# Patient Record
Sex: MALE | Race: BLACK OR AFRICAN AMERICAN | Employment: UNEMPLOYED | URBAN - METROPOLITAN AREA
[De-identification: names, ages, dates, MRNs, and addresses within clinical notes are randomized per-mention and may not be internally consistent; named-entity substitution may affect disease eponyms.]

---

## 2024-03-05 ENCOUNTER — APPOINTMENT (OUTPATIENT)
Dept: CT IMAGING | Age: 44
DRG: 253 | End: 2024-03-05
Payer: MEDICAID

## 2024-03-05 ENCOUNTER — HOSPITAL ENCOUNTER (INPATIENT)
Age: 44
LOS: 2 days | Discharge: ANOTHER HOSPITAL | DRG: 253 | End: 2024-03-07
Attending: STUDENT IN AN ORGANIZED HEALTH CARE EDUCATION/TRAINING PROGRAM | Admitting: FAMILY MEDICINE
Payer: MEDICAID

## 2024-03-05 DIAGNOSIS — K92.2 GASTROINTESTINAL HEMORRHAGE, UNSPECIFIED GASTROINTESTINAL HEMORRHAGE TYPE: Primary | ICD-10-CM

## 2024-03-05 DIAGNOSIS — Z71.89 GOALS OF CARE, COUNSELING/DISCUSSION: ICD-10-CM

## 2024-03-05 DIAGNOSIS — D64.9 ANEMIA, UNSPECIFIED TYPE: ICD-10-CM

## 2024-03-05 LAB
ABO + RH BLD: NORMAL
ACANTHOCYTES BLD QL SMEAR: ABNORMAL
ALP SERPL-CCNC: 44 U/L (ref 40–129)
ANION GAP SERPL CALCULATED.3IONS-SCNC: 12 MMOL/L (ref 3–16)
AST SERPL-CCNC: 18 U/L (ref 15–37)
BACTERIA URNS QL MICRO: ABNORMAL /HPF
BASO STIPL BLD QL SMEAR: ABNORMAL
BASOPHILS # BLD: 0 K/UL (ref 0–0.2)
BASOPHILS NFR BLD: 0 %
BILIRUB DIRECT SERPL-MCNC: <0.2 MG/DL (ref 0–0.3)
BILIRUB SERPL-MCNC: 1.1 MG/DL (ref 0–1)
BILIRUB UR QL STRIP.AUTO: ABNORMAL
BLD GP AB SCN SERPL QL: NORMAL
BLOOD BANK DISPENSE STATUS: NORMAL
BLOOD BANK DISPENSE STATUS: NORMAL
BLOOD BANK PRODUCT CODE: NORMAL
BLOOD BANK PRODUCT CODE: NORMAL
BUN SERPL-MCNC: 7 MG/DL (ref 7–20)
CALCIUM SERPL-MCNC: 9.4 MG/DL (ref 8.3–10.6)
CHLORIDE SERPL-SCNC: 103 MMOL/L (ref 99–110)
CLARITY UR: ABNORMAL
COLOR UR: ABNORMAL
CRYSTALS URNS MICRO: ABNORMAL /HPF
DESCRIPTION BLOOD BANK: NORMAL
EOSINOPHIL # BLD: 0 K/UL (ref 0–0.6)
EOSINOPHIL NFR BLD: 0 %
EPI CELLS #/AREA URNS HPF: ABNORMAL /HPF (ref 0–5)
GFR SERPLBLD CREATININE-BSD FMLA CKD-EPI: >60 ML/MIN/{1.73_M2}
GLUCOSE SERPL-MCNC: 96 MG/DL (ref 70–99)
GLUCOSE UR STRIP.AUTO-MCNC: NEGATIVE MG/DL
HEMOCCULT STL QL: ABNORMAL
HGB BLD-MCNC: 4.8 G/DL (ref 13.5–17.5)
HGB UR QL STRIP.AUTO: ABNORMAL
HYALINE CASTS #/AREA URNS LPF: ABNORMAL /LPF (ref 0–2)
HYPOCHROMIA BLD QL SMEAR: ABNORMAL
KETONES UR STRIP.AUTO-MCNC: ABNORMAL MG/DL
LEUKOCYTE ESTERASE UR QL STRIP.AUTO: ABNORMAL
LIPASE SERPL-CCNC: 28 U/L (ref 13–60)
LYMPHOCYTES # BLD: 6 K/UL (ref 1–5.1)
LYMPHOCYTES NFR BLD: 47 %
MCH RBC QN AUTO: 19.3 PG (ref 26–34)
MCHC RBC AUTO-ENTMCNC: 28.7 G/DL (ref 31–36)
MCV RBC AUTO: 67.3 FL (ref 80–100)
MONOCYTES # BLD: 0.5 K/UL (ref 0–1.3)
MONONUC CELLS NFR BLD MANUAL: 9 %
MUCOUS THREADS #/AREA URNS LPF: ABNORMAL /LPF
MYELOCYTES NFR BLD MANUAL: 4 %
NEUTROPHILS # BLD: 4.8 K/UL (ref 1.7–7.7)
NEUTROPHILS NFR BLD: 16 %
NEUTS BAND NFR BLD MANUAL: 16 % (ref 0–7)
NITRITE UR QL STRIP.AUTO: NEGATIVE
PH UR STRIP.AUTO: 5.5 [PH] (ref 5–8)
PLATELET # BLD AUTO: 95 K/UL (ref 135–450)
PMV BLD AUTO: 10.7 FL (ref 5–10.5)
POTASSIUM SERPL-SCNC: 4.1 MMOL/L (ref 3.5–5.1)
PROT SERPL-MCNC: 7.1 G/DL (ref 6.4–8.2)
PROT UR STRIP.AUTO-MCNC: 30 MG/DL
RBC # BLD AUTO: 2.49 M/UL (ref 4.2–5.9)
RBC #/AREA URNS HPF: ABNORMAL /HPF (ref 0–4)
REASON FOR REJECTION: NORMAL
REJECTED TEST: NORMAL
SCHISTOCYTES BLD QL SMEAR: ABNORMAL
SP GR UR STRIP.AUTO: 1.02 (ref 1–1.03)
TARGETS BLD QL SMEAR: ABNORMAL
UA COMPLETE W REFLEX CULTURE PNL UR: ABNORMAL
UA DIPSTICK W REFLEX MICRO PNL UR: YES
URN SPEC COLLECT METH UR: ABNORMAL
UROBILINOGEN UR STRIP-ACNC: 1 E.U./DL
VARIANT LYMPHS NFR BLD MANUAL: 1 % (ref 0–6)
WBC # BLD AUTO: 12.4 K/UL (ref 4–11)
WBC #/AREA URNS HPF: ABNORMAL /HPF (ref 0–5)

## 2024-03-05 PROCEDURE — 30233N1 TRANSFUSION OF NONAUTOLOGOUS RED BLOOD CELLS INTO PERIPHERAL VEIN, PERCUTANEOUS APPROACH: ICD-10-PCS | Performed by: FAMILY MEDICINE

## 2024-03-05 PROCEDURE — 96374 THER/PROPH/DIAG INJ IV PUSH: CPT

## 2024-03-05 PROCEDURE — 81001 URINALYSIS AUTO W/SCOPE: CPT

## 2024-03-05 PROCEDURE — 6360000002 HC RX W HCPCS: Performed by: STUDENT IN AN ORGANIZED HEALTH CARE EDUCATION/TRAINING PROGRAM

## 2024-03-05 PROCEDURE — 86900 BLOOD TYPING SEROLOGIC ABO: CPT

## 2024-03-05 PROCEDURE — 86880 COOMBS TEST DIRECT: CPT

## 2024-03-05 PROCEDURE — 86901 BLOOD TYPING SEROLOGIC RH(D): CPT

## 2024-03-05 PROCEDURE — 96375 TX/PRO/DX INJ NEW DRUG ADDON: CPT

## 2024-03-05 PROCEDURE — 82270 OCCULT BLOOD FECES: CPT

## 2024-03-05 PROCEDURE — 99285 EMERGENCY DEPT VISIT HI MDM: CPT

## 2024-03-05 PROCEDURE — C9113 INJ PANTOPRAZOLE SODIUM, VIA: HCPCS | Performed by: STUDENT IN AN ORGANIZED HEALTH CARE EDUCATION/TRAINING PROGRAM

## 2024-03-05 PROCEDURE — 74177 CT ABD & PELVIS W/CONTRAST: CPT

## 2024-03-05 PROCEDURE — A4216 STERILE WATER/SALINE, 10 ML: HCPCS | Performed by: STUDENT IN AN ORGANIZED HEALTH CARE EDUCATION/TRAINING PROGRAM

## 2024-03-05 PROCEDURE — 6360000004 HC RX CONTRAST MEDICATION: Performed by: STUDENT IN AN ORGANIZED HEALTH CARE EDUCATION/TRAINING PROGRAM

## 2024-03-05 PROCEDURE — 80048 BASIC METABOLIC PNL TOTAL CA: CPT

## 2024-03-05 PROCEDURE — 2580000003 HC RX 258: Performed by: STUDENT IN AN ORGANIZED HEALTH CARE EDUCATION/TRAINING PROGRAM

## 2024-03-05 PROCEDURE — 85025 COMPLETE CBC W/AUTO DIFF WBC: CPT

## 2024-03-05 PROCEDURE — 36415 COLL VENOUS BLD VENIPUNCTURE: CPT

## 2024-03-05 PROCEDURE — 83690 ASSAY OF LIPASE: CPT

## 2024-03-05 PROCEDURE — 86850 RBC ANTIBODY SCREEN: CPT

## 2024-03-05 PROCEDURE — 80076 HEPATIC FUNCTION PANEL: CPT

## 2024-03-05 PROCEDURE — 1200000000 HC SEMI PRIVATE

## 2024-03-05 PROCEDURE — P9016 RBC LEUKOCYTES REDUCED: HCPCS

## 2024-03-05 PROCEDURE — 6370000000 HC RX 637 (ALT 250 FOR IP): Performed by: STUDENT IN AN ORGANIZED HEALTH CARE EDUCATION/TRAINING PROGRAM

## 2024-03-05 PROCEDURE — 86923 COMPATIBILITY TEST ELECTRIC: CPT

## 2024-03-05 RX ORDER — ONDANSETRON 4 MG/1
4 TABLET, ORALLY DISINTEGRATING ORAL EVERY 8 HOURS PRN
Status: DISCONTINUED | OUTPATIENT
Start: 2024-03-05 | End: 2024-03-07 | Stop reason: HOSPADM

## 2024-03-05 RX ORDER — ACETAMINOPHEN 650 MG/1
650 SUPPOSITORY RECTAL EVERY 6 HOURS PRN
Status: DISCONTINUED | OUTPATIENT
Start: 2024-03-05 | End: 2024-03-07 | Stop reason: HOSPADM

## 2024-03-05 RX ORDER — SODIUM CHLORIDE 0.9 % (FLUSH) 0.9 %
5-40 SYRINGE (ML) INJECTION PRN
Status: DISCONTINUED | OUTPATIENT
Start: 2024-03-05 | End: 2024-03-07 | Stop reason: HOSPADM

## 2024-03-05 RX ORDER — LEVETIRACETAM 500 MG/5ML
1500 INJECTION, SOLUTION, CONCENTRATE INTRAVENOUS ONCE
Status: COMPLETED | OUTPATIENT
Start: 2024-03-05 | End: 2024-03-05

## 2024-03-05 RX ORDER — ONDANSETRON 2 MG/ML
4 INJECTION INTRAMUSCULAR; INTRAVENOUS ONCE
Status: COMPLETED | OUTPATIENT
Start: 2024-03-05 | End: 2024-03-05

## 2024-03-05 RX ORDER — SODIUM CHLORIDE 0.9 % (FLUSH) 0.9 %
5-40 SYRINGE (ML) INJECTION EVERY 12 HOURS SCHEDULED
Status: DISCONTINUED | OUTPATIENT
Start: 2024-03-05 | End: 2024-03-07 | Stop reason: HOSPADM

## 2024-03-05 RX ORDER — SODIUM CHLORIDE 9 MG/ML
INJECTION, SOLUTION INTRAVENOUS CONTINUOUS
Status: ACTIVE | OUTPATIENT
Start: 2024-03-06 | End: 2024-03-06

## 2024-03-05 RX ORDER — DICYCLOMINE HYDROCHLORIDE 10 MG/1
10 CAPSULE ORAL
Status: DISCONTINUED | OUTPATIENT
Start: 2024-03-05 | End: 2024-03-07 | Stop reason: HOSPADM

## 2024-03-05 RX ORDER — SODIUM CHLORIDE 9 MG/ML
INJECTION, SOLUTION INTRAVENOUS PRN
Status: DISCONTINUED | OUTPATIENT
Start: 2024-03-05 | End: 2024-03-07 | Stop reason: HOSPADM

## 2024-03-05 RX ORDER — ONDANSETRON 2 MG/ML
4 INJECTION INTRAMUSCULAR; INTRAVENOUS EVERY 6 HOURS PRN
Status: DISCONTINUED | OUTPATIENT
Start: 2024-03-05 | End: 2024-03-07 | Stop reason: HOSPADM

## 2024-03-05 RX ORDER — ACETAMINOPHEN 325 MG/1
650 TABLET ORAL EVERY 6 HOURS PRN
Status: DISCONTINUED | OUTPATIENT
Start: 2024-03-05 | End: 2024-03-07 | Stop reason: HOSPADM

## 2024-03-05 RX ORDER — LEVETIRACETAM 1000 MG/1
1500 TABLET ORAL 2 TIMES DAILY
Status: ON HOLD | COMMUNITY
Start: 2018-03-31 | End: 2024-08-02

## 2024-03-05 RX ADMIN — IOPAMIDOL 75 ML: 755 INJECTION, SOLUTION INTRAVENOUS at 16:44

## 2024-03-05 RX ADMIN — PANTOPRAZOLE SODIUM 40 MG: 40 INJECTION, POWDER, FOR SOLUTION INTRAVENOUS at 18:11

## 2024-03-05 RX ADMIN — ONDANSETRON 4 MG: 2 INJECTION INTRAMUSCULAR; INTRAVENOUS at 16:02

## 2024-03-05 RX ADMIN — DICYCLOMINE HYDROCHLORIDE 10 MG: 10 CAPSULE ORAL at 16:01

## 2024-03-05 RX ADMIN — LEVETIRACETAM 1500 MG: 100 INJECTION, SOLUTION INTRAVENOUS at 20:25

## 2024-03-05 ASSESSMENT — PAIN DESCRIPTION - LOCATION
LOCATION: ABDOMEN
LOCATION: ABDOMEN

## 2024-03-05 ASSESSMENT — LIFESTYLE VARIABLES
HOW OFTEN DO YOU HAVE A DRINK CONTAINING ALCOHOL: NEVER
HOW MANY STANDARD DRINKS CONTAINING ALCOHOL DO YOU HAVE ON A TYPICAL DAY: PATIENT DOES NOT DRINK

## 2024-03-05 ASSESSMENT — PAIN SCALES - GENERAL
PAINLEVEL_OUTOF10: 10
PAINLEVEL_OUTOF10: 6

## 2024-03-05 ASSESSMENT — PAIN DESCRIPTION - DESCRIPTORS
DESCRIPTORS: ACHING
DESCRIPTORS: CRAMPING

## 2024-03-05 ASSESSMENT — PAIN - FUNCTIONAL ASSESSMENT
PAIN_FUNCTIONAL_ASSESSMENT: ACTIVITIES ARE NOT PREVENTED
PAIN_FUNCTIONAL_ASSESSMENT: 0-10

## 2024-03-05 ASSESSMENT — PAIN DESCRIPTION - ORIENTATION: ORIENTATION: LOWER

## 2024-03-05 NOTE — ED PROVIDER NOTES
[facility] EMERGENCY DEPARTMENT      EMERGENCY MEDICINE     Pt Name: Dennys Peguero  MRN: [id number]  Birthdate 1980  Date of evaluation: 3/5/2024  Provider: Mikel Smith MD    CHIEF COMPLAINT       Chief Complaint   Patient presents with    Abdominal Pain     Pt states \"I am having constant abd pain and burning pain when I pee that has lasted about a month, it has progressively gotten worse.\" Pt rates pain 10/10. Pt states \"I am feeling more tired than normal recently.\"      HISTORY OF PRESENT ILLNESS   Dennys Peguero is a 43 y.o. male who presents to the emergency department for abdominal pain the last month or worsening last few days and mentioned that mainly in his right lower quadrant region as well as having some pain with urination and more frequency than usual.  No hematuria no testicular pain no penile discharge.  No nausea no vomiting but does mention some loose/occasional watery nonbloody diarrhea no new foods or recent travel.     PASTMEDICAL HISTORY   No past medical history on file.    There is no problem list on file for this patient.    SURGICAL HISTORY     No past surgical history on file.    CURRENT MEDICATIONS       Previous Medications    LEVETIRACETAM (KEPPRA) 1000 MG TABLET    Take 1.5 tablets by mouth 2 times daily       ALLERGIES     has No Known Allergies.    FAMILY HISTORY     has no family status information on file.        SOCIAL HISTORY          PHYSICAL EXAM       ED Triage Vitals [03/05/24 1336]   BP Temp Temp Source Pulse Respirations SpO2 Height Weight - Scale   112/69 99 °F (37.2 °C) Oral 88 16 100 % 1.803 m (5' 11\") 82.5 kg (181 lb 14.1 oz)     Physical Exam  Vitals and nursing note reviewed.   Constitutional:       General: He is not in acute distress.     Appearance: He is not toxic-appearing or diaphoretic.   HENT:      Head: Normocephalic and atraumatic.      Right Ear: External ear normal.      Left Ear: External ear normal.      Nose: Nose

## 2024-03-06 ENCOUNTER — ANESTHESIA EVENT (OUTPATIENT)
Dept: ENDOSCOPY | Age: 44
DRG: 253 | End: 2024-03-06
Payer: MEDICAID

## 2024-03-06 ENCOUNTER — ANESTHESIA (OUTPATIENT)
Dept: ENDOSCOPY | Age: 44
DRG: 253 | End: 2024-03-06
Payer: MEDICAID

## 2024-03-06 LAB
ACANTHOCYTES BLD QL SMEAR: ABNORMAL
ALBUMIN SERPL-MCNC: 3.5 G/DL (ref 3.4–5)
ALBUMIN/GLOB SERPL: 1.5 {RATIO} (ref 1.1–2.2)
ALP SERPL-CCNC: 37 U/L (ref 40–129)
ALP SERPL-CCNC: 39 U/L (ref 40–129)
ALT SERPL-CCNC: 6 U/L (ref 10–40)
ALT SERPL-CCNC: 7 U/L (ref 10–40)
ANION GAP SERPL CALCULATED.3IONS-SCNC: 11 MMOL/L (ref 3–16)
ANION GAP SERPL CALCULATED.3IONS-SCNC: 9 MMOL/L (ref 3–16)
ANISOCYTOSIS BLD QL SMEAR: ABNORMAL
APTT BLD: 30.5 SEC (ref 22.7–35.9)
AST SERPL-CCNC: 11 U/L (ref 15–37)
AST SERPL-CCNC: 13 U/L (ref 15–37)
BASO STIPL BLD QL SMEAR: ABNORMAL
BASOPHILS # BLD: 0 K/UL (ref 0–0.2)
BASOPHILS NFR BLD: 0 %
BILIRUB INDIRECT SERPL-MCNC: ABNORMAL MG/DL (ref 0–1)
BILIRUB SERPL-MCNC: 0.9 MG/DL (ref 0–1)
BLOOD BANK DISPENSE STATUS: NORMAL
BLOOD BANK PRODUCT CODE: NORMAL
BPU ID: NORMAL
BUN SERPL-MCNC: 7 MG/DL (ref 7–20)
BUN SERPL-MCNC: 8 MG/DL (ref 7–20)
C3 SERPL-MCNC: 101.9 MG/DL (ref 90–180)
C4 SERPL-MCNC: 31 MG/DL (ref 10–40)
CALCIUM SERPL-MCNC: 8.9 MG/DL (ref 8.3–10.6)
CHLORIDE SERPL-SCNC: 106 MMOL/L (ref 99–110)
CHLORIDE SERPL-SCNC: 106 MMOL/L (ref 99–110)
CO2 SERPL-SCNC: 24 MMOL/L (ref 21–32)
CREAT SERPL-MCNC: 0.8 MG/DL (ref 0.9–1.3)
CREAT SERPL-MCNC: 0.9 MG/DL (ref 0.9–1.3)
DACRYOCYTES BLD QL SMEAR: ABNORMAL
DEPRECATED RDW RBC AUTO: 35.5 % (ref 12.4–15.4)
DEPRECATED RDW RBC AUTO: 37 % (ref 12.4–15.4)
DESCRIPTION BLOOD BANK: NORMAL
EOSINOPHIL # BLD: 0.3 K/UL (ref 0–0.6)
EOSINOPHIL NFR BLD: 3 %
FERRITIN SERPL IA-MCNC: 557.7 NG/ML (ref 30–400)
FIBRINOGEN PPP-MCNC: 328 MG/DL (ref 243–550)
GFR SERPLBLD CREATININE-BSD FMLA CKD-EPI: >60 ML/MIN/{1.73_M2}
GFR SERPLBLD CREATININE-BSD FMLA CKD-EPI: >60 ML/MIN/{1.73_M2}
GLUCOSE SERPL-MCNC: 80 MG/DL (ref 70–99)
GLUCOSE SERPL-MCNC: 94 MG/DL (ref 70–99)
HAPTOGLOB SERPL-MCNC: <10 MG/DL (ref 30–200)
HCT VFR BLD AUTO: 22.6 % (ref 40.5–52.5)
HCT VFR BLD AUTO: 23.2 % (ref 40.5–52.5)
HCT VFR BLD AUTO: 24.1 % (ref 40.5–52.5)
HCT VFR BLD AUTO: 24.4 % (ref 40.5–52.5)
HCT VFR BLD AUTO: 25.8 % (ref 40.5–52.5)
HGB BLD-MCNC: 6.8 G/DL (ref 13.5–17.5)
HGB BLD-MCNC: 7.4 G/DL (ref 13.5–17.5)
HGB BLD-MCNC: 7.6 G/DL (ref 13.5–17.5)
HGB BLD-MCNC: 7.6 G/DL (ref 13.5–17.5)
HGB BLD-MCNC: 8 G/DL (ref 13.5–17.5)
HYPOCHROMIA BLD QL SMEAR: ABNORMAL
INR PPP: 1.2 (ref 0.84–1.16)
IRON SATN MFR SERPL: 52 % (ref 20–50)
IRON SERPL-MCNC: 86 UG/DL (ref 59–158)
LDH SERPL L TO P-CCNC: 1017 U/L (ref 100–190)
LYMPHOCYTES # BLD: 6 K/UL (ref 1–5.1)
LYMPHOCYTES NFR BLD: 54 %
MCH RBC QN AUTO: 23.4 PG (ref 26–34)
MCH RBC QN AUTO: 23.5 PG (ref 26–34)
MCHC RBC AUTO-ENTMCNC: 31.5 G/DL (ref 31–36)
MCV RBC AUTO: 74.5 FL (ref 80–100)
MCV RBC AUTO: 75.9 FL (ref 80–100)
METAMYELOCYTES NFR BLD MANUAL: 1 %
MONOCYTES # BLD: 0.8 K/UL (ref 0–1.3)
MONOCYTES NFR BLD: 7 %
MONONUC CELLS NFR BLD MANUAL: 11 %
MYELOCYTES NFR BLD MANUAL: 1 %
NEUTROPHILS # BLD: 2.8 K/UL (ref 1.7–7.7)
NEUTROPHILS NFR BLD: 21 %
NEUTS BAND NFR BLD MANUAL: 2 % (ref 0–7)
NRBC BLD-RTO: 16 /100 WBC
OVALOCYTES BLD QL SMEAR: ABNORMAL
PATH INTERP BLD-IMP: YES
PLATELET # BLD AUTO: 52 K/UL (ref 135–450)
PLATELET # BLD AUTO: 54 K/UL (ref 135–450)
PLATELET BLD QL SMEAR: ABNORMAL
PMV BLD AUTO: 8.8 FL (ref 5–10.5)
POIKILOCYTOSIS BLD QL SMEAR: ABNORMAL
POTASSIUM SERPL-SCNC: 3.6 MMOL/L (ref 3.5–5.1)
POTASSIUM SERPL-SCNC: 3.9 MMOL/L (ref 3.5–5.1)
PROT SERPL-MCNC: 5.8 G/DL (ref 6.4–8.2)
PROT SERPL-MCNC: 6 G/DL (ref 6.4–8.2)
PROTHROMBIN TIME: 15.2 SEC (ref 11.5–14.8)
RBC # BLD AUTO: 3.24 M/UL (ref 4.2–5.9)
RBC # BLD AUTO: 3.4 M/UL (ref 4.2–5.9)
SCHISTOCYTES BLD QL SMEAR: ABNORMAL
SLIDE REVIEW: ABNORMAL
SODIUM SERPL-SCNC: 138 MMOL/L (ref 136–145)
SODIUM SERPL-SCNC: 141 MMOL/L (ref 136–145)
TIBC SERPL-MCNC: 164 UG/DL (ref 260–445)
WBC # BLD AUTO: 11.1 K/UL (ref 4–11)
WBC # BLD AUTO: 6.3 K/UL (ref 4–11)

## 2024-03-06 PROCEDURE — 6370000000 HC RX 637 (ALT 250 FOR IP): Performed by: STUDENT IN AN ORGANIZED HEALTH CARE EDUCATION/TRAINING PROGRAM

## 2024-03-06 PROCEDURE — 85018 HEMOGLOBIN: CPT

## 2024-03-06 PROCEDURE — 6370000000 HC RX 637 (ALT 250 FOR IP): Performed by: INTERNAL MEDICINE

## 2024-03-06 PROCEDURE — 6360000002 HC RX W HCPCS: Performed by: STUDENT IN AN ORGANIZED HEALTH CARE EDUCATION/TRAINING PROGRAM

## 2024-03-06 PROCEDURE — 83010 ASSAY OF HAPTOGLOBIN QUANT: CPT

## 2024-03-06 PROCEDURE — 85025 COMPLETE CBC W/AUTO DIFF WBC: CPT

## 2024-03-06 PROCEDURE — 1200000000 HC SEMI PRIVATE

## 2024-03-06 PROCEDURE — 36430 TRANSFUSION BLD/BLD COMPNT: CPT

## 2024-03-06 PROCEDURE — 99152 MOD SED SAME PHYS/QHP 5/>YRS: CPT | Performed by: INTERNAL MEDICINE

## 2024-03-06 PROCEDURE — 85397 CLOTTING FUNCT ACTIVITY: CPT

## 2024-03-06 PROCEDURE — 88184 FLOWCYTOMETRY/ TC 1 MARKER: CPT

## 2024-03-06 PROCEDURE — 85610 PROTHROMBIN TIME: CPT

## 2024-03-06 PROCEDURE — 2580000003 HC RX 258: Performed by: FAMILY MEDICINE

## 2024-03-06 PROCEDURE — 94760 N-INVAS EAR/PLS OXIMETRY 1: CPT

## 2024-03-06 PROCEDURE — 88185 FLOWCYTOMETRY/TC ADD-ON: CPT

## 2024-03-06 PROCEDURE — 85730 THROMBOPLASTIN TIME PARTIAL: CPT

## 2024-03-06 PROCEDURE — C9113 INJ PANTOPRAZOLE SODIUM, VIA: HCPCS | Performed by: STUDENT IN AN ORGANIZED HEALTH CARE EDUCATION/TRAINING PROGRAM

## 2024-03-06 PROCEDURE — 0DJ08ZZ INSPECTION OF UPPER INTESTINAL TRACT, VIA NATURAL OR ARTIFICIAL OPENING ENDOSCOPIC: ICD-10-PCS | Performed by: INTERNAL MEDICINE

## 2024-03-06 PROCEDURE — 7100000000 HC PACU RECOVERY - FIRST 15 MIN: Performed by: INTERNAL MEDICINE

## 2024-03-06 PROCEDURE — 85014 HEMATOCRIT: CPT

## 2024-03-06 PROCEDURE — 83615 LACTATE (LD) (LDH) ENZYME: CPT

## 2024-03-06 PROCEDURE — 2580000003 HC RX 258: Performed by: STUDENT IN AN ORGANIZED HEALTH CARE EDUCATION/TRAINING PROGRAM

## 2024-03-06 PROCEDURE — 6360000002 HC RX W HCPCS: Performed by: INTERNAL MEDICINE

## 2024-03-06 PROCEDURE — 86160 COMPLEMENT ANTIGEN: CPT

## 2024-03-06 PROCEDURE — 6370000000 HC RX 637 (ALT 250 FOR IP): Performed by: PHYSICIAN ASSISTANT

## 2024-03-06 PROCEDURE — 36415 COLL VENOUS BLD VENIPUNCTURE: CPT

## 2024-03-06 PROCEDURE — 3609017100 HC EGD: Performed by: INTERNAL MEDICINE

## 2024-03-06 PROCEDURE — 83550 IRON BINDING TEST: CPT

## 2024-03-06 PROCEDURE — 80053 COMPREHEN METABOLIC PANEL: CPT

## 2024-03-06 PROCEDURE — 85384 FIBRINOGEN ACTIVITY: CPT

## 2024-03-06 PROCEDURE — 2709999900 HC NON-CHARGEABLE SUPPLY: Performed by: INTERNAL MEDICINE

## 2024-03-06 PROCEDURE — 82728 ASSAY OF FERRITIN: CPT

## 2024-03-06 PROCEDURE — 85027 COMPLETE CBC AUTOMATED: CPT

## 2024-03-06 PROCEDURE — 83540 ASSAY OF IRON: CPT

## 2024-03-06 RX ORDER — FENTANYL CITRATE 50 UG/ML
INJECTION, SOLUTION INTRAMUSCULAR; INTRAVENOUS PRN
Status: DISCONTINUED | OUTPATIENT
Start: 2024-03-06 | End: 2024-03-06 | Stop reason: HOSPADM

## 2024-03-06 RX ORDER — LEVETIRACETAM 500 MG/1
1500 TABLET ORAL 2 TIMES DAILY
Status: DISCONTINUED | OUTPATIENT
Start: 2024-03-06 | End: 2024-03-07 | Stop reason: HOSPADM

## 2024-03-06 RX ORDER — MIDAZOLAM HYDROCHLORIDE 1 MG/ML
INJECTION INTRAMUSCULAR; INTRAVENOUS PRN
Status: DISCONTINUED | OUTPATIENT
Start: 2024-03-06 | End: 2024-03-06 | Stop reason: HOSPADM

## 2024-03-06 RX ORDER — SODIUM CHLORIDE 9 MG/ML
INJECTION, SOLUTION INTRAVENOUS PRN
Status: DISCONTINUED | OUTPATIENT
Start: 2024-03-06 | End: 2024-03-07 | Stop reason: HOSPADM

## 2024-03-06 RX ADMIN — Medication 10 ML: at 00:20

## 2024-03-06 RX ADMIN — POLYETHYLENE GLYCOL-3350 AND ELECTROLYTES 4000 ML: 236; 6.74; 5.86; 2.97; 22.74 POWDER, FOR SOLUTION ORAL at 09:10

## 2024-03-06 RX ADMIN — Medication 10 ML: at 09:12

## 2024-03-06 RX ADMIN — DICYCLOMINE HYDROCHLORIDE 10 MG: 10 CAPSULE ORAL at 18:12

## 2024-03-06 RX ADMIN — SODIUM CHLORIDE: 9 INJECTION, SOLUTION INTRAVENOUS at 00:19

## 2024-03-06 RX ADMIN — DICYCLOMINE HYDROCHLORIDE 10 MG: 10 CAPSULE ORAL at 06:16

## 2024-03-06 RX ADMIN — LEVETIRACETAM 1500 MG: 500 TABLET, FILM COATED ORAL at 21:01

## 2024-03-06 RX ADMIN — PANTOPRAZOLE SODIUM 40 MG: 40 INJECTION, POWDER, FOR SOLUTION INTRAVENOUS at 09:11

## 2024-03-06 ASSESSMENT — PAIN SCALES - GENERAL
PAINLEVEL_OUTOF10: 0
PAINLEVEL_OUTOF10: 0

## 2024-03-06 ASSESSMENT — PAIN - FUNCTIONAL ASSESSMENT: PAIN_FUNCTIONAL_ASSESSMENT: NONE - DENIES PAIN

## 2024-03-06 NOTE — PLAN OF CARE
Problem: Discharge Planning  Goal: Discharge to home or other facility with appropriate resources  3/6/2024 1026 by Luis Barnhart RN  Outcome: Progressing  Flowsheets (Taken 3/6/2024 1024)  Discharge to home or other facility with appropriate resources:   Identify barriers to discharge with patient and caregiver   Arrange for needed discharge resources and transportation as appropriate  3/6/2024 0248 by Saba Berumen RN  Outcome: Progressing     Problem: Pain  Goal: Verbalizes/displays adequate comfort level or baseline comfort level  3/6/2024 1026 by Luis Barnhart RN  Outcome: Progressing  3/6/2024 0248 by Saba Berumen RN  Outcome: Progressing     Problem: Safety - Adult  Goal: Free from fall injury  3/6/2024 1026 by Luis Barnhart RN  Outcome: Progressing  3/6/2024 0248 by Saba Berumen RN  Outcome: Progressing     Problem: Nutrition Deficit:  Goal: Optimize nutritional status  Outcome: Progressing   Electronically signed by LUIS BARNHART RN on 3/6/2024 at 10:26 AM

## 2024-03-06 NOTE — H&P
Hospital Medicine History & Physical      Date of Admission: 3/5/2024    Date of Service:  Pt seen/examined on 3/5/24     [x]Admitted to Inpatient with expected LOS greater than two midnights due to medical therapy.  []Placed in Observation status.    Chief Admission Complaint: GI bleed    Presenting Admission History:      43 y.o. male with past medical history of brain tumor status post partial resection, on prophylactic antiepileptic, who presents to the ER complaining of worsening fatigue over the last several days.  Patient initially attributed the fatigue to recent stomach infection however his energy continued to get worse prompting him to come to the ER for evaluation.  Patient states he does remember that he had blood-tinged stools intermittently over the last several weeks. In the ER, laboratory testing showed a hemoglobin of 4.8.      Assessment/Plan:    GI bleed  Brain tumor  Thrombocytopenia    Plan  GI bleed: Patient is being transfused 2 units of packed red blood cells in the ER, will continue to monitor H&H.  Place on IV Protonix.  Consult gastroenterology for further recommendations.    History of brain tumor: Patient has a history of partial resection of tumor and had to take antiepileptics prophylactically.  Continue Keppra 1500 mg twice daily.    Thrombocytopenia: Will continue to monitor closely. Anticoagulation already on hold.    Discussed management and the need for Hospitalization of the patient w/ the Emergency Department Provider.      Physical Exam Performed:      BP (!) 109/58   Pulse 78   Temp 98.3 °F (36.8 °C) (Oral)   Resp 19   Ht 1.803 m (5' 11\")   Wt 82.5 kg (181 lb 14.1 oz)   SpO2 99%   BMI 25.37 kg/m²     General appearance:  No apparent distress, appears stated age and cooperative.  HEENT:  Pupils equal, round, and reactive to light. Conjunctivae/corneas clear.  Respiratory:  Normal respiratory effort. Clear to auscultation, bilaterally without

## 2024-03-06 NOTE — CONSULTS
[facility]    Consult Note      Requesting Physician:  Jeanette Smith MD    CHIEF COMPLAINT:  anemia/TCP      HISTORY OF PRESENT ILLNESS:    Mr. Peguero  is a 43 y.o. male we are seeing in consultation for anemia/TCP.  This is a patient that presented yesterday with a 3 to 4-week history of progressive bloody stools.  His hemoglobin on admission was 4.8 with a platelet count in the 90s. He then received 2 to 3 units of blood and his hemoglobin is up to 7.6 but his platelets did drop down to 52.  Hematology was consulted for further workup and management.  He is not on any blood thinners.  He does not drink excessive alcohol.  Pathology reviewed his smear this afternoon and I got a call letting me know that he had several schistocytes-at least 5 per high-power field.  The patient is otherwise doing well.  He feels much better now that he has gotten some blood.  He denies any shortness of breath or chest pain or further rectal bleeding.    Past Medical History:  Past Medical History:   Diagnosis Date    Brain cancer (HCC)     diagnosed in 2017    Seizure (HCC)        Past Surgical History:  Past Surgical History:   Procedure Laterality Date    BRAIN SURGERY      Surgery in 2017 at [facility]       Current Medications:  Current Facility-Administered Medications   Medication Dose Route Frequency Provider Last Rate Last Admin    0.9 % sodium chloride infusion   IntraVENous PRN Ban Barriga APRN - CNP        levETIRAcetam (KEPPRA) tablet 1,500 mg  1,500 mg Oral BID Tania Juárez MD        dicyclomine (BENTYL) capsule 10 mg  10 mg Oral TID AC Mikel Smith MD   10 mg at 03/06/24 0616    0.9 % sodium chloride infusion   IntraVENous PRN Mikel Smith MD        pantoprazole (PROTONIX) 40 mg in sodium chloride (PF) 0.9 % 10 mL injection  40 mg IntraVENous Daily 
(3/6/2024)    PRAPARE - Transportation     Lack of Transportation (Medical): No     Lack of Transportation (Non-Medical): No   Housing Stability: Low Risk  (3/6/2024)    Housing Stability Vital Sign     Unable to Pay for Housing in the Last Year: No     Number of Places Lived in the Last Year: 1     Unstable Housing in the Last Year: No       MEDICATIONS   SCHEDULED:  levETIRAcetam, 1,500 mg, BID  dicyclomine, 10 mg, TID AC  pantoprazole (PROTONIX) 40 mg in sodium chloride (PF) 0.9 % 10 mL injection, 40 mg, Daily  sodium chloride flush, 5-40 mL, 2 times per day      FLUIDS/DRIPS:     sodium chloride      sodium chloride      sodium chloride      sodium chloride 75 mL/hr at 03/06/24 0019     PRNs: sodium chloride, , PRN  sodium chloride, , PRN  sodium chloride flush, 5-40 mL, PRN  sodium chloride, , PRN  ondansetron, 4 mg, Q8H PRN   Or  ondansetron, 4 mg, Q6H PRN  acetaminophen, 650 mg, Q6H PRN   Or  acetaminophen, 650 mg, Q6H PRN      ALLERGIES:  He   Allergies   Allergen Reactions    [place] Meal Anaphylaxis       REVIEW OF SYSTEMS   Pertinent ROS noted in HPI    PHYSICAL EXAM     Vitals:    03/06/24 0222 03/06/24 0507 03/06/24 0638 03/06/24 0711   BP: (!) 108/56 (!) 109/58  115/72   Pulse: 82 78  62   Resp: 18 19  17   Temp: 98.5 °F (36.9 °C) 98.3 °F (36.8 °C)  98.6 °F (37 °C)   TempSrc: Oral Oral  Oral   SpO2: 98% 99%  98%   Weight:   80.5 kg (177 lb 7.5 oz)    Height:           I/O last 3 completed shifts:  In: 680 [Blood:680]  Out: -       Physical Exam:  General appearance: alert, cooperative, no distress, appears stated age  Eyes: Anicteric  Head: Normocephalic, without obvious abnormality  Lungs: clear to auscultation bilaterally, Normal Effort  Heart: regular rate and rhythm, normal S1 and S2, no murmurs or rubs  Abdomen: soft, non-distended, non-tender. Bowel sounds normal. No masses,  no organomegaly.   Extremities: atraumatic, no cyanosis or edema  Skin: warm and dry, no jaundice  Neuro: Grossly intact,

## 2024-03-06 NOTE — CARE COORDINATION
Chart reviewed; noted no PCP listed and no insurance. Patient states he has a [facility] PCP and has seen the financial counselor re; medicaid application. He denies any needs at this time.   Electronically signed by CIRA Tomas on 3/6/2024 at 5:33 PM

## 2024-03-06 NOTE — OP NOTE
Endoscopy Note    Patient: Dennys Peguero  : 1980  Acct#:     Procedure: Esophagogastroduodenoscopy    Date:  3/6/2024     Surgeon:  Wade Cantrell MD,     Referring Physician:  Dr. Smith    Indications: This is a 43 y.o. year old male who presents today with melena vs. Hematochezia and acute blood loss anemia for EGD.    Anesthesia:  TIVA    Description of Procedure:  Informed consent was obtained from the patient after explanation of indications, benefits and possible risks and complications of the procedure.  The patient was then taken to the endoscopy suite, placed in the left lateral decubitus position and the above IV sedation was administrered.    The Olympus videoendoscope was placed in the patient's mouth and under direct visualization passed into the esophagus and advanced without difficulty to the 2nd portion of the duodenum.  Views were good, patient toleration was good.  Retroflexion was performed in the stomach.      Findings:  1.  The esophagus appeared normal without evidence of Matamoros's esophagus or reflux esophagitis. No varices.  2.  Normal stomach.   3.  Normal duodenum.     The scope was then withdrawn back into the stomach, it was decompressed, and the scope was completely withdrawn.    The patient tolerated the procedure well and was taken to the post anesthesia care unit in good condition.    Estimated blood loss: none  Specimens taken: none    Impression:    Normal EGD. No cause of bleeding      Recommendations:   1.  Clear liquid diet,   2.  Complete bowel prep today and plan colonoscopy at 9am tomorrow when bowels cleared out.        Wade Cantrell MD,   [facility]

## 2024-03-07 ENCOUNTER — HOSPITAL ENCOUNTER (INPATIENT)
Age: 44
LOS: 26 days | Discharge: HOME | DRG: 690 | End: 2024-04-02
Attending: STUDENT IN AN ORGANIZED HEALTH CARE EDUCATION/TRAINING PROGRAM | Admitting: STUDENT IN AN ORGANIZED HEALTH CARE EDUCATION/TRAINING PROGRAM
Payer: MEDICAID

## 2024-03-07 VITALS
HEIGHT: 71 IN | OXYGEN SATURATION: 99 % | WEIGHT: 177.47 LBS | TEMPERATURE: 97.7 F | DIASTOLIC BLOOD PRESSURE: 81 MMHG | BODY MASS INDEX: 24.85 KG/M2 | RESPIRATION RATE: 18 BRPM | HEART RATE: 51 BPM | SYSTOLIC BLOOD PRESSURE: 136 MMHG

## 2024-03-07 DIAGNOSIS — K92.1 HEMATOCHEZIA: ICD-10-CM

## 2024-03-07 PROBLEM — D69.6 THROMBOCYTOPENIA (HCC): Status: ACTIVE | Noted: 2024-03-07

## 2024-03-07 PROBLEM — M31.19 TTP (THROMBOTIC THROMBOCYTOPENIC PURPURA) (HCC): Status: ACTIVE | Noted: 2024-03-07

## 2024-03-07 LAB
ABO + RH BLD: NORMAL
ACANTHOCYTES BLD QL SMEAR: ABNORMAL
ALBUMIN SERPL-MCNC: 3.5 G/DL (ref 3.4–5)
ALBUMIN/GLOB SERPL: 1.6 {RATIO} (ref 1.1–2.2)
ALP SERPL-CCNC: 36 U/L (ref 40–129)
ALT SERPL-CCNC: 6 U/L (ref 10–40)
ANION GAP SERPL CALCULATED.3IONS-SCNC: 10 MMOL/L (ref 3–16)
ANISOCYTOSIS BLD QL SMEAR: ABNORMAL
APTT BLD: 29.7 SEC (ref 22.7–35.9)
AST SERPL-CCNC: 13 U/L (ref 15–37)
BASOPHILS # BLD: 0 K/UL (ref 0–0.2)
BASOPHILS NFR BLD: 0 %
BILIRUB SERPL-MCNC: 1 MG/DL (ref 0–1)
BLD GP AB SCN SERPL QL: NORMAL
C DIFF TOX A+B STL QL IA: NORMAL
CALCIUM SERPL-MCNC: 8.8 MG/DL (ref 8.3–10.6)
CHLORIDE SERPL-SCNC: 108 MMOL/L (ref 99–110)
CO2 SERPL-SCNC: 22 MMOL/L (ref 21–32)
CREAT SERPL-MCNC: 0.8 MG/DL (ref 0.9–1.3)
D DIMER: 1.91 UG/ML FEU (ref 0–0.6)
DACRYOCYTES BLD QL SMEAR: ABNORMAL
DAT POLY-SP REAG RBC QL: NORMAL
DEPRECATED RDW RBC AUTO: 37.2 % (ref 12.4–15.4)
EOSINOPHIL # BLD: 0.1 K/UL (ref 0–0.6)
EOSINOPHIL NFR BLD: 1 %
FIBRINOGEN PPP-MCNC: 376 MG/DL (ref 243–550)
FOLATE SERPL-MCNC: >20 NG/ML (ref 4.78–24.2)
GFR SERPLBLD CREATININE-BSD FMLA CKD-EPI: >60 ML/MIN/{1.73_M2}
GLUCOSE SERPL-MCNC: 85 MG/DL (ref 70–99)
HAPTOGLOB SERPL-MCNC: <10 MG/DL (ref 30–200)
HCT VFR BLD AUTO: 23.6 % (ref 40.5–52.5)
HCT VFR BLD AUTO: 25.7 % (ref 40.5–52.5)
HCT VFR BLD AUTO: 26.1 % (ref 40.5–52.5)
HGB BLD-MCNC: 7.6 G/DL (ref 13.5–17.5)
HGB BLD-MCNC: 8 G/DL (ref 13.5–17.5)
HYPERCHROMIA BLD QL SMEAR: ABNORMAL
IMMATURE RETIC FRACT: 0.43 (ref 0.21–0.37)
INR PPP: 1.08 (ref 0.84–1.16)
LYMPHOCYTES # BLD: 3.7 K/UL (ref 1–5.1)
LYMPHOCYTES NFR BLD: 56 %
MCH RBC QN AUTO: 23.9 PG (ref 26–34)
MCV RBC AUTO: 74.4 FL (ref 80–100)
MONOCYTES NFR BLD: 10 %
NEUTROPHILS # BLD: 1.6 K/UL (ref 1.7–7.7)
NEUTROPHILS NFR BLD: 9 %
NEUTS BAND NFR BLD MANUAL: 17 % (ref 0–7)
NRBC BLD-RTO: 21 /100 WBC
OVALOCYTES BLD QL SMEAR: ABNORMAL
PATH INTERP BLD-IMP: NO
PATH INTERP BLD-IMP: NORMAL
PLATELET # BLD AUTO: 68 K/UL (ref 135–450)
PLATELET BLD QL SMEAR: ABNORMAL
PMV BLD AUTO: 10.9 FL (ref 5–10.5)
POIKILOCYTOSIS BLD QL SMEAR: ABNORMAL
POLYCHROMASIA BLD QL SMEAR: ABNORMAL
POTASSIUM SERPL-SCNC: 3.8 MMOL/L (ref 3.5–5.1)
PROT SERPL-MCNC: 5.7 G/DL (ref 6.4–8.2)
PROTHROMBIN TIME: 14 SEC (ref 11.5–14.8)
RBC # BLD AUTO: 3.17 M/UL (ref 4.2–5.9)
RETICS # AUTO: 0.11 M/UL
RETICS/RBC NFR AUTO: 3.22 % (ref 0.5–2.18)
SCHISTOCYTES BLD QL SMEAR: ABNORMAL
SLIDE REVIEW: ABNORMAL
SODIUM SERPL-SCNC: 140 MMOL/L (ref 136–145)
VARIANT LYMPHS NFR BLD MANUAL: 6 % (ref 0–6)
VIT B12 SERPL-MCNC: 411 PG/ML (ref 211–911)
WBC # BLD AUTO: 5.9 K/UL (ref 4–11)

## 2024-03-07 PROCEDURE — 85384 FIBRINOGEN ACTIVITY: CPT

## 2024-03-07 PROCEDURE — 86923 COMPATIBILITY TEST ELECTRIC: CPT

## 2024-03-07 PROCEDURE — 83615 LACTATE (LD) (LDH) ENZYME: CPT

## 2024-03-07 PROCEDURE — 36415 COLL VENOUS BLD VENIPUNCTURE: CPT

## 2024-03-07 PROCEDURE — 6370000000 HC RX 637 (ALT 250 FOR IP): Performed by: STUDENT IN AN ORGANIZED HEALTH CARE EDUCATION/TRAINING PROGRAM

## 2024-03-07 PROCEDURE — 94150 VITAL CAPACITY TEST: CPT

## 2024-03-07 PROCEDURE — 82607 VITAMIN B-12: CPT

## 2024-03-07 PROCEDURE — 85014 HEMATOCRIT: CPT

## 2024-03-07 PROCEDURE — 30233K1 TRANSFUSION OF NONAUTOLOGOUS FROZEN PLASMA INTO PERIPHERAL VEIN, PERCUTANEOUS APPROACH: ICD-10-PCS | Performed by: STUDENT IN AN ORGANIZED HEALTH CARE EDUCATION/TRAINING PROGRAM

## 2024-03-07 PROCEDURE — 2580000003 HC RX 258: Performed by: INTERNAL MEDICINE

## 2024-03-07 PROCEDURE — P9040 RBC LEUKOREDUCED IRRADIATED: HCPCS

## 2024-03-07 PROCEDURE — 87449 NOS EACH ORGANISM AG IA: CPT

## 2024-03-07 PROCEDURE — 85610 PROTHROMBIN TIME: CPT

## 2024-03-07 PROCEDURE — 83010 ASSAY OF HAPTOGLOBIN QUANT: CPT

## 2024-03-07 PROCEDURE — 2580000003 HC RX 258: Performed by: NURSE PRACTITIONER

## 2024-03-07 PROCEDURE — 85025 COMPLETE CBC W/AUTO DIFF WBC: CPT

## 2024-03-07 PROCEDURE — 87506 IADNA-DNA/RNA PROBE TQ 6-11: CPT

## 2024-03-07 PROCEDURE — 6370000000 HC RX 637 (ALT 250 FOR IP): Performed by: NURSE PRACTITIONER

## 2024-03-07 PROCEDURE — 86850 RBC ANTIBODY SCREEN: CPT

## 2024-03-07 PROCEDURE — 86900 BLOOD TYPING SEROLOGIC ABO: CPT

## 2024-03-07 PROCEDURE — 87324 CLOSTRIDIUM AG IA: CPT

## 2024-03-07 PROCEDURE — 6370000000 HC RX 637 (ALT 250 FOR IP): Performed by: INTERNAL MEDICINE

## 2024-03-07 PROCEDURE — 87493 C DIFF AMPLIFIED PROBE: CPT

## 2024-03-07 PROCEDURE — 94760 N-INVAS EAR/PLS OXIMETRY 1: CPT

## 2024-03-07 PROCEDURE — 82746 ASSAY OF FOLIC ACID SERUM: CPT

## 2024-03-07 PROCEDURE — 82955 ASSAY OF G6PD ENZYME: CPT

## 2024-03-07 PROCEDURE — 85018 HEMOGLOBIN: CPT

## 2024-03-07 PROCEDURE — 85730 THROMBOPLASTIN TIME PARTIAL: CPT

## 2024-03-07 PROCEDURE — 2580000003 HC RX 258: Performed by: STUDENT IN AN ORGANIZED HEALTH CARE EDUCATION/TRAINING PROGRAM

## 2024-03-07 PROCEDURE — 85379 FIBRIN DEGRADATION QUANT: CPT

## 2024-03-07 PROCEDURE — 2060000000 HC ICU INTERMEDIATE R&B

## 2024-03-07 PROCEDURE — C9113 INJ PANTOPRAZOLE SODIUM, VIA: HCPCS | Performed by: INTERNAL MEDICINE

## 2024-03-07 PROCEDURE — 6360000002 HC RX W HCPCS: Performed by: INTERNAL MEDICINE

## 2024-03-07 PROCEDURE — 85045 AUTOMATED RETICULOCYTE COUNT: CPT

## 2024-03-07 PROCEDURE — 80053 COMPREHEN METABOLIC PANEL: CPT

## 2024-03-07 PROCEDURE — 86901 BLOOD TYPING SEROLOGIC RH(D): CPT

## 2024-03-07 RX ORDER — POTASSIUM CHLORIDE 7.45 MG/ML
10 INJECTION INTRAVENOUS PRN
Status: DISCONTINUED | OUTPATIENT
Start: 2024-03-07 | End: 2024-04-02 | Stop reason: HOSPADM

## 2024-03-07 RX ORDER — SODIUM CHLORIDE 0.9 % (FLUSH) 0.9 %
5-40 SYRINGE (ML) INJECTION PRN
Status: DISCONTINUED | OUTPATIENT
Start: 2024-03-07 | End: 2024-04-02 | Stop reason: HOSPADM

## 2024-03-07 RX ORDER — SODIUM CHLORIDE 9 MG/ML
500 INJECTION, SOLUTION INTRAVENOUS CONTINUOUS PRN
Status: DISCONTINUED | OUTPATIENT
Start: 2024-03-07 | End: 2024-03-07

## 2024-03-07 RX ORDER — ALLOPURINOL 300 MG/1
300 TABLET ORAL DAILY
Status: DISCONTINUED | OUTPATIENT
Start: 2024-03-08 | End: 2024-03-07

## 2024-03-07 RX ORDER — CALCIUM CARBONATE 500 MG/1
500 TABLET, CHEWABLE ORAL 3 TIMES DAILY PRN
Status: DISCONTINUED | OUTPATIENT
Start: 2024-03-07 | End: 2024-04-02 | Stop reason: HOSPADM

## 2024-03-07 RX ORDER — MECOBALAMIN 5000 MCG
5 TABLET,DISINTEGRATING ORAL NIGHTLY PRN
Status: DISCONTINUED | OUTPATIENT
Start: 2024-03-07 | End: 2024-04-02 | Stop reason: HOSPADM

## 2024-03-07 RX ORDER — POTASSIUM CHLORIDE 20 MEQ/1
40 TABLET, EXTENDED RELEASE ORAL PRN
Status: DISCONTINUED | OUTPATIENT
Start: 2024-03-07 | End: 2024-04-02 | Stop reason: HOSPADM

## 2024-03-07 RX ORDER — SODIUM CHLORIDE 9 MG/ML
INJECTION, SOLUTION INTRAVENOUS PRN
Status: DISCONTINUED | OUTPATIENT
Start: 2024-03-07 | End: 2024-04-02 | Stop reason: HOSPADM

## 2024-03-07 RX ORDER — MAGNESIUM SULFATE IN WATER 40 MG/ML
4000 INJECTION, SOLUTION INTRAVENOUS PRN
Status: DISCONTINUED | OUTPATIENT
Start: 2024-03-07 | End: 2024-04-02 | Stop reason: HOSPADM

## 2024-03-07 RX ORDER — LEVETIRACETAM 500 MG/1
1500 TABLET ORAL 2 TIMES DAILY
Status: DISCONTINUED | OUTPATIENT
Start: 2024-03-07 | End: 2024-04-02 | Stop reason: HOSPADM

## 2024-03-07 RX ORDER — ALLOPURINOL 300 MG/1
300 TABLET ORAL DAILY
Status: DISCONTINUED | OUTPATIENT
Start: 2024-03-07 | End: 2024-03-20

## 2024-03-07 RX ORDER — SENNA AND DOCUSATE SODIUM 50; 8.6 MG/1; MG/1
2 TABLET, FILM COATED ORAL 2 TIMES DAILY PRN
Status: DISCONTINUED | OUTPATIENT
Start: 2024-03-07 | End: 2024-03-12

## 2024-03-07 RX ORDER — SODIUM CHLORIDE 9 MG/ML
500 INJECTION, SOLUTION INTRAVENOUS CONTINUOUS
Status: DISCONTINUED | OUTPATIENT
Start: 2024-03-07 | End: 2024-03-09

## 2024-03-07 RX ORDER — SODIUM CHLORIDE 0.9 % (FLUSH) 0.9 %
5-40 SYRINGE (ML) INJECTION EVERY 12 HOURS SCHEDULED
Status: DISCONTINUED | OUTPATIENT
Start: 2024-03-07 | End: 2024-04-02 | Stop reason: HOSPADM

## 2024-03-07 RX ADMIN — ALLOPURINOL 300 MG: 300 TABLET ORAL at 23:22

## 2024-03-07 RX ADMIN — SODIUM CHLORIDE, PRESERVATIVE FREE 10 ML: 5 INJECTION INTRAVENOUS at 21:16

## 2024-03-07 RX ADMIN — SODIUM CHLORIDE 1000 ML: 9 INJECTION, SOLUTION INTRAVENOUS at 23:31

## 2024-03-07 RX ADMIN — DICYCLOMINE HYDROCHLORIDE 10 MG: 10 CAPSULE ORAL at 06:20

## 2024-03-07 RX ADMIN — PANTOPRAZOLE SODIUM 40 MG: 40 INJECTION, POWDER, FOR SOLUTION INTRAVENOUS at 08:39

## 2024-03-07 RX ADMIN — Medication 10 ML: at 08:43

## 2024-03-07 RX ADMIN — LEVETIRACETAM 1500 MG: 500 TABLET, FILM COATED ORAL at 21:16

## 2024-03-07 ASSESSMENT — PAIN SCALES - GENERAL: PAINLEVEL_OUTOF10: 0

## 2024-03-07 NOTE — PLAN OF CARE
Problem: Safety - Adult  Goal: Free from fall injury  Outcome: Progressing  Note: Orthostatic vital signs obtained at start of shift - see flowsheet for details.  Pt does not meet criteria for orthostasis.  Pt is a Med fall risk. See Apple Fall Score and ABCDS Injury Risk assessments.     - Screening for Orthostasis AND not a [place] Risk per APPLE/ABCDS: Pt bed is in low position, side rails up, call light and belongings are in reach.  Fall risk light is on outside pts room.  Pt encouraged to call for assistance as needed. Will continue with hourly rounds for PO intake, pain needs, toileting and repositioning as needed.      Problem: Hematologic - Adult  Goal: Maintains hematologic stability  Outcome: Progressing  Note: Patient's hemoglobin this AM:   Recent Labs     03/07/24  1053   HGB 8.0*     Patient's platelet count this AM:   Recent Labs     03/07/24  0424   PLT 68*    Thrombocytopenia not present at this time.  Patient showing no signs or symptoms of active bleeding.  Transfusion not indicated at this time.  Patient verbalizes understanding of all instructions. Will continue to assess and implement POC. Call light within reach and hourly rounding in place.

## 2024-03-07 NOTE — PLAN OF CARE
Problem: Discharge Planning  Goal: Discharge to home or other facility with appropriate resources  3/7/2024 0913 by Diana Christina  Outcome: Progressing  3/6/2024 2157 by Lula Melgar, RN  Outcome: Progressing  Flowsheets (Taken 3/6/2024 2157)  Discharge to home or other facility with appropriate resources:   Identify barriers to discharge with patient and caregiver   Identify discharge learning needs (meds, wound care, etc)   Arrange for needed discharge resources and transportation as appropriate     Problem: Pain  Goal: Verbalizes/displays adequate comfort level or baseline comfort level  3/7/2024 0913 by Diana Christina  Outcome: Progressing  3/6/2024 2157 by Lula Melgar, RN  Outcome: Progressing  Flowsheets (Taken 3/6/2024 2157)  Verbalizes/displays adequate comfort level or baseline comfort level:   Encourage patient to monitor pain and request assistance   Administer analgesics based on type and severity of pain and evaluate response   Assess pain using appropriate pain scale     Problem: Safety - Adult  Goal: Free from fall injury  3/7/2024 0913 by Diana hCristina  Outcome: Progressing  3/6/2024 2157 by Llua Melgar, RN  Outcome: Progressing  Flowsheets (Taken 3/6/2024 2157)  Free From Fall Injury:   Instruct family/caregiver on patient safety   Based on caregiver fall risk screen, instruct family/caregiver to ask for assistance with transferring infant if caregiver noted to have fall risk factors     Problem: Nutrition Deficit:  Goal: Optimize nutritional status  3/7/2024 0913 by Diana Christina  Outcome: Progressing  3/6/2024 2157 by Lula Melgar, RN  Outcome: Progressing  Flowsheets (Taken 3/6/2024 2157)  Nutrient intake appropriate for improving, restoring, or maintaining nutritional needs: Assess nutritional status and recommend course of action     Problem: ABCDS Injury Assessment  Goal: Absence of physical injury  3/7/2024 0913 by Diana Christina  Outcome: Progressing  3/6/2024 2157 by

## 2024-03-07 NOTE — PLAN OF CARE
Problem: Discharge Planning  Goal: Discharge to home or other facility with appropriate resources  3/6/2024 2157 by Lula Melgar, RN  Outcome: Progressing  Flowsheets (Taken 3/6/2024 2157)  Discharge to home or other facility with appropriate resources:   Identify barriers to discharge with patient and caregiver   Identify discharge learning needs (meds, wound care, etc)   Arrange for needed discharge resources and transportation as appropriate  3/6/2024 1026 by Luis Chavez RN  Outcome: Progressing  Flowsheets (Taken 3/6/2024 1024)  Discharge to home or other facility with appropriate resources:   Identify barriers to discharge with patient and caregiver   Arrange for needed discharge resources and transportation as appropriate     Problem: Pain  Goal: Verbalizes/displays adequate comfort level or baseline comfort level  3/6/2024 2157 by Lula Melgar, RN  Outcome: Progressing  Flowsheets (Taken 3/6/2024 2157)  Verbalizes/displays adequate comfort level or baseline comfort level:   Encourage patient to monitor pain and request assistance   Administer analgesics based on type and severity of pain and evaluate response   Assess pain using appropriate pain scale  3/6/2024 1026 by Luis Chavez RN  Outcome: Progressing     Problem: Safety - Adult  Goal: Free from fall injury  3/6/2024 2157 by Lula Melgar, RN  Outcome: Progressing  Flowsheets (Taken 3/6/2024 2157)  Free From Fall Injury:   Instruct family/caregiver on patient safety   Based on caregiver fall risk screen, instruct family/caregiver to ask for assistance with transferring infant if caregiver noted to have fall risk factors  3/6/2024 1026 by Luis Chavez RN  Outcome: Progressing  Flowsheets (Taken 3/6/2024 1026)  Free From Fall Injury: Instruct family/caregiver on patient safety     Problem: Nutrition Deficit:  Goal: Optimize nutritional status  3/6/2024 2157 by Lula Melgar, RN  Outcome: Progressing  Flowsheets (Taken 3/6/2024

## 2024-03-07 NOTE — PROGRESS NOTES
Gastroenterology Progress Note    Dennys Peguero is a 43 y.o. male patient.  Principal Problem:    GI bleed  Resolved Problems:    * No resolved hospital problems. *      SUBJECTIVE:  Tolerated endoscopy.     Current Facility-Administered Medications: 0.9 % sodium chloride infusion, , IntraVENous, PRN  levETIRAcetam (KEPPRA) tablet 1,500 mg, 1,500 mg, Oral, BID  fentaNYL (SUBLIMAZE) injection, , , PRN  midazolam (VERSED) injection, , , PRN  dicyclomine (BENTYL) capsule 10 mg, 10 mg, Oral, TID AC  0.9 % sodium chloride infusion, , IntraVENous, PRN  pantoprazole (PROTONIX) 40 mg in sodium chloride (PF) 0.9 % 10 mL injection, 40 mg, IntraVENous, Daily  sodium chloride flush 0.9 % injection 5-40 mL, 5-40 mL, IntraVENous, 2 times per day  sodium chloride flush 0.9 % injection 5-40 mL, 5-40 mL, IntraVENous, PRN  0.9 % sodium chloride infusion, , IntraVENous, PRN  ondansetron (ZOFRAN-ODT) disintegrating tablet 4 mg, 4 mg, Oral, Q8H PRN **OR** ondansetron (ZOFRAN) injection 4 mg, 4 mg, IntraVENous, Q6H PRN  acetaminophen (TYLENOL) tablet 650 mg, 650 mg, Oral, Q6H PRN **OR** acetaminophen (TYLENOL) suppository 650 mg, 650 mg, Rectal, Q6H PRN  0.9 % sodium chloride infusion, , IntraVENous, Continuous    Physical    VITALS:  /83   Pulse 60   Temp 98 °F (36.7 °C) (Oral)   Resp 18   Ht 1.803 m (5' 10.98\")   Wt 80.5 kg (177 lb 7.5 oz)   SpO2 98%   BMI 24.76 kg/m²   TEMPERATURE:  Current - Temp: 98 °F (36.7 °C); Max - Temp  Av.4 °F (36.9 °C)  Min: 97.6 °F (36.4 °C)  Max: 98.8 °F (37.1 °C)    NAD  Eyes: No icterus  RRR  Lungs CTA Bilaterally, normal effort  Abdomen soft, ND, NT, Bowel sounds normal.  Ext: no edema  Neuro: No tremor  Psych: A&Ox3    Data    Data Review:    Recent Labs     24  1635 24  0022 24  0610   WBC 12.4*  --  11.1*   HGB 4.8* 6.8* 7.6*   HCT 16.8* 22.6* 24.1*   MCV 67.3*  --  74.5*   PLT 95*  --  52*     Recent Labs     24  1544 24  0610    141   K 4.1 3.6 
4 Eyes Skin Assessment     NAME:  Dennys Peguero  YOB: 1980  MEDICAL RECORD NUMBER:  [id number]    The patient is being assessed for  Admission    I agree that at least one RN has performed a thorough Head to Toe Skin Assessment on the patient. ALL assessment sites listed below have been assessed.      Areas assessed by both nurses:    Head, Face, Ears, Shoulders, Back, Chest, Arms, Elbows, Hands, Sacrum. Buttock, Coccyx, Ischium, Legs. Feet and Heels, and Under Medical Devices         Does the Patient have a Wound? No noted wound(s)       Alexandre Prevention initiated by RN: No  Wound Care Orders initiated by RN: No    Pressure Injury (Stage 3,4, Unstageable, DTI, NWPT, and Complex wounds) if present, place Wound referral order by RN under : No    New Ostomies, if present place, Ostomy referral order under : No     Nurse 1 eSignature: Electronically signed by Saba Berumen RN on 3/6/24 at 1:03 AM EST    **SHARE this note so that the co-signing nurse can place an eSignature**    Nurse 2 eSignature: {Esignature:[id number]}    
Bedside introduction complete. Patient denies any needs at this time. Updated whiteboard with RN and PCA name and number. Patient able to make needs known, using call light appropriately.     
Comprehensive Nutrition Assessment    Type and Reason for Visit:  Positive Nutrition Screen    Nutrition Recommendations/Plan:   Monitor for start of oral diet.  Start Ensure clear BID with diet.      Malnutrition Assessment:  Malnutrition Status:  At risk for malnutrition (Comment) (Recent poor diet acceptance with significant weight loss.) (03/06/24 1010)    Context:  Chronic Illness     Findings of the 6 clinical characteristics of malnutrition:  Energy Intake:  75% or less estimated energy requirements for 1 month or longer  Weight Loss:  Greater than 7.5% over 3 months (11%)     Body Fat Loss:  Mild body fat loss Triceps   Muscle Mass Loss:  Mild muscle mass loss Clavicles (pectoralis & deltoids)    Nutrition Assessment:    Pt. admitted for GI bleed. Currently NPO for EGD and colonoscopy today. Pt. reporting very poor intake for the past month, averaging one meal/day. Common meals this past month have been a sandwich which he typically will not finish. He did indicate finishing a personal pizza on one of his \"good days\" but he was very sick the next day with no oral intake. Pt. endorses being 200# at the beginning of this year, 11% body weight lost in the past 3 months. Upon visual impression Pt. did not reveal signs of malnutrition. Physical assessment revealed mild subcutaneous fat and muscle wasting. No malnutrition present at this time. Pt. agreeable to start Ensure Clear BID with diet. Will continue to monitor nutritional adequacy and diet acceptance.    Nutrition Related Findings:    Nutrition related labs reviewed. LBM PTA. Wound Type: None       Current Nutrition Intake & Therapies:    Average Meal Intake: NPO  Average Supplements Intake: NPO  Diet NPO    Anthropometric Measures:  Height: 180.3 cm (5' 10.98\")  Ideal Body Weight (IBW): 172 lbs (78 kg)       Current Body Weight: 80.5 kg (177 lb 7.5 oz), 103.2 % IBW.    Current BMI (kg/m2): 24.8                          BMI Categories: Normal Weight (BMI 
Dr. Russo notified me patient being transferred to [facility] for possible TTP. He asked that I cancel colonoscopy.  I notified endoscopy. Will sign off since being transferred.    Colby Cantrell MD  
Medication Reconciliation     List of medications patient is currently taking is complete.     Source of information: 1. Conversation with patient at bedside                                      2. EPIC records      Allergies  Patient has no known allergies.     Notes regarding home medications:  1. Patient states he received his morning dose of his home medication today prior to arrival in the ED.    Colette Conway, Pharmacy Intern  3/5/2024 8:17 PM     
Oncology Progress Note    Patient feeling well today.  He denies any shortness of breath or chest pain or abdominal pain.  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  This could be due to a number of causes including TTP.  [id number] was sent and is pending.  Although I am leaning against TTP, I will transfer him to [facility] and we will likely start pheresis today until this can be sorted out.  Dr. Lei Tiwari has accepted the patient in transfer.  The patient is agreeable.  Will pursue a colonoscopy once he is more stable.    Dmitriy Russo MD  
Patient admitted to room 3104 from ED.  Oriented to room, call light, and floor policies.  Plan of care reviewed with patient. Pt was given 2U of PRBC in the ED, received call from lab for critical hgb-6.8 post-transfusion. Sent message to on-call hospitalist via [employer], awaiting response. Pt is resting in bed and no c/o pain at this time; no s/s of distress noted. Assessment completed; VSS. Safety precautions in place; call light and bedside table within reach.  Pt encouraged to call for needs or ambulation.  
Patient called for this RN to assess last BM. Stool brown watery with no obvious formed pieces. Instructed patient to continue with bowel prep as stool needs to be more clear before procedure, patient verbalized understanding.   
Patient to bathroom and flushed stool before this RN could assess. Patient states it was all water, but did remain brown in color. Instructed patient to drink more bowel prep at this time, patient verbalized understanding.   
Patient up in bed and aaox4. Patient denies any pain at the moment. Head to toe assessment completed with changes documented.   Patient started prep for colonoscopy at 9:10. Patient remains npo otherwise. Morning PO medications held.   IVF running. Patient denies any other needs at the moment.   Electronically signed by HUSSEIN BARNHART RN on 3/6/2024 at 10:11 AM    
Perfect serve sent to [facility] due to patient having 8/10 pain in lower abd. Patient is about 1/4th through prep for colonoscopy but does not think he will be able to finish because of pain. Awaiting new orders.  Electronically signed by HUSSEIN BARNHART RN on 3/6/2024 at 11:51 AM    
Pt awake sitting in bed.  Alert and oriented x 4.   Head to toe assessment completed Morning med given as ordered. Pt expresses no needs at this time.  Standard safety precautions in place.   
Pt from endo to pacu. Pt asleep, wakes easily. Denies pain. Abdomen soft. IV infusing. Monitor in sinus rhythm.   
This RN gave report to nurse at [facility]. Patient aware transfer is at 1200.  Electronically signed by HUSSEIN BARNHART RN on 3/7/2024 at 12:40 PM    
This RN just got a call from Endo letting me know they are running ahead of schedule and he will now come down at 1600. This RN let endo RN  know that patient way still working on Prep drink and he is a little more than half way done.    Patient now instructed to stop drinking due to patients procedure being pushed up.     Electronically signed by HUSSEIN BARNHART RN on 3/6/2024 at 3:08 PM'  
Transfused 1U PRBC, no transfusion reaction noted, VSS, post transfusion HH lab drawn- awaiting result. Pt awake, in bed, no c/o pain or discomfort, no s/s of distress noted. Standard safety precautions in place. Pt encouraged to call for needs.  
VSS throughout procedure, unable to link monitor  
for input(s): \"LACTA\" in the last 72 hours.  BNP: No results for input(s): \"PROBNP\" in the last 72 hours.  UA:  Lab Results   Component Value Date/Time    NITRU Negative 03/05/2024 03:53 PM    COLORU DARK YELLOW 03/05/2024 03:53 PM    PHUR 5.5 03/05/2024 03:53 PM    WBCUA 0-2 03/05/2024 03:53 PM    RBCUA 5-10 03/05/2024 03:53 PM    MUCUS Rare 03/05/2024 03:53 PM    BACTERIA Rare 03/05/2024 03:53 PM    CLARITYU CLOUDY 03/05/2024 03:53 PM    SPECGRAV 1.024 03/05/2024 03:53 PM    LEUKOCYTESUR TRACE 03/05/2024 03:53 PM    UROBILINOGEN 1.0 03/05/2024 03:53 PM    BILIRUBINUR SMALL 03/05/2024 03:53 PM    BLOODU MODERATE 03/05/2024 03:53 PM    GLUCOSEU Negative 03/05/2024 03:53 PM    KETUA TRACE 03/05/2024 03:53 PM     Urine Cultures: No results found for: \"LABURIN\"  Blood Cultures: No results found for: \"BC\"  No results found for: \"BLOODCULT2\"  Organism: No results found for: \"ORG\"      Electronically signed by Jeanette Smith MD on 3/6/2024 at 9:49 AM

## 2024-03-07 NOTE — H&P
[facility] History and Physical       Attending Physician: Lei Tiwari MD    Primary Care: No primary care provider on file.       Referring MD: Dmitriy Russo Jr., MD  [address] [place] [address]    Name: Dennys Peguero :  1980  MRN:  [id number]    Admission: 3/7/2024      Date: 3/7/2024    Reason for Admission: Evaluation for abnormal CBC (anemia and thrombocytopenia)    History of Present Illness:   Mr. Peguero is a 43 y.o. male with past medical history of seizure since .  In late , he presented to the ER with increased seizure activity.  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 He is status post extensive resection but not gross total resection on 2017. He then received 5400 cGy partial brain irradiation completed 2019. Treatment was given with concurrent Temodar under the direction of Dr. Swenson and was continued for 6 months after completion of radiation therapy treatment.   He received Vimpat and Lamictal but is no longer taking her medication.  He continues his Keppra 1500 mg PO BID.      Patient presented to the ER on 3/05/24 with increased fatigue.  Patient initially attributed the fatigue to recent stomach infection however his energy continued to get worse prompting him to come to the ER for evaluation.  Patient stated he did remember that he had blood-tinged stools intermittently over the last several weeks. In the ER, laboratory testing showed a hemoglobin of 4.8 and platelet count of 95.  He also had a leukocytosis (WBC 12.4).  His stool occult with positive.  GI was consulted and EGD was performed which showed a normal EGD.  Hem/Onc was consulted and pathology stated he had several shistocytes (at least 5 per high power field) on his peripheral blood.  LDH was elevated and haptoglobin was <10.  LIISZF26 is pending.  CT A/P

## 2024-03-08 PROBLEM — E43 SEVERE MALNUTRITION (HCC): Chronic | Status: ACTIVE | Noted: 2024-03-08

## 2024-03-08 LAB
ALBUMIN SERPL-MCNC: 3.5 G/DL (ref 3.4–5)
ALP SERPL-CCNC: 40 U/L (ref 40–129)
ALT SERPL-CCNC: 7 U/L (ref 10–40)
ANION GAP SERPL CALCULATED.3IONS-SCNC: 12 MMOL/L (ref 3–16)
ANISOCYTOSIS BLD QL SMEAR: ABNORMAL
AST SERPL-CCNC: 11 U/L (ref 15–37)
BASOPHILS # BLD: 0 K/UL (ref 0–0.2)
BASOPHILS NFR BLD: 0 %
BILIRUB DIRECT SERPL-MCNC: <0.2 MG/DL (ref 0–0.3)
BILIRUB INDIRECT SERPL-MCNC: ABNORMAL MG/DL (ref 0–1)
BILIRUB SERPL-MCNC: 1.1 MG/DL (ref 0–1)
BILIRUB UR QL STRIP.AUTO: NEGATIVE
BUN SERPL-MCNC: 8 MG/DL (ref 7–20)
C DIFF TOX GENS STL QL NAA+PROBE: ABNORMAL
CALCIUM SERPL-MCNC: 8.8 MG/DL (ref 8.3–10.6)
CHLORIDE SERPL-SCNC: 104 MMOL/L (ref 99–110)
CLARITY UR: CLEAR
CO2 SERPL-SCNC: 22 MMOL/L (ref 21–32)
COLOR UR: YELLOW
CREAT SERPL-MCNC: 0.8 MG/DL (ref 0.9–1.3)
DEPRECATED RDW RBC AUTO: 35.6 % (ref 12.4–15.4)
EOSINOPHIL # BLD: 0 K/UL (ref 0–0.6)
EOSINOPHIL NFR BLD: 0 %
G6PD RBC-CCNC: 11 U/G HB (ref 9.9–16.6)
GFR SERPLBLD CREATININE-BSD FMLA CKD-EPI: >60 ML/MIN/{1.73_M2}
GI PATHOGENS PNL STL NAA+PROBE: NORMAL
GLUCOSE SERPL-MCNC: 87 MG/DL (ref 70–99)
GLUCOSE UR STRIP.AUTO-MCNC: NEGATIVE MG/DL
HCT VFR BLD AUTO: 24.1 % (ref 40.5–52.5)
HGB BLD-MCNC: 7.6 G/DL (ref 13.5–17.5)
HGB UR QL STRIP.AUTO: ABNORMAL
HYPOCHROMIA BLD QL SMEAR: ABNORMAL
KETONES UR STRIP.AUTO-MCNC: NEGATIVE MG/DL
LDH SERPL L TO P-CCNC: 898 U/L (ref 100–190)
LEUKOCYTE ESTERASE UR QL STRIP.AUTO: NEGATIVE
LYMPHOCYTES # BLD: 3.1 K/UL (ref 1–5.1)
LYMPHOCYTES NFR BLD: 58 %
MAGNESIUM SERPL-MCNC: 1.6 MG/DL (ref 1.8–2.4)
MCH RBC QN AUTO: 23.5 PG (ref 26–34)
MCHC RBC AUTO-ENTMCNC: 31.7 G/DL (ref 31–36)
MCV RBC AUTO: 74.3 FL (ref 80–100)
METAMYELOCYTES NFR BLD MANUAL: 4 %
MONOCYTES # BLD: 0.1 K/UL (ref 0–1.3)
MONOCYTES NFR BLD: 1 %
MYELOCYTES NFR BLD MANUAL: 1 %
NEUTROPHILS # BLD: 2.2 K/UL (ref 1.7–7.7)
NEUTROPHILS NFR BLD: 30 %
NEUTS BAND NFR BLD MANUAL: 6 % (ref 0–7)
NITRITE UR QL STRIP.AUTO: NEGATIVE
NRBC BLD-RTO: 19 /100 WBC
ORGANISM: ABNORMAL
PH UR STRIP.AUTO: 6.5 [PH] (ref 5–8)
PHOSPHATE SERPL-MCNC: 4.9 MG/DL (ref 2.5–4.9)
PLATELET # BLD AUTO: 76 K/UL (ref 135–450)
PMV BLD AUTO: 10.7 FL (ref 5–10.5)
POLYCHROMASIA BLD QL SMEAR: ABNORMAL
POTASSIUM SERPL-SCNC: 3.5 MMOL/L (ref 3.5–5.1)
PROT SERPL-MCNC: 5.8 G/DL (ref 6.4–8.2)
PROT UR STRIP.AUTO-MCNC: NEGATIVE MG/DL
RBC # BLD AUTO: 3.25 M/UL (ref 4.2–5.9)
RBC #/AREA URNS HPF: NORMAL /HPF (ref 0–4)
SCHISTOCYTES BLD QL SMEAR: ABNORMAL
SODIUM SERPL-SCNC: 138 MMOL/L (ref 136–145)
SP GR UR STRIP.AUTO: 1.01 (ref 1–1.03)
UA DIPSTICK W REFLEX MICRO PNL UR: YES
URATE SERPL-MCNC: 8.1 MG/DL (ref 3.5–7.2)
URN SPEC COLLECT METH UR: ABNORMAL
UROBILINOGEN UR STRIP-ACNC: 0.2 E.U./DL
WBC # BLD AUTO: 5.4 K/UL (ref 4–11)
WBC #/AREA URNS HPF: NORMAL /HPF (ref 0–5)

## 2024-03-08 PROCEDURE — 80076 HEPATIC FUNCTION PANEL: CPT

## 2024-03-08 PROCEDURE — 88184 FLOWCYTOMETRY/ TC 1 MARKER: CPT

## 2024-03-08 PROCEDURE — 88305 TISSUE EXAM BY PATHOLOGIST: CPT

## 2024-03-08 PROCEDURE — 85025 COMPLETE CBC W/AUTO DIFF WBC: CPT

## 2024-03-08 PROCEDURE — 36415 COLL VENOUS BLD VENIPUNCTURE: CPT

## 2024-03-08 PROCEDURE — 6370000000 HC RX 637 (ALT 250 FOR IP): Performed by: STUDENT IN AN ORGANIZED HEALTH CARE EDUCATION/TRAINING PROGRAM

## 2024-03-08 PROCEDURE — 2060000000 HC ICU INTERMEDIATE R&B

## 2024-03-08 PROCEDURE — 02HV33Z INSERTION OF INFUSION DEVICE INTO SUPERIOR VENA CAVA, PERCUTANEOUS APPROACH: ICD-10-PCS | Performed by: INTERNAL MEDICINE

## 2024-03-08 PROCEDURE — 80048 BASIC METABOLIC PNL TOTAL CA: CPT

## 2024-03-08 PROCEDURE — 3E04305 INTRODUCTION OF OTHER ANTINEOPLASTIC INTO CENTRAL VEIN, PERCUTANEOUS APPROACH: ICD-10-PCS | Performed by: INTERNAL MEDICINE

## 2024-03-08 PROCEDURE — 83020 HEMOGLOBIN ELECTROPHORESIS: CPT

## 2024-03-08 PROCEDURE — 88334 PATH CONSLTJ SURG CYTO XM EA: CPT

## 2024-03-08 PROCEDURE — 87086 URINE CULTURE/COLONY COUNT: CPT

## 2024-03-08 PROCEDURE — 6370000000 HC RX 637 (ALT 250 FOR IP): Performed by: INTERNAL MEDICINE

## 2024-03-08 PROCEDURE — 83615 LACTATE (LD) (LDH) ENZYME: CPT

## 2024-03-08 PROCEDURE — 88333 PATH CONSLTJ SURG CYTO XM 1: CPT

## 2024-03-08 PROCEDURE — 6360000004 HC RX CONTRAST MEDICATION: Performed by: INTERNAL MEDICINE

## 2024-03-08 PROCEDURE — 2580000003 HC RX 258: Performed by: INTERNAL MEDICINE

## 2024-03-08 PROCEDURE — 88185 FLOWCYTOMETRY/TC ADD-ON: CPT

## 2024-03-08 PROCEDURE — 36569 INSJ PICC 5 YR+ W/O IMAGING: CPT

## 2024-03-08 PROCEDURE — 88313 SPECIAL STAINS GROUP 2: CPT

## 2024-03-08 PROCEDURE — 86701 HIV-1ANTIBODY: CPT

## 2024-03-08 PROCEDURE — 6370000000 HC RX 637 (ALT 250 FOR IP): Performed by: NURSE PRACTITIONER

## 2024-03-08 PROCEDURE — 84100 ASSAY OF PHOSPHORUS: CPT

## 2024-03-08 PROCEDURE — 86702 HIV-2 ANTIBODY: CPT

## 2024-03-08 PROCEDURE — 80074 ACUTE HEPATITIS PANEL: CPT

## 2024-03-08 PROCEDURE — C1751 CATH, INF, PER/CENT/MIDLINE: HCPCS

## 2024-03-08 PROCEDURE — 87390 HIV-1 AG IA: CPT

## 2024-03-08 PROCEDURE — 83735 ASSAY OF MAGNESIUM: CPT

## 2024-03-08 PROCEDURE — 81001 URINALYSIS AUTO W/SCOPE: CPT

## 2024-03-08 PROCEDURE — C8929 TTE W OR WO FOL WCON,DOPPLER: HCPCS

## 2024-03-08 PROCEDURE — 88311 DECALCIFY TISSUE: CPT

## 2024-03-08 PROCEDURE — 88342 IMHCHEM/IMCYTCHM 1ST ANTB: CPT

## 2024-03-08 PROCEDURE — 07DR3ZX EXTRACTION OF ILIAC BONE MARROW, PERCUTANEOUS APPROACH, DIAGNOSTIC: ICD-10-PCS | Performed by: INTERNAL MEDICINE

## 2024-03-08 PROCEDURE — 2500000003 HC RX 250 WO HCPCS: Performed by: INTERNAL MEDICINE

## 2024-03-08 PROCEDURE — 84550 ASSAY OF BLOOD/URIC ACID: CPT

## 2024-03-08 PROCEDURE — 88341 IMHCHEM/IMCYTCHM EA ADD ANTB: CPT

## 2024-03-08 RX ORDER — LIDOCAINE HYDROCHLORIDE 10 MG/ML
5 INJECTION, SOLUTION EPIDURAL; INFILTRATION; INTRACAUDAL; PERINEURAL ONCE
Status: COMPLETED | OUTPATIENT
Start: 2024-03-08 | End: 2024-03-08

## 2024-03-08 RX ORDER — LANOLIN ALCOHOL/MO/W.PET/CERES
1000 CREAM (GRAM) TOPICAL DAILY
Status: DISCONTINUED | OUTPATIENT
Start: 2024-03-08 | End: 2024-04-02 | Stop reason: HOSPADM

## 2024-03-08 RX ORDER — SODIUM CHLORIDE 0.9 % (FLUSH) 0.9 %
5-40 SYRINGE (ML) INJECTION PRN
Status: DISCONTINUED | OUTPATIENT
Start: 2024-03-08 | End: 2024-04-02 | Stop reason: HOSPADM

## 2024-03-08 RX ORDER — SODIUM CHLORIDE 0.9 % (FLUSH) 0.9 %
5-40 SYRINGE (ML) INJECTION EVERY 12 HOURS SCHEDULED
Status: DISCONTINUED | OUTPATIENT
Start: 2024-03-08 | End: 2024-04-02 | Stop reason: HOSPADM

## 2024-03-08 RX ORDER — SODIUM CHLORIDE 9 MG/ML
25 INJECTION, SOLUTION INTRAVENOUS PRN
Status: DISCONTINUED | OUTPATIENT
Start: 2024-03-08 | End: 2024-04-02 | Stop reason: HOSPADM

## 2024-03-08 RX ADMIN — PERFLUTREN 1.5 ML: 6.52 INJECTION, SUSPENSION INTRAVENOUS at 14:30

## 2024-03-08 RX ADMIN — SODIUM CHLORIDE, PRESERVATIVE FREE 10 ML: 5 INJECTION INTRAVENOUS at 10:02

## 2024-03-08 RX ADMIN — LEVETIRACETAM 1500 MG: 500 TABLET, FILM COATED ORAL at 10:01

## 2024-03-08 RX ADMIN — CYANOCOBALAMIN TAB 1000 MCG 1000 MCG: 1000 TAB at 10:01

## 2024-03-08 RX ADMIN — LEVETIRACETAM 1500 MG: 500 TABLET, FILM COATED ORAL at 21:00

## 2024-03-08 RX ADMIN — ALLOPURINOL 300 MG: 300 TABLET ORAL at 10:01

## 2024-03-08 RX ADMIN — LIDOCAINE HYDROCHLORIDE ANHYDROUS 5 ML: 10 INJECTION, SOLUTION INFILTRATION at 11:00

## 2024-03-08 RX ADMIN — POTASSIUM CHLORIDE 40 MEQ: 1500 TABLET, EXTENDED RELEASE ORAL at 06:35

## 2024-03-08 RX ADMIN — SODIUM CHLORIDE, PRESERVATIVE FREE 10 ML: 5 INJECTION INTRAVENOUS at 21:00

## 2024-03-08 RX ADMIN — FIDAXOMICIN 200 MG: 200 TABLET, FILM COATED ORAL at 16:05

## 2024-03-08 ASSESSMENT — PAIN SCALES - GENERAL: PAINLEVEL_OUTOF10: 0

## 2024-03-08 NOTE — PROCEDURES
Procedure: Bone Marrow Biopsy and Needle Aspirate    Indication: Anemia, leukocytosis and thrombocytopenia. Concern for acute leukemia.     Anesthesia:  Lidocaine 1% 10 mL    Patient given risks and benefits of procedure. Consent signed and time out performed.  Patient placed in the left lateral decubitus position. Area cleaned and prepped in a sterile fashion with chloraprep. Sterile drape applied. Lidocaine 1% -10ml administered to the subcutaneous tissue and periosteimum of the right iliac crest. Using sterile technique and using an aspirate needle a bone marrow aspirate was performed. A puncture was then made with the provided scalpel and using a Jamshidi needle a biopsy was taken from the right posterior iliac crest. A sterile bandage was applied. No significant bleeding. Sample sent for histology, flow cytometry, FISH, cytogenetics and molecular studies including NGS & PCR testing. Patient tolerated procedure well.     Estimated Blood Loss: 0    Shanthi Flowers, APRN - CNP    Татьяна Pettit, APRN - NP

## 2024-03-08 NOTE — ACP (ADVANCE CARE PLANNING)
Advance Care Planning     General Advance Care Planning (ACP) Conversation    Date of Conversation: 3/8/2024  Conducted with: Patient with Decision Making Capacity    Healthcare Decision Maker:    Primary Decision Maker: Pierre Lowry - [phone]  Click here to complete Healthcare Decision Makers including selection of the Healthcare Decision Maker Relationship (ie \"Primary\").   Today we documented Decision Maker(s) consistent with Legal Next of Kin hierarchy. He stated that he is not  and has no adult children. He stated his dad is his next of kin     Length of Voluntary ACP Conversation in minutes:  <16 minutes (Non-Billable)    JENNIFER Ortiz   for [facility] ([facility])  Office Phone: [phone]  [employer] Mobile: [phone]

## 2024-03-08 NOTE — PLAN OF CARE
Problem: Safety - Adult  Goal: Free from fall injury  Outcome: Progressing     Problem: ABCDS Injury Assessment  Goal: Absence of physical injury  Outcome: Progressing     Problem: Hematologic - Adult  Goal: Maintains hematologic stability  Outcome: Progressing

## 2024-03-08 NOTE — PLAN OF CARE
Problem: Safety - Adult  Goal: Free from fall injury  3/8/2024 0153 by Courtney Graves, RN  Outcome: Progressing  Orthostatic vital signs obtained at start of shift - see flowsheet for details.  Pt does not meet criteria for orthostasis.  Pt is a Med fall risk. See Apple Fall Score and ABCDS Injury Risk assessments.   - Screening for Orthostasis AND not a [place] Risk per APPLE/ABCDS: Pt bed is in low position, side rails up, call light and belongings are in reach.  Fall risk light is on outside pts room.  Pt encouraged to call for assistance as needed. Will continue with hourly rounds for PO intake, pain needs, toileting and repositioning as needed.      Problem: Hematologic - Adult  Goal: Maintains hematologic stability  3/8/2024 0153 by Courtney Graves, RN  Outcome: Progressing  Patient's hemoglobin this AM:   Recent Labs     03/08/24  0319   HGB 7.6*     Patient's platelet count this AM:   Recent Labs     03/08/24  0319   PLT 76*    Thrombocytopenia Precautions in place.  Patient showing no signs or symptoms of active bleeding.  Transfusion not indicated at this time.  Patient verbalizes understanding of all instructions. Will continue to assess and implement POC. Call light within reach and hourly rounding in place.      Problem: Respiratory - Adult  Goal: Achieves optimal ventilation and oxygenation  Outcome: Progressing  Pt tolerated room air throughout the shift.

## 2024-03-09 LAB
ALBUMIN SERPL-MCNC: 3.8 G/DL (ref 3.4–5)
ALP SERPL-CCNC: 40 U/L (ref 40–129)
ALT SERPL-CCNC: 7 U/L (ref 10–40)
ANION GAP SERPL CALCULATED.3IONS-SCNC: 10 MMOL/L (ref 3–16)
ANISOCYTOSIS BLD QL SMEAR: ABNORMAL
ANISOCYTOSIS BLD QL SMEAR: ABNORMAL
AST SERPL-CCNC: 11 U/L (ref 15–37)
BACTERIA UR CULT: NORMAL
BASOPHILS # BLD: 0 K/UL (ref 0–0.2)
BASOPHILS # BLD: 0 K/UL (ref 0–0.2)
BASOPHILS NFR BLD: 0 %
BASOPHILS NFR BLD: 0 %
BILIRUB DIRECT SERPL-MCNC: <0.2 MG/DL (ref 0–0.3)
BILIRUB INDIRECT SERPL-MCNC: ABNORMAL MG/DL (ref 0–1)
BILIRUB SERPL-MCNC: 1.1 MG/DL (ref 0–1)
BLASTS NFR BLD MANUAL: 5 %
BLOOD BANK DISPENSE STATUS: NORMAL
BLOOD BANK PRODUCT CODE: NORMAL
BPU ID: NORMAL
BUN SERPL-MCNC: 7 MG/DL (ref 7–20)
CALCIUM SERPL-MCNC: 9.2 MG/DL (ref 8.3–10.6)
CHLORIDE SERPL-SCNC: 104 MMOL/L (ref 99–110)
CO2 SERPL-SCNC: 24 MMOL/L (ref 21–32)
CREAT SERPL-MCNC: 0.8 MG/DL (ref 0.9–1.3)
DACRYOCYTES BLD QL SMEAR: ABNORMAL
DACRYOCYTES BLD QL SMEAR: ABNORMAL
DEPRECATED RDW RBC AUTO: 37.2 % (ref 12.4–15.4)
DEPRECATED RDW RBC AUTO: 37.3 % (ref 12.4–15.4)
DESCRIPTION BLOOD BANK: NORMAL
EOSINOPHIL # BLD: 0 K/UL (ref 0–0.6)
EOSINOPHIL # BLD: 0 K/UL (ref 0–0.6)
EOSINOPHIL NFR BLD: 1 %
EOSINOPHIL NFR BLD: 1 %
GFR SERPLBLD CREATININE-BSD FMLA CKD-EPI: >60 ML/MIN/{1.73_M2}
GLUCOSE SERPL-MCNC: 88 MG/DL (ref 70–99)
HAV IGM SERPL QL IA: NORMAL
HBV CORE IGM SERPL QL IA: NORMAL
HBV SURFACE AG SERPL QL IA: NORMAL
HCT VFR BLD AUTO: 22.7 % (ref 40.5–52.5)
HCT VFR BLD AUTO: 24.4 % (ref 40.5–52.5)
HCV AB SERPL QL IA: NORMAL
HGB BLD-MCNC: 7 G/DL (ref 13.5–17.5)
HGB BLD-MCNC: 7.6 G/DL (ref 13.5–17.5)
HIV 1+2 AB+HIV1 P24 AG SERPL QL IA: NORMAL
HIV 2 AB SERPL QL IA: NORMAL
HIV1 AB SERPL QL IA: NORMAL
HIV1 P24 AG SERPL QL IA: NORMAL
HYPOCHROMIA BLD QL SMEAR: ABNORMAL
HYPOCHROMIA BLD QL SMEAR: ABNORMAL
LDH SERPL L TO P-CCNC: 894 U/L (ref 100–190)
LYMPHOCYTES # BLD: 2.4 K/UL (ref 1–5.1)
LYMPHOCYTES # BLD: 2.7 K/UL (ref 1–5.1)
LYMPHOCYTES NFR BLD: 47 %
LYMPHOCYTES NFR BLD: 55 %
MACROCYTES BLD QL SMEAR: ABNORMAL
MCH RBC QN AUTO: 23 PG (ref 26–34)
MCH RBC QN AUTO: 23.2 PG (ref 26–34)
MCHC RBC AUTO-ENTMCNC: 30.8 G/DL (ref 31–36)
MCHC RBC AUTO-ENTMCNC: 31 G/DL (ref 31–36)
MCV RBC AUTO: 74.6 FL (ref 80–100)
MCV RBC AUTO: 74.8 FL (ref 80–100)
METAMYELOCYTES NFR BLD MANUAL: 1 %
METAMYELOCYTES NFR BLD MANUAL: 5 %
MICROCYTES BLD QL SMEAR: ABNORMAL
MICROCYTES BLD QL SMEAR: ABNORMAL
MONOCYTES # BLD: 0.4 K/UL (ref 0–1.3)
MONOCYTES # BLD: 0.7 K/UL (ref 0–1.3)
MONOCYTES NFR BLD: 14 %
MONOCYTES NFR BLD: 8 %
MYELOCYTES NFR BLD MANUAL: 2 %
MYELOCYTES NFR BLD MANUAL: 2 %
NEUTROPHILS # BLD: 1.5 K/UL (ref 1.7–7.7)
NEUTROPHILS # BLD: 1.5 K/UL (ref 1.7–7.7)
NEUTROPHILS NFR BLD: 21 %
NEUTROPHILS NFR BLD: 23 %
NEUTS BAND NFR BLD MANUAL: 3 % (ref 0–7)
NEUTS BAND NFR BLD MANUAL: 6 % (ref 0–7)
NRBC BLD-RTO: 19 /100 WBC
NRBC BLD-RTO: 40 /100 WBC
OVALOCYTES BLD QL SMEAR: ABNORMAL
OVALOCYTES BLD QL SMEAR: ABNORMAL
PATH INTERP BLD-IMP: YES
PHOSPHATE SERPL-MCNC: 4.9 MG/DL (ref 2.5–4.9)
PLATELET # BLD AUTO: 53 K/UL (ref 135–450)
PLATELET # BLD AUTO: 72 K/UL (ref 135–450)
PLATELET BLD QL SMEAR: ABNORMAL
PLATELET BLD QL SMEAR: ABNORMAL
PMV BLD AUTO: 10.7 FL (ref 5–10.5)
PMV BLD AUTO: 8.3 FL (ref 5–10.5)
POLYCHROMASIA BLD QL SMEAR: ABNORMAL
POLYCHROMASIA BLD QL SMEAR: ABNORMAL
POTASSIUM SERPL-SCNC: 3.8 MMOL/L (ref 3.5–5.1)
PROT SERPL-MCNC: 6 G/DL (ref 6.4–8.2)
RBC # BLD AUTO: 3.03 M/UL (ref 4.2–5.9)
RBC # BLD AUTO: 3.27 M/UL (ref 4.2–5.9)
SCHISTOCYTES BLD QL SMEAR: ABNORMAL
SCHISTOCYTES BLD QL SMEAR: ABNORMAL
SLIDE REVIEW: ABNORMAL
SLIDE REVIEW: ABNORMAL
SODIUM SERPL-SCNC: 138 MMOL/L (ref 136–145)
TARGETS BLD QL SMEAR: ABNORMAL
URATE SERPL-MCNC: 6.7 MG/DL (ref 3.5–7.2)
VARIANT LYMPHS NFR BLD MANUAL: 6 % (ref 0–6)
VWF CP ACT/NOR PPP CHRO: 59 %
WBC # BLD AUTO: 4.5 K/UL (ref 4–11)
WBC # BLD AUTO: 4.9 K/UL (ref 4–11)

## 2024-03-09 PROCEDURE — 2580000003 HC RX 258: Performed by: NURSE PRACTITIONER

## 2024-03-09 PROCEDURE — 6370000000 HC RX 637 (ALT 250 FOR IP): Performed by: STUDENT IN AN ORGANIZED HEALTH CARE EDUCATION/TRAINING PROGRAM

## 2024-03-09 PROCEDURE — 36592 COLLECT BLOOD FROM PICC: CPT

## 2024-03-09 PROCEDURE — 6370000000 HC RX 637 (ALT 250 FOR IP): Performed by: NURSE PRACTITIONER

## 2024-03-09 PROCEDURE — 84550 ASSAY OF BLOOD/URIC ACID: CPT

## 2024-03-09 PROCEDURE — 6370000000 HC RX 637 (ALT 250 FOR IP): Performed by: INTERNAL MEDICINE

## 2024-03-09 PROCEDURE — 80048 BASIC METABOLIC PNL TOTAL CA: CPT

## 2024-03-09 PROCEDURE — 2580000003 HC RX 258: Performed by: STUDENT IN AN ORGANIZED HEALTH CARE EDUCATION/TRAINING PROGRAM

## 2024-03-09 PROCEDURE — 2060000000 HC ICU INTERMEDIATE R&B

## 2024-03-09 PROCEDURE — 2580000003 HC RX 258: Performed by: INTERNAL MEDICINE

## 2024-03-09 PROCEDURE — 36430 TRANSFUSION BLD/BLD COMPNT: CPT

## 2024-03-09 PROCEDURE — 85025 COMPLETE CBC W/AUTO DIFF WBC: CPT

## 2024-03-09 PROCEDURE — 83615 LACTATE (LD) (LDH) ENZYME: CPT

## 2024-03-09 PROCEDURE — 84100 ASSAY OF PHOSPHORUS: CPT

## 2024-03-09 PROCEDURE — 80076 HEPATIC FUNCTION PANEL: CPT

## 2024-03-09 RX ORDER — SODIUM CHLORIDE 9 MG/ML
INJECTION, SOLUTION INTRAVENOUS PRN
Status: DISCONTINUED | OUTPATIENT
Start: 2024-03-09 | End: 2024-04-02 | Stop reason: HOSPADM

## 2024-03-09 RX ORDER — SODIUM CHLORIDE 9 MG/ML
500 INJECTION, SOLUTION INTRAVENOUS CONTINUOUS
Status: ACTIVE | OUTPATIENT
Start: 2024-03-09 | End: 2024-03-12

## 2024-03-09 RX ADMIN — SODIUM CHLORIDE 500 ML: 9 INJECTION, SOLUTION INTRAVENOUS at 16:09

## 2024-03-09 RX ADMIN — SODIUM CHLORIDE, PRESERVATIVE FREE 10 ML: 5 INJECTION INTRAVENOUS at 09:09

## 2024-03-09 RX ADMIN — ALLOPURINOL 300 MG: 300 TABLET ORAL at 09:03

## 2024-03-09 RX ADMIN — LEVETIRACETAM 1500 MG: 500 TABLET, FILM COATED ORAL at 09:03

## 2024-03-09 RX ADMIN — SODIUM CHLORIDE 1000 ML: 9 INJECTION, SOLUTION INTRAVENOUS at 18:27

## 2024-03-09 RX ADMIN — FIDAXOMICIN 200 MG: 200 TABLET, FILM COATED ORAL at 20:58

## 2024-03-09 RX ADMIN — CYANOCOBALAMIN TAB 1000 MCG 1000 MCG: 1000 TAB at 09:03

## 2024-03-09 RX ADMIN — SODIUM CHLORIDE 1000 ML: 9 INJECTION, SOLUTION INTRAVENOUS at 03:27

## 2024-03-09 RX ADMIN — SODIUM CHLORIDE, PRESERVATIVE FREE 10 ML: 5 INJECTION INTRAVENOUS at 21:00

## 2024-03-09 RX ADMIN — LEVETIRACETAM 1500 MG: 500 TABLET, FILM COATED ORAL at 20:58

## 2024-03-09 RX ADMIN — FIDAXOMICIN 200 MG: 200 TABLET, FILM COATED ORAL at 09:03

## 2024-03-09 NOTE — DISCHARGE SUMMARY
V2.0  Discharge Summary    Name:  Dennys Peguero /Age/Sex: 1980 (43 y.o. male)   Admit Date: 3/5/2024  Discharge Date: 3/7/24    MRN & CSN:  [id number] & [id number] Encounter Date and Time 3/7/24 9:44 AM EST    Attending:  No att. providers found Discharging Provider: Jeanette Smith MD       Hospital Course:     Brief Problem Based Course:     GI bleed:  - s/p 2U pRBC's  - cont IV protonix  - GI consulted:  EGD performed without source of bleeding foundand colonoscopy to be done at [facility] due to need for transfer     Thrombocytopenia  Schistocytes  Hepatosplenomegaly  - hem/onc consulted:  [id number] activity, fly cotometry, LDH, haptoglobin ordered  - transfer to [facility] due to potential for TTP     H/o brain tumor from  s/p resection:  - cont keppra    The patient expressed appropriate understanding of, and agreement with the discharge recommendations, medications, and plan.     Consults this admission:  IP CONSULT TO GI  IP CONSULT TO HEM/ONC    Discharge Diagnosis:   GI bleed    Severe anemia  Thrombocytopenia    Discharge Instruction:   Follow up appointments: transfer to [facility]  Primary care physician: No primary care provider on file. within 2 weeks  Diet: regular diet   Activity: activity as tolerated  Disposition: Discharged to:  transfer to [facility]  Condition on discharge: Stable  Labs and Tests to be Followed up as an outpatient by PCP or Specialist: transfer to [facility]    Discharge Medications:        Medication List        CONTINUE taking these medications      levETIRAcetam 1000 MG tablet  Commonly known as: KEPPRA             Objective Findings at Discharge:   /81   Pulse 51   Temp 97.7 °F (36.5 °C) (Oral)   Resp 18   Ht 1.803 m (5' 10.98\")   Wt 80.5 kg (177 lb 7.5 oz)   SpO2 99%   BMI 24.76 kg/m²       Physical Exam:     General: NAD  Eyes: EOMI  ENT: neck supple  Cardiovascular: Regular rate.  Respiratory: Clear to auscultation  Gastrointestinal: Soft, mildly diffusely

## 2024-03-09 NOTE — PLAN OF CARE
Problem: Safety - Adult  Goal: Free from fall injury  Outcome: Progressing  Free From Fall Injury:   Instruct family/caregiver on patient safety   Based on caregiver fall risk screen, instruct family/caregiver to ask for assistance with transferring infant if caregiver noted to have fall risk factors  Note: Patient verbalizes understanding using call light for assistance. Call light within reach with bed in lowest position.       Problem: ABCDS Injury Assessment  Goal: Absence of physical injury  Outcome: Progressing  Absence of Physical Injury: Implement safety measures based on patient assessment  Note: - Screening for Orthostasis AND not a [place] Risk per APPLE/ABCDS: Pt bed is in low position, side rails up, call light and belongings are in reach.  Fall risk light is on outside pts room.  Pt encouraged to call for assistance as needed. Will continue with hourly rounds for PO intake, pain needs, toileting and repositioning as needed.        Problem: Hematologic - Adult  Goal: Maintains hematologic stability  Outcome: Progressing  Maintains hematologic stability:   Assess for signs and symptoms of bleeding or hemorrhage   Administer blood products/factors as ordered   Monitor labs for bleeding or clotting disorders  Note: Patient's hemoglobin this AM:   Recent Labs     03/09/24  0351   HGB 7.6*     Patient's platelet count this AM:   Recent Labs     03/09/24  0351   PLT 72*    Thrombocytopenia not present at this time.  Patient showing no signs or symptoms of active bleeding.  Transfusion not indicated at this time.  Patient verbalizes understanding of all instructions. Will continue to assess and implement POC. Call light within reach and hourly rounding in place.        Problem: Gastrointestinal - Adult  Goal: Minimal or absence of nausea and vomiting  Outcome: Progressing  Minimal or absence of nausea and vomiting:   Administer IV fluids as ordered to ensure adequate hydration   Nasogastric tube to low

## 2024-03-09 NOTE — PLAN OF CARE
Problem: Safety - Adult  Goal: Free from fall injury  3/9/2024 1457 by Pilar Fox, RN  Outcome: Progressing  Orthostatic vital signs obtained at start of shift - see flowsheet for details.  Pt does not meet criteria for orthostasis.  Pt is a Med fall risk. See [place] Fall Score and ABCDS Injury Risk assessments.   - Screening for Orthostasis AND not a [place] Risk per APPLE/ABCDS: Pt bed is in low position, side rails up, call light and belongings are in reach.  Fall risk light is on outside pts room.  Pt encouraged to call for assistance as needed. Will continue with hourly rounds for PO intake, pain needs, toileting and repositioning as needed.      Problem: Hematologic - Adult  Goal: Maintains hematologic stability  3/9/2024 1457 by Pilar Fox, RN  Outcome: Progressing  Patient's hemoglobin this AM:   Recent Labs     03/09/24  0351   HGB 7.6*     Patient's platelet count this AM:   Recent Labs     03/09/24  0351   PLT 72*    Thrombocytopenia Precautions in place.  Patient showing no signs or symptoms of active bleeding.  Blood product transfused per order during shift. Patient verbalizes understanding of all instructions. Will continue to assess and implement POC. Call light within reach and hourly rounding in place.      Problem: Gastrointestinal - Adult  Goal: Maintains or returns to baseline bowel function  Outcome: Progressing- patient with one BM during shift. Formed stool, but bloody. MD notified, see notes.

## 2024-03-10 LAB
ALBUMIN SERPL-MCNC: 3.6 G/DL (ref 3.4–5)
ALP SERPL-CCNC: 37 U/L (ref 40–129)
ALT SERPL-CCNC: 8 U/L (ref 10–40)
ANION GAP SERPL CALCULATED.3IONS-SCNC: 10 MMOL/L (ref 3–16)
ANISOCYTOSIS BLD QL SMEAR: ABNORMAL
AST SERPL-CCNC: 12 U/L (ref 15–37)
BASOPHILS # BLD: 0 K/UL (ref 0–0.2)
BASOPHILS NFR BLD: 0 %
BILIRUB DIRECT SERPL-MCNC: <0.2 MG/DL (ref 0–0.3)
BILIRUB INDIRECT SERPL-MCNC: ABNORMAL MG/DL (ref 0–1)
BILIRUB SERPL-MCNC: 1 MG/DL (ref 0–1)
BLASTS NFR BLD MANUAL: 3 %
BUN SERPL-MCNC: 9 MG/DL (ref 7–20)
CALCIUM SERPL-MCNC: 9.1 MG/DL (ref 8.3–10.6)
CHLORIDE SERPL-SCNC: 102 MMOL/L (ref 99–110)
CO2 SERPL-SCNC: 26 MMOL/L (ref 21–32)
CREAT SERPL-MCNC: 0.8 MG/DL (ref 0.9–1.3)
DACRYOCYTES BLD QL SMEAR: ABNORMAL
DEPRECATED RDW RBC AUTO: 33.2 % (ref 12.4–15.4)
EOSINOPHIL # BLD: 0.1 K/UL (ref 0–0.6)
EOSINOPHIL NFR BLD: 2 %
GFR SERPLBLD CREATININE-BSD FMLA CKD-EPI: >60 ML/MIN/{1.73_M2}
GLUCOSE SERPL-MCNC: 85 MG/DL (ref 70–99)
HCT VFR BLD AUTO: 23.9 % (ref 40.5–52.5)
HGB BLD-MCNC: 7.9 G/DL (ref 13.5–17.5)
HYPOCHROMIA BLD QL SMEAR: ABNORMAL
LDH SERPL L TO P-CCNC: 839 U/L (ref 100–190)
LYMPHOCYTES # BLD: 3 K/UL (ref 1–5.1)
LYMPHOCYTES NFR BLD: 54 %
MACROCYTES BLD QL SMEAR: ABNORMAL
MCH RBC QN AUTO: 24.8 PG (ref 26–34)
MCHC RBC AUTO-ENTMCNC: 33.1 G/DL (ref 31–36)
MCV RBC AUTO: 74.8 FL (ref 80–100)
METAMYELOCYTES NFR BLD MANUAL: 3 %
MICROCYTES BLD QL SMEAR: ABNORMAL
MONOCYTES # BLD: 0.2 K/UL (ref 0–1.3)
MONOCYTES NFR BLD: 4 %
NEUTROPHILS # BLD: 1.3 K/UL (ref 1.7–7.7)
NEUTROPHILS NFR BLD: 22 %
NEUTS BAND NFR BLD MANUAL: 3 % (ref 0–7)
NRBC BLD-RTO: 27 /100 WBC
OVALOCYTES BLD QL SMEAR: ABNORMAL
PATH INTERP BLD-IMP: ABNORMAL
PHOSPHATE SERPL-MCNC: 5 MG/DL (ref 2.5–4.9)
PLATELET # BLD AUTO: 69 K/UL (ref 135–450)
PMV BLD AUTO: 10.3 FL (ref 5–10.5)
POLYCHROMASIA BLD QL SMEAR: ABNORMAL
POTASSIUM SERPL-SCNC: 3.8 MMOL/L (ref 3.5–5.1)
PROT SERPL-MCNC: 5.8 G/DL (ref 6.4–8.2)
RBC # BLD AUTO: 3.19 M/UL (ref 4.2–5.9)
SCHISTOCYTES BLD QL SMEAR: ABNORMAL
SMUDGE CELLS BLD QL SMEAR: PRESENT
SODIUM SERPL-SCNC: 138 MMOL/L (ref 136–145)
TARGETS BLD QL SMEAR: ABNORMAL
URATE SERPL-MCNC: 6 MG/DL (ref 3.5–7.2)
VARIANT LYMPHS NFR BLD MANUAL: 9 % (ref 0–6)
WBC # BLD AUTO: 4.8 K/UL (ref 4–11)

## 2024-03-10 PROCEDURE — 36592 COLLECT BLOOD FROM PICC: CPT

## 2024-03-10 PROCEDURE — 2060000000 HC ICU INTERMEDIATE R&B

## 2024-03-10 PROCEDURE — 80048 BASIC METABOLIC PNL TOTAL CA: CPT

## 2024-03-10 PROCEDURE — 84100 ASSAY OF PHOSPHORUS: CPT

## 2024-03-10 PROCEDURE — 6370000000 HC RX 637 (ALT 250 FOR IP): Performed by: NURSE PRACTITIONER

## 2024-03-10 PROCEDURE — 6370000000 HC RX 637 (ALT 250 FOR IP): Performed by: STUDENT IN AN ORGANIZED HEALTH CARE EDUCATION/TRAINING PROGRAM

## 2024-03-10 PROCEDURE — 84550 ASSAY OF BLOOD/URIC ACID: CPT

## 2024-03-10 PROCEDURE — 6370000000 HC RX 637 (ALT 250 FOR IP): Performed by: INTERNAL MEDICINE

## 2024-03-10 PROCEDURE — 2580000003 HC RX 258: Performed by: INTERNAL MEDICINE

## 2024-03-10 PROCEDURE — 2580000003 HC RX 258: Performed by: NURSE PRACTITIONER

## 2024-03-10 PROCEDURE — 80076 HEPATIC FUNCTION PANEL: CPT

## 2024-03-10 PROCEDURE — 83615 LACTATE (LD) (LDH) ENZYME: CPT

## 2024-03-10 PROCEDURE — 85025 COMPLETE CBC W/AUTO DIFF WBC: CPT

## 2024-03-10 RX ADMIN — LEVETIRACETAM 1500 MG: 500 TABLET, FILM COATED ORAL at 19:34

## 2024-03-10 RX ADMIN — SODIUM CHLORIDE, PRESERVATIVE FREE 10 ML: 5 INJECTION INTRAVENOUS at 19:36

## 2024-03-10 RX ADMIN — SODIUM CHLORIDE 1000 ML: 9 INJECTION, SOLUTION INTRAVENOUS at 14:39

## 2024-03-10 RX ADMIN — SODIUM CHLORIDE, PRESERVATIVE FREE 10 ML: 5 INJECTION INTRAVENOUS at 08:42

## 2024-03-10 RX ADMIN — SODIUM CHLORIDE, PRESERVATIVE FREE 10 ML: 5 INJECTION INTRAVENOUS at 08:43

## 2024-03-10 RX ADMIN — FIDAXOMICIN 200 MG: 200 TABLET, FILM COATED ORAL at 19:34

## 2024-03-10 RX ADMIN — CYANOCOBALAMIN TAB 1000 MCG 1000 MCG: 1000 TAB at 08:42

## 2024-03-10 RX ADMIN — ALLOPURINOL 300 MG: 300 TABLET ORAL at 08:42

## 2024-03-10 RX ADMIN — LEVETIRACETAM 1500 MG: 500 TABLET, FILM COATED ORAL at 08:42

## 2024-03-10 RX ADMIN — FIDAXOMICIN 200 MG: 200 TABLET, FILM COATED ORAL at 08:42

## 2024-03-10 NOTE — PLAN OF CARE
Problem: Safety - Adult  Goal: Free from fall injury  3/9/2024 2312 by Courtney Graves, RN  Outcome: Progressing  Orthostatic vital signs obtained at start of shift - see flowsheet for details.  Pt does not meet criteria for orthostasis.  Pt is a Med fall risk. See Apple Fall Score and ABCDS Injury Risk assessments.   - Screening for Orthostasis AND not a [place] Risk per APPLE/ABCDS: Pt bed is in low position, side rails up, call light and belongings are in reach.  Fall risk light is on outside pts room.  Pt encouraged to call for assistance as needed. Will continue with hourly rounds for PO intake, pain needs, toileting and repositioning as needed.      Problem: Hematologic - Adult  Goal: Maintains hematologic stability  3/9/2024 2312 by Courtney Graves, RN  Outcome: Progressing  Patient's hemoglobin this AM:   Recent Labs     03/10/24  0331   HGB 7.9*     Patient's platelet count this AM:   Recent Labs     03/10/24  0331   PLT 69*    Thrombocytopenia Precautions in place.  Patient showing no signs or symptoms of active bleeding.  Transfusion not indicated at this time.  Patient verbalizes understanding of all instructions. Will continue to assess and implement POC. Call light within reach and hourly rounding in place.      Problem: Respiratory - Adult  Goal: Achieves optimal ventilation and oxygenation  Outcome: Progressing  Pt tolerated RA throughout the shift.

## 2024-03-10 NOTE — PLAN OF CARE
Problem: Safety - Adult  Goal: Free from fall injury  Outcome: Progressing  Orthostatic vital signs obtained at start of shift - see flowsheet for details.  Pt does not meet criteria for orthostasis.  Pt is a Med fall risk. See Apple Fall Score and ABCDS Injury Risk assessments.   - Screening for Orthostasis AND not a [place] Risk per APPLE/ABCDS: Pt bed is in low position, side rails up, call light and belongings are in reach.  Fall risk light is on outside pts room.  Pt encouraged to call for assistance as needed. Will continue with hourly rounds for PO intake, pain needs, toileting and repositioning as needed.      Problem: Gastrointestinal - Adult  Goal: Maintains or returns to baseline bowel function  Outcome: Progressing- patient stool recorded in I and O for shift. Stools more formed this shift. Care continues.

## 2024-03-11 PROBLEM — M31.19 TTP (THROMBOTIC THROMBOCYTOPENIC PURPURA) (HCC): Status: RESOLVED | Noted: 2024-03-07 | Resolved: 2024-03-11

## 2024-03-11 LAB
ABO + RH BLD: NORMAL
ALBUMIN SERPL-MCNC: 3.6 G/DL (ref 3.4–5)
ALP SERPL-CCNC: 40 U/L (ref 40–129)
ALT SERPL-CCNC: 8 U/L (ref 10–40)
AMORPH SED URNS QL MICRO: NORMAL /HPF
ANION GAP SERPL CALCULATED.3IONS-SCNC: 11 MMOL/L (ref 3–16)
ANISOCYTOSIS BLD QL SMEAR: ABNORMAL
APTT BLD: 31.9 SEC (ref 22.7–35.9)
AST SERPL-CCNC: 12 U/L (ref 15–37)
BASOPHILS # BLD: 0 K/UL (ref 0–0.2)
BASOPHILS NFR BLD: 0 %
BILIRUB DIRECT SERPL-MCNC: <0.2 MG/DL (ref 0–0.3)
BILIRUB INDIRECT SERPL-MCNC: ABNORMAL MG/DL (ref 0–1)
BILIRUB SERPL-MCNC: 1 MG/DL (ref 0–1)
BILIRUB UR QL STRIP.AUTO: NEGATIVE
BLD GP AB SCN SERPL QL: NORMAL
BUN SERPL-MCNC: 8 MG/DL (ref 7–20)
CALCIUM SERPL-MCNC: 9.1 MG/DL (ref 8.3–10.6)
CHLORIDE SERPL-SCNC: 102 MMOL/L (ref 99–110)
CLARITY UR: CLEAR
CO2 SERPL-SCNC: 25 MMOL/L (ref 21–32)
COLOR UR: YELLOW
CREAT SERPL-MCNC: 0.9 MG/DL (ref 0.9–1.3)
DEPRECATED RDW RBC AUTO: 35.3 % (ref 12.4–15.4)
EOSINOPHIL # BLD: 0.1 K/UL (ref 0–0.6)
EOSINOPHIL NFR BLD: 2 %
EPI CELLS #/AREA URNS HPF: NORMAL /HPF (ref 0–5)
GFR SERPLBLD CREATININE-BSD FMLA CKD-EPI: >60 ML/MIN/{1.73_M2}
GLUCOSE SERPL-MCNC: 92 MG/DL (ref 70–99)
GLUCOSE UR STRIP.AUTO-MCNC: NEGATIVE MG/DL
HCT VFR BLD AUTO: 24.9 % (ref 40.5–52.5)
HGB BLD-MCNC: 7.8 G/DL (ref 13.5–17.5)
HGB UR QL STRIP.AUTO: ABNORMAL
HYPOCHROMIA BLD QL SMEAR: ABNORMAL
INR PPP: 1.2 (ref 0.84–1.16)
KETONES UR STRIP.AUTO-MCNC: NEGATIVE MG/DL
LDH SERPL L TO P-CCNC: 785 U/L (ref 100–190)
LEUKOCYTE ESTERASE UR QL STRIP.AUTO: NEGATIVE
LYMPHOCYTES # BLD: 2.2 K/UL (ref 1–5.1)
LYMPHOCYTES NFR BLD: 52 %
MACROCYTES BLD QL SMEAR: ABNORMAL
MAGNESIUM SERPL-MCNC: 1.5 MG/DL (ref 1.8–2.4)
MCH RBC QN AUTO: 23.2 PG (ref 26–34)
MCHC RBC AUTO-ENTMCNC: 31.2 G/DL (ref 31–36)
MCV RBC AUTO: 74.2 FL (ref 80–100)
MICROCYTES BLD QL SMEAR: ABNORMAL
MONOCYTES # BLD: 0.3 K/UL (ref 0–1.3)
MONOCYTES NFR BLD: 8 %
NEUTROPHILS # BLD: 1.6 K/UL (ref 1.7–7.7)
NEUTROPHILS NFR BLD: 38 %
NITRITE UR QL STRIP.AUTO: NEGATIVE
NRBC BLD-RTO: 2 /100 WBC
PATH INTERP BLD-IMP: NORMAL
PH UR STRIP.AUTO: 6.5 [PH] (ref 5–8)
PHOSPHATE SERPL-MCNC: 5.3 MG/DL (ref 2.5–4.9)
PLATELET # BLD AUTO: 51 K/UL (ref 135–450)
PMV BLD AUTO: 8.7 FL (ref 5–10.5)
POTASSIUM SERPL-SCNC: 3.8 MMOL/L (ref 3.5–5.1)
PROT SERPL-MCNC: 5.9 G/DL (ref 6.4–8.2)
PROT UR STRIP.AUTO-MCNC: NEGATIVE MG/DL
PROTHROMBIN TIME: 15.2 SEC (ref 11.5–14.8)
RBC # BLD AUTO: 3.36 M/UL (ref 4.2–5.9)
RBC #/AREA URNS HPF: NORMAL /HPF (ref 0–4)
SCHISTOCYTES BLD QL SMEAR: ABNORMAL
SODIUM SERPL-SCNC: 138 MMOL/L (ref 136–145)
SP GR UR STRIP.AUTO: 1.01 (ref 1–1.03)
TARGETS BLD QL SMEAR: ABNORMAL
UA DIPSTICK W REFLEX MICRO PNL UR: YES
URATE SERPL-MCNC: 5.7 MG/DL (ref 3.5–7.2)
URN SPEC COLLECT METH UR: ABNORMAL
UROBILINOGEN UR STRIP-ACNC: 0.2 E.U./DL
WBC # BLD AUTO: 4.2 K/UL (ref 4–11)
WBC #/AREA URNS HPF: NORMAL /HPF (ref 0–5)

## 2024-03-11 PROCEDURE — 2580000003 HC RX 258: Performed by: INTERNAL MEDICINE

## 2024-03-11 PROCEDURE — 86901 BLOOD TYPING SEROLOGIC RH(D): CPT

## 2024-03-11 PROCEDURE — P9036 PLATELET PHERESIS IRRADIATED: HCPCS

## 2024-03-11 PROCEDURE — 36592 COLLECT BLOOD FROM PICC: CPT

## 2024-03-11 PROCEDURE — 2060000000 HC ICU INTERMEDIATE R&B

## 2024-03-11 PROCEDURE — 6370000000 HC RX 637 (ALT 250 FOR IP): Performed by: STUDENT IN AN ORGANIZED HEALTH CARE EDUCATION/TRAINING PROGRAM

## 2024-03-11 PROCEDURE — 81001 URINALYSIS AUTO W/SCOPE: CPT

## 2024-03-11 PROCEDURE — 6370000000 HC RX 637 (ALT 250 FOR IP): Performed by: NURSE PRACTITIONER

## 2024-03-11 PROCEDURE — 86850 RBC ANTIBODY SCREEN: CPT

## 2024-03-11 PROCEDURE — 86900 BLOOD TYPING SEROLOGIC ABO: CPT

## 2024-03-11 PROCEDURE — 85025 COMPLETE CBC W/AUTO DIFF WBC: CPT

## 2024-03-11 PROCEDURE — 2580000003 HC RX 258: Performed by: NURSE PRACTITIONER

## 2024-03-11 PROCEDURE — 83735 ASSAY OF MAGNESIUM: CPT

## 2024-03-11 PROCEDURE — 83615 LACTATE (LD) (LDH) ENZYME: CPT

## 2024-03-11 PROCEDURE — 80076 HEPATIC FUNCTION PANEL: CPT

## 2024-03-11 PROCEDURE — 84550 ASSAY OF BLOOD/URIC ACID: CPT

## 2024-03-11 PROCEDURE — 6370000000 HC RX 637 (ALT 250 FOR IP): Performed by: INTERNAL MEDICINE

## 2024-03-11 PROCEDURE — 85730 THROMBOPLASTIN TIME PARTIAL: CPT

## 2024-03-11 PROCEDURE — 85610 PROTHROMBIN TIME: CPT

## 2024-03-11 PROCEDURE — 84100 ASSAY OF PHOSPHORUS: CPT

## 2024-03-11 PROCEDURE — 80048 BASIC METABOLIC PNL TOTAL CA: CPT

## 2024-03-11 RX ORDER — PROCHLORPERAZINE EDISYLATE 5 MG/ML
10 INJECTION INTRAMUSCULAR; INTRAVENOUS EVERY 4 HOURS PRN
Status: DISCONTINUED | OUTPATIENT
Start: 2024-03-11 | End: 2024-04-02 | Stop reason: HOSPADM

## 2024-03-11 RX ORDER — LORAZEPAM 0.5 MG/1
0.5 TABLET ORAL EVERY 4 HOURS PRN
Status: DISCONTINUED | OUTPATIENT
Start: 2024-03-11 | End: 2024-04-02 | Stop reason: HOSPADM

## 2024-03-11 RX ORDER — LORAZEPAM 2 MG/ML
0.5 INJECTION INTRAMUSCULAR EVERY 4 HOURS PRN
Status: DISCONTINUED | OUTPATIENT
Start: 2024-03-11 | End: 2024-04-02 | Stop reason: HOSPADM

## 2024-03-11 RX ORDER — ONDANSETRON HYDROCHLORIDE 8 MG/1
8 TABLET, FILM COATED ORAL EVERY 24 HOURS
Status: COMPLETED | OUTPATIENT
Start: 2024-03-12 | End: 2024-03-16

## 2024-03-11 RX ORDER — PROCHLORPERAZINE MALEATE 10 MG
10 TABLET ORAL EVERY 4 HOURS PRN
Status: DISCONTINUED | OUTPATIENT
Start: 2024-03-11 | End: 2024-04-02 | Stop reason: HOSPADM

## 2024-03-11 RX ORDER — FUROSEMIDE 40 MG/1
40 TABLET ORAL EVERY 12 HOURS
Status: DISCONTINUED | OUTPATIENT
Start: 2024-03-13 | End: 2024-03-15

## 2024-03-11 RX ORDER — SODIUM CHLORIDE 9 MG/ML
INJECTION, SOLUTION INTRAVENOUS CONTINUOUS
Status: DISCONTINUED | OUTPATIENT
Start: 2024-03-12 | End: 2024-03-20

## 2024-03-11 RX ADMIN — SODIUM CHLORIDE, PRESERVATIVE FREE 10 ML: 5 INJECTION INTRAVENOUS at 09:58

## 2024-03-11 RX ADMIN — CYANOCOBALAMIN TAB 1000 MCG 1000 MCG: 1000 TAB at 09:58

## 2024-03-11 RX ADMIN — FIDAXOMICIN 200 MG: 200 TABLET, FILM COATED ORAL at 21:01

## 2024-03-11 RX ADMIN — SODIUM CHLORIDE, PRESERVATIVE FREE 10 ML: 5 INJECTION INTRAVENOUS at 21:54

## 2024-03-11 RX ADMIN — LEVETIRACETAM 1500 MG: 500 TABLET, FILM COATED ORAL at 09:58

## 2024-03-11 RX ADMIN — FIDAXOMICIN 200 MG: 200 TABLET, FILM COATED ORAL at 09:58

## 2024-03-11 RX ADMIN — SODIUM CHLORIDE 1000 ML: 9 INJECTION, SOLUTION INTRAVENOUS at 14:45

## 2024-03-11 RX ADMIN — SODIUM CHLORIDE, PRESERVATIVE FREE 10 ML: 5 INJECTION INTRAVENOUS at 21:53

## 2024-03-11 RX ADMIN — LEVETIRACETAM 1500 MG: 500 TABLET, FILM COATED ORAL at 21:00

## 2024-03-11 RX ADMIN — SODIUM CHLORIDE, PRESERVATIVE FREE 10 ML: 5 INJECTION INTRAVENOUS at 09:59

## 2024-03-11 RX ADMIN — ALLOPURINOL 300 MG: 300 TABLET ORAL at 09:58

## 2024-03-11 NOTE — PLAN OF CARE
Problem: Hematologic - Adult  Goal: Maintains hematologic stability  Outcome: Progressing     Problem: ABCDS Injury Assessment  Goal: Absence of physical injury  Outcome: Progressing     Problem: Safety - Adult  Goal: Free from fall injury  Outcome: Progressing   Orthostatic vital signs obtained at start of shift - see flowsheet for details.  Pt does not meet criteria for orthostasis.  Pt is a Med fall risk. See Apple Fall Score and ABCDS Injury Risk assessments.   - Screening for Orthostasis AND not a [place] Risk per APPLE/ABCDS: Pt bed is in low position, side rails up, call light and belongings are in reach.  Fall risk light is on outside pts room.  Pt encouraged to call for assistance as needed. Will continue with hourly rounds for PO intake, pain needs, toileting and repositioning as needed.

## 2024-03-11 NOTE — PLAN OF CARE
Problem: Safety - Adult  Goal: Free from fall injury  3/10/2024 2228 by Courtney Graves, RN  Outcome: Progressing  Orthostatic vital signs obtained at start of shift - see flowsheet for details.  Pt does not meet criteria for orthostasis.  Pt is a Med fall risk. See Apple Fall Score and ABCDS Injury Risk assessments.   - Screening for Orthostasis AND not a [place] Risk per APPLE/ABCDS: Pt bed is in low position, side rails up, call light and belongings are in reach.  Fall risk light is on outside pts room.  Pt encouraged to call for assistance as needed. Will continue with hourly rounds for PO intake, pain needs, toileting and repositioning as needed.      Problem: Hematologic - Adult  Goal: Maintains hematologic stability  Outcome: Progressing  Patient's hemoglobin this AM:   Recent Labs     03/11/24  0344   HGB 7.8*     Patient's platelet count this AM:   Recent Labs     03/11/24  0344   PLT 51*    Thrombocytopenia Precautions in place.  Patient showing no signs or symptoms of active bleeding.  Transfusion not indicated at this time.  Patient verbalizes understanding of all instructions. Will continue to assess and implement POC. Call light within reach and hourly rounding in place.      Problem: Respiratory - Adult  Goal: Achieves optimal ventilation and oxygenation  Outcome: Progressing  Pt tolerated RA throughout the whole shift.     Problem: Gastrointestinal - Adult  Goal: Maintains or returns to baseline bowel function  3/10/2024 2228 by Courtney Graves, RN  Outcome: Progressing  Pt had no bowel movements throughout this shift.

## 2024-03-11 NOTE — BH NOTE
Behavioral Health Intervention Note    Met with patient briefly to introduce services. Patient denied behavioral health needs today.     Lisette Craig Psy.D., MSCP  Clinical Psychologist

## 2024-03-12 LAB
ALBUMIN SERPL-MCNC: 3.5 G/DL (ref 3.4–5)
ALP SERPL-CCNC: 39 U/L (ref 40–129)
ALT SERPL-CCNC: 8 U/L (ref 10–40)
ANION GAP SERPL CALCULATED.3IONS-SCNC: 9 MMOL/L (ref 3–16)
ANISOCYTOSIS BLD QL SMEAR: ABNORMAL
AST SERPL-CCNC: 11 U/L (ref 15–37)
BASOPHILS # BLD: 0 K/UL (ref 0–0.2)
BASOPHILS NFR BLD: 0 %
BILIRUB DIRECT SERPL-MCNC: <0.2 MG/DL (ref 0–0.3)
BILIRUB INDIRECT SERPL-MCNC: ABNORMAL MG/DL (ref 0–1)
BILIRUB SERPL-MCNC: 0.9 MG/DL (ref 0–1)
BLOOD BANK DISPENSE STATUS: NORMAL
BLOOD BANK PRODUCT CODE: NORMAL
BPU ID: NORMAL
BUN SERPL-MCNC: 10 MG/DL (ref 7–20)
CALCIUM SERPL-MCNC: 9.6 MG/DL (ref 8.3–10.6)
CHLORIDE SERPL-SCNC: 100 MMOL/L (ref 99–110)
CO2 SERPL-SCNC: 25 MMOL/L (ref 21–32)
CREAT SERPL-MCNC: 0.8 MG/DL (ref 0.9–1.3)
DACRYOCYTES BLD QL SMEAR: ABNORMAL
DEPRECATED RDW RBC AUTO: 34 % (ref 12.4–15.4)
DESCRIPTION BLOOD BANK: NORMAL
EOSINOPHIL # BLD: 0 K/UL (ref 0–0.6)
EOSINOPHIL NFR BLD: 0 %
GFR SERPLBLD CREATININE-BSD FMLA CKD-EPI: >60 ML/MIN/{1.73_M2}
GLUCOSE SERPL-MCNC: 93 MG/DL (ref 70–99)
HCT VFR BLD AUTO: 22.7 % (ref 40.5–52.5)
HGB BLD-MCNC: 7.6 G/DL (ref 13.5–17.5)
HGB FRACT BLD ELPH-IMP: NORMAL
HGB FRACT BLD ELPH-IMP: NORMAL
HYPOCHROMIA BLD QL SMEAR: ABNORMAL
LDH SERPL L TO P-CCNC: 732 U/L (ref 100–190)
LYMPHOCYTES # BLD: 2.4 K/UL (ref 1–5.1)
LYMPHOCYTES NFR BLD: 66 %
MACROCYTES BLD QL SMEAR: ABNORMAL
MAGNESIUM SERPL-MCNC: 1.7 MG/DL (ref 1.8–2.4)
MCH RBC QN AUTO: 25.2 PG (ref 26–34)
MCHC RBC AUTO-ENTMCNC: 33.4 G/DL (ref 31–36)
MCV RBC AUTO: 75.5 FL (ref 80–100)
MICROCYTES BLD QL SMEAR: ABNORMAL
MONOCYTES # BLD: 0.3 K/UL (ref 0–1.3)
MONOCYTES NFR BLD: 8 %
NEUTROPHILS # BLD: 1 K/UL (ref 1.7–7.7)
NEUTROPHILS NFR BLD: 26 %
NRBC BLD-RTO: 14 /100 WBC
OVALOCYTES BLD QL SMEAR: ABNORMAL
PHOSPHATE SERPL-MCNC: 5.2 MG/DL (ref 2.5–4.9)
PLATELET # BLD AUTO: 45 K/UL (ref 135–450)
PMV BLD AUTO: 8.8 FL (ref 5–10.5)
POLYCHROMASIA BLD QL SMEAR: ABNORMAL
POTASSIUM SERPL-SCNC: 4 MMOL/L (ref 3.5–5.1)
PROT SERPL-MCNC: 5.7 G/DL (ref 6.4–8.2)
RBC # BLD AUTO: 3.01 M/UL (ref 4.2–5.9)
SCHISTOCYTES BLD QL SMEAR: ABNORMAL
SMUDGE CELLS BLD QL SMEAR: PRESENT
SODIUM SERPL-SCNC: 134 MMOL/L (ref 136–145)
TARGETS BLD QL SMEAR: ABNORMAL
URATE SERPL-MCNC: 5.4 MG/DL (ref 3.5–7.2)
WBC # BLD AUTO: 3.7 K/UL (ref 4–11)

## 2024-03-12 PROCEDURE — 80076 HEPATIC FUNCTION PANEL: CPT

## 2024-03-12 PROCEDURE — 80048 BASIC METABOLIC PNL TOTAL CA: CPT

## 2024-03-12 PROCEDURE — 83615 LACTATE (LD) (LDH) ENZYME: CPT

## 2024-03-12 PROCEDURE — 84550 ASSAY OF BLOOD/URIC ACID: CPT

## 2024-03-12 PROCEDURE — 85025 COMPLETE CBC W/AUTO DIFF WBC: CPT

## 2024-03-12 PROCEDURE — 83735 ASSAY OF MAGNESIUM: CPT

## 2024-03-12 PROCEDURE — 6360000002 HC RX W HCPCS: Performed by: INTERNAL MEDICINE

## 2024-03-12 PROCEDURE — 2580000003 HC RX 258: Performed by: NURSE PRACTITIONER

## 2024-03-12 PROCEDURE — 6370000000 HC RX 637 (ALT 250 FOR IP): Performed by: NURSE PRACTITIONER

## 2024-03-12 PROCEDURE — 2580000003 HC RX 258: Performed by: INTERNAL MEDICINE

## 2024-03-12 PROCEDURE — 6370000000 HC RX 637 (ALT 250 FOR IP): Performed by: STUDENT IN AN ORGANIZED HEALTH CARE EDUCATION/TRAINING PROGRAM

## 2024-03-12 PROCEDURE — A4217 STERILE WATER/SALINE, 500 ML: HCPCS | Performed by: NURSE PRACTITIONER

## 2024-03-12 PROCEDURE — 84100 ASSAY OF PHOSPHORUS: CPT

## 2024-03-12 PROCEDURE — 2060000000 HC ICU INTERMEDIATE R&B

## 2024-03-12 PROCEDURE — 6370000000 HC RX 637 (ALT 250 FOR IP): Performed by: INTERNAL MEDICINE

## 2024-03-12 PROCEDURE — 36430 TRANSFUSION BLD/BLD COMPNT: CPT

## 2024-03-12 RX ORDER — LEVOFLOXACIN 500 MG/1
500 TABLET, FILM COATED ORAL NIGHTLY
Status: DISCONTINUED | OUTPATIENT
Start: 2024-03-12 | End: 2024-04-02 | Stop reason: HOSPADM

## 2024-03-12 RX ORDER — SENNA AND DOCUSATE SODIUM 50; 8.6 MG/1; MG/1
1 TABLET, FILM COATED ORAL 2 TIMES DAILY PRN
Status: DISCONTINUED | OUTPATIENT
Start: 2024-03-12 | End: 2024-04-02 | Stop reason: HOSPADM

## 2024-03-12 RX ORDER — VALACYCLOVIR HYDROCHLORIDE 500 MG/1
500 TABLET, FILM COATED ORAL 2 TIMES DAILY
Status: DISCONTINUED | OUTPATIENT
Start: 2024-03-12 | End: 2024-04-02 | Stop reason: HOSPADM

## 2024-03-12 RX ORDER — SODIUM CHLORIDE 9 MG/ML
INJECTION, SOLUTION INTRAVENOUS PRN
Status: DISCONTINUED | OUTPATIENT
Start: 2024-03-12 | End: 2024-04-02 | Stop reason: HOSPADM

## 2024-03-12 RX ORDER — FLUCONAZOLE 200 MG/1
200 TABLET ORAL DAILY
Status: DISCONTINUED | OUTPATIENT
Start: 2024-03-12 | End: 2024-04-02 | Stop reason: HOSPADM

## 2024-03-12 RX ORDER — PANTOPRAZOLE SODIUM 40 MG/1
40 TABLET, DELAYED RELEASE ORAL
Status: DISCONTINUED | OUTPATIENT
Start: 2024-03-12 | End: 2024-03-18

## 2024-03-12 RX ADMIN — SODIUM CHLORIDE 15 ML: 900 IRRIGANT IRRIGATION at 16:23

## 2024-03-12 RX ADMIN — FIDAXOMICIN 200 MG: 200 TABLET, FILM COATED ORAL at 20:07

## 2024-03-12 RX ADMIN — SODIUM CHLORIDE: 9 INJECTION, SOLUTION INTRAVENOUS at 15:03

## 2024-03-12 RX ADMIN — CYANOCOBALAMIN TAB 1000 MCG 1000 MCG: 1000 TAB at 08:21

## 2024-03-12 RX ADMIN — SODIUM CHLORIDE 1000 ML: 9 INJECTION, SOLUTION INTRAVENOUS at 09:16

## 2024-03-12 RX ADMIN — ONDANSETRON HYDROCHLORIDE 8 MG: 8 TABLET, FILM COATED ORAL at 14:40

## 2024-03-12 RX ADMIN — VALACYCLOVIR HYDROCHLORIDE 500 MG: 500 TABLET, FILM COATED ORAL at 08:21

## 2024-03-12 RX ADMIN — VALACYCLOVIR HYDROCHLORIDE 500 MG: 500 TABLET, FILM COATED ORAL at 20:07

## 2024-03-12 RX ADMIN — SODIUM CHLORIDE 15 ML: 900 IRRIGANT IRRIGATION at 20:09

## 2024-03-12 RX ADMIN — SODIUM CHLORIDE, PRESERVATIVE FREE 10 ML: 5 INJECTION INTRAVENOUS at 20:08

## 2024-03-12 RX ADMIN — LEVOFLOXACIN 500 MG: 500 TABLET, FILM COATED ORAL at 20:07

## 2024-03-12 RX ADMIN — LEVETIRACETAM 1500 MG: 500 TABLET, FILM COATED ORAL at 08:20

## 2024-03-12 RX ADMIN — FIDAXOMICIN 200 MG: 200 TABLET, FILM COATED ORAL at 08:21

## 2024-03-12 RX ADMIN — PANTOPRAZOLE SODIUM 40 MG: 40 TABLET, DELAYED RELEASE ORAL at 09:12

## 2024-03-12 RX ADMIN — SODIUM CHLORIDE, PRESERVATIVE FREE 10 ML: 5 INJECTION INTRAVENOUS at 08:22

## 2024-03-12 RX ADMIN — FLUCONAZOLE 200 MG: 200 TABLET ORAL at 08:21

## 2024-03-12 RX ADMIN — DECITABINE 40 MG: 50 INJECTION, POWDER, LYOPHILIZED, FOR SOLUTION INTRAVENOUS at 15:09

## 2024-03-12 RX ADMIN — DOCUSATE SODIUM 50 MG AND SENNOSIDES 8.6 MG 1 TABLET: 8.6; 5 TABLET, FILM COATED ORAL at 16:23

## 2024-03-12 RX ADMIN — LEVETIRACETAM 1500 MG: 500 TABLET, FILM COATED ORAL at 20:07

## 2024-03-12 RX ADMIN — ALLOPURINOL 300 MG: 300 TABLET ORAL at 08:21

## 2024-03-12 NOTE — ONCOLOGY
Administration: Chemotherapy drug dacogen independently verified with Daiana Millan RN prior to administration.  Acknowledgement of informed consent for chemotherapy administration verified.  Original order, appropriateness of regimen, drug supplied, height, weight, BSA, dose calculations, expiration dates/times, drug appearance, and two patient identifiers were verified by both RNs.  Drug checked for vesicant/irritant status and for risk of hypersensitivity.  Most recent laboratory values and allergies, were reviewed.  Positive, brisk blood return via CVC was confirmed prior to administration. Chest x-ray for correct line placement reviewed. Pilar Fox RN, RN and Daiana Millan RN verified correct rate of chemotherapy and maintenance IV fluids.  Patient was educated on chemotherapy regimen prior to administration including indication for treatment related to disease & side effects of chemotherapy drug.  Patient verbalizes understanding of all instructions.    Completion of Chemotherapy: Monitoring during infusion done per policy, see Flowsheets.  Blood return verified before, during, and after infusion per policy; no signs of extravasation.  Pt tolerating chemotherapy well and without incident.  Chemotherapy infusion end time on the MAR.

## 2024-03-12 NOTE — PLAN OF CARE
Problem: Safety - Adult  Goal: Free from fall injury  3/12/2024 0439 by Valentina Abbasi RN  Outcome: Progressing  Note: Orthostatic vital signs obtained at start of shift - see flowsheet for details.  Pt does not meet criteria for orthostasis.  Pt is a Low fall risk. See Apple Fall Score and ABCDS Injury Risk assessments.   - Screening for Orthostasis AND not a [place] Risk per APPLE/ABCDS: Pt bed is in low position, side rails up, call light and belongings are in reach.  Fall risk light is on outside pts room.  Pt encouraged to call for assistance as needed. Will continue with hourly rounds for PO intake, pain needs, toileting and repositioning as needed.       Problem: ABCDS Injury Assessment  Goal: Absence of physical injury  3/12/2024 0439 by Valentina Abbasi RN  Outcome: Progressing     Problem: Hematologic - Adult  Goal: Maintains hematologic stability  3/12/2024 0439 by Valentina Abbasi RN  Outcome: Progressing  Note: Patient's hemoglobin this AM:   Recent Labs     03/12/24  0401   HGB 7.6*     Patient's platelet count this AM:   Recent Labs     03/12/24  0401   PLT 45*    Thrombocytopenia Precautions in place.  Patient showing no signs or symptoms of active bleeding.  Transfusion not indicated at this time.  Patient verbalizes understanding of all instructions. Will continue to assess and implement POC. Call light within reach and hourly rounding in place.

## 2024-03-12 NOTE — PLAN OF CARE
Problem: Safety - Adult  Goal: Free from fall injury  3/12/2024 1336 by Pilar Fox, RN  Outcome: Progressing  Orthostatic vital signs obtained at start of shift - see flowsheet for details.  Pt does not meet criteria for orthostasis.  Pt is a Med fall risk. See Apple Fall Score and ABCDS Injury Risk assessments.   - Screening for Orthostasis AND not a [place] Risk per APPLE/ABCDS: Pt bed is in low position, side rails up, call light and belongings are in reach.  Fall risk light is on outside pts room.  Pt encouraged to call for assistance as needed. Will continue with hourly rounds for PO intake, pain needs, toileting and repositioning as needed.      Problem: Hematologic - Adult  Goal: Maintains hematologic stability  3/12/2024 1336 by Pilar Fox, RN  Outcome: Progressing  Patient's hemoglobin this AM:   Recent Labs     03/12/24  0401   HGB 7.6*     Patient's platelet count this AM:   Recent Labs     03/12/24  0401   PLT 45*    Thrombocytopenia Precautions in place.  Patient showing no signs or symptoms of active bleeding.  Patient transfused blood products per orders - see flowsheet.  Patient verbalizes understanding of all instructions. Will continue to assess and implement POC. Call light within reach and hourly rounding in place.      Problem: Gastrointestinal - Adult  Goal: Maintains or returns to baseline bowel function  Outcome: Progressing- patient with no blood stools during shift. Care continues.

## 2024-03-13 LAB
ALBUMIN SERPL-MCNC: 3.7 G/DL (ref 3.4–5)
ALP SERPL-CCNC: 41 U/L (ref 40–129)
ALT SERPL-CCNC: 8 U/L (ref 10–40)
ANION GAP SERPL CALCULATED.3IONS-SCNC: 10 MMOL/L (ref 3–16)
ANION GAP SERPL CALCULATED.3IONS-SCNC: 10 MMOL/L (ref 3–16)
ANION GAP SERPL CALCULATED.3IONS-SCNC: 11 MMOL/L (ref 3–16)
ANISOCYTOSIS BLD QL SMEAR: ABNORMAL
APTT BLD: 32.9 SEC (ref 22.7–35.9)
AST SERPL-CCNC: 13 U/L (ref 15–37)
BASOPHILS # BLD: 0 K/UL (ref 0–0.2)
BASOPHILS NFR BLD: 0 %
BILIRUB DIRECT SERPL-MCNC: <0.2 MG/DL (ref 0–0.3)
BILIRUB INDIRECT SERPL-MCNC: ABNORMAL MG/DL (ref 0–1)
BILIRUB SERPL-MCNC: 1.1 MG/DL (ref 0–1)
BLASTS NFR BLD MANUAL: 15 %
BUN SERPL-MCNC: 10 MG/DL (ref 7–20)
BUN SERPL-MCNC: 11 MG/DL (ref 7–20)
BUN SERPL-MCNC: 15 MG/DL (ref 7–20)
CALCIUM SERPL-MCNC: 9 MG/DL (ref 8.3–10.6)
CALCIUM SERPL-MCNC: 9.6 MG/DL (ref 8.3–10.6)
CALCIUM SERPL-MCNC: 9.6 MG/DL (ref 8.3–10.6)
CHLORIDE SERPL-SCNC: 102 MMOL/L (ref 99–110)
CHLORIDE SERPL-SCNC: 98 MMOL/L (ref 99–110)
CHLORIDE SERPL-SCNC: 99 MMOL/L (ref 99–110)
CO2 SERPL-SCNC: 25 MMOL/L (ref 21–32)
CO2 SERPL-SCNC: 26 MMOL/L (ref 21–32)
CO2 SERPL-SCNC: 26 MMOL/L (ref 21–32)
CREAT SERPL-MCNC: 0.8 MG/DL (ref 0.9–1.3)
CREAT SERPL-MCNC: 0.9 MG/DL (ref 0.9–1.3)
CREAT SERPL-MCNC: 1.1 MG/DL (ref 0.9–1.3)
DACRYOCYTES BLD QL SMEAR: ABNORMAL
DEPRECATED RDW RBC AUTO: 35.2 % (ref 12.4–15.4)
DEPRECATED RDW RBC AUTO: 36.2 % (ref 12.4–15.4)
EOSINOPHIL # BLD: 0.1 K/UL (ref 0–0.6)
EOSINOPHIL NFR BLD: 2 %
GFR SERPLBLD CREATININE-BSD FMLA CKD-EPI: >60 ML/MIN/{1.73_M2}
GLUCOSE SERPL-MCNC: 104 MG/DL (ref 70–99)
GLUCOSE SERPL-MCNC: 83 MG/DL (ref 70–99)
GLUCOSE SERPL-MCNC: 89 MG/DL (ref 70–99)
HCT VFR BLD AUTO: 23.5 % (ref 40.5–52.5)
HCT VFR BLD AUTO: 24.4 % (ref 40.5–52.5)
HGB BLD-MCNC: 7.5 G/DL (ref 13.5–17.5)
HGB BLD-MCNC: 7.7 G/DL (ref 13.5–17.5)
HYPOCHROMIA BLD QL SMEAR: ABNORMAL
INR PPP: 1.14 (ref 0.84–1.16)
LDH SERPL L TO P-CCNC: 695 U/L (ref 100–190)
LYMPHOCYTES # BLD: 1.7 K/UL (ref 1–5.1)
LYMPHOCYTES NFR BLD: 53 %
Lab: NORMAL
Lab: NORMAL
MACROCYTES BLD QL SMEAR: ABNORMAL
MAGNESIUM SERPL-MCNC: 1.6 MG/DL (ref 1.8–2.4)
MCH RBC QN AUTO: 23.2 PG (ref 26–34)
MCH RBC QN AUTO: 23.3 PG (ref 26–34)
MCHC RBC AUTO-ENTMCNC: 31.5 G/DL (ref 31–36)
MCHC RBC AUTO-ENTMCNC: 31.7 G/DL (ref 31–36)
MCV RBC AUTO: 73.5 FL (ref 80–100)
MCV RBC AUTO: 73.6 FL (ref 80–100)
METAMYELOCYTES NFR BLD MANUAL: 2 %
MICROCYTES BLD QL SMEAR: ABNORMAL
MONOCYTES # BLD: 0.1 K/UL (ref 0–1.3)
MONOCYTES NFR BLD: 3 %
NEUTROPHILS # BLD: 0.8 K/UL (ref 1.7–7.7)
NEUTROPHILS NFR BLD: 23 %
NRBC BLD-RTO: 21 /100 WBC
OVALOCYTES BLD QL SMEAR: ABNORMAL
PATH INTERP BLD-IMP: YES
PHOSPHATE SERPL-MCNC: 3.9 MG/DL (ref 2.5–4.9)
PHOSPHATE SERPL-MCNC: 4.2 MG/DL (ref 2.5–4.9)
PHOSPHATE SERPL-MCNC: 5 MG/DL (ref 2.5–4.9)
PLATELET # BLD AUTO: 97 K/UL (ref 135–450)
PLATELET # BLD AUTO: 97 K/UL (ref 135–450)
PLATELET BLD QL SMEAR: ABNORMAL
PLATELET BLD QL SMEAR: ABNORMAL
PMV BLD AUTO: 10.2 FL (ref 5–10.5)
PMV BLD AUTO: 9.2 FL (ref 5–10.5)
POLYCHROMASIA BLD QL SMEAR: ABNORMAL
POTASSIUM SERPL-SCNC: 3.8 MMOL/L (ref 3.5–5.1)
POTASSIUM SERPL-SCNC: 3.9 MMOL/L (ref 3.5–5.1)
POTASSIUM SERPL-SCNC: 4.4 MMOL/L (ref 3.5–5.1)
PROT SERPL-MCNC: 6.2 G/DL (ref 6.4–8.2)
PROTHROMBIN TIME: 14.6 SEC (ref 11.5–14.8)
RBC # BLD AUTO: 3.2 M/UL (ref 4.2–5.9)
RBC # BLD AUTO: 3.31 M/UL (ref 4.2–5.9)
REPORT: NORMAL
REPORT: NORMAL
SCHISTOCYTES BLD QL SMEAR: ABNORMAL
SLIDE REVIEW: ABNORMAL
SMUDGE CELLS BLD QL SMEAR: PRESENT
SODIUM SERPL-SCNC: 134 MMOL/L (ref 136–145)
SODIUM SERPL-SCNC: 136 MMOL/L (ref 136–145)
SODIUM SERPL-SCNC: 137 MMOL/L (ref 136–145)
SPHEROCYTES BLD QL SMEAR: ABNORMAL
TARGETS BLD QL SMEAR: ABNORMAL
THIS TEST SENT TO: NORMAL
THIS TEST SENT TO: NORMAL
URATE SERPL-MCNC: 4.7 MG/DL (ref 3.5–7.2)
URATE SERPL-MCNC: 4.8 MG/DL (ref 3.5–7.2)
URATE SERPL-MCNC: 5.1 MG/DL (ref 3.5–7.2)
VARIANT LYMPHS NFR BLD MANUAL: 2 % (ref 0–6)
WBC # BLD AUTO: 3.1 K/UL (ref 4–11)
WBC # BLD AUTO: 3.1 K/UL (ref 4–11)

## 2024-03-13 PROCEDURE — 6370000000 HC RX 637 (ALT 250 FOR IP): Performed by: NURSE PRACTITIONER

## 2024-03-13 PROCEDURE — 36592 COLLECT BLOOD FROM PICC: CPT

## 2024-03-13 PROCEDURE — 2580000003 HC RX 258: Performed by: INTERNAL MEDICINE

## 2024-03-13 PROCEDURE — 85730 THROMBOPLASTIN TIME PARTIAL: CPT

## 2024-03-13 PROCEDURE — 85610 PROTHROMBIN TIME: CPT

## 2024-03-13 PROCEDURE — 6370000000 HC RX 637 (ALT 250 FOR IP): Performed by: STUDENT IN AN ORGANIZED HEALTH CARE EDUCATION/TRAINING PROGRAM

## 2024-03-13 PROCEDURE — 6360000002 HC RX W HCPCS: Performed by: INTERNAL MEDICINE

## 2024-03-13 PROCEDURE — 80076 HEPATIC FUNCTION PANEL: CPT

## 2024-03-13 PROCEDURE — 2060000000 HC ICU INTERMEDIATE R&B

## 2024-03-13 PROCEDURE — 80048 BASIC METABOLIC PNL TOTAL CA: CPT

## 2024-03-13 PROCEDURE — 83615 LACTATE (LD) (LDH) ENZYME: CPT

## 2024-03-13 PROCEDURE — 6370000000 HC RX 637 (ALT 250 FOR IP): Performed by: INTERNAL MEDICINE

## 2024-03-13 PROCEDURE — 85025 COMPLETE CBC W/AUTO DIFF WBC: CPT

## 2024-03-13 PROCEDURE — 84550 ASSAY OF BLOOD/URIC ACID: CPT

## 2024-03-13 PROCEDURE — 85027 COMPLETE CBC AUTOMATED: CPT

## 2024-03-13 PROCEDURE — 84100 ASSAY OF PHOSPHORUS: CPT

## 2024-03-13 PROCEDURE — 83735 ASSAY OF MAGNESIUM: CPT

## 2024-03-13 PROCEDURE — 2580000003 HC RX 258: Performed by: NURSE PRACTITIONER

## 2024-03-13 RX ADMIN — SODIUM CHLORIDE, PRESERVATIVE FREE 10 ML: 5 INJECTION INTRAVENOUS at 20:34

## 2024-03-13 RX ADMIN — SODIUM CHLORIDE, PRESERVATIVE FREE 10 ML: 5 INJECTION INTRAVENOUS at 08:14

## 2024-03-13 RX ADMIN — FIDAXOMICIN 200 MG: 200 TABLET, FILM COATED ORAL at 20:34

## 2024-03-13 RX ADMIN — PANTOPRAZOLE SODIUM 40 MG: 40 TABLET, DELAYED RELEASE ORAL at 06:44

## 2024-03-13 RX ADMIN — LEVETIRACETAM 1500 MG: 500 TABLET, FILM COATED ORAL at 20:34

## 2024-03-13 RX ADMIN — VALACYCLOVIR HYDROCHLORIDE 500 MG: 500 TABLET, FILM COATED ORAL at 20:34

## 2024-03-13 RX ADMIN — DECITABINE 40 MG: 50 INJECTION, POWDER, LYOPHILIZED, FOR SOLUTION INTRAVENOUS at 15:06

## 2024-03-13 RX ADMIN — LEVETIRACETAM 1500 MG: 500 TABLET, FILM COATED ORAL at 08:13

## 2024-03-13 RX ADMIN — FLUCONAZOLE 200 MG: 200 TABLET ORAL at 08:13

## 2024-03-13 RX ADMIN — ALLOPURINOL 300 MG: 300 TABLET ORAL at 08:13

## 2024-03-13 RX ADMIN — SODIUM CHLORIDE 15 ML: 900 IRRIGANT IRRIGATION at 10:41

## 2024-03-13 RX ADMIN — ONDANSETRON HYDROCHLORIDE 8 MG: 8 TABLET, FILM COATED ORAL at 14:29

## 2024-03-13 RX ADMIN — LEVOFLOXACIN 500 MG: 500 TABLET, FILM COATED ORAL at 20:34

## 2024-03-13 RX ADMIN — CYANOCOBALAMIN TAB 1000 MCG 1000 MCG: 1000 TAB at 08:13

## 2024-03-13 RX ADMIN — SODIUM CHLORIDE, PRESERVATIVE FREE 10 ML: 5 INJECTION INTRAVENOUS at 08:16

## 2024-03-13 RX ADMIN — SODIUM CHLORIDE 15 ML: 900 IRRIGANT IRRIGATION at 08:14

## 2024-03-13 RX ADMIN — VALACYCLOVIR HYDROCHLORIDE 500 MG: 500 TABLET, FILM COATED ORAL at 08:13

## 2024-03-13 RX ADMIN — SODIUM CHLORIDE 15 ML: 900 IRRIGANT IRRIGATION at 16:23

## 2024-03-13 RX ADMIN — FIDAXOMICIN 200 MG: 200 TABLET, FILM COATED ORAL at 08:13

## 2024-03-13 NOTE — PLAN OF CARE
Problem: Safety - Adult  Goal: Free from fall injury  3/13/2024 1519 by Pilar Fox, RN  Outcome: Progressing  Orthostatic vital signs obtained at start of shift - see flowsheet for details.  Pt does not meet criteria for orthostasis.  Pt is a Med fall risk. See Apple Fall Score and ABCDS Injury Risk assessments.   - Screening for Orthostasis AND not a [place] Risk per APPLE/ABCDS: Pt bed is in low position, side rails up, call light and belongings are in reach.  Fall risk light is on outside pts room.  Pt encouraged to call for assistance as needed. Will continue with hourly rounds for PO intake, pain needs, toileting and repositioning as needed.      Problem: Hematologic - Adult  Goal: Maintains hematologic stability  3/13/2024 1519 by Pilar Fox, RN  Outcome: Progressing  Patient's hemoglobin this AM:   Recent Labs     03/13/24  1120   HGB 7.7*     Patient's platelet count this AM:   Recent Labs     03/13/24  1120   PLT 97*    Thrombocytopenia Precautions in place.  Patient showing no signs or symptoms of active bleeding.  Transfusion not indicated at this time.  Patient verbalizes understanding of all instructions. Will continue to assess and implement POC. Call light within reach and hourly rounding in place.      Problem: Gastrointestinal - Adult  Goal: Maintains or returns to baseline bowel function  Outcome: Progressing- patient with bloody stool during shift. See progress note. Care continues.

## 2024-03-13 NOTE — ONCOLOGY
Administration: Chemotherapy drug dacogen independently verified with Shahla Angela RN prior to administration.  Acknowledgement of informed consent for chemotherapy administration verified.  Original order, appropriateness of regimen, drug supplied, height, weight, BSA, dose calculations, expiration dates/times, drug appearance, and two patient identifiers were verified by both RNs.  Drug checked for vesicant/irritant status and for risk of hypersensitivity.  Most recent laboratory values and allergies, were reviewed.  Positive, brisk blood return via CVC was confirmed prior to administration. Chest x-ray for correct line placement reviewed. Pilar Fox RN, RN and Shahla Angela RN verified correct rate of chemotherapy and maintenance IV fluids.  Patient was educated on chemotherapy regimen prior to administration including indication for treatment related to disease & side effects of chemotherapy drug.  Patient verbalizes understanding of all instructions.    Completion of Chemotherapy: Monitoring during infusion done per policy, see Flowsheets.  Blood return verified before, during, and after infusion per policy; no signs of extravasation.  Pt tolerating chemotherapy well and without incident.  Chemotherapy infusion end time on the MAR.

## 2024-03-13 NOTE — PLAN OF CARE
Problem: Safety - Adult  Goal: Free from fall injury  Outcome: Progressing  Free From Fall Injury:   Instruct family/caregiver on patient safety   Based on caregiver fall risk screen, instruct family/caregiver to ask for assistance with transferring infant if caregiver noted to have fall risk factors  Note: Patient verbalizes using call light for assistance. Call light within reach with bed in lowest position.     Problem: ABCDS Injury Assessment  Goal: Absence of physical injury  Outcome: Progressing  Absence of Physical Injury: Implement safety measures based on patient assessment  Note: - Screening for Orthostasis AND not a [place] Risk per APPLE/ABCDS: Pt bed is in low position, side rails up, call light and belongings are in reach.  Fall risk light is on outside pts room.  Pt encouraged to call for assistance as needed. Will continue with hourly rounds for PO intake, pain needs, toileting and repositioning as needed.      Problem: Respiratory - Adult  Goal: Achieves optimal ventilation and oxygenation  Outcome: Progressing  Achieves optimal ventilation and oxygenation:   Assess for changes in respiratory status   Position to facilitate oxygenation and minimize respiratory effort   Initiate smoking cessation protocol as indicated   Assess the need for suctioning and aspirate as needed   Respiratory therapy support as indicated   Oxygen supplementation based on oxygen saturation or arterial blood gases   Encourage broncho-pulmonary hygiene including cough, deep breathe, incentive spirometry   Assess for changes in mentation and behavior   Assess and instruct to report shortness of breath or any respiratory difficulty    Problem: Gastrointestinal - Adult  Goal: Minimal or absence of nausea and vomiting  Outcome: Progressing  Minimal or absence of nausea and vomiting:   Administer IV fluids as ordered to ensure adequate hydration   Nasogastric tube to low intermittent suction as ordered   Provide

## 2024-03-14 LAB
ALBUMIN SERPL-MCNC: 3.8 G/DL (ref 3.4–5)
ALP SERPL-CCNC: 43 U/L (ref 40–129)
ALT SERPL-CCNC: 8 U/L (ref 10–40)
ANION GAP SERPL CALCULATED.3IONS-SCNC: 10 MMOL/L (ref 3–16)
ANION GAP SERPL CALCULATED.3IONS-SCNC: 10 MMOL/L (ref 3–16)
ANION GAP SERPL CALCULATED.3IONS-SCNC: 11 MMOL/L (ref 3–16)
ANISOCYTOSIS BLD QL SMEAR: ABNORMAL
APTT BLD: 34 SEC (ref 22.7–35.9)
AST SERPL-CCNC: 13 U/L (ref 15–37)
BASOPHILS # BLD: 0 K/UL (ref 0–0.2)
BASOPHILS NFR BLD: 1 %
BILIRUB DIRECT SERPL-MCNC: <0.2 MG/DL (ref 0–0.3)
BILIRUB INDIRECT SERPL-MCNC: ABNORMAL MG/DL (ref 0–1)
BILIRUB SERPL-MCNC: 1 MG/DL (ref 0–1)
BLASTS NFR BLD MANUAL: 6 %
BUN SERPL-MCNC: 12 MG/DL (ref 7–20)
BUN SERPL-MCNC: 14 MG/DL (ref 7–20)
BUN SERPL-MCNC: 15 MG/DL (ref 7–20)
CALCIUM SERPL-MCNC: 9.5 MG/DL (ref 8.3–10.6)
CALCIUM SERPL-MCNC: 9.6 MG/DL (ref 8.3–10.6)
CALCIUM SERPL-MCNC: 9.7 MG/DL (ref 8.3–10.6)
CHLORIDE SERPL-SCNC: 100 MMOL/L (ref 99–110)
CHLORIDE SERPL-SCNC: 100 MMOL/L (ref 99–110)
CHLORIDE SERPL-SCNC: 98 MMOL/L (ref 99–110)
CO2 SERPL-SCNC: 26 MMOL/L (ref 21–32)
CO2 SERPL-SCNC: 27 MMOL/L (ref 21–32)
CO2 SERPL-SCNC: 27 MMOL/L (ref 21–32)
CREAT SERPL-MCNC: 0.9 MG/DL (ref 0.9–1.3)
CREAT SERPL-MCNC: 0.9 MG/DL (ref 0.9–1.3)
CREAT SERPL-MCNC: 1 MG/DL (ref 0.9–1.3)
DACRYOCYTES BLD QL SMEAR: ABNORMAL
DEPRECATED RDW RBC AUTO: 35.1 % (ref 12.4–15.4)
EOSINOPHIL # BLD: 0.1 K/UL (ref 0–0.6)
EOSINOPHIL NFR BLD: 2 %
GFR SERPLBLD CREATININE-BSD FMLA CKD-EPI: >60 ML/MIN/{1.73_M2}
GLUCOSE SERPL-MCNC: 136 MG/DL (ref 70–99)
GLUCOSE SERPL-MCNC: 83 MG/DL (ref 70–99)
GLUCOSE SERPL-MCNC: 89 MG/DL (ref 70–99)
HCT VFR BLD AUTO: 22 % (ref 40.5–52.5)
HGB BLD-MCNC: 7.1 G/DL (ref 13.5–17.5)
INR PPP: 1.19 (ref 0.84–1.16)
LDH SERPL L TO P-CCNC: 664 U/L (ref 100–190)
LYMPHOCYTES # BLD: 1.9 K/UL (ref 1–5.1)
LYMPHOCYTES NFR BLD: 61 %
MACROCYTES BLD QL SMEAR: ABNORMAL
MAGNESIUM SERPL-MCNC: 1.8 MG/DL (ref 1.8–2.4)
MCH RBC QN AUTO: 23.9 PG (ref 26–34)
MCHC RBC AUTO-ENTMCNC: 32.4 G/DL (ref 31–36)
MCV RBC AUTO: 73.9 FL (ref 80–100)
MICROCYTES BLD QL SMEAR: ABNORMAL
MONOCYTES # BLD: 0.1 K/UL (ref 0–1.3)
MONOCYTES NFR BLD: 4 %
NEUTROPHILS # BLD: 0.8 K/UL (ref 1.7–7.7)
NEUTROPHILS NFR BLD: 26 %
NRBC BLD-RTO: 17 /100 WBC
OVALOCYTES BLD QL SMEAR: ABNORMAL
PATH INTERP BLD-IMP: NO
PHOSPHATE SERPL-MCNC: 3.8 MG/DL (ref 2.5–4.9)
PHOSPHATE SERPL-MCNC: 4.2 MG/DL (ref 2.5–4.9)
PHOSPHATE SERPL-MCNC: 5.2 MG/DL (ref 2.5–4.9)
PLATELET # BLD AUTO: 81 K/UL (ref 135–450)
PMV BLD AUTO: 9.3 FL (ref 5–10.5)
POLYCHROMASIA BLD QL SMEAR: ABNORMAL
POTASSIUM SERPL-SCNC: 4 MMOL/L (ref 3.5–5.1)
POTASSIUM SERPL-SCNC: 4.1 MMOL/L (ref 3.5–5.1)
POTASSIUM SERPL-SCNC: 4.2 MMOL/L (ref 3.5–5.1)
PROT SERPL-MCNC: 6.3 G/DL (ref 6.4–8.2)
PROTHROMBIN TIME: 15.1 SEC (ref 11.5–14.8)
RBC # BLD AUTO: 2.98 M/UL (ref 4.2–5.9)
SCHISTOCYTES BLD QL SMEAR: ABNORMAL
SMUDGE CELLS BLD QL SMEAR: PRESENT
SODIUM SERPL-SCNC: 134 MMOL/L (ref 136–145)
SODIUM SERPL-SCNC: 137 MMOL/L (ref 136–145)
SODIUM SERPL-SCNC: 138 MMOL/L (ref 136–145)
URATE SERPL-MCNC: 5.2 MG/DL (ref 3.5–7.2)
URATE SERPL-MCNC: 5.5 MG/DL (ref 3.5–7.2)
URATE SERPL-MCNC: 5.5 MG/DL (ref 3.5–7.2)
WBC # BLD AUTO: 3.1 K/UL (ref 4–11)

## 2024-03-14 PROCEDURE — 83615 LACTATE (LD) (LDH) ENZYME: CPT

## 2024-03-14 PROCEDURE — 84550 ASSAY OF BLOOD/URIC ACID: CPT

## 2024-03-14 PROCEDURE — 36592 COLLECT BLOOD FROM PICC: CPT

## 2024-03-14 PROCEDURE — 83735 ASSAY OF MAGNESIUM: CPT

## 2024-03-14 PROCEDURE — 6370000000 HC RX 637 (ALT 250 FOR IP): Performed by: NURSE PRACTITIONER

## 2024-03-14 PROCEDURE — 6370000000 HC RX 637 (ALT 250 FOR IP): Performed by: INTERNAL MEDICINE

## 2024-03-14 PROCEDURE — 84100 ASSAY OF PHOSPHORUS: CPT

## 2024-03-14 PROCEDURE — 80048 BASIC METABOLIC PNL TOTAL CA: CPT

## 2024-03-14 PROCEDURE — 6360000002 HC RX W HCPCS: Performed by: INTERNAL MEDICINE

## 2024-03-14 PROCEDURE — 85610 PROTHROMBIN TIME: CPT

## 2024-03-14 PROCEDURE — 80076 HEPATIC FUNCTION PANEL: CPT

## 2024-03-14 PROCEDURE — 2060000000 HC ICU INTERMEDIATE R&B

## 2024-03-14 PROCEDURE — 6370000000 HC RX 637 (ALT 250 FOR IP): Performed by: STUDENT IN AN ORGANIZED HEALTH CARE EDUCATION/TRAINING PROGRAM

## 2024-03-14 PROCEDURE — 2580000003 HC RX 258: Performed by: INTERNAL MEDICINE

## 2024-03-14 PROCEDURE — 85025 COMPLETE CBC W/AUTO DIFF WBC: CPT

## 2024-03-14 PROCEDURE — 2580000003 HC RX 258: Performed by: NURSE PRACTITIONER

## 2024-03-14 PROCEDURE — 85730 THROMBOPLASTIN TIME PARTIAL: CPT

## 2024-03-14 RX ADMIN — FIDAXOMICIN 200 MG: 200 TABLET, FILM COATED ORAL at 21:45

## 2024-03-14 RX ADMIN — ALLOPURINOL 300 MG: 300 TABLET ORAL at 08:50

## 2024-03-14 RX ADMIN — CYANOCOBALAMIN TAB 1000 MCG 1000 MCG: 1000 TAB at 08:50

## 2024-03-14 RX ADMIN — PANTOPRAZOLE SODIUM 40 MG: 40 TABLET, DELAYED RELEASE ORAL at 05:58

## 2024-03-14 RX ADMIN — SODIUM CHLORIDE, PRESERVATIVE FREE 10 ML: 5 INJECTION INTRAVENOUS at 19:54

## 2024-03-14 RX ADMIN — VALACYCLOVIR HYDROCHLORIDE 500 MG: 500 TABLET, FILM COATED ORAL at 08:50

## 2024-03-14 RX ADMIN — LEVOFLOXACIN 500 MG: 500 TABLET, FILM COATED ORAL at 19:54

## 2024-03-14 RX ADMIN — SODIUM CHLORIDE, PRESERVATIVE FREE 10 ML: 5 INJECTION INTRAVENOUS at 08:50

## 2024-03-14 RX ADMIN — LEVETIRACETAM 1500 MG: 500 TABLET, FILM COATED ORAL at 19:54

## 2024-03-14 RX ADMIN — ONDANSETRON HYDROCHLORIDE 8 MG: 8 TABLET, FILM COATED ORAL at 14:38

## 2024-03-14 RX ADMIN — FIDAXOMICIN 200 MG: 200 TABLET, FILM COATED ORAL at 08:50

## 2024-03-14 RX ADMIN — SODIUM CHLORIDE, PRESERVATIVE FREE 10 ML: 5 INJECTION INTRAVENOUS at 08:51

## 2024-03-14 RX ADMIN — SODIUM CHLORIDE: 9 INJECTION, SOLUTION INTRAVENOUS at 11:59

## 2024-03-14 RX ADMIN — LEVETIRACETAM 1500 MG: 500 TABLET, FILM COATED ORAL at 08:50

## 2024-03-14 RX ADMIN — DOCUSATE SODIUM 50 MG AND SENNOSIDES 8.6 MG 1 TABLET: 8.6; 5 TABLET, FILM COATED ORAL at 19:54

## 2024-03-14 RX ADMIN — FLUCONAZOLE 200 MG: 200 TABLET ORAL at 08:50

## 2024-03-14 RX ADMIN — VALACYCLOVIR HYDROCHLORIDE 500 MG: 500 TABLET, FILM COATED ORAL at 19:54

## 2024-03-14 RX ADMIN — DECITABINE 40 MG: 50 INJECTION, POWDER, LYOPHILIZED, FOR SOLUTION INTRAVENOUS at 15:06

## 2024-03-14 RX ADMIN — SODIUM CHLORIDE, PRESERVATIVE FREE 10 ML: 5 INJECTION INTRAVENOUS at 19:55

## 2024-03-14 ASSESSMENT — PAIN SCALES - GENERAL: PAINLEVEL_OUTOF10: 0

## 2024-03-14 NOTE — PLAN OF CARE
Problem: Safety - Adult  Goal: Free from fall injury  Outcome: Progressing  Flowsheets (Taken 3/14/2024 0527)  Free From Fall Injury:   Instruct family/caregiver on patient safety   Based on caregiver fall risk screen, instruct family/caregiver to ask for assistance with transferring infant if caregiver noted to have fall risk factors  Note: Orthostatic vital signs obtained at start of shift - see flowsheet for details.  Pt does not meet criteria for orthostasis.  Pt is a Med fall risk. See Apple Fall Score and ABCDS Injury Risk assessments.     - Screening for Orthostasis AND not a [place] Risk per APPLE/ABCDS: Pt bed is in low position, side rails up, call light and belongings are in reach.  Fall risk light is on outside pts room.  Pt encouraged to call for assistance as needed. Will continue with hourly rounds for PO intake, pain needs, toileting and repositioning as needed.      Problem: Hematologic - Adult  Goal: Maintains hematologic stability  Outcome: Progressing  Flowsheets (Taken 3/14/2024 0527)  Maintains hematologic stability:   Assess for signs and symptoms of bleeding or hemorrhage   Monitor labs for bleeding or clotting disorders  Note: Patient's hemoglobin this AM:   Recent Labs     03/14/24  0355   HGB 7.1*     Patient's platelet count this AM:   Recent Labs     03/14/24  0355   PLT 81*    Thrombocytopenia Precautions in place.  Patient showing no signs or symptoms of active bleeding.  Transfusion not indicated at this time.  Patient verbalizes understanding of all instructions. Will continue to assess and implement POC. Call light within reach and hourly rounding in place.      Problem: Respiratory - Adult  Goal: Achieves optimal ventilation and oxygenation  Outcome: Progressing  Flowsheets (Taken 3/14/2024 0527)  Achieves optimal ventilation and oxygenation:   Assess for changes in respiratory status   Assess for changes in mentation and behavior  Note: Patient remained on room air throughout

## 2024-03-14 NOTE — PLAN OF CARE
Problem: Safety - Adult  Goal: Free from fall injury  3/14/2024 1507 by Belgica Castrejon, RN  Outcome: Progressing  Flowsheets (Taken 3/14/2024 0844)  Free From Fall Injury: Instruct family/caregiver on patient safety  Note: Orthostatic vital signs obtained at start of shift - see flowsheet for details.  Pt does not meet criteria for orthostasis.  Pt is a Med fall risk. See Jose Fall Score and ABCDS Injury Risk assessments. Pt bed is in low position, side rails up, call light and belongings are in reach.  Fall risk light is on outside pts room.  Pt encouraged to call for assistance as needed. Will continue with hourly rounds for PO intake, pain needs, toileting and repositioning as needed.        Problem: Hematologic - Adult  Goal: Maintains hematologic stability  3/14/2024 1507 by Belgica Castrejon RN  Outcome: Progressing  Flowsheets (Taken 3/14/2024 0527 by Hayley Knowles RN)  Maintains hematologic stability:   Assess for signs and symptoms of bleeding or hemorrhage   Monitor labs for bleeding or clotting disorders  Note: Patient's hemoglobin this AM:   Recent Labs     03/14/24  0355   HGB 7.1*     Patient's platelet count this AM:   Recent Labs     03/14/24  0355   PLT 81*    Thrombocytopenia Precautions in place.  Patient showing no signs or symptoms of active bleeding.  Transfusion not indicated at this time.  Patient verbalizes understanding of all instructions. Will continue to assess and implement POC. Call light within reach and hourly rounding in place.        Problem: Gastrointestinal - Adult  Goal: Maintains or returns to baseline bowel function  3/14/2024 1507 by Belgica Castrejon, RN  Outcome: Progressing  Flowsheets (Taken 3/14/2024 0844)  Maintains or returns to baseline bowel function:   Assess bowel function   Administer ordered medications as needed  Note: Patient C.Diff +. Has had 1 bowel movement this shift. Denies blood in stool.      Problem: Pain  Goal: Verbalizes/displays adequate comfort level or

## 2024-03-14 NOTE — ONCOLOGY
Administration: Chemotherapy drug Decitabine independently verified with Padmini Shahid RN prior to administration.  Acknowledgement of informed consent for chemotherapy administration verified.  Original order, appropriateness of regimen, drug supplied, height, weight, BSA, dose calculations, expiration dates/times, drug appearance, and two patient identifiers were verified by both RNs.  Drug checked for vesicant/irritant status and for risk of hypersensitivity.  Most recent laboratory values and allergies, were reviewed.  Positive, brisk blood return via CVC was confirmed prior to administration. Chest x-ray for correct line placement reviewed. Belgica Castrejon RN and Padmini Shahid RN verified correct rate of chemotherapy and maintenance IV fluids.  Patient was educated on chemotherapy regimen prior to administration including indication for treatment related to disease & side effects of chemotherapy drug.  Patient verbalizes understanding of all instructions.    Completion of Chemotherapy: Monitoring during infusion done per policy, see Flowsheets.  Blood return verified before, during, and after infusion per policy; no signs of extravasation.  Pt tolerated chemotherapy well and without incident.  Chemotherapy infusion end time on the MAR.  Will continue to monitor.

## 2024-03-15 LAB
ALBUMIN SERPL-MCNC: 4.1 G/DL (ref 3.4–5)
ALP SERPL-CCNC: 47 U/L (ref 40–129)
ALT SERPL-CCNC: 9 U/L (ref 10–40)
ANION GAP SERPL CALCULATED.3IONS-SCNC: 10 MMOL/L (ref 3–16)
ANION GAP SERPL CALCULATED.3IONS-SCNC: 11 MMOL/L (ref 3–16)
ANION GAP SERPL CALCULATED.3IONS-SCNC: 13 MMOL/L (ref 3–16)
ANISOCYTOSIS BLD QL SMEAR: ABNORMAL
APTT BLD: 33.4 SEC (ref 22.7–35.9)
AST SERPL-CCNC: 16 U/L (ref 15–37)
BASOPHILS # BLD: 0.1 K/UL (ref 0–0.2)
BASOPHILS NFR BLD: 3 %
BILIRUB DIRECT SERPL-MCNC: <0.2 MG/DL (ref 0–0.3)
BILIRUB INDIRECT SERPL-MCNC: ABNORMAL MG/DL (ref 0–1)
BILIRUB SERPL-MCNC: 1 MG/DL (ref 0–1)
BUN SERPL-MCNC: 12 MG/DL (ref 7–20)
BUN SERPL-MCNC: 13 MG/DL (ref 7–20)
BUN SERPL-MCNC: 15 MG/DL (ref 7–20)
CALCIUM SERPL-MCNC: 9.3 MG/DL (ref 8.3–10.6)
CALCIUM SERPL-MCNC: 9.9 MG/DL (ref 8.3–10.6)
CALCIUM SERPL-MCNC: 9.9 MG/DL (ref 8.3–10.6)
CHLORIDE SERPL-SCNC: 100 MMOL/L (ref 99–110)
CHLORIDE SERPL-SCNC: 98 MMOL/L (ref 99–110)
CHLORIDE SERPL-SCNC: 98 MMOL/L (ref 99–110)
CO2 SERPL-SCNC: 25 MMOL/L (ref 21–32)
CO2 SERPL-SCNC: 25 MMOL/L (ref 21–32)
CO2 SERPL-SCNC: 27 MMOL/L (ref 21–32)
CREAT SERPL-MCNC: 0.9 MG/DL (ref 0.9–1.3)
CREAT SERPL-MCNC: 1 MG/DL (ref 0.9–1.3)
CREAT SERPL-MCNC: 1 MG/DL (ref 0.9–1.3)
DACRYOCYTES BLD QL SMEAR: ABNORMAL
DEPRECATED RDW RBC AUTO: 36 % (ref 12.4–15.4)
EOSINOPHIL # BLD: 0.1 K/UL (ref 0–0.6)
EOSINOPHIL NFR BLD: 2 %
GFR SERPLBLD CREATININE-BSD FMLA CKD-EPI: >60 ML/MIN/{1.73_M2}
GLUCOSE SERPL-MCNC: 121 MG/DL (ref 70–99)
GLUCOSE SERPL-MCNC: 84 MG/DL (ref 70–99)
GLUCOSE SERPL-MCNC: 89 MG/DL (ref 70–99)
HCT VFR BLD AUTO: 23.9 % (ref 40.5–52.5)
HGB BLD-MCNC: 7.5 G/DL (ref 13.5–17.5)
HYPOCHROMIA BLD QL SMEAR: ABNORMAL
INR PPP: 1.22 (ref 0.84–1.16)
LDH SERPL L TO P-CCNC: 681 U/L (ref 100–190)
LYMPHOCYTES # BLD: 1.8 K/UL (ref 1–5.1)
LYMPHOCYTES NFR BLD: 53 %
MACROCYTES BLD QL SMEAR: ABNORMAL
MAGNESIUM SERPL-MCNC: 1.7 MG/DL (ref 1.8–2.4)
MCH RBC QN AUTO: 23.2 PG (ref 26–34)
MCHC RBC AUTO-ENTMCNC: 31.5 G/DL (ref 31–36)
MCV RBC AUTO: 73.7 FL (ref 80–100)
MICROCYTES BLD QL SMEAR: ABNORMAL
MONOCYTES # BLD: 0.1 K/UL (ref 0–1.3)
MONOCYTES NFR BLD: 3 %
NEUTROPHILS # BLD: 1.3 K/UL (ref 1.7–7.7)
NEUTROPHILS NFR BLD: 38 %
NRBC BLD-RTO: 20 /100 WBC
OVALOCYTES BLD QL SMEAR: ABNORMAL
PHOSPHATE SERPL-MCNC: 4.1 MG/DL (ref 2.5–4.9)
PHOSPHATE SERPL-MCNC: 4.1 MG/DL (ref 2.5–4.9)
PHOSPHATE SERPL-MCNC: 5.1 MG/DL (ref 2.5–4.9)
PLATELET # BLD AUTO: 85 K/UL (ref 135–450)
PLATELET BLD QL SMEAR: ABNORMAL
PMV BLD AUTO: 10.7 FL (ref 5–10.5)
POIKILOCYTOSIS BLD QL SMEAR: ABNORMAL
POLYCHROMASIA BLD QL SMEAR: ABNORMAL
POTASSIUM SERPL-SCNC: 4 MMOL/L (ref 3.5–5.1)
POTASSIUM SERPL-SCNC: 4 MMOL/L (ref 3.5–5.1)
POTASSIUM SERPL-SCNC: 4.4 MMOL/L (ref 3.5–5.1)
PROT SERPL-MCNC: 6.7 G/DL (ref 6.4–8.2)
PROTHROMBIN TIME: 15.4 SEC (ref 11.5–14.8)
RBC # BLD AUTO: 3.25 M/UL (ref 4.2–5.9)
SCHISTOCYTES BLD QL SMEAR: ABNORMAL
SLIDE REVIEW: ABNORMAL
SMUDGE CELLS BLD QL SMEAR: PRESENT
SODIUM SERPL-SCNC: 134 MMOL/L (ref 136–145)
SODIUM SERPL-SCNC: 135 MMOL/L (ref 136–145)
SODIUM SERPL-SCNC: 138 MMOL/L (ref 136–145)
SPHEROCYTES BLD QL SMEAR: ABNORMAL
TARGETS BLD QL SMEAR: ABNORMAL
URATE SERPL-MCNC: 5.4 MG/DL (ref 3.5–7.2)
URATE SERPL-MCNC: 5.6 MG/DL (ref 3.5–7.2)
URATE SERPL-MCNC: 5.8 MG/DL (ref 3.5–7.2)
VARIANT LYMPHS NFR BLD MANUAL: 1 % (ref 0–6)
WBC # BLD AUTO: 3.3 K/UL (ref 4–11)

## 2024-03-15 PROCEDURE — 85610 PROTHROMBIN TIME: CPT

## 2024-03-15 PROCEDURE — 80048 BASIC METABOLIC PNL TOTAL CA: CPT

## 2024-03-15 PROCEDURE — 80076 HEPATIC FUNCTION PANEL: CPT

## 2024-03-15 PROCEDURE — 6370000000 HC RX 637 (ALT 250 FOR IP): Performed by: NURSE PRACTITIONER

## 2024-03-15 PROCEDURE — 36592 COLLECT BLOOD FROM PICC: CPT

## 2024-03-15 PROCEDURE — 6370000000 HC RX 637 (ALT 250 FOR IP): Performed by: INTERNAL MEDICINE

## 2024-03-15 PROCEDURE — 84550 ASSAY OF BLOOD/URIC ACID: CPT

## 2024-03-15 PROCEDURE — 2580000003 HC RX 258: Performed by: NURSE PRACTITIONER

## 2024-03-15 PROCEDURE — 85730 THROMBOPLASTIN TIME PARTIAL: CPT

## 2024-03-15 PROCEDURE — 85025 COMPLETE CBC W/AUTO DIFF WBC: CPT

## 2024-03-15 PROCEDURE — 2060000000 HC ICU INTERMEDIATE R&B

## 2024-03-15 PROCEDURE — 83615 LACTATE (LD) (LDH) ENZYME: CPT

## 2024-03-15 PROCEDURE — 83735 ASSAY OF MAGNESIUM: CPT

## 2024-03-15 PROCEDURE — 6360000002 HC RX W HCPCS: Performed by: INTERNAL MEDICINE

## 2024-03-15 PROCEDURE — 84100 ASSAY OF PHOSPHORUS: CPT

## 2024-03-15 PROCEDURE — 2580000003 HC RX 258: Performed by: INTERNAL MEDICINE

## 2024-03-15 PROCEDURE — 6370000000 HC RX 637 (ALT 250 FOR IP): Performed by: STUDENT IN AN ORGANIZED HEALTH CARE EDUCATION/TRAINING PROGRAM

## 2024-03-15 RX ORDER — FUROSEMIDE 10 MG/ML
40 INJECTION INTRAMUSCULAR; INTRAVENOUS EVERY 12 HOURS
Status: DISCONTINUED | OUTPATIENT
Start: 2024-03-15 | End: 2024-03-19

## 2024-03-15 RX ADMIN — SODIUM CHLORIDE, PRESERVATIVE FREE 10 ML: 5 INJECTION INTRAVENOUS at 20:23

## 2024-03-15 RX ADMIN — ALLOPURINOL 300 MG: 300 TABLET ORAL at 09:08

## 2024-03-15 RX ADMIN — FIDAXOMICIN 200 MG: 200 TABLET, FILM COATED ORAL at 20:22

## 2024-03-15 RX ADMIN — LEVETIRACETAM 1500 MG: 500 TABLET, FILM COATED ORAL at 09:08

## 2024-03-15 RX ADMIN — SODIUM CHLORIDE: 9 INJECTION, SOLUTION INTRAVENOUS at 12:37

## 2024-03-15 RX ADMIN — LEVETIRACETAM 1500 MG: 500 TABLET, FILM COATED ORAL at 20:22

## 2024-03-15 RX ADMIN — ONDANSETRON HYDROCHLORIDE 8 MG: 8 TABLET, FILM COATED ORAL at 14:37

## 2024-03-15 RX ADMIN — VALACYCLOVIR HYDROCHLORIDE 500 MG: 500 TABLET, FILM COATED ORAL at 09:08

## 2024-03-15 RX ADMIN — FIDAXOMICIN 200 MG: 200 TABLET, FILM COATED ORAL at 09:08

## 2024-03-15 RX ADMIN — SODIUM CHLORIDE, PRESERVATIVE FREE 10 ML: 5 INJECTION INTRAVENOUS at 09:09

## 2024-03-15 RX ADMIN — SODIUM CHLORIDE, PRESERVATIVE FREE 10 ML: 5 INJECTION INTRAVENOUS at 09:08

## 2024-03-15 RX ADMIN — CYANOCOBALAMIN TAB 1000 MCG 1000 MCG: 1000 TAB at 09:08

## 2024-03-15 RX ADMIN — DOCUSATE SODIUM 50 MG AND SENNOSIDES 8.6 MG 1 TABLET: 8.6; 5 TABLET, FILM COATED ORAL at 06:09

## 2024-03-15 RX ADMIN — VALACYCLOVIR HYDROCHLORIDE 500 MG: 500 TABLET, FILM COATED ORAL at 20:22

## 2024-03-15 RX ADMIN — LEVOFLOXACIN 500 MG: 500 TABLET, FILM COATED ORAL at 20:22

## 2024-03-15 RX ADMIN — PANTOPRAZOLE SODIUM 40 MG: 40 TABLET, DELAYED RELEASE ORAL at 06:09

## 2024-03-15 RX ADMIN — FLUCONAZOLE 200 MG: 200 TABLET ORAL at 09:08

## 2024-03-15 RX ADMIN — DECITABINE 40 MG: 50 INJECTION, POWDER, LYOPHILIZED, FOR SOLUTION INTRAVENOUS at 15:06

## 2024-03-15 ASSESSMENT — PAIN SCALES - GENERAL
PAINLEVEL_OUTOF10: 0
PAINLEVEL_OUTOF10: 0

## 2024-03-15 NOTE — PLAN OF CARE
Problem: Safety - Adult  Goal: Free from fall injury  Outcome: Progressing  Flowsheets (Taken 3/15/2024 0903)  Free From Fall Injury: Instruct family/caregiver on patient safety  Note: Orthostatic vital signs obtained at start of shift - see flowsheet for details.  Pt does not meet criteria for orthostasis.  Pt is a Med fall risk. See Jose Fall Score and ABCDS Injury Risk assessments. Pt bed is in low position, side rails up, call light and belongings are in reach.  Fall risk light is on outside pts room.  Pt encouraged to call for assistance as needed. Will continue with hourly rounds for PO intake, pain needs, toileting and repositioning as needed.        Problem: Hematologic - Adult  Goal: Maintains hematologic stability  Outcome: Progressing  Flowsheets (Taken 3/15/2024 0903)  Maintains hematologic stability:   Assess for signs and symptoms of bleeding or hemorrhage   Monitor labs for bleeding or clotting disorders   Administer blood products/factors as ordered  Note: Patient's hemoglobin this AM:   Recent Labs     03/15/24  0340   HGB 7.5*     Patient's platelet count this AM:   Recent Labs     03/15/24  0340   PLT 85*    Thrombocytopenia Precautions in place.  Patient showing no signs or symptoms of active bleeding.  Transfusion not indicated at this time.  Patient verbalizes understanding of all instructions. Will continue to assess and implement POC. Call light within reach and hourly rounding in place.        Problem: Gastrointestinal - Adult  Goal: Maintains or returns to baseline bowel function  Outcome: Progressing  Flowsheets (Taken 3/15/2024 0903)  Maintains or returns to baseline bowel function:   Assess bowel function   Administer ordered medications as needed  Note: Patient had one formed bowel movement today. Patient c/o constipation, senna given per MAR. Patient educated on dificid and C. Diff      Problem: Pain  Goal: Verbalizes/displays adequate comfort level or baseline comfort

## 2024-03-15 NOTE — ONCOLOGY
Administration: Chemotherapy drug dacogen independently verified with Belgica Castrejon RN prior to administration.  Acknowledgement of informed consent for chemotherapy administration verified.  Original order, appropriateness of regimen, drug supplied, height, weight, BSA, dose calculations, expiration dates/times, drug appearance, and two patient identifiers were verified by both RNs.  Drug checked for vesicant/irritant status and for risk of hypersensitivity.  Most recent laboratory values and allergies, were reviewed.  Positive, brisk blood return via CVC was confirmed prior to administration. Chest x-ray for correct line placement reviewed. CHERRY ELLER RN and Belgica Castrejon RN verified correct rate of chemotherapy and maintenance IV fluids.  Patient was educated on chemotherapy regimen prior to administration including indication for treatment related to disease & side effects of chemotherapy drug.  Patient verbalizes understanding of all instructions.    Completion of Chemotherapy: Monitoring during infusion done per policy, see Flowsheets.  Blood return verified before, during, and after infusion per policy; no signs of extravasation.  Pt tolerated chemotherapy well and without incident.  Chemotherapy infusion end time on the MAR.

## 2024-03-16 LAB
ABO + RH BLD: NORMAL
ALBUMIN SERPL-MCNC: 3.9 G/DL (ref 3.4–5)
ALP SERPL-CCNC: 43 U/L (ref 40–129)
ALT SERPL-CCNC: 8 U/L (ref 10–40)
ANION GAP SERPL CALCULATED.3IONS-SCNC: 10 MMOL/L (ref 3–16)
APTT BLD: 33.8 SEC (ref 22.7–35.9)
AST SERPL-CCNC: 14 U/L (ref 15–37)
BASOPHILS # BLD: 0 K/UL (ref 0–0.2)
BASOPHILS NFR BLD: 1.1 %
BILIRUB DIRECT SERPL-MCNC: <0.2 MG/DL (ref 0–0.3)
BILIRUB INDIRECT SERPL-MCNC: ABNORMAL MG/DL (ref 0–1)
BILIRUB SERPL-MCNC: 0.9 MG/DL (ref 0–1)
BLD GP AB SCN SERPL QL: NORMAL
BLOOD BANK DISPENSE STATUS: NORMAL
BLOOD BANK PRODUCT CODE: NORMAL
BPU ID: NORMAL
BUN SERPL-MCNC: 14 MG/DL (ref 7–20)
CALCIUM SERPL-MCNC: 9.6 MG/DL (ref 8.3–10.6)
CHLORIDE SERPL-SCNC: 100 MMOL/L (ref 99–110)
CO2 SERPL-SCNC: 27 MMOL/L (ref 21–32)
CREAT SERPL-MCNC: 0.9 MG/DL (ref 0.9–1.3)
DEPRECATED RDW RBC AUTO: 34.9 % (ref 12.4–15.4)
DESCRIPTION BLOOD BANK: NORMAL
EOSINOPHIL # BLD: 0.1 K/UL (ref 0–0.6)
EOSINOPHIL NFR BLD: 3.1 %
GFR SERPLBLD CREATININE-BSD FMLA CKD-EPI: >60 ML/MIN/{1.73_M2}
GLUCOSE SERPL-MCNC: 91 MG/DL (ref 70–99)
HCT VFR BLD AUTO: 22.2 % (ref 40.5–52.5)
HGB BLD-MCNC: 7 G/DL (ref 13.5–17.5)
LDH SERPL L TO P-CCNC: 624 U/L (ref 100–190)
LYMPHOCYTES # BLD: 1.2 K/UL (ref 1–5.1)
LYMPHOCYTES NFR BLD: 34.8 %
MAGNESIUM SERPL-MCNC: 1.7 MG/DL (ref 1.8–2.4)
MCH RBC QN AUTO: 22.8 PG (ref 26–34)
MCHC RBC AUTO-ENTMCNC: 31.5 G/DL (ref 31–36)
MCV RBC AUTO: 72.5 FL (ref 80–100)
MONOCYTES # BLD: 0.3 K/UL (ref 0–1.3)
MONOCYTES NFR BLD: 9.4 %
NEUTROPHILS # BLD: 1.7 K/UL (ref 1.7–7.7)
NEUTROPHILS NFR BLD: 51.6 %
PHOSPHATE SERPL-MCNC: 5 MG/DL (ref 2.5–4.9)
PLATELET # BLD AUTO: 82 K/UL (ref 135–450)
PMV BLD AUTO: 10.7 FL (ref 5–10.5)
POTASSIUM SERPL-SCNC: 4 MMOL/L (ref 3.5–5.1)
PROT SERPL-MCNC: 6.3 G/DL (ref 6.4–8.2)
RBC # BLD AUTO: 3.06 M/UL (ref 4.2–5.9)
SODIUM SERPL-SCNC: 137 MMOL/L (ref 136–145)
URATE SERPL-MCNC: 6 MG/DL (ref 3.5–7.2)
WBC # BLD AUTO: 3.3 K/UL (ref 4–11)

## 2024-03-16 PROCEDURE — 36430 TRANSFUSION BLD/BLD COMPNT: CPT

## 2024-03-16 PROCEDURE — 83735 ASSAY OF MAGNESIUM: CPT

## 2024-03-16 PROCEDURE — 84550 ASSAY OF BLOOD/URIC ACID: CPT

## 2024-03-16 PROCEDURE — P9040 RBC LEUKOREDUCED IRRADIATED: HCPCS

## 2024-03-16 PROCEDURE — 6370000000 HC RX 637 (ALT 250 FOR IP): Performed by: STUDENT IN AN ORGANIZED HEALTH CARE EDUCATION/TRAINING PROGRAM

## 2024-03-16 PROCEDURE — 80048 BASIC METABOLIC PNL TOTAL CA: CPT

## 2024-03-16 PROCEDURE — 6360000002 HC RX W HCPCS: Performed by: INTERNAL MEDICINE

## 2024-03-16 PROCEDURE — 85730 THROMBOPLASTIN TIME PARTIAL: CPT

## 2024-03-16 PROCEDURE — 86850 RBC ANTIBODY SCREEN: CPT

## 2024-03-16 PROCEDURE — 6370000000 HC RX 637 (ALT 250 FOR IP): Performed by: NURSE PRACTITIONER

## 2024-03-16 PROCEDURE — 2580000003 HC RX 258: Performed by: NURSE PRACTITIONER

## 2024-03-16 PROCEDURE — 6370000000 HC RX 637 (ALT 250 FOR IP): Performed by: INTERNAL MEDICINE

## 2024-03-16 PROCEDURE — 84100 ASSAY OF PHOSPHORUS: CPT

## 2024-03-16 PROCEDURE — 85025 COMPLETE CBC W/AUTO DIFF WBC: CPT

## 2024-03-16 PROCEDURE — 83615 LACTATE (LD) (LDH) ENZYME: CPT

## 2024-03-16 PROCEDURE — 86901 BLOOD TYPING SEROLOGIC RH(D): CPT

## 2024-03-16 PROCEDURE — 2060000000 HC ICU INTERMEDIATE R&B

## 2024-03-16 PROCEDURE — 86923 COMPATIBILITY TEST ELECTRIC: CPT

## 2024-03-16 PROCEDURE — 86900 BLOOD TYPING SEROLOGIC ABO: CPT

## 2024-03-16 PROCEDURE — 2580000003 HC RX 258: Performed by: INTERNAL MEDICINE

## 2024-03-16 PROCEDURE — 80076 HEPATIC FUNCTION PANEL: CPT

## 2024-03-16 RX ADMIN — PANTOPRAZOLE SODIUM 40 MG: 40 TABLET, DELAYED RELEASE ORAL at 08:34

## 2024-03-16 RX ADMIN — SODIUM CHLORIDE, PRESERVATIVE FREE 10 ML: 5 INJECTION INTRAVENOUS at 08:35

## 2024-03-16 RX ADMIN — FLUCONAZOLE 200 MG: 200 TABLET ORAL at 08:34

## 2024-03-16 RX ADMIN — VALACYCLOVIR HYDROCHLORIDE 500 MG: 500 TABLET, FILM COATED ORAL at 20:59

## 2024-03-16 RX ADMIN — SODIUM CHLORIDE: 9 INJECTION, SOLUTION INTRAVENOUS at 00:45

## 2024-03-16 RX ADMIN — DECITABINE 40 MG: 50 INJECTION, POWDER, LYOPHILIZED, FOR SOLUTION INTRAVENOUS at 15:31

## 2024-03-16 RX ADMIN — LEVOFLOXACIN 500 MG: 500 TABLET, FILM COATED ORAL at 20:59

## 2024-03-16 RX ADMIN — LEVETIRACETAM 1500 MG: 500 TABLET, FILM COATED ORAL at 08:34

## 2024-03-16 RX ADMIN — FIDAXOMICIN 200 MG: 200 TABLET, FILM COATED ORAL at 08:34

## 2024-03-16 RX ADMIN — ALLOPURINOL 300 MG: 300 TABLET ORAL at 08:34

## 2024-03-16 RX ADMIN — SODIUM CHLORIDE 15 ML: 900 IRRIGANT IRRIGATION at 09:44

## 2024-03-16 RX ADMIN — SODIUM CHLORIDE, PRESERVATIVE FREE 10 ML: 5 INJECTION INTRAVENOUS at 08:36

## 2024-03-16 RX ADMIN — VALACYCLOVIR HYDROCHLORIDE 500 MG: 500 TABLET, FILM COATED ORAL at 08:34

## 2024-03-16 RX ADMIN — SODIUM CHLORIDE 15 ML: 900 IRRIGANT IRRIGATION at 21:00

## 2024-03-16 RX ADMIN — SODIUM CHLORIDE: 9 INJECTION, SOLUTION INTRAVENOUS at 12:53

## 2024-03-16 RX ADMIN — SODIUM CHLORIDE, PRESERVATIVE FREE 10 ML: 5 INJECTION INTRAVENOUS at 20:59

## 2024-03-16 RX ADMIN — SODIUM CHLORIDE 15 ML: 900 IRRIGANT IRRIGATION at 08:36

## 2024-03-16 RX ADMIN — FIDAXOMICIN 200 MG: 200 TABLET, FILM COATED ORAL at 20:59

## 2024-03-16 RX ADMIN — CYANOCOBALAMIN TAB 1000 MCG 1000 MCG: 1000 TAB at 08:34

## 2024-03-16 RX ADMIN — ONDANSETRON HYDROCHLORIDE 8 MG: 8 TABLET, FILM COATED ORAL at 14:48

## 2024-03-16 RX ADMIN — LEVETIRACETAM 1500 MG: 500 TABLET, FILM COATED ORAL at 20:59

## 2024-03-16 RX ADMIN — SODIUM CHLORIDE 15 ML: 900 IRRIGANT IRRIGATION at 15:33

## 2024-03-16 ASSESSMENT — PAIN SCALES - GENERAL
PAINLEVEL_OUTOF10: 0
PAINLEVEL_OUTOF10: 0

## 2024-03-16 NOTE — PLAN OF CARE
Problem: Safety - Adult  Goal: Free from fall injury  Note: Orthostatic vital signs obtained at start of shift - see flowsheet for details.  Pt does not meet criteria for orthostasis.  Pt is a Med fall risk. See Apple Fall Score and ABCDS Injury Risk assessments.   - Screening for Orthostasis AND not a [place] Risk per APPLE/ABCDS: Pt bed is in low position, side rails up, call light and belongings are in reach.  Fall risk light is on outside pts room.  Pt encouraged to call for assistance as needed. Will continue with hourly rounds for PO intake, pain needs, toileting and repositioning as needed.      Problem: Hematologic - Adult  Goal: Maintains hematologic stability  Note: Patient's hemoglobin this AM:   Recent Labs     03/16/24  0344   HGB 7.0*     Patient's platelet count this AM:   Recent Labs     03/16/24  0344   PLT 82*    Thrombocytopenia Precautions in place.  Patient showing no signs or symptoms of active bleeding.  Patient received transfusions per orders prior to this shift.  Patient verbalizes understanding of all instructions. Will continue to assess and implement POC. Call light within reach and hourly rounding in place.      Problem: Infection - Adult  Goal: Absence of infection at discharge  Note: CVC site remains free of signs/symptoms of infection. No drainage, edema, erythema, pain, or warmth noted at site. Dressing changes continue per protocol and on an as needed basis - see flowsheet.     Compliant with [facility] Bath Protocol:  Performed CHG bath today per [facility] protocol utilizing CHG solution in the shower.  CVC site cleansed with CHG wipe over dressing, skin surrounding dressing, and first 6\" of IV tubing.  Pt tolerated well.  Continued to encourage daily CHG bathing per [facility] protocol.

## 2024-03-17 LAB
ALBUMIN SERPL-MCNC: 3.9 G/DL (ref 3.4–5)
ALP SERPL-CCNC: 46 U/L (ref 40–129)
ALT SERPL-CCNC: 10 U/L (ref 10–40)
ANION GAP SERPL CALCULATED.3IONS-SCNC: 10 MMOL/L (ref 3–16)
ANISOCYTOSIS BLD QL SMEAR: ABNORMAL
APTT BLD: 32.6 SEC (ref 22.7–35.9)
AST SERPL-CCNC: 16 U/L (ref 15–37)
BASOPHILS # BLD: 0 K/UL (ref 0–0.2)
BASOPHILS # BLD: 0 K/UL (ref 0–0.2)
BASOPHILS NFR BLD: 0 %
BASOPHILS NFR BLD: 0 %
BILIRUB DIRECT SERPL-MCNC: <0.2 MG/DL (ref 0–0.3)
BILIRUB INDIRECT SERPL-MCNC: ABNORMAL MG/DL (ref 0–1)
BILIRUB SERPL-MCNC: 0.9 MG/DL (ref 0–1)
BLASTS NFR BLD MANUAL: 13 %
BLASTS NFR BLD MANUAL: 8 %
BUN SERPL-MCNC: 14 MG/DL (ref 7–20)
CALCIUM SERPL-MCNC: 9.3 MG/DL (ref 8.3–10.6)
CHLORIDE SERPL-SCNC: 100 MMOL/L (ref 99–110)
CO2 SERPL-SCNC: 26 MMOL/L (ref 21–32)
CREAT SERPL-MCNC: 0.9 MG/DL (ref 0.9–1.3)
DACRYOCYTES BLD QL SMEAR: ABNORMAL
DACRYOCYTES BLD QL SMEAR: ABNORMAL
DEPRECATED RDW RBC AUTO: 31 % (ref 12.4–15.4)
DEPRECATED RDW RBC AUTO: 33.8 % (ref 12.4–15.4)
EOSINOPHIL # BLD: 0 K/UL (ref 0–0.6)
EOSINOPHIL # BLD: 0.1 K/UL (ref 0–0.6)
EOSINOPHIL NFR BLD: 0 %
EOSINOPHIL NFR BLD: 2 %
GFR SERPLBLD CREATININE-BSD FMLA CKD-EPI: >60 ML/MIN/{1.73_M2}
GLUCOSE SERPL-MCNC: 89 MG/DL (ref 70–99)
HCT VFR BLD AUTO: 23.4 % (ref 40.5–52.5)
HCT VFR BLD AUTO: 25.6 % (ref 40.5–52.5)
HGB BLD-MCNC: 7.9 G/DL (ref 13.5–17.5)
HGB BLD-MCNC: 8.2 G/DL (ref 13.5–17.5)
HYPOCHROMIA BLD QL SMEAR: ABNORMAL
LDH SERPL L TO P-CCNC: 625 U/L (ref 100–190)
LYMPHOCYTES # BLD: 1.7 K/UL (ref 1–5.1)
LYMPHOCYTES # BLD: 1.8 K/UL (ref 1–5.1)
LYMPHOCYTES NFR BLD: 41 %
LYMPHOCYTES NFR BLD: 50 %
MACROCYTES BLD QL SMEAR: ABNORMAL
MACROCYTES BLD QL SMEAR: ABNORMAL
MAGNESIUM SERPL-MCNC: 1.8 MG/DL (ref 1.8–2.4)
MCH RBC QN AUTO: 23.9 PG (ref 26–34)
MCH RBC QN AUTO: 25.1 PG (ref 26–34)
MCHC RBC AUTO-ENTMCNC: 32.2 G/DL (ref 31–36)
MCHC RBC AUTO-ENTMCNC: 33.7 G/DL (ref 31–36)
MCV RBC AUTO: 74.3 FL (ref 80–100)
MCV RBC AUTO: 74.5 FL (ref 80–100)
METAMYELOCYTES NFR BLD MANUAL: 1 %
METAMYELOCYTES NFR BLD MANUAL: 3 %
MICROCYTES BLD QL SMEAR: ABNORMAL
MICROCYTES BLD QL SMEAR: ABNORMAL
MONOCYTES # BLD: 0.1 K/UL (ref 0–1.3)
MONOCYTES # BLD: 0.2 K/UL (ref 0–1.3)
MONOCYTES NFR BLD: 2 %
MONOCYTES NFR BLD: 5 %
MYELOCYTES NFR BLD MANUAL: 3 %
NEUTROPHILS # BLD: 1.1 K/UL (ref 1.7–7.7)
NEUTROPHILS # BLD: 1.3 K/UL (ref 1.7–7.7)
NEUTROPHILS NFR BLD: 24 %
NEUTROPHILS NFR BLD: 32 %
NEUTS BAND NFR BLD MANUAL: 6 % (ref 0–7)
NRBC BLD-RTO: 4 /100 WBC
NRBC BLD-RTO: 8 /100 WBC
OVALOCYTES BLD QL SMEAR: ABNORMAL
PATH INTERP BLD-IMP: NO
PATH INTERP BLD-IMP: NO
PHOSPHATE SERPL-MCNC: 5 MG/DL (ref 2.5–4.9)
PLATELET # BLD AUTO: 76 K/UL (ref 135–450)
PLATELET # BLD AUTO: 82 K/UL (ref 135–450)
PLATELET BLD QL SMEAR: ABNORMAL
PLATELET BLD QL SMEAR: ABNORMAL
PMV BLD AUTO: 10.2 FL (ref 5–10.5)
PMV BLD AUTO: 10.7 FL (ref 5–10.5)
POIKILOCYTOSIS BLD QL SMEAR: ABNORMAL
POLYCHROMASIA BLD QL SMEAR: ABNORMAL
POTASSIUM SERPL-SCNC: 4.1 MMOL/L (ref 3.5–5.1)
PROT SERPL-MCNC: 6.3 G/DL (ref 6.4–8.2)
RBC # BLD AUTO: 3.14 M/UL (ref 4.2–5.9)
RBC # BLD AUTO: 3.44 M/UL (ref 4.2–5.9)
SCHISTOCYTES BLD QL SMEAR: ABNORMAL
SCHISTOCYTES BLD QL SMEAR: ABNORMAL
SLIDE REVIEW: ABNORMAL
SODIUM SERPL-SCNC: 136 MMOL/L (ref 136–145)
TARGETS BLD QL SMEAR: ABNORMAL
URATE SERPL-MCNC: 6.3 MG/DL (ref 3.5–7.2)
VARIANT LYMPHS NFR BLD MANUAL: 10 % (ref 0–6)
WBC # BLD AUTO: 3.3 K/UL (ref 4–11)
WBC # BLD AUTO: 3.5 K/UL (ref 4–11)

## 2024-03-17 PROCEDURE — 2580000003 HC RX 258: Performed by: INTERNAL MEDICINE

## 2024-03-17 PROCEDURE — 6370000000 HC RX 637 (ALT 250 FOR IP): Performed by: NURSE PRACTITIONER

## 2024-03-17 PROCEDURE — 84100 ASSAY OF PHOSPHORUS: CPT

## 2024-03-17 PROCEDURE — 83615 LACTATE (LD) (LDH) ENZYME: CPT

## 2024-03-17 PROCEDURE — 80048 BASIC METABOLIC PNL TOTAL CA: CPT

## 2024-03-17 PROCEDURE — 6370000000 HC RX 637 (ALT 250 FOR IP): Performed by: STUDENT IN AN ORGANIZED HEALTH CARE EDUCATION/TRAINING PROGRAM

## 2024-03-17 PROCEDURE — 85730 THROMBOPLASTIN TIME PARTIAL: CPT

## 2024-03-17 PROCEDURE — 84550 ASSAY OF BLOOD/URIC ACID: CPT

## 2024-03-17 PROCEDURE — 80076 HEPATIC FUNCTION PANEL: CPT

## 2024-03-17 PROCEDURE — 6370000000 HC RX 637 (ALT 250 FOR IP): Performed by: INTERNAL MEDICINE

## 2024-03-17 PROCEDURE — 36592 COLLECT BLOOD FROM PICC: CPT

## 2024-03-17 PROCEDURE — 2580000003 HC RX 258: Performed by: NURSE PRACTITIONER

## 2024-03-17 PROCEDURE — 83735 ASSAY OF MAGNESIUM: CPT

## 2024-03-17 PROCEDURE — 85025 COMPLETE CBC W/AUTO DIFF WBC: CPT

## 2024-03-17 PROCEDURE — 30233N1 TRANSFUSION OF NONAUTOLOGOUS RED BLOOD CELLS INTO PERIPHERAL VEIN, PERCUTANEOUS APPROACH: ICD-10-PCS | Performed by: INTERNAL MEDICINE

## 2024-03-17 PROCEDURE — 2060000000 HC ICU INTERMEDIATE R&B

## 2024-03-17 RX ADMIN — PANTOPRAZOLE SODIUM 40 MG: 40 TABLET, DELAYED RELEASE ORAL at 06:55

## 2024-03-17 RX ADMIN — VALACYCLOVIR HYDROCHLORIDE 500 MG: 500 TABLET, FILM COATED ORAL at 08:36

## 2024-03-17 RX ADMIN — SODIUM CHLORIDE 15 ML: 900 IRRIGANT IRRIGATION at 09:27

## 2024-03-17 RX ADMIN — SODIUM CHLORIDE, PRESERVATIVE FREE 10 ML: 5 INJECTION INTRAVENOUS at 21:14

## 2024-03-17 RX ADMIN — LEVETIRACETAM 1500 MG: 500 TABLET, FILM COATED ORAL at 21:14

## 2024-03-17 RX ADMIN — CYANOCOBALAMIN TAB 1000 MCG 1000 MCG: 1000 TAB at 08:36

## 2024-03-17 RX ADMIN — SODIUM CHLORIDE, PRESERVATIVE FREE 10 ML: 5 INJECTION INTRAVENOUS at 08:37

## 2024-03-17 RX ADMIN — ALLOPURINOL 300 MG: 300 TABLET ORAL at 08:36

## 2024-03-17 RX ADMIN — SODIUM CHLORIDE: 9 INJECTION, SOLUTION INTRAVENOUS at 10:30

## 2024-03-17 RX ADMIN — VALACYCLOVIR HYDROCHLORIDE 500 MG: 500 TABLET, FILM COATED ORAL at 21:14

## 2024-03-17 RX ADMIN — LEVOFLOXACIN 500 MG: 500 TABLET, FILM COATED ORAL at 21:14

## 2024-03-17 RX ADMIN — FIDAXOMICIN 200 MG: 200 TABLET, FILM COATED ORAL at 08:37

## 2024-03-17 RX ADMIN — LEVETIRACETAM 1500 MG: 500 TABLET, FILM COATED ORAL at 08:36

## 2024-03-17 RX ADMIN — SODIUM CHLORIDE, PRESERVATIVE FREE 10 ML: 5 INJECTION INTRAVENOUS at 08:36

## 2024-03-17 RX ADMIN — SODIUM CHLORIDE 15 ML: 900 IRRIGANT IRRIGATION at 21:14

## 2024-03-17 RX ADMIN — SODIUM CHLORIDE: 9 INJECTION, SOLUTION INTRAVENOUS at 05:04

## 2024-03-17 RX ADMIN — FLUCONAZOLE 200 MG: 200 TABLET ORAL at 08:36

## 2024-03-17 RX ADMIN — SODIUM CHLORIDE 15 ML: 900 IRRIGANT IRRIGATION at 15:22

## 2024-03-17 RX ADMIN — SODIUM CHLORIDE 15 ML: 900 IRRIGANT IRRIGATION at 07:28

## 2024-03-17 RX ADMIN — FIDAXOMICIN 200 MG: 200 TABLET, FILM COATED ORAL at 21:20

## 2024-03-17 NOTE — PLAN OF CARE
Problem: Safety - Adult  Goal: Free from fall injury  Note: Orthostatic vital signs obtained at start of shift - see flowsheet for details.  Pt does not meet criteria for orthostasis.  Pt is a Med fall risk. See Apple Fall Score and ABCDS Injury Risk assessments.   - Screening for Orthostasis AND not a [place] Risk per APPLE/ABCDS: Pt bed is in low position, side rails up, call light and belongings are in reach.  Fall risk light is on outside pts room.  Pt encouraged to call for assistance as needed. Will continue with hourly rounds for PO intake, pain needs, toileting and repositioning as needed.      Problem: Hematologic - Adult  Goal: Maintains hematologic stability  Note: Patient's hemoglobin this AM:   Recent Labs     03/17/24  0408   HGB 7.9*     Patient's platelet count this AM:   Recent Labs     03/17/24  0408   PLT 76*    Thrombocytopenia Precautions in place.  Patient showing no signs or symptoms of active bleeding.  Transfusion not indicated at this time.  Patient verbalizes understanding of all instructions. Will continue to assess and implement POC. Call light within reach and hourly rounding in place.      Problem: Infection - Adult  Goal: Absence of infection at discharge  Note: CVC site remains free of signs/symptoms of infection. No drainage, edema, erythema, pain, or warmth noted at site. Dressing changes continue per protocol and on an as needed basis - see flowsheet.     Compliant with [facility] Bath Protocol:  Performed CHG bath today per [facility] protocol utilizing CHG solution in the shower.  CVC site cleansed with CHG wipe over dressing, skin surrounding dressing, and first 6\" of IV tubing.  Pt tolerated well.  Continued to encourage daily CHG bathing per [facility] protocol.

## 2024-03-18 LAB
ALBUMIN SERPL-MCNC: 4 G/DL (ref 3.4–5)
ALP SERPL-CCNC: 46 U/L (ref 40–129)
ALT SERPL-CCNC: 12 U/L (ref 10–40)
ANION GAP SERPL CALCULATED.3IONS-SCNC: 10 MMOL/L (ref 3–16)
APTT BLD: 28.4 SEC (ref 22.7–35.9)
AST SERPL-CCNC: 16 U/L (ref 15–37)
BASOPHILS # BLD: 0 K/UL (ref 0–0.2)
BASOPHILS NFR BLD: 0 %
BILIRUB DIRECT SERPL-MCNC: <0.2 MG/DL (ref 0–0.3)
BILIRUB INDIRECT SERPL-MCNC: ABNORMAL MG/DL (ref 0–1)
BILIRUB SERPL-MCNC: 1.1 MG/DL (ref 0–1)
BILIRUB UR QL STRIP.AUTO: NEGATIVE
BLASTS NFR BLD MANUAL: 5 %
BUN SERPL-MCNC: 14 MG/DL (ref 7–20)
CALCIUM SERPL-MCNC: 9.6 MG/DL (ref 8.3–10.6)
CHLORIDE SERPL-SCNC: 100 MMOL/L (ref 99–110)
CLARITY UR: CLEAR
CO2 SERPL-SCNC: 27 MMOL/L (ref 21–32)
COLOR UR: YELLOW
CREAT SERPL-MCNC: 1 MG/DL (ref 0.9–1.3)
DACRYOCYTES BLD QL SMEAR: ABNORMAL
DEPRECATED RDW RBC AUTO: 33.1 % (ref 12.4–15.4)
EOSINOPHIL # BLD: 0 K/UL (ref 0–0.6)
EOSINOPHIL NFR BLD: 0 %
FIBRINOGEN PPP-MCNC: 347 MG/DL (ref 243–550)
GFR SERPLBLD CREATININE-BSD FMLA CKD-EPI: >60 ML/MIN/{1.73_M2}
GLUCOSE SERPL-MCNC: 89 MG/DL (ref 70–99)
GLUCOSE UR STRIP.AUTO-MCNC: NEGATIVE MG/DL
HCT VFR BLD AUTO: 25.3 % (ref 40.5–52.5)
HGB BLD-MCNC: 8.3 G/DL (ref 13.5–17.5)
HGB UR QL STRIP.AUTO: NEGATIVE
HYPOCHROMIA BLD QL SMEAR: ABNORMAL
KETONES UR STRIP.AUTO-MCNC: NEGATIVE MG/DL
LDH SERPL L TO P-CCNC: 660 U/L (ref 100–190)
LEUKOCYTE ESTERASE UR QL STRIP.AUTO: NEGATIVE
LYMPHOCYTES # BLD: 2 K/UL (ref 1–5.1)
LYMPHOCYTES NFR BLD: 58 %
MACROCYTES BLD QL SMEAR: ABNORMAL
MAGNESIUM SERPL-MCNC: 1.8 MG/DL (ref 1.8–2.4)
MCH RBC QN AUTO: 24.3 PG (ref 26–34)
MCHC RBC AUTO-ENTMCNC: 32.7 G/DL (ref 31–36)
MCV RBC AUTO: 74.5 FL (ref 80–100)
METAMYELOCYTES NFR BLD MANUAL: 2 %
MICROCYTES BLD QL SMEAR: ABNORMAL
MONOCYTES # BLD: 0.1 K/UL (ref 0–1.3)
MONOCYTES NFR BLD: 3 %
NEUTROPHILS # BLD: 1.2 K/UL (ref 1.7–7.7)
NEUTROPHILS NFR BLD: 29 %
NEUTS BAND NFR BLD MANUAL: 3 % (ref 0–7)
NITRITE UR QL STRIP.AUTO: NEGATIVE
OVALOCYTES BLD QL SMEAR: ABNORMAL
PATH INTERP BLD-IMP: NO
PH UR STRIP.AUTO: 7 [PH] (ref 5–8)
PHOSPHATE SERPL-MCNC: 5.3 MG/DL (ref 2.5–4.9)
PLATELET # BLD AUTO: 69 K/UL (ref 135–450)
PMV BLD AUTO: 10.6 FL (ref 5–10.5)
POIKILOCYTOSIS BLD QL SMEAR: ABNORMAL
POLYCHROMASIA BLD QL SMEAR: ABNORMAL
POTASSIUM SERPL-SCNC: 3.7 MMOL/L (ref 3.5–5.1)
PROT SERPL-MCNC: 6.7 G/DL (ref 6.4–8.2)
PROT UR STRIP.AUTO-MCNC: NEGATIVE MG/DL
RBC # BLD AUTO: 3.4 M/UL (ref 4.2–5.9)
SCHISTOCYTES BLD QL SMEAR: ABNORMAL
SMUDGE CELLS BLD QL SMEAR: PRESENT
SODIUM SERPL-SCNC: 137 MMOL/L (ref 136–145)
SP GR UR STRIP.AUTO: 1.01 (ref 1–1.03)
TARGETS BLD QL SMEAR: ABNORMAL
UA DIPSTICK W REFLEX MICRO PNL UR: NORMAL
URATE SERPL-MCNC: 6.4 MG/DL (ref 3.5–7.2)
URN SPEC COLLECT METH UR: NORMAL
UROBILINOGEN UR STRIP-ACNC: 0.2 E.U./DL
WBC # BLD AUTO: 3.5 K/UL (ref 4–11)

## 2024-03-18 PROCEDURE — 2580000003 HC RX 258: Performed by: INTERNAL MEDICINE

## 2024-03-18 PROCEDURE — 85384 FIBRINOGEN ACTIVITY: CPT

## 2024-03-18 PROCEDURE — 2580000003 HC RX 258: Performed by: NURSE PRACTITIONER

## 2024-03-18 PROCEDURE — 6370000000 HC RX 637 (ALT 250 FOR IP): Performed by: NURSE PRACTITIONER

## 2024-03-18 PROCEDURE — 84100 ASSAY OF PHOSPHORUS: CPT

## 2024-03-18 PROCEDURE — 6370000000 HC RX 637 (ALT 250 FOR IP): Performed by: INTERNAL MEDICINE

## 2024-03-18 PROCEDURE — 81003 URINALYSIS AUTO W/O SCOPE: CPT

## 2024-03-18 PROCEDURE — 83615 LACTATE (LD) (LDH) ENZYME: CPT

## 2024-03-18 PROCEDURE — 36592 COLLECT BLOOD FROM PICC: CPT

## 2024-03-18 PROCEDURE — 84550 ASSAY OF BLOOD/URIC ACID: CPT

## 2024-03-18 PROCEDURE — 80076 HEPATIC FUNCTION PANEL: CPT

## 2024-03-18 PROCEDURE — 6370000000 HC RX 637 (ALT 250 FOR IP): Performed by: STUDENT IN AN ORGANIZED HEALTH CARE EDUCATION/TRAINING PROGRAM

## 2024-03-18 PROCEDURE — 85025 COMPLETE CBC W/AUTO DIFF WBC: CPT

## 2024-03-18 PROCEDURE — 83735 ASSAY OF MAGNESIUM: CPT

## 2024-03-18 PROCEDURE — 2060000000 HC ICU INTERMEDIATE R&B

## 2024-03-18 PROCEDURE — 85730 THROMBOPLASTIN TIME PARTIAL: CPT

## 2024-03-18 PROCEDURE — 80048 BASIC METABOLIC PNL TOTAL CA: CPT

## 2024-03-18 RX ORDER — PANTOPRAZOLE SODIUM 40 MG/1
40 TABLET, DELAYED RELEASE ORAL
Status: DISCONTINUED | OUTPATIENT
Start: 2024-03-18 | End: 2024-03-23

## 2024-03-18 RX ADMIN — SODIUM CHLORIDE 15 ML: 900 IRRIGANT IRRIGATION at 21:17

## 2024-03-18 RX ADMIN — CYANOCOBALAMIN TAB 1000 MCG 1000 MCG: 1000 TAB at 08:22

## 2024-03-18 RX ADMIN — SODIUM CHLORIDE, PRESERVATIVE FREE 10 ML: 5 INJECTION INTRAVENOUS at 08:24

## 2024-03-18 RX ADMIN — PANTOPRAZOLE SODIUM 40 MG: 40 TABLET, DELAYED RELEASE ORAL at 06:51

## 2024-03-18 RX ADMIN — ALLOPURINOL 300 MG: 300 TABLET ORAL at 08:22

## 2024-03-18 RX ADMIN — VALACYCLOVIR HYDROCHLORIDE 500 MG: 500 TABLET, FILM COATED ORAL at 21:11

## 2024-03-18 RX ADMIN — PANTOPRAZOLE SODIUM 40 MG: 40 TABLET, DELAYED RELEASE ORAL at 16:45

## 2024-03-18 RX ADMIN — SODIUM CHLORIDE, PRESERVATIVE FREE 10 ML: 5 INJECTION INTRAVENOUS at 21:13

## 2024-03-18 RX ADMIN — LEVETIRACETAM 1500 MG: 500 TABLET, FILM COATED ORAL at 08:22

## 2024-03-18 RX ADMIN — LEVETIRACETAM 1500 MG: 500 TABLET, FILM COATED ORAL at 21:11

## 2024-03-18 RX ADMIN — LEVOFLOXACIN 500 MG: 500 TABLET, FILM COATED ORAL at 21:11

## 2024-03-18 RX ADMIN — SODIUM CHLORIDE, PRESERVATIVE FREE 10 ML: 5 INJECTION INTRAVENOUS at 21:14

## 2024-03-18 RX ADMIN — SODIUM CHLORIDE: 9 INJECTION, SOLUTION INTRAVENOUS at 06:47

## 2024-03-18 RX ADMIN — FIDAXOMICIN 200 MG: 200 TABLET, FILM COATED ORAL at 08:23

## 2024-03-18 RX ADMIN — FLUCONAZOLE 200 MG: 200 TABLET ORAL at 08:22

## 2024-03-18 RX ADMIN — VALACYCLOVIR HYDROCHLORIDE 500 MG: 500 TABLET, FILM COATED ORAL at 08:22

## 2024-03-18 NOTE — PLAN OF CARE
Problem: Safety - Adult  Goal: Free from fall injury  Outcome: Progressing  Flowsheets (Taken 3/18/2024 1135)  Free From Fall Injury:   Instruct family/caregiver on patient safety   Based on caregiver fall risk screen, instruct family/caregiver to ask for assistance with transferring infant if caregiver noted to have fall risk factors  Note: Orthostatic vital signs obtained at start of shift - see flowsheet for details.  Pt does not meet criteria for orthostasis.  Pt is a Med fall risk. See Apple Fall Score and ABCDS Injury Risk assessments.   - Screening for Orthostasis AND not a [place] Risk per APPLE/ABCDS: Pt bed is in low position, side rails up, call light and belongings are in reach.  Fall risk light is on outside pts room.  Pt encouraged to call for assistance as needed. Will continue with hourly rounds for PO intake, pain needs, toileting and repositioning as needed.      Problem: Hematologic - Adult  Goal: Maintains hematologic stability  Outcome: Progressing  Flowsheets (Taken 3/18/2024 1135)  Maintains hematologic stability:   Assess for signs and symptoms of bleeding or hemorrhage   Administer blood products/factors as ordered   Monitor labs for bleeding or clotting disorders  Note: Patient's hemoglobin this AM:   Recent Labs     03/18/24  0431   HGB 8.3*     Patient's platelet count this AM:   Recent Labs     03/18/24  0431   PLT 69*    Thrombocytopenia Precautions in place.  Patient showing no signs or symptoms of active bleeding.  Transfusion not indicated at this time.  Patient verbalizes understanding of all instructions. Will continue to assess and implement POC. Call light within reach and hourly rounding in place.      Problem: Gastrointestinal - Adult  Goal: Minimal or absence of nausea and vomiting  Outcome: Progressing  Flowsheets (Taken 3/18/2024 1135)  Minimal or absence of nausea and vomiting:   Administer IV fluids as ordered to ensure adequate hydration   Administer ordered

## 2024-03-19 LAB
ALBUMIN SERPL-MCNC: 3.9 G/DL (ref 3.4–5)
ALP SERPL-CCNC: 49 U/L (ref 40–129)
ALT SERPL-CCNC: 12 U/L (ref 10–40)
ANION GAP SERPL CALCULATED.3IONS-SCNC: 10 MMOL/L (ref 3–16)
ANISOCYTOSIS BLD QL SMEAR: ABNORMAL
APTT BLD: 30.5 SEC (ref 22.7–35.9)
AST SERPL-CCNC: 18 U/L (ref 15–37)
BASOPHILS # BLD: 0.1 K/UL (ref 0–0.2)
BASOPHILS NFR BLD: 3 %
BILIRUB DIRECT SERPL-MCNC: <0.2 MG/DL (ref 0–0.3)
BILIRUB INDIRECT SERPL-MCNC: NORMAL MG/DL (ref 0–1)
BILIRUB SERPL-MCNC: 1 MG/DL (ref 0–1)
BUN SERPL-MCNC: 14 MG/DL (ref 7–20)
CALCIUM SERPL-MCNC: 9.3 MG/DL (ref 8.3–10.6)
CHLORIDE SERPL-SCNC: 100 MMOL/L (ref 99–110)
CO2 SERPL-SCNC: 26 MMOL/L (ref 21–32)
CREAT SERPL-MCNC: 0.9 MG/DL (ref 0.9–1.3)
DACRYOCYTES BLD QL SMEAR: ABNORMAL
DEPRECATED RDW RBC AUTO: 32.1 % (ref 12.4–15.4)
EOSINOPHIL # BLD: 0.1 K/UL (ref 0–0.6)
EOSINOPHIL NFR BLD: 2 %
FIBRINOGEN PPP-MCNC: 308 MG/DL (ref 243–550)
GFR SERPLBLD CREATININE-BSD FMLA CKD-EPI: >60 ML/MIN/{1.73_M2}
GLUCOSE SERPL-MCNC: 91 MG/DL (ref 70–99)
HCT VFR BLD AUTO: 24.1 % (ref 40.5–52.5)
HGB BLD-MCNC: 7.7 G/DL (ref 13.5–17.5)
HYPOCHROMIA BLD QL SMEAR: ABNORMAL
LDH SERPL L TO P-CCNC: 611 U/L (ref 100–190)
LYMPHOCYTES # BLD: 1.4 K/UL (ref 1–5.1)
LYMPHOCYTES NFR BLD: 46 %
MACROCYTES BLD QL SMEAR: ABNORMAL
MAGNESIUM SERPL-MCNC: 1.6 MG/DL (ref 1.8–2.4)
MCH RBC QN AUTO: 24.3 PG (ref 26–34)
MCHC RBC AUTO-ENTMCNC: 32.2 G/DL (ref 31–36)
MCV RBC AUTO: 75.4 FL (ref 80–100)
METAMYELOCYTES NFR BLD MANUAL: 3 %
MICROCYTES BLD QL SMEAR: ABNORMAL
MONOCYTES # BLD: 0.1 K/UL (ref 0–1.3)
MONOCYTES NFR BLD: 5 %
MYELOCYTES NFR BLD MANUAL: 2 %
NEUTROPHILS # BLD: 1.2 K/UL (ref 1.7–7.7)
NEUTROPHILS NFR BLD: 38 %
NEUTS VAC BLD QL SMEAR: PRESENT
NRBC BLD-RTO: 21 /100 WBC
OVALOCYTES BLD QL SMEAR: ABNORMAL
PATH INTERP BLD-IMP: NO
PHOSPHATE SERPL-MCNC: 5 MG/DL (ref 2.5–4.9)
PLATELET # BLD AUTO: 67 K/UL (ref 135–450)
PLATELET BLD QL SMEAR: ABNORMAL
PMV BLD AUTO: 10.1 FL (ref 5–10.5)
POIKILOCYTOSIS BLD QL SMEAR: ABNORMAL
POLYCHROMASIA BLD QL SMEAR: ABNORMAL
POTASSIUM SERPL-SCNC: 3.9 MMOL/L (ref 3.5–5.1)
PROT SERPL-MCNC: 6.4 G/DL (ref 6.4–8.2)
RBC # BLD AUTO: 3.19 M/UL (ref 4.2–5.9)
SCHISTOCYTES BLD QL SMEAR: ABNORMAL
SLIDE REVIEW: ABNORMAL
SODIUM SERPL-SCNC: 136 MMOL/L (ref 136–145)
SPHEROCYTES BLD QL SMEAR: ABNORMAL
TARGETS BLD QL SMEAR: ABNORMAL
URATE SERPL-MCNC: 6.3 MG/DL (ref 3.5–7.2)
VARIANT LYMPHS NFR BLD MANUAL: 1 % (ref 0–6)
WBC # BLD AUTO: 2.9 K/UL (ref 4–11)

## 2024-03-19 PROCEDURE — 84100 ASSAY OF PHOSPHORUS: CPT

## 2024-03-19 PROCEDURE — 83735 ASSAY OF MAGNESIUM: CPT

## 2024-03-19 PROCEDURE — 85384 FIBRINOGEN ACTIVITY: CPT

## 2024-03-19 PROCEDURE — 6370000000 HC RX 637 (ALT 250 FOR IP): Performed by: STUDENT IN AN ORGANIZED HEALTH CARE EDUCATION/TRAINING PROGRAM

## 2024-03-19 PROCEDURE — 80076 HEPATIC FUNCTION PANEL: CPT

## 2024-03-19 PROCEDURE — 83615 LACTATE (LD) (LDH) ENZYME: CPT

## 2024-03-19 PROCEDURE — 6370000000 HC RX 637 (ALT 250 FOR IP): Performed by: NURSE PRACTITIONER

## 2024-03-19 PROCEDURE — 80048 BASIC METABOLIC PNL TOTAL CA: CPT

## 2024-03-19 PROCEDURE — 36592 COLLECT BLOOD FROM PICC: CPT

## 2024-03-19 PROCEDURE — 85025 COMPLETE CBC W/AUTO DIFF WBC: CPT

## 2024-03-19 PROCEDURE — 2580000003 HC RX 258: Performed by: NURSE PRACTITIONER

## 2024-03-19 PROCEDURE — 85730 THROMBOPLASTIN TIME PARTIAL: CPT

## 2024-03-19 PROCEDURE — 84550 ASSAY OF BLOOD/URIC ACID: CPT

## 2024-03-19 PROCEDURE — 2060000000 HC ICU INTERMEDIATE R&B

## 2024-03-19 PROCEDURE — 2580000003 HC RX 258: Performed by: INTERNAL MEDICINE

## 2024-03-19 PROCEDURE — 6370000000 HC RX 637 (ALT 250 FOR IP): Performed by: INTERNAL MEDICINE

## 2024-03-19 RX ORDER — LANOLIN ALCOHOL/MO/W.PET/CERES
CREAM (GRAM) TOPICAL 2 TIMES DAILY
Status: DISCONTINUED | OUTPATIENT
Start: 2024-03-19 | End: 2024-04-02 | Stop reason: HOSPADM

## 2024-03-19 RX ADMIN — SODIUM CHLORIDE, PRESERVATIVE FREE 10 ML: 5 INJECTION INTRAVENOUS at 08:10

## 2024-03-19 RX ADMIN — SODIUM CHLORIDE, PRESERVATIVE FREE 10 ML: 5 INJECTION INTRAVENOUS at 22:31

## 2024-03-19 RX ADMIN — PANTOPRAZOLE SODIUM 40 MG: 40 TABLET, DELAYED RELEASE ORAL at 06:29

## 2024-03-19 RX ADMIN — SODIUM CHLORIDE 15 ML: 900 IRRIGANT IRRIGATION at 21:51

## 2024-03-19 RX ADMIN — SODIUM CHLORIDE: 9 INJECTION, SOLUTION INTRAVENOUS at 18:18

## 2024-03-19 RX ADMIN — FLUCONAZOLE 200 MG: 200 TABLET ORAL at 08:08

## 2024-03-19 RX ADMIN — LEVETIRACETAM 1500 MG: 500 TABLET, FILM COATED ORAL at 21:51

## 2024-03-19 RX ADMIN — SODIUM CHLORIDE: 9 INJECTION, SOLUTION INTRAVENOUS at 00:09

## 2024-03-19 RX ADMIN — VALACYCLOVIR HYDROCHLORIDE 500 MG: 500 TABLET, FILM COATED ORAL at 21:51

## 2024-03-19 RX ADMIN — Medication: at 21:53

## 2024-03-19 RX ADMIN — PANTOPRAZOLE SODIUM 40 MG: 40 TABLET, DELAYED RELEASE ORAL at 16:28

## 2024-03-19 RX ADMIN — LEVETIRACETAM 1500 MG: 500 TABLET, FILM COATED ORAL at 08:08

## 2024-03-19 RX ADMIN — LEVOFLOXACIN 500 MG: 500 TABLET, FILM COATED ORAL at 21:51

## 2024-03-19 RX ADMIN — VALACYCLOVIR HYDROCHLORIDE 500 MG: 500 TABLET, FILM COATED ORAL at 08:08

## 2024-03-19 RX ADMIN — SODIUM CHLORIDE, PRESERVATIVE FREE 10 ML: 5 INJECTION INTRAVENOUS at 21:51

## 2024-03-19 RX ADMIN — CYANOCOBALAMIN TAB 1000 MCG 1000 MCG: 1000 TAB at 08:08

## 2024-03-19 RX ADMIN — ALLOPURINOL 300 MG: 300 TABLET ORAL at 08:08

## 2024-03-19 NOTE — PLAN OF CARE
Problem: Safety - Adult  Goal: Free from fall injury  3/19/2024 1436 by Padmini Shahid, RN  Outcome: Progressing  Flowsheets (Taken 3/19/2024 1436)  Free From Fall Injury:   Instruct family/caregiver on patient safety   Based on caregiver fall risk screen, instruct family/caregiver to ask for assistance with transferring infant if caregiver noted to have fall risk factors  Note: Orthostatic vital signs obtained at start of shift - see flowsheet for details.  Pt does not meet criteria for orthostasis.  Pt is a Med fall risk. See Apple Fall Score and ABCDS Injury Risk assessments.   - Screening for Orthostasis AND not a [place] Risk per APPLE/ABCDS: Pt bed is in low position, side rails up, call light and belongings are in reach.  Fall risk light is on outside pts room.  Pt encouraged to call for assistance as needed. Will continue with hourly rounds for PO intake, pain needs, toileting and repositioning as needed.      Problem: Hematologic - Adult  Goal: Maintains hematologic stability  3/19/2024 1436 by Padmini Shahid, RN  Outcome: Progressing  Flowsheets (Taken 3/19/2024 1436)  Maintains hematologic stability:   Assess for signs and symptoms of bleeding or hemorrhage   Monitor labs for bleeding or clotting disorders   Administer blood products/factors as ordered  Note: Patient's hemoglobin this AM:   Recent Labs     03/19/24  0442   HGB 7.7*     Patient's platelet count this AM:   Recent Labs     03/19/24  0442   PLT 67*    Thrombocytopenia Precautions in place.  Patient showing no signs or symptoms of active bleeding.  Transfusion not indicated at this time.  Patient verbalizes understanding of all instructions. Will continue to assess and implement POC. Call light within reach and hourly rounding in place.      Problem: Gastrointestinal - Adult  Goal: Minimal or absence of nausea and vomiting  Outcome: Progressing  Note: Pt with no complaints of N/V this shift.      Problem: Nutrition Deficit:  Goal: Optimize

## 2024-03-19 NOTE — PLAN OF CARE
Problem: Safety - Adult  Goal: Free from fall injury  3/19/2024 0615 by Ivette Bernard, RN  Outcome: Progressing  Flowsheets (Taken 3/19/2024 0615)  Free From Fall Injury:   Instruct family/caregiver on patient safety   Based on caregiver fall risk screen, instruct family/caregiver to ask for assistance with transferring infant if caregiver noted to have fall risk factors     Problem: ABCDS Injury Assessment  Goal: Absence of physical injury  3/19/2024 0615 by Ivette Bernard, RN  Outcome: Progressing  Flowsheets (Taken 3/19/2024 0615)  Absence of Physical Injury: Implement safety measures based on patient assessment     Problem: Hematologic - Adult  Goal: Maintains hematologic stability  3/19/2024 0615 by Ivette Bernard, RN  Outcome: Progressing  Flowsheets (Taken 3/19/2024 0615)  Maintains hematologic stability:   Assess for signs and symptoms of bleeding or hemorrhage   Monitor labs for bleeding or clotting disorders   Administer blood products/factors as ordered

## 2024-03-20 LAB
ABO + RH BLD: NORMAL
ALBUMIN SERPL-MCNC: 4 G/DL (ref 3.4–5)
ALP SERPL-CCNC: 46 U/L (ref 40–129)
ALT SERPL-CCNC: 11 U/L (ref 10–40)
ANION GAP SERPL CALCULATED.3IONS-SCNC: 12 MMOL/L (ref 3–16)
ANISOCYTOSIS BLD QL SMEAR: ABNORMAL
ANISOCYTOSIS BLD QL SMEAR: ABNORMAL
APTT BLD: 29.5 SEC (ref 22.7–35.9)
AST SERPL-CCNC: 14 U/L (ref 15–37)
BASOPHILS # BLD: 0 K/UL (ref 0–0.2)
BASOPHILS # BLD: 0 K/UL (ref 0–0.2)
BASOPHILS NFR BLD: 0 %
BASOPHILS NFR BLD: 0 %
BILIRUB DIRECT SERPL-MCNC: <0.2 MG/DL (ref 0–0.3)
BILIRUB INDIRECT SERPL-MCNC: ABNORMAL MG/DL (ref 0–1)
BILIRUB SERPL-MCNC: 0.9 MG/DL (ref 0–1)
BLASTS NFR BLD MANUAL: 4 %
BLD GP AB SCN SERPL QL: NORMAL
BLOOD BANK DISPENSE STATUS: NORMAL
BLOOD BANK PRODUCT CODE: NORMAL
BPU ID: NORMAL
BUN SERPL-MCNC: 13 MG/DL (ref 7–20)
CALCIUM SERPL-MCNC: 9.3 MG/DL (ref 8.3–10.6)
CHLORIDE SERPL-SCNC: 101 MMOL/L (ref 99–110)
CO2 SERPL-SCNC: 26 MMOL/L (ref 21–32)
CREAT SERPL-MCNC: 0.9 MG/DL (ref 0.9–1.3)
DEPRECATED RDW RBC AUTO: 31.8 % (ref 12.4–15.4)
DEPRECATED RDW RBC AUTO: 32.4 % (ref 12.4–15.4)
DESCRIPTION BLOOD BANK: NORMAL
EOSINOPHIL # BLD: 0 K/UL (ref 0–0.6)
EOSINOPHIL # BLD: 0 K/UL (ref 0–0.6)
EOSINOPHIL NFR BLD: 1 %
EOSINOPHIL NFR BLD: 1 %
FIBRINOGEN PPP-MCNC: 313 MG/DL (ref 243–550)
GFR SERPLBLD CREATININE-BSD FMLA CKD-EPI: >60 ML/MIN/{1.73_M2}
GLUCOSE SERPL-MCNC: 86 MG/DL (ref 70–99)
HCT VFR BLD AUTO: 23.5 % (ref 40.5–52.5)
HCT VFR BLD AUTO: 23.7 % (ref 40.5–52.5)
HGB BLD-MCNC: 7.5 G/DL (ref 13.5–17.5)
HGB BLD-MCNC: 7.5 G/DL (ref 13.5–17.5)
HYPOCHROMIA BLD QL SMEAR: ABNORMAL
LDH SERPL L TO P-CCNC: 590 U/L (ref 100–190)
LYMPHOCYTES # BLD: 1.3 K/UL (ref 1–5.1)
LYMPHOCYTES # BLD: 1.5 K/UL (ref 1–5.1)
LYMPHOCYTES NFR BLD: 47 %
LYMPHOCYTES NFR BLD: 51 %
MACROCYTES BLD QL SMEAR: ABNORMAL
MAGNESIUM SERPL-MCNC: 1.6 MG/DL (ref 1.8–2.4)
MCH RBC QN AUTO: 23.4 PG (ref 26–34)
MCH RBC QN AUTO: 23.7 PG (ref 26–34)
MCHC RBC AUTO-ENTMCNC: 31.9 G/DL (ref 31–36)
MCHC RBC AUTO-ENTMCNC: 32 G/DL (ref 31–36)
MCV RBC AUTO: 73.4 FL (ref 80–100)
MCV RBC AUTO: 73.9 FL (ref 80–100)
METAMYELOCYTES NFR BLD MANUAL: 1 %
MICROCYTES BLD QL SMEAR: ABNORMAL
MONOCYTES # BLD: 0.1 K/UL (ref 0–1.3)
MONOCYTES # BLD: 0.3 K/UL (ref 0–1.3)
MONOCYTES NFR BLD: 10 %
MONOCYTES NFR BLD: 5 %
MYELOCYTES NFR BLD MANUAL: 2 %
MYELOCYTES NFR BLD MANUAL: 3 %
NEUTROPHILS # BLD: 1 K/UL (ref 1.7–7.7)
NEUTROPHILS # BLD: 1.2 K/UL (ref 1.7–7.7)
NEUTROPHILS NFR BLD: 34 %
NEUTROPHILS NFR BLD: 41 %
NRBC BLD-RTO: 1 /100 WBC
PATH INTERP BLD-IMP: ABNORMAL
PHOSPHATE SERPL-MCNC: 5.1 MG/DL (ref 2.5–4.9)
PLATELET # BLD AUTO: 20 K/UL (ref 135–450)
PLATELET # BLD AUTO: 85 K/UL (ref 135–450)
PMV BLD AUTO: 10.4 FL (ref 5–10.5)
PMV BLD AUTO: 8.5 FL (ref 5–10.5)
POIKILOCYTOSIS BLD QL SMEAR: ABNORMAL
POIKILOCYTOSIS BLD QL SMEAR: ABNORMAL
POLYCHROMASIA BLD QL SMEAR: ABNORMAL
POLYCHROMASIA BLD QL SMEAR: ABNORMAL
POTASSIUM SERPL-SCNC: 3.8 MMOL/L (ref 3.5–5.1)
PROT SERPL-MCNC: 6.5 G/DL (ref 6.4–8.2)
RBC # BLD AUTO: 3.18 M/UL (ref 4.2–5.9)
RBC # BLD AUTO: 3.23 M/UL (ref 4.2–5.9)
SCHISTOCYTES BLD QL SMEAR: ABNORMAL
SCHISTOCYTES BLD QL SMEAR: ABNORMAL
SODIUM SERPL-SCNC: 139 MMOL/L (ref 136–145)
SPHEROCYTES BLD QL SMEAR: ABNORMAL
TARGETS BLD QL SMEAR: ABNORMAL
URATE SERPL-MCNC: 6.1 MG/DL (ref 3.5–7.2)
WBC # BLD AUTO: 2.7 K/UL (ref 4–11)
WBC # BLD AUTO: 2.9 K/UL (ref 4–11)

## 2024-03-20 PROCEDURE — 86850 RBC ANTIBODY SCREEN: CPT

## 2024-03-20 PROCEDURE — 81379 HLA I TYPING COMPLETE HR: CPT | Performed by: STUDENT IN AN ORGANIZED HEALTH CARE EDUCATION/TRAINING PROGRAM

## 2024-03-20 PROCEDURE — 86832 HLA CLASS I HIGH DEFIN QUAL: CPT | Performed by: STUDENT IN AN ORGANIZED HEALTH CARE EDUCATION/TRAINING PROGRAM

## 2024-03-20 PROCEDURE — 83615 LACTATE (LD) (LDH) ENZYME: CPT

## 2024-03-20 PROCEDURE — 86901 BLOOD TYPING SEROLOGIC RH(D): CPT

## 2024-03-20 PROCEDURE — 2580000003 HC RX 258: Performed by: NURSE PRACTITIONER

## 2024-03-20 PROCEDURE — 85730 THROMBOPLASTIN TIME PARTIAL: CPT

## 2024-03-20 PROCEDURE — 85384 FIBRINOGEN ACTIVITY: CPT

## 2024-03-20 PROCEDURE — P9040 RBC LEUKOREDUCED IRRADIATED: HCPCS

## 2024-03-20 PROCEDURE — 2060000000 HC ICU INTERMEDIATE R&B

## 2024-03-20 PROCEDURE — 81382 HLA II TYPING 1 LOC HR: CPT | Performed by: STUDENT IN AN ORGANIZED HEALTH CARE EDUCATION/TRAINING PROGRAM

## 2024-03-20 PROCEDURE — 2580000003 HC RX 258: Performed by: INTERNAL MEDICINE

## 2024-03-20 PROCEDURE — 6370000000 HC RX 637 (ALT 250 FOR IP): Performed by: NURSE PRACTITIONER

## 2024-03-20 PROCEDURE — P9036 PLATELET PHERESIS IRRADIATED: HCPCS

## 2024-03-20 PROCEDURE — 36415 COLL VENOUS BLD VENIPUNCTURE: CPT

## 2024-03-20 PROCEDURE — 6370000000 HC RX 637 (ALT 250 FOR IP): Performed by: INTERNAL MEDICINE

## 2024-03-20 PROCEDURE — 84100 ASSAY OF PHOSPHORUS: CPT

## 2024-03-20 PROCEDURE — 36592 COLLECT BLOOD FROM PICC: CPT

## 2024-03-20 PROCEDURE — 84550 ASSAY OF BLOOD/URIC ACID: CPT

## 2024-03-20 PROCEDURE — 86833 HLA CLASS II HIGH DEFIN QUAL: CPT | Performed by: STUDENT IN AN ORGANIZED HEALTH CARE EDUCATION/TRAINING PROGRAM

## 2024-03-20 PROCEDURE — 83735 ASSAY OF MAGNESIUM: CPT

## 2024-03-20 PROCEDURE — 85025 COMPLETE CBC W/AUTO DIFF WBC: CPT

## 2024-03-20 PROCEDURE — 86923 COMPATIBILITY TEST ELECTRIC: CPT

## 2024-03-20 PROCEDURE — 86900 BLOOD TYPING SEROLOGIC ABO: CPT

## 2024-03-20 PROCEDURE — 6370000000 HC RX 637 (ALT 250 FOR IP): Performed by: STUDENT IN AN ORGANIZED HEALTH CARE EDUCATION/TRAINING PROGRAM

## 2024-03-20 PROCEDURE — 80076 HEPATIC FUNCTION PANEL: CPT

## 2024-03-20 PROCEDURE — 80048 BASIC METABOLIC PNL TOTAL CA: CPT

## 2024-03-20 PROCEDURE — 36430 TRANSFUSION BLD/BLD COMPNT: CPT

## 2024-03-20 RX ORDER — SODIUM CHLORIDE 9 MG/ML
INJECTION, SOLUTION INTRAVENOUS PRN
Status: DISCONTINUED | OUTPATIENT
Start: 2024-03-20 | End: 2024-04-02 | Stop reason: HOSPADM

## 2024-03-20 RX ADMIN — LEVETIRACETAM 1500 MG: 500 TABLET, FILM COATED ORAL at 20:53

## 2024-03-20 RX ADMIN — SODIUM CHLORIDE, PRESERVATIVE FREE 10 ML: 5 INJECTION INTRAVENOUS at 08:21

## 2024-03-20 RX ADMIN — PANTOPRAZOLE SODIUM 40 MG: 40 TABLET, DELAYED RELEASE ORAL at 16:08

## 2024-03-20 RX ADMIN — VALACYCLOVIR HYDROCHLORIDE 500 MG: 500 TABLET, FILM COATED ORAL at 20:54

## 2024-03-20 RX ADMIN — Medication: at 08:20

## 2024-03-20 RX ADMIN — PANTOPRAZOLE SODIUM 40 MG: 40 TABLET, DELAYED RELEASE ORAL at 06:58

## 2024-03-20 RX ADMIN — SODIUM CHLORIDE 15 ML: 900 IRRIGANT IRRIGATION at 20:53

## 2024-03-20 RX ADMIN — LEVOFLOXACIN 500 MG: 500 TABLET, FILM COATED ORAL at 20:54

## 2024-03-20 RX ADMIN — CYANOCOBALAMIN TAB 1000 MCG 1000 MCG: 1000 TAB at 08:18

## 2024-03-20 RX ADMIN — SODIUM CHLORIDE, PRESERVATIVE FREE 10 ML: 5 INJECTION INTRAVENOUS at 20:56

## 2024-03-20 RX ADMIN — SODIUM CHLORIDE, PRESERVATIVE FREE 10 ML: 5 INJECTION INTRAVENOUS at 20:53

## 2024-03-20 RX ADMIN — SODIUM CHLORIDE 15 ML: 900 IRRIGANT IRRIGATION at 06:58

## 2024-03-20 RX ADMIN — ALLOPURINOL 300 MG: 300 TABLET ORAL at 08:18

## 2024-03-20 RX ADMIN — VALACYCLOVIR HYDROCHLORIDE 500 MG: 500 TABLET, FILM COATED ORAL at 08:19

## 2024-03-20 RX ADMIN — LEVETIRACETAM 1500 MG: 500 TABLET, FILM COATED ORAL at 08:19

## 2024-03-20 RX ADMIN — Medication: at 20:54

## 2024-03-20 RX ADMIN — FLUCONAZOLE 200 MG: 200 TABLET ORAL at 08:19
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
## 2024-03-20 NOTE — CONSENT
Informed Consent for Blood Component Transfusion Note    I have discussed with the patient the rationale for blood component transfusion; its benefits in treating or preventing fatigue, organ damage, or death; and its risk which includes mild transfusion reactions, rare risk of blood borne infection, or more serious but rare reactions. I have discussed the alternatives to transfusion, including the risk and consequences of not receiving transfusion. The patient had an opportunity to ask questions and had agreed to proceed with transfusion of blood components.    Electronically signed by KATHLEEN Murray CNP on 3/20/24 at 9:03 AM EDT

## 2024-03-20 NOTE — PLAN OF CARE
Problem: Safety - Adult  Goal: Free from fall injury  3/20/2024 1621 by Padmini Shahid, RN  Outcome: Progressing  Flowsheets (Taken 3/20/2024 1621)  Free From Fall Injury:   Instruct family/caregiver on patient safety   Based on caregiver fall risk screen, instruct family/caregiver to ask for assistance with transferring infant if caregiver noted to have fall risk factors  Note: Orthostatic vital signs obtained at start of shift - see flowsheet for details.  Pt does not meet criteria for orthostasis.  Pt is a Med fall risk. See Apple Fall Score and ABCDS Injury Risk assessments.   - Screening for Orthostasis AND not a [place] Risk per APPLE/ABCDS: Pt bed is in low position, side rails up, call light and belongings are in reach.  Fall risk light is on outside pts room.  Pt encouraged to call for assistance as needed. Will continue with hourly rounds for PO intake, pain needs, toileting and repositioning as needed.      Problem: Hematologic - Adult  Goal: Maintains hematologic stability  3/20/2024 1621 by Padmini Shahid, RN  Outcome: Progressing  Flowsheets (Taken 3/20/2024 1621)  Maintains hematologic stability:   Assess for signs and symptoms of bleeding or hemorrhage   Monitor labs for bleeding or clotting disorders   Administer blood products/factors as ordered  Note: Patient's hemoglobin this AM:   Recent Labs     03/20/24  0501   HGB 7.5*     Patient's platelet count this AM:   Recent Labs     03/20/24  0501   PLT 20*    Thrombocytopenia Precautions in place.  Patient showing no signs or symptoms of active bleeding.  Patient transfused blood products per orders - see flowsheet.  Patient verbalizes understanding of all instructions. Will continue to assess and implement POC. Call light within reach and hourly rounding in place.      Problem: Infection - Adult  Goal: Absence of infection during hospitalization  Outcome: Progressing  Flowsheets (Taken 3/20/2024 1621)  Absence of infection during hospitalization:

## 2024-03-20 NOTE — PLAN OF CARE
Problem: Safety - Adult  Goal: Free from fall injury  3/20/2024 0237 by Ivette Bernard RN  Outcome: Progressing  Flowsheets (Taken 3/20/2024 0237)  Free From Fall Injury:   Instruct family/caregiver on patient safety   Based on caregiver fall risk screen, instruct family/caregiver to ask for assistance with transferring infant if caregiver noted to have fall risk factors  Outcome: Progressing  Problem: ABCDS Injury Assessment  Goal: Absence of physical injury  3/20/2024 0237 by Ivette Bernard RN  Flowsheets (Taken 3/20/2024 0237)  Absence of Physical Injury: Implement safety measures based on patient assessment     Problem: ABCDS Injury Assessment  Goal: Absence of physical injury  3/20/2024 0237 by Ivette Bernard RN  Flowsheets (Taken 3/20/2024 0237)  Absence of Physical Injury: Implement safety measures based on patient assessment  Free From Fall Injury:   Instruct family/caregiver on patient safety   Based on caregiver fall risk screen, instruct family/caregiver to ask for assistance with transferring infant if caregiver noted to have fall risk factors  Note: Orthostatic vital signs obtained at start of shift - see flowsheet for details.  Pt does not meet criteria for orthostasis.  Pt is a Med fall risk. See [place] Fall Score and ABCDS Injury Risk assessments.   - Screening for Orthostasis AND not a [place] Risk per JOSE/ABCDS: Pt bed is in low position, side rails up, call light and belongings are in reach.  Fall risk light is on outside pts room.  Pt encouraged to call for assistance as needed. Will continue with hourly rounds for PO intake, pain needs, toileting and repositioning as needed.

## 2024-03-21 ENCOUNTER — ANESTHESIA EVENT (OUTPATIENT)
Dept: ENDOSCOPY | Age: 44
End: 2024-03-21
Payer: MEDICAID

## 2024-03-21 LAB
ALBUMIN SERPL-MCNC: 4.1 G/DL (ref 3.4–5)
ALP SERPL-CCNC: 50 U/L (ref 40–129)
ALT SERPL-CCNC: 11 U/L (ref 10–40)
ANION GAP SERPL CALCULATED.3IONS-SCNC: 13 MMOL/L (ref 3–16)
ANISOCYTOSIS BLD QL SMEAR: ABNORMAL
ANISOCYTOSIS BLD QL SMEAR: ABNORMAL
APTT BLD: 30.7 SEC (ref 22.7–35.9)
AST SERPL-CCNC: 15 U/L (ref 15–37)
BASOPHILS # BLD: 0 K/UL (ref 0–0.2)
BASOPHILS # BLD: 0 K/UL (ref 0–0.2)
BASOPHILS NFR BLD: 0 %
BASOPHILS NFR BLD: 0 %
BILIRUB DIRECT SERPL-MCNC: <0.2 MG/DL (ref 0–0.3)
BILIRUB INDIRECT SERPL-MCNC: NORMAL MG/DL (ref 0–1)
BILIRUB SERPL-MCNC: 0.8 MG/DL (ref 0–1)
BLASTS NFR BLD MANUAL: 1 %
BLASTS NFR BLD MANUAL: 5 %
BUN SERPL-MCNC: 14 MG/DL (ref 7–20)
CALCIUM SERPL-MCNC: 9.6 MG/DL (ref 8.3–10.6)
CHLORIDE SERPL-SCNC: 101 MMOL/L (ref 99–110)
CO2 SERPL-SCNC: 25 MMOL/L (ref 21–32)
CREAT SERPL-MCNC: 0.9 MG/DL (ref 0.9–1.3)
DACRYOCYTES BLD QL SMEAR: ABNORMAL
DACRYOCYTES BLD QL SMEAR: ABNORMAL
DEPRECATED RDW RBC AUTO: 30.8 % (ref 12.4–15.4)
DEPRECATED RDW RBC AUTO: 31.6 % (ref 12.4–15.4)
EOSINOPHIL # BLD: 0 K/UL (ref 0–0.6)
EOSINOPHIL # BLD: 0.1 K/UL (ref 0–0.6)
EOSINOPHIL NFR BLD: 0 %
EOSINOPHIL NFR BLD: 3 %
FIBRINOGEN PPP-MCNC: 338 MG/DL (ref 243–550)
GFR SERPLBLD CREATININE-BSD FMLA CKD-EPI: >60 ML/MIN/{1.73_M2}
GLUCOSE SERPL-MCNC: 86 MG/DL (ref 70–99)
HCT VFR BLD AUTO: 23.7 % (ref 40.5–52.5)
HCT VFR BLD AUTO: 25 % (ref 40.5–52.5)
HGB BLD-MCNC: 7.5 G/DL (ref 13.5–17.5)
HGB BLD-MCNC: 7.9 G/DL (ref 13.5–17.5)
HYPOCHROMIA BLD QL SMEAR: ABNORMAL
HYPOCHROMIA BLD QL SMEAR: ABNORMAL
LDH SERPL L TO P-CCNC: 550 U/L (ref 100–190)
LYMPHOCYTES # BLD: 1.4 K/UL (ref 1–5.1)
LYMPHOCYTES # BLD: 1.7 K/UL (ref 1–5.1)
LYMPHOCYTES NFR BLD: 48 %
LYMPHOCYTES NFR BLD: 62 %
Lab: NORMAL
MACROCYTES BLD QL SMEAR: ABNORMAL
MACROCYTES BLD QL SMEAR: ABNORMAL
MAGNESIUM SERPL-MCNC: 1.6 MG/DL (ref 1.8–2.4)
MCH RBC QN AUTO: 23.1 PG (ref 26–34)
MCH RBC QN AUTO: 23.8 PG (ref 26–34)
MCHC RBC AUTO-ENTMCNC: 31.5 G/DL (ref 31–36)
MCHC RBC AUTO-ENTMCNC: 31.8 G/DL (ref 31–36)
MCV RBC AUTO: 73.4 FL (ref 80–100)
MCV RBC AUTO: 74.6 FL (ref 80–100)
METAMYELOCYTES NFR BLD MANUAL: 1 %
MICROCYTES BLD QL SMEAR: ABNORMAL
MICROCYTES BLD QL SMEAR: ABNORMAL
MONOCYTES # BLD: 0 K/UL (ref 0–1.3)
MONOCYTES # BLD: 0.1 K/UL (ref 0–1.3)
MONOCYTES NFR BLD: 1 %
MONOCYTES NFR BLD: 4 %
MYELOCYTES NFR BLD MANUAL: 5 %
NEUTROPHILS # BLD: 0.8 K/UL (ref 1.7–7.7)
NEUTROPHILS # BLD: 1.3 K/UL (ref 1.7–7.7)
NEUTROPHILS NFR BLD: 30 %
NEUTROPHILS NFR BLD: 38 %
NEUTS BAND NFR BLD MANUAL: 1 % (ref 0–7)
NRBC BLD-RTO: 1 /100 WBC
NRBC BLD-RTO: 2 /100 WBC
PATH INTERP BLD-IMP: NO
PATH INTERP BLD-IMP: NO
PHOSPHATE SERPL-MCNC: 4.9 MG/DL (ref 2.5–4.9)
PLATELET # BLD AUTO: 78 K/UL (ref 135–450)
PLATELET # BLD AUTO: 91 K/UL (ref 135–450)
PLATELET BLD QL SMEAR: ABNORMAL
PLATELET BLD QL SMEAR: ABNORMAL
PMV BLD AUTO: 10.7 FL (ref 5–10.5)
PMV BLD AUTO: 10.8 FL (ref 5–10.5)
POLYCHROMASIA BLD QL SMEAR: ABNORMAL
POTASSIUM SERPL-SCNC: 3.9 MMOL/L (ref 3.5–5.1)
PROT SERPL-MCNC: 6.6 G/DL (ref 6.4–8.2)
RBC # BLD AUTO: 3.18 M/UL (ref 4.2–5.9)
RBC # BLD AUTO: 3.41 M/UL (ref 4.2–5.9)
REPORT: NORMAL
SCHISTOCYTES BLD QL SMEAR: ABNORMAL
SCHISTOCYTES BLD QL SMEAR: ABNORMAL
SLIDE REVIEW: ABNORMAL
SMUDGE CELLS BLD QL SMEAR: PRESENT
SODIUM SERPL-SCNC: 139 MMOL/L (ref 136–145)
THIS TEST SENT TO: NORMAL
URATE SERPL-MCNC: 6.1 MG/DL (ref 3.5–7.2)
VARIANT LYMPHS NFR BLD MANUAL: 1 % (ref 0–6)
WBC # BLD AUTO: 2.7 K/UL (ref 4–11)
WBC # BLD AUTO: 2.9 K/UL (ref 4–11)

## 2024-03-21 PROCEDURE — 83735 ASSAY OF MAGNESIUM: CPT

## 2024-03-21 PROCEDURE — 85730 THROMBOPLASTIN TIME PARTIAL: CPT

## 2024-03-21 PROCEDURE — 85025 COMPLETE CBC W/AUTO DIFF WBC: CPT

## 2024-03-21 PROCEDURE — 80048 BASIC METABOLIC PNL TOTAL CA: CPT

## 2024-03-21 PROCEDURE — 6370000000 HC RX 637 (ALT 250 FOR IP): Performed by: INTERNAL MEDICINE

## 2024-03-21 PROCEDURE — 83615 LACTATE (LD) (LDH) ENZYME: CPT

## 2024-03-21 PROCEDURE — 2060000000 HC ICU INTERMEDIATE R&B

## 2024-03-21 PROCEDURE — 85384 FIBRINOGEN ACTIVITY: CPT

## 2024-03-21 PROCEDURE — 84550 ASSAY OF BLOOD/URIC ACID: CPT

## 2024-03-21 PROCEDURE — 80076 HEPATIC FUNCTION PANEL: CPT

## 2024-03-21 PROCEDURE — 2580000003 HC RX 258: Performed by: NURSE PRACTITIONER

## 2024-03-21 PROCEDURE — 2580000003 HC RX 258: Performed by: INTERNAL MEDICINE

## 2024-03-21 PROCEDURE — 6370000000 HC RX 637 (ALT 250 FOR IP): Performed by: NURSE PRACTITIONER

## 2024-03-21 PROCEDURE — 84100 ASSAY OF PHOSPHORUS: CPT

## 2024-03-21 PROCEDURE — 36592 COLLECT BLOOD FROM PICC: CPT

## 2024-03-21 RX ADMIN — SODIUM CHLORIDE, PRESERVATIVE FREE 10 ML: 5 INJECTION INTRAVENOUS at 19:56

## 2024-03-21 RX ADMIN — VALACYCLOVIR HYDROCHLORIDE 500 MG: 500 TABLET, FILM COATED ORAL at 08:42

## 2024-03-21 RX ADMIN — LEVETIRACETAM 1500 MG: 500 TABLET, FILM COATED ORAL at 08:41

## 2024-03-21 RX ADMIN — POLYETHYLENE GLYCOL-3350 AND ELECTROLYTES 4000 ML: 236; 6.74; 5.86; 2.97; 22.74 POWDER, FOR SOLUTION ORAL at 14:14

## 2024-03-21 RX ADMIN — Medication: at 19:56

## 2024-03-21 RX ADMIN — LEVOFLOXACIN 500 MG: 500 TABLET, FILM COATED ORAL at 19:54

## 2024-03-21 RX ADMIN — VALACYCLOVIR HYDROCHLORIDE 500 MG: 500 TABLET, FILM COATED ORAL at 19:54

## 2024-03-21 RX ADMIN — PANTOPRAZOLE SODIUM 40 MG: 40 TABLET, DELAYED RELEASE ORAL at 15:14

## 2024-03-21 RX ADMIN — Medication: at 08:43

## 2024-03-21 RX ADMIN — SODIUM CHLORIDE, PRESERVATIVE FREE 10 ML: 5 INJECTION INTRAVENOUS at 08:44

## 2024-03-21 RX ADMIN — PANTOPRAZOLE SODIUM 40 MG: 40 TABLET, DELAYED RELEASE ORAL at 06:58

## 2024-03-21 RX ADMIN — CYANOCOBALAMIN TAB 1000 MCG 1000 MCG: 1000 TAB at 08:42

## 2024-03-21 RX ADMIN — LEVETIRACETAM 1500 MG: 500 TABLET, FILM COATED ORAL at 19:54

## 2024-03-21 RX ADMIN — FLUCONAZOLE 200 MG: 200 TABLET ORAL at 08:42

## 2024-03-21 NOTE — PLAN OF CARE
Problem: Safety - Adult  Goal: Free from fall injury  3/21/2024 0512 by Ivette Bernard RN  Outcome: Progressing  Flowsheets (Taken 3/21/2024 0512)  Free From Fall Injury:   Instruct family/caregiver on patient safety   Based on caregiver fall risk screen, instruct family/caregiver to ask for assistance with   Outcome: Progressing  Flowsheets (Taken 3/20/2024 1621)  Free From Fall Injury:   Instruct family/caregiver on patient safety   Based on caregiver fall risk screen, instruct family/caregiver to ask for assistance with transferring infant if caregiver noted to have fall risk factors  Note: Orthostatic vital signs obtained at start of shift - see flowsheet for details.  Pt does not meet criteria for orthostasis.  Pt is a Med fall risk. See Jose Fall Score and ABCDS Injury Risk assessments.   - Screening for Orthostasis AND not a [place] Risk per JOSE/ABCDS: Pt bed is in low position, side rails up, call light and belongings are in reach.  Fall risk light is on outside pts room.  Pt encouraged to call for assistance as needed. Will continue care as planned  Problem: ABCDS Injury Assessment  Goal: Absence of physical injury  Outcome: Progressing

## 2024-03-21 NOTE — PLAN OF CARE
Problem: Safety - Adult  Goal: Free from fall injury  3/21/2024 1446 by Lillian Short, RN  Outcome: Progressing  Flowsheets (Taken 3/21/2024 1446)  Free From Fall Injury: Instruct family/caregiver on patient safety  Note: Orthostatic vital signs obtained at start of shift - see flowsheet for details.  Pt does not meet criteria for orthostasis.  Pt is a Med fall risk. See Apple Fall Score and ABCDS Injury Risk assessments.     - Screening for Orthostasis AND not a [place] Risk per APPLE/ABCDS: Pt bed is in low position, side rails up, call light and belongings are in reach.  Fall risk light is on outside pts room.  Pt encouraged to call for assistance as needed. Will continue with hourly rounds for PO intake, pain needs, toileting and repositioning as needed.      Problem: Respiratory - Adult  Goal: Achieves optimal ventilation and oxygenation  Outcome: Progressing  Flowsheets (Taken 3/14/2024 1937 by Hayley Knowles, RN)  Achieves optimal ventilation and oxygenation:   Assess for changes in respiratory status   Assess for changes in mentation and behavior  Note: WNL, started prep at 2pm     Problem: Nutrition Deficit:  Goal: Optimize nutritional status  Outcome: Progressing  Note: Continued with Clear liquid diet for coloscopy

## 2024-03-22 ENCOUNTER — ANESTHESIA (OUTPATIENT)
Dept: ENDOSCOPY | Age: 44
End: 2024-03-22
Payer: MEDICAID

## 2024-03-22 LAB
ALBUMIN SERPL-MCNC: 4 G/DL (ref 3.4–5)
ALP SERPL-CCNC: 44 U/L (ref 40–129)
ALT SERPL-CCNC: 11 U/L (ref 10–40)
ANION GAP SERPL CALCULATED.3IONS-SCNC: 10 MMOL/L (ref 3–16)
ANISOCYTOSIS BLD QL SMEAR: ABNORMAL
ANISOCYTOSIS BLD QL SMEAR: ABNORMAL
APTT BLD: 34 SEC (ref 22.7–35.9)
AST SERPL-CCNC: 15 U/L (ref 15–37)
BASOPHILS # BLD: 0 K/UL (ref 0–0.2)
BASOPHILS # BLD: 0 K/UL (ref 0–0.2)
BASOPHILS NFR BLD: 0 %
BASOPHILS NFR BLD: 0 %
BILIRUB DIRECT SERPL-MCNC: <0.2 MG/DL (ref 0–0.3)
BILIRUB INDIRECT SERPL-MCNC: NORMAL MG/DL (ref 0–1)
BILIRUB SERPL-MCNC: 0.9 MG/DL (ref 0–1)
BLASTS NFR BLD MANUAL: 1 %
BUN SERPL-MCNC: 9 MG/DL (ref 7–20)
CALCIUM SERPL-MCNC: 9.8 MG/DL (ref 8.3–10.6)
CHLORIDE SERPL-SCNC: 101 MMOL/L (ref 99–110)
CO2 SERPL-SCNC: 27 MMOL/L (ref 21–32)
CREAT SERPL-MCNC: 0.9 MG/DL (ref 0.9–1.3)
DACRYOCYTES BLD QL SMEAR: ABNORMAL
DEPRECATED RDW RBC AUTO: 33.2 % (ref 12.4–15.4)
DEPRECATED RDW RBC AUTO: 33.3 % (ref 12.4–15.4)
EOSINOPHIL # BLD: 0 K/UL (ref 0–0.6)
EOSINOPHIL # BLD: 0 K/UL (ref 0–0.6)
EOSINOPHIL NFR BLD: 1 %
EOSINOPHIL NFR BLD: 2 %
FIBRINOGEN PPP-MCNC: 320 MG/DL (ref 243–550)
GFR SERPLBLD CREATININE-BSD FMLA CKD-EPI: >60 ML/MIN/{1.73_M2}
GLUCOSE SERPL-MCNC: 87 MG/DL (ref 70–99)
HCT VFR BLD AUTO: 22.8 % (ref 40.5–52.5)
HCT VFR BLD AUTO: 23.9 % (ref 40.5–52.5)
HGB BLD-MCNC: 7.4 G/DL (ref 13.5–17.5)
HGB BLD-MCNC: 7.7 G/DL (ref 13.5–17.5)
HYPOCHROMIA BLD QL SMEAR: ABNORMAL
HYPOCHROMIA BLD QL SMEAR: ABNORMAL
LDH SERPL L TO P-CCNC: 507 U/L (ref 100–190)
LYMPHOCYTES # BLD: 1.1 K/UL (ref 1–5.1)
LYMPHOCYTES # BLD: 1.5 K/UL (ref 1–5.1)
LYMPHOCYTES NFR BLD: 51 %
LYMPHOCYTES NFR BLD: 65 %
MAGNESIUM SERPL-MCNC: 1.7 MG/DL (ref 1.8–2.4)
MCH RBC QN AUTO: 23.4 PG (ref 26–34)
MCH RBC QN AUTO: 23.5 PG (ref 26–34)
MCHC RBC AUTO-ENTMCNC: 32.3 G/DL (ref 31–36)
MCHC RBC AUTO-ENTMCNC: 32.3 G/DL (ref 31–36)
MCV RBC AUTO: 72.3 FL (ref 80–100)
MCV RBC AUTO: 72.7 FL (ref 80–100)
METAMYELOCYTES NFR BLD MANUAL: 1 %
METAMYELOCYTES NFR BLD MANUAL: 4 %
MICROCYTES BLD QL SMEAR: ABNORMAL
MICROCYTES BLD QL SMEAR: ABNORMAL
MONOCYTES # BLD: 0.2 K/UL (ref 0–1.3)
MONOCYTES # BLD: 0.2 K/UL (ref 0–1.3)
MONOCYTES NFR BLD: 11 %
MONOCYTES NFR BLD: 7 %
MYELOCYTES NFR BLD MANUAL: 2 %
MYELOCYTES NFR BLD MANUAL: 4 %
NEUTROPHILS # BLD: 0.6 K/UL (ref 1.7–7.7)
NEUTROPHILS # BLD: 0.8 K/UL (ref 1.7–7.7)
NEUTROPHILS NFR BLD: 18 %
NEUTROPHILS NFR BLD: 30 %
NEUTS BAND NFR BLD MANUAL: 1 % (ref 0–7)
NRBC BLD-RTO: 6 /100 WBC
NRBC BLD-RTO: 7 /100 WBC
OVALOCYTES BLD QL SMEAR: ABNORMAL
PATH INTERP BLD-IMP: NO
PHOSPHATE SERPL-MCNC: 5.2 MG/DL (ref 2.5–4.9)
PLATELET # BLD AUTO: 67 K/UL (ref 135–450)
PLATELET # BLD AUTO: 68 K/UL (ref 135–450)
PLATELET BLD QL SMEAR: ABNORMAL
PMV BLD AUTO: 10.8 FL (ref 5–10.5)
PMV BLD AUTO: 10.8 FL (ref 5–10.5)
POIKILOCYTOSIS BLD QL SMEAR: ABNORMAL
POIKILOCYTOSIS BLD QL SMEAR: ABNORMAL
POTASSIUM SERPL-SCNC: 3.7 MMOL/L (ref 3.5–5.1)
PROMYELOCYTES NFR BLD MANUAL: 2 %
PROT SERPL-MCNC: 6.5 G/DL (ref 6.4–8.2)
RBC # BLD AUTO: 3.15 M/UL (ref 4.2–5.9)
RBC # BLD AUTO: 3.29 M/UL (ref 4.2–5.9)
SCHISTOCYTES BLD QL SMEAR: ABNORMAL
SODIUM SERPL-SCNC: 138 MMOL/L (ref 136–145)
URATE SERPL-MCNC: 6.6 MG/DL (ref 3.5–7.2)
WBC # BLD AUTO: 2.1 K/UL (ref 4–11)
WBC # BLD AUTO: 2.3 K/UL (ref 4–11)

## 2024-03-22 PROCEDURE — 85025 COMPLETE CBC W/AUTO DIFF WBC: CPT

## 2024-03-22 PROCEDURE — 0DBP8ZX EXCISION OF RECTUM, VIA NATURAL OR ARTIFICIAL OPENING ENDOSCOPIC, DIAGNOSTIC: ICD-10-PCS | Performed by: INTERNAL MEDICINE

## 2024-03-22 PROCEDURE — 6370000000 HC RX 637 (ALT 250 FOR IP): Performed by: NURSE PRACTITIONER

## 2024-03-22 PROCEDURE — 2709999900 HC NON-CHARGEABLE SUPPLY: Performed by: INTERNAL MEDICINE

## 2024-03-22 PROCEDURE — 0DBK8ZZ EXCISION OF ASCENDING COLON, VIA NATURAL OR ARTIFICIAL OPENING ENDOSCOPIC: ICD-10-PCS | Performed by: INTERNAL MEDICINE

## 2024-03-22 PROCEDURE — 3700000000 HC ANESTHESIA ATTENDED CARE: Performed by: INTERNAL MEDICINE

## 2024-03-22 PROCEDURE — 80076 HEPATIC FUNCTION PANEL: CPT

## 2024-03-22 PROCEDURE — 88305 TISSUE EXAM BY PATHOLOGIST: CPT

## 2024-03-22 PROCEDURE — 3700000001 HC ADD 15 MINUTES (ANESTHESIA): Performed by: INTERNAL MEDICINE

## 2024-03-22 PROCEDURE — 1200000000 HC SEMI PRIVATE

## 2024-03-22 PROCEDURE — 2580000003 HC RX 258

## 2024-03-22 PROCEDURE — 3609010600 HC COLONOSCOPY POLYPECTOMY SNARE/COLD BIOPSY: Performed by: INTERNAL MEDICINE

## 2024-03-22 PROCEDURE — 7100000011 HC PHASE II RECOVERY - ADDTL 15 MIN: Performed by: INTERNAL MEDICINE

## 2024-03-22 PROCEDURE — 85730 THROMBOPLASTIN TIME PARTIAL: CPT

## 2024-03-22 PROCEDURE — 85384 FIBRINOGEN ACTIVITY: CPT

## 2024-03-22 PROCEDURE — 83615 LACTATE (LD) (LDH) ENZYME: CPT

## 2024-03-22 PROCEDURE — 88341 IMHCHEM/IMCYTCHM EA ADD ANTB: CPT

## 2024-03-22 PROCEDURE — 6360000002 HC RX W HCPCS

## 2024-03-22 PROCEDURE — 83735 ASSAY OF MAGNESIUM: CPT

## 2024-03-22 PROCEDURE — 2580000003 HC RX 258: Performed by: NURSE PRACTITIONER

## 2024-03-22 PROCEDURE — 88342 IMHCHEM/IMCYTCHM 1ST ANTB: CPT

## 2024-03-22 PROCEDURE — 6370000000 HC RX 637 (ALT 250 FOR IP): Performed by: INTERNAL MEDICINE

## 2024-03-22 PROCEDURE — 0DBK8ZX EXCISION OF ASCENDING COLON, VIA NATURAL OR ARTIFICIAL OPENING ENDOSCOPIC, DIAGNOSTIC: ICD-10-PCS | Performed by: INTERNAL MEDICINE

## 2024-03-22 PROCEDURE — 2060000000 HC ICU INTERMEDIATE R&B

## 2024-03-22 PROCEDURE — 80048 BASIC METABOLIC PNL TOTAL CA: CPT

## 2024-03-22 PROCEDURE — 2580000003 HC RX 258: Performed by: INTERNAL MEDICINE

## 2024-03-22 PROCEDURE — 7100000010 HC PHASE II RECOVERY - FIRST 15 MIN: Performed by: INTERNAL MEDICINE

## 2024-03-22 PROCEDURE — 36592 COLLECT BLOOD FROM PICC: CPT

## 2024-03-22 PROCEDURE — 0DBL8ZZ EXCISION OF TRANSVERSE COLON, VIA NATURAL OR ARTIFICIAL OPENING ENDOSCOPIC: ICD-10-PCS | Performed by: INTERNAL MEDICINE

## 2024-03-22 PROCEDURE — 84100 ASSAY OF PHOSPHORUS: CPT

## 2024-03-22 PROCEDURE — 84550 ASSAY OF BLOOD/URIC ACID: CPT

## 2024-03-22 RX ORDER — SODIUM CHLORIDE, SODIUM LACTATE, POTASSIUM CHLORIDE, CALCIUM CHLORIDE 600; 310; 30; 20 MG/100ML; MG/100ML; MG/100ML; MG/100ML
INJECTION, SOLUTION INTRAVENOUS CONTINUOUS PRN
Status: DISCONTINUED | OUTPATIENT
Start: 2024-03-22 | End: 2024-03-22 | Stop reason: SDUPTHER

## 2024-03-22 RX ORDER — LIDOCAINE HYDROCHLORIDE 20 MG/ML
INJECTION, SOLUTION INTRAVENOUS PRN
Status: DISCONTINUED | OUTPATIENT
Start: 2024-03-22 | End: 2024-03-22 | Stop reason: SDUPTHER

## 2024-03-22 RX ORDER — PROPOFOL 10 MG/ML
INJECTION, EMULSION INTRAVENOUS CONTINUOUS PRN
Status: DISCONTINUED | OUTPATIENT
Start: 2024-03-22 | End: 2024-03-22 | Stop reason: SDUPTHER

## 2024-03-22 RX ORDER — PROPOFOL 10 MG/ML
INJECTION, EMULSION INTRAVENOUS PRN
Status: DISCONTINUED | OUTPATIENT
Start: 2024-03-22 | End: 2024-03-22 | Stop reason: SDUPTHER

## 2024-03-22 RX ADMIN — PROPOFOL 150 MCG/KG/MIN: 10 INJECTION, EMULSION INTRAVENOUS at 14:17

## 2024-03-22 RX ADMIN — PANTOPRAZOLE SODIUM 40 MG: 40 TABLET, DELAYED RELEASE ORAL at 08:11

## 2024-03-22 RX ADMIN — PANTOPRAZOLE SODIUM 40 MG: 40 TABLET, DELAYED RELEASE ORAL at 15:27

## 2024-03-22 RX ADMIN — SODIUM CHLORIDE, SODIUM LACTATE, POTASSIUM CHLORIDE, AND CALCIUM CHLORIDE: .6; .31; .03; .02 INJECTION, SOLUTION INTRAVENOUS at 14:06

## 2024-03-22 RX ADMIN — SODIUM CHLORIDE, PRESERVATIVE FREE 10 ML: 5 INJECTION INTRAVENOUS at 08:13

## 2024-03-22 RX ADMIN — LEVETIRACETAM 1500 MG: 500 TABLET, FILM COATED ORAL at 08:11

## 2024-03-22 RX ADMIN — FLUCONAZOLE 200 MG: 200 TABLET ORAL at 08:11

## 2024-03-22 RX ADMIN — CYANOCOBALAMIN TAB 1000 MCG 1000 MCG: 1000 TAB at 08:11

## 2024-03-22 RX ADMIN — VALACYCLOVIR HYDROCHLORIDE 500 MG: 500 TABLET, FILM COATED ORAL at 08:11

## 2024-03-22 RX ADMIN — LIDOCAINE HYDROCHLORIDE 100 MG: 20 INJECTION, SOLUTION INTRAVENOUS at 14:17

## 2024-03-22 RX ADMIN — SODIUM CHLORIDE 15 ML: 900 IRRIGANT IRRIGATION at 21:20

## 2024-03-22 RX ADMIN — SODIUM CHLORIDE, PRESERVATIVE FREE 10 ML: 5 INJECTION INTRAVENOUS at 21:18

## 2024-03-22 RX ADMIN — PROPOFOL 100 MG: 10 INJECTION, EMULSION INTRAVENOUS at 14:17

## 2024-03-22 RX ADMIN — VALACYCLOVIR HYDROCHLORIDE 500 MG: 500 TABLET, FILM COATED ORAL at 21:18

## 2024-03-22 RX ADMIN — SODIUM CHLORIDE, PRESERVATIVE FREE 10 ML: 5 INJECTION INTRAVENOUS at 08:12

## 2024-03-22 RX ADMIN — LEVOFLOXACIN 500 MG: 500 TABLET, FILM COATED ORAL at 21:18

## 2024-03-22 RX ADMIN — LEVETIRACETAM 1500 MG: 500 TABLET, FILM COATED ORAL at 21:18

## 2024-03-22 RX ADMIN — Medication: at 08:12

## 2024-03-22 RX ADMIN — Medication: at 21:19

## 2024-03-22 ASSESSMENT — PAIN SCALES - GENERAL
PAINLEVEL_OUTOF10: 0
PAINLEVEL_OUTOF10: 0

## 2024-03-22 ASSESSMENT — PAIN - FUNCTIONAL ASSESSMENT: PAIN_FUNCTIONAL_ASSESSMENT: NONE - DENIES PAIN

## 2024-03-22 NOTE — PLAN OF CARE
Problem: Safety - Adult  Goal: Free from fall injury  Outcome: Progressing  Flowsheets (Taken 3/22/2024 6363)  Free From Fall Injury: Instruct family/caregiver on patient safety  Note: Orthostatic vital signs obtained at start of shift - see flowsheet for details.  Pt does not meet criteria for orthostasis.  Pt is a Med fall risk. See Apple Fall Score and ABCDS Injury Risk assessments.     - Screening for Orthostasis AND not a [place] Risk per APPLE/ABCDS: Pt bed is in low position, side rails up, call light and belongings are in reach.  Fall risk light is on outside pts room.  Pt encouraged to call for assistance as needed. Will continue with hourly rounds for PO intake, pain needs, toileting and repositioning as needed.      Problem: Respiratory - Adult  Goal: Achieves optimal ventilation and oxygenation  Outcome: Progressing  Note: WNL     Problem: Gastrointestinal - Adult  Goal: Maintains or returns to baseline bowel function  Outcome: Progressing  Note: Completed prep, completed colonoscopy      Problem: Pain  Goal: Verbalizes/displays adequate comfort level or baseline comfort level  Outcome: Progressing  Note: WNL

## 2024-03-22 NOTE — PROCEDURES
[facility]  Colonoscopy Note    Patient: Dennys Peguero  : 1980  Acct#:     Procedure: Colonoscopy with biopsy, polypectomy (hot snare)    Date:  3/22/2024    Surgeon:  ADRIÁN SHIRLEY MD    Referring Physician:  Lei Tiwari MD    Anesthesia: IV propofol, per anesthesia    EBL: <50 mL    Indications: Hematochezia, iron deficiency anemia    Procedure:     An informed consent was obtained from the patient after explanation of indications, benefits, possible risks and complications of the procedure.  The patient was then taken to the endoscopy suite, placed in the left lateral decubitus position, and the above IV anesthesia was administered.    A digital rectal examination was performed and revealed negative without mass, lesions or tenderness.      The Olympus PCFQ-H190 video colonoscope was placed in the patient's rectum under digital direction and advanced to the cecum. The cecum was identified by characteristic anatomy and ballottment.  The prep was fair.      Findings:  A 4 mm semipedunculated polyp overlying the ileocecal valve removed via hot snare polypectomy.  A 3 mm sessile polyp in the ascending colon removed via cold snare polypectomy  A 10 mm pedunculated polyp in the transverse colon removed via hot snare polypectomy.  A malignant appearing tumor measuring 4 cm in the proximal rectum, 10 cm from the anal verge.  The tumor had central depression with contact bleeding.  Biopsied      The scope was then withdrawn into the rectum and retroflexed.  The retroflexed view of the anal verge and rectum demonstrates small hemorrhoids.     The scope was straightened, the colon was decompressed and the scope was withdrawn from the patient.      The patient tolerated the procedure well and was taken to the PACU in good condition.    Biopsies:  yes      Impression:   A 4 mm semipedunculated polyp overlying the ileocecal valve removed via hot snare polypectomy.  A 3 mm sessile polyp

## 2024-03-22 NOTE — ANESTHESIA POSTPROCEDURE EVALUATION
Department of Anesthesiology  Postprocedure Note    Patient: Dennys Peguero  MRN: [id number]  YOB: 1980  Date of evaluation: 3/22/2024    Procedure Summary       Date: 03/22/24 Room / Location: [place] [id number] / [facility]    Anesthesia Start: 1410 Anesthesia Stop: 1440    Procedure: COLONOSCOPY POLYPECTOMY HOT SNARE/COLD BIOPSY Diagnosis:       Hematochezia      (Hematochezia [K92.1])    Surgeons: Marcus Uribe MD Responsible Provider: Liu Mercado MD    Anesthesia Type: MAC ASA Status: 3            Anesthesia Type: No value filed.    Lopez Phase I: Lopez Score: 10    Lopez Phase II: Lopez Score: 8    Anesthesia Post Evaluation    Patient location during evaluation: PACU  Patient participation: complete - patient participated  Level of consciousness: awake  Pain score: 0  Airway patency: patent  Nausea & Vomiting: no nausea  Cardiovascular status: hemodynamically stable  Respiratory status: acceptable  Hydration status: stable  Pain management: satisfactory to patient    No notable events documented.

## 2024-03-22 NOTE — PLAN OF CARE
Problem: Safety - Adult  Goal: Free from fall injury  3/21/2024 2242 by Courtney Graves, RN  Outcome: Progressing  Pt in bed with bed alarm on, instructed to call for assistance. Verbalized understanding. All fall precautions in place. Orthostatic vital signs obtained at start of shift - see flowsheet for details.  Pt does not meet criteria for orthostasis.  Pt is a Med fall risk. See Apple Fall Score and ABCDS Injury Risk assessments.   - Screening for Orthostasis AND not a [place] Risk per APPLE/ABCDS: Pt bed is in low position, side rails up, call light and belongings are in reach.  Fall risk light is on outside pts room.  Pt encouraged to call for assistance as needed. Will continue with hourly rounds for PO intake, pain needs, toileting and repositioning as needed.      Problem: Hematologic - Adult  Goal: Maintains hematologic stability  Outcome: Progressing  Patient's hemoglobin this AM:   Recent Labs     03/22/24  0332   HGB 7.4*     Patient's platelet count this AM:   Recent Labs     03/22/24  0332   PLT 68*    Thrombocytopenia Precautions in place.  Patient showing no signs or symptoms of active bleeding.  Transfusion not indicated at this time.  Patient verbalizes understanding of all instructions. Will continue to assess and implement POC. Call light within reach and hourly rounding in place.      Problem: Respiratory - Adult  Goal: Achieves optimal ventilation and oxygenation  3/21/2024 2242 by Courtney Graves, RN  Outcome: Progressing  Pt tolerated RA throughout the shift.      Problem: Pain  Goal: Verbalizes/displays adequate comfort level or baseline comfort level  Outcome: Progressing  Pt complained of no pain throughout the shift.      Problem: Infection - Adult  Goal: Absence of infection during hospitalization  Outcome: Progressing  Pt was afebrile throughout the shift.

## 2024-03-22 NOTE — ANESTHESIA PRE PROCEDURE
03:32 AM    HGB 7.4 03/22/2024 03:32 AM    HCT 22.8 03/22/2024 03:32 AM    MCV 72.3 03/22/2024 03:32 AM    RDW 33.3 03/22/2024 03:32 AM    PLT 68 03/22/2024 03:32 AM       CMP:   Lab Results   Component Value Date/Time     03/22/2024 03:32 AM    K 3.7 03/22/2024 03:32 AM    K 3.9 03/06/2024 06:05 PM     03/22/2024 03:32 AM    CO2 27 03/22/2024 03:32 AM    BUN 9 03/22/2024 03:32 AM    CREATININE 0.9 03/22/2024 03:32 AM    AGRATIO 1.6 03/07/2024 04:24 AM    LABGLOM >60 03/22/2024 03:32 AM    GLUCOSE 87 03/22/2024 03:32 AM    PROT 6.5 03/22/2024 03:32 AM    CALCIUM 9.8 03/22/2024 03:32 AM    BILITOT 0.9 03/22/2024 03:32 AM    ALKPHOS 44 03/22/2024 03:32 AM    AST 15 03/22/2024 03:32 AM    ALT 11 03/22/2024 03:32 AM       POC Tests: No results for input(s): \"POCGLU\", \"POCNA\", \"POCK\", \"POCCL\", \"POCBUN\", \"POCHEMO\", \"POCHCT\" in the last 72 hours.    Coags:   Lab Results   Component Value Date/Time    PROTIME 15.4 03/15/2024 03:40 AM    INR 1.22 03/15/2024 03:40 AM    APTT 34.0 03/22/2024 03:31 AM       HCG (If Applicable): No results found for: \"PREGTESTUR\", \"PREGSERUM\", \"HCG\", \"HCGQUANT\"     ABGs: No results found for: \"PHART\", \"PO2ART\", \"KDN0HYG\", \"QUV4IKG\", \"BEART\", \"S0VTKLSZ\"     Type & Screen (If Applicable):  No results found for: \"LABABO\", \"LABRH\"    Drug/Infectious Status (If Applicable):  No results found for: \"HIV\", \"HEPCAB\"    COVID-19 Screening (If Applicable): No results found for: \"COVID19\"        Anesthesia Evaluation  Patient summary reviewed and Nursing notes reviewed  Airway: Mallampati: III  TM distance: >3 FB   Neck ROM: full  Mouth opening: > = 3 FB   Dental: normal exam         Pulmonary:Negative Pulmonary ROS and normal exam                               Cardiovascular:  Exercise tolerance: no interval change              Echocardiogram reviewed               ROS comment: ECHO 3/24    Normal left ventricle size, wall thickness, and systolic function with an estimated ejection fraction of

## 2024-03-23 ENCOUNTER — APPOINTMENT (OUTPATIENT)
Dept: CT IMAGING | Age: 44
DRG: 690 | End: 2024-03-23
Attending: STUDENT IN AN ORGANIZED HEALTH CARE EDUCATION/TRAINING PROGRAM
Payer: MEDICAID

## 2024-03-23 LAB
ALBUMIN SERPL-MCNC: 4.4 G/DL (ref 3.4–5)
ALP SERPL-CCNC: 49 U/L (ref 40–129)
ALT SERPL-CCNC: 10 U/L (ref 10–40)
ANION GAP SERPL CALCULATED.3IONS-SCNC: 12 MMOL/L (ref 3–16)
ANISOCYTOSIS BLD QL SMEAR: ABNORMAL
ANISOCYTOSIS BLD QL SMEAR: ABNORMAL
APTT BLD: 34.2 SEC (ref 22.7–35.9)
AST SERPL-CCNC: 14 U/L (ref 15–37)
BASO STIPL BLD QL SMEAR: ABNORMAL
BASOPHILS # BLD: 0 K/UL (ref 0–0.2)
BASOPHILS # BLD: 0 K/UL (ref 0–0.2)
BASOPHILS NFR BLD: 0 %
BASOPHILS NFR BLD: 0 %
BILIRUB DIRECT SERPL-MCNC: <0.2 MG/DL (ref 0–0.3)
BILIRUB INDIRECT SERPL-MCNC: ABNORMAL MG/DL (ref 0–1)
BILIRUB SERPL-MCNC: 1 MG/DL (ref 0–1)
BLASTS NFR BLD MANUAL: 3 %
BLASTS NFR BLD MANUAL: 3 %
BLOOD BANK DISPENSE STATUS: NORMAL
BLOOD BANK PRODUCT CODE: NORMAL
BPU ID: NORMAL
BUN SERPL-MCNC: 13 MG/DL (ref 7–20)
CALCIUM SERPL-MCNC: 9.9 MG/DL (ref 8.3–10.6)
CEA SERPL-MCNC: 6.5 NG/ML (ref 0–5)
CHLORIDE SERPL-SCNC: 97 MMOL/L (ref 99–110)
CO2 SERPL-SCNC: 26 MMOL/L (ref 21–32)
CREAT SERPL-MCNC: 1 MG/DL (ref 0.9–1.3)
DACRYOCYTES BLD QL SMEAR: ABNORMAL
DEPRECATED RDW RBC AUTO: 32.7 % (ref 12.4–15.4)
DEPRECATED RDW RBC AUTO: 33.1 % (ref 12.4–15.4)
DESCRIPTION BLOOD BANK: NORMAL
EOSINOPHIL # BLD: 0 K/UL (ref 0–0.6)
EOSINOPHIL # BLD: 0 K/UL (ref 0–0.6)
EOSINOPHIL NFR BLD: 2 %
EOSINOPHIL NFR BLD: 2 %
FIBRINOGEN PPP-MCNC: 345 MG/DL (ref 243–550)
GFR SERPLBLD CREATININE-BSD FMLA CKD-EPI: >60 ML/MIN/{1.73_M2}
GLUCOSE SERPL-MCNC: 93 MG/DL (ref 70–99)
HCT VFR BLD AUTO: 23 % (ref 40.5–52.5)
HCT VFR BLD AUTO: 25.5 % (ref 40.5–52.5)
HGB BLD-MCNC: 7.3 G/DL (ref 13.5–17.5)
HGB BLD-MCNC: 8.2 G/DL (ref 13.5–17.5)
HYPOCHROMIA BLD QL SMEAR: ABNORMAL
HYPOCHROMIA BLD QL SMEAR: ABNORMAL
INR PPP: 1.16 (ref 0.84–1.16)
LDH SERPL L TO P-CCNC: 510 U/L (ref 100–190)
LYMPHOCYTES # BLD: 0.6 K/UL (ref 1–5.1)
LYMPHOCYTES # BLD: 1.3 K/UL (ref 1–5.1)
LYMPHOCYTES NFR BLD: 49 %
LYMPHOCYTES NFR BLD: 65 %
MACROCYTES BLD QL SMEAR: ABNORMAL
MACROCYTES BLD QL SMEAR: ABNORMAL
MAGNESIUM SERPL-MCNC: 1.7 MG/DL (ref 1.8–2.4)
MCH RBC QN AUTO: 23.2 PG (ref 26–34)
MCH RBC QN AUTO: 23.2 PG (ref 26–34)
MCHC RBC AUTO-ENTMCNC: 31.9 G/DL (ref 31–36)
MCHC RBC AUTO-ENTMCNC: 32 G/DL (ref 31–36)
MCV RBC AUTO: 72.4 FL (ref 80–100)
MCV RBC AUTO: 72.8 FL (ref 80–100)
MICROCYTES BLD QL SMEAR: ABNORMAL
MICROCYTES BLD QL SMEAR: ABNORMAL
MONOCYTES # BLD: 0.1 K/UL (ref 0–1.3)
MONOCYTES # BLD: 0.1 K/UL (ref 0–1.3)
MONOCYTES NFR BLD: 4 %
MONOCYTES NFR BLD: 6 %
NEUTROPHILS # BLD: 0.5 K/UL (ref 1.7–7.7)
NEUTROPHILS # BLD: 0.5 K/UL (ref 1.7–7.7)
NEUTROPHILS NFR BLD: 23 %
NEUTROPHILS NFR BLD: 38 %
NEUTS BAND NFR BLD MANUAL: 2 % (ref 0–7)
NEUTS BAND NFR BLD MANUAL: 2 % (ref 0–7)
NEUTS VAC BLD QL SMEAR: PRESENT
NRBC BLD-RTO: 3 /100 WBC
OVALOCYTES BLD QL SMEAR: ABNORMAL
OVALOCYTES BLD QL SMEAR: ABNORMAL
PATH INTERP BLD-IMP: NO
PATH INTERP BLD-IMP: YES
PHOSPHATE SERPL-MCNC: 5.4 MG/DL (ref 2.5–4.9)
PLATELET # BLD AUTO: 61 K/UL (ref 135–450)
PLATELET # BLD AUTO: 73 K/UL (ref 135–450)
PLATELET BLD QL SMEAR: ABNORMAL
PMV BLD AUTO: 10.8 FL (ref 5–10.5)
PMV BLD AUTO: 10.8 FL (ref 5–10.5)
POIKILOCYTOSIS BLD QL SMEAR: ABNORMAL
POIKILOCYTOSIS BLD QL SMEAR: ABNORMAL
POLYCHROMASIA BLD QL SMEAR: ABNORMAL
POTASSIUM SERPL-SCNC: 3.9 MMOL/L (ref 3.5–5.1)
PROT SERPL-MCNC: 7.2 G/DL (ref 6.4–8.2)
PROTHROMBIN TIME: 14.8 SEC (ref 11.5–14.8)
RBC # BLD AUTO: 3.16 M/UL (ref 4.2–5.9)
RBC # BLD AUTO: 3.53 M/UL (ref 4.2–5.9)
SCHISTOCYTES BLD QL SMEAR: ABNORMAL
SCHISTOCYTES BLD QL SMEAR: ABNORMAL
SLIDE REVIEW: ABNORMAL
SODIUM SERPL-SCNC: 135 MMOL/L (ref 136–145)
SPHEROCYTES BLD QL SMEAR: ABNORMAL
TARGETS BLD QL SMEAR: ABNORMAL
TARGETS BLD QL SMEAR: ABNORMAL
URATE SERPL-MCNC: 7.1 MG/DL (ref 3.5–7.2)
VARIANT LYMPHS NFR BLD MANUAL: 1 % (ref 0–6)
WBC # BLD AUTO: 1.2 K/UL (ref 4–11)
WBC # BLD AUTO: 2 K/UL (ref 4–11)

## 2024-03-23 PROCEDURE — 82378 CARCINOEMBRYONIC ANTIGEN: CPT

## 2024-03-23 PROCEDURE — 83615 LACTATE (LD) (LDH) ENZYME: CPT

## 2024-03-23 PROCEDURE — A4216 STERILE WATER/SALINE, 10 ML: HCPCS | Performed by: NURSE PRACTITIONER

## 2024-03-23 PROCEDURE — 6360000004 HC RX CONTRAST MEDICATION: Performed by: NURSE PRACTITIONER

## 2024-03-23 PROCEDURE — 2580000003 HC RX 258: Performed by: STUDENT IN AN ORGANIZED HEALTH CARE EDUCATION/TRAINING PROGRAM

## 2024-03-23 PROCEDURE — 2060000000 HC ICU INTERMEDIATE R&B

## 2024-03-23 PROCEDURE — 36430 TRANSFUSION BLD/BLD COMPNT: CPT

## 2024-03-23 PROCEDURE — 84550 ASSAY OF BLOOD/URIC ACID: CPT

## 2024-03-23 PROCEDURE — 2580000003 HC RX 258: Performed by: NURSE PRACTITIONER

## 2024-03-23 PROCEDURE — 36592 COLLECT BLOOD FROM PICC: CPT

## 2024-03-23 PROCEDURE — 83735 ASSAY OF MAGNESIUM: CPT

## 2024-03-23 PROCEDURE — 80076 HEPATIC FUNCTION PANEL: CPT

## 2024-03-23 PROCEDURE — 85730 THROMBOPLASTIN TIME PARTIAL: CPT

## 2024-03-23 PROCEDURE — 80048 BASIC METABOLIC PNL TOTAL CA: CPT

## 2024-03-23 PROCEDURE — 6370000000 HC RX 637 (ALT 250 FOR IP): Performed by: NURSE PRACTITIONER

## 2024-03-23 PROCEDURE — 74177 CT ABD & PELVIS W/CONTRAST: CPT

## 2024-03-23 PROCEDURE — 2580000003 HC RX 258: Performed by: INTERNAL MEDICINE

## 2024-03-23 PROCEDURE — 84100 ASSAY OF PHOSPHORUS: CPT

## 2024-03-23 PROCEDURE — 1200000000 HC SEMI PRIVATE

## 2024-03-23 PROCEDURE — 6370000000 HC RX 637 (ALT 250 FOR IP): Performed by: INTERNAL MEDICINE

## 2024-03-23 PROCEDURE — 85384 FIBRINOGEN ACTIVITY: CPT

## 2024-03-23 PROCEDURE — C9113 INJ PANTOPRAZOLE SODIUM, VIA: HCPCS | Performed by: NURSE PRACTITIONER

## 2024-03-23 PROCEDURE — 99223 1ST HOSP IP/OBS HIGH 75: CPT | Performed by: SURGERY

## 2024-03-23 PROCEDURE — 85610 PROTHROMBIN TIME: CPT

## 2024-03-23 PROCEDURE — 6360000002 HC RX W HCPCS: Performed by: NURSE PRACTITIONER

## 2024-03-23 PROCEDURE — 85025 COMPLETE CBC W/AUTO DIFF WBC: CPT

## 2024-03-23 RX ORDER — SODIUM CHLORIDE 9 MG/ML
INJECTION, SOLUTION INTRAVENOUS PRN
Status: DISCONTINUED | OUTPATIENT
Start: 2024-03-23 | End: 2024-04-02 | Stop reason: HOSPADM

## 2024-03-23 RX ORDER — 0.9 % SODIUM CHLORIDE 0.9 %
1000 INTRAVENOUS SOLUTION INTRAVENOUS ONCE
Status: COMPLETED | OUTPATIENT
Start: 2024-03-23 | End: 2024-03-23

## 2024-03-23 RX ADMIN — FLUCONAZOLE 200 MG: 200 TABLET ORAL at 08:22

## 2024-03-23 RX ADMIN — Medication: at 08:24

## 2024-03-23 RX ADMIN — IOPAMIDOL 75 ML: 755 INJECTION, SOLUTION INTRAVENOUS at 09:11

## 2024-03-23 RX ADMIN — LEVOFLOXACIN 500 MG: 500 TABLET, FILM COATED ORAL at 21:24

## 2024-03-23 RX ADMIN — LEVETIRACETAM 1500 MG: 500 TABLET, FILM COATED ORAL at 21:23

## 2024-03-23 RX ADMIN — PANTOPRAZOLE SODIUM 40 MG: 40 INJECTION, POWDER, FOR SOLUTION INTRAVENOUS at 17:53

## 2024-03-23 RX ADMIN — VALACYCLOVIR HYDROCHLORIDE 500 MG: 500 TABLET, FILM COATED ORAL at 08:22

## 2024-03-23 RX ADMIN — CYANOCOBALAMIN TAB 1000 MCG 1000 MCG: 1000 TAB at 08:21

## 2024-03-23 RX ADMIN — SODIUM CHLORIDE 1000 ML: 9 INJECTION, SOLUTION INTRAVENOUS at 09:01

## 2024-03-23 RX ADMIN — SODIUM CHLORIDE, PRESERVATIVE FREE 10 ML: 5 INJECTION INTRAVENOUS at 08:22

## 2024-03-23 RX ADMIN — Medication: at 21:25

## 2024-03-23 RX ADMIN — LEVETIRACETAM 1500 MG: 500 TABLET, FILM COATED ORAL at 08:21

## 2024-03-23 RX ADMIN — PANTOPRAZOLE SODIUM 40 MG: 40 TABLET, DELAYED RELEASE ORAL at 06:45

## 2024-03-23 RX ADMIN — VALACYCLOVIR HYDROCHLORIDE 500 MG: 500 TABLET, FILM COATED ORAL at 21:24

## 2024-03-23 ASSESSMENT — PAIN SCALES - GENERAL
PAINLEVEL_OUTOF10: 0
PAINLEVEL_OUTOF10: 0

## 2024-03-23 NOTE — CONSULTS
Colorectal Surgery  Resident Consult Note    Reason for Consult: bright red blood in stool    History of Present Illness:   Dennys Peguero is a 43 y.o. male with Hx of brain cancer and seizure disorder. Surgical history significant for c-scope 3/22. Colorectal surgery consulted for 4cm friable mass seen on C-scope.    Patient came to [facility] ED on 3/7 for increased seizure activity and weakness. At that time he reported intermittent blood tinged stools lasting several weeks. 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 On admit EGD done and was negative. Patient reports he has been having increased BRBR since coming into the hospital including passage of multiple clots    Patient continued to have bloody BM and C-scope on 3/22 demonstrated large ulcerated mass in distal rectum.     Past Medical History:        Diagnosis Date    Brain cancer (HCC)     diagnosed in 2017    Seizure (HCC)        Past Surgical History:        Procedure Laterality Date    BRAIN SURGERY      Surgery in 2017 at [facility]    COLONOSCOPY N/A 3/22/2024    COLONOSCOPY POLYPECTOMY HOT SNARE/COLD BIOPSY performed by Marcus Uribe MD at [facility] ENDOSCOPY    UPPER GASTROINTESTINAL ENDOSCOPY N/A 3/6/2024    ESOPHAGOGASTRODUODENOSCOPY performed by Wade Cantrell MD at [facility] ENDOSCOPY       Allergies:  [place] meal    Medications:   Home Meds  No current facility-administered medications on file prior to encounter.     Current Outpatient Medications on File Prior to Encounter   Medication Sig Dispense Refill    levETIRAcetam (KEPPRA) 1000 MG tablet Take 1.5 tablets by mouth 2 times daily         Current Meds  pantoprazole (PROTONIX) 40 mg in sodium chloride (PF) 0.9 % 10 mL injection, Q12H  sodium chloride 0.9 % bolus 1,000 mL, Once  iopamidol (ISOVUE-370) 76 % injection 75 mL, ONCE PRN  0.9 % sodium chloride infusion, PRN  Hydrocerin (EUCERIN) cream CREA, BID  Venetoclax TABS 200 mg  (Patient Supplied), 
file.        Medications:    Hydrocerin   Topical BID    pantoprazole  40 mg Oral BID AC    Venetoclax  200 mg Oral Q24H    valACYclovir  500 mg Oral BID    levoFLOXacin  500 mg Oral Nightly    fluconazole  200 mg Oral Daily    sodium chloride flush  5-40 mL IntraVENous 2 times per day    vitamin B-12  1,000 mcg Oral Daily    sodium chloride flush  5-40 mL IntraVENous 2 times per day    Saline Mouthwash  15 mL Swish & Spit 4x Daily AC & HS    levETIRAcetam  1,500 mg Oral BID            Objective:   PHYSICAL EXAM:      Vitals:   Vitals:    03/20/24 0806 03/20/24 0832 03/20/24 0902 03/20/24 1142   BP: 120/81 127/88 121/82 (!) 128/93   Pulse: 79 91 85 91   Resp: 18 18 18 18   Temp: 98.1 °F (36.7 °C) 98 °F (36.7 °C) 98.2 °F (36.8 °C) 98.1 °F (36.7 °C)   TempSrc: Oral Oral Oral Oral   SpO2: 98% 99% 100% 100%   Weight: 80.9 kg (178 lb 6.4 oz)      Height:           General appearance: alert, cooperative, no distress, appears stated age  Eyes: Anicteric  Head: Normocephalic, without obvious abnormality  Lungs: clear to auscultation bilaterally, Normal Effort  Heart: regular rate and rhythm, normal S1 and S2, no murmurs or rubs  Abdomen: soft, non-tender. Bowel sounds normal. No masses,  no organomegaly.   Extremities: atraumatic, no cyanosis or edema  Skin: warm and dry, no jaundice  Neuro: Grossly intact, A&OX3    Intake and output:   I/O last 3 completed shifts:  In: 1808 [P.O.:1200; I.V.:608]  Out: 3625 [Urine:3625]    Lab Data:      CBC:   Recent Labs     03/18/24  0431 03/19/24  0442 03/20/24  0501   WBC 3.5* 2.9* 2.7*   RBC 3.40* 3.19* 3.18*   HGB 8.3* 7.7* 7.5*   HCT 25.3* 24.1* 23.5*   PLT 69* 67* 20*   MCV 74.5* 75.4* 73.9*   MCH 24.3* 24.3* 23.7*   MCHC 32.7 32.2 32.0   RDW 33.1* 32.1* 31.8*   NRBC  --  21* 1*   BANDSPCT 3  --   --         BMP:  Recent Labs     03/18/24  0431 03/19/24  0444 03/20/24  0501    136 139   K 3.7 3.9 3.8    100 101   CO2 27 26 26   BUN 14 14 13   CREATININE 1.0 0.9 0.9

## 2024-03-23 NOTE — PLAN OF CARE
Problem: Safety - Adult  Goal: Free from fall injury  3/23/2024 1440 by Lillian Short RN  Outcome: Progressing  Flowsheets (Taken 3/23/2024 1440)  Free From Fall Injury: Instruct family/caregiver on patient safety  Note: Orthostatic vital signs obtained at start of shift - see flowsheet for details.  Pt does not meet criteria for orthostasis.  Pt is a Med fall risk. See Apple Fall Score and ABCDS Injury Risk assessments.     - Screening for Orthostasis AND not a [place] Risk per APPLE/ABCDS: Pt bed is in low position, side rails up, call light and belongings are in reach.  Fall risk light is on outside pts room.  Pt encouraged to call for assistance as needed. Will continue with hourly rounds for PO intake, pain needs, toileting and repositioning as needed.      Problem: Hematologic - Adult  Goal: Maintains hematologic stability  3/23/2024 1440 by Lillian Short, RN  Outcome: Progressing  Note: Pt had two bowel movements with blood/blood clots. MD was notified.      Problem: Gastrointestinal - Adult  Goal: Maintains or returns to baseline bowel function  Outcome: Progressing  Note: Pt having stomach aches.

## 2024-03-23 NOTE — PLAN OF CARE
Problem: Safety - Adult  Goal: Free from fall injury  Outcome: Progressing  Free From Fall Injury: Instruct family/caregiver on patient safety  Note: Orthostatic vital signs obtained at start of shift - see flowsheet for details.  Pt does not meet criteria for orthostasis.  Pt is a Med fall risk. See Apple Fall Score and ABCDS Injury Risk assessments.   - Screening for Orthostasis AND not a [place] Risk per APPLE/ABCDS: Pt bed is in low position, side rails up, call light and belongings are in reach.  Fall risk light is on outside pts room.  Pt encouraged to call for assistance as needed. Will continue with hourly rounds for PO intake, pain needs, toileting and repositioning as needed.        Problem: Hematologic - Adult  Goal: Maintains hematologic stability  Outcome: Progressing  Maintains hematologic stability:   Assess for signs and symptoms of bleeding or hemorrhage   Monitor labs for bleeding or clotting disorders   Administer blood products/factors as ordered  Note: Patient's hemoglobin this AM:   Recent Labs     03/23/24  0418   HGB 8.2*     Patient's platelet count this AM:   Recent Labs     03/23/24  0418   PLT 73*    Thrombocytopenia Precautions in place.  Patient showing no signs or symptoms of active bleeding.  Transfusion not indicated at this time.  Patient verbalizes understanding of all instructions. Will continue to assess and implement POC. Call light within reach and hourly rounding in place.        Problem: Gastrointestinal - Adult  Goal: Minimal or absence of nausea and vomiting  Outcome: Progressing

## 2024-03-24 ENCOUNTER — APPOINTMENT (OUTPATIENT)
Dept: CT IMAGING | Age: 44
DRG: 690 | End: 2024-03-24
Attending: STUDENT IN AN ORGANIZED HEALTH CARE EDUCATION/TRAINING PROGRAM
Payer: MEDICAID

## 2024-03-24 ENCOUNTER — APPOINTMENT (OUTPATIENT)
Dept: MRI IMAGING | Age: 44
DRG: 690 | End: 2024-03-24
Attending: STUDENT IN AN ORGANIZED HEALTH CARE EDUCATION/TRAINING PROGRAM
Payer: MEDICAID

## 2024-03-24 PROBLEM — K92.1 HEMATOCHEZIA: Status: ACTIVE | Noted: 2024-03-24

## 2024-03-24 PROBLEM — C92.00 ACUTE MYELOID LEUKEMIA NOT HAVING ACHIEVED REMISSION (HCC): Status: ACTIVE | Noted: 2024-03-24

## 2024-03-24 PROBLEM — K62.89 RECTAL MASS: Status: ACTIVE | Noted: 2024-03-24

## 2024-03-24 LAB
ABO + RH BLD: NORMAL
ALBUMIN SERPL-MCNC: 4 G/DL (ref 3.4–5)
ALP SERPL-CCNC: 44 U/L (ref 40–129)
ALT SERPL-CCNC: 9 U/L (ref 10–40)
ANION GAP SERPL CALCULATED.3IONS-SCNC: 12 MMOL/L (ref 3–16)
ANISOCYTOSIS BLD QL SMEAR: ABNORMAL
APTT BLD: 35 SEC (ref 22.7–35.9)
AST SERPL-CCNC: 13 U/L (ref 15–37)
BASOPHILS # BLD: 0 K/UL (ref 0–0.2)
BASOPHILS NFR BLD: 0 %
BILIRUB DIRECT SERPL-MCNC: <0.2 MG/DL (ref 0–0.3)
BILIRUB INDIRECT SERPL-MCNC: ABNORMAL MG/DL (ref 0–1)
BILIRUB SERPL-MCNC: 0.9 MG/DL (ref 0–1)
BLASTS NFR BLD MANUAL: 1 %
BLD GP AB SCN SERPL QL: NORMAL
BUN SERPL-MCNC: 12 MG/DL (ref 7–20)
CALCIUM SERPL-MCNC: 9.3 MG/DL (ref 8.3–10.6)
CHLORIDE SERPL-SCNC: 99 MMOL/L (ref 99–110)
CO2 SERPL-SCNC: 26 MMOL/L (ref 21–32)
CREAT SERPL-MCNC: 1 MG/DL (ref 0.9–1.3)
DACRYOCYTES BLD QL SMEAR: ABNORMAL
DEPRECATED RDW RBC AUTO: 27.8 % (ref 12.4–15.4)
EOSINOPHIL # BLD: 0 K/UL (ref 0–0.6)
EOSINOPHIL NFR BLD: 0 %
FIBRINOGEN PPP-MCNC: 329 MG/DL (ref 243–550)
GFR SERPLBLD CREATININE-BSD FMLA CKD-EPI: >60 ML/MIN/{1.73_M2}
GLUCOSE SERPL-MCNC: 93 MG/DL (ref 70–99)
HCT VFR BLD AUTO: 23.3 % (ref 40.5–52.5)
HGB BLD-MCNC: 7.9 G/DL (ref 13.5–17.5)
HYPOCHROMIA BLD QL SMEAR: ABNORMAL
LDH SERPL L TO P-CCNC: 433 U/L (ref 100–190)
LYMPHOCYTES # BLD: 1.1 K/UL (ref 1–5.1)
LYMPHOCYTES NFR BLD: 71 %
MACROCYTES BLD QL SMEAR: ABNORMAL
MAGNESIUM SERPL-MCNC: 1.7 MG/DL (ref 1.8–2.4)
MCH RBC QN AUTO: 25 PG (ref 26–34)
MCHC RBC AUTO-ENTMCNC: 34.1 G/DL (ref 31–36)
MCV RBC AUTO: 73.4 FL (ref 80–100)
METAMYELOCYTES NFR BLD MANUAL: 2 %
MICROCYTES BLD QL SMEAR: ABNORMAL
MONOCYTES # BLD: 0 K/UL (ref 0–1.3)
MONOCYTES NFR BLD: 2 %
NEUTROPHILS # BLD: 0.4 K/UL (ref 1.7–7.7)
NEUTROPHILS NFR BLD: 14 %
NEUTS BAND NFR BLD MANUAL: 6 % (ref 0–7)
NRBC BLD-RTO: 4 /100 WBC
OVALOCYTES BLD QL SMEAR: ABNORMAL
PATH INTERP BLD-IMP: NO
PHOSPHATE SERPL-MCNC: 5.1 MG/DL (ref 2.5–4.9)
PLASMA CELLS NFR BLD MANUAL: 4 %
PLATELET # BLD AUTO: 51 K/UL (ref 135–450)
PLATELET BLD QL SMEAR: ABNORMAL
PMV BLD AUTO: 10.8 FL (ref 5–10.5)
POIKILOCYTOSIS BLD QL SMEAR: ABNORMAL
POLYCHROMASIA BLD QL SMEAR: ABNORMAL
POTASSIUM SERPL-SCNC: 3.7 MMOL/L (ref 3.5–5.1)
PROT SERPL-MCNC: 6.5 G/DL (ref 6.4–8.2)
RBC # BLD AUTO: 3.17 M/UL (ref 4.2–5.9)
SCHISTOCYTES BLD QL SMEAR: ABNORMAL
SLIDE REVIEW: ABNORMAL
SODIUM SERPL-SCNC: 137 MMOL/L (ref 136–145)
SPHEROCYTES BLD QL SMEAR: ABNORMAL
TARGETS BLD QL SMEAR: ABNORMAL
URATE SERPL-MCNC: 6.9 MG/DL (ref 3.5–7.2)
WBC # BLD AUTO: 1.6 K/UL (ref 4–11)

## 2024-03-24 PROCEDURE — 36592 COLLECT BLOOD FROM PICC: CPT

## 2024-03-24 PROCEDURE — 84100 ASSAY OF PHOSPHORUS: CPT

## 2024-03-24 PROCEDURE — A4216 STERILE WATER/SALINE, 10 ML: HCPCS | Performed by: NURSE PRACTITIONER

## 2024-03-24 PROCEDURE — 2060000000 HC ICU INTERMEDIATE R&B

## 2024-03-24 PROCEDURE — 6370000000 HC RX 637 (ALT 250 FOR IP): Performed by: INTERNAL MEDICINE

## 2024-03-24 PROCEDURE — 80048 BASIC METABOLIC PNL TOTAL CA: CPT

## 2024-03-24 PROCEDURE — 6370000000 HC RX 637 (ALT 250 FOR IP): Performed by: NURSE PRACTITIONER

## 2024-03-24 PROCEDURE — 86900 BLOOD TYPING SEROLOGIC ABO: CPT

## 2024-03-24 PROCEDURE — A9579 GAD-BASE MR CONTRAST NOS,1ML: HCPCS

## 2024-03-24 PROCEDURE — 6360000002 HC RX W HCPCS: Performed by: NURSE PRACTITIONER

## 2024-03-24 PROCEDURE — 6360000004 HC RX CONTRAST MEDICATION

## 2024-03-24 PROCEDURE — 99232 SBSQ HOSP IP/OBS MODERATE 35: CPT | Performed by: SURGERY

## 2024-03-24 PROCEDURE — 85025 COMPLETE CBC W/AUTO DIFF WBC: CPT

## 2024-03-24 PROCEDURE — P9036 PLATELET PHERESIS IRRADIATED: HCPCS

## 2024-03-24 PROCEDURE — 80076 HEPATIC FUNCTION PANEL: CPT

## 2024-03-24 PROCEDURE — 84550 ASSAY OF BLOOD/URIC ACID: CPT

## 2024-03-24 PROCEDURE — 2580000003 HC RX 258: Performed by: INTERNAL MEDICINE

## 2024-03-24 PROCEDURE — 72197 MRI PELVIS W/O & W/DYE: CPT

## 2024-03-24 PROCEDURE — 2580000003 HC RX 258: Performed by: NURSE PRACTITIONER

## 2024-03-24 PROCEDURE — 83615 LACTATE (LD) (LDH) ENZYME: CPT

## 2024-03-24 PROCEDURE — 86901 BLOOD TYPING SEROLOGIC RH(D): CPT

## 2024-03-24 PROCEDURE — 85384 FIBRINOGEN ACTIVITY: CPT

## 2024-03-24 PROCEDURE — 85730 THROMBOPLASTIN TIME PARTIAL: CPT

## 2024-03-24 PROCEDURE — 83735 ASSAY OF MAGNESIUM: CPT

## 2024-03-24 PROCEDURE — 86850 RBC ANTIBODY SCREEN: CPT

## 2024-03-24 PROCEDURE — C9113 INJ PANTOPRAZOLE SODIUM, VIA: HCPCS | Performed by: NURSE PRACTITIONER

## 2024-03-24 RX ORDER — PANTOPRAZOLE SODIUM 40 MG/1
40 TABLET, DELAYED RELEASE ORAL
Status: DISCONTINUED | OUTPATIENT
Start: 2024-03-24 | End: 2024-04-02 | Stop reason: HOSPADM

## 2024-03-24 RX ADMIN — SODIUM CHLORIDE, PRESERVATIVE FREE 10 ML: 5 INJECTION INTRAVENOUS at 20:26

## 2024-03-24 RX ADMIN — FLUCONAZOLE 200 MG: 200 TABLET ORAL at 08:49

## 2024-03-24 RX ADMIN — Medication: at 20:22

## 2024-03-24 RX ADMIN — VALACYCLOVIR HYDROCHLORIDE 500 MG: 500 TABLET, FILM COATED ORAL at 20:23

## 2024-03-24 RX ADMIN — VALACYCLOVIR HYDROCHLORIDE 500 MG: 500 TABLET, FILM COATED ORAL at 08:49

## 2024-03-24 RX ADMIN — CYANOCOBALAMIN TAB 1000 MCG 1000 MCG: 1000 TAB at 08:49

## 2024-03-24 RX ADMIN — LEVOFLOXACIN 500 MG: 500 TABLET, FILM COATED ORAL at 20:23

## 2024-03-24 RX ADMIN — LEVETIRACETAM 1500 MG: 500 TABLET, FILM COATED ORAL at 08:49

## 2024-03-24 RX ADMIN — GADOTERIDOL 17 ML: 279.3 INJECTION, SOLUTION INTRAVENOUS at 23:58

## 2024-03-24 RX ADMIN — Medication: at 08:52

## 2024-03-24 RX ADMIN — PANTOPRAZOLE SODIUM 40 MG: 40 TABLET, DELAYED RELEASE ORAL at 15:33

## 2024-03-24 RX ADMIN — PANTOPRAZOLE SODIUM 40 MG: 40 INJECTION, POWDER, FOR SOLUTION INTRAVENOUS at 06:32

## 2024-03-24 RX ADMIN — SODIUM CHLORIDE, PRESERVATIVE FREE 10 ML: 5 INJECTION INTRAVENOUS at 08:51

## 2024-03-24 RX ADMIN — LEVETIRACETAM 1500 MG: 500 TABLET, FILM COATED ORAL at 20:23

## 2024-03-24 RX ADMIN — SODIUM CHLORIDE, PRESERVATIVE FREE 10 ML: 5 INJECTION INTRAVENOUS at 08:50

## 2024-03-24 RX ADMIN — SODIUM CHLORIDE 15 ML: 900 IRRIGANT IRRIGATION at 20:26

## 2024-03-24 ASSESSMENT — PAIN SCALES - GENERAL
PAINLEVEL_OUTOF10: 0

## 2024-03-24 NOTE — PLAN OF CARE
Problem: Safety - Adult  Goal: Free from fall injury  Outcome: Progressing  Flowsheets (Taken 3/23/2024 1440 by Lillian Short, RN)  Free From Fall Injury: Instruct family/caregiver on patient safety  Note: Pt in bed with bed alarm on, instructed to call for assistance. Verbalized understanding. All fall precautions in place.        Problem: ABCDS Injury Assessment  Goal: Absence of physical injury  Outcome: Progressing  Flowsheets (Taken 3/20/2024 0237 by Ivette Bernard RN)  Absence of Physical Injury: Implement safety measures based on patient assessment     Problem: Hematologic - Adult  Goal: Maintains hematologic stability  Outcome: Progressing  Flowsheets (Taken 3/21/2024 0512 by Ivette Bernard RN)  Maintains hematologic stability:   Assess for signs and symptoms of bleeding or hemorrhage   Monitor labs for bleeding or clotting disorders   Administer blood products/factors as ordered     Problem: Respiratory - Adult  Goal: Achieves optimal ventilation and oxygenation  Outcome: Progressing     Problem: Gastrointestinal - Adult  Goal: Maintains adequate nutritional intake  Outcome: Progressing  Flowsheets (Taken 3/18/2024 1135 by Padmini Shahid, RN)  Maintains adequate nutritional intake:   Monitor percentage of each meal consumed   Monitor intake and output, weight and lab values   Obtain nutritional consult as needed   Identify factors contributing to decreased intake, treat as appropriate  Note: Pt educated on importance of staying hydrated and having as much intake as possible. Verbalized understanding. Will continue to monitor.        Problem: Pain  Goal: Verbalizes/displays adequate comfort level or baseline comfort level  Outcome: Progressing  Flowsheets (Taken 3/21/2024 0512 by Ivette Bernard RN)  Verbalizes/displays adequate comfort level or baseline comfort level:   Encourage patient to monitor pain and request assistance   Assess pain using appropriate pain scale   Administer analgesics based on type and

## 2024-03-24 NOTE — PLAN OF CARE
Problem: Safety - Adult  Goal: Free from fall injury  3/24/2024 1743 by Andi Alston RN  Outcome: Progressing  Flowsheets (Taken 3/24/2024 1743)  Free From Fall Injury: Instruct family/caregiver on patient safety  Note: Orthostatic vital signs obtained at start of shift - see flowsheet for details.  Pt does not meet criteria for orthostasis.  Pt is a Low fall risk. See Jose Fall Score and ABCDS Injury Risk assessments.     - Screening for Orthostasis AND not a [place] Risk per JOSE/ABCDS: Pt bed is in low position, side rails up, call light and belongings are in reach.  Fall risk light is on outside pts room.  Pt encouraged to call for assistance as needed. Will continue with hourly rounds for PO intake, pain needs, toileting and repositioning as needed.      Problem: Hematologic - Adult  Goal: Maintains hematologic stability  3/24/2024 1743 by Andi Alston, RN  Outcome: Progressing  Flowsheets (Taken 3/24/2024 1743)  Maintains hematologic stability:   Assess for signs and symptoms of bleeding or hemorrhage   Monitor labs for bleeding or clotting disorders  Note: Patient's hemoglobin this AM:   Recent Labs     03/24/24 0347   HGB 7.9*     Patient's platelet count this AM:   Recent Labs     03/24/24 0347   PLT 51*    Thrombocytopenia Precautions in place.  Patient showing no signs or symptoms of active bleeding.  Transfusion not indicated at this time.  Patient verbalizes understanding of all instructions. Will continue to assess and implement POC. Call light within reach and hourly rounding in place.      Problem: Gastrointestinal - Adult  Goal: Maintains adequate nutritional intake  3/24/2024 1743 by Andi Alston, RN  Outcome: Progressing  Flowsheets (Taken 3/24/2024 1743)  Maintains adequate nutritional intake: Monitor percentage of each meal consumed  Note: Pt ate 100% of meals during shift.      Problem: Pain  Goal: Verbalizes/displays adequate comfort level or baseline comfort level  3/24/2024

## 2024-03-25 ENCOUNTER — APPOINTMENT (OUTPATIENT)
Dept: CT IMAGING | Age: 44
DRG: 690 | End: 2024-03-25
Attending: STUDENT IN AN ORGANIZED HEALTH CARE EDUCATION/TRAINING PROGRAM
Payer: MEDICAID

## 2024-03-25 LAB
ALBUMIN SERPL-MCNC: 4 G/DL (ref 3.4–5)
ALP SERPL-CCNC: 46 U/L (ref 40–129)
ALT SERPL-CCNC: 9 U/L (ref 10–40)
ANION GAP SERPL CALCULATED.3IONS-SCNC: 12 MMOL/L (ref 3–16)
ANISOCYTOSIS BLD QL SMEAR: ABNORMAL
APTT BLD: 31.8 SEC (ref 22.7–35.9)
AST SERPL-CCNC: 13 U/L (ref 15–37)
BASOPHILS # BLD: 0 K/UL (ref 0–0.2)
BASOPHILS NFR BLD: 0 %
BILIRUB DIRECT SERPL-MCNC: <0.2 MG/DL (ref 0–0.3)
BILIRUB INDIRECT SERPL-MCNC: ABNORMAL MG/DL (ref 0–1)
BILIRUB SERPL-MCNC: 0.9 MG/DL (ref 0–1)
BLOOD BANK DISPENSE STATUS: NORMAL
BLOOD BANK PRODUCT CODE: NORMAL
BPU ID: NORMAL
BUN SERPL-MCNC: 13 MG/DL (ref 7–20)
CALCIUM SERPL-MCNC: 9.3 MG/DL (ref 8.3–10.6)
CHLORIDE SERPL-SCNC: 99 MMOL/L (ref 99–110)
CO2 SERPL-SCNC: 26 MMOL/L (ref 21–32)
CREAT SERPL-MCNC: 1 MG/DL (ref 0.9–1.3)
DEPRECATED RDW RBC AUTO: 27 % (ref 12.4–15.4)
DEPRECATED RDW RBC AUTO: 28.6 % (ref 12.4–15.4)
DESCRIPTION BLOOD BANK: NORMAL
EOSINOPHIL # BLD: 0 K/UL (ref 0–0.6)
EOSINOPHIL NFR BLD: 0 %
FIBRINOGEN PPP-MCNC: 300 MG/DL (ref 243–550)
GFR SERPLBLD CREATININE-BSD FMLA CKD-EPI: >60 ML/MIN/{1.73_M2}
GLUCOSE SERPL-MCNC: 97 MG/DL (ref 70–99)
HCT VFR BLD AUTO: 22.6 % (ref 40.5–52.5)
HCT VFR BLD AUTO: 24.8 % (ref 40.5–52.5)
HGB BLD-MCNC: 7.8 G/DL (ref 13.5–17.5)
HGB BLD-MCNC: 8.1 G/DL (ref 13.5–17.5)
HYPOCHROMIA BLD QL SMEAR: ABNORMAL
LDH SERPL L TO P-CCNC: 412 U/L (ref 100–190)
LYMPHOCYTES # BLD: 1.1 K/UL (ref 1–5.1)
LYMPHOCYTES NFR BLD: 76 %
MACROCYTES BLD QL SMEAR: ABNORMAL
MAGNESIUM SERPL-MCNC: 1.6 MG/DL (ref 1.8–2.4)
MCH RBC QN AUTO: 23.9 PG (ref 26–34)
MCH RBC QN AUTO: 25.2 PG (ref 26–34)
MCHC RBC AUTO-ENTMCNC: 32.5 G/DL (ref 31–36)
MCHC RBC AUTO-ENTMCNC: 34.4 G/DL (ref 31–36)
MCV RBC AUTO: 73.2 FL (ref 80–100)
MCV RBC AUTO: 73.5 FL (ref 80–100)
MICROCYTES BLD QL SMEAR: ABNORMAL
MONOCYTES # BLD: 0.1 K/UL (ref 0–1.3)
MONOCYTES NFR BLD: 9 %
NEUTROPHILS # BLD: 0.2 K/UL (ref 1.7–7.7)
NEUTROPHILS NFR BLD: 15 %
PATH INTERP BLD-IMP: NO
PHOSPHATE SERPL-MCNC: 5 MG/DL (ref 2.5–4.9)
PLATELET # BLD AUTO: 35 K/UL (ref 135–450)
PLATELET # BLD AUTO: 51 K/UL (ref 135–450)
PLATELET BLD QL SMEAR: ABNORMAL
PMV BLD AUTO: 10.8 FL (ref 5–10.5)
PMV BLD AUTO: 8.8 FL (ref 5–10.5)
POIKILOCYTOSIS BLD QL SMEAR: ABNORMAL
POTASSIUM SERPL-SCNC: 3.7 MMOL/L (ref 3.5–5.1)
PROT SERPL-MCNC: 6.5 G/DL (ref 6.4–8.2)
RBC # BLD AUTO: 3.09 M/UL (ref 4.2–5.9)
RBC # BLD AUTO: 3.38 M/UL (ref 4.2–5.9)
SCHISTOCYTES BLD QL SMEAR: ABNORMAL
SLIDE REVIEW: ABNORMAL
SODIUM SERPL-SCNC: 137 MMOL/L (ref 136–145)
URATE SERPL-MCNC: 7.3 MG/DL (ref 3.5–7.2)
WBC # BLD AUTO: 1.4 K/UL (ref 4–11)
WBC # BLD AUTO: 1.5 K/UL (ref 4–11)

## 2024-03-25 PROCEDURE — 2060000000 HC ICU INTERMEDIATE R&B

## 2024-03-25 PROCEDURE — 6370000000 HC RX 637 (ALT 250 FOR IP): Performed by: NURSE PRACTITIONER

## 2024-03-25 PROCEDURE — 99231 SBSQ HOSP IP/OBS SF/LOW 25: CPT | Performed by: SURGERY

## 2024-03-25 PROCEDURE — 36430 TRANSFUSION BLD/BLD COMPNT: CPT

## 2024-03-25 PROCEDURE — 84550 ASSAY OF BLOOD/URIC ACID: CPT

## 2024-03-25 PROCEDURE — 80076 HEPATIC FUNCTION PANEL: CPT

## 2024-03-25 PROCEDURE — 84100 ASSAY OF PHOSPHORUS: CPT

## 2024-03-25 PROCEDURE — 2580000003 HC RX 258: Performed by: INTERNAL MEDICINE

## 2024-03-25 PROCEDURE — 85730 THROMBOPLASTIN TIME PARTIAL: CPT

## 2024-03-25 PROCEDURE — 71250 CT THORAX DX C-: CPT

## 2024-03-25 PROCEDURE — 80048 BASIC METABOLIC PNL TOTAL CA: CPT

## 2024-03-25 PROCEDURE — 83735 ASSAY OF MAGNESIUM: CPT

## 2024-03-25 PROCEDURE — 85027 COMPLETE CBC AUTOMATED: CPT

## 2024-03-25 PROCEDURE — 83615 LACTATE (LD) (LDH) ENZYME: CPT

## 2024-03-25 PROCEDURE — 2580000003 HC RX 258: Performed by: NURSE PRACTITIONER

## 2024-03-25 PROCEDURE — 85384 FIBRINOGEN ACTIVITY: CPT

## 2024-03-25 PROCEDURE — 6370000000 HC RX 637 (ALT 250 FOR IP): Performed by: INTERNAL MEDICINE

## 2024-03-25 PROCEDURE — 85025 COMPLETE CBC W/AUTO DIFF WBC: CPT

## 2024-03-25 PROCEDURE — 36592 COLLECT BLOOD FROM PICC: CPT

## 2024-03-25 RX ORDER — POLYETHYLENE GLYCOL 3350 17 G/17G
17 POWDER, FOR SOLUTION ORAL DAILY PRN
Status: DISCONTINUED | OUTPATIENT
Start: 2024-03-25 | End: 2024-04-02 | Stop reason: HOSPADM

## 2024-03-25 RX ADMIN — LEVETIRACETAM 1500 MG: 500 TABLET, FILM COATED ORAL at 21:42

## 2024-03-25 RX ADMIN — SODIUM CHLORIDE, PRESERVATIVE FREE 10 ML: 5 INJECTION INTRAVENOUS at 08:13

## 2024-03-25 RX ADMIN — Medication: at 08:14

## 2024-03-25 RX ADMIN — LEVETIRACETAM 1500 MG: 500 TABLET, FILM COATED ORAL at 08:12

## 2024-03-25 RX ADMIN — LEVOFLOXACIN 500 MG: 500 TABLET, FILM COATED ORAL at 21:42

## 2024-03-25 RX ADMIN — PANTOPRAZOLE SODIUM 40 MG: 40 TABLET, DELAYED RELEASE ORAL at 16:15

## 2024-03-25 RX ADMIN — PANTOPRAZOLE SODIUM 40 MG: 40 TABLET, DELAYED RELEASE ORAL at 08:13

## 2024-03-25 RX ADMIN — FLUCONAZOLE 200 MG: 200 TABLET ORAL at 08:13

## 2024-03-25 RX ADMIN — Medication: at 21:44

## 2024-03-25 RX ADMIN — VALACYCLOVIR HYDROCHLORIDE 500 MG: 500 TABLET, FILM COATED ORAL at 21:42

## 2024-03-25 RX ADMIN — SODIUM CHLORIDE, PRESERVATIVE FREE 10 ML: 5 INJECTION INTRAVENOUS at 21:43

## 2024-03-25 RX ADMIN — CYANOCOBALAMIN TAB 1000 MCG 1000 MCG: 1000 TAB at 08:13

## 2024-03-25 RX ADMIN — VALACYCLOVIR HYDROCHLORIDE 500 MG: 500 TABLET, FILM COATED ORAL at 08:13

## 2024-03-25 NOTE — PLAN OF CARE
Problem: Safety - Adult  Goal: Free from fall injury  Outcome: Progressing  Flowsheets (Taken 3/25/2024 1655)  Free From Fall Injury:   Instruct family/caregiver on patient safety   Based on caregiver fall risk screen, instruct family/caregiver to ask for assistance with transferring infant if caregiver noted to have fall risk factors  Note: Patient ambulates with a steady gait independently with no s/s of hypotension. Patient's orthostatic vital signs are negative.     Problem: Infection - Adult  Goal: Absence of infection during hospitalization  Outcome: Progressing  Flowsheets (Taken 3/25/2024 1655)  Absence of infection during hospitalization:   Monitor all insertion sites i.e., indwelling lines, tubes and drains   Assess and monitor for signs and symptoms of infection   Instruct and encourage patient and family to use good hand hygiene technique   Administer medications as ordered  Note: CVC site remains free of signs/symptoms of infection. No drainage, edema, erythema, pain, or warmth noted at site. Dressing changes continue per protocol and on an as needed basis - see flowsheet.

## 2024-03-26 LAB
ALBUMIN SERPL-MCNC: 4 G/DL (ref 3.4–5)
ALP SERPL-CCNC: 50 U/L (ref 40–129)
ALT SERPL-CCNC: 10 U/L (ref 10–40)
ANION GAP SERPL CALCULATED.3IONS-SCNC: 9 MMOL/L (ref 3–16)
ANISOCYTOSIS BLD QL SMEAR: ABNORMAL
APTT BLD: 30.7 SEC (ref 22.7–35.9)
AST SERPL-CCNC: 13 U/L (ref 15–37)
BASO STIPL BLD QL SMEAR: ABNORMAL
BASOPHILS # BLD: 0 K/UL (ref 0–0.2)
BASOPHILS NFR BLD: 0 %
BILIRUB DIRECT SERPL-MCNC: <0.2 MG/DL (ref 0–0.3)
BILIRUB INDIRECT SERPL-MCNC: ABNORMAL MG/DL (ref 0–1)
BILIRUB SERPL-MCNC: 0.9 MG/DL (ref 0–1)
BUN SERPL-MCNC: 12 MG/DL (ref 7–20)
CALCIUM SERPL-MCNC: 9.5 MG/DL (ref 8.3–10.6)
CHLORIDE SERPL-SCNC: 101 MMOL/L (ref 99–110)
CO2 SERPL-SCNC: 25 MMOL/L (ref 21–32)
CREAT SERPL-MCNC: 1 MG/DL (ref 0.9–1.3)
DEPRECATED RDW RBC AUTO: 26.6 % (ref 12.4–15.4)
EOSINOPHIL # BLD: 0 K/UL (ref 0–0.6)
EOSINOPHIL NFR BLD: 0 %
FIBRINOGEN PPP-MCNC: 256 MG/DL (ref 243–550)
GFR SERPLBLD CREATININE-BSD FMLA CKD-EPI: >90 ML/MIN/{1.73_M2}
GLUCOSE SERPL-MCNC: 91 MG/DL (ref 70–99)
HCT VFR BLD AUTO: 22.5 % (ref 40.5–52.5)
HGB BLD-MCNC: 7.7 G/DL (ref 13.5–17.5)
HYPOCHROMIA BLD QL SMEAR: ABNORMAL
LDH SERPL L TO P-CCNC: 388 U/L (ref 100–190)
LYMPHOCYTES # BLD: 1.2 K/UL (ref 1–5.1)
LYMPHOCYTES NFR BLD: 78 %
MACROCYTES BLD QL SMEAR: ABNORMAL
MAGNESIUM SERPL-MCNC: 1.7 MG/DL (ref 1.8–2.4)
MCH RBC QN AUTO: 24.9 PG (ref 26–34)
MCHC RBC AUTO-ENTMCNC: 34.4 G/DL (ref 31–36)
MCV RBC AUTO: 72.6 FL (ref 80–100)
MICROCYTES BLD QL SMEAR: ABNORMAL
MONOCYTES # BLD: 0.1 K/UL (ref 0–1.3)
MONOCYTES NFR BLD: 5 %
NEUTROPHILS # BLD: 0.3 K/UL (ref 1.7–7.7)
NEUTROPHILS NFR BLD: 17 %
OVALOCYTES BLD QL SMEAR: ABNORMAL
PHOSPHATE SERPL-MCNC: 4.9 MG/DL (ref 2.5–4.9)
PLATELET # BLD AUTO: 67 K/UL (ref 135–450)
PMV BLD AUTO: 10.8 FL (ref 5–10.5)
POIKILOCYTOSIS BLD QL SMEAR: ABNORMAL
POTASSIUM SERPL-SCNC: 3.8 MMOL/L (ref 3.5–5.1)
PROT SERPL-MCNC: 6.6 G/DL (ref 6.4–8.2)
RBC # BLD AUTO: 3.1 M/UL (ref 4.2–5.9)
SCHISTOCYTES BLD QL SMEAR: ABNORMAL
SODIUM SERPL-SCNC: 135 MMOL/L (ref 136–145)
TARGETS BLD QL SMEAR: ABNORMAL
URATE SERPL-MCNC: 7.8 MG/DL (ref 3.5–7.2)
WBC # BLD AUTO: 1.5 K/UL (ref 4–11)

## 2024-03-26 PROCEDURE — 84100 ASSAY OF PHOSPHORUS: CPT

## 2024-03-26 PROCEDURE — 6370000000 HC RX 637 (ALT 250 FOR IP): Performed by: NURSE PRACTITIONER

## 2024-03-26 PROCEDURE — 85384 FIBRINOGEN ACTIVITY: CPT

## 2024-03-26 PROCEDURE — 83735 ASSAY OF MAGNESIUM: CPT

## 2024-03-26 PROCEDURE — 2580000003 HC RX 258: Performed by: INTERNAL MEDICINE

## 2024-03-26 PROCEDURE — 83615 LACTATE (LD) (LDH) ENZYME: CPT

## 2024-03-26 PROCEDURE — 2580000003 HC RX 258: Performed by: NURSE PRACTITIONER

## 2024-03-26 PROCEDURE — 99231 SBSQ HOSP IP/OBS SF/LOW 25: CPT | Performed by: SURGERY

## 2024-03-26 PROCEDURE — 85730 THROMBOPLASTIN TIME PARTIAL: CPT

## 2024-03-26 PROCEDURE — 2060000000 HC ICU INTERMEDIATE R&B

## 2024-03-26 PROCEDURE — 36592 COLLECT BLOOD FROM PICC: CPT

## 2024-03-26 PROCEDURE — 85025 COMPLETE CBC W/AUTO DIFF WBC: CPT

## 2024-03-26 PROCEDURE — 84550 ASSAY OF BLOOD/URIC ACID: CPT

## 2024-03-26 PROCEDURE — 80076 HEPATIC FUNCTION PANEL: CPT

## 2024-03-26 PROCEDURE — 80048 BASIC METABOLIC PNL TOTAL CA: CPT

## 2024-03-26 PROCEDURE — 6370000000 HC RX 637 (ALT 250 FOR IP): Performed by: INTERNAL MEDICINE

## 2024-03-26 RX ADMIN — CYANOCOBALAMIN TAB 1000 MCG 1000 MCG: 1000 TAB at 08:10

## 2024-03-26 RX ADMIN — Medication: at 21:00

## 2024-03-26 RX ADMIN — PANTOPRAZOLE SODIUM 40 MG: 40 TABLET, DELAYED RELEASE ORAL at 06:13

## 2024-03-26 RX ADMIN — LEVETIRACETAM 1500 MG: 500 TABLET, FILM COATED ORAL at 21:00

## 2024-03-26 RX ADMIN — PANTOPRAZOLE SODIUM 40 MG: 40 TABLET, DELAYED RELEASE ORAL at 16:23

## 2024-03-26 RX ADMIN — LEVOFLOXACIN 500 MG: 500 TABLET, FILM COATED ORAL at 21:00

## 2024-03-26 RX ADMIN — LEVETIRACETAM 1500 MG: 500 TABLET, FILM COATED ORAL at 08:10

## 2024-03-26 RX ADMIN — VALACYCLOVIR HYDROCHLORIDE 500 MG: 500 TABLET, FILM COATED ORAL at 08:10

## 2024-03-26 RX ADMIN — VALACYCLOVIR HYDROCHLORIDE 500 MG: 500 TABLET, FILM COATED ORAL at 21:00

## 2024-03-26 RX ADMIN — SODIUM CHLORIDE, PRESERVATIVE FREE 10 ML: 5 INJECTION INTRAVENOUS at 08:11

## 2024-03-26 RX ADMIN — Medication: at 08:11

## 2024-03-26 RX ADMIN — SODIUM CHLORIDE, PRESERVATIVE FREE 10 ML: 5 INJECTION INTRAVENOUS at 21:00

## 2024-03-26 RX ADMIN — FLUCONAZOLE 200 MG: 200 TABLET ORAL at 08:10

## 2024-03-26 NOTE — PLAN OF CARE
Problem: Safety - Adult  Goal: Free from fall injury  3/26/2024 0557 by Antonio Osman, RN  Outcome: Progressing  Note: Orthostatic vital signs obtained at start of shift - see flowsheet for details.  Pt does not meet criteria for orthostasis.  Pt is a Med fall risk. See Apple Fall Score and ABCDS Injury Risk assessments.   - Screening for Orthostasis AND not a [place] Risk per APPLE/ABCDS: Pt bed is in low position, side rails up, call light and belongings are in reach.  Fall risk light is on outside pts room.  Pt encouraged to call for assistance as needed. Plan of care ongoing.     Problem: ABCDS Injury Assessment  Goal: Absence of physical injury  Outcome: Progressing  Flowsheets (Taken 3/26/2024 0557)  Absence of Physical Injury: Implement safety measures based on patient assessment     Problem: Hematologic - Adult  Goal: Maintains hematologic stability  Outcome: Progressing  Note: Patient's hemoglobin this AM:   Recent Labs     03/26/24  0331   HGB 7.7*     Patient's platelet count this AM:   Recent Labs     03/26/24  0331   PLT 67*    Thrombocytopenia Precautions in place.  Patient showing no signs or symptoms of active bleeding.  Transfusion not indicated at this time.  Patient verbalizes understanding of all instructions. Will continue to assess and implement POC. Call light within reach and hourly rounding in place.        Problem: Pain  Goal: Verbalizes/displays adequate comfort level or baseline comfort level  Outcome: Progressing  Flowsheets (Taken 3/26/2024 0557)  Verbalizes/displays adequate comfort level or baseline comfort level: Encourage patient to monitor pain and request assistance

## 2024-03-27 LAB
ALBUMIN SERPL-MCNC: 4.1 G/DL (ref 3.4–5)
ALP SERPL-CCNC: 49 U/L (ref 40–129)
ALT SERPL-CCNC: 10 U/L (ref 10–40)
ANION GAP SERPL CALCULATED.3IONS-SCNC: 11 MMOL/L (ref 3–16)
APTT BLD: 32.2 SEC (ref 22.7–35.9)
AST SERPL-CCNC: 12 U/L (ref 15–37)
BASOPHILS # BLD: 0 K/UL (ref 0–0.2)
BASOPHILS NFR BLD: 2.8 %
BILIRUB DIRECT SERPL-MCNC: <0.2 MG/DL (ref 0–0.3)
BILIRUB INDIRECT SERPL-MCNC: ABNORMAL MG/DL (ref 0–1)
BILIRUB SERPL-MCNC: 0.8 MG/DL (ref 0–1)
BUN SERPL-MCNC: 11 MG/DL (ref 7–20)
CALCIUM SERPL-MCNC: 9.2 MG/DL (ref 8.3–10.6)
CHLORIDE SERPL-SCNC: 100 MMOL/L (ref 99–110)
CO2 SERPL-SCNC: 25 MMOL/L (ref 21–32)
CREAT SERPL-MCNC: 0.9 MG/DL (ref 0.9–1.3)
DEPRECATED RDW RBC AUTO: 28.9 % (ref 12.4–15.4)
EOSINOPHIL # BLD: 0.1 K/UL (ref 0–0.6)
EOSINOPHIL NFR BLD: 5 %
FIBRINOGEN PPP-MCNC: 278 MG/DL (ref 243–550)
GFR SERPLBLD CREATININE-BSD FMLA CKD-EPI: >90 ML/MIN/{1.73_M2}
GLUCOSE SERPL-MCNC: 93 MG/DL (ref 70–99)
HCT VFR BLD AUTO: 21.7 % (ref 40.5–52.5)
HGB BLD-MCNC: 7.5 G/DL (ref 13.5–17.5)
LDH SERPL L TO P-CCNC: 369 U/L (ref 100–190)
LYMPHOCYTES # BLD: 0.9 K/UL (ref 1–5.1)
LYMPHOCYTES NFR BLD: 64.3 %
MAGNESIUM SERPL-MCNC: 1.6 MG/DL (ref 1.8–2.4)
MCH RBC QN AUTO: 25.3 PG (ref 26–34)
MCHC RBC AUTO-ENTMCNC: 34.4 G/DL (ref 31–36)
MCV RBC AUTO: 73.6 FL (ref 80–100)
MONOCYTES # BLD: 0.1 K/UL (ref 0–1.3)
MONOCYTES NFR BLD: 5 %
NEUTROPHILS # BLD: 0.3 K/UL (ref 1.7–7.7)
NEUTROPHILS NFR BLD: 22.9 %
PHOSPHATE SERPL-MCNC: 4.6 MG/DL (ref 2.5–4.9)
PLATELET # BLD AUTO: 60 K/UL (ref 135–450)
PMV BLD AUTO: 10.7 FL (ref 5–10.5)
POTASSIUM SERPL-SCNC: 3.8 MMOL/L (ref 3.5–5.1)
PROT SERPL-MCNC: 6.6 G/DL (ref 6.4–8.2)
RBC # BLD AUTO: 2.95 M/UL (ref 4.2–5.9)
SODIUM SERPL-SCNC: 136 MMOL/L (ref 136–145)
URATE SERPL-MCNC: 7.1 MG/DL (ref 3.5–7.2)
WBC # BLD AUTO: 1.5 K/UL (ref 4–11)

## 2024-03-27 PROCEDURE — 85384 FIBRINOGEN ACTIVITY: CPT

## 2024-03-27 PROCEDURE — 85025 COMPLETE CBC W/AUTO DIFF WBC: CPT

## 2024-03-27 PROCEDURE — 83615 LACTATE (LD) (LDH) ENZYME: CPT

## 2024-03-27 PROCEDURE — 80076 HEPATIC FUNCTION PANEL: CPT

## 2024-03-27 PROCEDURE — 6370000000 HC RX 637 (ALT 250 FOR IP): Performed by: NURSE PRACTITIONER

## 2024-03-27 PROCEDURE — 6370000000 HC RX 637 (ALT 250 FOR IP): Performed by: INTERNAL MEDICINE

## 2024-03-27 PROCEDURE — 84100 ASSAY OF PHOSPHORUS: CPT

## 2024-03-27 PROCEDURE — 2580000003 HC RX 258: Performed by: INTERNAL MEDICINE

## 2024-03-27 PROCEDURE — 99231 SBSQ HOSP IP/OBS SF/LOW 25: CPT | Performed by: SURGERY

## 2024-03-27 PROCEDURE — 80048 BASIC METABOLIC PNL TOTAL CA: CPT

## 2024-03-27 PROCEDURE — 2060000000 HC ICU INTERMEDIATE R&B

## 2024-03-27 PROCEDURE — 36592 COLLECT BLOOD FROM PICC: CPT

## 2024-03-27 PROCEDURE — 83735 ASSAY OF MAGNESIUM: CPT

## 2024-03-27 PROCEDURE — 84550 ASSAY OF BLOOD/URIC ACID: CPT

## 2024-03-27 PROCEDURE — 2580000003 HC RX 258: Performed by: NURSE PRACTITIONER

## 2024-03-27 PROCEDURE — 85730 THROMBOPLASTIN TIME PARTIAL: CPT

## 2024-03-27 RX ADMIN — VALACYCLOVIR HYDROCHLORIDE 500 MG: 500 TABLET, FILM COATED ORAL at 20:00

## 2024-03-27 RX ADMIN — Medication: at 08:45

## 2024-03-27 RX ADMIN — LEVOFLOXACIN 500 MG: 500 TABLET, FILM COATED ORAL at 20:00

## 2024-03-27 RX ADMIN — VALACYCLOVIR HYDROCHLORIDE 500 MG: 500 TABLET, FILM COATED ORAL at 08:45

## 2024-03-27 RX ADMIN — SODIUM CHLORIDE, PRESERVATIVE FREE 10 ML: 5 INJECTION INTRAVENOUS at 08:45

## 2024-03-27 RX ADMIN — LEVETIRACETAM 1500 MG: 500 TABLET, FILM COATED ORAL at 08:45

## 2024-03-27 RX ADMIN — PANTOPRAZOLE SODIUM 40 MG: 40 TABLET, DELAYED RELEASE ORAL at 16:03

## 2024-03-27 RX ADMIN — PANTOPRAZOLE SODIUM 40 MG: 40 TABLET, DELAYED RELEASE ORAL at 07:24

## 2024-03-27 RX ADMIN — CYANOCOBALAMIN TAB 1000 MCG 1000 MCG: 1000 TAB at 08:45

## 2024-03-27 RX ADMIN — Medication: at 20:01

## 2024-03-27 RX ADMIN — SODIUM CHLORIDE, PRESERVATIVE FREE 10 ML: 5 INJECTION INTRAVENOUS at 19:57

## 2024-03-27 RX ADMIN — FLUCONAZOLE 200 MG: 200 TABLET ORAL at 08:45

## 2024-03-27 RX ADMIN — LEVETIRACETAM 1500 MG: 500 TABLET, FILM COATED ORAL at 20:00

## 2024-03-27 ASSESSMENT — PAIN SCALES - GENERAL
PAINLEVEL_OUTOF10: 0

## 2024-03-27 NOTE — PLAN OF CARE
Problem: Safety - Adult  Goal: Free from fall injury  Outcome: Progressing  Note: Orthostatic vital signs obtained at start of shift - see flowsheet for details.  Pt does not meet criteria for orthostasis.  Pt is a Med fall risk. See Apple Fall Score and ABCDS Injury Risk assessments.   - Screening for Orthostasis AND not a [place] Risk per APPLE/ABCDS: Pt bed is in low position, side rails up, call light and belongings are in reach.  Fall risk light is on outside pts room.  Pt encouraged to call for assistance as needed. Plan of care ongoing.     Problem: ABCDS Injury Assessment  Goal: Absence of physical injury  Outcome: Progressing  Flowsheets (Taken 3/27/2024 0752)  Absence of Physical Injury: Implement safety measures based on patient assessment     Problem: Hematologic - Adult  Goal: Maintains hematologic stability  Outcome: Progressing  Note: Patient's hemoglobin this AM:   Recent Labs     03/27/24  0350   HGB 7.5*     Patient's platelet count this AM:   Recent Labs     03/27/24  0350   PLT 60*    Thrombocytopenia Precautions in place.  Patient showing no signs or symptoms of active bleeding.  Transfusion not indicated at this time.  Patient verbalizes understanding of all instructions. Will continue to assess and implement POC. Call light within reach and hourly rounding in place.        Problem: Pain  Goal: Verbalizes/displays adequate comfort level or baseline comfort level  Outcome: Progressing  Flowsheets (Taken 3/27/2024 0752)  Verbalizes/displays adequate comfort level or baseline comfort level: Encourage patient to monitor pain and request assistance

## 2024-03-27 NOTE — PLAN OF CARE
Problem: Safety - Adult  Goal: Free from fall injury  3/27/2024 1247 by Suzette Roberts RN  Outcome: Progressing     Problem: ABCDS Injury Assessment  Goal: Absence of physical injury  3/27/2024 1247 by Suzette Roberts RN  Outcome: Progressing     Problem: Hematologic - Adult  Goal: Maintains hematologic stability  3/27/2024 1247 by Suzette Roberts RN  Outcome: Progressing     Problem: Infection - Adult  Goal: Absence of infection at discharge  3/27/2024 1247 by Suzette Roberts RN  Outcome: Progressing     Problem: Respiratory - Adult  Goal: Achieves optimal ventilation and oxygenation  3/27/2024 1247 by Suzette Roberts RN  Outcome: Progressing

## 2024-03-28 LAB
ALBUMIN SERPL-MCNC: 4.1 G/DL (ref 3.4–5)
ALP SERPL-CCNC: 49 U/L (ref 40–129)
ALT SERPL-CCNC: 9 U/L (ref 10–40)
ANION GAP SERPL CALCULATED.3IONS-SCNC: 12 MMOL/L (ref 3–16)
APTT BLD: 31.2 SEC (ref 22.7–35.9)
AST SERPL-CCNC: 12 U/L (ref 15–37)
BASOPHILS # BLD: 0 K/UL (ref 0–0.2)
BASOPHILS NFR BLD: 2.9 %
BILIRUB DIRECT SERPL-MCNC: <0.2 MG/DL (ref 0–0.3)
BILIRUB INDIRECT SERPL-MCNC: ABNORMAL MG/DL (ref 0–1)
BILIRUB SERPL-MCNC: 0.8 MG/DL (ref 0–1)
BLOOD BANK DISPENSE STATUS: NORMAL
BLOOD BANK PRODUCT CODE: NORMAL
BPU ID: NORMAL
BUN SERPL-MCNC: 12 MG/DL (ref 7–20)
CALCIUM SERPL-MCNC: 9.5 MG/DL (ref 8.3–10.6)
CHLORIDE SERPL-SCNC: 101 MMOL/L (ref 99–110)
CO2 SERPL-SCNC: 25 MMOL/L (ref 21–32)
CREAT SERPL-MCNC: 1 MG/DL (ref 0.9–1.3)
DEPRECATED RDW RBC AUTO: 28.3 % (ref 12.4–15.4)
DESCRIPTION BLOOD BANK: NORMAL
EOSINOPHIL # BLD: 0 K/UL (ref 0–0.6)
EOSINOPHIL NFR BLD: 2.6 %
FIBRINOGEN PPP-MCNC: 284 MG/DL (ref 243–550)
GFR SERPLBLD CREATININE-BSD FMLA CKD-EPI: >90 ML/MIN/{1.73_M2}
GLUCOSE SERPL-MCNC: 88 MG/DL (ref 70–99)
HCT VFR BLD AUTO: 22.3 % (ref 40.5–52.5)
HGB BLD-MCNC: 7.4 G/DL (ref 13.5–17.5)
LDH SERPL L TO P-CCNC: 357 U/L (ref 100–190)
LYMPHOCYTES # BLD: 1 K/UL (ref 1–5.1)
LYMPHOCYTES NFR BLD: 70.4 %
Lab: NORMAL
MAGNESIUM SERPL-MCNC: 1.6 MG/DL (ref 1.8–2.4)
MCH RBC QN AUTO: 24.3 PG (ref 26–34)
MCHC RBC AUTO-ENTMCNC: 33.3 G/DL (ref 31–36)
MCV RBC AUTO: 72.9 FL (ref 80–100)
MONOCYTES # BLD: 0 K/UL (ref 0–1.3)
MONOCYTES NFR BLD: 1.3 %
NEUTROPHILS # BLD: 0.3 K/UL (ref 1.7–7.7)
NEUTROPHILS NFR BLD: 22.8 %
PHOSPHATE SERPL-MCNC: 5 MG/DL (ref 2.5–4.9)
PLATELET # BLD AUTO: 49 K/UL (ref 135–450)
PMV BLD AUTO: 10.8 FL (ref 5–10.5)
POTASSIUM SERPL-SCNC: 3.9 MMOL/L (ref 3.5–5.1)
PROT SERPL-MCNC: 6.6 G/DL (ref 6.4–8.2)
RBC # BLD AUTO: 3.06 M/UL (ref 4.2–5.9)
REPORT: NORMAL
SODIUM SERPL-SCNC: 138 MMOL/L (ref 136–145)
THIS TEST SENT TO: NORMAL
URATE SERPL-MCNC: 7.7 MG/DL (ref 3.5–7.2)
WBC # BLD AUTO: 1.5 K/UL (ref 4–11)

## 2024-03-28 PROCEDURE — 85384 FIBRINOGEN ACTIVITY: CPT

## 2024-03-28 PROCEDURE — P9036 PLATELET PHERESIS IRRADIATED: HCPCS

## 2024-03-28 PROCEDURE — 6370000000 HC RX 637 (ALT 250 FOR IP): Performed by: NURSE PRACTITIONER

## 2024-03-28 PROCEDURE — 84100 ASSAY OF PHOSPHORUS: CPT

## 2024-03-28 PROCEDURE — 36592 COLLECT BLOOD FROM PICC: CPT

## 2024-03-28 PROCEDURE — 36430 TRANSFUSION BLD/BLD COMPNT: CPT

## 2024-03-28 PROCEDURE — 2580000003 HC RX 258: Performed by: INTERNAL MEDICINE

## 2024-03-28 PROCEDURE — 80048 BASIC METABOLIC PNL TOTAL CA: CPT

## 2024-03-28 PROCEDURE — 2580000003 HC RX 258: Performed by: NURSE PRACTITIONER

## 2024-03-28 PROCEDURE — 83615 LACTATE (LD) (LDH) ENZYME: CPT

## 2024-03-28 PROCEDURE — 83735 ASSAY OF MAGNESIUM: CPT

## 2024-03-28 PROCEDURE — 6370000000 HC RX 637 (ALT 250 FOR IP): Performed by: INTERNAL MEDICINE

## 2024-03-28 PROCEDURE — 80076 HEPATIC FUNCTION PANEL: CPT

## 2024-03-28 PROCEDURE — 85730 THROMBOPLASTIN TIME PARTIAL: CPT

## 2024-03-28 PROCEDURE — 85025 COMPLETE CBC W/AUTO DIFF WBC: CPT

## 2024-03-28 PROCEDURE — 99231 SBSQ HOSP IP/OBS SF/LOW 25: CPT | Performed by: SURGERY

## 2024-03-28 PROCEDURE — 84550 ASSAY OF BLOOD/URIC ACID: CPT

## 2024-03-28 PROCEDURE — 2060000000 HC ICU INTERMEDIATE R&B

## 2024-03-28 RX ADMIN — Medication: at 09:15

## 2024-03-28 RX ADMIN — VALACYCLOVIR HYDROCHLORIDE 500 MG: 500 TABLET, FILM COATED ORAL at 09:15

## 2024-03-28 RX ADMIN — LEVETIRACETAM 1500 MG: 500 TABLET, FILM COATED ORAL at 19:59

## 2024-03-28 RX ADMIN — Medication: at 19:58

## 2024-03-28 RX ADMIN — VALACYCLOVIR HYDROCHLORIDE 500 MG: 500 TABLET, FILM COATED ORAL at 19:58

## 2024-03-28 RX ADMIN — PANTOPRAZOLE SODIUM 40 MG: 40 TABLET, DELAYED RELEASE ORAL at 06:22

## 2024-03-28 RX ADMIN — SODIUM CHLORIDE, PRESERVATIVE FREE 10 ML: 5 INJECTION INTRAVENOUS at 09:17

## 2024-03-28 RX ADMIN — CYANOCOBALAMIN TAB 1000 MCG 1000 MCG: 1000 TAB at 09:15

## 2024-03-28 RX ADMIN — FLUCONAZOLE 200 MG: 200 TABLET ORAL at 09:15

## 2024-03-28 RX ADMIN — LEVETIRACETAM 1500 MG: 500 TABLET, FILM COATED ORAL at 09:15

## 2024-03-28 RX ADMIN — SODIUM CHLORIDE 15 ML: 900 IRRIGANT IRRIGATION at 20:01

## 2024-03-28 RX ADMIN — LEVOFLOXACIN 500 MG: 500 TABLET, FILM COATED ORAL at 19:59

## 2024-03-28 RX ADMIN — SODIUM CHLORIDE, PRESERVATIVE FREE 10 ML: 5 INJECTION INTRAVENOUS at 09:21

## 2024-03-28 RX ADMIN — SODIUM CHLORIDE, PRESERVATIVE FREE 10 ML: 5 INJECTION INTRAVENOUS at 20:02

## 2024-03-28 RX ADMIN — PANTOPRAZOLE SODIUM 40 MG: 40 TABLET, DELAYED RELEASE ORAL at 16:17

## 2024-03-28 ASSESSMENT — PAIN SCALES - GENERAL: PAINLEVEL_OUTOF10: 0

## 2024-03-28 NOTE — PLAN OF CARE
Problem: Safety - Adult  Goal: Free from fall injury  3/28/2024 1058 by Suzette Roberts RN  Outcome: Progressing     Problem: ABCDS Injury Assessment  Goal: Absence of physical injury  3/28/2024 1058 by Suzette Roberts RN  Outcome: Progressing     Problem: Hematologic - Adult  Goal: Maintains hematologic stability  3/28/2024 1058 by Suzette Roberts RN  Outcome: Progressing

## 2024-03-28 NOTE — PLAN OF CARE
Problem: Safety - Adult  Goal: Free from fall injury  3/28/2024 1014 by Alba Roberts  Outcome: Progressing   Pt is fall free and UAL   Problem: ABCDS Injury Assessment  Goal: Absence of physical injury  3/28/2024 1014 by Alba Roberts  Outcome: Progressing     Problem: Hematologic - Adult  Goal: Maintains hematologic stability  3/28/2024 1014 by Alba Roberts  Outcome: Progressing     Problem: Respiratory - Adult  Goal: Achieves optimal ventilation and oxygenation  3/28/2024 1014 by Alba Roberts  Outcome: Progressing   Patient is stable on room air   Problem: Gastrointestinal - Adult  Goal: Minimal or absence of nausea and vomiting  3/28/2024 1014 by Alba Roberts  Outcome: Progressing     Problem: Gastrointestinal - Adult  Goal: Maintains or returns to baseline bowel function  3/28/2024 1014 by Alba Roberts  Outcome: Progressing   Patient stool is solid   Problem: Gastrointestinal - Adult  Goal: Maintains adequate nutritional intake  3/28/2024 1014 by Alba Roberts  Outcome: Progressing     Problem: Gastrointestinal - Adult  Goal: Establish and maintain optimal ostomy function  3/28/2024 1014 by lAba Roberts  Outcome: Progressing     Problem: Nutrition Deficit:  Goal: Optimize nutritional status  3/28/2024 1014 by Alba Roberts  Outcome: Progressing     Problem: Pain  Goal: Verbalizes/displays adequate comfort level or baseline comfort level  3/28/2024 1014 by Alba Roberts  Outcome: Progressing   Pt states no pain

## 2024-03-28 NOTE — PLAN OF CARE
Problem: Safety - Adult  Goal: Free from fall injury  Outcome: Progressing  Note: Orthostatic vital signs obtained at start of shift - see flowsheet for details.  Pt does not meet criteria for orthostasis.  Pt is a Med fall risk. See Apple Fall Score and ABCDS Injury Risk assessments.   - Screening for Orthostasis AND not a [place] Risk per APPLE/ABCDS: Pt bed is in low position, side rails up, call light and belongings are in reach.  Fall risk light is on outside pts room.  Pt encouraged to call for assistance as needed. Plan of care ongoing.     Problem: ABCDS Injury Assessment  Goal: Absence of physical injury  Outcome: Progressing  Flowsheets (Taken 3/28/2024 0609)  Absence of Physical Injury: Implement safety measures based on patient assessment     Problem: Hematologic - Adult  Goal: Maintains hematologic stability  Outcome: Progressing  Note: Patient's hemoglobin this AM:   Recent Labs     03/28/24  0423   HGB 7.4*     Patient's platelet count this AM:   Recent Labs     03/28/24  0423   PLT 49*    Thrombocytopenia Precautions in place.  Patient showing no signs or symptoms of active bleeding.  Patient transfused blood products per orders - see flowsheet.  Patient verbalizes understanding of all instructions. Will continue to assess and implement POC. Call light within reach and hourly rounding in place.

## 2024-03-29 ENCOUNTER — CLINICAL DOCUMENTATION (OUTPATIENT)
Dept: SURGERY | Age: 44
End: 2024-03-29

## 2024-03-29 LAB
ABO + RH BLD: NORMAL
ALBUMIN SERPL-MCNC: 4.1 G/DL (ref 3.4–5)
ALP SERPL-CCNC: 46 U/L (ref 40–129)
ALT SERPL-CCNC: 8 U/L (ref 10–40)
ANION GAP SERPL CALCULATED.3IONS-SCNC: 11 MMOL/L (ref 3–16)
ANISOCYTOSIS BLD QL SMEAR: ABNORMAL
APTT BLD: 31.6 SEC (ref 22.7–35.9)
AST SERPL-CCNC: 11 U/L (ref 15–37)
BASOPHILS # BLD: 0 K/UL (ref 0–0.2)
BASOPHILS NFR BLD: 0 %
BILIRUB DIRECT SERPL-MCNC: <0.2 MG/DL (ref 0–0.3)
BILIRUB INDIRECT SERPL-MCNC: ABNORMAL MG/DL (ref 0–1)
BILIRUB SERPL-MCNC: 0.8 MG/DL (ref 0–1)
BLD GP AB SCN SERPL QL: NORMAL
BLOOD BANK DISPENSE STATUS: NORMAL
BLOOD BANK PRODUCT CODE: NORMAL
BPU ID: NORMAL
BUN SERPL-MCNC: 12 MG/DL (ref 7–20)
BURR CELLS BLD QL SMEAR: ABNORMAL
CALCIUM SERPL-MCNC: 9.4 MG/DL (ref 8.3–10.6)
CHLORIDE SERPL-SCNC: 97 MMOL/L (ref 99–110)
CO2 SERPL-SCNC: 25 MMOL/L (ref 21–32)
CREAT SERPL-MCNC: 1 MG/DL (ref 0.9–1.3)
DACRYOCYTES BLD QL SMEAR: ABNORMAL
DEPRECATED RDW RBC AUTO: 27.6 % (ref 12.4–15.4)
DESCRIPTION BLOOD BANK: NORMAL
EOSINOPHIL # BLD: 0 K/UL (ref 0–0.6)
EOSINOPHIL NFR BLD: 1 %
FIBRINOGEN PPP-MCNC: 257 MG/DL (ref 243–550)
GFR SERPLBLD CREATININE-BSD FMLA CKD-EPI: >90 ML/MIN/{1.73_M2}
GLUCOSE SERPL-MCNC: 109 MG/DL (ref 70–99)
HCT VFR BLD AUTO: 21.2 % (ref 40.5–52.5)
HGB BLD-MCNC: 7 G/DL (ref 13.5–17.5)
HYPOCHROMIA BLD QL SMEAR: ABNORMAL
LDH SERPL L TO P-CCNC: 330 U/L (ref 100–190)
LYMPHOCYTES # BLD: 1 K/UL (ref 1–5.1)
LYMPHOCYTES NFR BLD: 72 %
MACROCYTES BLD QL SMEAR: ABNORMAL
MAGNESIUM SERPL-MCNC: 1.5 MG/DL (ref 1.8–2.4)
MCH RBC QN AUTO: 24 PG (ref 26–34)
MCHC RBC AUTO-ENTMCNC: 32.9 G/DL (ref 31–36)
MCV RBC AUTO: 73 FL (ref 80–100)
MICROCYTES BLD QL SMEAR: ABNORMAL
MONOCYTES # BLD: 0 K/UL (ref 0–1.3)
MONOCYTES NFR BLD: 3 %
MYELOCYTES NFR BLD MANUAL: 1 %
NEUTROPHILS # BLD: 0.3 K/UL (ref 1.7–7.7)
NEUTROPHILS NFR BLD: 22 %
NEUTS BAND NFR BLD MANUAL: 1 % (ref 0–7)
NRBC BLD-RTO: 1 /100 WBC
OVALOCYTES BLD QL SMEAR: ABNORMAL
PHOSPHATE SERPL-MCNC: 4.6 MG/DL (ref 2.5–4.9)
PLATELET # BLD AUTO: 60 K/UL (ref 135–450)
PLATELET BLD QL SMEAR: ABNORMAL
PMV BLD AUTO: 8.9 FL (ref 5–10.5)
POIKILOCYTOSIS BLD QL SMEAR: ABNORMAL
POTASSIUM SERPL-SCNC: 3.7 MMOL/L (ref 3.5–5.1)
PROT SERPL-MCNC: 6.4 G/DL (ref 6.4–8.2)
RBC # BLD AUTO: 2.91 M/UL (ref 4.2–5.9)
SCHISTOCYTES BLD QL SMEAR: ABNORMAL
SLIDE REVIEW: ABNORMAL
SODIUM SERPL-SCNC: 133 MMOL/L (ref 136–145)
SPHEROCYTES BLD QL SMEAR: ABNORMAL
URATE SERPL-MCNC: 7.7 MG/DL (ref 3.5–7.2)
WBC # BLD AUTO: 1.4 K/UL (ref 4–11)

## 2024-03-29 PROCEDURE — 2060000000 HC ICU INTERMEDIATE R&B

## 2024-03-29 PROCEDURE — 86900 BLOOD TYPING SEROLOGIC ABO: CPT

## 2024-03-29 PROCEDURE — 83615 LACTATE (LD) (LDH) ENZYME: CPT

## 2024-03-29 PROCEDURE — 86901 BLOOD TYPING SEROLOGIC RH(D): CPT

## 2024-03-29 PROCEDURE — 99231 SBSQ HOSP IP/OBS SF/LOW 25: CPT | Performed by: SURGERY

## 2024-03-29 PROCEDURE — 6370000000 HC RX 637 (ALT 250 FOR IP): Performed by: NURSE PRACTITIONER

## 2024-03-29 PROCEDURE — 86850 RBC ANTIBODY SCREEN: CPT

## 2024-03-29 PROCEDURE — 83735 ASSAY OF MAGNESIUM: CPT

## 2024-03-29 PROCEDURE — 85730 THROMBOPLASTIN TIME PARTIAL: CPT

## 2024-03-29 PROCEDURE — 86923 COMPATIBILITY TEST ELECTRIC: CPT

## 2024-03-29 PROCEDURE — 6370000000 HC RX 637 (ALT 250 FOR IP): Performed by: INTERNAL MEDICINE

## 2024-03-29 PROCEDURE — 85025 COMPLETE CBC W/AUTO DIFF WBC: CPT

## 2024-03-29 PROCEDURE — 80076 HEPATIC FUNCTION PANEL: CPT

## 2024-03-29 PROCEDURE — 80048 BASIC METABOLIC PNL TOTAL CA: CPT

## 2024-03-29 PROCEDURE — P9040 RBC LEUKOREDUCED IRRADIATED: HCPCS

## 2024-03-29 PROCEDURE — 2580000003 HC RX 258: Performed by: NURSE PRACTITIONER

## 2024-03-29 PROCEDURE — 84550 ASSAY OF BLOOD/URIC ACID: CPT

## 2024-03-29 PROCEDURE — 6360000002 HC RX W HCPCS: Performed by: INTERNAL MEDICINE

## 2024-03-29 PROCEDURE — 85384 FIBRINOGEN ACTIVITY: CPT

## 2024-03-29 PROCEDURE — 2580000003 HC RX 258: Performed by: INTERNAL MEDICINE

## 2024-03-29 PROCEDURE — 36430 TRANSFUSION BLD/BLD COMPNT: CPT

## 2024-03-29 PROCEDURE — 36592 COLLECT BLOOD FROM PICC: CPT

## 2024-03-29 PROCEDURE — 84100 ASSAY OF PHOSPHORUS: CPT

## 2024-03-29 PROCEDURE — P9073 PLATELETS PHERESIS PATH REDU: HCPCS

## 2024-03-29 RX ORDER — POTASSIUM CHLORIDE 29.8 MG/ML
20 INJECTION INTRAVENOUS PRN
Status: DISCONTINUED | OUTPATIENT
Start: 2024-03-29 | End: 2024-04-02 | Stop reason: HOSPADM

## 2024-03-29 RX ORDER — MAGNESIUM SULFATE IN WATER 40 MG/ML
2000 INJECTION, SOLUTION INTRAVENOUS PRN
Status: DISCONTINUED | OUTPATIENT
Start: 2024-03-29 | End: 2024-04-02 | Stop reason: HOSPADM

## 2024-03-29 RX ORDER — ALLOPURINOL 300 MG/1
300 TABLET ORAL DAILY
Status: DISCONTINUED | OUTPATIENT
Start: 2024-03-29 | End: 2024-04-02 | Stop reason: HOSPADM

## 2024-03-29 RX ADMIN — SODIUM CHLORIDE 15 ML: 900 IRRIGANT IRRIGATION at 21:37

## 2024-03-29 RX ADMIN — Medication: at 08:23

## 2024-03-29 RX ADMIN — PANTOPRAZOLE SODIUM 40 MG: 40 TABLET, DELAYED RELEASE ORAL at 06:56

## 2024-03-29 RX ADMIN — Medication: at 21:36

## 2024-03-29 RX ADMIN — CYANOCOBALAMIN TAB 1000 MCG 1000 MCG: 1000 TAB at 08:22

## 2024-03-29 RX ADMIN — LEVOFLOXACIN 500 MG: 500 TABLET, FILM COATED ORAL at 21:38

## 2024-03-29 RX ADMIN — ALLOPURINOL 300 MG: 300 TABLET ORAL at 12:45

## 2024-03-29 RX ADMIN — FLUCONAZOLE 200 MG: 200 TABLET ORAL at 08:22

## 2024-03-29 RX ADMIN — MAGNESIUM SULFATE HEPTAHYDRATE 2000 MG: 40 INJECTION, SOLUTION INTRAVENOUS at 15:56

## 2024-03-29 RX ADMIN — VALACYCLOVIR HYDROCHLORIDE 500 MG: 500 TABLET, FILM COATED ORAL at 21:37

## 2024-03-29 RX ADMIN — SODIUM CHLORIDE, PRESERVATIVE FREE 20 ML: 5 INJECTION INTRAVENOUS at 21:38

## 2024-03-29 RX ADMIN — SODIUM CHLORIDE, PRESERVATIVE FREE 10 ML: 5 INJECTION INTRAVENOUS at 21:38

## 2024-03-29 RX ADMIN — SODIUM CHLORIDE, PRESERVATIVE FREE 10 ML: 5 INJECTION INTRAVENOUS at 08:24

## 2024-03-29 RX ADMIN — PANTOPRAZOLE SODIUM 40 MG: 40 TABLET, DELAYED RELEASE ORAL at 15:52

## 2024-03-29 RX ADMIN — LEVETIRACETAM 1500 MG: 500 TABLET, FILM COATED ORAL at 21:38

## 2024-03-29 RX ADMIN — LEVETIRACETAM 1500 MG: 500 TABLET, FILM COATED ORAL at 08:22

## 2024-03-29 RX ADMIN — VALACYCLOVIR HYDROCHLORIDE 500 MG: 500 TABLET, FILM COATED ORAL at 08:22

## 2024-03-29 ASSESSMENT — PAIN SCALES - GENERAL: PAINLEVEL_OUTOF10: 0

## 2024-03-29 NOTE — PLAN OF CARE
Problem: Safety - Adult  Goal: Free from fall injury  Outcome: Progressing  Note:   - Screening for Orthostasis AND not a [place] Risk per APPLE/ABCDS: Pt bed is in low position, side rails up, call light and belongings are in reach.  Fall risk light is on outside pts room.  Pt encouraged to call for assistance as needed. Will continue with hourly rounds for PO intake, pain needs, toileting and repositioning as needed.        Problem: Hematologic - Adult  Goal: Maintains hematologic stability  Outcome: Progressing  Note: Patient's hemoglobin this AM:   Recent Labs     03/29/24  0445   HGB 7.0*     Patient's platelet count this AM:   Recent Labs     03/29/24  0445   PLT 60*    Thrombocytopenia Precautions in place.  Patient showing no signs or symptoms of active bleeding.  Patient transfused blood products per orders - see flowsheet.  Patient verbalizes understanding of all instructions. Will continue to assess and implement POC. Call light within reach and hourly rounding in place.        Problem: Infection - Adult  Goal: Absence of infection at discharge  Outcome: Progressing  Note: PICC site remains free of signs/symptoms of infection. No drainage, edema, erythema, pain, or warmth noted at site. Dressing changes continue per protocol and on an as needed basis - see flowsheet.

## 2024-03-29 NOTE — PROGRESS NOTES
Dennys Peguero  [id number]  1980    Rectal Cancer Tumor Board Pretreatment Summary:  Presented on 03/29/24    - Tumor location: high  - Sphincter involvement: no  - Clinical Stage: [id number]  - Pretreatment circumferential margin status: not threatened  - CEA: 6.5 (3/23/24)  - Path reviewed by MDT member: yes - [place] - confirmed adenoCA  - MRI reviewed by MDT member: yes - [facility]l - confirmed T2N1  - Neoadjuvant treatment recommendation: none  - Type and duration of neoadjuvant therapy recommended: N/A  - Anticipated date and type of surgical procedure: Plan robotic/lap LAR after counts revier from AML treatment  - Clinical research study eligibility and/or enrollment: none    Other:  - currently undergoing induction for AML/MDS; will have a brierf window in about 3-4 wks for LAR before needing bone marrow transplant  - also having intermittent bleeding - possibility of requiring more urgent treatment - LAR vs radiation

## 2024-03-29 NOTE — PLAN OF CARE
Problem: Safety - Adult  Goal: Free from fall injury  3/29/2024 1328 by Lillian Short, RN  Outcome: Progressing  Flowsheets (Taken 3/29/2024 1328)  Free From Fall Injury: Instruct family/caregiver on patient safety  Note: Orthostatic vital signs obtained at start of shift - see flowsheet for details.  Pt does not meet criteria for orthostasis.  Pt is a Med fall risk. See Apple Fall Score and ABCDS Injury Risk assessments.     - Screening for Orthostasis AND not a [place] Risk per APPLE/ABCDS: Pt bed is in low position, side rails up, call light and belongings are in reach.  Fall risk light is on outside pts room.  Pt encouraged to call for assistance as needed. Will continue with hourly rounds for PO intake, pain needs, toileting and repositioning as needed.      Problem: Gastrointestinal - Adult  Goal: Maintains or returns to baseline bowel function  3/29/2024 1328 by Lillian Short, RN  Outcome: Progressing  Note: Pt had a formed stool with streak of blood.     Problem: Nutrition Deficit:  Goal: Optimize nutritional status  3/29/2024 1328 by Lillian Short, RN  Outcome: Progressing  Note: Pt tolerating food.

## 2024-03-30 LAB
ALBUMIN SERPL-MCNC: 4 G/DL (ref 3.4–5)
ALP SERPL-CCNC: 47 U/L (ref 40–129)
ALT SERPL-CCNC: 8 U/L (ref 10–40)
ANION GAP SERPL CALCULATED.3IONS-SCNC: 11 MMOL/L (ref 3–16)
ANISOCYTOSIS BLD QL SMEAR: ABNORMAL
APTT BLD: 31.1 SEC (ref 22.7–35.9)
AST SERPL-CCNC: 11 U/L (ref 15–37)
BASOPHILS # BLD: 0.1 K/UL (ref 0–0.2)
BASOPHILS NFR BLD: 6 %
BILIRUB DIRECT SERPL-MCNC: <0.2 MG/DL (ref 0–0.3)
BILIRUB INDIRECT SERPL-MCNC: ABNORMAL MG/DL (ref 0–1)
BILIRUB SERPL-MCNC: 0.9 MG/DL (ref 0–1)
BUN SERPL-MCNC: 13 MG/DL (ref 7–20)
BURR CELLS BLD QL SMEAR: ABNORMAL
CALCIUM SERPL-MCNC: 9.5 MG/DL (ref 8.3–10.6)
CHLORIDE SERPL-SCNC: 98 MMOL/L (ref 99–110)
CO2 SERPL-SCNC: 26 MMOL/L (ref 21–32)
CREAT SERPL-MCNC: 1 MG/DL (ref 0.9–1.3)
DACRYOCYTES BLD QL SMEAR: ABNORMAL
DEPRECATED RDW RBC AUTO: 23.5 % (ref 12.4–15.4)
EOSINOPHIL # BLD: 0 K/UL (ref 0–0.6)
EOSINOPHIL NFR BLD: 2 %
FIBRINOGEN PPP-MCNC: 276 MG/DL (ref 243–550)
GFR SERPLBLD CREATININE-BSD FMLA CKD-EPI: >90 ML/MIN/{1.73_M2}
GLUCOSE SERPL-MCNC: 93 MG/DL (ref 70–99)
HCT VFR BLD AUTO: 23.1 % (ref 40.5–52.5)
HGB BLD-MCNC: 7.9 G/DL (ref 13.5–17.5)
HYPOCHROMIA BLD QL SMEAR: ABNORMAL
LDH SERPL L TO P-CCNC: 337 U/L (ref 100–190)
LYMPHOCYTES # BLD: 1 K/UL (ref 1–5.1)
LYMPHOCYTES NFR BLD: 66 %
MACROCYTES BLD QL SMEAR: ABNORMAL
MAGNESIUM SERPL-MCNC: 1.8 MG/DL (ref 1.8–2.4)
MCH RBC QN AUTO: 25.8 PG (ref 26–34)
MCHC RBC AUTO-ENTMCNC: 34.3 G/DL (ref 31–36)
MCV RBC AUTO: 75.1 FL (ref 80–100)
MICROCYTES BLD QL SMEAR: ABNORMAL
MONOCYTES # BLD: 0 K/UL (ref 0–1.3)
MONOCYTES NFR BLD: 2 %
NEUTROPHILS # BLD: 0.4 K/UL (ref 1.7–7.7)
NEUTROPHILS NFR BLD: 24 %
NRBC BLD-RTO: 10 /100 WBC
OVALOCYTES BLD QL SMEAR: ABNORMAL
PATH INTERP BLD-IMP: NO
PHOSPHATE SERPL-MCNC: 4.8 MG/DL (ref 2.5–4.9)
PLATELET # BLD AUTO: 52 K/UL (ref 135–450)
PLATELET BLD QL SMEAR: ABNORMAL
PMV BLD AUTO: 10.7 FL (ref 5–10.5)
POIKILOCYTOSIS BLD QL SMEAR: ABNORMAL
POTASSIUM SERPL-SCNC: 4 MMOL/L (ref 3.5–5.1)
PROT SERPL-MCNC: 6.7 G/DL (ref 6.4–8.2)
RBC # BLD AUTO: 3.08 M/UL (ref 4.2–5.9)
SCHISTOCYTES BLD QL SMEAR: ABNORMAL
SLIDE REVIEW: ABNORMAL
SODIUM SERPL-SCNC: 135 MMOL/L (ref 136–145)
TARGETS BLD QL SMEAR: ABNORMAL
URATE SERPL-MCNC: 7.7 MG/DL (ref 3.5–7.2)
WBC # BLD AUTO: 1.5 K/UL (ref 4–11)

## 2024-03-30 PROCEDURE — 84550 ASSAY OF BLOOD/URIC ACID: CPT

## 2024-03-30 PROCEDURE — 84100 ASSAY OF PHOSPHORUS: CPT

## 2024-03-30 PROCEDURE — 6370000000 HC RX 637 (ALT 250 FOR IP): Performed by: NURSE PRACTITIONER

## 2024-03-30 PROCEDURE — 2580000003 HC RX 258: Performed by: NURSE PRACTITIONER

## 2024-03-30 PROCEDURE — 2060000000 HC ICU INTERMEDIATE R&B

## 2024-03-30 PROCEDURE — 6360000002 HC RX W HCPCS: Performed by: INTERNAL MEDICINE

## 2024-03-30 PROCEDURE — 80076 HEPATIC FUNCTION PANEL: CPT

## 2024-03-30 PROCEDURE — 85025 COMPLETE CBC W/AUTO DIFF WBC: CPT

## 2024-03-30 PROCEDURE — 36592 COLLECT BLOOD FROM PICC: CPT

## 2024-03-30 PROCEDURE — 2580000003 HC RX 258: Performed by: INTERNAL MEDICINE

## 2024-03-30 PROCEDURE — 83735 ASSAY OF MAGNESIUM: CPT

## 2024-03-30 PROCEDURE — 85384 FIBRINOGEN ACTIVITY: CPT

## 2024-03-30 PROCEDURE — 85730 THROMBOPLASTIN TIME PARTIAL: CPT

## 2024-03-30 PROCEDURE — 6370000000 HC RX 637 (ALT 250 FOR IP): Performed by: INTERNAL MEDICINE

## 2024-03-30 PROCEDURE — 80048 BASIC METABOLIC PNL TOTAL CA: CPT

## 2024-03-30 PROCEDURE — 83615 LACTATE (LD) (LDH) ENZYME: CPT

## 2024-03-30 RX ADMIN — FLUCONAZOLE 200 MG: 200 TABLET ORAL at 09:54

## 2024-03-30 RX ADMIN — MAGNESIUM SULFATE HEPTAHYDRATE 2000 MG: 40 INJECTION, SOLUTION INTRAVENOUS at 06:49

## 2024-03-30 RX ADMIN — SODIUM CHLORIDE, PRESERVATIVE FREE 10 ML: 5 INJECTION INTRAVENOUS at 19:59

## 2024-03-30 RX ADMIN — SODIUM CHLORIDE, PRESERVATIVE FREE 10 ML: 5 INJECTION INTRAVENOUS at 09:55

## 2024-03-30 RX ADMIN — Medication: at 09:55

## 2024-03-30 RX ADMIN — Medication: at 19:54

## 2024-03-30 RX ADMIN — ALLOPURINOL 300 MG: 300 TABLET ORAL at 09:54

## 2024-03-30 RX ADMIN — VALACYCLOVIR HYDROCHLORIDE 500 MG: 500 TABLET, FILM COATED ORAL at 19:53

## 2024-03-30 RX ADMIN — CYANOCOBALAMIN TAB 1000 MCG 1000 MCG: 1000 TAB at 09:54

## 2024-03-30 RX ADMIN — SODIUM CHLORIDE 15 ML: 900 IRRIGANT IRRIGATION at 19:59

## 2024-03-30 RX ADMIN — SODIUM CHLORIDE, PRESERVATIVE FREE 10 ML: 5 INJECTION INTRAVENOUS at 09:56

## 2024-03-30 RX ADMIN — VALACYCLOVIR HYDROCHLORIDE 500 MG: 500 TABLET, FILM COATED ORAL at 09:54

## 2024-03-30 RX ADMIN — PANTOPRAZOLE SODIUM 40 MG: 40 TABLET, DELAYED RELEASE ORAL at 06:44

## 2024-03-30 RX ADMIN — LEVETIRACETAM 1500 MG: 500 TABLET, FILM COATED ORAL at 19:52

## 2024-03-30 RX ADMIN — PANTOPRAZOLE SODIUM 40 MG: 40 TABLET, DELAYED RELEASE ORAL at 15:47

## 2024-03-30 RX ADMIN — LEVOFLOXACIN 500 MG: 500 TABLET, FILM COATED ORAL at 19:53

## 2024-03-30 RX ADMIN — LEVETIRACETAM 1500 MG: 500 TABLET, FILM COATED ORAL at 09:54

## 2024-03-30 NOTE — PLAN OF CARE
Problem: Safety - Adult  Goal: Free from fall injury  Outcome: Progressing  Flowsheets (Taken 3/30/2024 1705)  Free From Fall Injury: Instruct family/caregiver on patient safety  Note: Orthostatic vital signs obtained at start of shift - see flowsheet for details.  Pt does not meet criteria for orthostasis.  Pt is a Med fall risk. See Apple Fall Score and ABCDS Injury Risk assessments.     - Screening for Orthostasis AND not a [place] Risk per APPLE/ABCDS: Pt bed is in low position, side rails up, call light and belongings are in reach.  Fall risk light is on outside pts room.  Pt encouraged to call for assistance as needed. Will continue with hourly rounds for PO intake, pain needs, toileting and repositioning as needed.      Problem: Gastrointestinal - Adult  Goal: Minimal or absence of nausea and vomiting  Outcome: Progressing  Note: Pt tolerated food and PO.      Problem: Gastrointestinal - Adult  Goal: Maintains or returns to baseline bowel function  Outcome: Progressing  Note: Pt continues to have formed BM

## 2024-03-30 NOTE — PLAN OF CARE
Problem: Safety - Adult  Goal: Free from fall injury  3/29/2024 2246 by Julia Bass, RN  Outcome: Progressing  Flowsheets (Taken 3/29/2024 2246)  Free From Fall Injury:   Instruct family/caregiver on patient safety   Based on caregiver fall risk screen, instruct family/caregiver to ask for assistance with transferring infant if caregiver noted to have fall risk factors     Problem: ABCDS Injury Assessment  Goal: Absence of physical injury  Outcome: Progressing  Flowsheets (Taken 3/29/2024 2246)  Absence of Physical Injury: Implement safety measures based on patient assessment     Problem: Hematologic - Adult  Goal: Maintains hematologic stability  Outcome: Progressing  Flowsheets (Taken 3/29/2024 2246)  Maintains hematologic stability:   Assess for signs and symptoms of bleeding or hemorrhage   Monitor labs for bleeding or clotting disorders     Problem: Pain  Goal: Verbalizes/displays adequate comfort level or baseline comfort level  Outcome: Progressing  Flowsheets (Taken 3/29/2024 2246)  Verbalizes/displays adequate comfort level or baseline comfort level:   Encourage patient to monitor pain and request assistance   Assess pain using appropriate pain scale     Problem: Infection - Adult  Goal: Absence of infection at discharge  Outcome: Progressing  Flowsheets (Taken 3/29/2024 2246)  Absence of infection at discharge:   Assess and monitor for signs and symptoms of infection   Monitor lab/diagnostic results   Monitor all insertion sites i.e., indwelling lines, tubes and drains     Problem: Infection - Adult  Goal: Absence of fever/infection during anticipated neutropenic period  Outcome: Progressing  Flowsheets (Taken 3/29/2024 2246)  Absence of fever/infection during anticipated neutropenic period: Monitor white blood cell count

## 2024-03-31 LAB
ALBUMIN SERPL-MCNC: 4.2 G/DL (ref 3.4–5)
ALP SERPL-CCNC: 51 U/L (ref 40–129)
ALT SERPL-CCNC: 8 U/L (ref 10–40)
ANION GAP SERPL CALCULATED.3IONS-SCNC: 11 MMOL/L (ref 3–16)
ANISOCYTOSIS BLD QL SMEAR: ABNORMAL
APTT BLD: 31.9 SEC (ref 22.7–35.9)
AST SERPL-CCNC: 11 U/L (ref 15–37)
BASOPHILS # BLD: 0 K/UL (ref 0–0.2)
BASOPHILS NFR BLD: 0 %
BILIRUB DIRECT SERPL-MCNC: <0.2 MG/DL (ref 0–0.3)
BILIRUB INDIRECT SERPL-MCNC: ABNORMAL MG/DL (ref 0–1)
BILIRUB SERPL-MCNC: 1 MG/DL (ref 0–1)
BLOOD BANK DISPENSE STATUS: NORMAL
BLOOD BANK PRODUCT CODE: NORMAL
BPU ID: NORMAL
BUN SERPL-MCNC: 14 MG/DL (ref 7–20)
BURR CELLS BLD QL SMEAR: ABNORMAL
CALCIUM SERPL-MCNC: 9.5 MG/DL (ref 8.3–10.6)
CHLORIDE SERPL-SCNC: 96 MMOL/L (ref 99–110)
CO2 SERPL-SCNC: 27 MMOL/L (ref 21–32)
CREAT SERPL-MCNC: 1 MG/DL (ref 0.9–1.3)
DACRYOCYTES BLD QL SMEAR: ABNORMAL
DEPRECATED RDW RBC AUTO: 25.1 % (ref 12.4–15.4)
DESCRIPTION BLOOD BANK: NORMAL
EOSINOPHIL # BLD: 0 K/UL (ref 0–0.6)
EOSINOPHIL NFR BLD: 0 %
FIBRINOGEN PPP-MCNC: 277 MG/DL (ref 243–550)
GFR SERPLBLD CREATININE-BSD FMLA CKD-EPI: >90 ML/MIN/{1.73_M2}
GLUCOSE SERPL-MCNC: 90 MG/DL (ref 70–99)
HCT VFR BLD AUTO: 22.6 % (ref 40.5–52.5)
HGB BLD-MCNC: 7.9 G/DL (ref 13.5–17.5)
LDH SERPL L TO P-CCNC: 314 U/L (ref 100–190)
LYMPHOCYTES # BLD: 1.2 K/UL (ref 1–5.1)
LYMPHOCYTES NFR BLD: 74 %
MACROCYTES BLD QL SMEAR: ABNORMAL
MAGNESIUM SERPL-MCNC: 1.8 MG/DL (ref 1.8–2.4)
MCH RBC QN AUTO: 26 PG (ref 26–34)
MCHC RBC AUTO-ENTMCNC: 35 G/DL (ref 31–36)
MCV RBC AUTO: 74.4 FL (ref 80–100)
MICROCYTES BLD QL SMEAR: ABNORMAL
MONOCYTES # BLD: 0 K/UL (ref 0–1.3)
MONOCYTES NFR BLD: 2 %
NEUTROPHILS # BLD: 0.4 K/UL (ref 1.7–7.7)
NEUTROPHILS NFR BLD: 24 %
NRBC BLD-RTO: 4 /100 WBC
OVALOCYTES BLD QL SMEAR: ABNORMAL
PHOSPHATE SERPL-MCNC: 5 MG/DL (ref 2.5–4.9)
PLATELET # BLD AUTO: 43 K/UL (ref 135–450)
PLATELET BLD QL SMEAR: ABNORMAL
PMV BLD AUTO: 10.8 FL (ref 5–10.5)
POIKILOCYTOSIS BLD QL SMEAR: ABNORMAL
POLYCHROMASIA BLD QL SMEAR: ABNORMAL
POTASSIUM SERPL-SCNC: 3.8 MMOL/L (ref 3.5–5.1)
PROT SERPL-MCNC: 6.6 G/DL (ref 6.4–8.2)
RBC # BLD AUTO: 3.04 M/UL (ref 4.2–5.9)
SCHISTOCYTES BLD QL SMEAR: ABNORMAL
SLIDE REVIEW: ABNORMAL
SODIUM SERPL-SCNC: 134 MMOL/L (ref 136–145)
TARGETS BLD QL SMEAR: ABNORMAL
URATE SERPL-MCNC: 7.1 MG/DL (ref 3.5–7.2)
WBC # BLD AUTO: 1.6 K/UL (ref 4–11)

## 2024-03-31 PROCEDURE — 84550 ASSAY OF BLOOD/URIC ACID: CPT

## 2024-03-31 PROCEDURE — 83735 ASSAY OF MAGNESIUM: CPT

## 2024-03-31 PROCEDURE — 85730 THROMBOPLASTIN TIME PARTIAL: CPT

## 2024-03-31 PROCEDURE — 36430 TRANSFUSION BLD/BLD COMPNT: CPT

## 2024-03-31 PROCEDURE — 80048 BASIC METABOLIC PNL TOTAL CA: CPT

## 2024-03-31 PROCEDURE — 2580000003 HC RX 258: Performed by: INTERNAL MEDICINE

## 2024-03-31 PROCEDURE — 6370000000 HC RX 637 (ALT 250 FOR IP): Performed by: NURSE PRACTITIONER

## 2024-03-31 PROCEDURE — 85384 FIBRINOGEN ACTIVITY: CPT

## 2024-03-31 PROCEDURE — 80076 HEPATIC FUNCTION PANEL: CPT

## 2024-03-31 PROCEDURE — 6360000002 HC RX W HCPCS: Performed by: INTERNAL MEDICINE

## 2024-03-31 PROCEDURE — 36592 COLLECT BLOOD FROM PICC: CPT

## 2024-03-31 PROCEDURE — 6370000000 HC RX 637 (ALT 250 FOR IP): Performed by: INTERNAL MEDICINE

## 2024-03-31 PROCEDURE — 2060000000 HC ICU INTERMEDIATE R&B

## 2024-03-31 PROCEDURE — 83615 LACTATE (LD) (LDH) ENZYME: CPT

## 2024-03-31 PROCEDURE — 85025 COMPLETE CBC W/AUTO DIFF WBC: CPT

## 2024-03-31 PROCEDURE — 84100 ASSAY OF PHOSPHORUS: CPT

## 2024-03-31 RX ADMIN — MAGNESIUM SULFATE HEPTAHYDRATE 2000 MG: 40 INJECTION, SOLUTION INTRAVENOUS at 06:27

## 2024-03-31 RX ADMIN — SODIUM CHLORIDE, PRESERVATIVE FREE 10 ML: 5 INJECTION INTRAVENOUS at 09:14

## 2024-03-31 RX ADMIN — CYANOCOBALAMIN TAB 1000 MCG 1000 MCG: 1000 TAB at 09:13

## 2024-03-31 RX ADMIN — LEVOFLOXACIN 500 MG: 500 TABLET, FILM COATED ORAL at 20:04

## 2024-03-31 RX ADMIN — SODIUM CHLORIDE, PRESERVATIVE FREE 10 ML: 5 INJECTION INTRAVENOUS at 20:07

## 2024-03-31 RX ADMIN — SODIUM CHLORIDE 15 ML: 900 IRRIGANT IRRIGATION at 20:06

## 2024-03-31 RX ADMIN — FLUCONAZOLE 200 MG: 200 TABLET ORAL at 09:14

## 2024-03-31 RX ADMIN — LEVETIRACETAM 1500 MG: 500 TABLET, FILM COATED ORAL at 09:13

## 2024-03-31 RX ADMIN — PANTOPRAZOLE SODIUM 40 MG: 40 TABLET, DELAYED RELEASE ORAL at 16:28

## 2024-03-31 RX ADMIN — PANTOPRAZOLE SODIUM 40 MG: 40 TABLET, DELAYED RELEASE ORAL at 06:28

## 2024-03-31 RX ADMIN — ALLOPURINOL 300 MG: 300 TABLET ORAL at 09:13

## 2024-03-31 RX ADMIN — LEVETIRACETAM 1500 MG: 500 TABLET, FILM COATED ORAL at 20:04

## 2024-03-31 RX ADMIN — Medication: at 09:13

## 2024-03-31 RX ADMIN — VALACYCLOVIR HYDROCHLORIDE 500 MG: 500 TABLET, FILM COATED ORAL at 09:13

## 2024-03-31 RX ADMIN — VALACYCLOVIR HYDROCHLORIDE 500 MG: 500 TABLET, FILM COATED ORAL at 20:04

## 2024-03-31 RX ADMIN — Medication: at 20:05

## 2024-03-31 NOTE — PLAN OF CARE
Problem: Hematologic - Adult  Goal: Maintains hematologic stability  Outcome: Progressing  Note: Patient's hemoglobin this AM:   Recent Labs     03/31/24  0350   HGB 7.9*     Patient's platelet count this AM:   Recent Labs     03/31/24  0350   PLT 43*    Thrombocytopenia Precautions in place.  Patient showing no signs or symptoms of active bleeding.  Patient transfused blood products per orders - see flowsheet.  Patient verbalizes understanding of all instructions. Will continue to assess and implement POC. Call light within reach and hourly rounding in place.        Problem: Safety - Adult  Goal: Free from fall injury  Outcome: Progressing  Note:   - Screening for Orthostasis AND not a [place] Risk per APPLE/ABCDS: Pt bed is in low position, side rails up, call light and belongings are in reach.  Fall risk light is on outside pts room.  Pt encouraged to call for assistance as needed. Will continue with hourly rounds for PO intake, pain needs, toileting and repositioning as needed.        Problem: Infection - Adult  Goal: Absence of infection at discharge  Outcome: Progressing  Note: PICC site remains free of signs/symptoms of infection. No drainage, edema, erythema, pain, or warmth noted at site. Dressing changes continue per protocol and on an as needed basis - see flowsheet.

## 2024-03-31 NOTE — PLAN OF CARE
Problem: Safety - Adult  Goal: Free from fall injury  3/31/2024 1456 by Kenya lAston RN  Outcome: Progressing  Flowsheets (Taken 3/31/2024 1456)  Free From Fall Injury: Instruct family/caregiver on patient safety  Note: Orthostatic vital signs obtained at start of shift - see flowsheet for details.  Pt does not meet criteria for orthostasis.  Pt is a Med fall risk. See Jose Fall Score and ABCDS Injury Risk assessments.   - Screening for Orthostasis AND not a [place] Risk per JOSE/ABCDS: Pt bed is in low position, side rails up, call light and belongings are in reach.  Fall risk light is on outside pts room.  Pt encouraged to call for assistance as needed. Will continue with hourly rounds for PO intake, pain needs, toileting and repositioning as needed.       Problem: ABCDS Injury Assessment  Goal: Absence of physical injury  3/31/2024 1456 by Kenya Alstno RN  Outcome: Progressing  Flowsheets (Taken 3/31/2024 1456)  Absence of Physical Injury: Implement safety measures based on patient assessment     Problem: Hematologic - Adult  Goal: Maintains hematologic stability  3/31/2024 1456 by Kenya Alston RN  Outcome: Progressing  Flowsheets (Taken 3/31/2024 1456)  Maintains hematologic stability:   Assess for signs and symptoms of bleeding or hemorrhage   Administer blood products/factors as ordered   Monitor labs for bleeding or clotting disorders  Note: Patient's hemoglobin this AM:   Recent Labs     03/31/24  0350   HGB 7.9*     Patient's platelet count this AM:   Recent Labs     03/31/24  0350   PLT 43*    Thrombocytopenia Precautions in place.  Patient showing no signs or symptoms of active bleeding.  Transfusion not indicated at this time.  Patient verbalizes understanding of all instructions. Will continue to assess and implement POC. Call light within reach and hourly rounding in place.         Problem: Gastrointestinal - Adult  Goal: Minimal or absence of nausea and vomiting  3/31/2024 1456 by

## 2024-04-01 LAB
ALBUMIN SERPL-MCNC: 4.1 G/DL (ref 3.4–5)
ALP SERPL-CCNC: 50 U/L (ref 40–129)
ALT SERPL-CCNC: 8 U/L (ref 10–40)
ANION GAP SERPL CALCULATED.3IONS-SCNC: 12 MMOL/L (ref 3–16)
APTT BLD: 30.6 SEC (ref 22.7–35.9)
AST SERPL-CCNC: 10 U/L (ref 15–37)
BASOPHILS # BLD: 0 K/UL (ref 0–0.2)
BASOPHILS NFR BLD: 0 %
BILIRUB DIRECT SERPL-MCNC: <0.2 MG/DL (ref 0–0.3)
BILIRUB INDIRECT SERPL-MCNC: ABNORMAL MG/DL (ref 0–1)
BILIRUB SERPL-MCNC: 1 MG/DL (ref 0–1)
BILIRUB UR QL STRIP.AUTO: NEGATIVE
BUN SERPL-MCNC: 13 MG/DL (ref 7–20)
CALCIUM SERPL-MCNC: 9.5 MG/DL (ref 8.3–10.6)
CHLORIDE SERPL-SCNC: 98 MMOL/L (ref 99–110)
CLARITY UR: CLEAR
CO2 SERPL-SCNC: 26 MMOL/L (ref 21–32)
COLOR UR: YELLOW
CREAT SERPL-MCNC: 1 MG/DL (ref 0.9–1.3)
DACRYOCYTES BLD QL SMEAR: ABNORMAL
DEPRECATED RDW RBC AUTO: 24.9 % (ref 12.4–15.4)
EOSINOPHIL # BLD: 0 K/UL (ref 0–0.6)
EOSINOPHIL NFR BLD: 0 %
FIBRINOGEN PPP-MCNC: 324 MG/DL (ref 243–550)
GFR SERPLBLD CREATININE-BSD FMLA CKD-EPI: >90 ML/MIN/{1.73_M2}
GLUCOSE SERPL-MCNC: 92 MG/DL (ref 70–99)
GLUCOSE UR STRIP.AUTO-MCNC: NEGATIVE MG/DL
HCT VFR BLD AUTO: 22.2 % (ref 40.5–52.5)
HGB BLD-MCNC: 7.5 G/DL (ref 13.5–17.5)
HGB UR QL STRIP.AUTO: NEGATIVE
HYPOCHROMIA BLD QL SMEAR: ABNORMAL
KETONES UR STRIP.AUTO-MCNC: NEGATIVE MG/DL
LDH SERPL L TO P-CCNC: 307 U/L (ref 100–190)
LEUKOCYTE ESTERASE UR QL STRIP.AUTO: NEGATIVE
LYMPHOCYTES # BLD: 1.3 K/UL (ref 1–5.1)
LYMPHOCYTES NFR BLD: 86 %
MACROCYTES BLD QL SMEAR: ABNORMAL
MAGNESIUM SERPL-MCNC: 1.8 MG/DL (ref 1.8–2.4)
MCH RBC QN AUTO: 25.6 PG (ref 26–34)
MCHC RBC AUTO-ENTMCNC: 34 G/DL (ref 31–36)
MCV RBC AUTO: 75.4 FL (ref 80–100)
MICROCYTES BLD QL SMEAR: ABNORMAL
MONOCYTES # BLD: 0 K/UL (ref 0–1.3)
MONOCYTES NFR BLD: 2 %
NEUTROPHILS # BLD: 0.2 K/UL (ref 1.7–7.7)
NEUTROPHILS NFR BLD: 12 %
NEUTS VAC BLD QL SMEAR: PRESENT
NITRITE UR QL STRIP.AUTO: NEGATIVE
NRBC BLD-RTO: 4 /100 WBC
OVALOCYTES BLD QL SMEAR: ABNORMAL
PH UR STRIP.AUTO: 6.5 [PH] (ref 5–8)
PHOSPHATE SERPL-MCNC: 4.7 MG/DL (ref 2.5–4.9)
PLATELET # BLD AUTO: 63 K/UL (ref 135–450)
PMV BLD AUTO: 9.9 FL (ref 5–10.5)
POIKILOCYTOSIS BLD QL SMEAR: ABNORMAL
POTASSIUM SERPL-SCNC: 4 MMOL/L (ref 3.5–5.1)
PROT SERPL-MCNC: 6.8 G/DL (ref 6.4–8.2)
PROT UR STRIP.AUTO-MCNC: NEGATIVE MG/DL
RBC # BLD AUTO: 2.94 M/UL (ref 4.2–5.9)
SCHISTOCYTES BLD QL SMEAR: ABNORMAL
SMUDGE CELLS BLD QL SMEAR: PRESENT
SODIUM SERPL-SCNC: 136 MMOL/L (ref 136–145)
SP GR UR STRIP.AUTO: 1.02 (ref 1–1.03)
TARGETS BLD QL SMEAR: ABNORMAL
UA DIPSTICK W REFLEX MICRO PNL UR: NORMAL
URATE SERPL-MCNC: 6.4 MG/DL (ref 3.5–7.2)
URN SPEC COLLECT METH UR: NORMAL
UROBILINOGEN UR STRIP-ACNC: 0.2 E.U./DL
WBC # BLD AUTO: 1.5 K/UL (ref 4–11)

## 2024-04-01 PROCEDURE — 81003 URINALYSIS AUTO W/O SCOPE: CPT

## 2024-04-01 PROCEDURE — 84550 ASSAY OF BLOOD/URIC ACID: CPT

## 2024-04-01 PROCEDURE — 2580000003 HC RX 258: Performed by: INTERNAL MEDICINE

## 2024-04-01 PROCEDURE — 6360000002 HC RX W HCPCS: Performed by: INTERNAL MEDICINE

## 2024-04-01 PROCEDURE — 6370000000 HC RX 637 (ALT 250 FOR IP): Performed by: NURSE PRACTITIONER

## 2024-04-01 PROCEDURE — 2580000003 HC RX 258: Performed by: NURSE PRACTITIONER

## 2024-04-01 PROCEDURE — 85025 COMPLETE CBC W/AUTO DIFF WBC: CPT

## 2024-04-01 PROCEDURE — 83735 ASSAY OF MAGNESIUM: CPT

## 2024-04-01 PROCEDURE — 80076 HEPATIC FUNCTION PANEL: CPT

## 2024-04-01 PROCEDURE — 85384 FIBRINOGEN ACTIVITY: CPT

## 2024-04-01 PROCEDURE — 2060000000 HC ICU INTERMEDIATE R&B

## 2024-04-01 PROCEDURE — 80048 BASIC METABOLIC PNL TOTAL CA: CPT

## 2024-04-01 PROCEDURE — 99231 SBSQ HOSP IP/OBS SF/LOW 25: CPT | Performed by: SURGERY

## 2024-04-01 PROCEDURE — 36592 COLLECT BLOOD FROM PICC: CPT

## 2024-04-01 PROCEDURE — 6370000000 HC RX 637 (ALT 250 FOR IP): Performed by: INTERNAL MEDICINE

## 2024-04-01 PROCEDURE — 83615 LACTATE (LD) (LDH) ENZYME: CPT

## 2024-04-01 PROCEDURE — 84100 ASSAY OF PHOSPHORUS: CPT

## 2024-04-01 PROCEDURE — 85730 THROMBOPLASTIN TIME PARTIAL: CPT

## 2024-04-01 RX ADMIN — SODIUM CHLORIDE, PRESERVATIVE FREE 10 ML: 5 INJECTION INTRAVENOUS at 08:16

## 2024-04-01 RX ADMIN — SODIUM CHLORIDE, PRESERVATIVE FREE 10 ML: 5 INJECTION INTRAVENOUS at 21:03

## 2024-04-01 RX ADMIN — SODIUM CHLORIDE 15 ML: 900 IRRIGANT IRRIGATION at 21:03

## 2024-04-01 RX ADMIN — PANTOPRAZOLE SODIUM 40 MG: 40 TABLET, DELAYED RELEASE ORAL at 05:55

## 2024-04-01 RX ADMIN — ALLOPURINOL 300 MG: 300 TABLET ORAL at 08:14

## 2024-04-01 RX ADMIN — LEVETIRACETAM 1500 MG: 500 TABLET, FILM COATED ORAL at 08:14

## 2024-04-01 RX ADMIN — CYANOCOBALAMIN TAB 1000 MCG 1000 MCG: 1000 TAB at 08:14

## 2024-04-01 RX ADMIN — Medication: at 21:03

## 2024-04-01 RX ADMIN — LEVETIRACETAM 1500 MG: 500 TABLET, FILM COATED ORAL at 21:02

## 2024-04-01 RX ADMIN — MAGNESIUM SULFATE HEPTAHYDRATE 2000 MG: 40 INJECTION, SOLUTION INTRAVENOUS at 05:52

## 2024-04-01 RX ADMIN — LEVOFLOXACIN 500 MG: 500 TABLET, FILM COATED ORAL at 21:02

## 2024-04-01 RX ADMIN — VALACYCLOVIR HYDROCHLORIDE 500 MG: 500 TABLET, FILM COATED ORAL at 21:02

## 2024-04-01 RX ADMIN — VALACYCLOVIR HYDROCHLORIDE 500 MG: 500 TABLET, FILM COATED ORAL at 08:14

## 2024-04-01 RX ADMIN — FLUCONAZOLE 200 MG: 200 TABLET ORAL at 08:14

## 2024-04-01 RX ADMIN — PANTOPRAZOLE SODIUM 40 MG: 40 TABLET, DELAYED RELEASE ORAL at 16:14

## 2024-04-01 RX ADMIN — Medication: at 08:15

## 2024-04-01 NOTE — DISCHARGE INSTRUCTIONS
Follow-up:  Please follow up with Dr. 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  Please call [phone] to schedule your appointment.        [facility] Discharge Instructions    Call for Questions/Concerns:  [phone]) [facility] office  The phone number listed above is available 24 hrs/7 days per week  [facility] is open M-F 8am-4:30pm; Sat-Sun/Holidays 8am-1pm    Symptoms to Report Immediately:    Fever of 100.5 or greater  Vomiting without relief after use of anti-nausea medication  Severe abdominal cramping  Diarrhea: More than 3 loose, watery bowel movements in a 24 hour period  Unusual or excessive bleeding from your mouth, nose, rectum, bladder or vagina  Sudden onset of shortness of breath or chest pain  Signs/symptoms of infection: redness, warmth, swelling-particularly to central line site    Report to Physician's office within 24 hours:    Pain not relieved by pain medication  Change in urination-odor, cloudiness, frequency, or pain with urination  Flu-like symptoms  Skin changes-rash, hives, redness or peeling of skin    Additional Instructions:    Avoid people with colds, flu-like symptoms, or any sign of infection  Drink plenty of fluids-attempt to consume 2-3 liters ( ounces) of fluids/24 hour period  Continue low microbial diet until instructed by physician to resume normal diet  Bring all of your medications with you to your doctor's appointments  Bring your current medicine list to each hospital and office visit        You are being discharged with IV access due to need for ongoing therapy.  Below is pertinent information regarding your IV that your next provider may need to know:  Type:  Double Lumen PICC                        Date of placement:  ***  Surgeon:  ***  Plan:{CHP CONTINUE, INSERT, REMOVE:76359}   Next dressing change due on: ***  Cap changes due on: ***  CVC care and maintenance was reviewed with

## 2024-04-01 NOTE — ADT AUTH CERT
[address] [facility]  [address]  [facility] [address]  NPI: [id number]  TAX ID: [id number]    Auth number: [id number]  [id number] - (Medicaid Managed)    Return Contact Information:  Yoselin Perdomo  PH: [phone]  FAX: [phone]     Patient Demographics    Name Patient ID SSN Gender Identity Birth Date   Dennys Peguero [id number]  Male [birth year] (43 yrs)     Address Phone Email Employer    [address] [place] [address] [address]  [facility] [id number] [phone] (H) [email or web address] NOT EMPLOYED      County Race Occupation Emp Status    [place] Black /  -- Not Employed      Reg Status PCP Date Last Verified Next Review Date    Verified -- 24      Admission Date Discharge Date Admitting Provider     24 -- Lei Tiwari MD       Marital Status Christianity      Single None        Emergency Contact 1   Pierre Lowry (3) [phone] (Q)     Physician Progress Notes  Notes from 24 through 24  Progress Notes by Eduardo Nguyen MD at 3/21/2024  9:37 AM    Author: Eduardo Nguyen MD Service: Hematology Oncology Author Type: Physician   Filed: 3/21/2024  9:42 AM Date of Service: 3/21/2024  9:37 AM Status: Signed   : Eduardo Nguyen MD (Physician)   Expand All Collapse All       [facility] Progress Note     3/21/2024      Dennys Peguero     MRN: [id number]     : 1980     Referring MD: Dmitriy Russo Jr., MD  [address] [facility] [address]  [place] [address]        SUBJECTIVE:  Tolerating  his chemo well so far. No dhaval yesterday. EGD - unremarkable. Colonoscopy pending.      ECOG PS:  (2) Ambulatory and capable of self care, unable to carry out work activity, up and about > 50% or waking hours     KPS: 80% Normal activity with effort; some signs or symptoms of disease     Isolation: None     Medications    Scheduled Meds:  Scheduled Medications    polyethylene glycol  4,000 mL Oral Once    Hydrocerin   Topical BID    
Transfuse for Hgb < 7 and Platelets < 10 K. - >50k given ongoing blood in stool  - PLT transfusion today   Risk for DIC:  - Check coags & fibrinogen daily      4. Metabolic: HyperPhos, HypoMag  - Replace Mg per PRN orders  TLS: at risk   - no evidence of TLS at this time     5. GI / Nutrition: Good po intake  UGI bleed              EGD ([facility]) reportedly unremarkable               Colonoscopy 3/22/24: rectal lesion: PATHOLOGY PENDING  - Bleeding  3/23/24: stat CTA abd/pelvis 3/23/24 , surg onc consult   -CTA abd/pelvis 3/23/24:  No acute abdominal or pelvic abnormality clearly identified. No evidence for acute bowel inflammation identified.  Surgery recommendations:   - awaiting rectal pathology              - likely malignant, will complete staging workup              - pelvic MRI              - CT chest needed, if ordering any CT imaging, please reach out to surgical team              - CEA              - operative planning to follow   - Plt goal > 50k, CBC q12 hours.   - protonix 40mg BID   Nutrition:    - Low microbial diet  - Dietician to follow closely   - Cdiff 3/7/24: positive s/p dificid   - S/P Dificid (3/8/24-3/17/24)     6.Neuro:  H/O Oligodendroglioma 2017, seizures since 2009  - Cont on Keppra 1500 mg PO BID  - s/p partial resection, radiation and Temodar     7. Cardiac  - ECHO 3/8/24:  EF 55-60% Mild tricuspid regurgitation. Mild mitral regurgitation is present.     - DVT Prophylaxis: Platelets <50,000 cells/dL - prophylactic lovenox on hold and mechanical prophylaxis with bilateral SCDs while in bed in place.  Contraindications to pharmacologic prophylaxis: Thrombocytopenia  Contraindications to mechanical prophylaxis: None     - Disposition: Uncertain at this time     The patient was seen and examined by Dr. GERALD Tiwari DO  Electronically signed by Gabby Tiwari DO on 3/24/2024  9:12 AM  Progress Notes by James Reveles DO at 3/24/2024  4:51 AM  Author: James Reveles 
[id number]      [id number]      [id number]      [id number]      [id number]      [id number]      [id number]      [id number]        Future Medications   Medications 03/12 03/13 03/14 03/15 03/16 03/17 03/18 03/19 03/20 03/21   polyethylene glycol (GoLYTELY) solution 4,000 mL  Dose: 4,000 mL  Freq: ONCE Route: PO  Start: 03/21/24 1400   Admin Instructions:   Fill container containing powder with lukewarm water to 4 liter fill line. After capping container, shake vigorously several times. Drink at a rate of 240 mL (8 oz.) every 10 minutes, until 4 liters are consumed or rectal effluent is clear. For nasogastric tube, rate is 1.2 to 1.8 liters per hour.             1400        Completed Medications   Medications 03/12 03/13 03/14 03/15 03/16 03/17 03/18 03/19 03/20 03/21   decitabine (DACOGEN) 40 mg in sodium chloride 0.9 % 100 mL chemo IVPB  Dose: 40 mg  Freq: EVERY 24 HOURS Route: IV  Start: 03/12/24 1500 End: 03/16/24 1631   Admin Instructions:   Hazardous Medication -- Refer to facility policy for handling and disposal.    [id number]     [id number]      [id number]     [id number]      [id number]     [id number]      [id number]     [id number]      [id number]     [id number]             ondansetron (ZOFRAN) tablet 8 mg  Dose: 8 mg  Freq: EVERY 24 HOURS Route: PO  Start: 03/12/24 1430 End: 03/16/24 1448   Admin Instructions:   Administer 30 minutes prior to decitabine    [id number]      [id number]      [id number]      [id number]      [id number]             Venetoclax TABS 100 mg (Patient Supplied)  Dose: 100 mg  Freq: ONCE Route: PO  Start: 03/13/24 1700 End: 03/13/24 1625   Admin Instructions:   Swallow whole; do not crush, chew, or break.  Avoid grapefruit products, [place] oranges, and Star Fruit.  Hazardous Medication -- Refer to facility policy for handling and disposal.     [id number]                Discontinued Medications   Medications 03/12 03/13 03/14 03/15 03/16 03/17 03/18 03/19 03/20 03/21   0.9 % sodium chloride infusion  Rate: 50 mL/hr Freq: CONTINUOUS Route: IV  Start: 03/12/24 
1451(a)  03/08/24 1451(r)     IntraVENous      Suzette Roberts   48 Canceled Entry 03/08/24 2100(s)  03/08/24 2107(a)  03/08/24 2107(r)     IntraVENous      Shruti Roberts   49 Given 03/09/24 0900(s)  03/09/24 0909(a)  03/09/24 0909(r) 10 mL   IntraVENous      Toweson, Pilar   50 Given 03/09/24 2100(s)  03/09/24 2100(a)  03/09/24 2101(r) 10 mL   IntraVENous      Siwik, Courtney   51 Given 03/10/24 0900(s)  03/10/24 0843(a)  03/10/24 0843(r) 10 mL   IntraVENous      Toweson, Pilar   52 Given 03/10/24 2100(s)  03/10/24 1936(a)  03/10/24 1936(r) 10 mL   IntraVENous      Siwik, Courtney   53 Given 03/11/24 0900(s)  03/11/24 0959(a)  03/11/24 0959(r) 10 mL   IntraVENous      Jordana Jovel   54 Given 03/11/24 2100(s)  03/11/24 2153(a)  03/11/24 2154(r) 10 mL   IntraVENous      Valentina Abbasi   55 Given 03/12/24 0900(s)  03/12/24 0822(a)  03/12/24 0822(r) 10 mL   IntraVENous      Toweson, Pilar   56 Canceled Entry 03/12/24 2100(s)  03/12/24 2010(a)  03/12/24 2010(r)     IntraVENous      Shruti Roberts   57 Given 03/08/24 1000(s)  03/08/24 1001(a)  03/08/24 1001(r) 1,000 mcg   Oral      Jordana Jovel E   58 Given 03/09/24 0900(s)  03/09/24 0903(a)  03/09/24 0904(r) 1,000 mcg   Oral      Toweson, Pilar   59 Given 03/10/24 0900(s)  03/10/24 0842(a)  03/10/24 0842(r) 1,000 mcg   Oral      Toweson, Pilar   60 Given 03/11/24 0900(s)  03/11/24 0958(a)  03/11/24 0958(r) 1,000 mcg   Oral      Jordana Jovel   61 Given 03/12/24 0900(s)  03/12/24 0821(a)  03/12/24 0821(r) 1,000 mcg   Oral      Pilar Fox   62 Given 03/08/24 1000(s)  03/08/24 1100(a)  03/08/24 1101(r) 5 mL   IntraDERmal Arm Right    Carmen Clay   63 Given 03/08/24 1430(s)  03/08/24 1430(a)  03/11/24 0835(r) 1.5 mL   IntraVENous      Shahla Gusman   64 New Bag 03/09/24 1730(s)  03/09/24 1827(a)  03/09/24 1827(r) 1,000 mL 50 mL/hr IntraVENous      Pilar Fox   65 New Bag 03/10/24 1439(s)  03/10/24 1439(a)  03/10/24 1440(r) 1,000 mL 50 mL/hr IntraVENous  
0700(s)  03/20/24 0658(a)  03/20/24 0658(r) 15 mL   Swish & Spit      Marco Antonio, April 85 Canceled Entry 03/20/24 1100(s)  03/20/24 1100(a)  03/20/24 0700(r)     Swish & Spit      [place], Padmini  86 Canceled Entry 03/20/24 1700(s)  03/20/24 1700(a)  03/20/24 0700(r)     Swish & Spit      [place], Padmini  87 Given 03/20/24 2100(s)  03/20/24 2053(a)  03/20/24 2056(r) 15 mL   Swish & Spit      Bernard, April 88 Given 03/12/24 1623(s)  03/12/24 1623(a)  03/12/24 1623(r) 1 tablet   Oral      Pilar Fox  89 Given 03/14/24 1954(s)  03/14/24 1954(a)  03/14/24 1954(r) 1 tablet   Oral      Knowles, Hayley  90 Given 03/15/24 0609(s)  03/15/24 0609(a)  03/15/24 0609(r) 1 tablet   Oral      Knowles, Hayley  91 Given 03/10/24 0900(s)  03/10/24 0842(a)  03/10/24 0842(r) 10 mL   IntraVENous      Ruddy, Pilar  92 Given 03/10/24 2100(s)  03/10/24 1936(a)  03/10/24 1936(r) 10 mL   IntraVENous      Courtney Graves  93 Given 03/11/24 0900(s)  03/11/24 0958(a)  03/11/24 0958(r) 10 mL   IntraVENous      Jordana Jovel  94 Given 03/11/24 2100(s)  03/11/24 2154(a)  03/11/24 2154(r) 10 mL   IntraVENous      Valentina Abbasi  95 Given 03/12/24 0900(s)  03/12/24 0822(a)  03/12/24 0822(r) 10 mL   IntraVENous      Toweson, Pilar  96 Given 03/12/24 2100(s)  03/12/24 2008(a)  03/12/24 2008(r) 10 mL   IntraVENous      Shruti Roberts  97 Given 03/13/24 0900(s)  03/13/24 0814(a)  03/13/24 0815(r) 10 mL   IntraVENous      TowPilar isabel  98 Given 03/13/24 2100(s)  03/13/24 2034(a)  03/13/24 2034(r) 10 mL   IntraVENous      Knowles, Hayley  99 Given 03/14/24 0900(s)  03/14/24 0850(a)  03/14/24 0850(r) 10 mL   IntraVENous      Pampur, [place]  100 Given 03/14/24 2100(s)  03/14/24 1954(a)  03/14/24 1955(r) 10 mL   IntraVENous      Knowles, Hayley  101 Given 03/15/24 0900(s)  03/15/24 0908(a)  03/15/24 0909(r) 10 mL   IntraVENous      Pampur, [place]  102 Given 03/15/24 2100(s)  03/15/24 2023(a)  03/15/24 2023(r) 10 mL   IntraVENous      [place], 
Jessy  252 Given 03/16/24 1700(s)  03/16/24 1705(a)  03/16/24 1706(r) 200 mg   Oral      BowlesPaula / Daiana Millan  253 Given 03/17/24 1700(s)  03/17/24 1638(a)  03/17/24 1639(r) 200 mg   Oral      VikiAaron sands / Joyce Sneed  254 Given 03/18/24 1700(s)  03/18/24 1644(a)  03/18/24 1645(r) 200 mg   Oral      [place], Padmini / [place], Elina  255 Given 03/19/24 1700(s)  03/19/24 1627(a)  03/19/24 1628(r) 200 mg   Oral      [place], Padmini / Maslowski, Apryl  256 Given 03/20/24 1700(s)  03/20/24 1609(a)  03/20/24 1609(r) 200 mg   Oral      [place], Padmini / Kenya Alston  257 Given 03/21/24 1030(s)  03/21/24 1022(a)  03/21/24 1022(r) 200 mg   Oral     pt getting prep. MD ordered Deja, Lillian / Genia Castro    Due 03/21/24 1030(s)  03/21/24 1021(r)     Oral      Liberal, Lillian    Due 03/21/24 1300(s)  03/21/24 1021(r)     Oral      Liberal, Lillian    Due 03/21/24 1300(s)  03/21/24 1021(r)     Oral      Deja, Lillian    Due 03/21/24 1300(s)  03/21/24 0913(r)     Oral      Saida Earl  258 Given 03/22/24 1300(s)  03/22/24 1527(a)  03/22/24 1536(r) 200 mg   Oral      Liberal, Lillian / Blank Vera    Due 03/22/24 1300(s)  03/21/24 0913(r)     Oral      Saida Earl  259 Given 03/23/24 1300(s)  03/23/24 1416(a)  03/23/24 1417(r) 200 mg   Oral      Liberal, Lillian / Suzette Roberts    Due 03/23/24 1300(s)  03/21/24 0913(r)     Oral      Saida Earl    (s)=scheduled time  (a)=action time  (r)=recorded time    There is too much information to fit in the report. To view medications before 03/13/24, use the MAR activity.   Jump to the MAR

## 2024-04-01 NOTE — PLAN OF CARE
Problem: Safety - Adult  Goal: Free from fall injury  4/1/2024 1428 by Lillian Short RN  Outcome: Progressing  Flowsheets (Taken 4/1/2024 1428)  Free From Fall Injury: Instruct family/caregiver on patient safety  Note: Orthostatic vital signs obtained at start of shift - see flowsheet for details.  Pt does not meet criteria for orthostasis.  Pt is a Med fall risk. See [place] Fall Score and ABCDS Injury Risk assessments.     - Screening for Orthostasis AND not a [place] Risk per APPLE/ABCDS: Pt bed is in low position, side rails up, call light and belongings are in reach.  Fall risk light is on outside pts room.  Pt encouraged to call for assistance as needed. Will continue with hourly rounds for PO intake, pain needs, toileting and repositioning as needed.      Problem: Gastrointestinal - Adult  Goal: Maintains or returns to baseline bowel function  4/1/2024 1428 by Lillian Short, RN  Outcome: Progressing  Note: Pt states no more blood in stool     Problem: Nutrition Deficit:  Goal: Optimize nutritional status  4/1/2024 1428 by Lillian Short, RN  Outcome: Progressing  Note: Pt tolerating food and PO intake well

## 2024-04-01 NOTE — PLAN OF CARE
Problem: Safety - Adult  Goal: Free from fall injury  Outcome: Progressing  Note:   - Screening for Orthostasis AND not a [place] Risk per APPLE/ABCDS: Pt bed is in low position, side rails up, call light and belongings are in reach.  Fall risk light is on outside pts room.  Pt encouraged to call for assistance as needed. Will continue with hourly rounds for PO intake, pain needs, toileting and repositioning as needed.        Problem: Hematologic - Adult  Goal: Maintains hematologic stability  Outcome: Progressing  Note: Patient's hemoglobin this AM:   Recent Labs     04/01/24  0315   HGB 7.5*     Patient's platelet count this AM:   Recent Labs     04/01/24  0315   PLT 63*    Thrombocytopenia Precautions in place.  Patient showing no signs or symptoms of active bleeding.  Transfusion not indicated at this time.  Patient verbalizes understanding of all instructions. Will continue to assess and implement POC. Call light within reach and hourly rounding in place.        Problem: Infection - Adult  Goal: Absence of infection at discharge  Outcome: Progressing  Note: Picc  site remains free of signs/symptoms of infection. No drainage, edema, erythema, pain, or warmth noted at site. Dressing changes continue per protocol and on an as needed basis - see flowsheet.

## 2024-04-02 VITALS
BODY MASS INDEX: 24.37 KG/M2 | RESPIRATION RATE: 16 BRPM | HEIGHT: 70 IN | SYSTOLIC BLOOD PRESSURE: 101 MMHG | HEART RATE: 99 BPM | DIASTOLIC BLOOD PRESSURE: 75 MMHG | OXYGEN SATURATION: 100 % | TEMPERATURE: 98.7 F | WEIGHT: 170.2 LBS

## 2024-04-02 LAB
ALBUMIN SERPL-MCNC: 4.3 G/DL (ref 3.4–5)
ALP SERPL-CCNC: 54 U/L (ref 40–129)
ALT SERPL-CCNC: 8 U/L (ref 10–40)
ANION GAP SERPL CALCULATED.3IONS-SCNC: 11 MMOL/L (ref 3–16)
ANISOCYTOSIS BLD QL SMEAR: ABNORMAL
APTT BLD: 33 SEC (ref 22.7–35.9)
AST SERPL-CCNC: 11 U/L (ref 15–37)
BASOPHILS # BLD: 0 K/UL (ref 0–0.2)
BASOPHILS NFR BLD: 0 %
BILIRUB DIRECT SERPL-MCNC: <0.2 MG/DL (ref 0–0.3)
BILIRUB INDIRECT SERPL-MCNC: ABNORMAL MG/DL (ref 0–1)
BILIRUB SERPL-MCNC: 1.2 MG/DL (ref 0–1)
BLOOD BANK DISPENSE STATUS: NORMAL
BLOOD BANK PRODUCT CODE: NORMAL
BPU ID: NORMAL
BUN SERPL-MCNC: 13 MG/DL (ref 7–20)
BURR CELLS BLD QL SMEAR: ABNORMAL
CALCIUM SERPL-MCNC: 9.7 MG/DL (ref 8.3–10.6)
CHLORIDE SERPL-SCNC: 95 MMOL/L (ref 99–110)
CO2 SERPL-SCNC: 26 MMOL/L (ref 21–32)
CREAT SERPL-MCNC: 1 MG/DL (ref 0.9–1.3)
DACRYOCYTES BLD QL SMEAR: ABNORMAL
DEPRECATED RDW RBC AUTO: 24.8 % (ref 12.4–15.4)
DESCRIPTION BLOOD BANK: NORMAL
EOSINOPHIL # BLD: 0 K/UL (ref 0–0.6)
EOSINOPHIL NFR BLD: 0 %
FIBRINOGEN PPP-MCNC: 314 MG/DL (ref 243–550)
GFR SERPLBLD CREATININE-BSD FMLA CKD-EPI: >90 ML/MIN/{1.73_M2}
GLUCOSE SERPL-MCNC: 96 MG/DL (ref 70–99)
HCT VFR BLD AUTO: 22.6 % (ref 40.5–52.5)
HGB BLD-MCNC: 8.1 G/DL (ref 13.5–17.5)
LDH SERPL L TO P-CCNC: 313 U/L (ref 100–190)
LYMPHOCYTES # BLD: 1.8 K/UL (ref 1–5.1)
LYMPHOCYTES NFR BLD: 88 %
MACROCYTES BLD QL SMEAR: ABNORMAL
MAGNESIUM SERPL-MCNC: 1.7 MG/DL (ref 1.8–2.4)
MCH RBC QN AUTO: 26.4 PG (ref 26–34)
MCHC RBC AUTO-ENTMCNC: 35.7 G/DL (ref 31–36)
MCV RBC AUTO: 73.9 FL (ref 80–100)
MICROCYTES BLD QL SMEAR: ABNORMAL
MONOCYTES # BLD: 0.1 K/UL (ref 0–1.3)
MONOCYTES NFR BLD: 3 %
NEUTROPHILS # BLD: 0.2 K/UL (ref 1.7–7.7)
NEUTROPHILS NFR BLD: 9 %
NEUTS VAC BLD QL SMEAR: PRESENT
OVALOCYTES BLD QL SMEAR: ABNORMAL
PHOSPHATE SERPL-MCNC: 4.7 MG/DL (ref 2.5–4.9)
PLATELET # BLD AUTO: 58 K/UL (ref 135–450)
PMV BLD AUTO: 10.7 FL (ref 5–10.5)
POIKILOCYTOSIS BLD QL SMEAR: ABNORMAL
POTASSIUM SERPL-SCNC: 3.9 MMOL/L (ref 3.5–5.1)
PROT SERPL-MCNC: 7.1 G/DL (ref 6.4–8.2)
RBC # BLD AUTO: 3.05 M/UL (ref 4.2–5.9)
SCHISTOCYTES BLD QL SMEAR: ABNORMAL
SMUDGE CELLS BLD QL SMEAR: PRESENT
SODIUM SERPL-SCNC: 132 MMOL/L (ref 136–145)
TARGETS BLD QL SMEAR: ABNORMAL
URATE SERPL-MCNC: 5.9 MG/DL (ref 3.5–7.2)
WBC # BLD AUTO: 2.1 K/UL (ref 4–11)

## 2024-04-02 PROCEDURE — 079T3ZX DRAINAGE OF BONE MARROW, PERCUTANEOUS APPROACH, DIAGNOSTIC: ICD-10-PCS | Performed by: NURSE PRACTITIONER

## 2024-04-02 PROCEDURE — 36592 COLLECT BLOOD FROM PICC: CPT

## 2024-04-02 PROCEDURE — 6370000000 HC RX 637 (ALT 250 FOR IP): Performed by: NURSE PRACTITIONER

## 2024-04-02 PROCEDURE — 80076 HEPATIC FUNCTION PANEL: CPT

## 2024-04-02 PROCEDURE — 84550 ASSAY OF BLOOD/URIC ACID: CPT

## 2024-04-02 PROCEDURE — 83615 LACTATE (LD) (LDH) ENZYME: CPT

## 2024-04-02 PROCEDURE — 2580000003 HC RX 258: Performed by: NURSE PRACTITIONER

## 2024-04-02 PROCEDURE — 84100 ASSAY OF PHOSPHORUS: CPT

## 2024-04-02 PROCEDURE — 6370000000 HC RX 637 (ALT 250 FOR IP): Performed by: INTERNAL MEDICINE

## 2024-04-02 PROCEDURE — 85730 THROMBOPLASTIN TIME PARTIAL: CPT

## 2024-04-02 PROCEDURE — P9036 PLATELET PHERESIS IRRADIATED: HCPCS

## 2024-04-02 PROCEDURE — 85025 COMPLETE CBC W/AUTO DIFF WBC: CPT

## 2024-04-02 PROCEDURE — 80048 BASIC METABOLIC PNL TOTAL CA: CPT

## 2024-04-02 PROCEDURE — 99231 SBSQ HOSP IP/OBS SF/LOW 25: CPT | Performed by: SURGERY

## 2024-04-02 PROCEDURE — 2580000003 HC RX 258: Performed by: INTERNAL MEDICINE

## 2024-04-02 PROCEDURE — 85384 FIBRINOGEN ACTIVITY: CPT

## 2024-04-02 PROCEDURE — 83735 ASSAY OF MAGNESIUM: CPT

## 2024-04-02 PROCEDURE — 88184 FLOWCYTOMETRY/ TC 1 MARKER: CPT

## 2024-04-02 PROCEDURE — 36430 TRANSFUSION BLD/BLD COMPNT: CPT

## 2024-04-02 PROCEDURE — 6360000002 HC RX W HCPCS: Performed by: INTERNAL MEDICINE

## 2024-04-02 PROCEDURE — 6360000002 HC RX W HCPCS: Performed by: NURSE PRACTITIONER

## 2024-04-02 RX ORDER — LEVOFLOXACIN 500 MG/1
500 TABLET, FILM COATED ORAL NIGHTLY
Qty: 30 TABLET | Refills: 0 | Status: SHIPPED | OUTPATIENT
Start: 2024-04-02 | End: 2024-05-02

## 2024-04-02 RX ORDER — SODIUM CHLORIDE 9 MG/ML
INJECTION, SOLUTION INTRAVENOUS PRN
Status: DISCONTINUED | OUTPATIENT
Start: 2024-04-02 | End: 2024-04-02

## 2024-04-02 RX ORDER — FLUCONAZOLE 200 MG/1
200 TABLET ORAL DAILY
Qty: 30 TABLET | Refills: 1 | Status: SHIPPED | OUTPATIENT
Start: 2024-04-03 | End: 2024-06-02

## 2024-04-02 RX ORDER — POTASSIUM CHLORIDE 20 MEQ/1
40 TABLET, EXTENDED RELEASE ORAL PRN
Qty: 60 TABLET | Refills: 3 | Status: SHIPPED | OUTPATIENT
Start: 2024-04-02 | End: 2024-04-02 | Stop reason: HOSPADM

## 2024-04-02 RX ORDER — VALACYCLOVIR HYDROCHLORIDE 500 MG/1
500 TABLET, FILM COATED ORAL 2 TIMES DAILY
Qty: 60 TABLET | Refills: 3 | Status: SHIPPED | OUTPATIENT
Start: 2024-04-02

## 2024-04-02 RX ORDER — PANTOPRAZOLE SODIUM 40 MG/1
40 TABLET, DELAYED RELEASE ORAL
Qty: 30 TABLET | Refills: 3 | Status: SHIPPED | OUTPATIENT
Start: 2024-04-02

## 2024-04-02 RX ORDER — ALLOPURINOL 300 MG/1
300 TABLET ORAL DAILY
Qty: 30 TABLET | Refills: 3 | Status: SHIPPED | OUTPATIENT
Start: 2024-04-03

## 2024-04-02 RX ORDER — FLUCONAZOLE 200 MG/1
200 TABLET ORAL DAILY
Qty: 7 TABLET | Refills: 0 | Status: SHIPPED | OUTPATIENT
Start: 2024-04-03 | End: 2024-04-02

## 2024-04-02 RX ORDER — LORAZEPAM 2 MG/ML
1 INJECTION INTRAMUSCULAR ONCE
Status: COMPLETED | OUTPATIENT
Start: 2024-04-02 | End: 2024-04-02

## 2024-04-02 RX ORDER — PROCHLORPERAZINE MALEATE 10 MG
10 TABLET ORAL EVERY 4 HOURS PRN
Qty: 120 TABLET | Refills: 3 | Status: SHIPPED | OUTPATIENT
Start: 2024-04-02

## 2024-04-02 RX ORDER — LANOLIN ALCOHOL/MO/W.PET/CERES
CREAM (GRAM) TOPICAL 2 TIMES DAILY
Qty: 1 EACH | Refills: 0 | COMMUNITY
Start: 2024-04-02

## 2024-04-02 RX ORDER — LEVOFLOXACIN 500 MG/1
500 TABLET, FILM COATED ORAL NIGHTLY
Qty: 10 TABLET | Refills: 0 | Status: SHIPPED | OUTPATIENT
Start: 2024-04-02 | End: 2024-04-02

## 2024-04-02 RX ADMIN — PANTOPRAZOLE SODIUM 40 MG: 40 TABLET, DELAYED RELEASE ORAL at 07:25

## 2024-04-02 RX ADMIN — ALLOPURINOL 300 MG: 300 TABLET ORAL at 08:01

## 2024-04-02 RX ADMIN — LORAZEPAM 1 MG: 2 INJECTION INTRAMUSCULAR; INTRAVENOUS at 12:45

## 2024-04-02 RX ADMIN — LEVETIRACETAM 1500 MG: 500 TABLET, FILM COATED ORAL at 08:01

## 2024-04-02 RX ADMIN — FLUCONAZOLE 200 MG: 200 TABLET ORAL at 08:01

## 2024-04-02 RX ADMIN — POTASSIUM CHLORIDE 20 MEQ: 400 INJECTION, SOLUTION INTRAVENOUS at 05:32

## 2024-04-02 RX ADMIN — POTASSIUM CHLORIDE 20 MEQ: 400 INJECTION, SOLUTION INTRAVENOUS at 06:36

## 2024-04-02 RX ADMIN — SODIUM CHLORIDE 25 ML: 900 INJECTION, SOLUTION INTRAVENOUS at 05:50

## 2024-04-02 RX ADMIN — MAGNESIUM SULFATE HEPTAHYDRATE 2000 MG: 40 INJECTION, SOLUTION INTRAVENOUS at 05:51

## 2024-04-02 RX ADMIN — Medication: at 08:03

## 2024-04-02 RX ADMIN — CYANOCOBALAMIN TAB 1000 MCG 1000 MCG: 1000 TAB at 08:01

## 2024-04-02 RX ADMIN — SODIUM CHLORIDE, PRESERVATIVE FREE 20 ML: 5 INJECTION INTRAVENOUS at 08:01

## 2024-04-02 RX ADMIN — SODIUM CHLORIDE: 9 INJECTION, SOLUTION INTRAVENOUS at 05:30

## 2024-04-02 RX ADMIN — VALACYCLOVIR HYDROCHLORIDE 500 MG: 500 TABLET, FILM COATED ORAL at 08:01

## 2024-04-02 NOTE — PROCEDURES
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 Consent signed and time out performed.  Patient placed in the right lateral decubitus position. Area cleaned and prepped in a sterile fashion with chloraprep. Sterile drape applied. Lidocaine 1% -10ml administered to the subcutaneous tissue and periosteimum of the left iliac crest. Using sterile technique and using an aspirate needle a bone marrow aspirate was performed. A puncture was made with the provided scalpel, then using an Jamshidi needle, a biopsy was taken from the left posterior iliac crest. A sterile bandage was applied. No significant bleeding. Sample sent for flow cytometry. Patient tolerated procedure well.     Estimated Blood Loss: < 5 mL    KATHLEEN Roldan - NP

## 2024-04-02 NOTE — CARE COORDINATION
10:55am: Attended rounds.    Pt plans on returning home alone when able with no anticipated needs.     SW will continue to follow    JENNIFER Ortiz   for [facility] [place] ([facility])  Office Phone: [phone]  [employer] Mobile: [phone]      
11:23am: Attended MD rounds at bedside for the family meeting earlier. Pt's father, 2 aunts, father's niece and father's wife all present for the discussion. MD explained expectations for caregiver/transportation and diagnosis. All stated that they will be able to support pt with the caregiver and transportation needs.     Spoke with pt and family after MD rounds. Explained that the AML co-pay michelle assist with co-pays and there are no current grants open at this time to assist with transportation cost for gas. Explained if/when they open, writer would apply pt. Also explained that the providers prefer family/friends transport pt to appt's rather than using private services or insurance transportation as those services aren't always reliable and they stated there would be no issues. His father's niece was asking about grants to assist with caregiver which writer explained there are no grants to assist with cost of caregivers but some people could utilize FMLA with appropriate paperwork being completed by [facility]. Pt's family stated they will be able to arrange a caregiver/transportation plan once they have more a final plan in place in regards to discharge. Pt's family appears to be supportive of pt and willing to assist.     SW will continue to follow    JENNIFER Ortiz   for [facility] ([facility])  [employer] Mobile: [phone]      
11:25am: Attended MD rounds. Anticipated discharge is tomorrow after his BM BX on oral ABX per MD and return Friday to [facility] for follow up. Mary Kate CADENA updated     Pt will return home with no needs from BRENDA.    BRENDA will continue to follow    JENNIFER Ortiz   for [facility] ([facility])  [employer] Mobile: [phone]      
11:32am: Attended MD rounds. Pt is a potential discharge Tuesday and follow up Friday for labs, etc.     Mary Kate CADENA updated on the above.     Pt will return home with no anticipated needs and support from family.    SW will follow    JENNIFER Ortiz   for [facility] ([facility])  [employer] Mobile: [phone]      
1:00pm: Received signed MD form which was uploaded to the LLS and it is pending at this time.    Per S portal, pt was approved for the AML Co-pay michelle ($4000).     Updated pt at bedside and provided the approval packet. He denied questions or concerns at this time.    JENNIFER Ortiz   for [facility] ([facility])  Office Phone: [phone]  [employer] Mobile: [phone]      
1:28pm: SW went to pt's room and pt was sleeping with his blanket over his head. No visitors at bedside.     Pt plans on returning home when able.     SW will follow    JENNIFER Ortiz   for [facility] ([facility])  Office Phone: [phone]  [employer] Mobile: [phone]          
1:57pm: Spoke with Dr. Nguyen earlier regarding transportation assistance. MD aware pt has the AML Co-Pay michelle but this doesn't work for transportation assistance. MD inquiring about insurance transportation or other resources. Explained in the past, writer has been directed to not discuss insurance transportation as it is not always reliable and pt's are encouraged to use family/friends. MD requesting pt's father to be present either Thursday or Friday for rounds and stated okay for writer to discuss insurance transportation if needed with pt.     Mary Kate CADENA updated and states working on contacting pt's father.     No current grants open on the LLS to assist with transportation.     Sharla COLON, GENIA-S   for [facility] ([facility])  Office Phone: [phone]  [employer] Mobile: [phone]      
2:10pm: Discussed plan of care with Mary Kate CADENA earlier regarding grants, etc.     Spoke with pt at bedside earlier. Discussed the LLS and potential grants. Pt provided verbal permission for writer to apply for grants on his behalf. He was requesting to rest and aware SW will follow back up tomorrow on outcome of grants.     Applied pt for the AML Co-Pay michelle on the LLS and it is pending income verification, along with MD diagnosis form. SW will update pt tomorrow on the information that is needed for the application. Application reference [id number].     SW will request MD complete the income verification form and upload it once completed     JENNIFER Ortiz   for [facility] ([facility])  Office Phone: [phone]  [employer] Mobile: [phone]        
9:30am: Spoke with pt who states his father will be present tomorrow for rounds; treatment team updated.     Attended MD rounds.     SW will continue to follow    JENNIFER Ortiz   for [facility] ([facility])  Office Phone: [phone]  [employer] Mobile: [phone]      
Attempted applying pt for the [facility] Fund on the LLS but unable to apply due to insufficient fund balance. SW will continue to follow and apply if/when fund opens again.    Sharla COLON, JENNIFER   for [place] [facility] ([facility])  [employer] Mobile: [phone]      
Attended MD rounds this AM.     Pt will return home when able; has a supportive family.    SW will continue to follow    JENNIFER Ortiz   for [place] [facility] [place] ([facility])  [employer] Mobile: [phone]      
Called patient caregiver (father) and updated him on the plan for discharge.  Tentatively for Tuesday and follow up in [facility] on that Friday 4/15/24.   
Contacted patient father Mr. Lowry to set up family meeting on Friday morning.  This meeting will discuss BM BX results in more detail as well as future treatment.  During this meeting we will work to establish consistent caregivers and transportation for patient  as it relates to discharge from this admission and future treatment.     Mr. Zarco agreed to meet Friday morning 9:00 AM.  Encouraged him to bring other family members as well to this meeting.  He stated that Patient notified him to come in for a meeting.  Patient has been extremely helpful in relaying messages to family as well as have documents brought in as requested by SW.     Patient has been very receptive to RN advice for example increasing activity.  Patient was observed riding bike that is in room.  Patient has been eating and drinking and takes all medications in a timely manner.   
Contacted patient father and set up a new time and date for family meeting.  Meeting is for Saturday March 16 during morning rounds.  Provider is aware of the meeting time.   
DISCHARGE ROUNDING:  Date:_____3/25/2024________    Team members present : BRENDA, NP Unit Manager, SENA, Psychologist updated    Anticipated date of discharge: uncertain at this time    Active problems/barriers to discharge: new rectal lesion pathology pending.      Home needs: uncertain at this time    Caregivers: father and family     Home medication issues: patient has grants to help cover the cost of prescription medications.  Venetoclax was filled by [facility].      Patient/caregiver aware of plan?  yes         
Mary Kate CADENA updated this AM that the AML Co-pay michelle was approved.    SW will continue to follow    JENNIFER Ortiz   for [facility] [place] [facility] ([facility])  Office Phone: [phone]  [employer] Mobile: [phone]      
Met with patient and reviewed chemotherapy calendar. Patient will be receiving  Dacogen and Venetoclax chemotherapy.  Explanation of each chemotherapeutic agent including: side effects, nausea and vomiting, diarrhea, neutropenia, thrombocytopenia, and increased risk of infection.  Also discussed ways in which He can help during this time, such as walking the halls, getting out of bed and dressed daily, and maintaining adequate nutrition and hydration.     Length of stay was also discussed according the chemotherapy regimen.   Patient and family verbalized understanding of education.    Venetoclax will be available 3/13/24.  Patient is aware.    Discussed future discharge plan.  Patient reports that he will have help when discharged.  I will work on identifying those supports so that education can be provided to caregivers.      Mary Kate Baez  
Patient father called this writer regarding colonoscopy results for his son.  I asked that he come in this weekend during rounds and discuss the results with the provider.  Father agreed.  Np updated.   
Received notification from Mary Kate CADENA that pt's father will be present tomorrow at 9am for rounds.     Sharla COLON, JENNIFER   for [place] [facility] [place] ([facility])  Office Phone: [phone]  [employer] Mobile: [phone]      
Reviewed discharge instructions with patient and  family members.  Reviewed discharge medications including dosing, schedule, indication, and adverse reactions.  Reviewed which medications were already taken today and next dosage due for each medication.      Reviewed signs and symptoms that prompt a call to the physician and appropriate phone numbers. Purple ER card given to the patient with explanations of its use.  Reviewed follow up appointments that have been made in [facility] and Outpatient Oncology.  Low microbial diet, activity restrictions, and increased risk of infection were reviewed.     Patient is being discharged with IV access d/t need for ongoing therapy:      Type:  Double Lumen PICC                        Date of placement:  3/8/24    Plan:continue   Next dressing change due on: 4/8  Cap changes due on: 4/5  CVC care and maintenance was reviewed with patient and family members.  Pt verbalizes understanding of line care and maintenance.      Patient verbalized understanding of all instructions and questions were answered to his. satisfaction.  Signed discharge instructions were given to the patient and a copy placed in the paper-lite chart.  Patient discharged to home per wheelchair with family members.      Reviewed how to order Ventoclax  Patient advised to stop taking Venetoclax at this time.  However patient should bring this to his appointment on Friday Aril 5.  All scripts were sent to [facility] in [facility] some scripts will not be available until 4/5 patient is aware.  Red discharge folder provided to patient and caregiver.     Bottle of venetoclax was handed back to patient.  Large quantity still available in the bottle.     Mary Kate Baez  
Updated pt at bedside that the AML Co-pay michelle is pending income verification and provided him with the letter on acceptable information. He stated he may have something with him showing his disability information. He is aware writer will follow back up with him and obtain if so to upload to the LLS portal which he stated agreement with.     He denied additional needs at this time.    JENNIFER Ortiz   for [place] [facility] [place] ([facility])  Office Phone: [phone]  [employer] Mobile: [phone]    Addendum at 3:17pm: Received Social Security benefit form from Mary Kate CADENA that he provided to her. Scanned and uploaded to the [employer] portal which is under review at this time.    SW will also upload the MD form once completed by MD      
Venetoclax approved education provided handed off to RN.      This writer contacted patient father to update on treatment plan and to discuss discharge plan. Patient father and his wife will come 3/14 for morning rounds to meet provider and ask questions.  
chart: Not Indicated    Financial:  Payor: [employer] [place] MEDICAID / [facility]: [facility]ICA / Product Type: *No Product type* /      Pharmacy:    [id number] - [place], [place] - [address] - P [phone] - F [phone]  [address] [address]  Phone: [phone] Fax: [phone]    [facility] - [place], [place] - [address] - P [phone] - F [phone]  [address]  [facility] [address]  Phone: [phone] Fax: [phone]      Assistance purchasing medications?: Potential Assistance Purchasing Medications: No  Assistance provided by Case Management: None at this time    Does patient want to participate in local refill/ meds to beds program?: Yes    Meds To Beds General Rules:  1. Can ONLY be done Monday- Friday between 8:30am-5pm  2. Prescription(s) must be in pharmacy by 3pm to be filled same day  3.Copy of patient's insurance/ prescription drug card and patient face sheet must be sent along with the prescription(s)  4. Cost of Rx cannot be added to hospital bill. If financial assistance is needed, please contact unit  or ;  or  CANNOT provide pharmacy voucher for patients co-pays  5. Patients can then  the prescription on their way out of the hospital at discharge, or pharmacy can deliver to the bedside if staff is available. (payment due at time of pick-up or delivery - cash, check, or card accepted)     Able to afford home medications/ co-pay costs: Yes    ADLS:  Current PT AM-PAC Score:   /24  Current OT AM-PAC Score:   /24      DISCHARGE Disposition: Home- No Services Needed    IMM Completed:   Not Indicated due to: Managed Medicaid    Transportation:  Transportation PLAN for discharge: family   Mode of Transport: Private Car    Home Care:  Home Care ordered at discharge: Not Indicated    Home Oxygen and Respiratory Equipment:  Oxygen needed at discharge?: 
other family members/significant others, and if so, who? Yes  Plans to Return to Present Housing: Yes  Other Identified Issues/Barriers to RETURNING to current housing: n/a  Potential Assistance needed at discharge: N/A            Potential DME:    Patient expects to discharge to: Apartment  Plan for transportation at discharge:      Financial    Payor: [facility] MEDICAID / [facility] / Product Type: *No Product type* /     Does insurance require precert for SNF: Yes    Potential assistance Purchasing Medications: No  Meds-to-Beds request: Yes      [facility] [id number] - [place], [place] - [address] -  [phone] - F [phone]  [address] [facility] [address]  Phone: [phone] Fax: [phone]      Notes:    Factors facilitating achievement of predicted outcomes: Family support, Motivated, Cooperative, Pleasant, and Good insight into deficits    Barriers to discharge: Medical complications    Additional Case Management Notes: Chart review completed. Spoke with pt at bedside earlier. Pt stated he is independent with his ADL's and will return home when able. He is not anticipating any needs for SW.     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

## 2024-04-02 NOTE — PROGRESS NOTES
B[facility] Progress Note    2024     Dennys Peguero    MRN: [id number]    : 1980    Referring MD: Dmitriy Russo Jr., MD  [address] [address]      SUBJECTIVE: Tolerating chemo well. No F/N/V/D.     ECOG PS:  (2) Ambulatory and capable of self care, unable to carry out work activity, up and about > 50% or waking hours    KPS: 80% Normal activity with effort; some signs or symptoms of disease    Isolation: None    Medications    Scheduled Meds:   allopurinol  300 mg Oral Daily    Venetoclax  200 mg Oral Q24H    pantoprazole  40 mg Oral BID AC    Hydrocerin   Topical BID    valACYclovir  500 mg Oral BID    levoFLOXacin  500 mg Oral Nightly    fluconazole  200 mg Oral Daily    sodium chloride flush  5-40 mL IntraVENous 2 times per day    vitamin B-12  1,000 mcg Oral Daily    sodium chloride flush  5-40 mL IntraVENous 2 times per day    Saline Mouthwash  15 mL Swish & Spit 4x Daily AC & HS    levETIRAcetam  1,500 mg Oral BID     Continuous Infusions:   sodium chloride      sodium chloride      sodium chloride      sodium chloride      sodium chloride      sodium chloride      sodium chloride       PRN Meds:.potassium chloride, magnesium sulfate, sodium phosphate IVPB (CENTRAL line), polyethylene glycol, sodium chloride, sodium chloride, sodium chloride, sennosides-docusate sodium, prochlorperazine **OR** prochlorperazine, LORazepam **OR** LORazepam, sodium chloride, sodium chloride, sodium chloride flush, sodium chloride, sodium chloride flush, sodium chloride, magnesium sulfate, Saline Mouthwash, alteplase (CATHFLO) 2 mg in sterile water 2 mL injection, melatonin, calcium carbonate, potassium chloride **OR** potassium alternative oral replacement **OR** potassium chloride    ROS:  As noted above, otherwise remainder of 10-point ROS negative    Physical Exam:     I&O:    Intake/Output Summary (Last 24 hours) at 2024 0954  Last data filed at 2024 0321  Gross per 24 
    [facility] Progress Note    3/20/2024     Dennys Peguero    MRN: [id number]    : 1980    Referring MD: Dmitriy Russo Jr., MD  [address] [address]      SUBJECTIVE:  Tolerating chemo well. Started to have recurrent dhaval with an episode yesterday. No fever/N/V/D.     ECOG PS:  (2) Ambulatory and capable of self care, unable to carry out work activity, up and about > 50% or waking hours    KPS: 80% Normal activity with effort; some signs or symptoms of disease    Isolation: None    Medications    Scheduled Meds:   Hydrocerin   Topical BID    pantoprazole  40 mg Oral BID AC    Venetoclax  200 mg Oral Q24H    valACYclovir  500 mg Oral BID    levoFLOXacin  500 mg Oral Nightly    fluconazole  200 mg Oral Daily    sodium chloride flush  5-40 mL IntraVENous 2 times per day    vitamin B-12  1,000 mcg Oral Daily    sodium chloride flush  5-40 mL IntraVENous 2 times per day    Saline Mouthwash  15 mL Swish & Spit 4x Daily AC & HS    levETIRAcetam  1,500 mg Oral BID    allopurinol  300 mg Oral Daily     Continuous Infusions:   sodium chloride      sodium chloride 50 mL/hr at 24 1818    sodium chloride      sodium chloride      sodium chloride      sodium chloride       PRN Meds:.sodium chloride, sennosides-docusate sodium, prochlorperazine **OR** prochlorperazine, LORazepam **OR** LORazepam, sodium chloride, sodium chloride, sodium chloride flush, sodium chloride, sodium chloride flush, sodium chloride, magnesium sulfate, Saline Mouthwash, alteplase (CATHFLO) 2 mg in sterile water 2 mL injection, melatonin, calcium carbonate, potassium chloride **OR** potassium alternative oral replacement **OR** potassium chloride    ROS:  As noted above, otherwise remainder of 10-point ROS negative    Physical Exam:     I&O:    Intake/Output Summary (Last 24 hours) at 3/20/2024 0652  Last data filed at 3/20/2024 0506  Gross per 24 hour   Intake 720 ml   Output 2525 ml   Net -1805 ml 
    [facility] Progress Note    3/23/2024     Dennys Peguero    MRN: [id number]    : 1980    Referring MD: Dmitriy Russo Jr., MD  [address] [address]      SUBJECTIVE:   Pt is having hemotochezia this morning. He is light headed as well. Stat CTA ordered, Surg onc consulted as well. 2 stools with 20 cc of bright red blood with clots.       ECOG PS:  (2) Ambulatory and capable of self care, unable to carry out work activity, up and about > 50% or waking hours    KPS: 80% Normal activity with effort; some signs or symptoms of disease    Isolation: None    Medications    Scheduled Meds:   Hydrocerin   Topical BID    pantoprazole  40 mg Oral BID AC    Venetoclax  200 mg Oral Q24H    valACYclovir  500 mg Oral BID    levoFLOXacin  500 mg Oral Nightly    fluconazole  200 mg Oral Daily    sodium chloride flush  5-40 mL IntraVENous 2 times per day    vitamin B-12  1,000 mcg Oral Daily    sodium chloride flush  5-40 mL IntraVENous 2 times per day    Saline Mouthwash  15 mL Swish & Spit 4x Daily AC & HS    levETIRAcetam  1,500 mg Oral BID     Continuous Infusions:   sodium chloride      sodium chloride      sodium chloride      sodium chloride      sodium chloride      sodium chloride       PRN Meds:.sodium chloride, sodium chloride, sennosides-docusate sodium, prochlorperazine **OR** prochlorperazine, LORazepam **OR** LORazepam, sodium chloride, sodium chloride, sodium chloride flush, sodium chloride, sodium chloride flush, sodium chloride, magnesium sulfate, Saline Mouthwash, alteplase (CATHFLO) 2 mg in sterile water 2 mL injection, melatonin, calcium carbonate, potassium chloride **OR** potassium alternative oral replacement **OR** potassium chloride    ROS:  As noted above, otherwise remainder of 10-point ROS negative    Physical Exam:     I&O:    Intake/Output Summary (Last 24 hours) at 3/23/2024 0723  Last data filed at 3/23/2024 0646  Gross per 24 hour   Intake 1100 ml   Output -- 
    [facility] Progress Note    3/25/2024     Dennys Peguero    MRN: [id number]    : 1980    Referring MD: Dmitriy Russo Jr., MD  [address] [address]      SUBJECTIVE: Tolerating chemo well so far. No BM over the weekend.     ECOG PS:  (2) Ambulatory and capable of self care, unable to carry out work activity, up and about > 50% or waking hours    KPS: 80% Normal activity with effort; some signs or symptoms of disease    Isolation: None    Medications    Scheduled Meds:   pantoprazole  40 mg Oral BID AC    Hydrocerin   Topical BID    Venetoclax  200 mg Oral Q24H    valACYclovir  500 mg Oral BID    levoFLOXacin  500 mg Oral Nightly    fluconazole  200 mg Oral Daily    sodium chloride flush  5-40 mL IntraVENous 2 times per day    vitamin B-12  1,000 mcg Oral Daily    sodium chloride flush  5-40 mL IntraVENous 2 times per day    Saline Mouthwash  15 mL Swish & Spit 4x Daily AC & HS    levETIRAcetam  1,500 mg Oral BID     Continuous Infusions:   sodium chloride      sodium chloride      sodium chloride      sodium chloride      sodium chloride      sodium chloride      sodium chloride       PRN Meds:.polyethylene glycol, sodium chloride, sodium chloride, sodium chloride, sennosides-docusate sodium, prochlorperazine **OR** prochlorperazine, LORazepam **OR** LORazepam, sodium chloride, sodium chloride, sodium chloride flush, sodium chloride, sodium chloride flush, sodium chloride, magnesium sulfate, Saline Mouthwash, alteplase (CATHFLO) 2 mg in sterile water 2 mL injection, melatonin, calcium carbonate, potassium chloride **OR** potassium alternative oral replacement **OR** potassium chloride    ROS:  As noted above, otherwise remainder of 10-point ROS negative    Physical Exam:     I&O:    Intake/Output Summary (Last 24 hours) at 3/25/2024 0848  Last data filed at 3/25/2024 0815  Gross per 24 hour   Intake 410 ml   Output 1125 ml   Net -715 ml         Vital Signs:  /75   Pulse 
    [facility] Progress Note    3/28/2024     Dennys Peguero    MRN: [id number]    : 1980    Referring MD: Dmitriy Russo Jr., MD  [address] [facility] [address]  [place] [address]      SUBJECTIVE: Tolerating chemo well. Had bloody BM this morning.     ECOG PS:  (2) Ambulatory and capable of self care, unable to carry out work activity, up and about > 50% or waking hours    KPS: 80% Normal activity with effort; some signs or symptoms of disease    Isolation: None    Medications    Scheduled Meds:   Venetoclax  200 mg Oral Q24H    pantoprazole  40 mg Oral BID AC    Hydrocerin   Topical BID    valACYclovir  500 mg Oral BID    levoFLOXacin  500 mg Oral Nightly    fluconazole  200 mg Oral Daily    sodium chloride flush  5-40 mL IntraVENous 2 times per day    vitamin B-12  1,000 mcg Oral Daily    sodium chloride flush  5-40 mL IntraVENous 2 times per day    Saline Mouthwash  15 mL Swish & Spit 4x Daily AC & HS    levETIRAcetam  1,500 mg Oral BID     Continuous Infusions:   sodium chloride      sodium chloride      sodium chloride      sodium chloride      sodium chloride      sodium chloride      sodium chloride       PRN Meds:.polyethylene glycol, sodium chloride, sodium chloride, sodium chloride, sennosides-docusate sodium, prochlorperazine **OR** prochlorperazine, LORazepam **OR** LORazepam, sodium chloride, sodium chloride, sodium chloride flush, sodium chloride, sodium chloride flush, sodium chloride, magnesium sulfate, Saline Mouthwash, alteplase (CATHFLO) 2 mg in sterile water 2 mL injection, melatonin, calcium carbonate, potassium chloride **OR** potassium alternative oral replacement **OR** potassium chloride    ROS:  As noted above, otherwise remainder of 10-point ROS negative    Physical Exam:     I&O:    Intake/Output Summary (Last 24 hours) at 3/28/2024 1101  Last data filed at 3/28/2024 0910  Gross per 24 hour   Intake 1384.7 ml   Output 1250 ml   Net 134.7 ml         Vital Signs:  /80   
    [facility] Progress Note    3/30/2024     Dennys Peguero    MRN: [id number]    : 1980    Referring MD: Dmitriy Russo Jr., MD  [address] [facility] [address]  [place] [address]      SUBJECTIVE: Tolerating chemo well. Had bloody BM yesterday morning. Got 1u pRBC today. No further bloody stools.  Denies pain.  States that he is having a little bit of difficulty sleeping.    ECOG PS:  (2) Ambulatory and capable of self care, unable to carry out work activity, up and about > 50% or waking hours    KPS: 80% Normal activity with effort; some signs or symptoms of disease    Isolation: None    Medications    Scheduled Meds:   allopurinol  300 mg Oral Daily    Venetoclax  200 mg Oral Q24H    pantoprazole  40 mg Oral BID AC    Hydrocerin   Topical BID    valACYclovir  500 mg Oral BID    levoFLOXacin  500 mg Oral Nightly    fluconazole  200 mg Oral Daily    sodium chloride flush  5-40 mL IntraVENous 2 times per day    vitamin B-12  1,000 mcg Oral Daily    sodium chloride flush  5-40 mL IntraVENous 2 times per day    Saline Mouthwash  15 mL Swish & Spit 4x Daily AC & HS    levETIRAcetam  1,500 mg Oral BID     Continuous Infusions:   sodium chloride      sodium chloride      sodium chloride      sodium chloride      sodium chloride      sodium chloride      sodium chloride       PRN Meds:.potassium chloride, magnesium sulfate, sodium phosphate IVPB (CENTRAL line), polyethylene glycol, sodium chloride, sodium chloride, sodium chloride, sennosides-docusate sodium, prochlorperazine **OR** prochlorperazine, LORazepam **OR** LORazepam, sodium chloride, sodium chloride, sodium chloride flush, sodium chloride, sodium chloride flush, sodium chloride, magnesium sulfate, Saline Mouthwash, alteplase (CATHFLO) 2 mg in sterile water 2 mL injection, melatonin, calcium carbonate, potassium chloride **OR** potassium alternative oral replacement **OR** potassium chloride    ROS:  As noted above, otherwise remainder of 10-point ROS 
    [facility] Progress Note    3/21/2024     Dennys Peguero    MRN: [id number]    : 1980    Referring MD: Dmitriy Russo Jr., MD  [address] [address]      SUBJECTIVE:  Tolerating  his chemo well so far. No dhaval yesterday. EGD - unremarkable. Colonoscopy pending.     ECOG PS:  (2) Ambulatory and capable of self care, unable to carry out work activity, up and about > 50% or waking hours    KPS: 80% Normal activity with effort; some signs or symptoms of disease    Isolation: None    Medications    Scheduled Meds:   polyethylene glycol  4,000 mL Oral Once    Hydrocerin   Topical BID    pantoprazole  40 mg Oral BID AC    Venetoclax  200 mg Oral Q24H    valACYclovir  500 mg Oral BID    levoFLOXacin  500 mg Oral Nightly    fluconazole  200 mg Oral Daily    sodium chloride flush  5-40 mL IntraVENous 2 times per day    vitamin B-12  1,000 mcg Oral Daily    sodium chloride flush  5-40 mL IntraVENous 2 times per day    Saline Mouthwash  15 mL Swish & Spit 4x Daily AC & HS    levETIRAcetam  1,500 mg Oral BID     Continuous Infusions:   sodium chloride      sodium chloride      sodium chloride      sodium chloride      sodium chloride      sodium chloride       PRN Meds:.sodium chloride, sodium chloride, sennosides-docusate sodium, prochlorperazine **OR** prochlorperazine, LORazepam **OR** LORazepam, sodium chloride, sodium chloride, sodium chloride flush, sodium chloride, sodium chloride flush, sodium chloride, magnesium sulfate, Saline Mouthwash, alteplase (CATHFLO) 2 mg in sterile water 2 mL injection, melatonin, calcium carbonate, potassium chloride **OR** potassium alternative oral replacement **OR** potassium chloride    ROS:  As noted above, otherwise remainder of 10-point ROS negative    Physical Exam:     I&O:    Intake/Output Summary (Last 24 hours) at 3/21/2024 0937  Last data filed at 3/21/2024 0835  Gross per 24 hour   Intake 880 ml   Output 1350 ml   Net -470 ml 
    [facility] Progress Note    3/13/2024     Dennys Peguero    MRN: [id number]    : 1980    Referring MD: Dmitriy Russo Jr., MD  [address] [address]      SUBJECTIVE:   No blood in his stools  Tolerating vidaza ok    ECOG PS:  (2) Ambulatory and capable of self care, unable to carry out work activity, up and about > 50% or waking hours    KPS: 80% Normal activity with effort; some signs or symptoms of disease    Isolation: None    Medications    Scheduled Meds:   valACYclovir  500 mg Oral BID    levoFLOXacin  500 mg Oral Nightly    fluconazole  200 mg Oral Daily    pantoprazole  40 mg Oral QAM AC    ondansetron  8 mg Oral Q24H    decitabine (DACOGEN) 40 mg in sodium chloride 0.9 % 100 mL chemo IVPB  40 mg IntraVENous Q24H    furosemide  40 mg Oral Q12H    sodium chloride flush  5-40 mL IntraVENous 2 times per day    vitamin B-12  1,000 mcg Oral Daily    Fidaxomicin  200 mg Oral BID    sodium chloride flush  5-40 mL IntraVENous 2 times per day    Saline Mouthwash  15 mL Swish & Spit 4x Daily AC & HS    levETIRAcetam  1,500 mg Oral BID    allopurinol  300 mg Oral Daily     Continuous Infusions:   sodium chloride      sodium chloride 75 mL/hr at 24 1503    sodium chloride      sodium chloride      sodium chloride      sodium chloride       PRN Meds:.sodium chloride, sennosides-docusate sodium, prochlorperazine **OR** prochlorperazine, LORazepam **OR** LORazepam, sodium chloride, sodium chloride, sodium chloride flush, sodium chloride, sodium chloride flush, sodium chloride, magnesium sulfate, Saline Mouthwash, alteplase (CATHFLO) 2 mg in sterile water 2 mL injection, melatonin, calcium carbonate, potassium chloride **OR** potassium alternative oral replacement **OR** potassium chloride    ROS:  As noted above, otherwise remainder of 10-point ROS negative    Physical Exam:     I&O:    Intake/Output Summary (Last 24 hours) at 3/13/2024 0636  Last data filed at 3/13/2024 
    [facility] Progress Note    3/24/2024     Dennys Peguero    MRN: [id number]    : 1980    Referring MD: Dmitriy Russo Jr., MD  [address] [address]      SUBJECTIVE:   He is doing better this morning, no bowel movement. MRI pelvis ordered per surg onc. Discussed plan of care with patient's father and sister.      ECOG PS:  (2) Ambulatory and capable of self care, unable to carry out work activity, up and about > 50% or waking hours    KPS: 80% Normal activity with effort; some signs or symptoms of disease    Isolation: None    Medications    Scheduled Meds:   pantoprazole (PROTONIX) 40 mg in sodium chloride (PF) 0.9 % 10 mL injection  40 mg IntraVENous Q12H    Hydrocerin   Topical BID    Venetoclax  200 mg Oral Q24H    valACYclovir  500 mg Oral BID    levoFLOXacin  500 mg Oral Nightly    fluconazole  200 mg Oral Daily    sodium chloride flush  5-40 mL IntraVENous 2 times per day    vitamin B-12  1,000 mcg Oral Daily    sodium chloride flush  5-40 mL IntraVENous 2 times per day    Saline Mouthwash  15 mL Swish & Spit 4x Daily AC & HS    levETIRAcetam  1,500 mg Oral BID     Continuous Infusions:   sodium chloride      sodium chloride      sodium chloride      sodium chloride      sodium chloride      sodium chloride      sodium chloride       PRN Meds:.sodium chloride, sodium chloride, sodium chloride, sennosides-docusate sodium, prochlorperazine **OR** prochlorperazine, LORazepam **OR** LORazepam, sodium chloride, sodium chloride, sodium chloride flush, sodium chloride, sodium chloride flush, sodium chloride, magnesium sulfate, Saline Mouthwash, alteplase (CATHFLO) 2 mg in sterile water 2 mL injection, melatonin, calcium carbonate, potassium chloride **OR** potassium alternative oral replacement **OR** potassium chloride    ROS:  As noted above, otherwise remainder of 10-point ROS negative    Physical Exam:     I&O:    Intake/Output Summary (Last 24 hours) at 3/24/2024 
    [facility] Progress Note    3/9/2024     Dennys Peguero    MRN: [id number]    : 1980    Referring MD: Dmitriy Russo Jr., MD  [address] [address]      SUBJECTIVE:  Difficulty sleeping but no complaints otherwise.  Await BM biopsy results.  ECHO Ok    ECOG PS:  (2) Ambulatory and capable of self care, unable to carry out work activity, up and about > 50% or waking hours    KPS: 80% Normal activity with effort; some signs or symptoms of disease    Isolation: None    Medications    Scheduled Meds:   sodium chloride flush  5-40 mL IntraVENous 2 times per day    vitamin B-12  1,000 mcg Oral Daily    Fidaxomicin  200 mg Oral BID    sodium chloride flush  5-40 mL IntraVENous 2 times per day    Saline Mouthwash  15 mL Swish & Spit 4x Daily AC & HS    levETIRAcetam  1,500 mg Oral BID    allopurinol  300 mg Oral Daily     Continuous Infusions:   sodium chloride      sodium chloride      sodium chloride 1,000 mL (24 0327)     PRN Meds:.sodium chloride flush, sodium chloride, sodium chloride flush, sodium chloride, magnesium sulfate, Saline Mouthwash, alteplase (CATHFLO) 2 mg in sterile water 2 mL injection, melatonin, calcium carbonate, sennosides-docusate sodium, potassium chloride **OR** potassium alternative oral replacement **OR** potassium chloride    ROS:  As noted above, otherwise remainder of 10-point ROS negative    Physical Exam:     I&O:    Intake/Output Summary (Last 24 hours) at 3/9/2024 0647  Last data filed at 3/9/2024 0637  Gross per 24 hour   Intake 2261 ml   Output 1850 ml   Net 411 ml         Vital Signs:  /84   Pulse 70   Temp 98 °F (36.7 °C) (Oral)   Resp 18   Ht 1.778 m (5' 10\")   Wt 82.2 kg (181 lb 3.2 oz)   SpO2 98%   BMI 26.00 kg/m²     Weight:    Wt Readings from Last 3 Encounters:   24 82.2 kg (181 lb 3.2 oz)   24 80.5 kg (177 lb 7.5 oz)         General: Awake, alert and oriented.  HEENT: normocephalic, PERRL, no scleral 
    [facility] Progress Note    3/15/2024     Dennys Peguero    MRN: [id number]    : 1980    Referring MD: Dmitriy Russo Jr., MD  [address] [address]      SUBJECTIVE: Tolerating chemo well. No N/V/D.     ECOG PS:  (2) Ambulatory and capable of self care, unable to carry out work activity, up and about > 50% or waking hours    KPS: 80% Normal activity with effort; some signs or symptoms of disease    Isolation: None    Medications    Scheduled Meds:   Venetoclax  200 mg Oral Q24H    valACYclovir  500 mg Oral BID    levoFLOXacin  500 mg Oral Nightly    fluconazole  200 mg Oral Daily    pantoprazole  40 mg Oral QAM AC    ondansetron  8 mg Oral Q24H    decitabine (DACOGEN) 40 mg in sodium chloride 0.9 % 100 mL chemo IVPB  40 mg IntraVENous Q24H    furosemide  40 mg Oral Q12H    sodium chloride flush  5-40 mL IntraVENous 2 times per day    vitamin B-12  1,000 mcg Oral Daily    Fidaxomicin  200 mg Oral BID    sodium chloride flush  5-40 mL IntraVENous 2 times per day    Saline Mouthwash  15 mL Swish & Spit 4x Daily AC & HS    levETIRAcetam  1,500 mg Oral BID    allopurinol  300 mg Oral Daily     Continuous Infusions:   sodium chloride      sodium chloride 75 mL/hr at 24 1159    sodium chloride      sodium chloride      sodium chloride      sodium chloride       PRN Meds:.sodium chloride, sennosides-docusate sodium, prochlorperazine **OR** prochlorperazine, LORazepam **OR** LORazepam, sodium chloride, sodium chloride, sodium chloride flush, sodium chloride, sodium chloride flush, sodium chloride, magnesium sulfate, Saline Mouthwash, alteplase (CATHFLO) 2 mg in sterile water 2 mL injection, melatonin, calcium carbonate, potassium chloride **OR** potassium alternative oral replacement **OR** potassium chloride    ROS:  As noted above, otherwise remainder of 10-point ROS negative    Physical Exam:     I&O:    Intake/Output Summary (Last 24 hours) at 3/15/2024 1112  Last data filed 
    [facility] Progress Note    3/15/2024     Dennys Peguero    MRN: [id number]    : 1980    Referring MD: Dmitriy Russo Jr., MD  [address] [address]      SUBJECTIVE: Tolerating chemo well.     ECOG PS:  (2) Ambulatory and capable of self care, unable to carry out work activity, up and about > 50% or waking hours    KPS: 80% Normal activity with effort; some signs or symptoms of disease    Isolation: None    Medications    Scheduled Meds:   Venetoclax  200 mg Oral Q24H    valACYclovir  500 mg Oral BID    levoFLOXacin  500 mg Oral Nightly    fluconazole  200 mg Oral Daily    pantoprazole  40 mg Oral QAM AC    ondansetron  8 mg Oral Q24H    decitabine (DACOGEN) 40 mg in sodium chloride 0.9 % 100 mL chemo IVPB  40 mg IntraVENous Q24H    furosemide  40 mg Oral Q12H    sodium chloride flush  5-40 mL IntraVENous 2 times per day    vitamin B-12  1,000 mcg Oral Daily    Fidaxomicin  200 mg Oral BID    sodium chloride flush  5-40 mL IntraVENous 2 times per day    Saline Mouthwash  15 mL Swish & Spit 4x Daily AC & HS    levETIRAcetam  1,500 mg Oral BID    allopurinol  300 mg Oral Daily     Continuous Infusions:   sodium chloride      sodium chloride 75 mL/hr at 24 1159    sodium chloride      sodium chloride      sodium chloride      sodium chloride       PRN Meds:.sodium chloride, sennosides-docusate sodium, prochlorperazine **OR** prochlorperazine, LORazepam **OR** LORazepam, sodium chloride, sodium chloride, sodium chloride flush, sodium chloride, sodium chloride flush, sodium chloride, magnesium sulfate, Saline Mouthwash, alteplase (CATHFLO) 2 mg in sterile water 2 mL injection, melatonin, calcium carbonate, potassium chloride **OR** potassium alternative oral replacement **OR** potassium chloride    ROS:  As noted above, otherwise remainder of 10-point ROS negative    Physical Exam:     I&O:    Intake/Output Summary (Last 24 hours) at 3/15/2024 1111  Last data filed at 
    [facility] Progress Note    3/12/2024     Dennys Peguero    MRN: [id number]    : 1980    Referring MD: Dmitriy Russo Jr., MD  [address] [address]      SUBJECTIVE:   No acute events  Still has blood in his stool    ECOG PS:  (2) Ambulatory and capable of self care, unable to carry out work activity, up and about > 50% or waking hours    KPS: 80% Normal activity with effort; some signs or symptoms of disease    Isolation: None    Medications    Scheduled Meds:   valACYclovir  500 mg Oral BID    levoFLOXacin  500 mg Oral Nightly    fluconazole  200 mg Oral Daily    ondansetron  8 mg Oral Q24H    decitabine (DACOGEN) 40 mg in sodium chloride 0.9 % 100 mL chemo IVPB  40 mg IntraVENous Q24H    [START ON 3/13/2024] furosemide  40 mg Oral Q12H    sodium chloride flush  5-40 mL IntraVENous 2 times per day    vitamin B-12  1,000 mcg Oral Daily    Fidaxomicin  200 mg Oral BID    sodium chloride flush  5-40 mL IntraVENous 2 times per day    Saline Mouthwash  15 mL Swish & Spit 4x Daily AC & HS    levETIRAcetam  1,500 mg Oral BID    allopurinol  300 mg Oral Daily     Continuous Infusions:   sodium chloride      sodium chloride 1,000 mL (24 1445)    sodium chloride      sodium chloride      sodium chloride      sodium chloride       PRN Meds:.prochlorperazine **OR** prochlorperazine, LORazepam **OR** LORazepam, sodium chloride, sodium chloride, sodium chloride flush, sodium chloride, sodium chloride flush, sodium chloride, magnesium sulfate, Saline Mouthwash, alteplase (CATHFLO) 2 mg in sterile water 2 mL injection, melatonin, calcium carbonate, sennosides-docusate sodium, potassium chloride **OR** potassium alternative oral replacement **OR** potassium chloride    ROS:  As noted above, otherwise remainder of 10-point ROS negative    Physical Exam:     I&O:    Intake/Output Summary (Last 24 hours) at 3/12/2024 0740  Last data filed at 3/12/2024 0655  Gross per 24 hour   Intake 8307 
    [facility] Progress Note    3/17/2024     Dennys Peguero    MRN: [id number]    : 1980    Referring MD: Dmitriy Russo Jr., MD  [address] [address]      SUBJECTIVE:  Doing well, no F/N/V/D.     ECOG PS:  (2) Ambulatory and capable of self care, unable to carry out work activity, up and about > 50% or waking hours    KPS: 80% Normal activity with effort; some signs or symptoms of disease    Isolation: None    Medications    Scheduled Meds:   furosemide  40 mg IntraVENous Q12H    Venetoclax  200 mg Oral Q24H    valACYclovir  500 mg Oral BID    levoFLOXacin  500 mg Oral Nightly    fluconazole  200 mg Oral Daily    pantoprazole  40 mg Oral QAM AC    sodium chloride flush  5-40 mL IntraVENous 2 times per day    vitamin B-12  1,000 mcg Oral Daily    Fidaxomicin  200 mg Oral BID    sodium chloride flush  5-40 mL IntraVENous 2 times per day    Saline Mouthwash  15 mL Swish & Spit 4x Daily AC & HS    levETIRAcetam  1,500 mg Oral BID    allopurinol  300 mg Oral Daily     Continuous Infusions:   sodium chloride      sodium chloride 75 mL/hr at 24 0504    sodium chloride      sodium chloride      sodium chloride      sodium chloride       PRN Meds:.sodium chloride, sennosides-docusate sodium, prochlorperazine **OR** prochlorperazine, LORazepam **OR** LORazepam, sodium chloride, sodium chloride, sodium chloride flush, sodium chloride, sodium chloride flush, sodium chloride, magnesium sulfate, Saline Mouthwash, alteplase (CATHFLO) 2 mg in sterile water 2 mL injection, melatonin, calcium carbonate, potassium chloride **OR** potassium alternative oral replacement **OR** potassium chloride    ROS:  As noted above, otherwise remainder of 10-point ROS negative    Physical Exam:     I&O:    Intake/Output Summary (Last 24 hours) at 3/17/2024 0730  Last data filed at 3/17/2024 0653  Gross per 24 hour   Intake 3854 ml   Output 3600 ml   Net 254 ml         Vital Signs:  /80   Pulse 72  
    [facility] Progress Note    3/18/2024     Dennys Peguero    MRN: [id number]    : 1980    Referring MD: Dmitriy Russo Jr., MD  [address] [facility] [address]  [place] [address]      SUBJECTIVE:  Tolerating his chemo well. No diarrhea and has been having intermittent dhaval.     ECOG PS:  (2) Ambulatory and capable of self care, unable to carry out work activity, up and about > 50% or waking hours    KPS: 80% Normal activity with effort; some signs or symptoms of disease    Isolation: None    Medications    Scheduled Meds:   furosemide  40 mg IntraVENous Q12H    Venetoclax  200 mg Oral Q24H    valACYclovir  500 mg Oral BID    levoFLOXacin  500 mg Oral Nightly    fluconazole  200 mg Oral Daily    pantoprazole  40 mg Oral QAM AC    sodium chloride flush  5-40 mL IntraVENous 2 times per day    vitamin B-12  1,000 mcg Oral Daily    sodium chloride flush  5-40 mL IntraVENous 2 times per day    Saline Mouthwash  15 mL Swish & Spit 4x Daily AC & HS    levETIRAcetam  1,500 mg Oral BID    allopurinol  300 mg Oral Daily     Continuous Infusions:   sodium chloride      sodium chloride 50 mL/hr at 24 0647    sodium chloride      sodium chloride      sodium chloride      sodium chloride       PRN Meds:.sodium chloride, sennosides-docusate sodium, prochlorperazine **OR** prochlorperazine, LORazepam **OR** LORazepam, sodium chloride, sodium chloride, sodium chloride flush, sodium chloride, sodium chloride flush, sodium chloride, magnesium sulfate, Saline Mouthwash, alteplase (CATHFLO) 2 mg in sterile water 2 mL injection, melatonin, calcium carbonate, potassium chloride **OR** potassium alternative oral replacement **OR** potassium chloride    ROS:  As noted above, otherwise remainder of 10-point ROS negative    Physical Exam:     I&O:    Intake/Output Summary (Last 24 hours) at 3/18/2024 0918  Last data filed at 3/18/2024 0825  Gross per 24 hour   Intake 2953 ml   Output 2550 ml   Net 403 ml         Vital Signs:  
    [facility] Progress Note    3/22/2024     Dennys Peguero    MRN: [id number]    : 1980    Referring MD: Dmitriy Russo Jr., MD  [address] [facility] [address]  [place] [address]      SUBJECTIVE: Planned for colonoscopy today. No dhaval or Fever/N/V/D.     ECOG PS:  (2) Ambulatory and capable of self care, unable to carry out work activity, up and about > 50% or waking hours    KPS: 80% Normal activity with effort; some signs or symptoms of disease    Isolation: None    Medications    Scheduled Meds:   Hydrocerin   Topical BID    pantoprazole  40 mg Oral BID AC    Venetoclax  200 mg Oral Q24H    valACYclovir  500 mg Oral BID    levoFLOXacin  500 mg Oral Nightly    fluconazole  200 mg Oral Daily    sodium chloride flush  5-40 mL IntraVENous 2 times per day    vitamin B-12  1,000 mcg Oral Daily    sodium chloride flush  5-40 mL IntraVENous 2 times per day    Saline Mouthwash  15 mL Swish & Spit 4x Daily AC & HS    levETIRAcetam  1,500 mg Oral BID     Continuous Infusions:   sodium chloride      sodium chloride      sodium chloride      sodium chloride      sodium chloride      sodium chloride       PRN Meds:.sodium chloride, sodium chloride, sennosides-docusate sodium, prochlorperazine **OR** prochlorperazine, LORazepam **OR** LORazepam, sodium chloride, sodium chloride, sodium chloride flush, sodium chloride, sodium chloride flush, sodium chloride, magnesium sulfate, Saline Mouthwash, alteplase (CATHFLO) 2 mg in sterile water 2 mL injection, melatonin, calcium carbonate, potassium chloride **OR** potassium alternative oral replacement **OR** potassium chloride    ROS:  As noted above, otherwise remainder of 10-point ROS negative    Physical Exam:     I&O:    Intake/Output Summary (Last 24 hours) at 3/22/2024 1007  Last data filed at 3/21/2024 2000  Gross per 24 hour   Intake 4400 ml   Output 300 ml   Net 4100 ml         Vital Signs:  /85   Pulse 89   Temp 97.9 °F (36.6 °C) (Axillary)   Resp 16   
    [facility] Progress Note    3/16/2024     Dennys Peguero    MRN: [id number]    : 1980    Referring MD: Dmitriy Russo Jr., MD  [address] [address]      SUBJECTIVE: Doing well, no F/N/V/D.     ECOG PS:  (2) Ambulatory and capable of self care, unable to carry out work activity, up and about > 50% or waking hours    KPS: 80% Normal activity with effort; some signs or symptoms of disease    Isolation: None    Medications    Scheduled Meds:   furosemide  40 mg IntraVENous Q12H    Venetoclax  200 mg Oral Q24H    valACYclovir  500 mg Oral BID    levoFLOXacin  500 mg Oral Nightly    fluconazole  200 mg Oral Daily    pantoprazole  40 mg Oral QAM AC    ondansetron  8 mg Oral Q24H    decitabine (DACOGEN) 40 mg in sodium chloride 0.9 % 100 mL chemo IVPB  40 mg IntraVENous Q24H    sodium chloride flush  5-40 mL IntraVENous 2 times per day    vitamin B-12  1,000 mcg Oral Daily    Fidaxomicin  200 mg Oral BID    sodium chloride flush  5-40 mL IntraVENous 2 times per day    Saline Mouthwash  15 mL Swish & Spit 4x Daily AC & HS    levETIRAcetam  1,500 mg Oral BID    allopurinol  300 mg Oral Daily     Continuous Infusions:   sodium chloride      sodium chloride 75 mL/hr at 24 0045    sodium chloride      sodium chloride      sodium chloride      sodium chloride       PRN Meds:.sodium chloride, sennosides-docusate sodium, prochlorperazine **OR** prochlorperazine, LORazepam **OR** LORazepam, sodium chloride, sodium chloride, sodium chloride flush, sodium chloride, sodium chloride flush, sodium chloride, magnesium sulfate, Saline Mouthwash, alteplase (CATHFLO) 2 mg in sterile water 2 mL injection, melatonin, calcium carbonate, potassium chloride **OR** potassium alternative oral replacement **OR** potassium chloride    ROS:  As noted above, otherwise remainder of 10-point ROS negative    Physical Exam:     I&O:    Intake/Output Summary (Last 24 hours) at 3/16/2024 0723  Last data filed at 
    [facility] Progress Note    3/31/2024     Dennys Peguero    MRN: [id number]    : 1980    Referring MD: Dmitriy Russo Jr., MD  [address] [address]      SUBJECTIVE:  No further bloody stools.  Denies pain.  No fevers or chills.  Was playing hand-held game during the entire encounter    ECOG PS:  (2) Ambulatory and capable of self care, unable to carry out work activity, up and about > 50% or waking hours    KPS: 80% Normal activity with effort; some signs or symptoms of disease    Isolation: None    Medications    Scheduled Meds:   allopurinol  300 mg Oral Daily    Venetoclax  200 mg Oral Q24H    pantoprazole  40 mg Oral BID AC    Hydrocerin   Topical BID    valACYclovir  500 mg Oral BID    levoFLOXacin  500 mg Oral Nightly    fluconazole  200 mg Oral Daily    sodium chloride flush  5-40 mL IntraVENous 2 times per day    vitamin B-12  1,000 mcg Oral Daily    sodium chloride flush  5-40 mL IntraVENous 2 times per day    Saline Mouthwash  15 mL Swish & Spit 4x Daily AC & HS    levETIRAcetam  1,500 mg Oral BID     Continuous Infusions:   sodium chloride      sodium chloride      sodium chloride      sodium chloride      sodium chloride      sodium chloride      sodium chloride       PRN Meds:.potassium chloride, magnesium sulfate, sodium phosphate IVPB (CENTRAL line), polyethylene glycol, sodium chloride, sodium chloride, sodium chloride, sennosides-docusate sodium, prochlorperazine **OR** prochlorperazine, LORazepam **OR** LORazepam, sodium chloride, sodium chloride, sodium chloride flush, sodium chloride, sodium chloride flush, sodium chloride, magnesium sulfate, Saline Mouthwash, alteplase (CATHFLO) 2 mg in sterile water 2 mL injection, melatonin, calcium carbonate, potassium chloride **OR** potassium alternative oral replacement **OR** potassium chloride    ROS:  As noted above, otherwise remainder of 10-point ROS negative    Physical Exam:     I&O:    Intake/Output Summary 
    [facility] Progress Note    2024     Dennys Peguero    MRN: [id number]    : 1980    Referring MD: Dmitriy Russo Jr., MD  [address] [address]      SUBJECTIVE: ***.     ECOG PS:  (2) Ambulatory and capable of self care, unable to carry out work activity, up and about > 50% or waking hours    KPS: 80% Normal activity with effort; some signs or symptoms of disease    Isolation: None    Medications    Scheduled Meds:   allopurinol  300 mg Oral Daily    Venetoclax  200 mg Oral Q24H    pantoprazole  40 mg Oral BID AC    Hydrocerin   Topical BID    valACYclovir  500 mg Oral BID    levoFLOXacin  500 mg Oral Nightly    fluconazole  200 mg Oral Daily    sodium chloride flush  5-40 mL IntraVENous 2 times per day    vitamin B-12  1,000 mcg Oral Daily    sodium chloride flush  5-40 mL IntraVENous 2 times per day    Saline Mouthwash  15 mL Swish & Spit 4x Daily AC & HS    levETIRAcetam  1,500 mg Oral BID     Continuous Infusions:   sodium chloride      sodium chloride      sodium chloride      sodium chloride      sodium chloride      sodium chloride 25 mL (24 0550)    sodium chloride 5 mL/hr at 24 0530     PRN Meds:.potassium chloride, magnesium sulfate, sodium phosphate IVPB (CENTRAL line), polyethylene glycol, sodium chloride, sodium chloride, sodium chloride, sennosides-docusate sodium, prochlorperazine **OR** prochlorperazine, LORazepam **OR** LORazepam, sodium chloride, sodium chloride, sodium chloride flush, sodium chloride, sodium chloride flush, sodium chloride, magnesium sulfate, Saline Mouthwash, alteplase (CATHFLO) 2 mg in sterile water 2 mL injection, melatonin, calcium carbonate, potassium chloride **OR** potassium alternative oral replacement **OR** potassium chloride    ROS:  As noted above, otherwise remainder of 10-point ROS negative    Physical Exam:     I&O:    Intake/Output Summary (Last 24 hours) at 2024 0757  Last data filed at 2024 
  Administration: Chemotherapy drug decitabine independently verified with Margaret Alston RN prior to administration.  Acknowledgement of informed consent for chemotherapy administration verified.  Original order, appropriateness of regimen, drug supplied, height, weight, BSA, dose calculations, expiration dates/times, drug appearance, and two patient identifiers were verified by both RNs.  Drug checked for vesicant/irritant status and for risk of hypersensitivity.  Most recent laboratory values and allergies, were reviewed.  Positive, brisk blood return via CVC was confirmed prior to administration. Chest x-ray for correct line placement reviewed. Aaron Drew RN and Margaret Alston RN verified correct rate of chemotherapy and maintenance IV fluids.  Patient was educated on chemotherapy regimen prior to administration including indication for treatment related to disease & side effects of chemotherapy drug.  Patient verbalizes understanding of all instructions.        Completion of Chemotherapy: Monitoring during infusion done per policy, see Flowsheets.  Blood return verified before, during, and after infusion per policy; no signs of extravasation.  Pt tolerating chemotherapy well and without incident.  Chemotherapy infusion end time on the MAR.    
  Comprehensive Nutrition Assessment    RECOMMENDATIONS:  PO Diet: Continue NPO; Resume Regular; Low Phosphorus; Low Microbial when diet able to be adv.  ONS: Resume Nepro bid upon diet adv.  Nutrition Education: Education initiated (low microbial diet)     NUTRITION ASSESSMENT:   Nutritional summary & status: Follow-up. Pt NPO for Colonoscopy. Previously on a Regular; Low Phos; Low Microbial diet with adequate po intake at % of meals and Nepro supplement. Recommend continue with low phos restriction and Renal supplement when diet advanced due to hyperphosphatemia. Continue to follow.   Admission // PMH: Thrombocytopenia//brain cancer, seizure    MALNUTRITION ASSESSMENT  Context of Malnutrition: Chronic Illness   Malnutrition Status: Severe malnutrition  Findings of the 6 clinical characteristics of malnutrition (Minimum of 2 out of 6 clinical characteristics is required to make the diagnosis of moderate or severe Protein Calorie Malnutrition based on AND/ASPEN Guidelines):  Energy Intake:  75% or less estimated energy requirements for 1 month or longer  Weight Loss:  Greater than 7.5% over 3 months     Body Fat Loss:  Mild body fat loss Buccal region, Triceps   Muscle Mass Loss:  Mild muscle mass loss Temples (temporalis), Clavicles (pectoralis & deltoids)  Fluid Accumulation:  No significant fluid accumulation     Strength:  Not Performed    NUTRITION DIAGNOSIS   Severe malnutrition related to catabolic illness, inadequate protein-energy intake as evidenced by Criteria as identified in malnutrition assessment    Nutrition Monitoring and Evaluation:   Food/Nutrient Intake Outcomes:  Food and Nutrient Intake, Supplement Intake  Physical Signs/Symptoms Outcomes:  Biochemical Data, Nutrition Focused Physical Findings, Weight     OBJECTIVE DATA: Significant to nutrition assessment  Nutrition Related Findings: LBM3/22(red). BLE trace edema. Phos 5.2, Mg 1.7  Wounds: None  Nutrition Goals: PO intake 75% or 
  Comprehensive Nutrition Assessment    RECOMMENDATIONS:  PO Diet: Continue Regular; Low Microbial  ONS: Begin Ensure +HP bid  Nutrition Education: Education initiated (low microbial diet)     NUTRITION ASSESSMENT:   Nutritional summary & status: Positive Nutrition Screen. Pt admitted for anemia and thrombocytopenia. Reports appetite good at present. EMR showing meal intake at 51-75% and %. Limited data due to new admit. Endorses weight loss of 9.5% in past 2 months due to decreased po intake(eating only 1 meal per day). Mild subcutaneous fat/muscle mass loss noted. Severe malnutrition identified per ASPEN criteria. Pt agreeable to receive Ensure supplement bid to promote adequate nutrition. Continue to follow.   Admission // PMH: Thrombocytopenia//brain cancer, seizure    MALNUTRITION ASSESSMENT  Context of Malnutrition: Chronic Illness   Malnutrition Status: Severe malnutrition  Findings of the 6 clinical characteristics of malnutrition (Minimum of 2 out of 6 clinical characteristics is required to make the diagnosis of moderate or severe Protein Calorie Malnutrition based on AND/ASPEN Guidelines):  Energy Intake:  75% or less estimated energy requirements for 1 month or longer  Weight Loss:  Greater than 7.5% over 3 months     Body Fat Loss:  Mild body fat loss Buccal region, Triceps   Muscle Mass Loss:  Mild muscle mass loss Temples (temporalis), Clavicles (pectoralis & deltoids)  Fluid Accumulation:  No significant fluid accumulation     Strength:  Not Performed    NUTRITION DIAGNOSIS   Severe malnutrition related to catabolic illness, inadequate protein-energy intake as evidenced by Criteria as identified in malnutrition assessment    Nutrition Monitoring and Evaluation:   Food/Nutrient Intake Outcomes:  Food and Nutrient Intake, Supplement Intake  Physical Signs/Symptoms Outcomes:  Biochemical Data, Nutrition Focused Physical Findings, Weight     OBJECTIVE DATA: Significant to nutrition 
  Comprehensive Nutrition Assessment    RECOMMENDATIONS:  PO Diet: Continue Regular;Low Phosphorus; Low Microbial  ONS: Continue Nepro supplement bid  Nutrition Education:  (Needs reviewe prior to discharge)     NUTRITION ASSESSMENT:   Nutritional summary & status: Follow-up. Pt reports good appetite with % at meals. Continues on low phosphorus diet due to hyperphosphatemia. Verbal review of foods high in phosphorus that are avoided/limited. Pt without complaints regarding diet restriction, not affecting intake. Wt loss of 2.6% in 3 weeks, not triggering as signficant. Agreeable to continue receiving ONS bid for added nutrition. Surgery following due to Rectal Mass. Surgical intervention on hold until counts recover. Continue to follow.   Admission // PMH: Thrombocytopenia//brain cancer, seizure    MALNUTRITION ASSESSMENT  Context of Malnutrition: Chronic Illness   Malnutrition Status: Severe malnutrition  Findings of the 6 clinical characteristics of malnutrition (Minimum of 2 out of 6 clinical characteristics is required to make the diagnosis of moderate or severe Protein Calorie Malnutrition based on AND/ASPEN Guidelines):  Energy Intake:  75% or less estimated energy requirements for 1 month or longer  Weight Loss:  Greater than 7.5% over 3 months     Body Fat Loss:  Mild body fat loss Buccal region, Triceps   Muscle Mass Loss:  Mild muscle mass loss Temples (temporalis), Clavicles (pectoralis & deltoids)  Fluid Accumulation:  No significant fluid accumulation     Strength:  Not Performed    NUTRITION DIAGNOSIS   Severe malnutrition related to catabolic illness, inadequate protein-energy intake as evidenced by Criteria as identified in malnutrition assessment    Nutrition Monitoring and Evaluation:   Food/Nutrient Intake Outcomes:  Food and Nutrient Intake, Supplement Intake  Physical Signs/Symptoms Outcomes:  Biochemical Data, Nutrition Focused Physical Findings, Weight     OBJECTIVE DATA: Significant 
  Comprehensive Nutrition Assessment    RECOMMENDATIONS:  PO Diet: Modify to Regular; Low Phosphorus; Low Microbial  ONS: Modify Nepro bid  Nutrition Education: Education initiated (low microbial diet)     NUTRITION ASSESSMENT:   Nutritional summary & status: Follow-up. Meal intake good with EMR showing % meals consumed. Hyperphosphatemia, will add low phosphorus modifier and change Ensure +HP to Nepro bid. Continues with blood in stool. On protonix. Continue to follow.   Admission // PMH: Thrombocytopenia//brain cancer, seizure    MALNUTRITION ASSESSMENT  Context of Malnutrition: Chronic Illness   Malnutrition Status: Severe malnutrition  Findings of the 6 clinical characteristics of malnutrition (Minimum of 2 out of 6 clinical characteristics is required to make the diagnosis of moderate or severe Protein Calorie Malnutrition based on AND/ASPEN Guidelines):  Energy Intake:  75% or less estimated energy requirements for 1 month or longer  Weight Loss:  Greater than 7.5% over 3 months     Body Fat Loss:  Mild body fat loss Buccal region, Triceps   Muscle Mass Loss:  Mild muscle mass loss Temples (temporalis), Clavicles (pectoralis & deltoids)  Fluid Accumulation:  No significant fluid accumulation     Strength:  Not Performed    NUTRITION DIAGNOSIS   Severe malnutrition related to catabolic illness, inadequate protein-energy intake as evidenced by Criteria as identified in malnutrition assessment    Nutrition Monitoring and Evaluation:   Food/Nutrient Intake Outcomes:  Food and Nutrient Intake, Supplement Intake  Physical Signs/Symptoms Outcomes:  Biochemical Data, Nutrition Focused Physical Findings, Weight     OBJECTIVE DATA: Significant to nutrition assessment  Nutrition Related Findings: Na 134 Mg 1.7 phos 5.2. No edema. LBM3/11  Wounds: None  Nutrition Goals: PO intake 75% or greater, by next RD assessment     CURRENT NUTRITION THERAPIES  ADULT DIET; Regular; Low Phosphorus (Less than 1000 mg); Low 
  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 =8.3, Platelets=69  Treatment: CBC, CMP, Hematology consult, Meds-DACOGEN, Decitabine, V/S and I/O   monitoring per unit protocol, Transfusion  per MD parameters  Options provided:  -- Antineoplastic (chemotherapy) induced pancytopenia  -- Other - I will add my own diagnosis  -- Disagree - Not applicable / Not valid  -- Disagree - Clinically unable to determine / Unknown  -- Refer to Clinical Documentation Reviewer    PROVIDER RESPONSE TEXT:    Patient has antineoplastic (chemotherapy) induced pancytopenia.    Query created by: Jeanette Ashford on 3/18/2024 9:55 AM      Electronically signed by:  Eduardo Nguyen MD 3/20/2024 4:21 PM          
  Physician Progress Note      PATIENT:               SAMANTHA HAMILTON  CSN #:                  [id number]  :                       1980  ADMIT DATE:       3/7/2024 1:11 PM  DISCH DATE:  RESPONDING  PROVIDER #:        SIMA CABRALES          QUERY TEXT:    Patient admitted with Concern for acute leukemia.  Noted to have Severe   malnutrition per dietician assessment on 3/8/24. If possible, please document   in progress notes and discharge summary if you are evaluating and /or treating   any of the following:    The medical record reflects the following:  Risk Factors: \"concern for acute leukemia\"  Clinical Indicators: per dietician 3/8 \"Severe malnutrition. Energy Intake:    75% or less estimated energy requirements for 1 month or  longer. Weight Loss:  Greater than 7.5% over 3 months. Body Fat Loss:  Mild   body fat loss Buccal region, Triceps. Muscle Mass  Loss: Mild muscle mass loss Temples (temporalis), Clavicles (pectoralis &   deltoids)\"  Treatment: CBC, CMP, dietician consult, per recommendations- Continue Regular;   Low Microbial, Begin Ensure +HP bid; V/S and  I/O monitoring per unit protocol    ASPEN Criteria:    https://aspenjournals.onlinelibrary.narvaez.com/doi/full/10.1177/377454390696949  5  Options provided:  -- Protein calorie malnutrition severe  -- Other - I will add my own diagnosis  -- Disagree - Not applicable / Not valid  -- Disagree - Clinically unable to determine / Unknown  -- Refer to Clinical Documentation Reviewer    PROVIDER RESPONSE TEXT:    This patient has severe protein calorie malnutrition.    Query created by: Jeanette Ashford on 3/11/2024 12:17 PM      Electronically signed by:  SIMA CABRALES 3/11/2024 12:38 PM          
  Physician Progress Note      PATIENT:               SAMANTHA HAMILTON  CSN #:                  [id number]  :                       1980  ADMIT DATE:       3/7/2024 1:11 PM  DISCH DATE:  RESPONDING  PROVIDER #:        TRACY SCHOENHOFT APRN - CNP          QUERY TEXT:    Patient admitted with Concern for acute leukemia.  Noted to have Severe   malnutrition per dietician assessment on 3/8/24. If possible, please document   in progress notes and discharge summary if you are evaluating and /or treating   any of the following:    The medical record reflects the following:  Risk Factors: \"concern for acute leukemia\"  Clinical Indicators: per dietician 3/8 \"Severe malnutrition. Energy Intake:    75% or less estimated energy requirements for 1 month or  longer. Weight Loss:  Greater than 7.5% over 3 months. Body Fat Loss:  Mild   body fat loss Buccal region, Triceps. Muscle Mass  Loss: Mild muscle mass loss Temples (temporalis), Clavicles (pectoralis &   deltoids)\"  Treatment: CBC, CMP, dietician consult, per recommendations- Continue Regular;   Low Microbial, Begin Ensure +HP bid; V/S and  I/O monitoring per unit protocol    ASPEN Criteria:    https://aspenjournals.onlinelibrary.narvaez.com/doi/full/10.1177/097860050473965  5  Options provided:  -- Protein calorie malnutrition severe  -- Other - I will add my own diagnosis  -- Disagree - Not applicable / Not valid  -- Disagree - Clinically unable to determine / Unknown  -- Refer to Clinical Documentation Reviewer    PROVIDER RESPONSE TEXT:    Provider disagreed with this query.    Query created by: Jeanette Ashford on 3/8/2024 2:57 PM      Electronically signed by:  TRACY SCHOENHOFT APRN - CNP 3/11/2024 11:37 AM          
4 Eyes Skin Assessment     NAME:  Dennys Peguero  YOB: 1980  MEDICAL RECORD NUMBER:  [id number]    The patient is being assessed for  Admission    I agree that at least one RN has performed a thorough Head to Toe Skin Assessment on the patient. ALL assessment sites listed below have been assessed.      Areas assessed by both nurses:    Head, Face, Ears, Shoulders, Back, Chest, Arms, Elbows, Hands, Sacrum. Buttock, Coccyx, Ischium, and Legs. Feet and Heels        Does the Patient have a Wound? No noted wound(s)       Alexandre Prevention initiated by RN: No  Wound Care Orders initiated by RN: No    Pressure Injury (Stage 3,4, Unstageable, DTI, NWPT, and Complex wounds) if present, place Wound referral order by RN under : No    New Ostomies, if present place, Ostomy referral order under : No     Nurse 1 eSignature: Electronically signed by Jordana Jovel RN on 3/7/24 at 4:16 PM EST    **SHARE this note so that the co-signing nurse can place an eSignature**    Nurse 2 eSignature: {Esignature:[id number]}  
Central line dressing changed using sterile technique following hospital policy due to dressing change protocol for Mondays. , RN, observed with procedure to ensure proper technique.  
Central line dressing changed using sterile technique following hospital policy. Elina Medellin RN, observed with procedure to ensure proper technique.  
Central line dressing changed using sterile technique following hospital policy. Kya Villarreal RN, observed with procedure to ensure proper technique.   
Colorectal Surgery   Daily Progress Note  Patient: Dennys Peguero      CC: ulcerated distal rectal mass    SUBJECTIVE:   Rested well overnight. Denies abdominal pain. Tolerating diet without NV. Voiding. Denies BM overnight, passing flatus.     Interval History;   Remained afebrile and HDS overnight     ROS:   A 14 point review of systems was conducted, significant findings as noted above. All other systems negative.    OBJECTIVE:    PHYSICAL EXAM:    Vitals:    03/24/24 2037 03/25/24 0020 03/25/24 0542 03/25/24 0543   BP: 116/86 114/79  115/78   Pulse: (!) 101 (!) 104  100   Resp:  16     Temp: 98.6 °F (37 °C) 97.8 °F (36.6 °C)  97.7 °F (36.5 °C)   TempSrc: Temporal Temporal  Temporal   SpO2: 100% 100% 98% 99%   Weight:       Height:           General appearance: alert, no acute distress  Eyes: No scleral icterus, EOM grossly intact  Neck: trachea midline, no JVD, neck supple  Chest/Lungs: normal effort with no accessory muscle use, no signs of respiratory distress, on RA  Cardiovascular: RRR   Abdomen: Soft, non-tender, non-distended, no rebound, guarding, or rigidity.  Skin: warm and dry, no rashes  Extremities: no edema, no cyanosis  Genitourinary: Grossly normal  Neuro: A&Ox3, no focal deficits, sensation intact    LABS:   Recent Labs     03/24/24  0347 03/25/24  0548   WBC 1.6* 1.4*   HGB 7.9* 7.8*   HCT 23.3* 22.6*   MCV 73.4* 73.2*   PLT 51* 51*          Recent Labs     03/24/24  0347 03/25/24  0548    137   K 3.7 3.7   CL 99 99   CO2 26 26   PHOS 5.1* 5.0*   BUN 12 13   CREATININE 1.0 1.0          Recent Labs     03/24/24  0347 03/25/24  0548   AST 13* 13*   ALT 9* 9*   BILIDIR <0.2 <0.2   BILITOT 0.9 0.9   ALKPHOS 44 46        No results for input(s): \"LIPASE\", \"AMYLASE\" in the last 72 hours.     Recent Labs     03/23/24  0914 03/24/24  0347 03/25/24  0548   PROT  --  6.5 6.5   INR 1.16  --   --    APTT  --  35.0 31.8        No results for input(s): \"CKTOTAL\", \"CKMB\", \"CKMBINDEX\", \"TROPONINI\" in the 
Completion of Chemotherapy: Monitoring during infusion done per policy, see Flowsheets.  Blood return verified before, during, and after infusion per policy; no signs of extravasation.  Pt tolerated chemotherapy well and without incident.  Chemotherapy infusion end time on the MAR.  Will continue to monitor.   
Np Chelly notified in regards to stool having blood present and if NP can take a look to make sure it is the same and not worsening. Np came to bedside. No new orders at this time.   
[facility]  Gastroenterology Progress Note    Dennys Peguero is a 43 y.o. male patient.  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  No bowel movements    Physical    VITALS:  /80   Pulse 94   Temp 98.5 °F (36.9 °C) (Oral)   Resp 19   Ht 1.778 m (5' 10\")   Wt 77.9 kg (171 lb 11.8 oz)   SpO2 100%   BMI 24.64 kg/m²   TEMPERATURE:  Current - Temp: 98.5 °F (36.9 °C); Max - Temp  Av.9 °F (37.2 °C)  Min: 98 °F (36.7 °C)  Max: 100.1 °F (37.8 °C)    NAD, age appropriate  Integ: no rash, jaundice, ecchymoses  Abdomen soft, ND, NT, no HSM, Bowel sounds normal.    Data    Data Review:    Recent Labs     2414 24   WBC 2.0* 1.2* 1.6*   HGB 8.2* 7.3* 7.9*   HCT 25.5* 23.0* 23.3*   MCV 72.4* 72.8* 73.4*   PLT 73* 61* 51*     Recent Labs     24    135* 137   K 3.7 3.9 3.7    97* 99   CO2 27 26 26   PHOS 5.2* 5.4* 5.1*   BUN 9 13 12   CREATININE 0.9 1.0 1.0     Recent Labs     24   AST 15 14* 13*   ALT 11 10 9*   BILIDIR <0.2 <0.2 <0.2   BILITOT 0.9 1.0 0.9   ALKPHOS 44 49 44     No results for input(s): \"LIPASE\", \"AMYLASE\" in the last 72 hours.  Recent Labs     24   PROTIME 14.8   INR 1.16     No results for input(s): \"PTT\" in the last 72 hours.    Radiology Review:      CT: neg       ASSESSMENT     Rectal tumor:      Colon 3/22/24:   Impression:   A 4 mm semipedunculated polyp overlying the ileocecal valve removed via hot snare polypectomy.  A 3 mm sessile polyp in the ascending colon removed via cold snare polypectomy  A 10 mm pedunculated polyp in the transverse colon removed via hot snare polypectomy.  A malignant appearing tumor 
[facility]  Gastroenterology Progress Note    Dennys Peguero is a 43 y.o. male patient.  Principal Problem:    Thrombocytopenia (HCC)  Resolved Problems:    TTP (thrombotic thrombocytopenic purpura) (HCC)      SUBJECTIVE:    No bleeding or AP    Physical    VITALS:  /86   Pulse 88   Temp 97.9 °F (36.6 °C) (Oral)   Resp 18   Ht 1.778 m (5' 10\")   Wt 81.4 kg (179 lb 6.4 oz)   SpO2 100%   BMI 25.74 kg/m²   TEMPERATURE:  Current - Temp: 97.9 °F (36.6 °C); Max - Temp  Av.1 °F (36.7 °C)  Min: 97.9 °F (36.6 °C)  Max: 98.4 °F (36.9 °C)    NAD, age appropriate  Integ: no rash, jaundice, ecchymoses  Abdomen soft, ND, NT, no HSM, Bowel sounds normal.    Data    Data Review:    Recent Labs     24  0501 24  1655 24  0411   WBC 2.7* 2.9* 2.7*   HGB 7.5* 7.5* 7.5*   HCT 23.5* 23.7* 23.7*   MCV 73.9* 73.4* 74.6*   PLT 20* 85* 91*     Recent Labs     24  0444 24  0501 24  0411    139 139   K 3.9 3.8 3.9    101 101   CO2 26 26 25   PHOS 5.0* 5.1* 4.9   BUN 14 13 14   CREATININE 0.9 0.9 0.9     Recent Labs     24  0444 24  0501 24  0411   AST 18 14* 15   ALT 12 11 11   BILIDIR <0.2 <0.2 <0.2   BILITOT 1.0 0.9 0.8   ALKPHOS 49 46 50     No results for input(s): \"LIPASE\", \"AMYLASE\" in the last 72 hours.  No results for input(s): \"PROTIME\", \"INR\" in the last 72 hours.  No results for input(s): \"PTT\" in the last 72 hours.    Assessment:   The patient is a 43 y.o. old male  with following problems:     1.  Hematochezia  2.  Pancytopenia secondary to AML/MDS with chemotherapy.  Will be difficult to determine any contribution from his bleeding to his anemia  3.  Recent upper endoscopy 3/6/2024 which was unremarkable        Recommendations:   1.  Colonoscopy on 3/22/2024.     Marcus Uribe MD  [facility]      
[facility]  Gastroenterology Progress Note    Dennys Peguero is a 43 y.o. male patient.  Principal Problem:    Thrombocytopenia (HCC)  Resolved Problems:    TTP (thrombotic thrombocytopenic purpura) (HCC)      SUBJECTIVE:    Pt had rectal bleeding yest. None overnight or this morning he states    Physical    VITALS:  /79   Pulse 97   Temp 99 °F (37.2 °C) (Oral)   Resp 16   Ht 1.778 m (5' 10\")   Wt 78.7 kg (173 lb 9.6 oz)   SpO2 100%   BMI 24.91 kg/m²   TEMPERATURE:  Current - Temp: 99 °F (37.2 °C); Max - Temp  Av.4 °F (36.9 °C)  Min: 97.5 °F (36.4 °C)  Max: 99 °F (37.2 °C)    NAD, age appropriate  Integ: no rash, jaundice, ecchymoses  Abdomen soft, ND, NT, no HSM, Bowel sounds normal.    Data    Data Review:    Recent Labs     24  1541 24  0914   WBC 2.1* 2.0* 1.2*   HGB 7.7* 8.2* 7.3*   HCT 23.9* 25.5* 23.0*   MCV 72.7* 72.4* 72.8*   PLT 67* 73* 61*     Recent Labs     24  0411 24  0332 24  0418    138 135*   K 3.9 3.7 3.9    101 97*   CO2 25 27 26   PHOS 4.9 5.2* 5.4*   BUN 14 9 13   CREATININE 0.9 0.9 1.0     Recent Labs     24  0411 24  0332 24  0418   AST 15 15 14*   ALT 11 11 10   BILIDIR <0.2 <0.2 <0.2   BILITOT 0.8 0.9 1.0   ALKPHOS 50 44 49     No results for input(s): \"LIPASE\", \"AMYLASE\" in the last 72 hours.  Recent Labs     24  0914   PROTIME 14.8   INR 1.16     No results for input(s): \"PTT\" in the last 72 hours.    Radiology Review:      CT: neg       ASSESSMENT     Rectal tumor:      Colon 3/22/24:   Impression:   A 4 mm semipedunculated polyp overlying the ileocecal valve removed via hot snare polypectomy.  A 3 mm sessile polyp in the ascending colon removed via cold snare polypectomy  A 10 mm pedunculated polyp in the transverse colon removed via hot snare polypectomy.  A malignant appearing tumor measuring 4 cm in the proximal rectum, 10 cm from the anal verge.  The tumor had central depression with contact 
Patient alert and oriented x 4 and up ad darrian. Ortho negative. Patient tolerating diet with no c/o nausea or pain at this time. Initial assessment completed, see flowsheet. Patient's bed is locked and in lowest position, side rails up x 2.    
Patient educated on urinal use to monitor Intake and output. Patient did say he has been urinating but in the toilet.   Patient verbalized understanding .  
Patient had a bowel movement with red streaks in it. Team notified verbally during rounds. No new orders at this time.   
Patient seen and assessed for standard line care needs.  CVC site remains free of visible signs and symptoms of infection. No drainage, edema, erythema, pain, itching or warmth noted at and around the insertion site.  Line care performed by Alirio Scrhoeder RN.  The need for continued use of the CVC is due to ongoing therapy.  Patient verbalizes understanding of line care education provided by line care RN.  Staff RNs will continue to monitor and assess the CVC site throughout the patient's hospital stay.  Sterile dressing changes will continue to be changed per policy.    Alirio Schroeder RN  
Patient seen and assessed for standard line care needs.  CVC site remains free of visible signs and symptoms of infection. No drainage, edema, erythema, pain, itching or warmth noted at and around the insertion site.  Line care performed by Julia Post RN.  The need for continued use of the CVC is due to ongoing therapy.  Patient verbalizes understanding of line care education provided by line care RN .  Staff RNs will continue to monitor and assess the CVC site throughout the patient's hospital stay.  Sterile dressing changes will continue to be changed per policy.    Julia Post RN  
Pt MRI checklist and test completed.   
Pt getting prep at 1400, MD gave verbal order to give Venetoclax now before pt gets colonoscopy prep.   
Pt with a medium sized bloody stool. Dark red color. Polina NP notified. CBC ordered, RN instructed to otherwise monitor. Care continues.  
Pt with one formed stool during shift. RN noticed bright red blood in hat with stool. Dr. Gore notified. Ordered 4 PM CBC. Lab drawn, care continues.   
Rn notified CORINNE Spaulding that cdiff was positive.   
Surgery Daily Progress Note      CC: Rectal cancer    SUBJECTIVE:  No acute events overnight.  Pain well-controlled at this time.  Tolerating diet without nausea/vomiting.  No further complaints this morning.      ROS:   A 14 point review of systems was conducted, significant findings as noted above. All other systems negative.      OBJECTIVE:    PHYSICAL EXAM:  Vitals:    04/01/24 1612 04/01/24 2057 04/01/24 2356 04/02/24 0344   BP: 110/78 121/82 116/76 115/78   Pulse: 89 91 95 94   Resp: 18 18 18 18   Temp: 99.3 °F (37.4 °C) 98.8 °F (37.1 °C) 98.6 °F (37 °C) 98.7 °F (37.1 °C)   TempSrc: Oral Oral Oral Oral   SpO2: 100% 100% 98% 98%   Weight:       Height:           General appearance: alert, no acute distress  Eyes: No scleral icterus, EOM grossly intact  Neck: trachea midline, no JVD, neck supple  Chest/Lungs: normal effort with no accessory muscle use, no signs of respiratory distress, on RA  Cardiovascular: RRR   Abdomen: Soft, non-tender, non-distended  Skin: warm and dry, no rashes  Extremities: no edema, no cyanosis  Genitourinary: Grossly normal  Neuro: A&Ox3, no focal deficits, sensation intact      ASSESSMENT & PLAN:   Dennys Peguero is a 43 y.o. male with rectal mass appreciated on colonoscopy.    - MRI with rectal mass above the peritoneal reflection  - path with foci of adenocarcinoma  - CEA 6.5  - Discussed at multidisciplinary tumor board on 3/29  - Discussed with heme/onc team; will plan for surgical intervention once counts recover    Isaac Mullen IV, MD  General Surgery, PGY-3  04/02/24  7:52 AM  [phone]    ATTENDING ATTESTATION    Patient independently examined. Management discussed and I agree with resident/midlevel provider documentation.    CC: Rectal cancer in setting of AML    No acute complaints.  Tolerating diet.  No nausea or vomiting    Vitals:    04/02/24 0757   BP: 120/74   Pulse: 81   Resp: 16   Temp: 98.2 °F (36.8 °C)   SpO2: 98%       Abd: Soft, nontender, nondistended  Labs 
Surgery Daily Progress Note      CC: Rectal cancer    SUBJECTIVE:  No acute events overnight. Denies abdominal pain. No new bloody bowel movements. Tolerating diet without nausea or vomiting.     ROS:   A 14 point review of systems was conducted, significant findings as noted above. All other systems negative.      OBJECTIVE:    PHYSICAL EXAM:  Vitals:    03/31/24 1450 03/31/24 1958 03/31/24 2351 04/01/24 0311   BP: 122/87 120/83 116/77 112/76   Pulse: 99 91 94 95   Resp: 16 18 20 18   Temp: 99 °F (37.2 °C) 98.4 °F (36.9 °C) 98.7 °F (37.1 °C) 98.4 °F (36.9 °C)   TempSrc: Oral Oral Oral Oral   SpO2: 99% 97% 99% 98%   Weight:       Height:           General appearance: alert, no acute distress  Eyes: No scleral icterus, EOM grossly intact  Neck: trachea midline, no JVD, neck supple  Chest/Lungs: normal effort with no accessory muscle use, no signs of respiratory distress, on RA  Cardiovascular: RRR   Abdomen: Soft, non-tender, non-distended  Skin: warm and dry, no rashes  Extremities: no edema, no cyanosis  Genitourinary: Grossly normal  Neuro: A&Ox3, no focal deficits, sensation intact      ASSESSMENT & PLAN:   Dennys Peguero is a 43 y.o. male with rectal mass appreciated on colonoscopy.    - MRI with rectal mass above the peritoneal reflection  - path with foci of adenocarcinoma  - CEA 6.5  - Discussed at multidisciplinary tumor board on 3/29  - Discussed with heme/onc team; will plan for surgical intervention once counts recover    James Reveles DO  PGY-1, General Surgery Resident  04/01/24 6:44 AM  [phone]        
Surgery Daily Progress Note      CC: Rectal mass    SUBJECTIVE:  No acute events overnight.  Remains afebrile, hemodynamically stable.  No abdominal pain, no episodes of bloody BMs.    ROS:   A 14 point review of systems was conducted, significant findings as noted above. All other systems negative.      OBJECTIVE:    PHYSICAL EXAM:  Vitals:    03/26/24 0005 03/26/24 0312 03/26/24 0805 03/26/24 1150   BP: 115/78 111/77 111/79 117/80   Pulse: 82 84 91 90   Resp: 16 16 16 16   Temp: 98.2 °F (36.8 °C) 97.6 °F (36.4 °C) 98.2 °F (36.8 °C) 98.3 °F (36.8 °C)   TempSrc: Oral Oral Oral Oral   SpO2: 99% 98% 100% 100%   Weight:   77.8 kg (171 lb 9.6 oz)    Height:           General appearance: alert, no acute distress  Eyes: No scleral icterus, EOM grossly intact  Neck: trachea midline, no JVD, neck supple  Chest/Lungs: normal effort with no accessory muscle use, no signs of respiratory distress, on RA  Cardiovascular: RRR   Abdomen: Soft, non-tender, non-distended, no rebound, guarding, or rigidity.  Skin: warm and dry, no rashes  Extremities: no edema, no cyanosis  Genitourinary: Grossly normal  Neuro: A&Ox3, no focal deficits, sensation intact      ASSESSMENT & PLAN:   Dennys Peguero is a 43 y.o. male with rectal mass appreciated on colonoscopy.    - MRI with rectal mass above the peritoneal reflection  - Formal pathology from rectal biopsy pending  - CEA 6.5  - Discussed with heme/onc team; will plan for surgical intervention once counts recover    Isaac Mullen IV, MD  General Surgery, PGY-3  03/26/24  2:53 PM  [phone]  
Surgery Daily Progress Note      CC: Rectal mass    SUBJECTIVE:  Reports minimal blood in stool yesterday. Denies abdominal pain. Tolerating diet.     ROS:   A 14 point review of systems was conducted, significant findings as noted above. All other systems negative.      OBJECTIVE:    PHYSICAL EXAM:  Vitals:    03/28/24 0350 03/28/24 0552 03/28/24 0617 03/28/24 0635   BP: 114/74 105/77 107/72 108/75   Pulse: 85 84 80 84   Resp: 16 18 18 18   Temp: 98 °F (36.7 °C) 98.1 °F (36.7 °C) 98.2 °F (36.8 °C) 97.9 °F (36.6 °C)   TempSrc: Oral Oral Oral Oral   SpO2: 99% 98% 99% 99%   Weight:       Height:           General appearance: alert, no acute distress  Eyes: No scleral icterus, EOM grossly intact  Neck: trachea midline, no JVD, neck supple  Chest/Lungs: normal effort with no accessory muscle use, no signs of respiratory distress, on RA  Cardiovascular: RRR   Abdomen: Soft, non-tender, non-distended  Skin: warm and dry, no rashes  Extremities: no edema, no cyanosis  Genitourinary: Grossly normal  Neuro: A&Ox3, no focal deficits, sensation intact      ASSESSMENT & PLAN:   Dennys Peguero is a 43 y.o. male with rectal mass appreciated on colonoscopy.    - MRI with rectal mass above the peritoneal reflection  - path with foci of adenocarcinoma  - CEA 6.5  - will discuss at multidisciplinary tumor board tomorrow 3/29  - Discussed with heme/onc team; will plan for surgical intervention once counts recover    Madeline Quintana DO  PGY5, General Surgery  03/28/24  8:30 AM      
126/82   Pulse 85   Temp 98.6 °F (37 °C) (Oral)   Resp 16   Ht 1.778 m (5' 10\")   Wt 83.1 kg (183 lb 3.2 oz)   SpO2 98%   BMI 26.29 kg/m²     Weight:    Wt Readings from Last 3 Encounters:   03/11/24 83.1 kg (183 lb 3.2 oz)   03/06/24 80.5 kg (177 lb 7.5 oz)     Physical Exam Performed by Dr. Nguyen    General: Awake, alert and oriented.  HEENT: normocephalic, PERRL, no scleral erythema or icterus, Oral mucosa moist and intact, throat clear  NECK: supple  BACK: Straight   SKIN: warm dry and intact without lesions rashes or masses  CHEST: CTA bilaterally without use of accessory muscles  CV: Normal S1 S2, RRR, no MRG  ABD: NT ND normoactive BS  EXTREMITIES: without edema, denies calf tenderness  NEURO: CN II - XII grossly intact  CATHETER: PIV    Data    CBC:   Recent Labs     03/09/24  1605 03/10/24  0331 03/11/24  0344   WBC 4.5 4.8 4.2   HGB 7.0* 7.9* 7.8*   HCT 22.7* 23.9* 24.9*   MCV 74.8* 74.8* 74.2*   PLT 53* 69* 51*       BMP/Mag:  Recent Labs     03/09/24  0351 03/10/24  0331 03/11/24  0344    138 138   K 3.8 3.8 3.8    102 102   CO2 24 26 25   PHOS 4.9 5.0* 5.3*   BUN 7 9 8   CREATININE 0.8* 0.8* 0.9   MG  --   --  1.50*       LIVP:   Recent Labs     03/09/24  0351 03/10/24  0331 03/11/24  0344   AST 11* 12* 12*   ALT 7* 8* 8*   BILIDIR <0.2 <0.2 <0.2   BILITOT 1.1* 1.0 1.0   ALKPHOS 40 37* 40       Coags:   Recent Labs     03/11/24  0344   PROTIME 15.2*   INR 1.20*   APTT 31.9       Uric Acid   Recent Labs     03/09/24  0351 03/10/24  0331 03/11/24  0344   LABURIC 6.7 6.0 5.7         PROBLEM LIST:           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 Anemia and Thrombocytopenia  3. Seizure disorder (since 2009)- on long term anti-epileptics      TREATMENT:            Oligodendroglioma              - Partial resection 2017              - Radiation (5400 cGy) with Temodar (end 2/2019)      ASSESSMENT AND PLAN:           1. Anemia and thrombocytopenia:  Peripheral blood smear 3/5/24:  Frequent 
NUTRITION SUPPLEMENT; Breakfast, Dinner; Renal Oral Supplement  PO Intake: %   PO Supplement Intake:%  Additional Sources of Calories/IVF:NS @ 50 ml/hr     COMPARATIVE STANDARDS  Energy (kcal):  5969-1601 (20-25 kcal/CBW)     Protein (g):   (1.2-1.5 gm/IBW)       Fluid (ml/day):  1 ml/kcal or per MD    ANTHROPOMETRICS  Current Height: 177.8 cm (5' 10\")  Current Weight - Scale: 81.3 kg (179 lb 3.2 oz)    Admission weight: 80.3 kg (177 lb)    The patient will be monitored per nutrition standards of care. Consult dietitian if additional nutrition interventions are needed prior to RD reassessment.     Molina Hayward RD  David:  [phone]  Office:  [phone]        
\"CKMBINDEX\", \"TROPONINI\" in the last 72 hours.      ASSESSMENT & PLAN:   This is a 43 y.o. male with a concerning mass seen on c-scope in distal rectum. Colorectal surgery consulted for rectal cancer workup.     - awaiting rectal pathology              - likely malignant, will complete staging workup              - pelvic MRI              - CT chest needed, if ordering any CT imaging, please reach out to surgical team              - CEA              - operative planning to follow  - continue diet as tolerated              - bowel reg  - if continues to bleed would recommend GI or IR for embolization at this time.   - medical management per primary     James Reveles DO  PGY-1, General Surgery Resident  03/24/24 7:26 AM  [phone]          
moist and intact, throat clear  NECK: supple  BACK: Straight   SKIN: warm dry and intact without lesions rashes or masses  CHEST: CTA bilaterally without use of accessory muscles  CV: Normal S1 S2, RRR, no MRG  ABD: NT ND normoactive BS  EXTREMITIES: without edema, denies calf tenderness  NEURO: CN II - XII grossly intact  CATHETER: PIV    Data    CBC:   Recent Labs     03/06/24  1534 03/06/24  1805 03/07/24  0424 03/07/24  1053 03/07/24  1359 03/08/24  0319   WBC 6.3  --  5.9  --   --  5.4   HGB 8.0*   < > 7.6* 8.0*  --  7.6*   HCT 25.8*   < > 23.6* 25.7* 26.1* 24.1*   MCV 75.9*  --  74.4*  --   --  74.3*   PLT 54*  --  68*  --   --  76*    < > = values in this interval not displayed.       BMP/Mag:  Recent Labs     03/06/24  1805 03/07/24  0424 03/08/24  0319    140 138   K 3.9 3.8 3.5    108 104   CO2 23 22 22   PHOS  --   --  4.9   BUN 7 7 8   CREATININE 0.8* 0.8* 0.8*   MG  --   --  1.60*       LIVP:   Recent Labs     03/05/24  1544 03/06/24  1805 03/07/24  0424 03/08/24  0319   AST 18 13*  11* 13* 11*   ALT 8* 7*  6* 6* 7*   LIPASE 28.0  --   --   --    BILIDIR <0.2 <0.2  --  <0.2   BILITOT 1.1* 0.9  0.9 1.0 1.1*   ALKPHOS 44 39*  37* 36* 40       Coags:   Recent Labs     03/06/24  0610 03/07/24  1359   PROTIME 15.2* 14.0   INR 1.20* 1.08   APTT 30.5 29.7       Uric Acid   Recent Labs     03/08/24  0319   LABURIC 8.1*         PROBLEM LIST:           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 Anemia and Thrombocytopenia  3. Seizure disorder (since 2009)- on long term anti-epileptics      TREATMENT:            Oligodendroglioma              - Partial resection 2017              - Radiation (5400 cGy) with Temodar (end 2/2019)      ASSESSMENT AND PLAN:           1. Anemia and thrombocytopenia:    3/8/24- Upon review of peripheral blood smear with unimpressive schistocytes and labs concerning for calros negative hemolytic anemia- I don't think its TTP or TMA. No need for PLEX. My concern is for 
with plans for LAR after counts recover, likely 1 month or so  We will discuss at rectal cancer tumor board on Friday    Rakesh Montano MD   
3/27-3/30/19: Cycle #1 Temozolomide 150 mg/m2 (4 day cycle            due to low platelet count)       - 5/1/19-5/5/19: Cycle #2 Temozolomide 150 mg/m2     AML w/underlying MDS  - Induction w/ Dacogen + Venetoclax (3/12/24)      ASSESSMENT AND PLAN:           1. 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   Peripheral blood smear 3/5/24:  Frequent schistocytes and spherocytes, thrombocytopenia.  Pattern suggest microangiopathic hemolytic anemia.  - Evidence of hemolysis:  Elevated LDH (1017), low haptoglobin (<10), Carlos neg 3/5/24  - ADAMTS 13 (3/6/24): 59  - G6PD (3/7/24): 11  Peripheral blood smear 3/8/24: Unimpressive schistocytes and labs concerning for carlos negative hemolytic anemia, unlikely TTP or TMA. No need for PLEX. 10% blasts  - Concern for intramedullary hemolysis from myeloid disorder, MDS/AML? given his clinical picture with ongoing B symptoms for 3 months.   BMBx 3/8/24: Hypercellular bone marrow (approaching 100% cellularity) showing increased blasts (13%) and trilineage myelodysplasia, consistent with clonal myeloid neoplasm (see comment)   - C3 and C4 WNL  - Iron studies:  Ferritin 557 (high), Fe 86 (WNL), TIBC 16 (low), Iron Sat 52 (high).    BMBx consistent with secondary AML with underlying MDS involving all 3 cell lines. BMBx morphology showed 13% blasts and flow cytometery showed 20% blasts.  -FISH:  Deletion 5q33, trisomy 8, gain of KMT2A/11q, Deletion of 20q.    - CG: Abnormal male karyotype 47~50,XY,juliette(3)t(3;11)(p14;q23),add(5) (q13),+8,-13,add(13)(q13), julitete(15)t(13;15) (q14;q26),juliette(15;20)t(15;20) (q11.2;p13) del(20)(q11.2q13.3) ,+juliette(15;20)t(15;20) (q11.2;p13)del(20) (q11.2q13.3)x1~2,+dup(15)(q15q22), +17,dic(17;?)(p11.1;?),+21, +1~2mar, 3~46dmin[cp20  - NGS: TP53  - PCRs: FLT3 ITD/TKD- Not Detected   -Start induction therapy with Dacogen 20 mg/m2 IV daily for 5 days and 
Unimpressive schistocytes and labs concerning for carlos negative hemolytic anemia, unlikely TTP or TMA. No need for PLEX. 10% blasts  - Concern for intramedullary hemolysis from myeloid disorder, MDS/AML? given his clinical picture with ongoing B symptoms for 3 months.   BMBx 3/8/24: pending  - PICC placed 3/8/24  - C3 and C4 WNL  - Iron studies:  Ferritin 557 (high), Fe 86 (WNL), TIBC 16 (low), Iron Sat 52 (high)     2. ID: Patient is currently afebrile without evidence of infection.   - Start Diflucan, Valtrex and Levaquin  if ANC <1.5     3. Heme:  Anemia and thrombocytopenia  - Transfuse for Hgb < 7 and Platelets < 10 K.   - No transfusion today   Risk for DIC:  - Check coags & fibrinogen on admit     4. Metabolic:  Electrolytes are WNL and renal fxn stable.    - Replace K & Mg per PRN orders  - Begin allopurinol     5. GI / Nutrition:    Nutrition:    - Start low microbial diet  - Dietician to follow closely   - Cdiff 3/7/24: positive  - Continue dificid day +3 (3/8/24)     6.Neuro:  H/O Oligodendroglioma 2017, seizures since 2009  - Cont on Keppra 1500 mg PO BID  - s/p partial resection, radiation and Temodar    7. Cardiac  - ECHO 3/8/24:  EF 55-60% Mild tricuspid regurgitation. Mild mitral regurgitation is present.    - DVT Prophylaxis: Platelets <50,000 cells/dL - prophylactic lovenox on hold and mechanical prophylaxis with bilateral SCDs while in bed in place.  Contraindications to pharmacologic prophylaxis: Thrombocytopenia  Contraindications to mechanical prophylaxis: None    - Disposition: Unknown, awaiting final diagnosis and plan      Tracy Schoenhoft, APRN - CNP    Gary Gore MD  [facility]  [phone]       
Venetoclax (3/12/24)      ASSESSMENT AND PLAN:           1. New diagnosis AML with underlying MDS (dysplasia in all three cell lines)  Peripheral blood smear 3/5/24:  Frequent schistocytes and spherocytes, thrombocytopenia.  Pattern suggest microangiopathic hemolytic anemia.  - Evidence of hemolysis:  Elevated LDH (1017), low haptoglobin (<10), Carlos neg 3/5/24  - ADAMTS 13 (3/6/24): 59  - G6PD (3/7/24): 11  - PB flow cytometry 3/6/24: pending  Peripheral blood smear 3/8/24: Unimpressive schistocytes and labs concerning for carlos negative hemolytic anemia, unlikely TTP or TMA. No need for PLEX. 10% blasts  - Concern for intramedullary hemolysis from myeloid disorder, MDS/AML? given his clinical picture with ongoing B symptoms for 3 months.   BMBx 3/8/24: Hypercellular bone marrow (approaching 100% cellularity) showing increased blasts (13%) and trilineage myelodysplasia, consistent with clonal myeloid neoplasm (see comment)        - Trace amount of stainable iron stores (0-1+/4+) with 19% ring   sideroblasts   - PICC placed 3/8/24  - C3 and C4 WNL  - Iron studies:  Ferritin 557 (high), Fe 86 (WNL), TIBC 16 (low), Iron Sat 52 (high).    BMBx consistent with secondary AML with underlying MDS involving all 3 cell lines. BMBx morphology showed 13% blasts and flow cytometery showed 20% blasts.  -FISH:  Deletion 5q33, trisomy 8, gain of KMT2A/11q, Deletion of 20q.    - CG: Pending  - NGS: TP53  - PCRs: FLT3 ITD/TKD- Not Detected   -Start induction therapy with Dacogen 20 mg/m2 IV daily for 5 days and Venetoclax target dose of 400 mg ( dose adjust for drug interaction) for 21 or 28 days with BMBx around D24--     Dacogen + Venetoclax (started Venetoclax 3/13)  Cycle # 1 Day 8      2. ID: Patient is currently afebrile without evidence of infection.   - Start Diflucan, Valtrex and Levaquin   - C-Diff + 3/7/24: Cont Dificid (see GI)     3. Heme:  Pancytopenia r/t AML/MDS  - Transfuse for Hgb < 7 and Platelets < 10 K. - >50k 
and Ovarian Cancer ( )  Maternal Grandfather- Breast Cancer ( late )      TREATMENT:            Oligodendroglioma              - Partial resection               - Radiation (5400 cGy)   - Temodar:       - 19- 19       - 3/27-3/30/19: Cycle #1 Temozolomide 150 mg/m2 (4 day cycle            due to low platelet count)       - 19-19: Cycle #2 Temozolomide 150 mg/m2     AML w/underlying MDS  - Induction w/ Dacogen + Venetoclax (3/12/24)          ASSESSMENT AND PLAN:           1. 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   Peripheral blood smear 3/5/24:  Frequent schistocytes and spherocytes, thrombocytopenia.  Pattern suggest microangiopathic hemolytic anemia.  - Evidence of hemolysis:  Elevated LDH (1017), low haptoglobin (<10), Carlos neg 3/5/24  - ADAMTS 13 (3/6/24): 59  - G6PD (3/7/24): 11  Peripheral blood smear 3/8/24: Unimpressive schistocytes and labs concerning for carlos negative hemolytic anemia, unlikely TTP or TMA. No need for PLEX. 10% blasts  - Concern for intramedullary hemolysis from myeloid disorder, MDS/AML? given his clinical picture with ongoing B symptoms for 3 months.   BMBx 3/8/24: Hypercellular bone marrow (approaching 100% cellularity) showing increased blasts (13%) and trilineage myelodysplasia, consistent with clonal myeloid neoplasm (see comment)   - C3 and C4 WNL  - Iron studies:  Ferritin 557 (high), Fe 86 (WNL), TIBC 16 (low), Iron Sat 52 (high).    BMBx consistent with secondary AML with underlying MDS involving all 3 cell lines. BMBx morphology showed 13% blasts and flow cytometery showed 20% blasts.  -FISH:  Deletion 5q33, trisomy 8, gain of KMT2A/11q, Deletion of 20q.    - CG: Abnormal male karyotype 47~50,XY,juliette(3)t(3;11)(p14;q23),add(5) (q13),+8,-13,add(13)(q13), juliette(15)t(13;15) (q14;q26),juliette(15;20)t(15;20) (q11.2;p13) del(20)(q11.2q13.3) 
50k  - CBC q12 hours.   - protonix 40mg BID   Nutrition:    - Low microbial diet  - Dietician to follow closely   C diff Colitis   - 3/7/24: c diff positive  - S/P Dificid (3/8/24-3/17/24)    6.Neuro:  H/O Oligodendroglioma 2017, seizures since 2009  - Cont on Keppra 1500 mg PO BID  - s/p partial resection, radiation and Temodar    7. Cardiac  - ECHO 3/8/24:  EF 55-60% Mild tricuspid regurgitation. Mild mitral regurgitation is present.    - DVT Prophylaxis: Platelets <50,000 cells/dL - prophylactic lovenox on hold and mechanical prophylaxis with bilateral SCDs while in bed in place.  Contraindications to pharmacologic prophylaxis: Thrombocytopenia  Contraindications to mechanical prophylaxis: None    - Disposition: Discharge home early next week.     KATHLEEN Murray - CNP     The patient was seen and examined by Dr. Patrick Nguyen MD  Hematology, Bone marrow transplant and Cellular therapy  [facility]

## 2024-04-02 NOTE — DISCHARGE SUMMARY
[facility] Discharge Summary             Attending Physician: Lei Tiwari MD    Referring MD: Dmitriy Russo Jr., MD  [address] [address]    Name: Dennys Peguero :  1980  MRN:  [id number]    Admission: 3/7/2024   Discharge:   2024    Date: 2024    Diagnosis on admit: Evaluation for abnormal CBC (anemia and thrombocytopenia)     Consultations:   Gastroenterology   Colorectal surgery    Medications:      Medication List        START taking these medications      allopurinol 300 MG tablet  Commonly known as: ZYLOPRIM  Take 1 tablet by mouth daily  Start taking on: April 3, 2024  Notes to patient: Prevents the build up of uric acid in the blood.     cyanocobalamin 1000 MCG tablet  Take 1 tablet by mouth daily  Start taking on: April 3, 2024  Notes to patient: B 12 treats and prevents vitamin B deficiency      fluconazole 200 MG tablet  Commonly known as: DIFLUCAN  Take 1 tablet by mouth daily for 7 days  Start taking on: April 3, 2024  Notes to patient: Fluconazole (Diflucan)  Use:  to prevent or treat fungal infections    Side effects:  headache, rash, nausea, stomach pain, diarrhea       Hydrocerin Crea cream  Apply topically 2 times daily     levoFLOXacin 500 MG tablet  Commonly known as: LEVAQUIN  Take 1 tablet by mouth at bedtime for 10 days  Notes to patient: Levofloxacin  (Levaquin)  USE--  Treat bacterial infection  SIDE EFFECTS--  Upset stomach, diarrhea     pantoprazole 40 MG tablet  Commonly known as: PROTONIX  Take 1 tablet by mouth 2 times daily (before meals)  Notes to patient: Pantoprazole  (Protonix)  USE--  Reduce stomach acid, protect stomach lining  SIDE EFFECTS--  Headache, diarrhea     potassium bicarb-citric acid 20 MEQ Tbef effervescent tablet  Commonly known as: EFFER-K  Take 2 tablets by mouth as needed (Per Potassium Replacement Protocol)     potassium chloride 20 MEQ extended release tablet  Commonly known as: KLOR-CON M  Take 2 tablets by

## 2024-04-03 LAB
BLOOD BANK DISPENSE STATUS: NORMAL
BLOOD BANK PRODUCT CODE: NORMAL
BPU ID: NORMAL
DESCRIPTION BLOOD BANK: NORMAL

## 2024-04-08 ENCOUNTER — HOSPITAL ENCOUNTER (OUTPATIENT)
Dept: ONCOLOGY | Age: 44
Setting detail: INFUSION SERIES
Discharge: HOME | End: 2024-04-08
Payer: MEDICAID

## 2024-04-08 ENCOUNTER — PREP FOR PROCEDURE (OUTPATIENT)
Dept: SURGERY | Age: 44
End: 2024-04-08

## 2024-04-08 VITALS
DIASTOLIC BLOOD PRESSURE: 63 MMHG | BODY MASS INDEX: 24.86 KG/M2 | WEIGHT: 173.28 LBS | SYSTOLIC BLOOD PRESSURE: 106 MMHG | RESPIRATION RATE: 16 BRPM | HEART RATE: 101 BPM | OXYGEN SATURATION: 99 % | TEMPERATURE: 99 F

## 2024-04-08 DIAGNOSIS — C92.00 ACUTE MYELOID LEUKEMIA NOT HAVING ACHIEVED REMISSION (HCC): Primary | ICD-10-CM

## 2024-04-08 DIAGNOSIS — C20 RECTAL CANCER (HCC): ICD-10-CM

## 2024-04-08 LAB
ABO + RH BLD: NORMAL
BLD GP AB SCN SERPL QL: NORMAL
BLOOD BANK DISPENSE STATUS: NORMAL
BLOOD BANK PRODUCT CODE: NORMAL
BPU ID: NORMAL
DESCRIPTION BLOOD BANK: NORMAL

## 2024-04-08 PROCEDURE — 86923 COMPATIBILITY TEST ELECTRIC: CPT

## 2024-04-08 PROCEDURE — 86901 BLOOD TYPING SEROLOGIC RH(D): CPT

## 2024-04-08 PROCEDURE — 36430 TRANSFUSION BLD/BLD COMPNT: CPT

## 2024-04-08 PROCEDURE — 86850 RBC ANTIBODY SCREEN: CPT

## 2024-04-08 PROCEDURE — P9040 RBC LEUKOREDUCED IRRADIATED: HCPCS

## 2024-04-08 PROCEDURE — 86900 BLOOD TYPING SEROLOGIC ABO: CPT

## 2024-04-08 RX ORDER — EPINEPHRINE 1 MG/ML
0.3 INJECTION, SOLUTION INTRAMUSCULAR; SUBCUTANEOUS PRN
OUTPATIENT
Start: 2024-04-08

## 2024-04-08 RX ORDER — ACETAMINOPHEN 325 MG/1
650 TABLET ORAL
OUTPATIENT
Start: 2024-04-08

## 2024-04-08 RX ORDER — SODIUM CHLORIDE 0.9 % (FLUSH) 0.9 %
5-40 SYRINGE (ML) INJECTION EVERY 12 HOURS SCHEDULED
Status: CANCELLED | OUTPATIENT
Start: 2024-04-10

## 2024-04-08 RX ORDER — SODIUM CHLORIDE 0.9 % (FLUSH) 0.9 %
5-40 SYRINGE (ML) INJECTION PRN
OUTPATIENT
Start: 2024-04-08

## 2024-04-08 RX ORDER — SODIUM CHLORIDE 9 MG/ML
INJECTION, SOLUTION INTRAVENOUS CONTINUOUS
OUTPATIENT
Start: 2024-04-08

## 2024-04-08 RX ORDER — ALBUTEROL SULFATE 90 UG/1
4 AEROSOL, METERED RESPIRATORY (INHALATION) PRN
OUTPATIENT
Start: 2024-04-08

## 2024-04-08 RX ORDER — ONDANSETRON 2 MG/ML
8 INJECTION INTRAMUSCULAR; INTRAVENOUS
OUTPATIENT
Start: 2024-04-08

## 2024-04-08 RX ORDER — DIPHENHYDRAMINE HYDROCHLORIDE 50 MG/ML
50 INJECTION INTRAMUSCULAR; INTRAVENOUS
OUTPATIENT
Start: 2024-04-08

## 2024-04-08 RX ORDER — SODIUM CHLORIDE 9 MG/ML
20 INJECTION, SOLUTION INTRAVENOUS CONTINUOUS
OUTPATIENT
Start: 2024-04-08

## 2024-04-08 RX ORDER — SODIUM CHLORIDE 9 MG/ML
INJECTION, SOLUTION INTRAVENOUS PRN
Status: CANCELLED | OUTPATIENT
Start: 2024-04-10

## 2024-04-08 RX ORDER — SODIUM CHLORIDE 0.9 % (FLUSH) 0.9 %
5-40 SYRINGE (ML) INJECTION PRN
Status: CANCELLED | OUTPATIENT
Start: 2024-04-10

## 2024-04-08 RX ORDER — METRONIDAZOLE 500 MG/100ML
500 INJECTION, SOLUTION INTRAVENOUS
Status: CANCELLED | OUTPATIENT
Start: 2024-04-10 | End: 2024-04-10

## 2024-04-08 RX ORDER — SODIUM CHLORIDE 9 MG/ML
25 INJECTION, SOLUTION INTRAVENOUS PRN
OUTPATIENT
Start: 2024-04-08

## 2024-04-08 RX ORDER — ENOXAPARIN SODIUM 100 MG/ML
40 INJECTION SUBCUTANEOUS ONCE
Status: CANCELLED | OUTPATIENT
Start: 2024-04-10 | End: 2024-04-08

## 2024-04-08 RX ORDER — ACETAMINOPHEN 325 MG/1
1000 TABLET ORAL ONCE
Status: CANCELLED | OUTPATIENT
Start: 2024-04-10 | End: 2024-04-08

## 2024-04-08 NOTE — PROGRESS NOTES
Patient's hemoglobin this AM: 5.5  Pt seen at [facility] today for  Packed red blood cell transfusion  for above lab values per order. Informed consent verified. No Pre-medications ordered with no previous transfusion reaction history. Transfused per policy. Pt tolerated transfusion well and without incident.  Pt verbalizes understanding of discharge instructions.  Discharged to home per self.

## 2024-04-09 ENCOUNTER — APPOINTMENT (OUTPATIENT)
Dept: GENERAL RADIOLOGY | Age: 44
DRG: 231 | End: 2024-04-09
Payer: MEDICAID

## 2024-04-09 ENCOUNTER — HOSPITAL ENCOUNTER (INPATIENT)
Age: 44
LOS: 7 days | Discharge: HOME | DRG: 231 | End: 2024-04-17
Attending: EMERGENCY MEDICINE | Admitting: STUDENT IN AN ORGANIZED HEALTH CARE EDUCATION/TRAINING PROGRAM
Payer: MEDICAID

## 2024-04-09 ENCOUNTER — TELEPHONE (OUTPATIENT)
Dept: SURGERY | Age: 44
End: 2024-04-09

## 2024-04-09 DIAGNOSIS — K62.5 RECTAL BLEEDING: Primary | ICD-10-CM

## 2024-04-09 DIAGNOSIS — D64.9 ANEMIA, UNSPECIFIED TYPE: ICD-10-CM

## 2024-04-09 DIAGNOSIS — D70.9 FEBRILE NEUTROPENIA (HCC): ICD-10-CM

## 2024-04-09 DIAGNOSIS — R50.81 FEBRILE NEUTROPENIA (HCC): ICD-10-CM

## 2024-04-09 DIAGNOSIS — C20 RECTAL CANCER (HCC): ICD-10-CM

## 2024-04-09 LAB
ALBUMIN SERPL-MCNC: 3.8 G/DL (ref 3.4–5)
ALP SERPL-CCNC: 48 U/L (ref 40–129)
ALT SERPL-CCNC: <5 U/L (ref 10–40)
ANION GAP SERPL CALCULATED.3IONS-SCNC: 11 MMOL/L (ref 3–16)
APTT BLD: 28.9 SEC (ref 22.1–36.4)
AST SERPL-CCNC: 7 U/L (ref 15–37)
BILIRUB DIRECT SERPL-MCNC: <0.2 MG/DL (ref 0–0.3)
BILIRUB INDIRECT SERPL-MCNC: ABNORMAL MG/DL (ref 0–1)
BILIRUB SERPL-MCNC: 0.7 MG/DL (ref 0–1)
BUN SERPL-MCNC: 10 MG/DL (ref 7–20)
CALCIUM SERPL-MCNC: 8.6 MG/DL (ref 8.3–10.6)
CHLORIDE SERPL-SCNC: 102 MMOL/L (ref 99–110)
CO2 SERPL-SCNC: 24 MMOL/L (ref 21–32)
CREAT SERPL-MCNC: 1 MG/DL (ref 0.9–1.3)
FLUAV RNA RESP QL NAA+PROBE: NOT DETECTED
FLUBV RNA RESP QL NAA+PROBE: NOT DETECTED
GFR SERPLBLD CREATININE-BSD FMLA CKD-EPI: >90 ML/MIN/{1.73_M2}
GLUCOSE SERPL-MCNC: 175 MG/DL (ref 70–99)
INR PPP: 1.18 (ref 0.85–1.15)
LACTATE BLDV-SCNC: 1.5 MMOL/L (ref 0.4–1.9)
LDH SERPL L TO P-CCNC: 277 U/L (ref 100–190)
MAGNESIUM SERPL-MCNC: 1.5 MG/DL (ref 1.8–2.4)
POTASSIUM SERPL-SCNC: 3.5 MMOL/L (ref 3.5–5.1)
PROT SERPL-MCNC: 6.1 G/DL (ref 6.4–8.2)
PROTHROMBIN TIME: 15.2 SEC (ref 11.9–14.9)
SARS-COV-2 RNA RESP QL NAA+PROBE: NOT DETECTED
SODIUM SERPL-SCNC: 137 MMOL/L (ref 136–145)
URATE SERPL-MCNC: 5.1 MG/DL (ref 3.5–7.2)

## 2024-04-09 PROCEDURE — 85610 PROTHROMBIN TIME: CPT

## 2024-04-09 PROCEDURE — 87252 VIRUS INOCULATION TISSUE: CPT

## 2024-04-09 PROCEDURE — 83605 ASSAY OF LACTIC ACID: CPT

## 2024-04-09 PROCEDURE — P9036 PLATELET PHERESIS IRRADIATED: HCPCS

## 2024-04-09 PROCEDURE — 83735 ASSAY OF MAGNESIUM: CPT

## 2024-04-09 PROCEDURE — 85025 COMPLETE CBC W/AUTO DIFF WBC: CPT

## 2024-04-09 PROCEDURE — 83615 LACTATE (LD) (LDH) ENZYME: CPT

## 2024-04-09 PROCEDURE — 84550 ASSAY OF BLOOD/URIC ACID: CPT

## 2024-04-09 PROCEDURE — 85730 THROMBOPLASTIN TIME PARTIAL: CPT

## 2024-04-09 PROCEDURE — 2580000003 HC RX 258: Performed by: EMERGENCY MEDICINE

## 2024-04-09 PROCEDURE — 86900 BLOOD TYPING SEROLOGIC ABO: CPT

## 2024-04-09 PROCEDURE — 87103 BLOOD FUNGUS CULTURE: CPT

## 2024-04-09 PROCEDURE — 96365 THER/PROPH/DIAG IV INF INIT: CPT

## 2024-04-09 PROCEDURE — 87253 VIRUS INOCULATE TISSUE ADDL: CPT

## 2024-04-09 PROCEDURE — 86901 BLOOD TYPING SEROLOGIC RH(D): CPT

## 2024-04-09 PROCEDURE — 86923 COMPATIBILITY TEST ELECTRIC: CPT

## 2024-04-09 PROCEDURE — 87205 SMEAR GRAM STAIN: CPT

## 2024-04-09 PROCEDURE — 80076 HEPATIC FUNCTION PANEL: CPT

## 2024-04-09 PROCEDURE — 36415 COLL VENOUS BLD VENIPUNCTURE: CPT

## 2024-04-09 PROCEDURE — 99285 EMERGENCY DEPT VISIT HI MDM: CPT

## 2024-04-09 PROCEDURE — P9040 RBC LEUKOREDUCED IRRADIATED: HCPCS

## 2024-04-09 PROCEDURE — 80048 BASIC METABOLIC PNL TOTAL CA: CPT

## 2024-04-09 PROCEDURE — 87070 CULTURE OTHR SPECIMN AEROBIC: CPT

## 2024-04-09 PROCEDURE — 87636 SARSCOV2 & INF A&B AMP PRB: CPT

## 2024-04-09 PROCEDURE — 86850 RBC ANTIBODY SCREEN: CPT

## 2024-04-09 PROCEDURE — 87102 FUNGUS ISOLATION CULTURE: CPT

## 2024-04-09 PROCEDURE — 87040 BLOOD CULTURE FOR BACTERIA: CPT

## 2024-04-09 PROCEDURE — 93005 ELECTROCARDIOGRAM TRACING: CPT | Performed by: EMERGENCY MEDICINE

## 2024-04-09 PROCEDURE — 71046 X-RAY EXAM CHEST 2 VIEWS: CPT

## 2024-04-09 PROCEDURE — 6360000002 HC RX W HCPCS: Performed by: EMERGENCY MEDICINE

## 2024-04-09 RX ORDER — METRONIDAZOLE 500 MG/1
TABLET ORAL
Qty: 3 TABLET | Refills: 0 | Status: ON HOLD | OUTPATIENT
Start: 2024-04-09

## 2024-04-09 RX ORDER — SODIUM CHLORIDE 9 MG/ML
INJECTION, SOLUTION INTRAVENOUS PRN
Status: COMPLETED | OUTPATIENT
Start: 2024-04-09 | End: 2024-04-10

## 2024-04-09 RX ORDER — NEOMYCIN SULFATE 500 MG/1
TABLET ORAL
Qty: 6 TABLET | Refills: 0 | Status: ON HOLD | OUTPATIENT
Start: 2024-04-09

## 2024-04-09 RX ORDER — ONDANSETRON 4 MG/1
4 TABLET, ORALLY DISINTEGRATING ORAL 3 TIMES DAILY PRN
Qty: 3 TABLET | Refills: 0 | Status: ON HOLD | OUTPATIENT
Start: 2024-04-09

## 2024-04-09 RX ADMIN — CEFEPIME HYDROCHLORIDE 2000 MG: 2 INJECTION, POWDER, FOR SOLUTION INTRAVENOUS at 22:35

## 2024-04-09 NOTE — TELEPHONE ENCOUNTER
Patient has been scheduled for:    Procedure:Robo LAR   Date:4/18/24  Time:7:30 AM   Arrival:5:30 AM   Hospital:[facility]     ASA?:none  Prep?Prep 2, reviewed on phone and emailed to patient     Pre-op?N/A    Post-op Appt? Danay 5/7/24 at 10:15 AM     Patient advised they will need a .    Orders faxed to surgery scheduling.    Medication sent to Pharmacy: [facility]     Stents or ostomy marking?    Instructions have been mailed/emailed to: [email or web address]    Added to outlook calendar

## 2024-04-10 PROBLEM — D70.9 NEUTROPENIC FEVER (HCC): Status: ACTIVE | Noted: 2024-04-10

## 2024-04-10 PROBLEM — R50.81 NEUTROPENIC FEVER (HCC): Status: ACTIVE | Noted: 2024-04-10

## 2024-04-10 LAB
ABO + RH BLD: NORMAL
ACANTHOCYTES BLD QL SMEAR: ABNORMAL
ALBUMIN SERPL-MCNC: 3.7 G/DL (ref 3.4–5)
ALP SERPL-CCNC: 47 U/L (ref 40–129)
ALT SERPL-CCNC: 6 U/L (ref 10–40)
ANION GAP SERPL CALCULATED.3IONS-SCNC: 11 MMOL/L (ref 3–16)
ANISOCYTOSIS BLD QL SMEAR: ABNORMAL
ANISOCYTOSIS BLD QL SMEAR: ABNORMAL
AST SERPL-CCNC: 9 U/L (ref 15–37)
BASOPHILS # BLD: 0 K/UL (ref 0–0.2)
BASOPHILS NFR BLD: 0 %
BASOPHILS NFR BLD: 0 %
BASOPHILS NFR BLD: 1 %
BASOPHILS NFR BLD: 2 %
BILIRUB DIRECT SERPL-MCNC: <0.2 MG/DL (ref 0–0.3)
BILIRUB INDIRECT SERPL-MCNC: ABNORMAL MG/DL (ref 0–1)
BILIRUB SERPL-MCNC: 1.1 MG/DL (ref 0–1)
BLASTS NFR BLD MANUAL: 1 %
BLASTS NFR BLD MANUAL: 2 %
BLD GP AB SCN SERPL QL: NORMAL
BLOOD BANK DISPENSE STATUS: NORMAL
BLOOD BANK PRODUCT CODE: NORMAL
BPU ID: NORMAL
BUN SERPL-MCNC: 9 MG/DL (ref 7–20)
BURR CELLS BLD QL SMEAR: ABNORMAL
CALCIUM SERPL-MCNC: 8.6 MG/DL (ref 8.3–10.6)
CHLORIDE SERPL-SCNC: 104 MMOL/L (ref 99–110)
CO2 SERPL-SCNC: 23 MMOL/L (ref 21–32)
CREAT SERPL-MCNC: 0.9 MG/DL (ref 0.9–1.3)
DACRYOCYTES BLD QL SMEAR: ABNORMAL
DEPRECATED RDW RBC AUTO: 16.5 % (ref 12.4–15.4)
DEPRECATED RDW RBC AUTO: 16.8 % (ref 12.4–15.4)
DEPRECATED RDW RBC AUTO: 17.6 % (ref 12.4–15.4)
DEPRECATED RDW RBC AUTO: 22.2 % (ref 12.4–15.4)
DESCRIPTION BLOOD BANK: NORMAL
EKG ATRIAL RATE: 117 BPM
EKG DIAGNOSIS: NORMAL
EKG P AXIS: 88 DEGREES
EKG P-R INTERVAL: 120 MS
EKG Q-T INTERVAL: 306 MS
EKG QRS DURATION: 82 MS
EKG QTC CALCULATION (BAZETT): 426 MS
EKG R AXIS: 78 DEGREES
EKG T AXIS: 72 DEGREES
EKG VENTRICULAR RATE: 117 BPM
EOSINOPHIL # BLD: 0 K/UL (ref 0–0.6)
EOSINOPHIL # BLD: 0 K/UL (ref 0–0.6)
EOSINOPHIL # BLD: 0.1 K/UL (ref 0–0.6)
EOSINOPHIL # BLD: 0.1 K/UL (ref 0–0.6)
EOSINOPHIL NFR BLD: 0 %
EOSINOPHIL NFR BLD: 1.5 %
EOSINOPHIL NFR BLD: 3 %
EOSINOPHIL NFR BLD: 4 %
GFR SERPLBLD CREATININE-BSD FMLA CKD-EPI: >90 ML/MIN/{1.73_M2}
GLUCOSE SERPL-MCNC: 100 MG/DL (ref 70–99)
HCT VFR BLD AUTO: 15.3 % (ref 40.5–52.5)
HCT VFR BLD AUTO: 16.6 % (ref 40.5–52.5)
HCT VFR BLD AUTO: 16.7 % (ref 40.5–52.5)
HCT VFR BLD AUTO: 17.8 % (ref 40.5–52.5)
HCT VFR BLD AUTO: 22 % (ref 40.5–52.5)
HGB BLD-MCNC: 5.3 G/DL (ref 13.5–17.5)
HGB BLD-MCNC: 5.8 G/DL (ref 13.5–17.5)
HGB BLD-MCNC: 6.2 G/DL (ref 13.5–17.5)
HGB BLD-MCNC: 7.7 G/DL (ref 13.5–17.5)
HYPOCHROMIA BLD QL SMEAR: ABNORMAL
IMMATURE RETIC FRACT: 0.28 (ref 0.21–0.37)
LACTATE BLDV-SCNC: 1 MMOL/L (ref 0.4–1.9)
LYMPHOCYTES # BLD: 0.7 K/UL (ref 1–5.1)
LYMPHOCYTES # BLD: 1.1 K/UL (ref 1–5.1)
LYMPHOCYTES # BLD: 1.2 K/UL (ref 1–5.1)
LYMPHOCYTES # BLD: 1.4 K/UL (ref 1–5.1)
LYMPHOCYTES NFR BLD: 48 %
LYMPHOCYTES NFR BLD: 59 %
LYMPHOCYTES NFR BLD: 60.9 %
LYMPHOCYTES NFR BLD: 64 %
MACROCYTES BLD QL SMEAR: ABNORMAL
MACROCYTES BLD QL SMEAR: ABNORMAL
MAGNESIUM SERPL-MCNC: 1.6 MG/DL (ref 1.8–2.4)
MCH RBC QN AUTO: 25.9 PG (ref 26–34)
MCH RBC QN AUTO: 26.3 PG (ref 26–34)
MCH RBC QN AUTO: 26.4 PG (ref 26–34)
MCH RBC QN AUTO: 27.3 PG (ref 26–34)
MCHC RBC AUTO-ENTMCNC: 34.6 G/DL (ref 31–36)
MCHC RBC AUTO-ENTMCNC: 34.7 G/DL (ref 31–36)
MCHC RBC AUTO-ENTMCNC: 35.2 G/DL (ref 31–36)
MCHC RBC AUTO-ENTMCNC: 35.2 G/DL (ref 31–36)
MCV RBC AUTO: 74.9 FL (ref 80–100)
MCV RBC AUTO: 75.1 FL (ref 80–100)
MCV RBC AUTO: 75.9 FL (ref 80–100)
MCV RBC AUTO: 77.6 FL (ref 80–100)
METAMYELOCYTES NFR BLD MANUAL: 1 %
METAMYELOCYTES NFR BLD MANUAL: 2 %
MICROCYTES BLD QL SMEAR: ABNORMAL
MONOCYTES # BLD: 0.1 K/UL (ref 0–1.3)
MONOCYTES NFR BLD: 3 %
MONOCYTES NFR BLD: 3 %
MONOCYTES NFR BLD: 3.8 %
MONOCYTES NFR BLD: 8 %
NEUTROPHILS # BLD: 0.4 K/UL (ref 1.7–7.7)
NEUTROPHILS # BLD: 0.5 K/UL (ref 1.7–7.7)
NEUTROPHILS # BLD: 0.6 K/UL (ref 1.7–7.7)
NEUTROPHILS # BLD: 0.8 K/UL (ref 1.7–7.7)
NEUTROPHILS NFR BLD: 20 %
NEUTROPHILS NFR BLD: 23 %
NEUTROPHILS NFR BLD: 33.8 %
NEUTROPHILS NFR BLD: 38 %
NEUTS BAND NFR BLD MANUAL: 1 % (ref 0–7)
NEUTS BAND NFR BLD MANUAL: 2 % (ref 0–7)
NEUTS BAND NFR BLD MANUAL: 4 % (ref 0–7)
NRBC BLD-RTO: 11 /100 WBC
NRBC BLD-RTO: 18 /100 WBC
NRBC BLD-RTO: 8 /100 WBC
OVALOCYTES BLD QL SMEAR: ABNORMAL
PATH INTERP BLD-IMP: NO
PHOSPHATE SERPL-MCNC: 4 MG/DL (ref 2.5–4.9)
PLATELET # BLD AUTO: 20 K/UL (ref 135–450)
PLATELET # BLD AUTO: 42 K/UL (ref 135–450)
PLATELET # BLD AUTO: 64 K/UL (ref 135–450)
PLATELET # BLD AUTO: 77 K/UL (ref 135–450)
PLATELET BLD QL SMEAR: ABNORMAL
PLATELET BLD QL SMEAR: ABNORMAL
PMV BLD AUTO: 8.2 FL (ref 5–10.5)
PMV BLD AUTO: 8.5 FL (ref 5–10.5)
PMV BLD AUTO: 8.8 FL (ref 5–10.5)
PMV BLD AUTO: 9.1 FL (ref 5–10.5)
POIKILOCYTOSIS BLD QL SMEAR: ABNORMAL
POIKILOCYTOSIS BLD QL SMEAR: ABNORMAL
POLYCHROMASIA BLD QL SMEAR: ABNORMAL
POLYCHROMASIA BLD QL SMEAR: ABNORMAL
POTASSIUM SERPL-SCNC: 3.8 MMOL/L (ref 3.5–5.1)
PROT SERPL-MCNC: 5.8 G/DL (ref 6.4–8.2)
RBC # BLD AUTO: 2.05 M/UL (ref 4.2–5.9)
RBC # BLD AUTO: 2.21 M/UL (ref 4.2–5.9)
RBC # BLD AUTO: 2.35 M/UL (ref 4.2–5.9)
RBC # BLD AUTO: 2.83 M/UL (ref 4.2–5.9)
RETICS # AUTO: 0.2 M/UL
RETICS/RBC NFR AUTO: 8.89 % (ref 0.5–2.18)
SCHISTOCYTES BLD QL SMEAR: ABNORMAL
SLIDE REVIEW: ABNORMAL
SMUDGE CELLS BLD QL SMEAR: PRESENT
SMUDGE CELLS BLD QL SMEAR: PRESENT
SODIUM SERPL-SCNC: 138 MMOL/L (ref 136–145)
STOMATOCYTES BLD QL SMEAR: ABNORMAL
URATE SERPL-MCNC: 4.9 MG/DL (ref 3.5–7.2)
VARIANT LYMPHS NFR BLD MANUAL: 2 % (ref 0–6)
VARIANT LYMPHS NFR BLD MANUAL: 4 % (ref 0–6)
VARIANT LYMPHS NFR BLD MANUAL: 5 % (ref 0–6)
WBC # BLD AUTO: 1.4 K/UL (ref 4–11)
WBC # BLD AUTO: 1.7 K/UL (ref 4–11)
WBC # BLD AUTO: 1.8 K/UL (ref 4–11)
WBC # BLD AUTO: 2.2 K/UL (ref 4–11)

## 2024-04-10 PROCEDURE — 2580000003 HC RX 258: Performed by: EMERGENCY MEDICINE

## 2024-04-10 PROCEDURE — 2580000003 HC RX 258: Performed by: NURSE PRACTITIONER

## 2024-04-10 PROCEDURE — 6360000002 HC RX W HCPCS: Performed by: STUDENT IN AN ORGANIZED HEALTH CARE EDUCATION/TRAINING PROGRAM

## 2024-04-10 PROCEDURE — 6370000000 HC RX 637 (ALT 250 FOR IP): Performed by: STUDENT IN AN ORGANIZED HEALTH CARE EDUCATION/TRAINING PROGRAM

## 2024-04-10 PROCEDURE — 36430 TRANSFUSION BLD/BLD COMPNT: CPT

## 2024-04-10 PROCEDURE — 84100 ASSAY OF PHOSPHORUS: CPT

## 2024-04-10 PROCEDURE — 83605 ASSAY OF LACTIC ACID: CPT

## 2024-04-10 PROCEDURE — 83735 ASSAY OF MAGNESIUM: CPT

## 2024-04-10 PROCEDURE — 83010 ASSAY OF HAPTOGLOBIN QUANT: CPT

## 2024-04-10 PROCEDURE — 85025 COMPLETE CBC W/AUTO DIFF WBC: CPT

## 2024-04-10 PROCEDURE — 2580000003 HC RX 258: Performed by: STUDENT IN AN ORGANIZED HEALTH CARE EDUCATION/TRAINING PROGRAM

## 2024-04-10 PROCEDURE — 85045 AUTOMATED RETICULOCYTE COUNT: CPT

## 2024-04-10 PROCEDURE — 99223 1ST HOSP IP/OBS HIGH 75: CPT | Performed by: SURGERY

## 2024-04-10 PROCEDURE — 84550 ASSAY OF BLOOD/URIC ACID: CPT

## 2024-04-10 PROCEDURE — 30233N1 TRANSFUSION OF NONAUTOLOGOUS RED BLOOD CELLS INTO PERIPHERAL VEIN, PERCUTANEOUS APPROACH: ICD-10-PCS | Performed by: STUDENT IN AN ORGANIZED HEALTH CARE EDUCATION/TRAINING PROGRAM

## 2024-04-10 PROCEDURE — 80076 HEPATIC FUNCTION PANEL: CPT

## 2024-04-10 PROCEDURE — 6360000002 HC RX W HCPCS: Performed by: NURSE PRACTITIONER

## 2024-04-10 PROCEDURE — 80048 BASIC METABOLIC PNL TOTAL CA: CPT

## 2024-04-10 PROCEDURE — 2060000000 HC ICU INTERMEDIATE R&B

## 2024-04-10 RX ORDER — POTASSIUM CHLORIDE 29.8 MG/ML
20 INJECTION INTRAVENOUS PRN
Status: DISCONTINUED | OUTPATIENT
Start: 2024-04-10 | End: 2024-04-17 | Stop reason: HOSPADM

## 2024-04-10 RX ORDER — METRONIDAZOLE 500 MG/1
500 TABLET ORAL 3 TIMES DAILY
Status: COMPLETED | OUTPATIENT
Start: 2024-04-11 | End: 2024-04-11

## 2024-04-10 RX ORDER — VALACYCLOVIR HYDROCHLORIDE 500 MG/1
500 TABLET, FILM COATED ORAL 2 TIMES DAILY
Status: DISCONTINUED | OUTPATIENT
Start: 2024-04-10 | End: 2024-04-17 | Stop reason: HOSPADM

## 2024-04-10 RX ORDER — SODIUM CHLORIDE 0.9 % (FLUSH) 0.9 %
5-40 SYRINGE (ML) INJECTION EVERY 12 HOURS SCHEDULED
Status: DISCONTINUED | OUTPATIENT
Start: 2024-04-10 | End: 2024-04-17 | Stop reason: HOSPADM

## 2024-04-10 RX ORDER — SODIUM CHLORIDE 9 MG/ML
INJECTION, SOLUTION INTRAVENOUS PRN
Status: DISCONTINUED | OUTPATIENT
Start: 2024-04-10 | End: 2024-04-17 | Stop reason: HOSPADM

## 2024-04-10 RX ORDER — SODIUM CHLORIDE 9 MG/ML
500 INJECTION, SOLUTION INTRAVENOUS CONTINUOUS PRN
Status: DISCONTINUED | OUTPATIENT
Start: 2024-04-10 | End: 2024-04-17 | Stop reason: HOSPADM

## 2024-04-10 RX ORDER — NEOMYCIN SULFATE 500 MG/1
1000 TABLET ORAL 3 TIMES DAILY
Status: COMPLETED | OUTPATIENT
Start: 2024-04-11 | End: 2024-04-11

## 2024-04-10 RX ORDER — LANOLIN ALCOHOL/MO/W.PET/CERES
1000 CREAM (GRAM) TOPICAL DAILY
Status: DISCONTINUED | OUTPATIENT
Start: 2024-04-10 | End: 2024-04-17 | Stop reason: HOSPADM

## 2024-04-10 RX ORDER — ENOXAPARIN SODIUM 100 MG/ML
40 INJECTION SUBCUTANEOUS EVERY EVENING
Status: DISCONTINUED | OUTPATIENT
Start: 2024-04-10 | End: 2024-04-10

## 2024-04-10 RX ORDER — SODIUM CHLORIDE 9 MG/ML
INJECTION, SOLUTION INTRAVENOUS CONTINUOUS
Status: DISCONTINUED | OUTPATIENT
Start: 2024-04-10 | End: 2024-04-17 | Stop reason: HOSPADM

## 2024-04-10 RX ORDER — ACETAMINOPHEN 650 MG/1
650 SUPPOSITORY RECTAL EVERY 6 HOURS PRN
Status: DISCONTINUED | OUTPATIENT
Start: 2024-04-10 | End: 2024-04-12

## 2024-04-10 RX ORDER — ACETAMINOPHEN 325 MG/1
650 TABLET ORAL EVERY 6 HOURS PRN
Status: DISCONTINUED | OUTPATIENT
Start: 2024-04-10 | End: 2024-04-12

## 2024-04-10 RX ORDER — PANTOPRAZOLE SODIUM 40 MG/1
40 TABLET, DELAYED RELEASE ORAL
Status: DISCONTINUED | OUTPATIENT
Start: 2024-04-10 | End: 2024-04-17 | Stop reason: HOSPADM

## 2024-04-10 RX ORDER — SODIUM CHLORIDE 9 MG/ML
INJECTION, SOLUTION INTRAVENOUS PRN
Status: COMPLETED | OUTPATIENT
Start: 2024-04-10 | End: 2024-04-10

## 2024-04-10 RX ORDER — POLYETHYLENE GLYCOL 3350 17 G/17G
238 POWDER, FOR SOLUTION ORAL ONCE
Status: COMPLETED | OUTPATIENT
Start: 2024-04-11 | End: 2024-04-11

## 2024-04-10 RX ORDER — SODIUM CHLORIDE 0.9 % (FLUSH) 0.9 %
5-40 SYRINGE (ML) INJECTION PRN
Status: DISCONTINUED | OUTPATIENT
Start: 2024-04-10 | End: 2024-04-17 | Stop reason: HOSPADM

## 2024-04-10 RX ORDER — PROCHLORPERAZINE MALEATE 10 MG
10 TABLET ORAL EVERY 4 HOURS PRN
Status: DISCONTINUED | OUTPATIENT
Start: 2024-04-10 | End: 2024-04-17 | Stop reason: HOSPADM

## 2024-04-10 RX ORDER — LEVETIRACETAM 500 MG/1
1500 TABLET ORAL 2 TIMES DAILY
Status: DISCONTINUED | OUTPATIENT
Start: 2024-04-10 | End: 2024-04-17 | Stop reason: HOSPADM

## 2024-04-10 RX ORDER — FLUCONAZOLE 200 MG/1
200 TABLET ORAL DAILY
Status: DISCONTINUED | OUTPATIENT
Start: 2024-04-10 | End: 2024-04-17 | Stop reason: HOSPADM

## 2024-04-10 RX ORDER — ONDANSETRON 4 MG/1
4 TABLET, ORALLY DISINTEGRATING ORAL 3 TIMES DAILY PRN
Status: DISCONTINUED | OUTPATIENT
Start: 2024-04-10 | End: 2024-04-17 | Stop reason: HOSPADM

## 2024-04-10 RX ORDER — MAGNESIUM SULFATE IN WATER 40 MG/ML
4000 INJECTION, SOLUTION INTRAVENOUS PRN
Status: DISCONTINUED | OUTPATIENT
Start: 2024-04-10 | End: 2024-04-17 | Stop reason: HOSPADM

## 2024-04-10 RX ADMIN — CEFEPIME 2000 MG: 2 INJECTION, POWDER, FOR SOLUTION INTRAVENOUS at 05:44

## 2024-04-10 RX ADMIN — VALACYCLOVIR HYDROCHLORIDE 500 MG: 500 TABLET, FILM COATED ORAL at 08:03

## 2024-04-10 RX ADMIN — CYANOCOBALAMIN TAB 1000 MCG 1000 MCG: 1000 TAB at 08:03

## 2024-04-10 RX ADMIN — SODIUM CHLORIDE: 9 INJECTION, SOLUTION INTRAVENOUS at 10:01

## 2024-04-10 RX ADMIN — PIPERACILLIN AND TAZOBACTAM 4500 MG: 4; .5 INJECTION, POWDER, LYOPHILIZED, FOR SOLUTION INTRAVENOUS at 17:21

## 2024-04-10 RX ADMIN — VALACYCLOVIR HYDROCHLORIDE 500 MG: 500 TABLET, FILM COATED ORAL at 20:03

## 2024-04-10 RX ADMIN — SODIUM CHLORIDE: 9 INJECTION, SOLUTION INTRAVENOUS at 03:03

## 2024-04-10 RX ADMIN — SODIUM CHLORIDE: 9 INJECTION, SOLUTION INTRAVENOUS at 03:07

## 2024-04-10 RX ADMIN — PANTOPRAZOLE SODIUM 40 MG: 40 TABLET, DELAYED RELEASE ORAL at 05:44

## 2024-04-10 RX ADMIN — LEVETIRACETAM 1500 MG: 500 TABLET, FILM COATED ORAL at 20:02

## 2024-04-10 RX ADMIN — SODIUM CHLORIDE, PRESERVATIVE FREE 10 ML: 5 INJECTION INTRAVENOUS at 08:03

## 2024-04-10 RX ADMIN — PANTOPRAZOLE SODIUM 40 MG: 40 TABLET, DELAYED RELEASE ORAL at 15:54

## 2024-04-10 RX ADMIN — FLUCONAZOLE 200 MG: 200 TABLET ORAL at 08:03

## 2024-04-10 RX ADMIN — LEVETIRACETAM 1500 MG: 500 TABLET, FILM COATED ORAL at 08:03

## 2024-04-10 RX ADMIN — PIPERACILLIN AND TAZOBACTAM 4500 MG: 4; .5 INJECTION, POWDER, LYOPHILIZED, FOR SOLUTION INTRAVENOUS at 10:00

## 2024-04-10 RX ADMIN — SODIUM CHLORIDE, PRESERVATIVE FREE 10 ML: 5 INJECTION INTRAVENOUS at 20:08

## 2024-04-10 ASSESSMENT — PAIN SCALES - GENERAL
PAINLEVEL_OUTOF10: 0

## 2024-04-10 NOTE — TELEPHONE ENCOUNTER
Surgery has been moved from 4/18 to 4/12 at 2:30 PM with a 12:30 PM arrival time. Attempted to call patient to advise of change. Left vm to call the office back.

## 2024-04-10 NOTE — CONSULTS
times the day before surgery. Take two tablets at 1pm, two tablets at 2pm and two tablets at 9pm. 6 tablet 0    ondansetron (ZOFRAN-ODT) 4 MG disintegrating tablet Place 1 tablet under the tongue 3 times daily as needed for Nausea or Vomiting 3 tablet 0    allopurinol (ZYLOPRIM) 300 MG tablet Take 1 tablet by mouth daily 30 tablet 3    Hydrocerin (EUCERIN) CREA cream Apply topically 2 times daily 1 each 0    pantoprazole (PROTONIX) 40 MG tablet Take 1 tablet by mouth 2 times daily (before meals) 30 tablet 3    prochlorperazine (COMPAZINE) 10 MG tablet Take 1 tablet by mouth every 4 hours as needed (Nausea / Vomiting) 120 tablet 3    valACYclovir (VALTREX) 500 MG tablet Take 1 tablet by mouth 2 times daily 60 tablet 3    vitamin B-12 1000 MCG tablet Take 1 tablet by mouth daily 30 tablet 3    fluconazole (DIFLUCAN) 200 MG tablet Take 1 tablet by mouth daily 30 tablet 1    levoFLOXacin (LEVAQUIN) 500 MG tablet Take 1 tablet by mouth at bedtime 30 tablet 0    Venetoclax 100 MG TABS Take 1 tablet on day 1, take 2 tablets on day 2, then take 4 tablets starting on day 3 through day 28 107 tablet 1    levETIRAcetam (KEPPRA) 1000 MG tablet Take 1.5 tablets by mouth 2 times daily         Current Meds  levETIRAcetam (KEPPRA) tablet 1,500 mg, BID  pantoprazole (PROTONIX) tablet 40 mg, BID AC  prochlorperazine (COMPAZINE) tablet 10 mg, Q4H PRN  valACYclovir (VALTREX) tablet 500 mg, BID  vitamin B-12 (CYANOCOBALAMIN) tablet 1,000 mcg, Daily  fluconazole (DIFLUCAN) tablet 200 mg, Daily  ondansetron (ZOFRAN-ODT) disintegrating tablet 4 mg, TID PRN  0.9 % sodium chloride infusion, Continuous PRN  sodium chloride flush 0.9 % injection 5-40 mL, 2 times per day  sodium chloride flush 0.9 % injection 5-40 mL, PRN  0.9 % sodium chloride infusion, PRN  potassium chloride 20 mEq/50 mL IVPB (Central Line), PRN  magnesium sulfate 4000 mg in 100 mL IVPB premix, PRN  magnesium hydroxide (MILK OF MAGNESIA) 400 MG/5ML suspension 30 mL, Daily

## 2024-04-10 NOTE — ED NOTES
Contacted the lab they stated they have the type and screen sample and it is currently in process     Abdirashid Lipscomb, RN  04/09/24 1835

## 2024-04-10 NOTE — CONSENT
Informed Consent for Blood Component Transfusion Note    I have discussed with the patient the rationale for blood component transfusion; its benefits in treating or preventing fatigue, organ damage, or death; and its risk which includes mild transfusion reactions, rare risk of blood borne infection, or more serious but rare reactions. I have discussed the alternatives to transfusion, including the risk and consequences of not receiving transfusion. The patient had an opportunity to ask questions and had agreed to proceed with transfusion of blood components.    Electronically signed by Liu Powell MD on 4/9/24 at 11:30 PM EDT

## 2024-04-10 NOTE — ED PROVIDER NOTES
THE [facility]  EMERGENCY DEPARTMENT ENCOUNTER          ATTENDING PHYSICIAN NOTE       Date of evaluation: 4/9/2024    Chief Complaint     Rectal Bleeding (Pt c/o increased bleeding with BM. States that it started out as bright red and has turned dark the past couple of days. Supposed to start chemo after surgery on 4/18 for rectal cancer and leukemia. )      History of Present Illness     Dennys Peguero is a 43 y.o. male who presents to the emergency department complaining of fever, lightheadedness, and rectal bleeding.  Patient is a history of AML that was diagnosed due to an unexplained anemia, then subsequently was found to have rectal bleeding and did have a rectal cancer on colonoscopy.  He is scheduled for resection of this in 9 days.  He states that over the last 4 days he has been having continuous rectal bleeding.  He states this evening he started to feel lightheaded so checked his temperature and it was 101.1 so he came to the emergency department for evaluation.  Patient denies any sore throat or ear pain.  He states he has had intermittent cough that is nonproductive sputum.  He denies any nausea or vomiting.  He has noted burning with urination but denies any increased urinary frequency.  He denies any recent sick contacts.  He states his last transfusion was yesterday.    ASSESSMENT / PLAN  (MEDICAL DECISION MAKING)     INITIAL VITALS: BP: 107/63, Temp: 99.9 °F (37.7 °C), Pulse: (!) 116, Respirations: 16, SpO2: 100 %      Dennys Peguero is a 43 y.o. male with history of recently diagnosed AML as well as rectal cancer with plans for resection in 9 days and then initiation of chemotherapy as well as remote history of oligodendroglioma who presents for rectal bleeding and lightheadedness.  Patient states for the last 4 days he has been having increased rectal bleeding with bowel movements.  He did have a transfusion yesterday of packed red blood cells.  She states this evening he developed a

## 2024-04-10 NOTE — TELEPHONE ENCOUNTER
Patient is currently in the hospital. Notes are indicating he is aware of surgery on 4/12 instead of 4/18. Prep will be done tomorrow. Schedulers are aware and making changes.

## 2024-04-10 NOTE — H&P
[facility] History and Physical       Attending Physician: Gabby Tiwari DO    Primary Care: No primary care provider on file.       Referring MD: No referring provider defined for this encounter.    Name: Dennys Peguero :  1980  MRN:  [id number]    Admission: 2024      Date: 4/10/2024    Reason for Admission: Rectal bleeding, fever    History of Present Illness:   Mr. Peguero is a 43 y.o. male with past medical history of Oligodendroglioma (S/P partial resection , radiation , and temodar ). Dennys presented to the ER at [facility]SH on 3/05/24 with increased fatigue. He initially attributed the fatigue to recent stomach infection however his energy continued to get worse prompting him to come to the ER for evaluation. Patient stated he did remember that he had blood-tinged stools intermittently over the last several weeks. In the ER, laboratory testing showed a hemoglobin of 4.8 and platelet count of 95. He also had a leukocytosis (WBC 12.4).  His stool occult with positive. GI was consulted and EGD was performed which showed a normal EGD. Hem/Onc was consulted and pathology stated he had several shistocytes (at least 5 per high power field) on his peripheral blood. LDH was elevated and haptoglobin was <10.  CT A/P noted hepatosplenomegaly, no lymphadenopathy. With concerns for TTP vs other, patient was transferred to [facility] for further evaluation and treatment.      Upon arrival to [facility], a bone marrow biopsy and aspiration was completed and came back AML with underlying MDS- Deletion 5q33, trisomy 8, gain of KMT2A/11q, Deletion of 20q. TP53 positive and cytogenetics with multiple abnormalities. With these findings he was started on Dacogen and Venetoclax as an inpatient to monitor closely for TLS.      During the first part of his admission Dennys continued to have intermittent bloody stools. GI PCR came back positive for c diff and he completed a 10 day course of Dificid. Upon assessment he did not have any

## 2024-04-11 ENCOUNTER — ANESTHESIA EVENT (OUTPATIENT)
Dept: OPERATING ROOM | Age: 44
End: 2024-04-11
Payer: MEDICAID

## 2024-04-11 PROBLEM — K62.5 RECTAL BLEEDING: Status: ACTIVE | Noted: 2024-04-11

## 2024-04-11 LAB
ACANTHOCYTES BLD QL SMEAR: ABNORMAL
ACANTHOCYTES BLD QL SMEAR: ABNORMAL
ALBUMIN SERPL-MCNC: 3.4 G/DL (ref 3.4–5)
ALP SERPL-CCNC: 45 U/L (ref 40–129)
ALT SERPL-CCNC: 6 U/L (ref 10–40)
ANION GAP SERPL CALCULATED.3IONS-SCNC: 11 MMOL/L (ref 3–16)
ANISOCYTOSIS BLD QL SMEAR: ABNORMAL
ANISOCYTOSIS BLD QL SMEAR: ABNORMAL
APTT BLD: 29.7 SEC (ref 22.1–36.4)
AST SERPL-CCNC: 9 U/L (ref 15–37)
BASOPHILS # BLD: 0 K/UL (ref 0–0.2)
BASOPHILS # BLD: 0 K/UL (ref 0–0.2)
BASOPHILS NFR BLD: 0 %
BASOPHILS NFR BLD: 0 %
BILIRUB DIRECT SERPL-MCNC: <0.2 MG/DL (ref 0–0.3)
BILIRUB INDIRECT SERPL-MCNC: ABNORMAL MG/DL (ref 0–1)
BILIRUB SERPL-MCNC: 0.9 MG/DL (ref 0–1)
BLASTS NFR BLD MANUAL: 1 %
BLASTS NFR BLD MANUAL: 2 %
BUN SERPL-MCNC: 7 MG/DL (ref 7–20)
BURR CELLS BLD QL SMEAR: ABNORMAL
CALCIUM SERPL-MCNC: 8.5 MG/DL (ref 8.3–10.6)
CHLORIDE SERPL-SCNC: 104 MMOL/L (ref 99–110)
CO2 SERPL-SCNC: 23 MMOL/L (ref 21–32)
CREAT SERPL-MCNC: 1 MG/DL (ref 0.9–1.3)
DACRYOCYTES BLD QL SMEAR: ABNORMAL
DACRYOCYTES BLD QL SMEAR: ABNORMAL
DEPRECATED RDW RBC AUTO: 16.2 % (ref 12.4–15.4)
DEPRECATED RDW RBC AUTO: 16.6 % (ref 12.4–15.4)
EOSINOPHIL # BLD: 0 K/UL (ref 0–0.6)
EOSINOPHIL # BLD: 0 K/UL (ref 0–0.6)
EOSINOPHIL NFR BLD: 0 %
EOSINOPHIL NFR BLD: 1 %
GFR SERPLBLD CREATININE-BSD FMLA CKD-EPI: >90 ML/MIN/{1.73_M2}
GLUCOSE SERPL-MCNC: 102 MG/DL (ref 70–99)
HAPTOGLOB SERPL-MCNC: 58 MG/DL (ref 30–200)
HCT VFR BLD AUTO: 20.9 % (ref 40.5–52.5)
HCT VFR BLD AUTO: 23.8 % (ref 40.5–52.5)
HGB BLD-MCNC: 7.3 G/DL (ref 13.5–17.5)
HGB BLD-MCNC: 8.3 G/DL (ref 13.5–17.5)
HYPOCHROMIA BLD QL SMEAR: ABNORMAL
INR PPP: 1.2 (ref 0.85–1.15)
LDH SERPL L TO P-CCNC: 308 U/L (ref 100–190)
LOEFFLER MB STN SPEC: NORMAL
LYMPHOCYTES # BLD: 1.3 K/UL (ref 1–5.1)
LYMPHOCYTES # BLD: 1.7 K/UL (ref 1–5.1)
LYMPHOCYTES NFR BLD: 63 %
LYMPHOCYTES NFR BLD: 64 %
MACROCYTES BLD QL SMEAR: ABNORMAL
MACROCYTES BLD QL SMEAR: ABNORMAL
MCH RBC QN AUTO: 26.8 PG (ref 26–34)
MCH RBC QN AUTO: 27.2 PG (ref 26–34)
MCHC RBC AUTO-ENTMCNC: 34.7 G/DL (ref 31–36)
MCHC RBC AUTO-ENTMCNC: 34.9 G/DL (ref 31–36)
MCV RBC AUTO: 77 FL (ref 80–100)
MCV RBC AUTO: 78.2 FL (ref 80–100)
METAMYELOCYTES NFR BLD MANUAL: 2 %
MICROCYTES BLD QL SMEAR: ABNORMAL
MICROCYTES BLD QL SMEAR: ABNORMAL
MONOCYTES # BLD: 0 K/UL (ref 0–1.3)
MONOCYTES # BLD: 0.1 K/UL (ref 0–1.3)
MONOCYTES NFR BLD: 2 %
MONOCYTES NFR BLD: 2 %
NEUTROPHILS # BLD: 0.7 K/UL (ref 1.7–7.7)
NEUTROPHILS # BLD: 0.9 K/UL (ref 1.7–7.7)
NEUTROPHILS NFR BLD: 21 %
NEUTROPHILS NFR BLD: 33 %
NEUTS BAND NFR BLD MANUAL: 9 % (ref 0–7)
NRBC BLD-RTO: 13 /100 WBC
NRBC BLD-RTO: 21 /100 WBC
OVALOCYTES BLD QL SMEAR: ABNORMAL
PATH INTERP BLD-IMP: NO
PATH INTERP BLD-IMP: NO
PLATELET # BLD AUTO: 71 K/UL (ref 135–450)
PLATELET # BLD AUTO: 73 K/UL (ref 135–450)
PLATELET BLD QL SMEAR: ABNORMAL
PMV BLD AUTO: 8.4 FL (ref 5–10.5)
PMV BLD AUTO: 8.6 FL (ref 5–10.5)
POIKILOCYTOSIS BLD QL SMEAR: ABNORMAL
POLYCHROMASIA BLD QL SMEAR: ABNORMAL
POLYCHROMASIA BLD QL SMEAR: ABNORMAL
POTASSIUM SERPL-SCNC: 3.8 MMOL/L (ref 3.5–5.1)
PROT SERPL-MCNC: 5.3 G/DL (ref 6.4–8.2)
PROTHROMBIN TIME: 15.4 SEC (ref 11.9–14.9)
RBC # BLD AUTO: 2.71 M/UL (ref 4.2–5.9)
RBC # BLD AUTO: 3.05 M/UL (ref 4.2–5.9)
SCHISTOCYTES BLD QL SMEAR: ABNORMAL
SCHISTOCYTES BLD QL SMEAR: ABNORMAL
SMUDGE CELLS BLD QL SMEAR: PRESENT
SMUDGE CELLS BLD QL SMEAR: PRESENT
SODIUM SERPL-SCNC: 138 MMOL/L (ref 136–145)
TARGETS BLD QL SMEAR: ABNORMAL
VARIANT LYMPHS NFR BLD MANUAL: 0 % (ref 0–6)
WBC # BLD AUTO: 2.1 K/UL (ref 4–11)
WBC # BLD AUTO: 2.7 K/UL (ref 4–11)

## 2024-04-11 PROCEDURE — 2580000003 HC RX 258: Performed by: NURSE PRACTITIONER

## 2024-04-11 PROCEDURE — 80076 HEPATIC FUNCTION PANEL: CPT

## 2024-04-11 PROCEDURE — 85025 COMPLETE CBC W/AUTO DIFF WBC: CPT

## 2024-04-11 PROCEDURE — 99233 SBSQ HOSP IP/OBS HIGH 50: CPT | Performed by: SURGERY

## 2024-04-11 PROCEDURE — 83615 LACTATE (LD) (LDH) ENZYME: CPT

## 2024-04-11 PROCEDURE — 85610 PROTHROMBIN TIME: CPT

## 2024-04-11 PROCEDURE — 85730 THROMBOPLASTIN TIME PARTIAL: CPT

## 2024-04-11 PROCEDURE — 2580000003 HC RX 258: Performed by: STUDENT IN AN ORGANIZED HEALTH CARE EDUCATION/TRAINING PROGRAM

## 2024-04-11 PROCEDURE — 36415 COLL VENOUS BLD VENIPUNCTURE: CPT

## 2024-04-11 PROCEDURE — 6370000000 HC RX 637 (ALT 250 FOR IP): Performed by: STUDENT IN AN ORGANIZED HEALTH CARE EDUCATION/TRAINING PROGRAM

## 2024-04-11 PROCEDURE — 36592 COLLECT BLOOD FROM PICC: CPT

## 2024-04-11 PROCEDURE — 6370000000 HC RX 637 (ALT 250 FOR IP)

## 2024-04-11 PROCEDURE — 6360000002 HC RX W HCPCS: Performed by: NURSE PRACTITIONER

## 2024-04-11 PROCEDURE — 2060000000 HC ICU INTERMEDIATE R&B

## 2024-04-11 PROCEDURE — 80048 BASIC METABOLIC PNL TOTAL CA: CPT

## 2024-04-11 RX ORDER — SODIUM CHLORIDE, SODIUM GLUCONATE, SODIUM ACETATE, POTASSIUM CHLORIDE AND MAGNESIUM CHLORIDE 526; 502; 368; 37; 30 MG/100ML; MG/100ML; MG/100ML; MG/100ML; MG/100ML
100 INJECTION, SOLUTION INTRAVENOUS CONTINUOUS
Status: DISCONTINUED | OUTPATIENT
Start: 2024-04-12 | End: 2024-04-12

## 2024-04-11 RX ORDER — SODIUM CHLORIDE, SODIUM GLUCONATE, SODIUM ACETATE, POTASSIUM CHLORIDE AND MAGNESIUM CHLORIDE 526; 502; 368; 37; 30 MG/100ML; MG/100ML; MG/100ML; MG/100ML; MG/100ML
125 INJECTION, SOLUTION INTRAVENOUS CONTINUOUS
Status: DISCONTINUED | OUTPATIENT
Start: 2024-04-11 | End: 2024-04-11

## 2024-04-11 RX ADMIN — NEOMYCIN SULFATE 1000 MG: 500 TABLET ORAL at 21:40

## 2024-04-11 RX ADMIN — PIPERACILLIN AND TAZOBACTAM 4500 MG: 4; .5 INJECTION, POWDER, LYOPHILIZED, FOR SOLUTION INTRAVENOUS at 09:02

## 2024-04-11 RX ADMIN — METRONIDAZOLE 500 MG: 500 TABLET ORAL at 21:40

## 2024-04-11 RX ADMIN — METRONIDAZOLE 500 MG: 500 TABLET ORAL at 14:38

## 2024-04-11 RX ADMIN — CYANOCOBALAMIN TAB 1000 MCG 1000 MCG: 1000 TAB at 08:57

## 2024-04-11 RX ADMIN — METRONIDAZOLE 500 MG: 500 TABLET ORAL at 16:52

## 2024-04-11 RX ADMIN — POLYETHYLENE GLYCOL 3350 238 G: 17 POWDER, FOR SOLUTION ORAL at 12:26

## 2024-04-11 RX ADMIN — NEOMYCIN SULFATE 1000 MG: 500 TABLET ORAL at 16:52

## 2024-04-11 RX ADMIN — PIPERACILLIN AND TAZOBACTAM 4500 MG: 4; .5 INJECTION, POWDER, LYOPHILIZED, FOR SOLUTION INTRAVENOUS at 16:58

## 2024-04-11 RX ADMIN — PIPERACILLIN AND TAZOBACTAM 4500 MG: 4; .5 INJECTION, POWDER, LYOPHILIZED, FOR SOLUTION INTRAVENOUS at 02:07

## 2024-04-11 RX ADMIN — PANTOPRAZOLE SODIUM 40 MG: 40 TABLET, DELAYED RELEASE ORAL at 06:50

## 2024-04-11 RX ADMIN — LEVETIRACETAM 1500 MG: 500 TABLET, FILM COATED ORAL at 08:57

## 2024-04-11 RX ADMIN — VALACYCLOVIR HYDROCHLORIDE 500 MG: 500 TABLET, FILM COATED ORAL at 21:40

## 2024-04-11 RX ADMIN — SODIUM CHLORIDE, PRESERVATIVE FREE 10 ML: 5 INJECTION INTRAVENOUS at 08:57

## 2024-04-11 RX ADMIN — NEOMYCIN SULFATE 1000 MG: 500 TABLET ORAL at 14:38

## 2024-04-11 RX ADMIN — VALACYCLOVIR HYDROCHLORIDE 500 MG: 500 TABLET, FILM COATED ORAL at 08:57

## 2024-04-11 RX ADMIN — LEVETIRACETAM 1500 MG: 500 TABLET, FILM COATED ORAL at 21:40

## 2024-04-11 RX ADMIN — PANTOPRAZOLE SODIUM 40 MG: 40 TABLET, DELAYED RELEASE ORAL at 16:52

## 2024-04-11 RX ADMIN — SODIUM CHLORIDE, PRESERVATIVE FREE 10 ML: 5 INJECTION INTRAVENOUS at 21:41

## 2024-04-11 RX ADMIN — FLUCONAZOLE 200 MG: 200 TABLET ORAL at 08:57

## 2024-04-11 ASSESSMENT — PAIN SCALES - GENERAL
PAINLEVEL_OUTOF10: 0

## 2024-04-11 NOTE — PLAN OF CARE
Problem: Safety - Adult  Goal: Free from fall injury  Outcome: Progressing  Flowsheets (Taken 4/11/2024 0620)  Free From Fall Injury: Instruct family/caregiver on patient safety     Problem: ABCDS Injury Assessment  Goal: Absence of physical injury  Outcome: Progressing  Flowsheets (Taken 4/11/2024 0620)  Absence of Physical Injury: Implement safety measures based on patient assessment     Problem: Pain  Goal: Verbalizes/displays adequate comfort level or baseline comfort level  Outcome: Progressing  Flowsheets (Taken 4/11/2024 0620)  Verbalizes/displays adequate comfort level or baseline comfort level:   Encourage patient to monitor pain and request assistance   Administer analgesics based on type and severity of pain and evaluate response   Consider cultural and social influences on pain and pain management   Notify Licensed Independent Practitioner if interventions unsuccessful or patient reports new pain   Implement non-pharmacological measures as appropriate and evaluate response   Assess pain using appropriate pain scale

## 2024-04-11 NOTE — ANESTHESIA PRE PROCEDURE
Department of Anesthesiology  Preprocedure Note       Name:  Dennys Peguero   Age:  43 y.o.  :  1980                                          MRN:  [id number]         Date:  2024      Surgeon: Surgeon(s):  Rakesh Montano MD    Procedure: Procedure(s):  ROBOTIC LOW ANTERIOR RESECTION WITH COLORECTAL ANASTOMOSIS    Medications prior to admission:   Prior to Admission medications    Medication Sig Start Date End Date Taking? Authorizing Provider   metroNIDAZOLE (FLAGYL) 500 MG tablet Take one tablet by mouth 3 times on the day prior to surgery. Take one tablet at 1pm, 2pm and 9pm. 24   Rakesh Montano MD   neomycin (MYCIFRADIN) 500 MG tablet Take two tablets 3 times the day before surgery. Take two tablets at 1pm, two tablets at 2pm and two tablets at 9pm. 24   Rakesh Montano MD   ondansetron (ZOFRAN-ODT) 4 MG disintegrating tablet Place 1 tablet under the tongue 3 times daily as needed for Nausea or Vomiting 24   Rakesh Montano MD   allopurinol (ZYLOPRIM) 300 MG tablet Take 1 tablet by mouth daily 4/3/24   Lei Tiwari MD   Hydrocerin (EUCERIN) CREA cream Apply topically 2 times daily 24   Lei Tiwari MD   pantoprazole (PROTONIX) 40 MG tablet Take 1 tablet by mouth 2 times daily (before meals) 24   Lei Tiwari MD   prochlorperazine (COMPAZINE) 10 MG tablet Take 1 tablet by mouth every 4 hours as needed (Nausea / Vomiting) 24   Lei Tiwari MD   valACYclovir (VALTREX) 500 MG tablet Take 1 tablet by mouth 2 times daily 24   Lei Tiwari MD   vitamin B-12 1000 MCG tablet Take 1 tablet by mouth daily 4/3/24   Lei Tiwari MD   fluconazole (DIFLUCAN) 200 MG tablet Take 1 tablet by mouth daily 4/3/24 6/2/24  Shanthi Flowers APRN - CNP   levoFLOXacin (LEVAQUIN) 500 MG tablet Take 1 tablet by mouth at bedtime 24  Little, Shanthi N, APRN - CNP   Venetoclax 100 MG TABS Take 1 tablet on day 1, take 2 tablets on day 2, then take 4 tablets starting on day 3 through day

## 2024-04-11 NOTE — PLAN OF CARE
Problem: Safety - Adult  Goal: Free from fall injury  4/11/2024 1731 by Merly Win RN  Outcome: Progressing  Orthostatic vital signs obtained at start of shift - see flowsheet for details.  Pt does not meet criteria for orthostasis.  Pt is a Med fall risk. See Apple Fall Score and ABCDS Injury Risk assessments.     - Screening for Orthostasis AND not a [place] Risk per APPLE/ABCDS: Pt bed is in low position, side rails up, call light and belongings are in reach.  Fall risk light is on outside pts room.  Pt encouraged to call for assistance as needed. Will continue with hourly rounds for PO intake, pain needs, toileting and repositioning as needed.      Problem: Hematologic - Adult  Goal: Maintains hematologic stability  Outcome: Progressing  Patient's hemoglobin this AM:   Recent Labs     04/11/24  1455   HGB 8.3*     Patient's platelet count this AM:   Recent Labs     04/11/24  1455   PLT 71*    Thrombocytopenia Precautions in place.  Patient showing no signs or symptoms of active bleeding.  Transfusion not indicated at this time.  Patient verbalizes understanding of all instructions. Will continue to assess and implement POC. Call light within reach and hourly rounding in place.

## 2024-04-11 NOTE — PERIOP NOTE
PRE-OP NOTE  Department of Surgery      Chief Complaint or Reason for Surgery: rectal bleeding    Procedure: robotic low anterior resection with colorectal anastomosis  Expected time: 04/12/2024 at 1430    Plan  1. Diet: NPO with sips of clears  2. IVF: Plasmalyte at 100 cc/hr  3. Antibiotics: Continue Zosyn  4. Labs to be drawn: CBC with auto-differential, renal panel, magnesium, type and screen, INR  5. Anesthesia: To see patient  6. Consent: Will obtain and consent  7. Pulmonary: CXR: Reviewed CXR from 04/09  8. Cardiac: EKG: Reviewed EKG from 04/09  9. Pregnancy test: N/A  10. DVT prophylaxis: Not required in the presence of active bleeding      Mirna Mclean DO  04/11/24  11:35 AM

## 2024-04-12 ENCOUNTER — ANESTHESIA (OUTPATIENT)
Dept: OPERATING ROOM | Age: 44
End: 2024-04-12
Payer: MEDICAID

## 2024-04-12 LAB
ACANTHOCYTES BLD QL SMEAR: ABNORMAL
ALBUMIN SERPL-MCNC: 3.6 G/DL (ref 3.4–5)
ALP SERPL-CCNC: 46 U/L (ref 40–129)
ALT SERPL-CCNC: 7 U/L (ref 10–40)
ANION GAP SERPL CALCULATED.3IONS-SCNC: 9 MMOL/L (ref 3–16)
ANISOCYTOSIS BLD QL SMEAR: ABNORMAL
AST SERPL-CCNC: 9 U/L (ref 15–37)
BASOPHILS # BLD: 0 K/UL (ref 0–0.2)
BASOPHILS NFR BLD: 0 %
BILIRUB DIRECT SERPL-MCNC: <0.2 MG/DL (ref 0–0.3)
BILIRUB INDIRECT SERPL-MCNC: ABNORMAL MG/DL (ref 0–1)
BILIRUB SERPL-MCNC: 1.1 MG/DL (ref 0–1)
BLOOD BANK DISPENSE STATUS: NORMAL
BLOOD BANK PRODUCT CODE: NORMAL
BPU ID: NORMAL
BUN SERPL-MCNC: 6 MG/DL (ref 7–20)
CALCIUM SERPL-MCNC: 9 MG/DL (ref 8.3–10.6)
CHLORIDE SERPL-SCNC: 104 MMOL/L (ref 99–110)
CO2 SERPL-SCNC: 24 MMOL/L (ref 21–32)
CREAT SERPL-MCNC: 1 MG/DL (ref 0.9–1.3)
DACRYOCYTES BLD QL SMEAR: ABNORMAL
DEPRECATED RDW RBC AUTO: 16 % (ref 12.4–15.4)
DEPRECATED RDW RBC AUTO: 16.4 % (ref 12.4–15.4)
DESCRIPTION BLOOD BANK: NORMAL
EOSINOPHIL # BLD: 0 K/UL (ref 0–0.6)
EOSINOPHIL NFR BLD: 2 %
FINAL REPORT: NORMAL
GFR SERPLBLD CREATININE-BSD FMLA CKD-EPI: >90 ML/MIN/{1.73_M2}
GLUCOSE SERPL-MCNC: 93 MG/DL (ref 70–99)
HCT VFR BLD AUTO: 19.7 % (ref 40.5–52.5)
HCT VFR BLD AUTO: 23.7 % (ref 40.5–52.5)
HGB BLD-MCNC: 6.9 G/DL (ref 13.5–17.5)
HGB BLD-MCNC: 8.1 G/DL (ref 13.5–17.5)
HYPOCHROMIA BLD QL SMEAR: ABNORMAL
INR PPP: 1.19 (ref 0.85–1.15)
LYMPHOCYTES # BLD: 1 K/UL (ref 1–5.1)
LYMPHOCYTES NFR BLD: 64 %
MACROCYTES BLD QL SMEAR: ABNORMAL
MAGNESIUM SERPL-MCNC: 1.7 MG/DL (ref 1.8–2.4)
MCH RBC QN AUTO: 27.3 PG (ref 26–34)
MCH RBC QN AUTO: 27.6 PG (ref 26–34)
MCHC RBC AUTO-ENTMCNC: 34.1 G/DL (ref 31–36)
MCHC RBC AUTO-ENTMCNC: 35.4 G/DL (ref 31–36)
MCV RBC AUTO: 77.3 FL (ref 80–100)
MCV RBC AUTO: 81.1 FL (ref 80–100)
MICROCYTES BLD QL SMEAR: ABNORMAL
MONOCYTES # BLD: 0.1 K/UL (ref 0–1.3)
MONOCYTES NFR BLD: 4 %
NEUTROPHILS # BLD: 0.5 K/UL (ref 1.7–7.7)
NEUTROPHILS NFR BLD: 30 %
OVALOCYTES BLD QL SMEAR: ABNORMAL
PHOSPHATE SERPL-MCNC: 4.7 MG/DL (ref 2.5–4.9)
PLATELET # BLD AUTO: 146 K/UL (ref 135–450)
PLATELET # BLD AUTO: 50 K/UL (ref 135–450)
PMV BLD AUTO: 10.1 FL (ref 5–10.5)
PMV BLD AUTO: 8.4 FL (ref 5–10.5)
POIKILOCYTOSIS BLD QL SMEAR: ABNORMAL
POLYCHROMASIA BLD QL SMEAR: ABNORMAL
POTASSIUM SERPL-SCNC: 3.7 MMOL/L (ref 3.5–5.1)
PRELIMINARY: NORMAL
PROT SERPL-MCNC: 5.7 G/DL (ref 6.4–8.2)
PROTHROMBIN TIME: 15.3 SEC (ref 11.9–14.9)
RBC # BLD AUTO: 2.54 M/UL (ref 4.2–5.9)
RBC # BLD AUTO: 2.93 M/UL (ref 4.2–5.9)
SCHISTOCYTES BLD QL SMEAR: ABNORMAL
SMUDGE CELLS BLD QL SMEAR: PRESENT
SODIUM SERPL-SCNC: 137 MMOL/L (ref 136–145)
SPHEROCYTES BLD QL SMEAR: ABNORMAL
STOMATOCYTES BLD QL SMEAR: ABNORMAL
TARGETS BLD QL SMEAR: ABNORMAL
URATE SERPL-MCNC: 4.1 MG/DL (ref 3.5–7.2)
WBC # BLD AUTO: 1.6 K/UL (ref 4–11)
WBC # BLD AUTO: 1.7 K/UL (ref 4–11)

## 2024-04-12 PROCEDURE — 2580000003 HC RX 258

## 2024-04-12 PROCEDURE — 84100 ASSAY OF PHOSPHORUS: CPT

## 2024-04-12 PROCEDURE — 0DTP4ZZ RESECTION OF RECTUM, PERCUTANEOUS ENDOSCOPIC APPROACH: ICD-10-PCS | Performed by: SURGERY

## 2024-04-12 PROCEDURE — 2580000003 HC RX 258: Performed by: SURGERY

## 2024-04-12 PROCEDURE — 80076 HEPATIC FUNCTION PANEL: CPT

## 2024-04-12 PROCEDURE — 64488 TAP BLOCK BI INJECTION: CPT | Performed by: ANESTHESIOLOGY

## 2024-04-12 PROCEDURE — 8E0W4CZ ROBOTIC ASSISTED PROCEDURE OF TRUNK REGION, PERCUTANEOUS ENDOSCOPIC APPROACH: ICD-10-PCS | Performed by: SURGERY

## 2024-04-12 PROCEDURE — 6360000002 HC RX W HCPCS

## 2024-04-12 PROCEDURE — 88309 TISSUE EXAM BY PATHOLOGIST: CPT

## 2024-04-12 PROCEDURE — 0D1N4ZP BYPASS SIGMOID COLON TO RECTUM, PERCUTANEOUS ENDOSCOPIC APPROACH: ICD-10-PCS | Performed by: SURGERY

## 2024-04-12 PROCEDURE — 85027 COMPLETE CBC AUTOMATED: CPT

## 2024-04-12 PROCEDURE — S2900 ROBOTIC SURGICAL SYSTEM: HCPCS | Performed by: SURGERY

## 2024-04-12 PROCEDURE — 2709999900 HC NON-CHARGEABLE SUPPLY: Performed by: SURGERY

## 2024-04-12 PROCEDURE — 0DTN0ZZ RESECTION OF SIGMOID COLON, OPEN APPROACH: ICD-10-PCS | Performed by: SURGERY

## 2024-04-12 PROCEDURE — 7100000000 HC PACU RECOVERY - FIRST 15 MIN: Performed by: SURGERY

## 2024-04-12 PROCEDURE — 7100000001 HC PACU RECOVERY - ADDTL 15 MIN: Performed by: SURGERY

## 2024-04-12 PROCEDURE — 3600000009 HC SURGERY ROBOT BASE: Performed by: SURGERY

## 2024-04-12 PROCEDURE — 2060000000 HC ICU INTERMEDIATE R&B

## 2024-04-12 PROCEDURE — 3700000000 HC ANESTHESIA ATTENDED CARE: Performed by: SURGERY

## 2024-04-12 PROCEDURE — 6360000002 HC RX W HCPCS: Performed by: ANESTHESIOLOGY

## 2024-04-12 PROCEDURE — 6360000002 HC RX W HCPCS: Performed by: STUDENT IN AN ORGANIZED HEALTH CARE EDUCATION/TRAINING PROGRAM

## 2024-04-12 PROCEDURE — 84550 ASSAY OF BLOOD/URIC ACID: CPT

## 2024-04-12 PROCEDURE — 2720000010 HC SURG SUPPLY STERILE: Performed by: SURGERY

## 2024-04-12 PROCEDURE — 80048 BASIC METABOLIC PNL TOTAL CA: CPT

## 2024-04-12 PROCEDURE — 6370000000 HC RX 637 (ALT 250 FOR IP): Performed by: STUDENT IN AN ORGANIZED HEALTH CARE EDUCATION/TRAINING PROGRAM

## 2024-04-12 PROCEDURE — 3600000019 HC SURGERY ROBOT ADDTL 15MIN: Performed by: SURGERY

## 2024-04-12 PROCEDURE — C1729 CATH, DRAINAGE: HCPCS | Performed by: SURGERY

## 2024-04-12 PROCEDURE — 2500000003 HC RX 250 WO HCPCS

## 2024-04-12 PROCEDURE — 6360000002 HC RX W HCPCS: Performed by: SURGERY

## 2024-04-12 PROCEDURE — 2580000003 HC RX 258: Performed by: NURSE PRACTITIONER

## 2024-04-12 PROCEDURE — 2580000003 HC RX 258: Performed by: STUDENT IN AN ORGANIZED HEALTH CARE EDUCATION/TRAINING PROGRAM

## 2024-04-12 PROCEDURE — 36592 COLLECT BLOOD FROM PICC: CPT

## 2024-04-12 PROCEDURE — 88305 TISSUE EXAM BY PATHOLOGIST: CPT

## 2024-04-12 PROCEDURE — 85610 PROTHROMBIN TIME: CPT

## 2024-04-12 PROCEDURE — P9045 ALBUMIN (HUMAN), 5%, 250 ML: HCPCS

## 2024-04-12 PROCEDURE — 6360000002 HC RX W HCPCS: Performed by: NURSE PRACTITIONER

## 2024-04-12 PROCEDURE — 3700000001 HC ADD 15 MINUTES (ANESTHESIA): Performed by: SURGERY

## 2024-04-12 PROCEDURE — 2500000003 HC RX 250 WO HCPCS: Performed by: SURGERY

## 2024-04-12 PROCEDURE — 36430 TRANSFUSION BLD/BLD COMPNT: CPT

## 2024-04-12 PROCEDURE — C9290 INJ, BUPIVACAINE LIPOSOME: HCPCS | Performed by: ANESTHESIOLOGY

## 2024-04-12 PROCEDURE — 2500000003 HC RX 250 WO HCPCS: Performed by: ANESTHESIOLOGY

## 2024-04-12 PROCEDURE — 44207 L COLECTOMY/COLOPROCTOSTOMY: CPT | Performed by: SURGERY

## 2024-04-12 PROCEDURE — 83735 ASSAY OF MAGNESIUM: CPT

## 2024-04-12 PROCEDURE — 85025 COMPLETE CBC W/AUTO DIFF WBC: CPT

## 2024-04-12 PROCEDURE — 3E0T3BZ INTRODUCTION OF ANESTHETIC AGENT INTO PERIPHERAL NERVES AND PLEXI, PERCUTANEOUS APPROACH: ICD-10-PCS | Performed by: ANESTHESIOLOGY

## 2024-04-12 RX ORDER — METRONIDAZOLE 500 MG/100ML
500 INJECTION, SOLUTION INTRAVENOUS
Status: DISCONTINUED | OUTPATIENT
Start: 2024-04-12 | End: 2024-04-12 | Stop reason: SDUPTHER

## 2024-04-12 RX ORDER — HYDROMORPHONE HYDROCHLORIDE 1 MG/ML
1 INJECTION, SOLUTION INTRAMUSCULAR; INTRAVENOUS; SUBCUTANEOUS
Status: DISCONTINUED | OUTPATIENT
Start: 2024-04-12 | End: 2024-04-17 | Stop reason: HOSPADM

## 2024-04-12 RX ORDER — SODIUM CHLORIDE 9 MG/ML
INJECTION, SOLUTION INTRAVENOUS PRN
Status: DISCONTINUED | OUTPATIENT
Start: 2024-04-12 | End: 2024-04-17 | Stop reason: HOSPADM

## 2024-04-12 RX ORDER — SODIUM CHLORIDE 0.9 % (FLUSH) 0.9 %
5-40 SYRINGE (ML) INJECTION EVERY 12 HOURS SCHEDULED
Status: DISCONTINUED | OUTPATIENT
Start: 2024-04-12 | End: 2024-04-12 | Stop reason: HOSPADM

## 2024-04-12 RX ORDER — NALOXONE HYDROCHLORIDE 0.4 MG/ML
INJECTION, SOLUTION INTRAMUSCULAR; INTRAVENOUS; SUBCUTANEOUS PRN
Status: DISCONTINUED | OUTPATIENT
Start: 2024-04-12 | End: 2024-04-12 | Stop reason: HOSPADM

## 2024-04-12 RX ORDER — HYDROMORPHONE HYDROCHLORIDE 1 MG/ML
0.5 INJECTION, SOLUTION INTRAMUSCULAR; INTRAVENOUS; SUBCUTANEOUS
Status: DISCONTINUED | OUTPATIENT
Start: 2024-04-12 | End: 2024-04-17 | Stop reason: HOSPADM

## 2024-04-12 RX ORDER — SODIUM CHLORIDE, SODIUM LACTATE, POTASSIUM CHLORIDE, CALCIUM CHLORIDE 600; 310; 30; 20 MG/100ML; MG/100ML; MG/100ML; MG/100ML
INJECTION, SOLUTION INTRAVENOUS CONTINUOUS PRN
Status: DISCONTINUED | OUTPATIENT
Start: 2024-04-12 | End: 2024-04-12 | Stop reason: SDUPTHER

## 2024-04-12 RX ORDER — FENTANYL CITRATE 50 UG/ML
25 INJECTION, SOLUTION INTRAMUSCULAR; INTRAVENOUS EVERY 5 MIN PRN
Status: DISCONTINUED | OUTPATIENT
Start: 2024-04-12 | End: 2024-04-12 | Stop reason: HOSPADM

## 2024-04-12 RX ORDER — ACETAMINOPHEN 500 MG
1000 TABLET ORAL EVERY 6 HOURS
Status: DISCONTINUED | OUTPATIENT
Start: 2024-04-12 | End: 2024-04-17 | Stop reason: HOSPADM

## 2024-04-12 RX ORDER — HYDROMORPHONE HYDROCHLORIDE 1 MG/ML
0.5 INJECTION, SOLUTION INTRAMUSCULAR; INTRAVENOUS; SUBCUTANEOUS EVERY 5 MIN PRN
Status: DISCONTINUED | OUTPATIENT
Start: 2024-04-12 | End: 2024-04-12

## 2024-04-12 RX ORDER — ACETAMINOPHEN 325 MG/1
1000 TABLET ORAL ONCE
Status: DISCONTINUED | OUTPATIENT
Start: 2024-04-12 | End: 2024-04-12 | Stop reason: SDUPTHER

## 2024-04-12 RX ORDER — FAMOTIDINE 10 MG/ML
INJECTION, SOLUTION INTRAVENOUS PRN
Status: DISCONTINUED | OUTPATIENT
Start: 2024-04-12 | End: 2024-04-12 | Stop reason: SDUPTHER

## 2024-04-12 RX ORDER — MAGNESIUM HYDROXIDE 1200 MG/15ML
LIQUID ORAL PRN
Status: DISCONTINUED | OUTPATIENT
Start: 2024-04-12 | End: 2024-04-12 | Stop reason: HOSPADM

## 2024-04-12 RX ORDER — METRONIDAZOLE 500 MG/100ML
500 INJECTION, SOLUTION INTRAVENOUS
Status: DISCONTINUED | OUTPATIENT
Start: 2024-04-12 | End: 2024-04-12 | Stop reason: HOSPADM

## 2024-04-12 RX ORDER — IPRATROPIUM BROMIDE AND ALBUTEROL SULFATE 2.5; .5 MG/3ML; MG/3ML
1 SOLUTION RESPIRATORY (INHALATION)
Status: DISCONTINUED | OUTPATIENT
Start: 2024-04-12 | End: 2024-04-12 | Stop reason: HOSPADM

## 2024-04-12 RX ORDER — BUPIVACAINE HYDROCHLORIDE 5 MG/ML
INJECTION, SOLUTION EPIDURAL; INTRACAUDAL PRN
Status: DISCONTINUED | OUTPATIENT
Start: 2024-04-12 | End: 2024-04-12 | Stop reason: SDUPTHER

## 2024-04-12 RX ORDER — ROCURONIUM BROMIDE 10 MG/ML
INJECTION, SOLUTION INTRAVENOUS PRN
Status: DISCONTINUED | OUTPATIENT
Start: 2024-04-12 | End: 2024-04-12 | Stop reason: SDUPTHER

## 2024-04-12 RX ORDER — LIDOCAINE HYDROCHLORIDE 20 MG/ML
INJECTION, SOLUTION INTRAVENOUS PRN
Status: DISCONTINUED | OUTPATIENT
Start: 2024-04-12 | End: 2024-04-12 | Stop reason: SDUPTHER

## 2024-04-12 RX ORDER — OXYCODONE HYDROCHLORIDE 5 MG/1
10 TABLET ORAL EVERY 4 HOURS PRN
Status: DISCONTINUED | OUTPATIENT
Start: 2024-04-12 | End: 2024-04-17 | Stop reason: HOSPADM

## 2024-04-12 RX ORDER — SODIUM CHLORIDE 0.9 % (FLUSH) 0.9 %
5-40 SYRINGE (ML) INJECTION PRN
Status: DISCONTINUED | OUTPATIENT
Start: 2024-04-12 | End: 2024-04-12 | Stop reason: HOSPADM

## 2024-04-12 RX ORDER — ENOXAPARIN SODIUM 100 MG/ML
40 INJECTION SUBCUTANEOUS ONCE
Status: DISCONTINUED | OUTPATIENT
Start: 2024-04-12 | End: 2024-04-12 | Stop reason: HOSPADM

## 2024-04-12 RX ORDER — OXYCODONE HYDROCHLORIDE 5 MG/1
5 TABLET ORAL EVERY 4 HOURS PRN
Status: DISCONTINUED | OUTPATIENT
Start: 2024-04-12 | End: 2024-04-17 | Stop reason: HOSPADM

## 2024-04-12 RX ORDER — SODIUM CHLORIDE 9 MG/ML
INJECTION, SOLUTION INTRAVENOUS PRN
Status: DISCONTINUED | OUTPATIENT
Start: 2024-04-12 | End: 2024-04-12 | Stop reason: HOSPADM

## 2024-04-12 RX ORDER — ONDANSETRON 2 MG/ML
4 INJECTION INTRAMUSCULAR; INTRAVENOUS
Status: DISCONTINUED | OUTPATIENT
Start: 2024-04-12 | End: 2024-04-12 | Stop reason: HOSPADM

## 2024-04-12 RX ORDER — ACETAMINOPHEN 500 MG
1000 TABLET ORAL ONCE
Status: DISCONTINUED | OUTPATIENT
Start: 2024-04-12 | End: 2024-04-12 | Stop reason: HOSPADM

## 2024-04-12 RX ORDER — ONDANSETRON 2 MG/ML
INJECTION INTRAMUSCULAR; INTRAVENOUS PRN
Status: DISCONTINUED | OUTPATIENT
Start: 2024-04-12 | End: 2024-04-12 | Stop reason: SDUPTHER

## 2024-04-12 RX ORDER — KETAMINE HCL IN NACL, ISO-OSM 20 MG/2 ML
SYRINGE (ML) INJECTION PRN
Status: DISCONTINUED | OUTPATIENT
Start: 2024-04-12 | End: 2024-04-12 | Stop reason: SDUPTHER

## 2024-04-12 RX ORDER — PROPOFOL 10 MG/ML
INJECTION, EMULSION INTRAVENOUS PRN
Status: DISCONTINUED | OUTPATIENT
Start: 2024-04-12 | End: 2024-04-12 | Stop reason: SDUPTHER

## 2024-04-12 RX ORDER — ENOXAPARIN SODIUM 100 MG/ML
40 INJECTION SUBCUTANEOUS ONCE
Status: DISCONTINUED | OUTPATIENT
Start: 2024-04-12 | End: 2024-04-12 | Stop reason: SDUPTHER

## 2024-04-12 RX ORDER — LORAZEPAM 2 MG/ML
0.5 INJECTION INTRAMUSCULAR
Status: DISCONTINUED | OUTPATIENT
Start: 2024-04-12 | End: 2024-04-12 | Stop reason: HOSPADM

## 2024-04-12 RX ORDER — MEPERIDINE HYDROCHLORIDE 25 MG/ML
12.5 INJECTION INTRAMUSCULAR; INTRAVENOUS; SUBCUTANEOUS EVERY 5 MIN PRN
Status: DISCONTINUED | OUTPATIENT
Start: 2024-04-12 | End: 2024-04-12 | Stop reason: HOSPADM

## 2024-04-12 RX ORDER — LABETALOL HYDROCHLORIDE 5 MG/ML
10 INJECTION, SOLUTION INTRAVENOUS
Status: DISCONTINUED | OUTPATIENT
Start: 2024-04-12 | End: 2024-04-12 | Stop reason: HOSPADM

## 2024-04-12 RX ORDER — INDOCYANINE GREEN AND WATER 25 MG
KIT INJECTION PRN
Status: DISCONTINUED | OUTPATIENT
Start: 2024-04-12 | End: 2024-04-12 | Stop reason: SDUPTHER

## 2024-04-12 RX ORDER — SUCCINYLCHOLINE/SOD CL,ISO/PF 200MG/10ML
SYRINGE (ML) INTRAVENOUS PRN
Status: DISCONTINUED | OUTPATIENT
Start: 2024-04-12 | End: 2024-04-12 | Stop reason: SDUPTHER

## 2024-04-12 RX ORDER — CEFAZOLIN SODIUM 1 G/3ML
INJECTION, POWDER, FOR SOLUTION INTRAMUSCULAR; INTRAVENOUS PRN
Status: DISCONTINUED | OUTPATIENT
Start: 2024-04-12 | End: 2024-04-12 | Stop reason: SDUPTHER

## 2024-04-12 RX ORDER — HYDROMORPHONE HYDROCHLORIDE 1 MG/ML
0.5 INJECTION, SOLUTION INTRAMUSCULAR; INTRAVENOUS; SUBCUTANEOUS EVERY 5 MIN PRN
Status: COMPLETED | OUTPATIENT
Start: 2024-04-12 | End: 2024-04-12

## 2024-04-12 RX ORDER — DIPHENHYDRAMINE HYDROCHLORIDE 50 MG/ML
12.5 INJECTION INTRAMUSCULAR; INTRAVENOUS
Status: DISCONTINUED | OUTPATIENT
Start: 2024-04-12 | End: 2024-04-12 | Stop reason: HOSPADM

## 2024-04-12 RX ORDER — MIDAZOLAM HYDROCHLORIDE 1 MG/ML
INJECTION INTRAMUSCULAR; INTRAVENOUS PRN
Status: DISCONTINUED | OUTPATIENT
Start: 2024-04-12 | End: 2024-04-12 | Stop reason: SDUPTHER

## 2024-04-12 RX ORDER — PROCHLORPERAZINE EDISYLATE 5 MG/ML
5 INJECTION INTRAMUSCULAR; INTRAVENOUS
Status: COMPLETED | OUTPATIENT
Start: 2024-04-12 | End: 2024-04-12

## 2024-04-12 RX ORDER — FENTANYL CITRATE 50 UG/ML
INJECTION, SOLUTION INTRAMUSCULAR; INTRAVENOUS PRN
Status: DISCONTINUED | OUTPATIENT
Start: 2024-04-12 | End: 2024-04-12 | Stop reason: SDUPTHER

## 2024-04-12 RX ORDER — DEXAMETHASONE SODIUM PHOSPHATE 4 MG/ML
INJECTION, SOLUTION INTRA-ARTICULAR; INTRALESIONAL; INTRAMUSCULAR; INTRAVENOUS; SOFT TISSUE PRN
Status: DISCONTINUED | OUTPATIENT
Start: 2024-04-12 | End: 2024-04-12 | Stop reason: SDUPTHER

## 2024-04-12 RX ORDER — ESMOLOL HYDROCHLORIDE 10 MG/ML
INJECTION INTRAVENOUS PRN
Status: DISCONTINUED | OUTPATIENT
Start: 2024-04-12 | End: 2024-04-12 | Stop reason: SDUPTHER

## 2024-04-12 RX ORDER — ALBUMIN, HUMAN INJ 5% 5 %
SOLUTION INTRAVENOUS PRN
Status: DISCONTINUED | OUTPATIENT
Start: 2024-04-12 | End: 2024-04-12 | Stop reason: SDUPTHER

## 2024-04-12 RX ORDER — SODIUM CHLORIDE, SODIUM LACTATE, POTASSIUM CHLORIDE, AND CALCIUM CHLORIDE .6; .31; .03; .02 G/100ML; G/100ML; G/100ML; G/100ML
IRRIGANT IRRIGATION PRN
Status: DISCONTINUED | OUTPATIENT
Start: 2024-04-12 | End: 2024-04-12 | Stop reason: HOSPADM

## 2024-04-12 RX ORDER — METRONIDAZOLE 500 MG/100ML
INJECTION, SOLUTION INTRAVENOUS PRN
Status: DISCONTINUED | OUTPATIENT
Start: 2024-04-12 | End: 2024-04-12 | Stop reason: SDUPTHER

## 2024-04-12 RX ADMIN — PIPERACILLIN AND TAZOBACTAM 4500 MG: 4; .5 INJECTION, POWDER, LYOPHILIZED, FOR SOLUTION INTRAVENOUS at 18:40

## 2024-04-12 RX ADMIN — METHOCARBAMOL 500 MG: 100 INJECTION INTRAMUSCULAR; INTRAVENOUS at 18:59

## 2024-04-12 RX ADMIN — SODIUM CHLORIDE, SODIUM LACTATE, POTASSIUM CHLORIDE, AND CALCIUM CHLORIDE: .6; .31; .03; .02 INJECTION, SOLUTION INTRAVENOUS at 16:15

## 2024-04-12 RX ADMIN — BUPIVACAINE 10 MG: 13.3 INJECTION, SUSPENSION, LIPOSOMAL INFILTRATION at 16:59

## 2024-04-12 RX ADMIN — PIPERACILLIN AND TAZOBACTAM 4500 MG: 4; .5 INJECTION, POWDER, LYOPHILIZED, FOR SOLUTION INTRAVENOUS at 09:23

## 2024-04-12 RX ADMIN — OXYCODONE 10 MG: 5 TABLET ORAL at 18:37

## 2024-04-12 RX ADMIN — SODIUM CHLORIDE, PRESERVATIVE FREE 10 ML: 5 INJECTION INTRAVENOUS at 08:22

## 2024-04-12 RX ADMIN — DEXMEDETOMIDINE HYDROCHLORIDE 4 MCG: 100 INJECTION, SOLUTION INTRAVENOUS at 15:38

## 2024-04-12 RX ADMIN — SODIUM CHLORIDE, SODIUM LACTATE, POTASSIUM CHLORIDE, CALCIUM CHLORIDE: 600; 310; 30; 20 INJECTION, SOLUTION INTRAVENOUS at 13:43

## 2024-04-12 RX ADMIN — ENOXAPARIN SODIUM 40 MG: 100 INJECTION SUBCUTANEOUS at 13:10

## 2024-04-12 RX ADMIN — ACETAMINOPHEN 1000 MG: 325 TABLET ORAL at 18:37

## 2024-04-12 RX ADMIN — PROCHLORPERAZINE EDISYLATE 5 MG: 5 INJECTION INTRAMUSCULAR; INTRAVENOUS at 17:27

## 2024-04-12 RX ADMIN — ROCURONIUM BROMIDE 20 MG: 10 INJECTION, SOLUTION INTRAVENOUS at 16:17

## 2024-04-12 RX ADMIN — ONDANSETRON 4 MG: 2 INJECTION INTRAMUSCULAR; INTRAVENOUS at 13:43

## 2024-04-12 RX ADMIN — Medication 140 MG: at 13:37

## 2024-04-12 RX ADMIN — FLUCONAZOLE 200 MG: 200 TABLET ORAL at 08:22

## 2024-04-12 RX ADMIN — CEFAZOLIN SODIUM 2 G: 1 POWDER, FOR SOLUTION INTRAMUSCULAR; INTRAVENOUS at 14:06

## 2024-04-12 RX ADMIN — VALACYCLOVIR HYDROCHLORIDE 500 MG: 500 TABLET, FILM COATED ORAL at 08:22

## 2024-04-12 RX ADMIN — ROCURONIUM BROMIDE 10 MG: 10 INJECTION, SOLUTION INTRAVENOUS at 13:37

## 2024-04-12 RX ADMIN — METRONIDAZOLE 500 MG: 500 INJECTION, SOLUTION INTRAVENOUS at 14:06

## 2024-04-12 RX ADMIN — BUPIVACAINE HYDROCHLORIDE 15 ML: 5 INJECTION, SOLUTION EPIDURAL; INTRACAUDAL; PERINEURAL at 17:03

## 2024-04-12 RX ADMIN — BUPIVACAINE HYDROCHLORIDE 15 ML: 5 INJECTION, SOLUTION EPIDURAL; INTRACAUDAL; PERINEURAL at 16:59

## 2024-04-12 RX ADMIN — HYDROMORPHONE HYDROCHLORIDE 0.5 MG: 1 INJECTION, SOLUTION INTRAMUSCULAR; INTRAVENOUS; SUBCUTANEOUS at 17:52

## 2024-04-12 RX ADMIN — BUPIVACAINE 10 MG: 13.3 INJECTION, SUSPENSION, LIPOSOMAL INFILTRATION at 17:03

## 2024-04-12 RX ADMIN — OXYCODONE 10 MG: 5 TABLET ORAL at 23:53

## 2024-04-12 RX ADMIN — INDOCYANINE GREEN AND WATER 7.5 MG: KIT at 15:46

## 2024-04-12 RX ADMIN — SODIUM CHLORIDE, SODIUM GLUCONATE, SODIUM ACETATE, POTASSIUM CHLORIDE AND MAGNESIUM CHLORIDE 100 ML/HR: 526; 502; 368; 37; 30 INJECTION, SOLUTION INTRAVENOUS at 01:22

## 2024-04-12 RX ADMIN — FAMOTIDINE 20 MG: 10 INJECTION, SOLUTION INTRAVENOUS at 13:46

## 2024-04-12 RX ADMIN — MIDAZOLAM HYDROCHLORIDE 2 MG: 2 INJECTION, SOLUTION INTRAMUSCULAR; INTRAVENOUS at 13:28

## 2024-04-12 RX ADMIN — SUGAMMADEX 200 MG: 100 INJECTION, SOLUTION INTRAVENOUS at 17:03

## 2024-04-12 RX ADMIN — ROCURONIUM BROMIDE 40 MG: 10 INJECTION, SOLUTION INTRAVENOUS at 13:42

## 2024-04-12 RX ADMIN — HYDROMORPHONE HYDROCHLORIDE 0.5 MG: 1 INJECTION, SOLUTION INTRAMUSCULAR; INTRAVENOUS; SUBCUTANEOUS at 17:37

## 2024-04-12 RX ADMIN — LEVETIRACETAM 1500 MG: 500 TABLET, FILM COATED ORAL at 20:49

## 2024-04-12 RX ADMIN — PIPERACILLIN AND TAZOBACTAM 4500 MG: 4; .5 INJECTION, POWDER, LYOPHILIZED, FOR SOLUTION INTRAVENOUS at 01:17

## 2024-04-12 RX ADMIN — HYDROMORPHONE HYDROCHLORIDE 1 MG: 1 INJECTION, SOLUTION INTRAMUSCULAR; INTRAVENOUS; SUBCUTANEOUS at 20:49

## 2024-04-12 RX ADMIN — FENTANYL CITRATE 50 MCG: 50 INJECTION, SOLUTION INTRAMUSCULAR; INTRAVENOUS at 13:37

## 2024-04-12 RX ADMIN — DEXMEDETOMIDINE HYDROCHLORIDE 4 MCG: 100 INJECTION, SOLUTION INTRAVENOUS at 15:26

## 2024-04-12 RX ADMIN — DEXMEDETOMIDINE HYDROCHLORIDE 4 MCG: 100 INJECTION, SOLUTION INTRAVENOUS at 15:42

## 2024-04-12 RX ADMIN — ROCURONIUM BROMIDE 30 MG: 10 INJECTION, SOLUTION INTRAVENOUS at 15:16

## 2024-04-12 RX ADMIN — DEXMEDETOMIDINE HYDROCHLORIDE 4 MCG: 100 INJECTION, SOLUTION INTRAVENOUS at 15:18

## 2024-04-12 RX ADMIN — PROPOFOL 150 MG: 10 INJECTION, EMULSION INTRAVENOUS at 13:37

## 2024-04-12 RX ADMIN — SODIUM CHLORIDE, SODIUM GLUCONATE, SODIUM ACETATE, POTASSIUM CHLORIDE AND MAGNESIUM CHLORIDE 100 ML/HR: 526; 502; 368; 37; 30 INJECTION, SOLUTION INTRAVENOUS at 19:28

## 2024-04-12 RX ADMIN — FENTANYL CITRATE 50 MCG: 50 INJECTION, SOLUTION INTRAMUSCULAR; INTRAVENOUS at 14:10

## 2024-04-12 RX ADMIN — ONDANSETRON 4 MG: 2 INJECTION INTRAMUSCULAR; INTRAVENOUS at 16:44

## 2024-04-12 RX ADMIN — DEXAMETHASONE SODIUM PHOSPHATE 8 MG: 4 INJECTION INTRA-ARTICULAR; INTRALESIONAL; INTRAMUSCULAR; INTRAVENOUS; SOFT TISSUE at 13:43

## 2024-04-12 RX ADMIN — SODIUM CHLORIDE, SODIUM LACTATE, POTASSIUM CHLORIDE, AND CALCIUM CHLORIDE: .6; .31; .03; .02 INJECTION, SOLUTION INTRAVENOUS at 13:28

## 2024-04-12 RX ADMIN — ROCURONIUM BROMIDE 30 MG: 10 INJECTION, SOLUTION INTRAVENOUS at 14:14

## 2024-04-12 RX ADMIN — PANTOPRAZOLE SODIUM 40 MG: 40 TABLET, DELAYED RELEASE ORAL at 18:37

## 2024-04-12 RX ADMIN — ESMOLOL HYDROCHLORIDE 20 MG: 10 INJECTION, SOLUTION INTRAVENOUS at 14:49

## 2024-04-12 RX ADMIN — ALBUMIN (HUMAN) 12.5 G: 12.5 SOLUTION INTRAVENOUS at 13:43

## 2024-04-12 RX ADMIN — DEXMEDETOMIDINE HYDROCHLORIDE 4 MCG: 100 INJECTION, SOLUTION INTRAVENOUS at 15:33

## 2024-04-12 RX ADMIN — Medication 20 MG: at 14:15

## 2024-04-12 RX ADMIN — LIDOCAINE HYDROCHLORIDE 100 MG: 20 INJECTION, SOLUTION INTRAVENOUS at 13:37

## 2024-04-12 RX ADMIN — FENTANYL CITRATE 25 MCG: 50 INJECTION INTRAMUSCULAR; INTRAVENOUS at 17:32

## 2024-04-12 RX ADMIN — SODIUM CHLORIDE, SODIUM GLUCONATE, SODIUM ACETATE, POTASSIUM CHLORIDE AND MAGNESIUM CHLORIDE 100 ML/HR: 526; 502; 368; 37; 30 INJECTION, SOLUTION INTRAVENOUS at 11:17

## 2024-04-12 RX ADMIN — SODIUM CHLORIDE, SODIUM LACTATE, POTASSIUM CHLORIDE, CALCIUM CHLORIDE: 600; 310; 30; 20 INJECTION, SOLUTION INTRAVENOUS at 14:29

## 2024-04-12 RX ADMIN — CYANOCOBALAMIN TAB 1000 MCG 1000 MCG: 1000 TAB at 08:22

## 2024-04-12 RX ADMIN — ESMOLOL HYDROCHLORIDE 20 MG: 10 INJECTION, SOLUTION INTRAVENOUS at 15:45

## 2024-04-12 RX ADMIN — DEXMEDETOMIDINE HYDROCHLORIDE 4 MCG: 100 INJECTION, SOLUTION INTRAVENOUS at 15:45

## 2024-04-12 RX ADMIN — LEVETIRACETAM 1500 MG: 500 TABLET, FILM COATED ORAL at 08:22

## 2024-04-12 RX ADMIN — VALACYCLOVIR HYDROCHLORIDE 500 MG: 500 TABLET, FILM COATED ORAL at 20:49

## 2024-04-12 RX ADMIN — SODIUM CHLORIDE, PRESERVATIVE FREE 10 ML: 5 INJECTION INTRAVENOUS at 20:51

## 2024-04-12 RX ADMIN — ESMOLOL HYDROCHLORIDE 20 MG: 10 INJECTION, SOLUTION INTRAVENOUS at 14:25

## 2024-04-12 ASSESSMENT — PAIN - FUNCTIONAL ASSESSMENT
PAIN_FUNCTIONAL_ASSESSMENT: PREVENTS OR INTERFERES SOME ACTIVE ACTIVITIES AND ADLS

## 2024-04-12 ASSESSMENT — PAIN SCALES - GENERAL
PAINLEVEL_OUTOF10: 10
PAINLEVEL_OUTOF10: 7
PAINLEVEL_OUTOF10: 0
PAINLEVEL_OUTOF10: 7
PAINLEVEL_OUTOF10: 5
PAINLEVEL_OUTOF10: 0
PAINLEVEL_OUTOF10: 6
PAINLEVEL_OUTOF10: 0
PAINLEVEL_OUTOF10: 0

## 2024-04-12 ASSESSMENT — PAIN DESCRIPTION - PAIN TYPE
TYPE: SURGICAL PAIN

## 2024-04-12 ASSESSMENT — PAIN DESCRIPTION - ORIENTATION
ORIENTATION: LOWER
ORIENTATION: INNER
ORIENTATION: LOWER
ORIENTATION: OUTER

## 2024-04-12 ASSESSMENT — PAIN DESCRIPTION - FREQUENCY
FREQUENCY: CONTINUOUS

## 2024-04-12 ASSESSMENT — PAIN DESCRIPTION - LOCATION
LOCATION: ABDOMEN

## 2024-04-12 ASSESSMENT — PAIN DESCRIPTION - DESCRIPTORS
DESCRIPTORS: SHARP
DESCRIPTORS: DISCOMFORT
DESCRIPTORS: SHARP
DESCRIPTORS: ACHING
DESCRIPTORS: DISCOMFORT

## 2024-04-12 ASSESSMENT — PAIN DESCRIPTION - ONSET
ONSET: ON-GOING

## 2024-04-12 NOTE — FLOWSHEET NOTE
Procedure(s):  ROBOTIC LOW ANTERIOR RESECTION WITH COLORECTAL ANASTOMOSIS      Admitted to PACU bed 10 from OR. Arrived on a hospital bed .  Attached to PACU monitoring system. Alarms and parameters set. Report received from anesthesia personnel and OR staff.OR staff did not report skin issues that were observed while in OR.     Dressing to abdomen c/d/I. Ice pack applied       04/12/24 1714   Vital Signs   Temp 97.1 °F (36.2 °C)   Temp Source Temporal   Pulse 99   Heart Rate Source Monitor   Respirations 20   BP (!) 131/90   MAP (Calculated) 104   MAP (mmHg) 103   Patient Position Semi fowlers   Pain Assessment   Pain Assessment Face, Legs, Activity, Cry, and Consolability (FLACC)   Pain Level 0   Faces, Legs, Activity, Cry, and Consolability (FLACC)   Face (F) 0   Legs (L) 0   Activity (A) 0   Cry (C) 0   Consolability (C) 0   FLACC Score  0   Opioid-Induced Sedation   POSS Score (!) 3   Oxygen Therapy   SpO2 100 %   O2 Device Nasal cannula   O2 Flow Rate (L/min) 3 L/min           Intake/Output Summary (Last 24 hours) at 4/12/2024 1720  Last data filed at 4/12/2024 1716  Gross per 24 hour   Intake 3792.08 ml   Output 1800 ml   Net 1992.08 ml

## 2024-04-12 NOTE — FLOWSHEET NOTE
PACU Transfer to Floor Note    Procedure(s):  ROBOTIC LOW ANTERIOR RESECTION WITH COLORECTAL ANASTOMOSIS      Pt meets criteria as per Lopez Score and ASPAN Standards to transfer to next phase of care.     Phone report given d/t pt returning to room on BCC.       04/12/24 1800   Vital Signs   Pulse 87   Respirations 16   /89   MAP (Calculated) 102   MAP (mmHg) 101   Pain Assessment   Pain Assessment 0-10   Pain Level 5   Patient's Stated Pain Goal 4   Pain Location Abdomen   Pain Orientation Lower   Response to Pain Intervention Patient satisfied   Opioid-Induced Sedation   POSS Score S   Oxygen Therapy   SpO2 99 %   O2 Device None (Room air)           In: 6172.1 [P.O.:2060; I.V.:2880; Blood:733.1]  Out: 2450       Pt taken to # [id number] via  hospital bed  by Tech Keirra

## 2024-04-12 NOTE — ANESTHESIA POSTPROCEDURE EVALUATION
Department of Anesthesiology  Postprocedure Note    Patient: Dennys Peguero  MRN: [id number]  YOB: 1980  Date of evaluation: 4/12/2024    Procedure Summary       Date: 04/12/24 Room / Location: [address]    Anesthesia Start: 1328 Anesthesia Stop: 1714    Procedure: ROBOTIC LOW ANTERIOR RESECTION WITH COLORECTAL ANASTOMOSIS (Abdomen/Perineum) Diagnosis:       Rectal cancer (HCC)      (Rectal cancer (HCC) [C20])    Surgeons: Rakesh Montano MD Responsible Provider: Mandeep Restrepo MD    Anesthesia Type: general ASA Status: 4            Anesthesia Type: No value filed.    Lopez Phase I: Lopez Score: 9    Lopez Phase II:      Anesthesia Post Evaluation    Patient location during evaluation: PACU  Patient participation: complete - patient participated  Level of consciousness: awake and alert  Airway patency: patent  Nausea & Vomiting: no nausea and no vomiting  Cardiovascular status: hemodynamically stable  Respiratory status: acceptable  Hydration status: euvolemic  Multimodal analgesia pain management approach  Pain management: satisfactory to patient    No notable events documented.

## 2024-04-12 NOTE — BRIEF OP NOTE
Brief Postoperative Note      Patient: Dennys Peguero  YOB: 1980  MRN: [id number]    Date of Procedure: 4/12/2024    Pre-Op Diagnosis Codes:     * Rectal cancer (HCC) [C20]    Post-Op Diagnosis: Same       Procedure(s):  ROBOTIC LOW ANTERIOR RESECTION WITH COLORECTAL ANASTOMOSIS    Surgeon(s):  Rakesh oMntano MD    Assistant:  Surgical Assistant: [place] Derek  Resident: Julia Zaldivar DO    Anesthesia: General    Estimated Blood Loss (mL): less than 50     Complications: None    Specimens:   ID Type Source Tests Collected by Time Destination   A : DISTAL RING Tissue Tissue SURGICAL PATHOLOGY Rakesh Montano MD 4/12/2024 1621    B : LOW ANTERIOR RESECTION Tissue Tissue SURGICAL PATHOLOGY Rakesh Montano MD 4/12/2024 1626        Implants:  * No implants in log *      Drains:   Urinary Catheter 04/12/24 2 Way (Active)       Findings:  Infection Present At Time Of Surgery (PATOS) (choose all levels that have infection present):  No infection present  Other Findings: none    Colon Resection  Operation performed with curative intent Yes   Tumor Location (select all that apply) Rectum, NOS   Extent of colon and vascular resection (select all that apply) Other - FANNY, IMV       Electronically signed by Rakesh Montano MD on 4/12/2024 at 4:32 PM

## 2024-04-12 NOTE — BRIEF OP NOTE
Brief Postoperative Note      Patient: Dennys Peguero  YOB: 1980  MRN: [id number]    Date of Procedure: 4/12/2024    Pre-Op Diagnosis Codes:     * Rectal cancer (HCC) [C20]    Post-Op Diagnosis: Same       Procedure(s):  ROBOTIC LOW ANTERIOR RESECTION WITH COLORECTAL ANASTOMOSIS    Surgeon(s):  Rakesh Montano MD    Assistant:  Surgical Assistant: [place] Derek  Resident: Julia Zaldivar DO    Anesthesia: General    Estimated Blood Loss (mL): less than 50     Complications: None    Specimens:   ID Type Source Tests Collected by Time Destination   A : DISTAL RING Tissue Tissue SURGICAL PATHOLOGY Rakesh Montano MD 4/12/2024 1621    B : LOW ANTERIOR RESECTION Tissue Tissue SURGICAL PATHOLOGY Rakesh Montano MD 4/12/2024 1626        Implants:  * No implants in log *      Drains:   Urinary Catheter 04/12/24 2 Way (Active)       Findings:  Infection Present At Time Of Surgery (PATOS) (choose all levels that have infection present):  No infection present  Other Findings: None  Colon Resection  Operation performed with curative intent Yes   Tumor Location (select all that apply) Rectum, NOS   Extent of colon and vascular resection (select all that apply) Sigmoid resection - inferior mesenteric      Negative leak test, intact donuts x 2, incisions closed with surgical grade glue.  Patient is okay for regular diet if tolerated  Patient getting TAP block post-operatively  [place] in place until AM    Electronically signed by Julia Zaldivar DO on 4/12/2024 at 4:50 PM

## 2024-04-12 NOTE — ANESTHESIA PROCEDURE NOTES
Peripheral Block    Patient location during procedure: pre-op  Reason for block: post-op pain management and at surgeon's request  Start time: 4/12/2024 4:55 PM  End time: 4/12/2024 5:03 PM  Staffing  Performed: anesthesiologist and resident/CRNA   Anesthesiologist: Mandeep Restrepo MD  Resident/CRNA: Leonor Giles APRN - CRNA  Performed by: Mandeep Restrepo MD  Authorized by: Mandeep Restrepo MD    Preanesthetic Checklist  Completed: patient identified, IV checked, site marked, risks and benefits discussed, surgical/procedural consents, equipment checked, pre-op evaluation, timeout performed, anesthesia consent given, oxygen available and monitors applied/VS acknowledged  Peripheral Block   Patient position: supine  Prep: ChloraPrep  Provider prep: mask and sterile gloves  Patient monitoring: cardiac monitor, continuous pulse ox, frequent blood pressure checks and IV access  Block type: TAP  Laterality: bilateral  Injection technique: single-shot  Guidance: ultrasound guided  Local infiltration: lidocaine  Infiltration strength: 1 %  Local infiltration: lidocaine  Dose: 3 mL    Needle   Needle type: insulated echogenic nerve stimulator needle   Needle gauge: 21 G  Needle localization: ultrasound guidance  Needle length: 10 cm  Assessment   Injection assessment: negative aspiration for heme, no paresthesia on injection and local visualized surrounding nerve on ultrasound  Paresthesia pain: none  Slow fractionated injection: yes  Hemodynamics: stable  Outcomes: uncomplicated and patient tolerated procedure well    Additional Notes  Immediately prior to procedure a \"time out\" was called to verify the correct patient, allergies, laterality, procedure and equipment. Time out performed with Mimi CADENA    Local Anesthetic: 0.5 %  Bupivacaine   Amount: 30 ml  in 5 ml increments after negative aspiration each time. Exparel 20 ml added rto block    External Oblique muscle, Internal Oblique muscle, Transversus

## 2024-04-12 NOTE — PLAN OF CARE
Problem: Safety - Adult  Goal: Free from fall injury  Outcome: Progressing  Orthostatic vital signs obtained at start of shift - see flowsheet for details.  Pt does not meet criteria for orthostasis.  Pt is a Med fall risk. See Apple Fall Score and ABCDS Injury Risk assessments.     - Screening for Orthostasis AND not a [place] Risk per APPLE/ABCDS: Pt bed is in low position, side rails up, call light and belongings are in reach.  Fall risk light is on outside pts room.  Pt encouraged to call for assistance as needed. Will continue with hourly rounds for PO intake, pain needs, toileting and repositioning as needed.      Problem: Pain  Goal: Verbalizes/displays adequate comfort level or baseline comfort level  Outcome: Progressing- no complaints of pain     Problem: Hematologic - Adult  Goal: Maintains hematologic stability  Outcome: Progressing  Patient's hemoglobin this AM:   Recent Labs     04/12/24  1213   HGB 8.1*     Patient's platelet count this AM:   Recent Labs     04/12/24  1213       Thrombocytopenia not present at this time.  Patient showing no signs or symptoms of active bleeding.  Patient transfused blood products per orders - see flowsheet.  Patient verbalizes understanding of all instructions. Will continue to assess and implement POC. Call light within reach and hourly rounding in place.

## 2024-04-13 LAB
ACANTHOCYTES BLD QL SMEAR: ABNORMAL
ANION GAP SERPL CALCULATED.3IONS-SCNC: 11 MMOL/L (ref 3–16)
ANISOCYTOSIS BLD QL SMEAR: ABNORMAL
ANISOCYTOSIS BLD QL SMEAR: ABNORMAL
BACTERIA BLD CULT ORG #2: NORMAL
BACTERIA BLD CULT: NORMAL
BACTERIA THROAT AEROBE CULT: NORMAL
BASOPHILS # BLD: 0 K/UL (ref 0–0.2)
BASOPHILS # BLD: 0 K/UL (ref 0–0.2)
BASOPHILS NFR BLD: 0 %
BASOPHILS NFR BLD: 0 %
BLASTS NFR BLD MANUAL: 1 %
BLASTS NFR BLD MANUAL: 6 %
BLOOD BANK DISPENSE STATUS: NORMAL
BLOOD BANK PRODUCT CODE: NORMAL
BPU ID: NORMAL
BUN SERPL-MCNC: 5 MG/DL (ref 7–20)
BURR CELLS BLD QL SMEAR: ABNORMAL
BURR CELLS BLD QL SMEAR: ABNORMAL
CALCIUM SERPL-MCNC: 8.6 MG/DL (ref 8.3–10.6)
CHLORIDE SERPL-SCNC: 103 MMOL/L (ref 99–110)
CO2 SERPL-SCNC: 25 MMOL/L (ref 21–32)
CREAT SERPL-MCNC: 0.9 MG/DL (ref 0.9–1.3)
DACRYOCYTES BLD QL SMEAR: ABNORMAL
DEPRECATED RDW RBC AUTO: 15.9 % (ref 12.4–15.4)
DEPRECATED RDW RBC AUTO: 16.7 % (ref 12.4–15.4)
DESCRIPTION BLOOD BANK: NORMAL
EOSINOPHIL # BLD: 0.1 K/UL (ref 0–0.6)
EOSINOPHIL # BLD: 0.1 K/UL (ref 0–0.6)
EOSINOPHIL NFR BLD: 3 %
EOSINOPHIL NFR BLD: 4 %
GFR SERPLBLD CREATININE-BSD FMLA CKD-EPI: >90 ML/MIN/{1.73_M2}
GLUCOSE SERPL-MCNC: 97 MG/DL (ref 70–99)
HCT VFR BLD AUTO: 23.1 % (ref 40.5–52.5)
HCT VFR BLD AUTO: 25.2 % (ref 40.5–52.5)
HGB BLD-MCNC: 8 G/DL (ref 13.5–17.5)
HGB BLD-MCNC: 8.6 G/DL (ref 13.5–17.5)
HYPOCHROMIA BLD QL SMEAR: ABNORMAL
HYPOCHROMIA BLD QL SMEAR: ABNORMAL
LYMPHOCYTES # BLD: 0.7 K/UL (ref 1–5.1)
LYMPHOCYTES # BLD: 1.3 K/UL (ref 1–5.1)
LYMPHOCYTES NFR BLD: 48 %
LYMPHOCYTES NFR BLD: 65 %
MACROCYTES BLD QL SMEAR: ABNORMAL
MCH RBC QN AUTO: 27.1 PG (ref 26–34)
MCH RBC QN AUTO: 27.7 PG (ref 26–34)
MCHC RBC AUTO-ENTMCNC: 34 G/DL (ref 31–36)
MCHC RBC AUTO-ENTMCNC: 34.6 G/DL (ref 31–36)
MCV RBC AUTO: 79.6 FL (ref 80–100)
MCV RBC AUTO: 79.9 FL (ref 80–100)
METAMYELOCYTES NFR BLD MANUAL: 6 %
MICROCYTES BLD QL SMEAR: ABNORMAL
MONOCYTES # BLD: 0.1 K/UL (ref 0–1.3)
MONOCYTES # BLD: 0.1 K/UL (ref 0–1.3)
MONOCYTES NFR BLD: 4 %
MONOCYTES NFR BLD: 8 %
NEUTROPHILS # BLD: 0.5 K/UL (ref 1.7–7.7)
NEUTROPHILS # BLD: 0.5 K/UL (ref 1.7–7.7)
NEUTROPHILS NFR BLD: 22 %
NEUTROPHILS NFR BLD: 27 %
NEUTS BAND NFR BLD MANUAL: 6 % (ref 0–7)
NRBC BLD-RTO: 14 /100 WBC
NRBC BLD-RTO: 4 /100 WBC
OVALOCYTES BLD QL SMEAR: ABNORMAL
PATH INTERP BLD-IMP: NO
PATH INTERP BLD-IMP: YES
PLATELET # BLD AUTO: 101 K/UL (ref 135–450)
PLATELET # BLD AUTO: 110 K/UL (ref 135–450)
PLATELET BLD QL SMEAR: ABNORMAL
PLATELET BLD QL SMEAR: ADEQUATE
PMV BLD AUTO: 8.1 FL (ref 5–10.5)
PMV BLD AUTO: 8.4 FL (ref 5–10.5)
POIKILOCYTOSIS BLD QL SMEAR: ABNORMAL
POIKILOCYTOSIS BLD QL SMEAR: ABNORMAL
POLYCHROMASIA BLD QL SMEAR: ABNORMAL
POTASSIUM SERPL-SCNC: 3.6 MMOL/L (ref 3.5–5.1)
RBC # BLD AUTO: 2.89 M/UL (ref 4.2–5.9)
RBC # BLD AUTO: 3.17 M/UL (ref 4.2–5.9)
SCHISTOCYTES BLD QL SMEAR: ABNORMAL
SCHISTOCYTES BLD QL SMEAR: ABNORMAL
SLIDE REVIEW: ABNORMAL
SLIDE REVIEW: ABNORMAL
SMUDGE CELLS BLD QL SMEAR: PRESENT
SODIUM SERPL-SCNC: 139 MMOL/L (ref 136–145)
SPHEROCYTES BLD QL SMEAR: ABNORMAL
TARGETS BLD QL SMEAR: ABNORMAL
TARGETS BLD QL SMEAR: ABNORMAL
WBC # BLD AUTO: 1.5 K/UL (ref 4–11)
WBC # BLD AUTO: 2 K/UL (ref 4–11)

## 2024-04-13 PROCEDURE — 6370000000 HC RX 637 (ALT 250 FOR IP): Performed by: STUDENT IN AN ORGANIZED HEALTH CARE EDUCATION/TRAINING PROGRAM

## 2024-04-13 PROCEDURE — 6360000002 HC RX W HCPCS: Performed by: STUDENT IN AN ORGANIZED HEALTH CARE EDUCATION/TRAINING PROGRAM

## 2024-04-13 PROCEDURE — 2580000003 HC RX 258: Performed by: NURSE PRACTITIONER

## 2024-04-13 PROCEDURE — 6360000002 HC RX W HCPCS: Performed by: NURSE PRACTITIONER

## 2024-04-13 PROCEDURE — 2580000003 HC RX 258: Performed by: STUDENT IN AN ORGANIZED HEALTH CARE EDUCATION/TRAINING PROGRAM

## 2024-04-13 PROCEDURE — 36592 COLLECT BLOOD FROM PICC: CPT

## 2024-04-13 PROCEDURE — 85025 COMPLETE CBC W/AUTO DIFF WBC: CPT

## 2024-04-13 PROCEDURE — 2060000000 HC ICU INTERMEDIATE R&B

## 2024-04-13 PROCEDURE — 80048 BASIC METABOLIC PNL TOTAL CA: CPT

## 2024-04-13 RX ADMIN — OXYCODONE 10 MG: 5 TABLET ORAL at 10:45

## 2024-04-13 RX ADMIN — CYANOCOBALAMIN TAB 1000 MCG 1000 MCG: 1000 TAB at 09:06

## 2024-04-13 RX ADMIN — ACETAMINOPHEN 1000 MG: 325 TABLET ORAL at 14:01

## 2024-04-13 RX ADMIN — LEVETIRACETAM 1500 MG: 500 TABLET, FILM COATED ORAL at 21:18

## 2024-04-13 RX ADMIN — ACETAMINOPHEN 1000 MG: 325 TABLET ORAL at 18:59

## 2024-04-13 RX ADMIN — METHOCARBAMOL 500 MG: 100 INJECTION INTRAMUSCULAR; INTRAVENOUS at 09:04

## 2024-04-13 RX ADMIN — METHOCARBAMOL 500 MG: 100 INJECTION INTRAMUSCULAR; INTRAVENOUS at 17:09

## 2024-04-13 RX ADMIN — FLUCONAZOLE 200 MG: 200 TABLET ORAL at 09:06

## 2024-04-13 RX ADMIN — VALACYCLOVIR HYDROCHLORIDE 500 MG: 500 TABLET, FILM COATED ORAL at 09:06

## 2024-04-13 RX ADMIN — LEVETIRACETAM 1500 MG: 500 TABLET, FILM COATED ORAL at 09:06

## 2024-04-13 RX ADMIN — PIPERACILLIN AND TAZOBACTAM 4500 MG: 4; .5 INJECTION, POWDER, LYOPHILIZED, FOR SOLUTION INTRAVENOUS at 00:50

## 2024-04-13 RX ADMIN — ACETAMINOPHEN 1000 MG: 325 TABLET ORAL at 07:07

## 2024-04-13 RX ADMIN — VALACYCLOVIR HYDROCHLORIDE 500 MG: 500 TABLET, FILM COATED ORAL at 21:18

## 2024-04-13 RX ADMIN — METHOCARBAMOL 500 MG: 100 INJECTION INTRAMUSCULAR; INTRAVENOUS at 00:49

## 2024-04-13 RX ADMIN — SODIUM CHLORIDE, PRESERVATIVE FREE 10 ML: 5 INJECTION INTRAVENOUS at 21:18

## 2024-04-13 RX ADMIN — PIPERACILLIN AND TAZOBACTAM 4500 MG: 4; .5 INJECTION, POWDER, LYOPHILIZED, FOR SOLUTION INTRAVENOUS at 09:56

## 2024-04-13 RX ADMIN — OXYCODONE 10 MG: 5 TABLET ORAL at 07:07

## 2024-04-13 RX ADMIN — PANTOPRAZOLE SODIUM 40 MG: 40 TABLET, DELAYED RELEASE ORAL at 07:07

## 2024-04-13 RX ADMIN — PIPERACILLIN AND TAZOBACTAM 4500 MG: 4; .5 INJECTION, POWDER, LYOPHILIZED, FOR SOLUTION INTRAVENOUS at 18:57

## 2024-04-13 RX ADMIN — PANTOPRAZOLE SODIUM 40 MG: 40 TABLET, DELAYED RELEASE ORAL at 17:08

## 2024-04-13 RX ADMIN — ACETAMINOPHEN 1000 MG: 325 TABLET ORAL at 00:46

## 2024-04-13 ASSESSMENT — PAIN SCALES - GENERAL
PAINLEVEL_OUTOF10: 0
PAINLEVEL_OUTOF10: 6
PAINLEVEL_OUTOF10: 7
PAINLEVEL_OUTOF10: 5
PAINLEVEL_OUTOF10: 7
PAINLEVEL_OUTOF10: 4
PAINLEVEL_OUTOF10: 4
PAINLEVEL_OUTOF10: 0

## 2024-04-13 ASSESSMENT — PAIN DESCRIPTION - LOCATION
LOCATION: ABDOMEN

## 2024-04-13 ASSESSMENT — PAIN - FUNCTIONAL ASSESSMENT: PAIN_FUNCTIONAL_ASSESSMENT: ACTIVITIES ARE NOT PREVENTED

## 2024-04-13 ASSESSMENT — PAIN DESCRIPTION - DESCRIPTORS: DESCRIPTORS: SHARP

## 2024-04-13 ASSESSMENT — PAIN DESCRIPTION - ORIENTATION: ORIENTATION: INNER

## 2024-04-13 NOTE — PLAN OF CARE
Problem: Safety - Adult  Goal: Free from fall injury  4/13/2024 0511 by Hayley Knowles RN  Outcome: Progressing  Flowsheets (Taken 4/13/2024 0511)  Free From Fall Injury:   Instruct family/caregiver on patient safety   Based on caregiver fall risk screen, instruct family/caregiver to ask for assistance with transferring infant if caregiver noted to have fall risk factors     Problem: Pain  Goal: Verbalizes/displays adequate comfort level or baseline comfort level  4/13/2024 0511 by aHyley Knowles RN  Outcome: Progressing  Flowsheets (Taken 4/13/2024 0511)  Verbalizes/displays adequate comfort level or baseline comfort level:   Encourage patient to monitor pain and request assistance   Assess pain using appropriate pain scale   Administer analgesics based on type and severity of pain and evaluate response   Implement non-pharmacological measures as appropriate and evaluate response     Problem: Hematologic - Adult  Goal: Maintains hematologic stability  4/13/2024 0511 by Hayley Knowles RN  Outcome: Progressing  Flowsheets (Taken 4/13/2024 0511)  Maintains hematologic stability:   Assess for signs and symptoms of bleeding or hemorrhage   Monitor labs for bleeding or clotting disorders  Note: Patient's hemoglobin this AM:   Recent Labs     04/13/24 0008   HGB 8.0*     Patient's platelet count this AM:   Recent Labs     04/13/24 0008   *    Thrombocytopenia Precautions in place.  Patient showing no signs or symptoms of active bleeding.  Transfusion not indicated at this time.  Patient verbalizes understanding of all instructions. Will continue to assess and implement POC. Call light within reach and hourly rounding in place.

## 2024-04-13 NOTE — OP NOTE
pedicle.  I made a peritoneal incision underneath the FANNY pedicle and dissected in the posterior total mesorectal space using sharp cautery dissection down to the mid rectum.  I then came up laterally on both sides and met the planes back together.  I then came back and skeletonized the FANNY pedicle just off the aorta.  I skeletonized and cleaned off the takeoff of the left colic artery.  There was quite a bit of redundant colon I wanted to try to save if possible, so I left the left colic artery intact and took a high ligation of the FANNY just past the left colic takeoff.  I also noted the IMV which was dissected medially to laterally off the retroperitoneum.  The gonadal and ureter were again noted to be well out of harm's way.  I then performed a high ligation of the IMV using vessel device.  Good hemostasis was noted.  I then took the mesentery from the FANNY toward the sigmoid colon.  I once again ligated the IMV as well on the distal aspect and took the mesenteric dissection up to the mid sigmoid colon.  I then used a 6 mm robotic blue staple load to transect.  I then continued the pelvic dissection.  I again started posteriorly in the posterior plane total mesorectal excision down to the levator muscles.  I then came up laterally and finally anteriorly.  At this point, I then performed flexible sigmoidoscopy.  I noted the tumor to be in the upper rectum approximately 12 cm or so from the anal verge.  I chose an area of about 4 cm distal to this, knowing that I would get an additional centimeter with the anastomotic donut and I circumferentially dissected around.  I took down the mesorectum using the vessel sealer device and a 60 mm robotic blue staple load x1-1/2 was used to transect the mid rectum.  I then irrigated out the pelvis.  I noted good mobility of the proximal colon, which would easily come down to the pelvis.  I used an ICG angiography to confirm good blood flow of the proximal segment.  [facility] [place]

## 2024-04-13 NOTE — PLAN OF CARE
Problem: Safety - Adult  Goal: Free from fall injury  4/13/2024 0940 by Flaquita Stovall RN  Outcome: Progressing  4/13/2024 0511 by Hayley Knowles RN  Outcome: Progressing  Flowsheets (Taken 4/13/2024 0511)  Free From Fall Injury:   Instruct family/caregiver on patient safety   Based on caregiver fall risk screen, instruct family/caregiver to ask for assistance with transferring infant if caregiver noted to have fall risk factors     Problem: ABCDS Injury Assessment  Goal: Absence of physical injury  4/13/2024 0940 by Flaquita Stovall RN  Outcome: Progressing  4/13/2024 0511 by Hayley Knowles RN  Outcome: Progressing     Problem: Pain  Goal: Verbalizes/displays adequate comfort level or baseline comfort level  4/13/2024 0940 by Flaquita Stovall RN  Outcome: Progressing  4/13/2024 0511 by Hayley Knowles RN  Outcome: Progressing  Flowsheets (Taken 4/13/2024 0511)  Verbalizes/displays adequate comfort level or baseline comfort level:   Encourage patient to monitor pain and request assistance   Assess pain using appropriate pain scale   Administer analgesics based on type and severity of pain and evaluate response   Implement non-pharmacological measures as appropriate and evaluate response     Problem: Hematologic - Adult  Goal: Maintains hematologic stability  4/13/2024 0940 by Flaquita Stovall RN  Outcome: Progressing  4/13/2024 0511 by Hayley Knowles RN  Outcome: Progressing  Flowsheets (Taken 4/13/2024 0511)  Maintains hematologic stability:   Assess for signs and symptoms of bleeding or hemorrhage   Monitor labs for bleeding or clotting disorders  Note: Patient's hemoglobin this AM:   Recent Labs     04/13/24 0008   HGB 8.0*     Patient's platelet count this AM:   Recent Labs     04/13/24 0008   *    Thrombocytopenia Precautions in place.  Patient showing no signs or symptoms of active bleeding.  Transfusion not indicated at this time.  Patient verbalizes understanding of all instructions. Will continue to assess

## 2024-04-14 LAB
ANION GAP SERPL CALCULATED.3IONS-SCNC: 11 MMOL/L (ref 3–16)
BUN SERPL-MCNC: 6 MG/DL (ref 7–20)
CALCIUM SERPL-MCNC: 8.4 MG/DL (ref 8.3–10.6)
CHLORIDE SERPL-SCNC: 105 MMOL/L (ref 99–110)
CO2 SERPL-SCNC: 24 MMOL/L (ref 21–32)
CREAT SERPL-MCNC: 0.9 MG/DL (ref 0.9–1.3)
DEPRECATED RDW RBC AUTO: 15.9 % (ref 12.4–15.4)
GFR SERPLBLD CREATININE-BSD FMLA CKD-EPI: >90 ML/MIN/{1.73_M2}
GLUCOSE SERPL-MCNC: 106 MG/DL (ref 70–99)
HCT VFR BLD AUTO: 21.4 % (ref 40.5–52.5)
HGB BLD-MCNC: 7.4 G/DL (ref 13.5–17.5)
MCH RBC QN AUTO: 27.8 PG (ref 26–34)
MCHC RBC AUTO-ENTMCNC: 34.8 G/DL (ref 31–36)
MCV RBC AUTO: 80 FL (ref 80–100)
PLATELET # BLD AUTO: 70 K/UL (ref 135–450)
PMV BLD AUTO: 8.2 FL (ref 5–10.5)
POTASSIUM SERPL-SCNC: 3.1 MMOL/L (ref 3.5–5.1)
RBC # BLD AUTO: 2.68 M/UL (ref 4.2–5.9)
SODIUM SERPL-SCNC: 140 MMOL/L (ref 136–145)
WBC # BLD AUTO: 1.6 K/UL (ref 4–11)

## 2024-04-14 PROCEDURE — 36592 COLLECT BLOOD FROM PICC: CPT

## 2024-04-14 PROCEDURE — 6360000002 HC RX W HCPCS: Performed by: NURSE PRACTITIONER

## 2024-04-14 PROCEDURE — 6370000000 HC RX 637 (ALT 250 FOR IP): Performed by: STUDENT IN AN ORGANIZED HEALTH CARE EDUCATION/TRAINING PROGRAM

## 2024-04-14 PROCEDURE — 85027 COMPLETE CBC AUTOMATED: CPT

## 2024-04-14 PROCEDURE — 2580000003 HC RX 258: Performed by: NURSE PRACTITIONER

## 2024-04-14 PROCEDURE — 2580000003 HC RX 258: Performed by: STUDENT IN AN ORGANIZED HEALTH CARE EDUCATION/TRAINING PROGRAM

## 2024-04-14 PROCEDURE — A4217 STERILE WATER/SALINE, 500 ML: HCPCS | Performed by: STUDENT IN AN ORGANIZED HEALTH CARE EDUCATION/TRAINING PROGRAM

## 2024-04-14 PROCEDURE — 2060000000 HC ICU INTERMEDIATE R&B

## 2024-04-14 PROCEDURE — 6360000002 HC RX W HCPCS: Performed by: STUDENT IN AN ORGANIZED HEALTH CARE EDUCATION/TRAINING PROGRAM

## 2024-04-14 PROCEDURE — 80048 BASIC METABOLIC PNL TOTAL CA: CPT

## 2024-04-14 RX ORDER — POLYETHYLENE GLYCOL 3350 17 G/17G
17 POWDER, FOR SOLUTION ORAL DAILY
Status: DISCONTINUED | OUTPATIENT
Start: 2024-04-15 | End: 2024-04-17 | Stop reason: HOSPADM

## 2024-04-14 RX ORDER — POTASSIUM CHLORIDE 7.45 MG/ML
10 INJECTION INTRAVENOUS
Status: DISCONTINUED | OUTPATIENT
Start: 2024-04-14 | End: 2024-04-14

## 2024-04-14 RX ORDER — DOCUSATE SODIUM 100 MG/1
100 CAPSULE, LIQUID FILLED ORAL 2 TIMES DAILY
Status: DISCONTINUED | OUTPATIENT
Start: 2024-04-14 | End: 2024-04-17 | Stop reason: HOSPADM

## 2024-04-14 RX ADMIN — POTASSIUM CHLORIDE 20 MEQ: 400 INJECTION, SOLUTION INTRAVENOUS at 11:40

## 2024-04-14 RX ADMIN — METHOCARBAMOL 500 MG: 100 INJECTION INTRAMUSCULAR; INTRAVENOUS at 01:28

## 2024-04-14 RX ADMIN — ACETAMINOPHEN 1000 MG: 325 TABLET ORAL at 14:02

## 2024-04-14 RX ADMIN — SODIUM CHLORIDE, PRESERVATIVE FREE 10 ML: 5 INJECTION INTRAVENOUS at 20:18

## 2024-04-14 RX ADMIN — DOCUSATE SODIUM 100 MG: 100 CAPSULE, LIQUID FILLED ORAL at 23:59

## 2024-04-14 RX ADMIN — ACETAMINOPHEN 1000 MG: 325 TABLET ORAL at 07:16

## 2024-04-14 RX ADMIN — LEVETIRACETAM 1500 MG: 500 TABLET, FILM COATED ORAL at 20:16

## 2024-04-14 RX ADMIN — LEVETIRACETAM 1500 MG: 500 TABLET, FILM COATED ORAL at 08:08

## 2024-04-14 RX ADMIN — SODIUM CHLORIDE 15 ML: 900 IRRIGANT IRRIGATION at 20:18

## 2024-04-14 RX ADMIN — PIPERACILLIN AND TAZOBACTAM 4500 MG: 4; .5 INJECTION, POWDER, LYOPHILIZED, FOR SOLUTION INTRAVENOUS at 01:30

## 2024-04-14 RX ADMIN — VALACYCLOVIR HYDROCHLORIDE 500 MG: 500 TABLET, FILM COATED ORAL at 20:16

## 2024-04-14 RX ADMIN — POTASSIUM CHLORIDE 20 MEQ: 400 INJECTION, SOLUTION INTRAVENOUS at 06:38

## 2024-04-14 RX ADMIN — PANTOPRAZOLE SODIUM 40 MG: 40 TABLET, DELAYED RELEASE ORAL at 06:32

## 2024-04-14 RX ADMIN — METHOCARBAMOL 500 MG: 100 INJECTION INTRAMUSCULAR; INTRAVENOUS at 16:19

## 2024-04-14 RX ADMIN — METHOCARBAMOL 500 MG: 100 INJECTION INTRAMUSCULAR; INTRAVENOUS at 08:10

## 2024-04-14 RX ADMIN — PIPERACILLIN AND TAZOBACTAM 4500 MG: 4; .5 INJECTION, POWDER, LYOPHILIZED, FOR SOLUTION INTRAVENOUS at 17:25

## 2024-04-14 RX ADMIN — ACETAMINOPHEN 1000 MG: 325 TABLET ORAL at 01:20

## 2024-04-14 RX ADMIN — CYANOCOBALAMIN TAB 1000 MCG 1000 MCG: 1000 TAB at 08:08

## 2024-04-14 RX ADMIN — VALACYCLOVIR HYDROCHLORIDE 500 MG: 500 TABLET, FILM COATED ORAL at 08:08

## 2024-04-14 RX ADMIN — FLUCONAZOLE 200 MG: 200 TABLET ORAL at 08:08

## 2024-04-14 RX ADMIN — PANTOPRAZOLE SODIUM 40 MG: 40 TABLET, DELAYED RELEASE ORAL at 16:18

## 2024-04-14 RX ADMIN — POTASSIUM CHLORIDE 20 MEQ: 400 INJECTION, SOLUTION INTRAVENOUS at 08:05

## 2024-04-14 RX ADMIN — ACETAMINOPHEN 1000 MG: 325 TABLET ORAL at 20:16

## 2024-04-14 RX ADMIN — POTASSIUM CHLORIDE 20 MEQ: 400 INJECTION, SOLUTION INTRAVENOUS at 09:28

## 2024-04-14 RX ADMIN — SODIUM CHLORIDE, PRESERVATIVE FREE 10 ML: 5 INJECTION INTRAVENOUS at 08:08

## 2024-04-14 RX ADMIN — PIPERACILLIN AND TAZOBACTAM 4500 MG: 4; .5 INJECTION, POWDER, LYOPHILIZED, FOR SOLUTION INTRAVENOUS at 09:29

## 2024-04-14 ASSESSMENT — PAIN SCALES - GENERAL
PAINLEVEL_OUTOF10: 3
PAINLEVEL_OUTOF10: 0

## 2024-04-14 ASSESSMENT — PAIN DESCRIPTION - LOCATION: LOCATION: ABDOMEN

## 2024-04-14 ASSESSMENT — PAIN DESCRIPTION - ORIENTATION: ORIENTATION: MID

## 2024-04-14 ASSESSMENT — PAIN - FUNCTIONAL ASSESSMENT: PAIN_FUNCTIONAL_ASSESSMENT: ACTIVITIES ARE NOT PREVENTED

## 2024-04-14 ASSESSMENT — PAIN DESCRIPTION - DESCRIPTORS: DESCRIPTORS: SORE

## 2024-04-14 ASSESSMENT — PAIN DESCRIPTION - ONSET: ONSET: ON-GOING

## 2024-04-14 ASSESSMENT — PAIN DESCRIPTION - FREQUENCY: FREQUENCY: CONTINUOUS

## 2024-04-14 ASSESSMENT — PAIN DESCRIPTION - PAIN TYPE: TYPE: SURGICAL PAIN

## 2024-04-14 NOTE — PLAN OF CARE
Problem: Safety - Adult  Goal: Free from fall injury  Outcome: Progressing  Note: Orthostatic vital signs obtained at start of shift - see flowsheet for details.  Pt does not meet criteria for orthostasis.  Pt is a Med fall risk. See Apple Fall Score and ABCDS Injury Risk assessments.   - Screening for Orthostasis AND not a [place] Risk per APPLE/ABCDS: Pt bed is in low position, side rails up, call light and belongings are in reach.  Fall risk light is on outside pts room.  Pt encouraged to call for assistance as needed. Plan of care ongoing.       Problem: ABCDS Injury Assessment  Goal: Absence of physical injury  Outcome: Progressing  Flowsheets (Taken 4/14/2024 0749)  Absence of Physical Injury: Implement safety measures based on patient assessment     Problem: Pain  Goal: Verbalizes/displays adequate comfort level or baseline comfort level  Outcome: Progressing  Flowsheets (Taken 4/14/2024 0749)  Verbalizes/displays adequate comfort level or baseline comfort level: Encourage patient to monitor pain and request assistance     Problem: Hematologic - Adult  Goal: Maintains hematologic stability  Outcome: Progressing  Note: Patient's hemoglobin this AM:   Recent Labs     04/14/24  0421   HGB 7.4*     Patient's platelet count this AM:   Recent Labs     04/14/24 0421   PLT 70*    Thrombocytopenia Precautions in place.  Patient showing no signs or symptoms of active bleeding.  Transfusion not indicated at this time.  Patient verbalizes understanding of all instructions. Will continue to assess and implement POC. Call light within reach and hourly rounding in place.

## 2024-04-14 NOTE — PLAN OF CARE
Problem: Safety - Adult  Goal: Free from fall injury  4/14/2024 1020 by Suzette Roberts RN  Outcome: Progressing     Problem: ABCDS Injury Assessment  Goal: Absence of physical injury  4/14/2024 1020 by Suzette Roberts RN  Outcome: Progressing     Problem: Pain  Goal: Verbalizes/displays adequate comfort level or baseline comfort level  4/14/2024 1020 by Suzette Roberts RN  Outcome: Progressing     Problem: Hematologic - Adult  Goal: Maintains hematologic stability  4/14/2024 1020 by Suzette Roberts RN  Outcome: Progressing

## 2024-04-15 ENCOUNTER — APPOINTMENT (OUTPATIENT)
Dept: GENERAL RADIOLOGY | Age: 44
DRG: 231 | End: 2024-04-15
Payer: MEDICAID

## 2024-04-15 LAB
ALBUMIN SERPL-MCNC: 3.5 G/DL (ref 3.4–5)
ALP SERPL-CCNC: 48 U/L (ref 40–129)
ALT SERPL-CCNC: 11 U/L (ref 10–40)
ANION GAP SERPL CALCULATED.3IONS-SCNC: 9 MMOL/L (ref 3–16)
APTT BLD: 35.3 SEC (ref 22.1–36.4)
AST SERPL-CCNC: 17 U/L (ref 15–37)
BILIRUB DIRECT SERPL-MCNC: <0.2 MG/DL (ref 0–0.3)
BILIRUB INDIRECT SERPL-MCNC: ABNORMAL MG/DL (ref 0–1)
BILIRUB SERPL-MCNC: 0.6 MG/DL (ref 0–1)
BILIRUB UR QL STRIP.AUTO: NEGATIVE
BUN SERPL-MCNC: 4 MG/DL (ref 7–20)
CALCIUM SERPL-MCNC: 8.7 MG/DL (ref 8.3–10.6)
CHLORIDE SERPL-SCNC: 105 MMOL/L (ref 99–110)
CLARITY UR: CLEAR
CO2 SERPL-SCNC: 25 MMOL/L (ref 21–32)
COLOR UR: YELLOW
CREAT SERPL-MCNC: 0.8 MG/DL (ref 0.9–1.3)
DEPRECATED RDW RBC AUTO: 15.9 % (ref 12.4–15.4)
GFR SERPLBLD CREATININE-BSD FMLA CKD-EPI: >90 ML/MIN/{1.73_M2}
GLUCOSE SERPL-MCNC: 83 MG/DL (ref 70–99)
GLUCOSE UR STRIP.AUTO-MCNC: NEGATIVE MG/DL
HCT VFR BLD AUTO: 21.3 % (ref 40.5–52.5)
HGB BLD-MCNC: 7.4 G/DL (ref 13.5–17.5)
HGB UR QL STRIP.AUTO: NEGATIVE
INR PPP: 1.22 (ref 0.85–1.15)
KETONES UR STRIP.AUTO-MCNC: NEGATIVE MG/DL
LEUKOCYTE ESTERASE UR QL STRIP.AUTO: NEGATIVE
MAGNESIUM SERPL-MCNC: 1.5 MG/DL (ref 1.8–2.4)
MCH RBC QN AUTO: 27.4 PG (ref 26–34)
MCHC RBC AUTO-ENTMCNC: 34.6 G/DL (ref 31–36)
MCV RBC AUTO: 79.1 FL (ref 80–100)
NITRITE UR QL STRIP.AUTO: NEGATIVE
PH UR STRIP.AUTO: 6 [PH] (ref 5–8)
PHOSPHATE SERPL-MCNC: 3.6 MG/DL (ref 2.5–4.9)
PLATELET # BLD AUTO: 51 K/UL (ref 135–450)
PMV BLD AUTO: 8.4 FL (ref 5–10.5)
POTASSIUM SERPL-SCNC: 3.6 MMOL/L (ref 3.5–5.1)
PROT SERPL-MCNC: 5.6 G/DL (ref 6.4–8.2)
PROT UR STRIP.AUTO-MCNC: NEGATIVE MG/DL
PROTHROMBIN TIME: 15.6 SEC (ref 11.9–14.9)
RBC # BLD AUTO: 2.7 M/UL (ref 4.2–5.9)
SODIUM SERPL-SCNC: 139 MMOL/L (ref 136–145)
SP GR UR STRIP.AUTO: 1.02 (ref 1–1.03)
UA DIPSTICK W REFLEX MICRO PNL UR: NORMAL
URATE SERPL-MCNC: 3.2 MG/DL (ref 3.5–7.2)
URN SPEC COLLECT METH UR: NORMAL
UROBILINOGEN UR STRIP-ACNC: 0.2 E.U./DL
WBC # BLD AUTO: 1.3 K/UL (ref 4–11)

## 2024-04-15 PROCEDURE — 2580000003 HC RX 258: Performed by: NURSE PRACTITIONER

## 2024-04-15 PROCEDURE — 6370000000 HC RX 637 (ALT 250 FOR IP): Performed by: STUDENT IN AN ORGANIZED HEALTH CARE EDUCATION/TRAINING PROGRAM

## 2024-04-15 PROCEDURE — 6360000002 HC RX W HCPCS: Performed by: NURSE PRACTITIONER

## 2024-04-15 PROCEDURE — 2060000000 HC ICU INTERMEDIATE R&B

## 2024-04-15 PROCEDURE — 85730 THROMBOPLASTIN TIME PARTIAL: CPT

## 2024-04-15 PROCEDURE — 83735 ASSAY OF MAGNESIUM: CPT

## 2024-04-15 PROCEDURE — 84100 ASSAY OF PHOSPHORUS: CPT

## 2024-04-15 PROCEDURE — 85610 PROTHROMBIN TIME: CPT

## 2024-04-15 PROCEDURE — 85027 COMPLETE CBC AUTOMATED: CPT

## 2024-04-15 PROCEDURE — 81003 URINALYSIS AUTO W/O SCOPE: CPT

## 2024-04-15 PROCEDURE — 71045 X-RAY EXAM CHEST 1 VIEW: CPT

## 2024-04-15 PROCEDURE — 2580000003 HC RX 258: Performed by: STUDENT IN AN ORGANIZED HEALTH CARE EDUCATION/TRAINING PROGRAM

## 2024-04-15 PROCEDURE — 99024 POSTOP FOLLOW-UP VISIT: CPT | Performed by: SURGERY

## 2024-04-15 PROCEDURE — 80048 BASIC METABOLIC PNL TOTAL CA: CPT

## 2024-04-15 PROCEDURE — 80076 HEPATIC FUNCTION PANEL: CPT

## 2024-04-15 PROCEDURE — 6360000002 HC RX W HCPCS: Performed by: STUDENT IN AN ORGANIZED HEALTH CARE EDUCATION/TRAINING PROGRAM

## 2024-04-15 PROCEDURE — 84550 ASSAY OF BLOOD/URIC ACID: CPT

## 2024-04-15 RX ORDER — METHOCARBAMOL 500 MG/1
500 TABLET, FILM COATED ORAL 3 TIMES DAILY
Status: DISCONTINUED | OUTPATIENT
Start: 2024-04-15 | End: 2024-04-17 | Stop reason: HOSPADM

## 2024-04-15 RX ADMIN — ACETAMINOPHEN 1000 MG: 325 TABLET ORAL at 08:05

## 2024-04-15 RX ADMIN — PIPERACILLIN AND TAZOBACTAM 4500 MG: 4; .5 INJECTION, POWDER, LYOPHILIZED, FOR SOLUTION INTRAVENOUS at 16:37

## 2024-04-15 RX ADMIN — PIPERACILLIN AND TAZOBACTAM 4500 MG: 4; .5 INJECTION, POWDER, LYOPHILIZED, FOR SOLUTION INTRAVENOUS at 10:02

## 2024-04-15 RX ADMIN — FLUCONAZOLE 200 MG: 200 TABLET ORAL at 08:05

## 2024-04-15 RX ADMIN — VALACYCLOVIR HYDROCHLORIDE 500 MG: 500 TABLET, FILM COATED ORAL at 21:52

## 2024-04-15 RX ADMIN — METHOCARBAMOL 500 MG: 500 TABLET ORAL at 08:05

## 2024-04-15 RX ADMIN — SODIUM CHLORIDE, PRESERVATIVE FREE 10 ML: 5 INJECTION INTRAVENOUS at 21:54

## 2024-04-15 RX ADMIN — ACETAMINOPHEN 1000 MG: 325 TABLET ORAL at 14:04

## 2024-04-15 RX ADMIN — METHOCARBAMOL 500 MG: 500 TABLET ORAL at 21:53

## 2024-04-15 RX ADMIN — LEVETIRACETAM 1500 MG: 500 TABLET, FILM COATED ORAL at 08:05

## 2024-04-15 RX ADMIN — PIPERACILLIN AND TAZOBACTAM 4500 MG: 4; .5 INJECTION, POWDER, LYOPHILIZED, FOR SOLUTION INTRAVENOUS at 02:05

## 2024-04-15 RX ADMIN — ACETAMINOPHEN 1000 MG: 325 TABLET ORAL at 21:52

## 2024-04-15 RX ADMIN — DOCUSATE SODIUM 100 MG: 100 CAPSULE, LIQUID FILLED ORAL at 21:53

## 2024-04-15 RX ADMIN — METHOCARBAMOL 500 MG: 500 TABLET ORAL at 14:04

## 2024-04-15 RX ADMIN — VALACYCLOVIR HYDROCHLORIDE 500 MG: 500 TABLET, FILM COATED ORAL at 08:05

## 2024-04-15 RX ADMIN — POLYETHYLENE GLYCOL 3350 17 G: 17 POWDER, FOR SOLUTION ORAL at 14:04

## 2024-04-15 RX ADMIN — SODIUM CHLORIDE, PRESERVATIVE FREE 10 ML: 5 INJECTION INTRAVENOUS at 10:02

## 2024-04-15 RX ADMIN — DOCUSATE SODIUM 100 MG: 100 CAPSULE, LIQUID FILLED ORAL at 08:05

## 2024-04-15 RX ADMIN — OXYCODONE 5 MG: 5 TABLET ORAL at 08:05

## 2024-04-15 RX ADMIN — LEVETIRACETAM 1500 MG: 500 TABLET, FILM COATED ORAL at 21:52

## 2024-04-15 RX ADMIN — PANTOPRAZOLE SODIUM 40 MG: 40 TABLET, DELAYED RELEASE ORAL at 16:37

## 2024-04-15 RX ADMIN — PANTOPRAZOLE SODIUM 40 MG: 40 TABLET, DELAYED RELEASE ORAL at 06:33

## 2024-04-15 RX ADMIN — CYANOCOBALAMIN TAB 1000 MCG 1000 MCG: 1000 TAB at 08:05

## 2024-04-15 RX ADMIN — METHOCARBAMOL 500 MG: 100 INJECTION INTRAMUSCULAR; INTRAVENOUS at 01:25

## 2024-04-15 RX ADMIN — ACETAMINOPHEN 1000 MG: 325 TABLET ORAL at 01:26

## 2024-04-15 ASSESSMENT — PAIN SCALES - GENERAL
PAINLEVEL_OUTOF10: 5
PAINLEVEL_OUTOF10: 2
PAINLEVEL_OUTOF10: 1
PAINLEVEL_OUTOF10: 2
PAINLEVEL_OUTOF10: 2
PAINLEVEL_OUTOF10: 4
PAINLEVEL_OUTOF10: 2
PAINLEVEL_OUTOF10: 3
PAINLEVEL_OUTOF10: 2

## 2024-04-15 ASSESSMENT — PAIN SCALES - WONG BAKER: WONGBAKER_NUMERICALRESPONSE: HURTS A LITTLE BIT

## 2024-04-15 ASSESSMENT — PAIN DESCRIPTION - DESCRIPTORS: DESCRIPTORS: ACHING

## 2024-04-15 ASSESSMENT — PAIN DESCRIPTION - LOCATION: LOCATION: ABDOMEN

## 2024-04-15 ASSESSMENT — PAIN DESCRIPTION - ORIENTATION: ORIENTATION: MID

## 2024-04-15 NOTE — ADT AUTH CERT
mg/min.       electrolyte-A (PLASMALYTE-A) solution  Rate: 100 mL/hr Dose: 100 mL/hr  Freq: CONTINUOUS Route: IV  Start: 04/12/24 0015 End: 04/12/24 2109       [id number]   [id number]   [id number]    [id number]        electrolyte-A (PLASMALYTE-A) solution  Rate: 125 mL/hr Dose: 125 mL/hr  Freq: CONTINUOUS Route: IV  Start: 04/11/24 1200 End: 04/11/24 1144       enoxaparin (LOVENOX) injection 40 mg  Dose: 40 mg  Freq: ONCE Route: SC  Start: 04/12/24 0415 End: 04/12/24 0513  Order specific questions:         [id number]        enoxaparin (LOVENOX) injection 40 mg  Dose: 40 mg  Freq: EVERY EVENING Route: SC  Start: 04/10/24 1800 End: 04/10/24 0622  Admin Instructions:  Pharmacy to dose if renal insufficiency present.  Hold for platelets < 50,000  Order specific questions:         fentaNYL (SUBLIMAZE) injection 25 mcg  Dose: 25 mcg  Freq: EVERY 5 MIN PRN Route: IV  PRN Reason: Pain Moderate (4-6)  Start: 04/12/24 1643 End: 04/12/24 1817  Admin Instructions:  Phase I - Initial therapy for moderate pain.       [id number]        HYDROmorphone HCl PF (DILAUDID) injection 0.5 mg  Dose: 0.5 mg  Freq: EVERY 5 MIN PRN Route: IV  PRN Reason: Pain Severe (7-10)  Start: 04/12/24 1643 End: 04/12/24 1654  Admin Instructions:  For Phase I. If Phase II oral narcotics have been administered in the last 60 minutes, do not administer IV narcotics unless specifically approved by provider.       ipratropium 0.5 mg-albuterol 2.5 mg (DUONEB) nebulizer solution 1 Dose  Dose: 1 Dose  Freq: ONCE PRN Route: IN  PRN Reasons: Shortness of Breath,Wheezing  Start: 04/12/24 1643 End: 04/12/24 1817  Order specific questions:         labetalol (NORMODYNE;TRANDATE) injection 10 mg  Dose: 10 mg  Freq: EVERY 15 MIN PRN Route: IV  PRN Reason: High Blood Pressure  PRN Comment: for SBP greater than 180 mmHg for 2 consecutive measurements taken from different sites.  Start: 04/12/24 1643 End: 04/12/24 1817  Admin Instructions:  Inform provider if SBP is still greater than 180

## 2024-04-15 NOTE — PLAN OF CARE
Problem: Safety - Adult  Goal: Free from fall injury  Outcome: Progressing     Problem: Pain  Goal: Verbalizes/displays adequate comfort level or baseline comfort level  Outcome: Progressing  Flowsheets (Taken 4/15/2024 0738 by Jp Haddad, RN)  Verbalizes/displays adequate comfort level or baseline comfort level: Encourage patient to monitor pain and request assistance     Problem: Hematologic - Adult  Goal: Maintains hematologic stability  Outcome: Progressing  Flowsheets (Taken 4/13/2024 0511 by Hayley Knowles RN)  Maintains hematologic stability:   Assess for signs and symptoms of bleeding or hemorrhage   Monitor labs for bleeding or clotting disorders

## 2024-04-16 LAB
ABO + RH BLD: NORMAL
ANION GAP SERPL CALCULATED.3IONS-SCNC: 7 MMOL/L (ref 3–16)
BLD GP AB SCN SERPL QL: NORMAL
BLOOD BANK DISPENSE STATUS: NORMAL
BLOOD BANK DISPENSE STATUS: NORMAL
BLOOD BANK PRODUCT CODE: NORMAL
BLOOD BANK PRODUCT CODE: NORMAL
BPU ID: NORMAL
BPU ID: NORMAL
BUN SERPL-MCNC: 5 MG/DL (ref 7–20)
CALCIUM SERPL-MCNC: 8.5 MG/DL (ref 8.3–10.6)
CHLORIDE SERPL-SCNC: 106 MMOL/L (ref 99–110)
CO2 SERPL-SCNC: 26 MMOL/L (ref 21–32)
CREAT SERPL-MCNC: 0.8 MG/DL (ref 0.9–1.3)
DEPRECATED RDW RBC AUTO: 16.1 % (ref 12.4–15.4)
DESCRIPTION BLOOD BANK: NORMAL
DESCRIPTION BLOOD BANK: NORMAL
GFR SERPLBLD CREATININE-BSD FMLA CKD-EPI: >90 ML/MIN/{1.73_M2}
GLUCOSE SERPL-MCNC: 85 MG/DL (ref 70–99)
HCT VFR BLD AUTO: 20.7 % (ref 40.5–52.5)
HGB BLD-MCNC: 7 G/DL (ref 13.5–17.5)
MCH RBC QN AUTO: 26.7 PG (ref 26–34)
MCHC RBC AUTO-ENTMCNC: 33.7 G/DL (ref 31–36)
MCV RBC AUTO: 79.3 FL (ref 80–100)
PLATELET # BLD AUTO: 43 K/UL (ref 135–450)
PMV BLD AUTO: 9.6 FL (ref 5–10.5)
POTASSIUM SERPL-SCNC: 3.7 MMOL/L (ref 3.5–5.1)
RBC # BLD AUTO: 2.61 M/UL (ref 4.2–5.9)
SODIUM SERPL-SCNC: 139 MMOL/L (ref 136–145)
WBC # BLD AUTO: 1.3 K/UL (ref 4–11)

## 2024-04-16 PROCEDURE — 86900 BLOOD TYPING SEROLOGIC ABO: CPT

## 2024-04-16 PROCEDURE — 86850 RBC ANTIBODY SCREEN: CPT

## 2024-04-16 PROCEDURE — 86923 COMPATIBILITY TEST ELECTRIC: CPT

## 2024-04-16 PROCEDURE — 6370000000 HC RX 637 (ALT 250 FOR IP): Performed by: STUDENT IN AN ORGANIZED HEALTH CARE EDUCATION/TRAINING PROGRAM

## 2024-04-16 PROCEDURE — 6360000002 HC RX W HCPCS: Performed by: NURSE PRACTITIONER

## 2024-04-16 PROCEDURE — 80048 BASIC METABOLIC PNL TOTAL CA: CPT

## 2024-04-16 PROCEDURE — 2580000003 HC RX 258: Performed by: STUDENT IN AN ORGANIZED HEALTH CARE EDUCATION/TRAINING PROGRAM

## 2024-04-16 PROCEDURE — 85027 COMPLETE CBC AUTOMATED: CPT

## 2024-04-16 PROCEDURE — 2580000003 HC RX 258: Performed by: NURSE PRACTITIONER

## 2024-04-16 PROCEDURE — 2060000000 HC ICU INTERMEDIATE R&B

## 2024-04-16 PROCEDURE — 36430 TRANSFUSION BLD/BLD COMPNT: CPT

## 2024-04-16 PROCEDURE — 86901 BLOOD TYPING SEROLOGIC RH(D): CPT

## 2024-04-16 PROCEDURE — P9036 PLATELET PHERESIS IRRADIATED: HCPCS

## 2024-04-16 PROCEDURE — 99024 POSTOP FOLLOW-UP VISIT: CPT | Performed by: SURGERY

## 2024-04-16 PROCEDURE — P9040 RBC LEUKOREDUCED IRRADIATED: HCPCS

## 2024-04-16 PROCEDURE — 36592 COLLECT BLOOD FROM PICC: CPT

## 2024-04-16 RX ORDER — LEVOFLOXACIN 500 MG/1
500 TABLET, FILM COATED ORAL DAILY
Status: DISCONTINUED | OUTPATIENT
Start: 2024-04-17 | End: 2024-04-17 | Stop reason: HOSPADM

## 2024-04-16 RX ADMIN — ACETAMINOPHEN 1000 MG: 325 TABLET ORAL at 03:36

## 2024-04-16 RX ADMIN — SODIUM CHLORIDE, PRESERVATIVE FREE 10 ML: 5 INJECTION INTRAVENOUS at 09:40

## 2024-04-16 RX ADMIN — LEVETIRACETAM 1500 MG: 500 TABLET, FILM COATED ORAL at 20:01

## 2024-04-16 RX ADMIN — ACETAMINOPHEN 1000 MG: 325 TABLET ORAL at 15:46

## 2024-04-16 RX ADMIN — SODIUM CHLORIDE 15 ML: 900 IRRIGANT IRRIGATION at 20:06

## 2024-04-16 RX ADMIN — METHOCARBAMOL 500 MG: 500 TABLET ORAL at 20:02

## 2024-04-16 RX ADMIN — PIPERACILLIN AND TAZOBACTAM 4500 MG: 4; .5 INJECTION, POWDER, LYOPHILIZED, FOR SOLUTION INTRAVENOUS at 01:29

## 2024-04-16 RX ADMIN — FLUCONAZOLE 200 MG: 200 TABLET ORAL at 09:36

## 2024-04-16 RX ADMIN — PIPERACILLIN AND TAZOBACTAM 4500 MG: 4; .5 INJECTION, POWDER, LYOPHILIZED, FOR SOLUTION INTRAVENOUS at 18:03

## 2024-04-16 RX ADMIN — LEVETIRACETAM 1500 MG: 500 TABLET, FILM COATED ORAL at 09:36

## 2024-04-16 RX ADMIN — PANTOPRAZOLE SODIUM 40 MG: 40 TABLET, DELAYED RELEASE ORAL at 15:46

## 2024-04-16 RX ADMIN — CYANOCOBALAMIN TAB 1000 MCG 1000 MCG: 1000 TAB at 09:36

## 2024-04-16 RX ADMIN — VALACYCLOVIR HYDROCHLORIDE 500 MG: 500 TABLET, FILM COATED ORAL at 09:36

## 2024-04-16 RX ADMIN — VALACYCLOVIR HYDROCHLORIDE 500 MG: 500 TABLET, FILM COATED ORAL at 20:03

## 2024-04-16 RX ADMIN — PIPERACILLIN AND TAZOBACTAM 4500 MG: 4; .5 INJECTION, POWDER, LYOPHILIZED, FOR SOLUTION INTRAVENOUS at 09:40

## 2024-04-16 RX ADMIN — SODIUM CHLORIDE: 9 INJECTION, SOLUTION INTRAVENOUS at 15:51

## 2024-04-16 RX ADMIN — METHOCARBAMOL 500 MG: 500 TABLET ORAL at 09:36

## 2024-04-16 RX ADMIN — ACETAMINOPHEN 1000 MG: 325 TABLET ORAL at 09:36

## 2024-04-16 RX ADMIN — METHOCARBAMOL 500 MG: 500 TABLET ORAL at 15:46

## 2024-04-16 RX ADMIN — SODIUM CHLORIDE, PRESERVATIVE FREE 10 ML: 5 INJECTION INTRAVENOUS at 20:03

## 2024-04-16 NOTE — BH NOTE
Behavioral Health Intervention Note    Met with patient briefly. Patient denied behavioral health needs today.     Lisette Craig Psy.D., MSCP  Clinical Psychologist

## 2024-04-16 NOTE — PLAN OF CARE
Problem: Hematologic - Adult  Goal: Maintains hematologic stability  4/16/2024 1811 by Petty Escoto, RN  Outcome: Progressing  Note: Patient's hemoglobin this AM:   Recent Labs     04/16/24  0343   HGB 7.0*     Patient's platelet count this AM:   Recent Labs     04/16/24  0343   PLT 43*    Thrombocytopenia Precautions in place.  Patient showing no signs or symptoms of active bleeding.  Patient transfused blood products per orders - see flowsheet.  Patient verbalizes understanding of all instructions. Will continue to assess and implement POC. Call light within reach and hourly rounding in place.       Problem: Pain  Goal: Verbalizes/displays adequate comfort level or baseline comfort level  4/16/2024 1811 by Petty Escoto, RN  Outcome: Progressing     Problem: Safety - Adult  Goal: Free from fall injury  4/16/2024 1811 by Petyt Escoto, RN  Outcome: Progressing

## 2024-04-16 NOTE — PLAN OF CARE
Problem: Safety - Adult  Goal: Free from fall injur  Outcome: Progressing  Free From Fall Injury:   Instruct family/caregiver on patient safety   Based on caregiver fall risk screen, instruct family/caregiver to ask for assistance with transferring infant if caregiver noted to have fall risk factors     Problem: ABCDS Injury Assessment  Goal: Absence of physical injury  Outcome: Progressing  Absence of Physical Injury: Implement safety measures based on patient assessment  Note: - Screening for Orthostasis AND not a [place] Risk per APPLE/ABCDS: Pt bed is in low position, side rails up, call light and belongings are in reach.  Fall risk light is on outside pts room.  Pt encouraged to call for assistance as needed. Will continue with hourly rounds for PO intake, pain needs, toileting and repositioning as needed.        Problem: Pain  Goal: Verbalizes/displays adequate comfort level or baseline comfort level  Outcome: Progressing  Verbalizes/displays adequate comfort level or baseline comfort level:   Encourage patient to monitor pain and request assistance   Administer analgesics based on type and severity of pain and evaluate response   Consider cultural and social influences on pain and pain management   Assess pain using appropriate pain scale   Implement non-pharmacological measures as appropriate and evaluate response   Notify Licensed Independent Practitioner if interventions unsuccessful or patient reports new pain  Note: No pain noted during this shift.       Problem: Hematologic - Adult  Goal: Maintains hematologic stability  Outcome: Progressing  Maintains hematologic stability:   Assess for signs and symptoms of bleeding or hemorrhage   Administer blood products/factors as ordered   Monitor labs for bleeding or clotting disorders  Note: Patient's hemoglobin this AM:   Recent Labs     04/16/24  0343   HGB 7.0*     Patient's platelet count this AM:   Recent Labs     04/16/24  0343   PLT 43*

## 2024-04-17 VITALS
SYSTOLIC BLOOD PRESSURE: 132 MMHG | HEIGHT: 70 IN | OXYGEN SATURATION: 100 % | RESPIRATION RATE: 16 BRPM | TEMPERATURE: 98.2 F | WEIGHT: 176.59 LBS | BODY MASS INDEX: 25.28 KG/M2 | DIASTOLIC BLOOD PRESSURE: 84 MMHG | HEART RATE: 86 BPM

## 2024-04-17 LAB
ALBUMIN SERPL-MCNC: 3.4 G/DL (ref 3.4–5)
ALP SERPL-CCNC: 51 U/L (ref 40–129)
ALT SERPL-CCNC: 22 U/L (ref 10–40)
ANION GAP SERPL CALCULATED.3IONS-SCNC: 9 MMOL/L (ref 3–16)
AST SERPL-CCNC: 18 U/L (ref 15–37)
BILIRUB DIRECT SERPL-MCNC: <0.2 MG/DL (ref 0–0.3)
BILIRUB INDIRECT SERPL-MCNC: ABNORMAL MG/DL (ref 0–1)
BILIRUB SERPL-MCNC: 0.6 MG/DL (ref 0–1)
BLOOD BANK DISPENSE STATUS: NORMAL
BLOOD BANK PRODUCT CODE: NORMAL
BPU ID: NORMAL
BUN SERPL-MCNC: 6 MG/DL (ref 7–20)
CALCIUM SERPL-MCNC: 8.7 MG/DL (ref 8.3–10.6)
CHLORIDE SERPL-SCNC: 103 MMOL/L (ref 99–110)
CO2 SERPL-SCNC: 25 MMOL/L (ref 21–32)
CREAT SERPL-MCNC: 0.7 MG/DL (ref 0.9–1.3)
DEPRECATED RDW RBC AUTO: 15.5 % (ref 12.4–15.4)
DESCRIPTION BLOOD BANK: NORMAL
GFR SERPLBLD CREATININE-BSD FMLA CKD-EPI: >90 ML/MIN/{1.73_M2}
GLUCOSE SERPL-MCNC: 86 MG/DL (ref 70–99)
HCT VFR BLD AUTO: 23.5 % (ref 40.5–52.5)
HGB BLD-MCNC: 8 G/DL (ref 13.5–17.5)
MAGNESIUM SERPL-MCNC: 1.4 MG/DL (ref 1.8–2.4)
MCH RBC QN AUTO: 27.2 PG (ref 26–34)
MCHC RBC AUTO-ENTMCNC: 34.1 G/DL (ref 31–36)
MCV RBC AUTO: 79.8 FL (ref 80–100)
PATH INTERP BLD-IMP: NORMAL
PHOSPHATE SERPL-MCNC: 3.9 MG/DL (ref 2.5–4.9)
PLATELET # BLD AUTO: 47 K/UL (ref 135–450)
PMV BLD AUTO: 8.8 FL (ref 5–10.5)
POTASSIUM SERPL-SCNC: 3.3 MMOL/L (ref 3.5–5.1)
PROT SERPL-MCNC: 5.5 G/DL (ref 6.4–8.2)
RBC # BLD AUTO: 2.94 M/UL (ref 4.2–5.9)
SODIUM SERPL-SCNC: 137 MMOL/L (ref 136–145)
URATE SERPL-MCNC: 3.2 MG/DL (ref 3.5–7.2)
WBC # BLD AUTO: 1.2 K/UL (ref 4–11)

## 2024-04-17 PROCEDURE — 6360000002 HC RX W HCPCS: Performed by: STUDENT IN AN ORGANIZED HEALTH CARE EDUCATION/TRAINING PROGRAM

## 2024-04-17 PROCEDURE — 6370000000 HC RX 637 (ALT 250 FOR IP): Performed by: STUDENT IN AN ORGANIZED HEALTH CARE EDUCATION/TRAINING PROGRAM

## 2024-04-17 PROCEDURE — 6360000002 HC RX W HCPCS: Performed by: NURSE PRACTITIONER

## 2024-04-17 PROCEDURE — 83735 ASSAY OF MAGNESIUM: CPT

## 2024-04-17 PROCEDURE — 84550 ASSAY OF BLOOD/URIC ACID: CPT

## 2024-04-17 PROCEDURE — 80048 BASIC METABOLIC PNL TOTAL CA: CPT

## 2024-04-17 PROCEDURE — 36430 TRANSFUSION BLD/BLD COMPNT: CPT

## 2024-04-17 PROCEDURE — 85027 COMPLETE CBC AUTOMATED: CPT

## 2024-04-17 PROCEDURE — 2580000003 HC RX 258: Performed by: NURSE PRACTITIONER

## 2024-04-17 PROCEDURE — 6370000000 HC RX 637 (ALT 250 FOR IP): Performed by: NURSE PRACTITIONER

## 2024-04-17 PROCEDURE — 80076 HEPATIC FUNCTION PANEL: CPT

## 2024-04-17 PROCEDURE — 84100 ASSAY OF PHOSPHORUS: CPT

## 2024-04-17 RX ADMIN — ACETAMINOPHEN 1000 MG: 325 TABLET ORAL at 17:54

## 2024-04-17 RX ADMIN — METHOCARBAMOL 500 MG: 500 TABLET ORAL at 09:58

## 2024-04-17 RX ADMIN — VALACYCLOVIR HYDROCHLORIDE 500 MG: 500 TABLET, FILM COATED ORAL at 09:58

## 2024-04-17 RX ADMIN — PANTOPRAZOLE SODIUM 40 MG: 40 TABLET, DELAYED RELEASE ORAL at 09:58

## 2024-04-17 RX ADMIN — CYANOCOBALAMIN TAB 1000 MCG 1000 MCG: 1000 TAB at 09:59

## 2024-04-17 RX ADMIN — ACETAMINOPHEN 1000 MG: 325 TABLET ORAL at 12:15

## 2024-04-17 RX ADMIN — LEVETIRACETAM 1500 MG: 500 TABLET, FILM COATED ORAL at 09:58

## 2024-04-17 RX ADMIN — PIPERACILLIN AND TAZOBACTAM 4500 MG: 4; .5 INJECTION, POWDER, LYOPHILIZED, FOR SOLUTION INTRAVENOUS at 02:18

## 2024-04-17 RX ADMIN — POTASSIUM CHLORIDE 20 MEQ: 400 INJECTION, SOLUTION INTRAVENOUS at 10:51

## 2024-04-17 RX ADMIN — LEVOFLOXACIN 500 MG: 500 TABLET, FILM COATED ORAL at 09:58

## 2024-04-17 RX ADMIN — PANTOPRAZOLE SODIUM 40 MG: 40 TABLET, DELAYED RELEASE ORAL at 17:54

## 2024-04-17 RX ADMIN — MAGNESIUM SULFATE HEPTAHYDRATE 4000 MG: 40 INJECTION, SOLUTION INTRAVENOUS at 10:14

## 2024-04-17 RX ADMIN — ACETAMINOPHEN 1000 MG: 325 TABLET ORAL at 00:44

## 2024-04-17 RX ADMIN — POTASSIUM CHLORIDE 20 MEQ: 400 INJECTION, SOLUTION INTRAVENOUS at 13:28

## 2024-04-17 RX ADMIN — POTASSIUM CHLORIDE 20 MEQ: 400 INJECTION, SOLUTION INTRAVENOUS at 12:16

## 2024-04-17 RX ADMIN — METHOCARBAMOL 500 MG: 500 TABLET ORAL at 12:15

## 2024-04-17 RX ADMIN — FLUCONAZOLE 200 MG: 200 TABLET ORAL at 09:58

## 2024-04-17 RX ADMIN — POTASSIUM CHLORIDE 20 MEQ: 400 INJECTION, SOLUTION INTRAVENOUS at 15:01

## 2024-04-17 ASSESSMENT — PAIN SCALES - WONG BAKER: WONGBAKER_NUMERICALRESPONSE: HURTS A LITTLE BIT

## 2024-04-17 ASSESSMENT — PAIN DESCRIPTION - LOCATION: LOCATION: ABDOMEN

## 2024-04-17 ASSESSMENT — PAIN SCALES - GENERAL: PAINLEVEL_OUTOF10: 1

## 2024-04-17 NOTE — PROGRESS NOTES
[facility] Progress Note    2024     Dennys Peguero    MRN: [id number]    : 1980    Referring MD: No referring provider defined for this encounter.      SUBJECTIVE: he is doing well, pain under control. No bleeding. Will discuss with surgery regarding possibly going home and follow up with [facility]      ECOG PS:  (2) Ambulatory and capable of self care, unable to carry out work activity, up and about > 50% or waking hours    KPS: 60% Requires occasional assistance, but is able to care for most of his personal needs    Isolation: None    Medications    Scheduled Meds:   methocarbamol  500 mg Oral TID    docusate sodium  100 mg Oral BID    polyethylene glycol  17 g Oral Daily    acetaminophen  1,000 mg Oral Q6H    levETIRAcetam  1,500 mg Oral BID    pantoprazole  40 mg Oral BID AC    valACYclovir  500 mg Oral BID    cyanocobalamin  1,000 mcg Oral Daily    fluconazole  200 mg Oral Daily    sodium chloride flush  5-40 mL IntraVENous 2 times per day    Saline Mouthwash  15 mL Swish & Spit 4x Daily AC & HS    piperacillin-tazobactam  4,500 mg IntraVENous Q8H     Continuous Infusions:   sodium chloride      sodium chloride      sodium chloride      sodium chloride      sodium chloride 20 mL/hr at 24 0851    sodium chloride      sodium chloride       PRN Meds:.sodium chloride, sodium chloride, HYDROmorphone **OR** HYDROmorphone, oxyCODONE **OR** oxyCODONE, prochlorperazine, ondansetron, sodium chloride, sodium chloride flush, sodium chloride, potassium chloride, magnesium sulfate, magnesium hydroxide, Saline Mouthwash, ALTEplase (CATHFLO) 2 mg in sterile water 2 mL injection, sodium chloride, sodium chloride    ROS:  As noted above, otherwise remainder of 10-point ROS negative    Physical Exam:     I&O:    Intake/Output Summary (Last 24 hours) at 2024 0838  Last data filed at 2024 0645  Gross per 24 hour   Intake 480 ml   Output 550 ml   Net -70 ml         Vital Signs:  /87   Pulse 85   Temp 98.1 
    [facility] Progress Note    2024     Dennys Peguero    MRN: [id number]    : 1980    Referring MD: No referring provider defined for this encounter.      SUBJECTIVE: Dennys states he feels pretty well.  Surprisingly, he is not having much pain.      ECOG PS:  (2) Ambulatory and capable of self care, unable to carry out work activity, up and about > 50% or waking hours    KPS: 60% Requires occasional assistance, but is able to care for most of his personal needs    Isolation: None    Medications    Scheduled Meds:   acetaminophen  1,000 mg Oral Q6H    methocarbamol (ROBAXIN) 500 mg in sodium chloride 0.9 % 100 mL IVPB  500 mg IntraVENous Q8H    levETIRAcetam  1,500 mg Oral BID    pantoprazole  40 mg Oral BID AC    valACYclovir  500 mg Oral BID    cyanocobalamin  1,000 mcg Oral Daily    fluconazole  200 mg Oral Daily    sodium chloride flush  5-40 mL IntraVENous 2 times per day    Saline Mouthwash  15 mL Swish & Spit 4x Daily AC & HS    piperacillin-tazobactam  4,500 mg IntraVENous Q8H     Continuous Infusions:   sodium chloride      sodium chloride      sodium chloride      sodium chloride      sodium chloride 20 mL/hr at 24 0851    sodium chloride      sodium chloride       PRN Meds:.sodium chloride, sodium chloride, HYDROmorphone **OR** HYDROmorphone, oxyCODONE **OR** oxyCODONE, prochlorperazine, ondansetron, sodium chloride, sodium chloride flush, sodium chloride, potassium chloride, magnesium sulfate, magnesium hydroxide, Saline Mouthwash, ALTEplase (CATHFLO) 2 mg in sterile water 2 mL injection, sodium chloride, sodium chloride    ROS:  As noted above, otherwise remainder of 10-point ROS negative    Physical Exam:     I&O:    Intake/Output Summary (Last 24 hours) at 2024 0643  Last data filed at 2024 0300  Gross per 24 hour   Intake 742 ml   Output 1200 ml   Net -458 ml         Vital Signs:  /75   Pulse 80   Temp 98.3 °F (36.8 °C) (Oral)   Resp 18   Ht 1.778 m (5' 10\")   Wt 77 kg 
    [facility] Progress Note    4/15/2024     Dennys Peguero    MRN: [id number]    : 1980    Referring MD: No referring provider defined for this encounter.      SUBJECTIVE: Dennys states he feels pretty well.  Surprisingly, he is not having much pain.      ECOG PS:  (2) Ambulatory and capable of self care, unable to carry out work activity, up and about > 50% or waking hours    KPS: 60% Requires occasional assistance, but is able to care for most of his personal needs    Isolation: None    Medications    Scheduled Meds:   methocarbamol  500 mg Oral TID    docusate sodium  100 mg Oral BID    polyethylene glycol  17 g Oral Daily    acetaminophen  1,000 mg Oral Q6H    levETIRAcetam  1,500 mg Oral BID    pantoprazole  40 mg Oral BID AC    valACYclovir  500 mg Oral BID    cyanocobalamin  1,000 mcg Oral Daily    fluconazole  200 mg Oral Daily    sodium chloride flush  5-40 mL IntraVENous 2 times per day    Saline Mouthwash  15 mL Swish & Spit 4x Daily AC & HS    piperacillin-tazobactam  4,500 mg IntraVENous Q8H     Continuous Infusions:   sodium chloride      sodium chloride      sodium chloride      sodium chloride      sodium chloride 20 mL/hr at 24 0851    sodium chloride      sodium chloride       PRN Meds:.sodium chloride, sodium chloride, HYDROmorphone **OR** HYDROmorphone, oxyCODONE **OR** oxyCODONE, prochlorperazine, ondansetron, sodium chloride, sodium chloride flush, sodium chloride, potassium chloride, magnesium sulfate, magnesium hydroxide, Saline Mouthwash, ALTEplase (CATHFLO) 2 mg in sterile water 2 mL injection, sodium chloride, sodium chloride    ROS:  As noted above, otherwise remainder of 10-point ROS negative    Physical Exam:     I&O:    Intake/Output Summary (Last 24 hours) at 4/15/2024 0840  Last data filed at 4/15/2024 0001  Gross per 24 hour   Intake 1464 ml   Output 850 ml   Net 614 ml         Vital Signs:  /85   Pulse 76   Temp 97.9 °F (36.6 °C) (Oral)   Resp 18   Ht 1.778 m (5' 
    [facility] Progress Note    2024     Dennys Peguero    MRN: [id number]    : 1980    Referring MD: No referring provider defined for this encounter.      SUBJECTIVE: no further bleeding. Plan for OR today for rectal mass resection       ECOG PS:  (2) Ambulatory and capable of self care, unable to carry out work activity, up and about > 50% or waking hours    KPS: 60% Requires occasional assistance, but is able to care for most of his personal needs    Isolation: None    Medications    Scheduled Meds:   sodium chloride flush  5-40 mL IntraVENous 2 times per day    ceFAZolin (ANCEF) IVPB  2,000 mg IntraVENous On Call to OR    And    metroNIDAZOLE  500 mg IntraVENous On Call to OR    enoxaparin  40 mg SubCUTAneous Once    acetaminophen  1,000 mg Oral Once    levETIRAcetam  1,500 mg Oral BID    pantoprazole  40 mg Oral BID AC    valACYclovir  500 mg Oral BID    cyanocobalamin  1,000 mcg Oral Daily    fluconazole  200 mg Oral Daily    sodium chloride flush  5-40 mL IntraVENous 2 times per day    Saline Mouthwash  15 mL Swish & Spit 4x Daily AC & HS    piperacillin-tazobactam  4,500 mg IntraVENous Q8H     Continuous Infusions:   sodium chloride      sodium chloride      electrolyte-A 100 mL/hr (24 0122)    sodium chloride      sodium chloride      sodium chloride 20 mL/hr at 24 0851    sodium chloride      sodium chloride       PRN Meds:.sodium chloride flush, sodium chloride, sodium chloride, prochlorperazine, ondansetron, sodium chloride, sodium chloride flush, sodium chloride, potassium chloride, magnesium sulfate, magnesium hydroxide, acetaminophen **OR** acetaminophen, Saline Mouthwash, ALTEplase (CATHFLO) 2 mg in sterile water 2 mL injection, sodium chloride, sodium chloride    ROS:  As noted above, otherwise remainder of 10-point ROS negative    Physical Exam:     I&O:    Intake/Output Summary (Last 24 hours) at 2024 0755  Last data filed at 2024 0728  Gross per 24 hour   Intake 4205.66 
4 Eyes Admission Assessment     I agree as the admission nurse that 2 RN's have performed a thorough Head to Toe Skin Assessment on the patient. ALL assessment sites listed below have been assessed on admission.       Areas assessed by both nurses: CB and HS  [x]   Head, Face, and Ears   [x]   Shoulders, Back, and Chest  [x]   Arms, Elbows, and Hands   [x]   Coccyx, Sacrum, and Ischium  [x]   Legs, Feet, and Heels        Does the Patient have Skin Breakdown?  No         Alexandre Prevention initiated:  No   Wound Care Orders initiated:  No      WOC nurse consulted for Pressure Injury (Stage 3,4, Unstageable, DTI, NWPT, and Complex wounds) or Alexandre score 18 or lower:  NA      Nurse 1 eSignature: Electronically signed by Cameron Carter RN on 4/10/24 at 6:28 AM EDT    **SHARE this note so that the co-signing nurse is able to place an eSignature**    Nurse 2 eSignature: Electronically signed by Courtney Graves RN on 4/10/24 at 6:30 AM EDT    
Assessment and medications completed. Patient denies pain, vital signs stable at this time. From report patient is already having clear watery stools from bowel prep. Patient aware of NPO at midnight order, patient has been notifying RN when pt has bowel movement. Bowel Prep completed. Will continue to monitor.   
Central line dressing changed using sterile technique following hospital policy. Pilar Fox RN, observed with procedure to ensure proper technique.    
Contacted pharmacy via eMAR at 1735 asking them to send Robaxin to PACU - did not receive it by 1810. Therefore sent pt back to his room on [facility] since his Lopez score = 9 / appropriate for transfer back to floor. Pain is controlled with PRNs, denies nausea. VSS on RA.     Message sent to pharmacy to send IVPB to [facility].   
Dr. Mac notified of patient's critical neutrophil count  
Patient returned to room from PACU, VSS, alert and oriented c/o 10/10 abdominal pain PRN oxycodone and scheduled tylenol given.   
Patient seen and assessed for standard line care needs.  CVC site remains free of visible signs and symptoms of infection. No drainage, edema, erythema, pain, itching or warmth noted at and around the insertion site.  Line care performed by Alirio Schroeder RN.  The need for continued use of the CVC is due to ongoing therapy.  Patient verbalizes understanding of line care education provided by line care RN.  Staff RNs will continue to monitor and assess the CVC site throughout the patient's hospital stay.  Sterile dressing changes will continue to be changed per policy.    Alirio Schroeder RN  
Pt able to ambulate outside today. Pt was greatly appreciative of the [facility]. Pt returned to room. Will continue plan of care. Jp Haddad RN    
Reviewed discharge instructions with patient. Patient verbalizes understanding of f/u appt, medications, incision care and s/s to report to the doctor.Patient PICC dressing clean, dry, Intact and patient verbalizes understanding of PICC precautions. Patient friend/family member arrived for  and patient take to lobby by PCA via wheelchair  
Surgery Daily Progress Note      CC: rectal adenocarcinoma    SUBJECTIVE:  No acute events overnight.  Tachycardia resolved following PRBC administration.  Continues to have bloody bowel movements.      ROS:   A 14 point review of systems was conducted, significant findings as noted above. All other systems negative.      OBJECTIVE:    PHYSICAL EXAM:  Vitals:    04/10/24 1556 04/10/24 1952 04/11/24 0006 04/11/24 0326   BP: 112/72 113/77 120/75 117/69   Pulse: 92 97 96 93   Resp: 20 21 18 18   Temp: 98.8 °F (37.1 °C) 98.5 °F (36.9 °C) 98 °F (36.7 °C) 98.2 °F (36.8 °C)   TempSrc: Oral Oral Oral Oral   SpO2: 100% 99% 98% 98%   Weight:       Height:           General appearance: Alert, no acute distress, grooming appropriate  Eyes: No scleral icterus, EOM grossly intact  Neck: Trachea midline, no JVD, no lymphadenopathy, neck supple  Chest/Lungs: Normal effort with no accessory muscle use on RA  Cardiovascular: Tachycardic, well perfused  Abdomen: Soft, non-tender, non-distended  Skin: warm and dry, no rashes  Extremities: no edema, no cyanosis  Genitourinary: Grossly normal  Neuro: A&Ox3, no focal deficits, sensation intact      ASSESSMENT & PLAN:   Dennys Peguero is a 43 y.o. male with Hx of oligodendoglioma, seizure disorder, recent diagnosis of AML and 4-cm rectal mass in the proximal rectum that was found on colonoscopy during last admission causing rectal bleeding who now presents back to [facility] for continued rectal bleeding with lightheadedness.     - Plan for surgery tomorrow  - Bowel prep, clear liquids today  - Continue to transfuse for Hgb goal > 7 and platelets > 50K  - Rest of care per primary team    Isaac Mullen IV, MD  General Surgery, PGY-3  04/11/24  7:30 AM  [phone]  
Surgery Daily Progress Note      CC: rectal adenocarcinoma    SUBJECTIVE:  No acute events overnight. Pain is stable. Tolerating diet without nausea or vomiting. Had a liquid bowel movement.     ROS:   A 14 point review of systems was conducted, significant findings as noted above. All other systems negative.      OBJECTIVE:    PHYSICAL EXAM:  Vitals:    04/12/24 2359 04/13/24 0023 04/13/24 0427 04/13/24 0707   BP: 123/77  122/78    Pulse: (!) 101  84    Resp: 18 16 16 18   Temp: 98.3 °F (36.8 °C)  98.2 °F (36.8 °C)    TempSrc: Oral  Oral    SpO2: 98%  98%    Weight:       Height:           General appearance: Alert, no acute distress, grooming appropriate  Eyes: No scleral icterus, EOM grossly intact  Neck: Trachea midline, no JVD, no lymphadenopathy, neck supple  Chest/Lungs: Normal effort with no accessory muscle use on RA  Cardiovascular: RRR, well-perfused  Abdomen: Soft, appropriately-tender, incisions c/d/i and well approximated with Dermabond  Skin: Warm and dry, no rashes  : chan in place with clear yellow urine  Extremities: No edema, no cyanosis, baseline groin swelling R  Neuro: A&Ox3, no focal deficits, sensation intact      ASSESSMENT & PLAN:   Dennys Peguero is a 43 y.o. male with Hx of oligodendoglioma, seizure disorder, recent diagnosis of AML and 4-cm rectal mass in the proximal rectum status post robotic low anterior resection with colorectal anastomosis secondary to bleeding rectal cancer. (4/12)     - Multimodal pain control  - IS ordered to bedside, encourage hourly IS and deep breathing, wean oxygen as tolerated   - General low microbial diet  - Encourage OOB to chair and ambulation and IS use.  - Continue to transfuse for Hgb goal > 7 and platelets > 50K  - Educated patient that some blood with ROBF may occur, please contact residents if excessive bleeding is noted  - Okay to remove chan  - Rest of care per primary team    James Reveles DO  PGY-1, General Surgery Resident  04/13/24 7:56 
Surgery Daily Progress Note      CC: rectal adenocarcinoma    SUBJECTIVE:  No acute events. Abdominal pain is stable. Tolerating diet without nausea or vomiting. Felt constipated yesterday. Having bowel function. Non-bloody bowel movement yesterday.     ROS:   A 14 point review of systems was conducted, significant findings as noted above. All other systems negative.      OBJECTIVE:    PHYSICAL EXAM:  Vitals:    04/14/24 1615 04/14/24 2010 04/14/24 2357 04/15/24 0419   BP: 122/78 127/81  131/83   Pulse: 76 82 95 91   Resp: 16 18 16 18   Temp: 98.3 °F (36.8 °C) 98.5 °F (36.9 °C) 98.5 °F (36.9 °C) 98.1 °F (36.7 °C)   TempSrc: Oral Oral Oral Oral   SpO2: 100% 100%     Weight:       Height:           General appearance: Alert, no acute distress  Eyes: No scleral icterus, EOM grossly intact  Neck: Trachea midline, neck supple  Chest/Lungs: Normal effort with no accessory muscle use on RA  Cardiovascular: RRR, well-perfused  Abdomen: Soft, appropriately-tender, incisions c/d/i and well approximated with Dermabond  Skin: Warm and dry, no rashes  Extremities: No edema, no cyanosis, baseline groin swelling R  Neuro: A&Ox3, no focal deficits, sensation intact      ASSESSMENT & PLAN:   Dennys Peguero is a 43 y.o. male with Hx of oligodendoglioma, seizure disorder, recent diagnosis of AML and 4-cm rectal mass in the proximal rectum status post robotic low anterior resection with colorectal anastomosis secondary to bleeding rectal cancer. (4/12)     - Multimodal pain control  - IS ordered to bedside, encourage hourly IS and deep breathing, wean oxygen as tolerated   - General low microbial diet, bowel regimen  - Encourage OOB to chair and ambulation and IS use.  - Continue to transfuse for Hgb goal > 7 and platelets > 50K  - Rest of care per primary team    James Reveles DO  PGY-1, General Surgery Resident  04/15/24 6:58 AM  [phone]          
Surgery Daily Progress Note      CC: rectal adenocarcinoma    SUBJECTIVE:  No acute events. Ambulating. Voiding. Tolerating diet without nausea or vomiting. Pain is stable, sore with movement. Having liquid bowel movements without blood.     ROS:   A 14 point review of systems was conducted, significant findings as noted above. All other systems negative.      OBJECTIVE:    PHYSICAL EXAM:  Vitals:    04/13/24 1552 04/13/24 2113 04/14/24 0034 04/14/24 0407   BP: 116/79 124/82 111/75 113/75   Pulse:  80 85 80   Resp: 18 18 16 18   Temp: 98.4 °F (36.9 °C) 98.5 °F (36.9 °C) 98.2 °F (36.8 °C) 98.3 °F (36.8 °C)   TempSrc: Oral Oral Oral Oral   SpO2: 98% 100% 99% 98%   Weight:       Height:           General appearance: Alert, no acute distress  Eyes: No scleral icterus, EOM grossly intact  Neck: Trachea midline, neck supple  Chest/Lungs: Normal effort with no accessory muscle use on RA  Cardiovascular: RRR, well-perfused  Abdomen: Soft, appropriately-tender, incisions c/d/i and well approximated with Dermabond  Skin: Warm and dry, no rashes  Extremities: No edema, no cyanosis, baseline groin swelling R  Neuro: A&Ox3, no focal deficits, sensation intact      ASSESSMENT & PLAN:   Dennys Peguero is a 43 y.o. male with Hx of oligodendoglioma, seizure disorder, recent diagnosis of AML and 4-cm rectal mass in the proximal rectum status post robotic low anterior resection with colorectal anastomosis secondary to bleeding rectal cancer. (4/12)     - Multimodal pain control  - IS ordered to bedside, encourage hourly IS and deep breathing, wean oxygen as tolerated   - General low microbial diet  - Encourage OOB to chair and ambulation and IS use.  - Continue to transfuse for Hgb goal > 7 and platelets > 50K  - Educated patient that some blood with ROBF may occur, please contact residents if excessive bleeding is noted  - Rest of care per primary team    James Reveles DO  PGY-1, General Surgery Resident  04/14/24 7:30 
Surgery Daily Progress Note      CC: rectal adenocarcinoma    SUBJECTIVE:  No acute overnight events. Patient continues to have more solid non-bloody bowel movements. Tolerating diet without nausea or vomiting. Ambulating. Pain is controlled.     ROS:   A 14 point review of systems was conducted, significant findings as noted above. All other systems negative.      OBJECTIVE:    PHYSICAL EXAM:  Vitals:    04/17/24 0410 04/17/24 0824 04/17/24 0908 04/17/24 0924   BP: 129/81  128/83 131/89   Pulse: 78  85 88   Resp: 16 18 18   Temp: 98 °F (36.7 °C) 98.4 °F (36.9 °C) 98.6 °F (37 °C) 98.3 °F (36.8 °C)   TempSrc: Oral Oral Oral Oral   SpO2: 97%  100% 100%   Weight:       Height:           General appearance: Alert, no acute distress; sitting on side of bed  Eyes: No scleral icterus, EOM grossly intact  Neck: Trachea midline, neck supple  Chest/Lungs: Normal effort with no accessory muscle use on RA  Cardiovascular: RRR, well-perfused  Abdomen: Soft, appropriately-tender, incisions c/d/i and well approximated with Dermabond  Skin: Warm and dry, no rashes  Extremities: No edema, no cyanosis, baseline groin swelling R  Neuro: A&Ox3, no focal deficits, sensation intact      ASSESSMENT & PLAN:   Dennys Peguero is a 43 y.o. male with Hx of oligodendoglioma, seizure disorder, recent diagnosis of AML and 4-cm rectal mass in the proximal rectum status post robotic low anterior resection with colorectal anastomosis secondary to bleeding rectal cancer. (4/12)     - Multimodal pain control  - IS ordered to bedside, encourage hourly IS and deep breathing, wean oxygen as tolerated   - General low microbial diet, bowel regimen  - Encourage OOB to chair and ambulation and IS use  - Okay to discharge from surgery standpoint given no further episodes of bloody BMs and hemodynamic stability  - Rest of care including disposition per primary team    Mirna Mclean DO, MSMEd  PGY-2, General Surgery  04/17/24  11:18 AM  Efra  
Surgery Daily Progress Note      CC: rectal adenocarcinoma    SUBJECTIVE:  No acute overnight events. Patient is having more solid bowel movements. No blood in bowel movements. Tolerating diet without nausea or vomiting. Ambulating. Pain is controlled.     ROS:   A 14 point review of systems was conducted, significant findings as noted above. All other systems negative.      OBJECTIVE:    PHYSICAL EXAM:  Vitals:    04/15/24 2143 04/16/24 0034 04/16/24 0332 04/16/24 0629   BP: 134/84 120/77 121/82 128/81   Pulse: 99 81 88 93   Resp: 20 20 18 18   Temp: 98.5 °F (36.9 °C) 97.7 °F (36.5 °C) 98.2 °F (36.8 °C) 97.9 °F (36.6 °C)   TempSrc: Oral Oral Oral Oral   SpO2: 100% 96% 95% 98%   Weight:       Height:           General appearance: Alert, no acute distress  Eyes: No scleral icterus, EOM grossly intact  Neck: Trachea midline, neck supple  Chest/Lungs: Normal effort with no accessory muscle use on RA  Cardiovascular: RRR, well-perfused  Abdomen: Soft, appropriately-tender, incisions c/d/i and well approximated with Dermabond  Skin: Warm and dry, no rashes  Extremities: No edema, no cyanosis, baseline groin swelling R  Neuro: A&Ox3, no focal deficits, sensation intact      ASSESSMENT & PLAN:   Dennys Peguero is a 43 y.o. male with Hx of oligodendoglioma, seizure disorder, recent diagnosis of AML and 4-cm rectal mass in the proximal rectum status post robotic low anterior resection with colorectal anastomosis secondary to bleeding rectal cancer. (4/12)     - Multimodal pain control  - IS ordered to bedside, encourage hourly IS and deep breathing, wean oxygen as tolerated   - General low microbial diet, bowel regimen  - Encourage OOB to chair and ambulation and IS use.  - Continue to transfuse for Hgb goal > 7 and platelets > 50K  - Rest of care including disposition per primary team    James Reveles DO  PGY-1, General Surgery Resident  04/16/24 6:42 AM  [phone]          
Surgery Update:    Patient to OR today. Please keep platelets above 100. Please transfuse as needed to meet goal prior to operating room.    Zohra Short MD  PGY3, General Surgery  04/12/24  7:08 AM  [facility]  [phone]    
care      Mirna Mclean DO, MSMEd  PGY-2, General Surgery  04/12/24  9:09 PM  E[place]  
)  Maternal Grandfather- Breast Cancer ( late )      TREATMENT:               1. Oligodendroglioma              - Partial resection               - Radiation (5400 cGy)   - Temodar:       - 19- 19       - 3/27-3/30/19: Cycle #1 Temozolomide 150 mg/m2 (4 day cycle            due to low platelet count)       - 19-19: Cycle #2 Temozolomide 150 mg/m2      2. AML w/underlying MDS  - Induction w/ Dacogen + Venetoclax (3/12/24)     3. Rectal Adenocarcinoma  - Surgical resection pending count recovery- plan for 24 with increased bleeding           ASSESSMENT AND PLAN:             1. 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  Peripheral blood smear 3/8/24: Unimpressive schistocytes and labs concerning for carlos negative hemolytic anemia, unlikely TTP or TMA. No need for PLEX. 20% blasts  BMBx 3/8/24: Hypercellular bone marrow (approaching 100% cellularity) showing increased blasts (13%) and trilineage myelodysplasia, consistent with clonal myeloid neoplasm (see comment). Flow cytometery showing 20% blasts.  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    - CG: Abnormal male karyotype 47~50,XY,juliette(3)t(3;11)(p14;q23),add(5) (q13),+8,-13,add(13)(q13), juliette(15)t(13;15) (q14;q26),juliette(15;20)t(15;20) (q11.2;p13) del(20)(q11.2q13.3) ,+juliette(15;20)t(15;20) (q11.2;p13)del(20) (q11.2q13.3)x1~2,+dup(15)(q15q22), +17,dic(17;?)(p11.1;?),+21, +1~2mar, 3~46dmin[cp20  - NGS: TP53 mutation positive.  - PCRs: FLT3 ITD/TKD- Not Detected  BMBx 4/2/24: morphology pending but flow cytometery showed 7% blasts, consistent with VT.    PLAN: Induction with Dacogen and Venetoclax (C1D1 3/13/24) target dose of 400 mg (dose adjust for drug interaction) for 21 days. Plan to do total 2 induction cycles.  - Current ECOG 1, HLA typing done and pt has 4 full siblings as potential donors. ECHO- wnl. PFT 
down  -Will also refer to Dr. Russo for  rectal ca follow up post resection.                          - Pelvic MRI 3/25/24: Radiological Stage: T 1/2, N 1.  MRF: Clear. Sphincter involvement: No. Suspicious extra mesorectal nodes: None. EMVI: Absent  - CT chest 3/25/24: No acute pathology               - CEA: 6.5  - Continue protonix 40mg BID   Nutrition:    - Low microbial diet  C diff Colitis:  - 3/7/24: c diff positive  - S/P Dificid (3/8/24-3/17/24)     6.Neuro:  H/O Oligodendroglioma 2017, seizures since 2009  - Cont on Keppra 1500 mg PO BID  - s/p partial resection, radiation and Temodar     7. Cardiac  - ECHO 3/8/24:  EF 55-60% Mild tricuspid regurgitation. Mild mitral regurgitation is present.    8. Genetics  - Referral placed to [facility] genetics for possible germline mutation. Sister tested positive for BRCA.      - DVT Prophylaxis: Platelets <50,000 cells/dL - prophylactic lovenox on hold and mechanical prophylaxis with bilateral SCDs while in bed in place.  Contraindications to pharmacologic prophylaxis: Thrombocytopenia  Contraindications to mechanical prophylaxis: None      - Disposition: Unknown    The patient was seen and examined by Dr. Gabby Porter, APRN - CNP

## 2024-04-17 NOTE — PLAN OF CARE
Problem: Safety - Adult  Goal: Free from fall injury  4/17/2024 1426 by Ruby Peralta RN  Outcome: Progressing  4/17/2024 0829 by Shruti Roberts RN  Outcome: Progressing  Flowsheets  Taken 4/17/2024 0829 by Shruti Roberts RN  Free From Fall Injury:   Instruct family/caregiver on patient safety   Based on caregiver fall risk screen, instruct family/caregiver to ask for assistance with transferring infant if caregiver noted to have fall risk factors  Taken 4/17/2024 0825 by Ruby Peralta RN  Free From Fall Injury: Instruct family/caregiver on patient safety     Problem: Pain  Goal: Verbalizes/displays adequate comfort level or baseline comfort level  4/17/2024 1426 by Ruby Peralta RN  Outcome: Progressing  4/17/2024 0829 by Shruti Roberts RN  Outcome: Progressing  Flowsheets (Taken 4/17/2024 0829)  Verbalizes/displays adequate comfort level or baseline comfort level:   Encourage patient to monitor pain and request assistance   Administer analgesics based on type and severity of pain and evaluate response   Consider cultural and social influences on pain and pain management   Assess pain using appropriate pain scale   Implement non-pharmacological measures as appropriate and evaluate response   Notify Licensed Independent Practitioner if interventions unsuccessful or patient reports new pain  Note: No pain noted during this shift.

## 2024-04-17 NOTE — DISCHARGE SUMMARY
smear 3/8/24: Unimpressive schistocytes and labs concerning for carlos negative hemolytic anemia, unlikely TTP or TMA. No need for PLEX. 20% blasts  BMBx 3/8/24: Hypercellular bone marrow (approaching 100% cellularity) showing increased blasts (13%) and trilineage myelodysplasia, consistent with clonal myeloid neoplasm (see comment). Flow cytometery showing 20% blasts.  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    - CG: Abnormal male karyotype 47~50,XY,juliette(3)t(3;11)(p14;q23),add(5) (q13),+8,-13,add(13)(q13), juliette(15)t(13;15) (q14;q26),juliette(15;20)t(15;20) (q11.2;p13) del(20)(q11.2q13.3) ,+juliette(15;20)t(15;20) (q11.2;p13)del(20) (q11.2q13.3)x1~2,+dup(15)(q15q22), +17,dic(17;?)(p11.1;?),+21, +1~2mar, 3~46dmin[cp20  - NGS: TP53 mutation positive.  - PCRs: FLT3 ITD/TKD- Not Detected  BMBx 4/2/24: morphology pending but flow cytometery showed 7% blasts, consistent with NM.    PLAN: Induction with Dacogen and Venetoclax (C1D1 3/13/24) target dose of 400 mg (dose adjust for drug interaction) for 21 days. Plan to do total 2 induction cycles.  - Current ECOG 1, HLA typing done and pt has 4 full siblings as potential donors. ECHO- wnl. PFT pending.   - His HCT-CI score will be 4 ( 1- seizure disorder and 3- Rectal adenoca).     Cycle 1 Day 37 Dacogen and Venetoclax  - Plan to do cycle # 2 induction therapy with Dacogen plus Venetoclax with Tian for 28 days post surgical resection of rectal mass. Surgery requests waiting 2 weeks postop prior to starting chemotherapy (2 weeks from 4/12/24).   - Will repeat BMBx after cycle # 2 or 3 and if pt still has persistent disease, will do salvage induction therapy with Vyxeos followed by MAC based alloSCT using BuFlu/PTCy based regimen depending on post surgical course for rectal adenoCa.    2. ID: Afebrile  - Pan-cx 4/9/24:  NG   - CXray 4/9/24:  No acute issues  - s/p 7 days of Zosyn (started 4/10/24)    - Cont Levaquin,Diflucan, Valtrex    - C-Diff + 3/7/24: s/p

## 2024-04-17 NOTE — DISCHARGE INSTRUCTIONS
Diet:   May resume regular diet    Wound Care:   Surgical grade glue was used on your incision, this will aid in the healing of your incision. It will peel off on its own within 10 days. If it does not peel off in 10 days, you may use petroleum jelly to gently remove it. Do not soak or scrub area of incision for 7-10 days.    Activity:   No heavy lifting greater than a milk jug, or 8 lbs until follow up.    Pain management:   For moderate to severe pain please take the Roxicodone that you were prescribed. If you take narcotics, note that they may cause constipation. For constipation take Colace up to two times a day as needed. If stools become loose, you may reduce the amount of colace you are taking or stop taking all together. Take as little narcotic pain medication as you can tolerate. No driving while on narcotics.    For mild to moderate pain you may take over-the-counter Tylenol or Motrin as directed on the packaging.     Return if:   Call/ Return to ED for increased redness, worsening pain, drainage from wound, or fevers greater than 101.5 F.      Follow up with Dr. Montano in 2 week(s). Please call the office to make an appointment. [phone]

## 2024-04-17 NOTE — PLAN OF CARE
Problem: Safety - Adult  Goal: Free from fall injury  4/17/2024 1852 by Ruby Peralta RN  Outcome: Adequate for Discharge  4/17/2024 1426 by Ruby Peralta RN  Outcome: Progressing  4/17/2024 0829 by Shruti Roberts RN  Outcome: Progressing  Flowsheets  Taken 4/17/2024 0829 by Shruti Roberts RN  Free From Fall Injury:   Instruct family/caregiver on patient safety   Based on caregiver fall risk screen, instruct family/caregiver to ask for assistance with transferring infant if caregiver noted to have fall risk factors  Taken 4/17/2024 0825 by Ruby Peralta RN  Free From Fall Injury: Instruct family/caregiver on patient safety     Problem: ABCDS Injury Assessment  Goal: Absence of physical injury  4/17/2024 1852 by Ruby Peralta RN  Outcome: Adequate for Discharge  4/17/2024 0829 by Shruti Roberts RN  Outcome: Progressing  Flowsheets  Taken 4/17/2024 0829 by Shruti Roberts RN  Absence of Physical Injury: Implement safety measures based on patient assessment  Taken 4/17/2024 0825 by Ruby Peralta RN  Absence of Physical Injury: Implement safety measures based on patient assessment  Note: - Screening for Orthostasis AND not a [place] Risk per APPLE/ABCDS: Pt bed is in low position, side rails up, call light and belongings are in reach.  Fall risk light is on outside pts room.  Pt encouraged to call for assistance as needed. Will continue with hourly rounds for PO intake, pain needs, toileting and repositioning as needed.       Problem: Pain  Goal: Verbalizes/displays adequate comfort level or baseline comfort level  4/17/2024 1852 by Ruby Peralta RN  Outcome: Adequate for Discharge  4/17/2024 1426 by Ruby Peralta RN  Outcome: Progressing  4/17/2024 0829 by Shruti Roberts RN  Outcome: Progressing  Flowsheets (Taken 4/17/2024 0829)  Verbalizes/displays adequate comfort level or baseline comfort level:   Encourage patient to monitor pain and request assistance   Administer

## 2024-04-17 NOTE — PLAN OF CARE
Problem: Safety - Adult  Goal: Free from fall injury  Outcome: Progressing  Free From Fall Injury:   Instruct family/caregiver on patient safety   Based on caregiver fall risk screen, instruct family/caregiver to ask for assistance with transferring infant if caregiver noted to have fall risk factors    Problem: ABCDS Injury Assessment  Goal: Absence of physical injury  Outcome: Progressing  Absence of Physical Injury: Implement safety measures based on patient assessment  Note: - Screening for Orthostasis AND not a [place] Risk per APPLE/ABCDS: Pt bed is in low position, side rails up, call light and belongings are in reach.  Fall risk light is on outside pts room.  Pt encouraged to call for assistance as needed. Will continue with hourly rounds for PO intake, pain needs, toileting and repositioning as needed.      Problem: Pain  Goal: Verbalizes/displays adequate comfort level or baseline comfort level  Outcome: Progressing  Verbalizes/displays adequate comfort level or baseline comfort level:   Encourage patient to monitor pain and request assistance   Administer analgesics based on type and severity of pain and evaluate response   Consider cultural and social influences on pain and pain management   Assess pain using appropriate pain scale   Implement non-pharmacological measures as appropriate and evaluate response   Notify Licensed Independent Practitioner if interventions unsuccessful or patient reports new pain  Note: No pain noted during this shift.       Problem: Hematologic - Adult  Goal: Maintains hematologic stability  Outcome: Progressing  Maintains hematologic stability:   Assess for signs and symptoms of bleeding or hemorrhage   Administer blood products/factors as ordered   Monitor labs for bleeding or clotting disorders  Note: Patient's hemoglobin this AM:   Recent Labs     04/17/24  0420   HGB 8.0*     Patient's platelet count this AM:   Recent Labs     04/17/24  0420   PLT 47*

## 2024-04-17 NOTE — CARE COORDINATION
Applied pt for the [employer] on the LLS and is pending Diagnosis Verification. Application reference [id number]    JENNIFER Ortiz   for [facility] ([facility])  [employer] Mobile: [phone]    
Attended MD rounds. Pt's goal is to return home when able with no anticipated needs.     SW notes pt going to the OR tomorrow; will follow for post-op recommendations.     Sharla COLON, JENNIFER   for [facility] ([facility])  [employer] Mobile: [phone]      
Case Management Assessment  Initial Evaluation    Date/Time of Evaluation: 4/10/2024 11:28 AM  Assessment Completed by: JENNIFER Ortiz   for [facility] ([facility])  [employer] Mobile: [phone]    If patient is discharged prior to next notation, then this note serves as note for discharge by case management.    Patient Name: Dennys Peguero                   YOB: 1980  Diagnosis: Rectal bleeding [K62.5]  Febrile neutropenia (HCC) [D70.9, R50.81]  Neutropenic fever (HCC) [D70.9, R50.81]  Anemia, unspecified type [D64.9]                   Date / Time: 4/9/2024 10:09 PM    Patient Admission Status: Inpatient   Readmission Risk (Low < 19, Mod (19-27), High > 27): Readmission Risk Score: 26    Current PCP: No primary care provider on file.  PCP verified by CM? Yes    Chart Reviewed: Yes      History Provided by: Patient  Patient Orientation: Alert and Oriented    Patient Cognition: Alert    Hospitalization in the last 30 days (Readmission):  Yes    If yes, Readmission Assessment in CM Navigator will be completed.    Advance Directives:      Code Status: Full Code   Patient's Primary Decision Maker is: Legal Next of Kin    Primary Decision Maker: Pierre Lowry - [phone]    Discharge Planning:    Patient lives with: Alone Type of Home: Apartment  Primary Care Giver:    Patient Support Systems include: Family Members   Current Financial resources: Other (Comment) ([facility])  Current community resources: None  Current services prior to admission: Other (Comment) ([facility])            Current DME:              Type of Home Care services:  None    ADLS  Prior functional level: Independent in ADLs/IADLs  Current functional level: Independent in ADLs/IADLs    PT AM-PAC:   /24  OT AM-PAC:   /24    Family can provide assistance at DC: Yes  Would you like Case Management to discuss the discharge plan with any other family members/significant others, and if so, who? 
Spoke with pt at bedside briefly with RN present. Pt denied needs for writer at this time.    Pt will return home with no needs anticipated. Pt stated he was ambulating the halls earlier today.    SW will follow but anticipates no needs.     Sharla COOLN, JENNIFER   for [facility] ([facility])  [employer] Mobile: [phone]      
Product type* /      Pharmacy:    [facility] [id number] - [place], [place] - [address] - P [phone] - F [phone]  [address] [address]  Phone: [phone] Fax: [phone]    [facility] - [place], [place] - [address] - P [phone] - F [phone]  [address]  [facility] [address]  Phone: [phone] Fax: [phone]      Assistance purchasing medications?: Potential Assistance Purchasing Medications: No  Assistance provided by Case Management: None at this time    Does patient want to participate in local refill/ meds to beds program?: Yes    Meds To Beds General Rules:  1. Can ONLY be done Monday- Friday between 8:30am-5pm  2. Prescription(s) must be in pharmacy by 3pm to be filled same day  3.Copy of patient's insurance/ prescription drug card and patient face sheet must be sent along with the prescription(s)  4. Cost of Rx cannot be added to hospital bill. If financial assistance is needed, please contact unit  or ;  or  CANNOT provide pharmacy voucher for patients co-pays  5. Patients can then  the prescription on their way out of the hospital at discharge, or pharmacy can deliver to the bedside if staff is available. (payment due at time of pick-up or delivery - cash, check, or card accepted)     Able to afford home medications/ co-pay costs: Yes    ADLS:  Current PT AM-PAC Score:   /24  Current OT AM-PAC Score:   /24    DISCHARGE Disposition: Home- No Services Needed    IMM Completed:   Not Indicated due to: managed medicaid     Transportation:  Transportation PLAN for discharge: family   Mode of Transport: Private Car    Home Care:  Home Care ordered at discharge: Not Indicated    Home Oxygen and Respiratory Equipment:  Oxygen needed at discharge?: Not Indicated; 100% per vitals in epic    Additional CM Notes: Chart review completed and discharge order noted. Pt is returning

## 2024-04-22 LAB
FUNGUS BLD CULT: NORMAL
FUNGUS BLD CULT: NORMAL
FUNGUS SPEC CULT: NORMAL
LOEFFLER MB STN SPEC: NORMAL

## 2024-04-26 ENCOUNTER — HOSPITAL ENCOUNTER (INPATIENT)
Age: 44
LOS: 11 days | Discharge: HOME | DRG: 720 | End: 2024-05-07
Attending: STUDENT IN AN ORGANIZED HEALTH CARE EDUCATION/TRAINING PROGRAM | Admitting: INTERNAL MEDICINE
Payer: MEDICAID

## 2024-04-26 ENCOUNTER — APPOINTMENT (OUTPATIENT)
Dept: GENERAL RADIOLOGY | Age: 44
DRG: 720 | End: 2024-04-26
Attending: STUDENT IN AN ORGANIZED HEALTH CARE EDUCATION/TRAINING PROGRAM
Payer: MEDICAID

## 2024-04-26 ENCOUNTER — CLINICAL DOCUMENTATION (OUTPATIENT)
Dept: SURGERY | Age: 44
End: 2024-04-26

## 2024-04-26 LAB
BILIRUB UR QL STRIP.AUTO: NEGATIVE
C DIFF TOX A+B STL QL IA: NORMAL
CLARITY UR: CLEAR
COLOR UR: YELLOW
CRP SERPL-MCNC: 114.6 MG/L (ref 0–5.1)
GLUCOSE UR STRIP.AUTO-MCNC: NEGATIVE MG/DL
HGB UR QL STRIP.AUTO: NEGATIVE
KETONES UR STRIP.AUTO-MCNC: NEGATIVE MG/DL
LACTATE BLDV-SCNC: 1.1 MMOL/L (ref 0.4–2)
LACTATE BLDV-SCNC: 2.1 MMOL/L (ref 0.4–2)
LEUKOCYTE ESTERASE UR QL STRIP.AUTO: NEGATIVE
NITRITE UR QL STRIP.AUTO: NEGATIVE
PH UR STRIP.AUTO: 6 [PH] (ref 5–8)
PROCALCITONIN SERPL IA-MCNC: 22.22 NG/ML (ref 0–0.15)
PROT UR STRIP.AUTO-MCNC: NEGATIVE MG/DL
SP GR UR STRIP.AUTO: 1.02 (ref 1–1.03)
UA DIPSTICK W REFLEX MICRO PNL UR: NORMAL
URN SPEC COLLECT METH UR: NORMAL
UROBILINOGEN UR STRIP-ACNC: 0.2 E.U./DL

## 2024-04-26 PROCEDURE — 6360000002 HC RX W HCPCS: Performed by: NURSE PRACTITIONER

## 2024-04-26 PROCEDURE — 81003 URINALYSIS AUTO W/O SCOPE: CPT

## 2024-04-26 PROCEDURE — 2580000003 HC RX 258: Performed by: NURSE PRACTITIONER

## 2024-04-26 PROCEDURE — 87324 CLOSTRIDIUM AG IA: CPT

## 2024-04-26 PROCEDURE — 83605 ASSAY OF LACTIC ACID: CPT

## 2024-04-26 PROCEDURE — 6370000000 HC RX 637 (ALT 250 FOR IP): Performed by: NURSE PRACTITIONER

## 2024-04-26 PROCEDURE — 86140 C-REACTIVE PROTEIN: CPT

## 2024-04-26 PROCEDURE — 87449 NOS EACH ORGANISM AG IA: CPT

## 2024-04-26 PROCEDURE — 84145 PROCALCITONIN (PCT): CPT

## 2024-04-26 PROCEDURE — 71046 X-RAY EXAM CHEST 2 VIEWS: CPT

## 2024-04-26 PROCEDURE — 87493 C DIFF AMPLIFIED PROBE: CPT

## 2024-04-26 PROCEDURE — 2580000003 HC RX 258

## 2024-04-26 PROCEDURE — 2060000000 HC ICU INTERMEDIATE R&B

## 2024-04-26 PROCEDURE — 93005 ELECTROCARDIOGRAM TRACING: CPT | Performed by: NURSE PRACTITIONER

## 2024-04-26 PROCEDURE — 36415 COLL VENOUS BLD VENIPUNCTURE: CPT

## 2024-04-26 RX ORDER — SODIUM CHLORIDE 9 MG/ML
INJECTION, SOLUTION INTRAVENOUS PRN
Status: DISCONTINUED | OUTPATIENT
Start: 2024-04-26 | End: 2024-05-07 | Stop reason: HOSPADM

## 2024-04-26 RX ORDER — 0.9 % SODIUM CHLORIDE 0.9 %
1000 INTRAVENOUS SOLUTION INTRAVENOUS ONCE
Status: COMPLETED | OUTPATIENT
Start: 2024-04-26 | End: 2024-04-26

## 2024-04-26 RX ORDER — ONDANSETRON 4 MG/1
4 TABLET, ORALLY DISINTEGRATING ORAL 3 TIMES DAILY PRN
Status: DISCONTINUED | OUTPATIENT
Start: 2024-04-26 | End: 2024-04-29

## 2024-04-26 RX ORDER — METOPROLOL TARTRATE 1 MG/ML
5 INJECTION, SOLUTION INTRAVENOUS
Status: DISCONTINUED | OUTPATIENT
Start: 2024-04-26 | End: 2024-04-27

## 2024-04-26 RX ORDER — LANOLIN ALCOHOL/MO/W.PET/CERES
1000 CREAM (GRAM) TOPICAL DAILY
Status: DISCONTINUED | OUTPATIENT
Start: 2024-04-26 | End: 2024-05-07 | Stop reason: HOSPADM

## 2024-04-26 RX ORDER — MAGNESIUM SULFATE IN WATER 40 MG/ML
4000 INJECTION, SOLUTION INTRAVENOUS PRN
Status: DISCONTINUED | OUTPATIENT
Start: 2024-04-26 | End: 2024-05-07 | Stop reason: HOSPADM

## 2024-04-26 RX ORDER — POTASSIUM CHLORIDE 7.45 MG/ML
10 INJECTION INTRAVENOUS PRN
Status: DISCONTINUED | OUTPATIENT
Start: 2024-04-26 | End: 2024-05-07 | Stop reason: HOSPADM

## 2024-04-26 RX ORDER — PANTOPRAZOLE SODIUM 40 MG/1
40 TABLET, DELAYED RELEASE ORAL
Status: DISCONTINUED | OUTPATIENT
Start: 2024-04-26 | End: 2024-05-07 | Stop reason: HOSPADM

## 2024-04-26 RX ORDER — SODIUM CHLORIDE 0.9 % (FLUSH) 0.9 %
5-40 SYRINGE (ML) INJECTION PRN
Status: DISCONTINUED | OUTPATIENT
Start: 2024-04-26 | End: 2024-05-07 | Stop reason: HOSPADM

## 2024-04-26 RX ORDER — SODIUM CHLORIDE 9 MG/ML
INJECTION, SOLUTION INTRAVENOUS CONTINUOUS
Status: DISCONTINUED | OUTPATIENT
Start: 2024-04-26 | End: 2024-04-29

## 2024-04-26 RX ORDER — LANOLIN ALCOHOL/MO/W.PET/CERES
CREAM (GRAM) TOPICAL 2 TIMES DAILY
Status: DISCONTINUED | OUTPATIENT
Start: 2024-04-26 | End: 2024-05-07 | Stop reason: HOSPADM

## 2024-04-26 RX ORDER — ACETAMINOPHEN 325 MG/1
650 TABLET ORAL EVERY 4 HOURS PRN
Status: DISCONTINUED | OUTPATIENT
Start: 2024-04-26 | End: 2024-05-07 | Stop reason: HOSPADM

## 2024-04-26 RX ORDER — VALACYCLOVIR HYDROCHLORIDE 500 MG/1
500 TABLET, FILM COATED ORAL 2 TIMES DAILY
Status: DISCONTINUED | OUTPATIENT
Start: 2024-04-26 | End: 2024-05-07 | Stop reason: HOSPADM

## 2024-04-26 RX ORDER — DIPHENHYDRAMINE HCL 25 MG
25 TABLET ORAL NIGHTLY PRN
Status: DISCONTINUED | OUTPATIENT
Start: 2024-04-26 | End: 2024-05-07 | Stop reason: HOSPADM

## 2024-04-26 RX ORDER — PROCHLORPERAZINE MALEATE 10 MG
10 TABLET ORAL EVERY 4 HOURS PRN
Status: DISCONTINUED | OUTPATIENT
Start: 2024-04-26 | End: 2024-04-29

## 2024-04-26 RX ORDER — LEVETIRACETAM 500 MG/1
1500 TABLET ORAL 2 TIMES DAILY
Status: DISCONTINUED | OUTPATIENT
Start: 2024-04-26 | End: 2024-05-07 | Stop reason: HOSPADM

## 2024-04-26 RX ORDER — FLUCONAZOLE 200 MG/1
200 TABLET ORAL DAILY
Status: DISCONTINUED | OUTPATIENT
Start: 2024-04-26 | End: 2024-05-07 | Stop reason: HOSPADM

## 2024-04-26 RX ORDER — SODIUM CHLORIDE 0.9 % (FLUSH) 0.9 %
5-40 SYRINGE (ML) INJECTION EVERY 12 HOURS SCHEDULED
Status: DISCONTINUED | OUTPATIENT
Start: 2024-04-26 | End: 2024-05-03 | Stop reason: SDUPTHER

## 2024-04-26 RX ORDER — CEFEPIME 1 G/50ML
2000 INJECTION, SOLUTION INTRAVENOUS EVERY 8 HOURS
Status: DISCONTINUED | OUTPATIENT
Start: 2024-04-26 | End: 2024-05-03 | Stop reason: DRUGHIGH

## 2024-04-26 RX ADMIN — PANTOPRAZOLE SODIUM 40 MG: 40 TABLET, DELAYED RELEASE ORAL at 15:16

## 2024-04-26 RX ADMIN — VALACYCLOVIR HYDROCHLORIDE 500 MG: 500 TABLET, FILM COATED ORAL at 15:16

## 2024-04-26 RX ADMIN — CEFEPIME 2000 MG: 1 INJECTION, SOLUTION INTRAVENOUS at 20:06

## 2024-04-26 RX ADMIN — SODIUM CHLORIDE: 9 INJECTION, SOLUTION INTRAVENOUS at 16:09

## 2024-04-26 RX ADMIN — SODIUM CHLORIDE: 9 INJECTION, SOLUTION INTRAVENOUS at 20:12

## 2024-04-26 RX ADMIN — VANCOMYCIN HYDROCHLORIDE 2000 MG: 10 INJECTION, POWDER, LYOPHILIZED, FOR SOLUTION INTRAVENOUS at 17:05

## 2024-04-26 RX ADMIN — FLUCONAZOLE 200 MG: 200 TABLET ORAL at 15:16

## 2024-04-26 RX ADMIN — LEVETIRACETAM 1500 MG: 500 TABLET, FILM COATED ORAL at 20:09

## 2024-04-26 RX ADMIN — SODIUM CHLORIDE 1000 ML: 9 INJECTION, SOLUTION INTRAVENOUS at 14:07

## 2024-04-26 RX ADMIN — VALACYCLOVIR HYDROCHLORIDE 500 MG: 500 TABLET, FILM COATED ORAL at 20:09

## 2024-04-26 RX ADMIN — ONDANSETRON 4 MG: 4 TABLET, ORALLY DISINTEGRATING ORAL at 17:14

## 2024-04-26 RX ADMIN — CYANOCOBALAMIN TAB 1000 MCG 1000 MCG: 1000 TAB at 15:16

## 2024-04-26 ASSESSMENT — PAIN SCALES - GENERAL: PAINLEVEL_OUTOF10: 0

## 2024-04-26 NOTE — H&P
Cycle 1 Dacogen and Venetoclax, plan for next cycle 4/29/24 -- likely will be delayed r/t admission for neutropenic fever  - Plan to do cycle # 2 induction therapy with Dacogen plus Venetoclax with Tian for 28 days post surgical resection of rectal mass. Surgery requests waiting 2 weeks postop prior to starting chemotherapy (2 weeks from 4/12/24).  - Will repeat BMBx after cycle # 2 or 3 and if pt still has persistent disease, will do salvage induction therapy with Vyxeos followed by MAC based alloSCT using BuFlu/PTCy based regimen depending on post surgical course for rectal adenoCa.    2. ID: Neutropenic fever Tmax 103.1   - Hx C-Diff + 3/7/24: s/p Dificid (see GI)  - Cont Diflucan, Valtrex    - Blood cx (4/26/24): pending  - PICC tip cx (4/26/24): pending  - Procalcitonin (4/26/24): pending  - Lactic Acid (4/26/24): 2.1  - Cefepime day +1 (4/26/24)  - Vancomycin day +1 (4/26/24)  - CXR (4/26/24): No acute findings  - CT a/p (4/26/24): Pending     3. Heme:  Pancytopenia r/t AML/MDS and recent chemotherapy, anemia d/t rectal bleeding  - Transfuse for Hgb < 7 and Platelets < 10 K   - NO transfusion today      4. Metabolic:   - Encourage PO intake     5. GI / Nutrition: Good po intake, new rectal mass- rectal bleeding; No bleeding 4/19/24  - Continue protonix 40mg BID   Rectal Adenocarcinoma: Stage T1N0. Mismatch repair gene expression by IHC-intact with low probability for MSI-H.   - EGD ([facility]) reportedly unremarkable  - Colonoscopy 3/22/24: rectal lesion: Adenocarcinoma, at least adenocarcinoma in situ,  - s/p rectal mass resection 4/12/24  -Will also refer to Dr. Russo for  rectal ca follow up post resection. he is unsure if he has f/u appt  -Would likely not offer adjuvant therapy at this stage.  Bleeding with hypotension 3/23/24: Now with rectal bleeding  - CTA abd/pelvis 3/23/24: No acute abdominal or pelvic abnormality clearly identified. No evidence for acute bowel inflammation identified.  MRI

## 2024-04-26 NOTE — CARE COORDINATION
Case Management Assessment  Initial Evaluation    Date/Time of Evaluation: 4/26/2024 2:56 PM  Assessment Completed by: JENNIFER Ortiz   for [facility] ([facility])  [employer] Mobile: [phone]    If patient is discharged prior to next notation, then this note serves as note for discharge by case management.    Patient Name: Dennys Peguero                   YOB: 1980  Diagnosis: Neutropenic fever (HCC) [D70.9, R50.81]                   Date / Time: 4/26/2024  1:47 PM    Patient Admission Status: Inpatient   Readmission Risk (Low < 19, Mod (19-27), High > 27): Readmission Risk Score: 25    Current PCP: No primary care provider on file.  PCP verified by CM? Yes    Chart Reviewed: Yes      History Provided by: Patient  Patient Orientation: Alert and Oriented    Patient Cognition: Alert    Hospitalization in the last 30 days (Readmission):  Yes    If yes, Readmission Assessment in CM Navigator will be completed.    Advance Directives:      Code Status: Full Code   Patient's Primary Decision Maker is: Legal Next of Kin    Primary Decision Maker: Pierre Lowry Roxana Bellamy - [phone]    Discharge Planning:    Patient lives with: Family Members Type of Home: Apartment  Primary Care Giver: Self  Patient Support Systems include: Family Members, Parent   Current Financial resources: Medicaid ([place] medicaid)  Current community resources: None  Current services prior to admission: Other (Comment) ([facility])            Current DME:              Type of Home Care services:  None    ADLS  Prior functional level: Independent in ADLs/IADLs  Current functional level: Independent in ADLs/IADLs    PT AM-PAC:   /24  OT AM-PAC:   /24    Family can provide assistance at DC: Yes  Would you like Case Management to discuss the discharge plan with any other family members/significant others, and if so, who? Yes  Plans to Return to Present Housing: Yes  Other Identified Issues/Barriers

## 2024-04-26 NOTE — PROGRESS NOTES
Dennys Peguero  [id number]  1980    Rectal Cancer Tumor Board Post Surgical Discussion Summary Report  Date of presentation: 4/26/24    - Clinical Stage: cT1/2N1aM0  - Pretreatment CEA: 6.5 (3/23/24)  - Neoadjuvant therapy before surgery: none  - Type of neoadjuvant therapy: N/A  - Neoadjuvant therapy date of completion: N/A  - Surgical procedure: Robotic LAR  - Date of surgery: 4/12/24  - Final path stage: pT3N0  - Tumor regression Grade: N/A  - MSI status: intact  - CRM: neg, 2.9 cm  - Distal resection margin: neg, 2.8 cm  - Mesorectal grade: complete  - Recommendation for adjuvant therapy and regimen: None

## 2024-04-27 ENCOUNTER — APPOINTMENT (OUTPATIENT)
Dept: CT IMAGING | Age: 44
DRG: 720 | End: 2024-04-27
Attending: STUDENT IN AN ORGANIZED HEALTH CARE EDUCATION/TRAINING PROGRAM
Payer: MEDICAID

## 2024-04-27 LAB
ABO + RH BLD: NORMAL
ANION GAP SERPL CALCULATED.3IONS-SCNC: 11 MMOL/L (ref 3–16)
ANISOCYTOSIS BLD QL SMEAR: ABNORMAL
BASOPHILS # BLD: 0 K/UL (ref 0–0.2)
BASOPHILS NFR BLD: 0 %
BLASTS NFR BLD MANUAL: 2 %
BLD GP AB SCN SERPL QL: NORMAL
BUN SERPL-MCNC: 13 MG/DL (ref 7–20)
CALCIUM SERPL-MCNC: 8.3 MG/DL (ref 8.3–10.6)
CHLORIDE SERPL-SCNC: 106 MMOL/L (ref 99–110)
CO2 SERPL-SCNC: 21 MMOL/L (ref 21–32)
CREAT SERPL-MCNC: 0.8 MG/DL (ref 0.9–1.3)
DEPRECATED RDW RBC AUTO: 15.9 % (ref 12.4–15.4)
EKG DIAGNOSIS: NORMAL
EKG Q-T INTERVAL: 314 MS
EKG QRS DURATION: 74 MS
EKG QTC CALCULATION (BAZETT): 460 MS
EKG R AXIS: 37 DEGREES
EKG T AXIS: 31 DEGREES
EKG VENTRICULAR RATE: 129 BPM
EOSINOPHIL # BLD: 0 K/UL (ref 0–0.6)
EOSINOPHIL NFR BLD: 0 %
GFR SERPLBLD CREATININE-BSD FMLA CKD-EPI: >90 ML/MIN/{1.73_M2}
GLUCOSE SERPL-MCNC: 93 MG/DL (ref 70–99)
HCT VFR BLD AUTO: 18.1 % (ref 40.5–52.5)
HGB BLD-MCNC: 6.3 G/DL (ref 13.5–17.5)
LDH SERPL L TO P-CCNC: 282 U/L (ref 100–190)
LYMPHOCYTES # BLD: 0.7 K/UL (ref 1–5.1)
MCH RBC QN AUTO: 27.2 PG (ref 26–34)
MCHC RBC AUTO-ENTMCNC: 34.6 G/DL (ref 31–36)
MCV RBC AUTO: 78.5 FL (ref 80–100)
METAMYELOCYTES NFR BLD MANUAL: 1 %
MICROCYTES BLD QL SMEAR: ABNORMAL
MONOCYTES # BLD: 0 K/UL (ref 0–1.3)
MONOCYTES NFR BLD: 1 %
NEUTROPHILS # BLD: 0 K/UL (ref 1.7–7.7)
NEUTROPHILS NFR BLD: 5 %
PATH INTERP BLD-IMP: YES
PHOSPHATE SERPL-MCNC: 2.8 MG/DL (ref 2.5–4.9)
PLATELET # BLD AUTO: 46 K/UL (ref 135–450)
PLATELET BLD QL SMEAR: ABNORMAL
PMV BLD AUTO: 10.5 FL (ref 5–10.5)
POTASSIUM SERPL-SCNC: 3.9 MMOL/L (ref 3.5–5.1)
RBC # BLD AUTO: 2.31 M/UL (ref 4.2–5.9)
SCHISTOCYTES BLD QL SMEAR: ABNORMAL
SODIUM SERPL-SCNC: 138 MMOL/L (ref 136–145)
URATE SERPL-MCNC: 8.7 MG/DL (ref 3.5–7.2)
VANCOMYCIN SERPL-MCNC: 9.1 UG/ML
WBC # BLD AUTO: 0.8 K/UL (ref 4–11)

## 2024-04-27 PROCEDURE — 74177 CT ABD & PELVIS W/CONTRAST: CPT

## 2024-04-27 PROCEDURE — 83615 LACTATE (LD) (LDH) ENZYME: CPT

## 2024-04-27 PROCEDURE — 80048 BASIC METABOLIC PNL TOTAL CA: CPT

## 2024-04-27 PROCEDURE — 86850 RBC ANTIBODY SCREEN: CPT

## 2024-04-27 PROCEDURE — 6370000000 HC RX 637 (ALT 250 FOR IP): Performed by: NURSE PRACTITIONER

## 2024-04-27 PROCEDURE — 85025 COMPLETE CBC W/AUTO DIFF WBC: CPT

## 2024-04-27 PROCEDURE — P9040 RBC LEUKOREDUCED IRRADIATED: HCPCS

## 2024-04-27 PROCEDURE — 93010 ELECTROCARDIOGRAM REPORT: CPT | Performed by: INTERNAL MEDICINE

## 2024-04-27 PROCEDURE — 84100 ASSAY OF PHOSPHORUS: CPT

## 2024-04-27 PROCEDURE — 36430 TRANSFUSION BLD/BLD COMPNT: CPT

## 2024-04-27 PROCEDURE — 6360000002 HC RX W HCPCS: Performed by: NURSE PRACTITIONER

## 2024-04-27 PROCEDURE — 6360000004 HC RX CONTRAST MEDICATION: Performed by: NURSE PRACTITIONER

## 2024-04-27 PROCEDURE — 84550 ASSAY OF BLOOD/URIC ACID: CPT

## 2024-04-27 PROCEDURE — 2580000003 HC RX 258: Performed by: NURSE PRACTITIONER

## 2024-04-27 PROCEDURE — 86923 COMPATIBILITY TEST ELECTRIC: CPT

## 2024-04-27 PROCEDURE — 6370000000 HC RX 637 (ALT 250 FOR IP)

## 2024-04-27 PROCEDURE — 36415 COLL VENOUS BLD VENIPUNCTURE: CPT

## 2024-04-27 PROCEDURE — 2060000000 HC ICU INTERMEDIATE R&B

## 2024-04-27 PROCEDURE — 86900 BLOOD TYPING SEROLOGIC ABO: CPT

## 2024-04-27 PROCEDURE — 80202 ASSAY OF VANCOMYCIN: CPT

## 2024-04-27 PROCEDURE — 86901 BLOOD TYPING SEROLOGIC RH(D): CPT

## 2024-04-27 RX ORDER — LOPERAMIDE HYDROCHLORIDE 2 MG/1
4 CAPSULE ORAL ONCE
Status: DISCONTINUED | OUTPATIENT
Start: 2024-04-27 | End: 2024-04-27

## 2024-04-27 RX ORDER — LOPERAMIDE HYDROCHLORIDE 2 MG/1
2 CAPSULE ORAL 4 TIMES DAILY PRN
Status: DISCONTINUED | OUTPATIENT
Start: 2024-04-27 | End: 2024-04-27

## 2024-04-27 RX ORDER — CETIRIZINE HYDROCHLORIDE 10 MG/1
10 TABLET ORAL DAILY
Status: DISCONTINUED | OUTPATIENT
Start: 2024-04-27 | End: 2024-05-07 | Stop reason: HOSPADM

## 2024-04-27 RX ADMIN — CEFEPIME 2000 MG: 1 INJECTION, SOLUTION INTRAVENOUS at 05:39

## 2024-04-27 RX ADMIN — SODIUM CHLORIDE: 9 INJECTION, SOLUTION INTRAVENOUS at 06:40

## 2024-04-27 RX ADMIN — SODIUM CHLORIDE, PRESERVATIVE FREE 10 ML: 5 INJECTION INTRAVENOUS at 08:04

## 2024-04-27 RX ADMIN — CEFEPIME 2000 MG: 1 INJECTION, SOLUTION INTRAVENOUS at 13:23

## 2024-04-27 RX ADMIN — PANTOPRAZOLE SODIUM 40 MG: 40 TABLET, DELAYED RELEASE ORAL at 16:33

## 2024-04-27 RX ADMIN — LEVETIRACETAM 1500 MG: 500 TABLET, FILM COATED ORAL at 20:53

## 2024-04-27 RX ADMIN — CYANOCOBALAMIN TAB 1000 MCG 1000 MCG: 1000 TAB at 08:03

## 2024-04-27 RX ADMIN — CEFEPIME 2000 MG: 1 INJECTION, SOLUTION INTRAVENOUS at 20:56

## 2024-04-27 RX ADMIN — VANCOMYCIN HYDROCHLORIDE 1000 MG: 1 INJECTION, POWDER, LYOPHILIZED, FOR SOLUTION INTRAVENOUS at 06:39

## 2024-04-27 RX ADMIN — PANTOPRAZOLE SODIUM 40 MG: 40 TABLET, DELAYED RELEASE ORAL at 06:40

## 2024-04-27 RX ADMIN — LEVETIRACETAM 1500 MG: 500 TABLET, FILM COATED ORAL at 08:03

## 2024-04-27 RX ADMIN — VALACYCLOVIR HYDROCHLORIDE 500 MG: 500 TABLET, FILM COATED ORAL at 08:03

## 2024-04-27 RX ADMIN — FIDAXOMICIN 200 MG: 200 TABLET, FILM COATED ORAL at 21:01

## 2024-04-27 RX ADMIN — VALACYCLOVIR HYDROCHLORIDE 500 MG: 500 TABLET, FILM COATED ORAL at 20:54

## 2024-04-27 RX ADMIN — Medication: at 20:56

## 2024-04-27 RX ADMIN — CETIRIZINE HYDROCHLORIDE 10 MG: 10 TABLET, FILM COATED ORAL at 10:06

## 2024-04-27 RX ADMIN — FLUCONAZOLE 200 MG: 200 TABLET ORAL at 08:03

## 2024-04-27 RX ADMIN — IOPAMIDOL 75 ML: 755 INJECTION, SOLUTION INTRAVENOUS at 08:37

## 2024-04-27 RX ADMIN — Medication: at 08:04

## 2024-04-27 RX ADMIN — FIDAXOMICIN 200 MG: 200 TABLET, FILM COATED ORAL at 10:06

## 2024-04-27 ASSESSMENT — PAIN SCALES - GENERAL: PAINLEVEL_OUTOF10: 0

## 2024-04-27 NOTE — CONSULTS
Department of Pharmacy    Notification received from laboratory of positive blood culture results.    Organism(s) detected: E. Coli    No recommended changes. Current antimicrobial therapy provides coverage.    Fidelia Farah, PharmD  [id number] (Main Pharmacy)

## 2024-04-27 NOTE — PLAN OF CARE
Problem: Safety - Adult  Goal: Free from fall injury  4/27/2024 1506 by Lillian Short RN  Outcome: Progressing  Flowsheets (Taken 4/27/2024 1505)  Free From Fall Injury: Instruct family/caregiver on patient safety  Note: Orthostatic vital signs obtained at start of shift - see flowsheet for details.  Pt does not meet criteria for orthostasis.  Pt is a Med fall risk. See Apple Fall Score and ABCDS Injury Risk assessments.     - Screening for Orthostasis AND not a [place] Risk per APPLE/ABCDS: Pt bed is in low position, side rails up, call light and belongings are in reach.  Fall risk light is on outside pts room.  Pt encouraged to call for assistance as needed. Will continue with hourly rounds for PO intake, pain needs, toileting and repositioning as needed.      Problem: Musculoskeletal - Adult  Goal: Return mobility to safest level of function  4/27/2024 1506 by Lillian Short RN  Outcome: Progressing  Flowsheets (Taken 4/27/2024 1506)  Return Mobility to Safest Level of Function:   Assess patient stability and activity tolerance for standing, transferring and ambulating with or without assistive devices   Assist with transfers and ambulation using safe patient handling equipment as needed  Note: Pt able to ambulate around room well. Pt denies dizziness and lightheadedness.      Problem: Hematologic - Adult  Goal: Maintains hematologic stability  4/27/2024 1506 by Lillian Short RN  Outcome: Progressing  Note: Patient's hemoglobin this AM: blood was given  Recent Labs     04/27/24  0519   HGB 6.3*     Patient's platelet count this AM:   Recent Labs     04/27/24  0519   PLT 46*    Thrombocytopenia Precautions in place.  Patient showing no signs or symptoms of active bleeding. Patient verbalizes understanding of all instructions. Will continue to assess and implement POC. Call light within reach and hourly rounding in place.

## 2024-04-27 NOTE — PLAN OF CARE
Problem: Safety - Adult  Goal: Free from fall injury  Outcome: Progressing     Problem: ABCDS Injury Assessment  Goal: Absence of physical injury  Outcome: Progressing     Problem: Musculoskeletal - Adult  Goal: Return mobility to safest level of function  Outcome: Progressing     Problem: Hematologic - Adult  Goal: Maintains hematologic stability  Outcome: Progressing

## 2024-04-27 NOTE — CONSENT
Informed Consent for Blood Component Transfusion Note    I have discussed with the patient the rationale for blood component transfusion; its benefits in treating or preventing fatigue, organ damage, or death; and its risk which includes mild transfusion reactions, rare risk of blood borne infection, or more serious but rare reactions. I have discussed the alternatives to transfusion, including the risk and consequences of not receiving transfusion. The patient had an opportunity to ask questions and had agreed to proceed with transfusion of blood components.    Electronically signed by KATHLEEN Morel CNP on 4/27/24 at 7:18 AM EDT

## 2024-04-28 LAB
ANION GAP SERPL CALCULATED.3IONS-SCNC: 8 MMOL/L (ref 3–16)
ANISOCYTOSIS BLD QL SMEAR: ABNORMAL
BASOPHILS # BLD: 0 K/UL (ref 0–0.2)
BASOPHILS NFR BLD: 0 %
BLOOD BANK DISPENSE STATUS: NORMAL
BLOOD BANK DISPENSE STATUS: NORMAL
BLOOD BANK PRODUCT CODE: NORMAL
BLOOD BANK PRODUCT CODE: NORMAL
BPU ID: NORMAL
BPU ID: NORMAL
BUN SERPL-MCNC: 8 MG/DL (ref 7–20)
C DIFF TOX GENS STL QL NAA+PROBE: ABNORMAL
CALCIUM SERPL-MCNC: 8.4 MG/DL (ref 8.3–10.6)
CHLORIDE SERPL-SCNC: 106 MMOL/L (ref 99–110)
CO2 SERPL-SCNC: 21 MMOL/L (ref 21–32)
CREAT SERPL-MCNC: 0.7 MG/DL (ref 0.9–1.3)
DEPRECATED RDW RBC AUTO: 15.9 % (ref 12.4–15.4)
DESCRIPTION BLOOD BANK: NORMAL
DESCRIPTION BLOOD BANK: NORMAL
EOSINOPHIL # BLD: 0 K/UL (ref 0–0.6)
EOSINOPHIL NFR BLD: 5 %
GFR SERPLBLD CREATININE-BSD FMLA CKD-EPI: >90 ML/MIN/{1.73_M2}
GLUCOSE SERPL-MCNC: 101 MG/DL (ref 70–99)
HCT VFR BLD AUTO: 19 % (ref 40.5–52.5)
HGB BLD-MCNC: 6.7 G/DL (ref 13.5–17.5)
HYPOCHROMIA BLD QL SMEAR: ABNORMAL
LDH SERPL L TO P-CCNC: 231 U/L (ref 100–190)
LYMPHOCYTES # BLD: 0.6 K/UL (ref 1–5.1)
LYMPHOCYTES NFR BLD: 86 %
MACROCYTES BLD QL SMEAR: ABNORMAL
MCH RBC QN AUTO: 28.1 PG (ref 26–34)
MCHC RBC AUTO-ENTMCNC: 35.2 G/DL (ref 31–36)
MCV RBC AUTO: 79.9 FL (ref 80–100)
MICROCYTES BLD QL SMEAR: ABNORMAL
MONOCYTES # BLD: 0 K/UL (ref 0–1.3)
MONOCYTES NFR BLD: 1 %
NEUTROPHILS # BLD: 0 K/UL (ref 1.7–7.7)
NEUTROPHILS NFR BLD: 6 %
NRBC BLD-RTO: 2 /100 WBC
ORGANISM: ABNORMAL
PHOSPHATE SERPL-MCNC: 2.5 MG/DL (ref 2.5–4.9)
PLATELET # BLD AUTO: 36 K/UL (ref 135–450)
PLATELET BLD QL SMEAR: ABNORMAL
PMV BLD AUTO: 9.4 FL (ref 5–10.5)
POLYCHROMASIA BLD QL SMEAR: ABNORMAL
POTASSIUM SERPL-SCNC: 3.3 MMOL/L (ref 3.5–5.1)
PROCALCITONIN SERPL IA-MCNC: 57.02 NG/ML (ref 0–0.15)
RBC # BLD AUTO: 2.38 M/UL (ref 4.2–5.9)
SCHISTOCYTES BLD QL SMEAR: ABNORMAL
SODIUM SERPL-SCNC: 135 MMOL/L (ref 136–145)
TARGETS BLD QL SMEAR: ABNORMAL
URATE SERPL-MCNC: 6.1 MG/DL (ref 3.5–7.2)
VARIANT LYMPHS NFR BLD MANUAL: 2 % (ref 0–6)
WBC # BLD AUTO: 0.7 K/UL (ref 4–11)

## 2024-04-28 PROCEDURE — 2580000003 HC RX 258: Performed by: NURSE PRACTITIONER

## 2024-04-28 PROCEDURE — 84550 ASSAY OF BLOOD/URIC ACID: CPT

## 2024-04-28 PROCEDURE — 80048 BASIC METABOLIC PNL TOTAL CA: CPT

## 2024-04-28 PROCEDURE — 83615 LACTATE (LD) (LDH) ENZYME: CPT

## 2024-04-28 PROCEDURE — 6370000000 HC RX 637 (ALT 250 FOR IP)

## 2024-04-28 PROCEDURE — 84145 PROCALCITONIN (PCT): CPT

## 2024-04-28 PROCEDURE — 6360000002 HC RX W HCPCS: Performed by: NURSE PRACTITIONER

## 2024-04-28 PROCEDURE — 36430 TRANSFUSION BLD/BLD COMPNT: CPT

## 2024-04-28 PROCEDURE — 2060000000 HC ICU INTERMEDIATE R&B

## 2024-04-28 PROCEDURE — 85025 COMPLETE CBC W/AUTO DIFF WBC: CPT

## 2024-04-28 PROCEDURE — 6370000000 HC RX 637 (ALT 250 FOR IP): Performed by: NURSE PRACTITIONER

## 2024-04-28 PROCEDURE — 84100 ASSAY OF PHOSPHORUS: CPT

## 2024-04-28 RX ORDER — POTASSIUM CHLORIDE 20 MEQ/1
20 TABLET, EXTENDED RELEASE ORAL 2 TIMES DAILY WITH MEALS
Status: DISCONTINUED | OUTPATIENT
Start: 2024-04-29 | End: 2024-05-07 | Stop reason: HOSPADM

## 2024-04-28 RX ORDER — DICYCLOMINE HYDROCHLORIDE 10 MG/1
10 CAPSULE ORAL 3 TIMES DAILY PRN
Status: DISCONTINUED | OUTPATIENT
Start: 2024-04-28 | End: 2024-05-07 | Stop reason: HOSPADM

## 2024-04-28 RX ORDER — POTASSIUM CHLORIDE 20 MEQ/1
40 TABLET, EXTENDED RELEASE ORAL 2 TIMES DAILY WITH MEALS
Status: COMPLETED | OUTPATIENT
Start: 2024-04-28 | End: 2024-04-28

## 2024-04-28 RX ADMIN — FLUCONAZOLE 200 MG: 200 TABLET ORAL at 08:34

## 2024-04-28 RX ADMIN — SODIUM CHLORIDE, PRESERVATIVE FREE 10 ML: 5 INJECTION INTRAVENOUS at 20:38

## 2024-04-28 RX ADMIN — PANTOPRAZOLE SODIUM 40 MG: 40 TABLET, DELAYED RELEASE ORAL at 16:27

## 2024-04-28 RX ADMIN — DICYCLOMINE HYDROCHLORIDE 10 MG: 10 CAPSULE ORAL at 23:34

## 2024-04-28 RX ADMIN — VALACYCLOVIR HYDROCHLORIDE 500 MG: 500 TABLET, FILM COATED ORAL at 08:34

## 2024-04-28 RX ADMIN — CEFEPIME 2000 MG: 1 INJECTION, SOLUTION INTRAVENOUS at 05:15

## 2024-04-28 RX ADMIN — SODIUM CHLORIDE, PRESERVATIVE FREE 10 ML: 5 INJECTION INTRAVENOUS at 08:33

## 2024-04-28 RX ADMIN — CETIRIZINE HYDROCHLORIDE 10 MG: 10 TABLET, FILM COATED ORAL at 08:34

## 2024-04-28 RX ADMIN — FIDAXOMICIN 200 MG: 200 TABLET, FILM COATED ORAL at 20:45

## 2024-04-28 RX ADMIN — LEVETIRACETAM 1500 MG: 500 TABLET, FILM COATED ORAL at 20:37

## 2024-04-28 RX ADMIN — CYANOCOBALAMIN TAB 1000 MCG 1000 MCG: 1000 TAB at 08:34

## 2024-04-28 RX ADMIN — SODIUM CHLORIDE: 9 INJECTION, SOLUTION INTRAVENOUS at 20:32

## 2024-04-28 RX ADMIN — Medication: at 20:38

## 2024-04-28 RX ADMIN — VALACYCLOVIR HYDROCHLORIDE 500 MG: 500 TABLET, FILM COATED ORAL at 20:37

## 2024-04-28 RX ADMIN — CEFEPIME 2000 MG: 1 INJECTION, SOLUTION INTRAVENOUS at 20:33

## 2024-04-28 RX ADMIN — SODIUM CHLORIDE: 9 INJECTION, SOLUTION INTRAVENOUS at 03:39

## 2024-04-28 RX ADMIN — Medication: at 09:37

## 2024-04-28 RX ADMIN — CEFEPIME 2000 MG: 1 INJECTION, SOLUTION INTRAVENOUS at 12:52

## 2024-04-28 RX ADMIN — LEVETIRACETAM 1500 MG: 500 TABLET, FILM COATED ORAL at 08:34

## 2024-04-28 RX ADMIN — POTASSIUM CHLORIDE 40 MEQ: 1500 TABLET, EXTENDED RELEASE ORAL at 11:21

## 2024-04-28 RX ADMIN — PANTOPRAZOLE SODIUM 40 MG: 40 TABLET, DELAYED RELEASE ORAL at 06:45

## 2024-04-28 RX ADMIN — POTASSIUM CHLORIDE 40 MEQ: 1500 TABLET, EXTENDED RELEASE ORAL at 16:27

## 2024-04-28 RX ADMIN — FIDAXOMICIN 200 MG: 200 TABLET, FILM COATED ORAL at 08:34

## 2024-04-28 NOTE — PLAN OF CARE
Problem: Safety - Adult  Goal: Free from fall injury  Outcome: Progressing  Note: Orthostatic vital signs obtained at start of shift - see flowsheet for details.  Pt does not meet criteria for orthostasis.  Pt is a Med fall risk. See Apple Fall Score and ABCDS Injury Risk assessments.      - Screening for Orthostasis AND not a [place] Risk per APPLE/ABCDS: Pt bed is in low position, side rails up, call light and belongings are in reach.  Fall risk light is on outside pts room.  Pt encouraged to call for assistance as needed. Will continue with hourly rounds for PO intake, pain needs, toileting and repositioning as needed.     Problem: Hematologic - Adult  Goal: Maintains hematologic stability  Outcome: Progressing  Note: Patient's hemoglobin this AM:   Recent Labs     04/28/24  0400   HGB 6.7*     Patient's platelet count this AM:   Recent Labs     04/27/24  0519   PLT 46*    Thrombocytopenia Precautions in place.  Patient showing no signs or symptoms of active bleeding.  Patient transfused blood products per orders - see flowsheet.  Patient verbalizes understanding of all instructions. Will continue to assess and implement POC. Call light within reach and hourly rounding in place.

## 2024-04-28 NOTE — PLAN OF CARE
Problem: Safety - Adult  Goal: Free from fall injury  4/28/2024 1428 by Blank Vera, RN  Outcome: Progressing       Problem: ABCDS Injury Assessment  Goal: Absence of physical injury  4/28/2024 1428 by Blank Vera, RN  Outcome: Progressing     Problem: Hematologic - Adult  Goal: Maintains hematologic stability  4/28/2024 1428 by Blank Vera, RN  Outcome: Progressing

## 2024-04-29 ENCOUNTER — APPOINTMENT (OUTPATIENT)
Dept: GENERAL RADIOLOGY | Age: 44
DRG: 720 | End: 2024-04-29
Attending: STUDENT IN AN ORGANIZED HEALTH CARE EDUCATION/TRAINING PROGRAM
Payer: MEDICAID

## 2024-04-29 LAB
ALBUMIN SERPL-MCNC: 3.6 G/DL (ref 3.4–5)
ALP SERPL-CCNC: 56 U/L (ref 40–129)
ALT SERPL-CCNC: 9 U/L (ref 10–40)
ANION GAP SERPL CALCULATED.3IONS-SCNC: 9 MMOL/L (ref 3–16)
ANISOCYTOSIS BLD QL SMEAR: ABNORMAL
AST SERPL-CCNC: 8 U/L (ref 15–37)
BASOPHILS # BLD: 0 K/UL (ref 0–0.2)
BASOPHILS NFR BLD: 0 %
BILIRUB DIRECT SERPL-MCNC: <0.2 MG/DL (ref 0–0.3)
BILIRUB INDIRECT SERPL-MCNC: ABNORMAL MG/DL (ref 0–1)
BILIRUB SERPL-MCNC: 0.7 MG/DL (ref 0–1)
BUN SERPL-MCNC: 6 MG/DL (ref 7–20)
CALCIUM SERPL-MCNC: 8.9 MG/DL (ref 8.3–10.6)
CHLORIDE SERPL-SCNC: 109 MMOL/L (ref 99–110)
CO2 SERPL-SCNC: 21 MMOL/L (ref 21–32)
CREAT SERPL-MCNC: 0.6 MG/DL (ref 0.9–1.3)
DACRYOCYTES BLD QL SMEAR: ABNORMAL
DEPRECATED RDW RBC AUTO: 16 % (ref 12.4–15.4)
EOSINOPHIL # BLD: 0 K/UL (ref 0–0.6)
EOSINOPHIL NFR BLD: 0 %
FUNGUS BLD CULT: NORMAL
FUNGUS BLD CULT: NORMAL
FUNGUS SPEC CULT: NORMAL
GFR SERPLBLD CREATININE-BSD FMLA CKD-EPI: >90 ML/MIN/{1.73_M2}
GLUCOSE SERPL-MCNC: 101 MG/DL (ref 70–99)
HCT VFR BLD AUTO: 22.4 % (ref 40.5–52.5)
HGB BLD-MCNC: 7.8 G/DL (ref 13.5–17.5)
HYPOCHROMIA BLD QL SMEAR: ABNORMAL
LDH SERPL L TO P-CCNC: 267 U/L (ref 100–190)
LOEFFLER MB STN SPEC: NORMAL
LYMPHOCYTES # BLD: 0.9 K/UL (ref 1–5.1)
LYMPHOCYTES NFR BLD: 90 %
MACROCYTES BLD QL SMEAR: ABNORMAL
MAGNESIUM SERPL-MCNC: 1.5 MG/DL (ref 1.8–2.4)
MCH RBC QN AUTO: 27.6 PG (ref 26–34)
MCHC RBC AUTO-ENTMCNC: 34.8 G/DL (ref 31–36)
MCV RBC AUTO: 79.2 FL (ref 80–100)
MICROCYTES BLD QL SMEAR: ABNORMAL
MONOCYTES # BLD: 0 K/UL (ref 0–1.3)
MONOCYTES NFR BLD: 2 %
NEUTROPHILS # BLD: 0.1 K/UL (ref 1.7–7.7)
NEUTROPHILS NFR BLD: 6 %
NEUTS BAND NFR BLD MANUAL: 2 % (ref 0–7)
NRBC BLD-RTO: 2 /100 WBC
OVALOCYTES BLD QL SMEAR: ABNORMAL
PHOSPHATE SERPL-MCNC: 3 MG/DL (ref 2.5–4.9)
PLATELET # BLD AUTO: 36 K/UL (ref 135–450)
PLATELET BLD QL SMEAR: ABNORMAL
PMV BLD AUTO: 10.8 FL (ref 5–10.5)
POTASSIUM SERPL-SCNC: 4 MMOL/L (ref 3.5–5.1)
PROT SERPL-MCNC: 6 G/DL (ref 6.4–8.2)
RBC # BLD AUTO: 2.83 M/UL (ref 4.2–5.9)
SCHISTOCYTES BLD QL SMEAR: ABNORMAL
SLIDE REVIEW: ABNORMAL
SODIUM SERPL-SCNC: 139 MMOL/L (ref 136–145)
URATE SERPL-MCNC: 4.5 MG/DL (ref 3.5–7.2)
WBC # BLD AUTO: 1 K/UL (ref 4–11)

## 2024-04-29 PROCEDURE — 97161 PT EVAL LOW COMPLEX 20 MIN: CPT

## 2024-04-29 PROCEDURE — 2580000003 HC RX 258: Performed by: NURSE PRACTITIONER

## 2024-04-29 PROCEDURE — 83615 LACTATE (LD) (LDH) ENZYME: CPT

## 2024-04-29 PROCEDURE — 36415 COLL VENOUS BLD VENIPUNCTURE: CPT

## 2024-04-29 PROCEDURE — 6360000002 HC RX W HCPCS: Performed by: STUDENT IN AN ORGANIZED HEALTH CARE EDUCATION/TRAINING PROGRAM

## 2024-04-29 PROCEDURE — 85025 COMPLETE CBC W/AUTO DIFF WBC: CPT

## 2024-04-29 PROCEDURE — 97535 SELF CARE MNGMENT TRAINING: CPT

## 2024-04-29 PROCEDURE — 6360000002 HC RX W HCPCS: Performed by: NURSE PRACTITIONER

## 2024-04-29 PROCEDURE — 97165 OT EVAL LOW COMPLEX 30 MIN: CPT

## 2024-04-29 PROCEDURE — 84550 ASSAY OF BLOOD/URIC ACID: CPT

## 2024-04-29 PROCEDURE — 6370000000 HC RX 637 (ALT 250 FOR IP): Performed by: STUDENT IN AN ORGANIZED HEALTH CARE EDUCATION/TRAINING PROGRAM

## 2024-04-29 PROCEDURE — 87040 BLOOD CULTURE FOR BACTERIA: CPT

## 2024-04-29 PROCEDURE — 2580000003 HC RX 258: Performed by: STUDENT IN AN ORGANIZED HEALTH CARE EDUCATION/TRAINING PROGRAM

## 2024-04-29 PROCEDURE — 84100 ASSAY OF PHOSPHORUS: CPT

## 2024-04-29 PROCEDURE — 83735 ASSAY OF MAGNESIUM: CPT

## 2024-04-29 PROCEDURE — 80076 HEPATIC FUNCTION PANEL: CPT

## 2024-04-29 PROCEDURE — 6370000000 HC RX 637 (ALT 250 FOR IP)

## 2024-04-29 PROCEDURE — 6370000000 HC RX 637 (ALT 250 FOR IP): Performed by: NURSE PRACTITIONER

## 2024-04-29 PROCEDURE — 80048 BASIC METABOLIC PNL TOTAL CA: CPT

## 2024-04-29 PROCEDURE — 97530 THERAPEUTIC ACTIVITIES: CPT

## 2024-04-29 PROCEDURE — 97116 GAIT TRAINING THERAPY: CPT

## 2024-04-29 PROCEDURE — 2060000000 HC ICU INTERMEDIATE R&B

## 2024-04-29 RX ORDER — PROCHLORPERAZINE MALEATE 10 MG
10 TABLET ORAL EVERY 4 HOURS PRN
Status: DISCONTINUED | OUTPATIENT
Start: 2024-04-29 | End: 2024-05-07 | Stop reason: HOSPADM

## 2024-04-29 RX ORDER — LORAZEPAM 0.5 MG/1
0.5 TABLET ORAL EVERY 4 HOURS PRN
Status: DISCONTINUED | OUTPATIENT
Start: 2024-04-29 | End: 2024-05-07 | Stop reason: HOSPADM

## 2024-04-29 RX ORDER — ALLOPURINOL 300 MG/1
300 TABLET ORAL DAILY
Status: DISCONTINUED | OUTPATIENT
Start: 2024-04-29 | End: 2024-05-07 | Stop reason: HOSPADM

## 2024-04-29 RX ORDER — LORAZEPAM 2 MG/ML
0.5 INJECTION INTRAMUSCULAR EVERY 4 HOURS PRN
Status: DISCONTINUED | OUTPATIENT
Start: 2024-04-29 | End: 2024-05-07 | Stop reason: HOSPADM

## 2024-04-29 RX ORDER — PROCHLORPERAZINE EDISYLATE 5 MG/ML
10 INJECTION INTRAMUSCULAR; INTRAVENOUS EVERY 4 HOURS PRN
Status: DISCONTINUED | OUTPATIENT
Start: 2024-04-29 | End: 2024-05-07 | Stop reason: HOSPADM

## 2024-04-29 RX ORDER — ONDANSETRON HYDROCHLORIDE 8 MG/1
8 TABLET, FILM COATED ORAL EVERY 24 HOURS
Status: COMPLETED | OUTPATIENT
Start: 2024-04-29 | End: 2024-05-03

## 2024-04-29 RX ORDER — SODIUM CHLORIDE 9 MG/ML
INJECTION, SOLUTION INTRAVENOUS CONTINUOUS
Status: DISCONTINUED | OUTPATIENT
Start: 2024-04-29 | End: 2024-05-07 | Stop reason: HOSPADM

## 2024-04-29 RX ORDER — FUROSEMIDE 10 MG/ML
40 INJECTION INTRAMUSCULAR; INTRAVENOUS EVERY 12 HOURS
Status: DISCONTINUED | OUTPATIENT
Start: 2024-04-30 | End: 2024-05-06

## 2024-04-29 RX ADMIN — CEFEPIME 2000 MG: 1 INJECTION, SOLUTION INTRAVENOUS at 05:47

## 2024-04-29 RX ADMIN — CEFEPIME 2000 MG: 1 INJECTION, SOLUTION INTRAVENOUS at 20:59

## 2024-04-29 RX ADMIN — ALLOPURINOL 300 MG: 300 TABLET ORAL at 13:03

## 2024-04-29 RX ADMIN — SODIUM CHLORIDE, PRESERVATIVE FREE 10 ML: 5 INJECTION INTRAVENOUS at 20:55

## 2024-04-29 RX ADMIN — POTASSIUM CHLORIDE 20 MEQ: 1500 TABLET, EXTENDED RELEASE ORAL at 10:25

## 2024-04-29 RX ADMIN — LEVETIRACETAM 1500 MG: 500 TABLET, FILM COATED ORAL at 10:25

## 2024-04-29 RX ADMIN — PANTOPRAZOLE SODIUM 40 MG: 40 TABLET, DELAYED RELEASE ORAL at 05:48

## 2024-04-29 RX ADMIN — SODIUM CHLORIDE: 9 INJECTION, SOLUTION INTRAVENOUS at 11:54

## 2024-04-29 RX ADMIN — PANTOPRAZOLE SODIUM 40 MG: 40 TABLET, DELAYED RELEASE ORAL at 16:35

## 2024-04-29 RX ADMIN — CYANOCOBALAMIN TAB 1000 MCG 1000 MCG: 1000 TAB at 10:25

## 2024-04-29 RX ADMIN — DECITABINE 40 MG: 50 INJECTION, POWDER, LYOPHILIZED, FOR SOLUTION INTRAVENOUS at 15:53

## 2024-04-29 RX ADMIN — SODIUM CHLORIDE, PRESERVATIVE FREE 10 ML: 5 INJECTION INTRAVENOUS at 10:34

## 2024-04-29 RX ADMIN — CETIRIZINE HYDROCHLORIDE 10 MG: 10 TABLET, FILM COATED ORAL at 10:25

## 2024-04-29 RX ADMIN — CEFEPIME 2000 MG: 1 INJECTION, SOLUTION INTRAVENOUS at 13:03

## 2024-04-29 RX ADMIN — FIDAXOMICIN 200 MG: 200 TABLET, FILM COATED ORAL at 20:54

## 2024-04-29 RX ADMIN — Medication: at 10:33

## 2024-04-29 RX ADMIN — LEVETIRACETAM 1500 MG: 500 TABLET, FILM COATED ORAL at 20:54

## 2024-04-29 RX ADMIN — VALACYCLOVIR HYDROCHLORIDE 500 MG: 500 TABLET, FILM COATED ORAL at 10:25

## 2024-04-29 RX ADMIN — SODIUM CHLORIDE: 9 INJECTION, SOLUTION INTRAVENOUS at 17:14

## 2024-04-29 RX ADMIN — FIDAXOMICIN 200 MG: 200 TABLET, FILM COATED ORAL at 10:25

## 2024-04-29 RX ADMIN — SODIUM CHLORIDE: 9 INJECTION, SOLUTION INTRAVENOUS at 05:58

## 2024-04-29 RX ADMIN — FLUCONAZOLE 200 MG: 200 TABLET ORAL at 10:25

## 2024-04-29 RX ADMIN — Medication: at 20:57

## 2024-04-29 RX ADMIN — POTASSIUM CHLORIDE 20 MEQ: 1500 TABLET, EXTENDED RELEASE ORAL at 16:35

## 2024-04-29 RX ADMIN — ONDANSETRON HYDROCHLORIDE 8 MG: 8 TABLET, FILM COATED ORAL at 15:10

## 2024-04-29 RX ADMIN — VALACYCLOVIR HYDROCHLORIDE 500 MG: 500 TABLET, FILM COATED ORAL at 20:54

## 2024-04-29 ASSESSMENT — ENCOUNTER SYMPTOMS: NAUSEA: 1

## 2024-04-29 NOTE — PLAN OF CARE
Problem: Safety - Adult  Goal: Free from fall injury  4/29/2024 1522 by Blank Vera, RN  Outcome: Progressing     Problem: Hematologic - Adult  Goal: Maintains hematologic stability  4/29/2024 1522 by Blank Vera, RN  Outcome: Progressing     Problem: Pain  Goal: Verbalizes/displays adequate comfort level or baseline comfort level  Outcome: Progressing

## 2024-04-29 NOTE — ONCOLOGY
Verification:  Original chemotherapy orders reviewed and acknowledged. 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  Patient educated on chemotherapy regimen.  Acknowledgement of informed consent for chemotherapy obtained.      Estimated body surface area is 1.95 meters squared as calculated from the following:    Height as of this encounter: 1.778 m (5' 10\").    Weight as of this encounter: 77.1 kg (169 lb 14.4 oz). verified.  Appropriate dosing calculations of chemotherapy based on above height, weight, and BSA verified.        Administration: Chemotherapy drug Dacogen independently verified with Kaushal Greenfield RN prior to administration.  Acknowledgement of informed consent for chemotherapy administration verified.  Original order, appropriateness of regimen, drug supplied, height, weight, BSA, dose calculations, expiration dates/times, drug appearance, and two patient identifiers were verified by both RNs.  Drug checked for vesicant/irritant status and for risk of hypersensitivity.  Most recent laboratory values and allergies, were reviewed.  Positive, brisk blood return via CVC was confirmed prior to administration. Chest x-ray for correct line placement reviewed. Blank Vera RN and Kaushal Greenfield RN verified correct rate of chemotherapy and maintenance IV fluids.  Patient was educated on chemotherapy regimen prior to administration including indication for treatment related to disease & side effects of chemotherapy drug.  Patient verbalizes understanding of all instructions.    Completion of Chemotherapy: Monitoring during infusion done per policy, see Flowsheets.  Blood return verified before, during, and after infusion per policy; no signs of extravasation.  Pt tolerating chemotherapy well and without incident.  Chemotherapy infusion end time on the MAR.

## 2024-04-29 NOTE — PLAN OF CARE
Problem: Safety - Adult  Goal: Free from fall injury  4/29/2024 0400 by Isabella Turner, RN  Outcome: Progressing     Problem: ABCDS Injury Assessment  Goal: Absence of physical injury  4/29/2024 0400 by Isabella Turner, RN  Outcome: Progressing  4/28/2024 1428 by Blank Vera RN  Outcome: Progressing     Problem: Musculoskeletal - Adult  Goal: Return mobility to safest level of function  4/29/2024 0400 by Isabella Turner RN  Outcome: Progressing  4/28/2024 1428 by Blank Vera RN  Outcome: Progressing     Problem: Hematologic - Adult  Goal: Maintains hematologic stability  4/29/2024 0400 by Isabella Turner RN  Outcome: Progressing  4/28/2024 1428 by Blank Vera RN  Outcome: Progressing

## 2024-04-29 NOTE — CARE COORDINATION
Spoke with pt at bedside briefly. He denied needs for writer at this time. Discussed seeing about getting him a bike in his room due to isolation and he declined at this time. He is aware to notify staff if he changes his mind which he stated understanding.    SW will continue to follow    JENNIFER Ortiz   for [facility] ([facility])  [employer] Mobile: [phone]

## 2024-04-30 LAB
ANION GAP SERPL CALCULATED.3IONS-SCNC: 9 MMOL/L (ref 3–16)
BASOPHILS # BLD: 0 K/UL (ref 0–0.2)
BASOPHILS NFR BLD: 0 %
BUN SERPL-MCNC: 4 MG/DL (ref 7–20)
CALCIUM SERPL-MCNC: 8.8 MG/DL (ref 8.3–10.6)
CHLORIDE SERPL-SCNC: 109 MMOL/L (ref 99–110)
CO2 SERPL-SCNC: 21 MMOL/L (ref 21–32)
CREAT SERPL-MCNC: 0.7 MG/DL (ref 0.9–1.3)
DEPRECATED RDW RBC AUTO: 16.2 % (ref 12.4–15.4)
EOSINOPHIL # BLD: 0 K/UL (ref 0–0.6)
EOSINOPHIL NFR BLD: 3.1 %
GFR SERPLBLD CREATININE-BSD FMLA CKD-EPI: >90 ML/MIN/{1.73_M2}
GLUCOSE SERPL-MCNC: 118 MG/DL (ref 70–99)
HCT VFR BLD AUTO: 21.6 % (ref 40.5–52.5)
HGB BLD-MCNC: 7.5 G/DL (ref 13.5–17.5)
LDH SERPL L TO P-CCNC: 255 U/L (ref 100–190)
LYMPHOCYTES # BLD: 0.6 K/UL (ref 1–5.1)
LYMPHOCYTES NFR BLD: 78.5 %
MCH RBC QN AUTO: 27.8 PG (ref 26–34)
MCHC RBC AUTO-ENTMCNC: 35 G/DL (ref 31–36)
MCV RBC AUTO: 79.3 FL (ref 80–100)
MONOCYTES # BLD: 0.1 K/UL (ref 0–1.3)
MONOCYTES NFR BLD: 9.6 %
NEUTROPHILS # BLD: 0.1 K/UL (ref 1.7–7.7)
NEUTROPHILS NFR BLD: 8.8 %
PHOSPHATE SERPL-MCNC: 3.1 MG/DL (ref 2.5–4.9)
PLATELET # BLD AUTO: 36 K/UL (ref 135–450)
PMV BLD AUTO: 10.8 FL (ref 5–10.5)
POTASSIUM SERPL-SCNC: 3.9 MMOL/L (ref 3.5–5.1)
RBC # BLD AUTO: 2.72 M/UL (ref 4.2–5.9)
SODIUM SERPL-SCNC: 139 MMOL/L (ref 136–145)
URATE SERPL-MCNC: 4.1 MG/DL (ref 3.5–7.2)
WBC # BLD AUTO: 0.8 K/UL (ref 4–11)

## 2024-04-30 PROCEDURE — 83615 LACTATE (LD) (LDH) ENZYME: CPT

## 2024-04-30 PROCEDURE — 2060000000 HC ICU INTERMEDIATE R&B

## 2024-04-30 PROCEDURE — 84100 ASSAY OF PHOSPHORUS: CPT

## 2024-04-30 PROCEDURE — 80048 BASIC METABOLIC PNL TOTAL CA: CPT

## 2024-04-30 PROCEDURE — 6370000000 HC RX 637 (ALT 250 FOR IP): Performed by: NURSE PRACTITIONER

## 2024-04-30 PROCEDURE — 2580000003 HC RX 258: Performed by: STUDENT IN AN ORGANIZED HEALTH CARE EDUCATION/TRAINING PROGRAM

## 2024-04-30 PROCEDURE — 36415 COLL VENOUS BLD VENIPUNCTURE: CPT

## 2024-04-30 PROCEDURE — 2500000003 HC RX 250 WO HCPCS: Performed by: NURSE PRACTITIONER

## 2024-04-30 PROCEDURE — 6370000000 HC RX 637 (ALT 250 FOR IP)

## 2024-04-30 PROCEDURE — 02HV33Z INSERTION OF INFUSION DEVICE INTO SUPERIOR VENA CAVA, PERCUTANEOUS APPROACH: ICD-10-PCS | Performed by: STUDENT IN AN ORGANIZED HEALTH CARE EDUCATION/TRAINING PROGRAM

## 2024-04-30 PROCEDURE — C1751 CATH, INF, PER/CENT/MIDLINE: HCPCS

## 2024-04-30 PROCEDURE — 84550 ASSAY OF BLOOD/URIC ACID: CPT

## 2024-04-30 PROCEDURE — 6360000002 HC RX W HCPCS: Performed by: STUDENT IN AN ORGANIZED HEALTH CARE EDUCATION/TRAINING PROGRAM

## 2024-04-30 PROCEDURE — 2580000003 HC RX 258: Performed by: NURSE PRACTITIONER

## 2024-04-30 PROCEDURE — 36569 INSJ PICC 5 YR+ W/O IMAGING: CPT

## 2024-04-30 PROCEDURE — 6360000002 HC RX W HCPCS: Performed by: NURSE PRACTITIONER

## 2024-04-30 PROCEDURE — 6370000000 HC RX 637 (ALT 250 FOR IP): Performed by: STUDENT IN AN ORGANIZED HEALTH CARE EDUCATION/TRAINING PROGRAM

## 2024-04-30 PROCEDURE — 85025 COMPLETE CBC W/AUTO DIFF WBC: CPT

## 2024-04-30 RX ORDER — SODIUM CHLORIDE 9 MG/ML
25 INJECTION, SOLUTION INTRAVENOUS PRN
Status: DISCONTINUED | OUTPATIENT
Start: 2024-04-30 | End: 2024-05-07 | Stop reason: HOSPADM

## 2024-04-30 RX ORDER — SODIUM CHLORIDE 0.9 % (FLUSH) 0.9 %
5-40 SYRINGE (ML) INJECTION EVERY 12 HOURS SCHEDULED
Status: DISCONTINUED | OUTPATIENT
Start: 2024-04-30 | End: 2024-05-07 | Stop reason: HOSPADM

## 2024-04-30 RX ORDER — LIDOCAINE HYDROCHLORIDE 10 MG/ML
5 INJECTION, SOLUTION EPIDURAL; INFILTRATION; INTRACAUDAL; PERINEURAL ONCE
Status: COMPLETED | OUTPATIENT
Start: 2024-04-30 | End: 2024-04-30

## 2024-04-30 RX ORDER — SODIUM CHLORIDE 0.9 % (FLUSH) 0.9 %
5-40 SYRINGE (ML) INJECTION PRN
Status: DISCONTINUED | OUTPATIENT
Start: 2024-04-30 | End: 2024-05-07 | Stop reason: HOSPADM

## 2024-04-30 RX ADMIN — SODIUM CHLORIDE: 9 INJECTION, SOLUTION INTRAVENOUS at 03:21

## 2024-04-30 RX ADMIN — VALACYCLOVIR HYDROCHLORIDE 500 MG: 500 TABLET, FILM COATED ORAL at 20:22

## 2024-04-30 RX ADMIN — FLUCONAZOLE 200 MG: 200 TABLET ORAL at 08:40

## 2024-04-30 RX ADMIN — POTASSIUM CHLORIDE 20 MEQ: 1500 TABLET, EXTENDED RELEASE ORAL at 08:40

## 2024-04-30 RX ADMIN — CETIRIZINE HYDROCHLORIDE 10 MG: 10 TABLET, FILM COATED ORAL at 08:40

## 2024-04-30 RX ADMIN — SODIUM CHLORIDE, PRESERVATIVE FREE 10 ML: 5 INJECTION INTRAVENOUS at 08:41

## 2024-04-30 RX ADMIN — PANTOPRAZOLE SODIUM 40 MG: 40 TABLET, DELAYED RELEASE ORAL at 06:33

## 2024-04-30 RX ADMIN — FUROSEMIDE 40 MG: 10 INJECTION, SOLUTION INTRAMUSCULAR; INTRAVENOUS at 17:39

## 2024-04-30 RX ADMIN — SODIUM CHLORIDE, PRESERVATIVE FREE 10 ML: 5 INJECTION INTRAVENOUS at 20:24

## 2024-04-30 RX ADMIN — Medication: at 20:24

## 2024-04-30 RX ADMIN — LIDOCAINE HYDROCHLORIDE ANHYDROUS 5 ML: 10 INJECTION, SOLUTION INFILTRATION at 11:27

## 2024-04-30 RX ADMIN — PANTOPRAZOLE SODIUM 40 MG: 40 TABLET, DELAYED RELEASE ORAL at 17:35

## 2024-04-30 RX ADMIN — LEVETIRACETAM 1500 MG: 500 TABLET, FILM COATED ORAL at 20:22

## 2024-04-30 RX ADMIN — LEVETIRACETAM 1500 MG: 500 TABLET, FILM COATED ORAL at 08:40

## 2024-04-30 RX ADMIN — FIDAXOMICIN 200 MG: 200 TABLET, FILM COATED ORAL at 20:22

## 2024-04-30 RX ADMIN — SODIUM CHLORIDE, PRESERVATIVE FREE 10 ML: 5 INJECTION INTRAVENOUS at 20:25

## 2024-04-30 RX ADMIN — Medication: at 08:42

## 2024-04-30 RX ADMIN — DECITABINE 40 MG: 50 INJECTION, POWDER, LYOPHILIZED, FOR SOLUTION INTRAVENOUS at 15:13

## 2024-04-30 RX ADMIN — VALACYCLOVIR HYDROCHLORIDE 500 MG: 500 TABLET, FILM COATED ORAL at 08:40

## 2024-04-30 RX ADMIN — CYANOCOBALAMIN TAB 1000 MCG 1000 MCG: 1000 TAB at 08:40

## 2024-04-30 RX ADMIN — FIDAXOMICIN 200 MG: 200 TABLET, FILM COATED ORAL at 08:40

## 2024-04-30 RX ADMIN — FUROSEMIDE 40 MG: 10 INJECTION, SOLUTION INTRAMUSCULAR; INTRAVENOUS at 06:50

## 2024-04-30 RX ADMIN — CEFEPIME 2000 MG: 1 INJECTION, SOLUTION INTRAVENOUS at 06:32

## 2024-04-30 RX ADMIN — SODIUM CHLORIDE, PRESERVATIVE FREE 10 ML: 5 INJECTION INTRAVENOUS at 08:42

## 2024-04-30 RX ADMIN — ONDANSETRON HYDROCHLORIDE 8 MG: 8 TABLET, FILM COATED ORAL at 14:48

## 2024-04-30 RX ADMIN — CEFEPIME 2000 MG: 1 INJECTION, SOLUTION INTRAVENOUS at 13:27

## 2024-04-30 RX ADMIN — POTASSIUM CHLORIDE 20 MEQ: 1500 TABLET, EXTENDED RELEASE ORAL at 17:35

## 2024-04-30 RX ADMIN — ALLOPURINOL 300 MG: 300 TABLET ORAL at 08:40

## 2024-04-30 RX ADMIN — CEFEPIME 2000 MG: 1 INJECTION, SOLUTION INTRAVENOUS at 21:18

## 2024-04-30 NOTE — ONCOLOGY
Administration: Chemotherapy drug Dacogen independently verified with Kenya Alston RN prior to administration.  Acknowledgement of informed consent for chemotherapy administration verified.  Original order, appropriateness of regimen, drug supplied, height, weight, BSA, dose calculations, expiration dates/times, drug appearance, and two patient identifiers were verified by both RNs.  Drug checked for vesicant/irritant status and for risk of hypersensitivity.  Most recent laboratory values and allergies, were reviewed.  Positive, brisk blood return via CVC was confirmed prior to administration. Chest x-ray for correct line placement reviewed. Daiana Millan RN and Kenya Alston RN verified correct rate of chemotherapy and maintenance IV fluids.  Patient was educated on chemotherapy regimen prior to administration including indication for treatment related to disease & side effects of chemotherapy drug.  Patient verbalizes understanding of all instructions.    Completion of Chemotherapy: Monitoring during infusion done per policy, see Flowsheets.  Blood return verified before, during, and after infusion per policy; no signs of extravasation.  Pt tolerated chemotherapy well and without incident.  Chemotherapy infusion end time on the MAR.  Will continue to monitor.

## 2024-04-30 NOTE — PLAN OF CARE
Problem: Safety - Adult  Goal: Free from fall injury  Outcome: Progressing  Flowsheets (Taken 4/30/2024 6984)  Free From Fall Injury: Instruct family/caregiver on patient safety  Note: Orthostatic vital signs obtained at start of shift - see flowsheet for details.  Pt does not meet criteria for orthostasis.  Pt is a Med fall risk. See Apple Fall Score and ABCDS Injury Risk assessments.     - Screening for Orthostasis AND not a [place] Risk per APPLE/ABCDS: Pt bed is in low position, side rails up, call light and belongings are in reach.  Fall risk light is on outside pts room.  Pt encouraged to call for assistance as needed. Will continue with hourly rounds for PO intake, pain needs, toileting and repositioning as needed.      Problem: Pain  Goal: Verbalizes/displays adequate comfort level or baseline comfort level  Outcome: Progressing  Note: Pt stating no pain at while on shift     Problem: Musculoskeletal - Adult  Goal: Return mobility to safest level of function  Note: Pt ambulating well in room

## 2024-04-30 NOTE — PROCEDURES
button in reach.Pt. Response to procedure tolerated well.  RN notified of all of the above.  A Double Lumen Power Injectable PICC was trimmed at 47 CM and placed per MELINA Basilic vein, hubbed at insertion site.

## 2024-05-01 LAB
ALBUMIN SERPL-MCNC: 3.7 G/DL (ref 3.4–5)
ALP SERPL-CCNC: 62 U/L (ref 40–129)
ALT SERPL-CCNC: 7 U/L (ref 10–40)
ANION GAP SERPL CALCULATED.3IONS-SCNC: 7 MMOL/L (ref 3–16)
AST SERPL-CCNC: 6 U/L (ref 15–37)
BASOPHILS # BLD: 0 K/UL (ref 0–0.2)
BASOPHILS NFR BLD: 0 %
BILIRUB DIRECT SERPL-MCNC: <0.2 MG/DL (ref 0–0.3)
BILIRUB INDIRECT SERPL-MCNC: ABNORMAL MG/DL (ref 0–1)
BILIRUB SERPL-MCNC: 0.6 MG/DL (ref 0–1)
BUN SERPL-MCNC: 6 MG/DL (ref 7–20)
CALCIUM SERPL-MCNC: 8.9 MG/DL (ref 8.3–10.6)
CHLORIDE SERPL-SCNC: 103 MMOL/L (ref 99–110)
CO2 SERPL-SCNC: 25 MMOL/L (ref 21–32)
CREAT SERPL-MCNC: 0.8 MG/DL (ref 0.9–1.3)
DEPRECATED RDW RBC AUTO: 15.6 % (ref 12.4–15.4)
EOSINOPHIL # BLD: 0 K/UL (ref 0–0.6)
EOSINOPHIL NFR BLD: 1.3 %
GFR SERPLBLD CREATININE-BSD FMLA CKD-EPI: >90 ML/MIN/{1.73_M2}
GLUCOSE SERPL-MCNC: 97 MG/DL (ref 70–99)
HCT VFR BLD AUTO: 21.1 % (ref 40.5–52.5)
HGB BLD-MCNC: 7.5 G/DL (ref 13.5–17.5)
LDH SERPL L TO P-CCNC: 245 U/L (ref 100–190)
LYMPHOCYTES # BLD: 0.8 K/UL (ref 1–5.1)
LYMPHOCYTES NFR BLD: 86.5 %
MAGNESIUM SERPL-MCNC: 1.4 MG/DL (ref 1.8–2.4)
MCH RBC QN AUTO: 28.2 PG (ref 26–34)
MCHC RBC AUTO-ENTMCNC: 35.5 G/DL (ref 31–36)
MCV RBC AUTO: 79.3 FL (ref 80–100)
MONOCYTES # BLD: 0.1 K/UL (ref 0–1.3)
MONOCYTES NFR BLD: 8 %
NEUTROPHILS # BLD: 0 K/UL (ref 1.7–7.7)
NEUTROPHILS NFR BLD: 4.2 %
PHOSPHATE SERPL-MCNC: 3.6 MG/DL (ref 2.5–4.9)
PLATELET # BLD AUTO: 39 K/UL (ref 135–450)
PMV BLD AUTO: 10.5 FL (ref 5–10.5)
POTASSIUM SERPL-SCNC: 3.6 MMOL/L (ref 3.5–5.1)
PROT SERPL-MCNC: 6.2 G/DL (ref 6.4–8.2)
RBC # BLD AUTO: 2.66 M/UL (ref 4.2–5.9)
SODIUM SERPL-SCNC: 135 MMOL/L (ref 136–145)
URATE SERPL-MCNC: 4.4 MG/DL (ref 3.5–7.2)
WBC # BLD AUTO: 1 K/UL (ref 4–11)

## 2024-05-01 PROCEDURE — 6370000000 HC RX 637 (ALT 250 FOR IP): Performed by: NURSE PRACTITIONER

## 2024-05-01 PROCEDURE — 83735 ASSAY OF MAGNESIUM: CPT

## 2024-05-01 PROCEDURE — 2060000000 HC ICU INTERMEDIATE R&B

## 2024-05-01 PROCEDURE — 2580000003 HC RX 258: Performed by: NURSE PRACTITIONER

## 2024-05-01 PROCEDURE — 85025 COMPLETE CBC W/AUTO DIFF WBC: CPT

## 2024-05-01 PROCEDURE — 6370000000 HC RX 637 (ALT 250 FOR IP)

## 2024-05-01 PROCEDURE — 6360000002 HC RX W HCPCS: Performed by: STUDENT IN AN ORGANIZED HEALTH CARE EDUCATION/TRAINING PROGRAM

## 2024-05-01 PROCEDURE — 84100 ASSAY OF PHOSPHORUS: CPT

## 2024-05-01 PROCEDURE — 83615 LACTATE (LD) (LDH) ENZYME: CPT

## 2024-05-01 PROCEDURE — 6370000000 HC RX 637 (ALT 250 FOR IP): Performed by: STUDENT IN AN ORGANIZED HEALTH CARE EDUCATION/TRAINING PROGRAM

## 2024-05-01 PROCEDURE — 84550 ASSAY OF BLOOD/URIC ACID: CPT

## 2024-05-01 PROCEDURE — 80076 HEPATIC FUNCTION PANEL: CPT

## 2024-05-01 PROCEDURE — 80048 BASIC METABOLIC PNL TOTAL CA: CPT

## 2024-05-01 PROCEDURE — 2580000003 HC RX 258: Performed by: STUDENT IN AN ORGANIZED HEALTH CARE EDUCATION/TRAINING PROGRAM

## 2024-05-01 PROCEDURE — 6360000002 HC RX W HCPCS: Performed by: NURSE PRACTITIONER

## 2024-05-01 RX ORDER — LOPERAMIDE HYDROCHLORIDE 2 MG/1
2 CAPSULE ORAL 3 TIMES DAILY PRN
Status: DISCONTINUED | OUTPATIENT
Start: 2024-05-01 | End: 2024-05-07 | Stop reason: HOSPADM

## 2024-05-01 RX ORDER — LOPERAMIDE HYDROCHLORIDE 2 MG/1
4 CAPSULE ORAL ONCE
Status: COMPLETED | OUTPATIENT
Start: 2024-05-01 | End: 2024-05-01

## 2024-05-01 RX ADMIN — Medication: at 20:25

## 2024-05-01 RX ADMIN — ALLOPURINOL 300 MG: 300 TABLET ORAL at 09:45

## 2024-05-01 RX ADMIN — FUROSEMIDE 40 MG: 10 INJECTION, SOLUTION INTRAMUSCULAR; INTRAVENOUS at 18:03

## 2024-05-01 RX ADMIN — LOPERAMIDE HYDROCHLORIDE 4 MG: 2 CAPSULE ORAL at 11:51

## 2024-05-01 RX ADMIN — CEFEPIME 2000 MG: 1 INJECTION, SOLUTION INTRAVENOUS at 21:02

## 2024-05-01 RX ADMIN — FIDAXOMICIN 200 MG: 200 TABLET, FILM COATED ORAL at 09:45

## 2024-05-01 RX ADMIN — PANTOPRAZOLE SODIUM 40 MG: 40 TABLET, DELAYED RELEASE ORAL at 17:19

## 2024-05-01 RX ADMIN — SODIUM CHLORIDE, PRESERVATIVE FREE 10 ML: 5 INJECTION INTRAVENOUS at 09:48

## 2024-05-01 RX ADMIN — CEFEPIME 2000 MG: 1 INJECTION, SOLUTION INTRAVENOUS at 05:14

## 2024-05-01 RX ADMIN — Medication: at 09:49

## 2024-05-01 RX ADMIN — PANTOPRAZOLE SODIUM 40 MG: 40 TABLET, DELAYED RELEASE ORAL at 06:08

## 2024-05-01 RX ADMIN — SODIUM CHLORIDE, PRESERVATIVE FREE 10 ML: 5 INJECTION INTRAVENOUS at 20:26

## 2024-05-01 RX ADMIN — POTASSIUM CHLORIDE 20 MEQ: 1500 TABLET, EXTENDED RELEASE ORAL at 17:51

## 2024-05-01 RX ADMIN — SODIUM CHLORIDE, PRESERVATIVE FREE 10 ML: 5 INJECTION INTRAVENOUS at 20:25

## 2024-05-01 RX ADMIN — DICYCLOMINE HYDROCHLORIDE 10 MG: 10 CAPSULE ORAL at 17:51

## 2024-05-01 RX ADMIN — VALACYCLOVIR HYDROCHLORIDE 500 MG: 500 TABLET, FILM COATED ORAL at 09:45

## 2024-05-01 RX ADMIN — ONDANSETRON HYDROCHLORIDE 8 MG: 8 TABLET, FILM COATED ORAL at 14:46

## 2024-05-01 RX ADMIN — CEFEPIME 2000 MG: 1 INJECTION, SOLUTION INTRAVENOUS at 14:00

## 2024-05-01 RX ADMIN — SODIUM CHLORIDE: 9 INJECTION, SOLUTION INTRAVENOUS at 15:35

## 2024-05-01 RX ADMIN — DECITABINE 40 MG: 50 INJECTION, POWDER, LYOPHILIZED, FOR SOLUTION INTRAVENOUS at 15:31

## 2024-05-01 RX ADMIN — MAGNESIUM SULFATE HEPTAHYDRATE 4000 MG: 40 INJECTION, SOLUTION INTRAVENOUS at 05:15

## 2024-05-01 RX ADMIN — POTASSIUM CHLORIDE 20 MEQ: 1500 TABLET, EXTENDED RELEASE ORAL at 09:45

## 2024-05-01 RX ADMIN — SODIUM CHLORIDE: 9 INJECTION, SOLUTION INTRAVENOUS at 03:05

## 2024-05-01 RX ADMIN — LEVETIRACETAM 1500 MG: 500 TABLET, FILM COATED ORAL at 09:45

## 2024-05-01 RX ADMIN — CYANOCOBALAMIN TAB 1000 MCG 1000 MCG: 1000 TAB at 09:45

## 2024-05-01 RX ADMIN — VALACYCLOVIR HYDROCHLORIDE 500 MG: 500 TABLET, FILM COATED ORAL at 20:23

## 2024-05-01 RX ADMIN — CETIRIZINE HYDROCHLORIDE 10 MG: 10 TABLET, FILM COATED ORAL at 09:45

## 2024-05-01 RX ADMIN — LEVETIRACETAM 1500 MG: 500 TABLET, FILM COATED ORAL at 20:23

## 2024-05-01 RX ADMIN — FLUCONAZOLE 200 MG: 200 TABLET ORAL at 09:45

## 2024-05-01 RX ADMIN — FIDAXOMICIN 200 MG: 200 TABLET, FILM COATED ORAL at 20:24

## 2024-05-01 ASSESSMENT — PAIN DESCRIPTION - ORIENTATION: ORIENTATION: MID

## 2024-05-01 ASSESSMENT — PAIN DESCRIPTION - LOCATION: LOCATION: ABDOMEN

## 2024-05-01 ASSESSMENT — PAIN - FUNCTIONAL ASSESSMENT: PAIN_FUNCTIONAL_ASSESSMENT: ACTIVITIES ARE NOT PREVENTED

## 2024-05-01 ASSESSMENT — PAIN SCALES - GENERAL: PAINLEVEL_OUTOF10: 3

## 2024-05-01 ASSESSMENT — PAIN DESCRIPTION - DESCRIPTORS: DESCRIPTORS: CRAMPING;BURNING

## 2024-05-01 NOTE — ONCOLOGY
Administration: Chemotherapy drug dacogen independently verified with Daiana Millan RN prior to administration.  Acknowledgement of informed consent for chemotherapy administration verified.  Original order, appropriateness of regimen, drug supplied, height, weight, BSA, dose calculations, expiration dates/times, drug appearance, and two patient identifiers were verified by both RNs.  Drug checked for vesicant/irritant status and for risk of hypersensitivity.  Most recent laboratory values and allergies, were reviewed.  Positive, brisk blood return via CVC was confirmed prior to administration. Chest x-ray for correct line placement reviewed. Petty Escoto RN and Daiana Millan RN verified correct rate of chemotherapy and maintenance IV fluids.  Patient was educated on chemotherapy regimen prior to administration including indication for treatment related to disease & side effects of chemotherapy drug.  Patient verbalizes understanding of all instructions.    Completion of Chemotherapy: Monitoring during infusion done per policy, see Flowsheets.  Blood return verified before, during, and after infusion per policy; no signs of extravasation.  Pt tolerated chemotherapy well and without incident. Chemotherapy infusion end time on the MAR.

## 2024-05-01 NOTE — PLAN OF CARE
Problem: Safety - Adult  Goal: Free from fall injury  Outcome: Progressing  Note: Orthostatic vital signs obtained at start of shift - see flowsheet for details.  Pt does not meet criteria for orthostasis.  Pt is a Med fall risk. See Apple Fall Score and ABCDS Injury Risk assessments.   - Screening for Orthostasis AND not a [place] Risk per APPLE/ABCDS: Pt bed is in low position, side rails up, call light and belongings are in reach.  Fall risk light is on outside pts room.  Pt encouraged to call for assistance as needed. Will continue with hourly rounds for PO intake, pain needs, toileting and repositioning as needed.      Problem: Hematologic - Adult  Goal: Maintains hematologic stability  Outcome: Progressing  Flowsheets (Taken 5/1/2024 0424)  Maintains hematologic stability:   Assess for signs and symptoms of bleeding or hemorrhage   Monitor labs for bleeding or clotting disorders   Administer blood products/factors as ordered  Note: Patient's hemoglobin this AM:   Recent Labs     05/01/24  0323   HGB 7.5*     Patient's platelet count this AM:   Recent Labs     05/01/24  0323   PLT 39*    Thrombocytopenia Precautions in place.  Patient showing no signs or symptoms of active bleeding.  Transfusion not indicated at this time.  Patient verbalizes understanding of all instructions. Will continue to assess and implement POC. Call light within reach and hourly rounding in place.      Problem: Pain  Goal: Verbalizes/displays adequate comfort level or baseline comfort level  Outcome: Progressing  Flowsheets (Taken 5/1/2024 0424)  Verbalizes/displays adequate comfort level or baseline comfort level:   Encourage patient to monitor pain and request assistance   Assess pain using appropriate pain scale  Note: Pt denies pain this shift

## 2024-05-01 NOTE — PLAN OF CARE
Problem: Safety - Adult  Goal: Free from fall injury  Outcome: Progressing  Note: Orthostatic vital signs obtained at start of shift - see flowsheet for details.  Pt does not meet criteria for orthostasis.  Pt is a Med fall risk. See Jose Fall Score and ABCDS Injury Risk assessments. Pt bed is in low position, side rails up, call light and belongings are in reach.  Fall risk light is on outside pts room.  Pt encouraged to call for assistance as needed. Will continue with hourly rounds for PO intake, pain needs, toileting and repositioning as needed.       Problem: Metabolic/Fluid and Electrolytes - Adult  Goal: Electrolytes maintained within normal limits  Outcome: Progressing  Note: Pt given scheduled lasix d/t net intake/output +700.     Problem: Infection - Adult  Goal: Absence of fever/infection during anticipated neutropenic period  Outcome: Progressing

## 2024-05-02 LAB
ANION GAP SERPL CALCULATED.3IONS-SCNC: 7 MMOL/L (ref 3–16)
BASOPHILS # BLD: 0 K/UL (ref 0–0.2)
BASOPHILS NFR BLD: 0 %
BUN SERPL-MCNC: 5 MG/DL (ref 7–20)
CALCIUM SERPL-MCNC: 9.1 MG/DL (ref 8.3–10.6)
CHLORIDE SERPL-SCNC: 100 MMOL/L (ref 99–110)
CO2 SERPL-SCNC: 27 MMOL/L (ref 21–32)
CREAT SERPL-MCNC: 0.7 MG/DL (ref 0.9–1.3)
DEPRECATED RDW RBC AUTO: 15.5 % (ref 12.4–15.4)
EOSINOPHIL # BLD: 0 K/UL (ref 0–0.6)
EOSINOPHIL NFR BLD: 0 %
GFR SERPLBLD CREATININE-BSD FMLA CKD-EPI: >90 ML/MIN/{1.73_M2}
GLUCOSE SERPL-MCNC: 90 MG/DL (ref 70–99)
HCT VFR BLD AUTO: 21.4 % (ref 40.5–52.5)
HGB BLD-MCNC: 7.5 G/DL (ref 13.5–17.5)
HYPOCHROMIA BLD QL SMEAR: ABNORMAL
LDH SERPL L TO P-CCNC: 250 U/L (ref 100–190)
LYMPHOCYTES # BLD: 0.8 K/UL (ref 1–5.1)
LYMPHOCYTES NFR BLD: 82 %
MACROCYTES BLD QL SMEAR: ABNORMAL
MCH RBC QN AUTO: 27.7 PG (ref 26–34)
MCHC RBC AUTO-ENTMCNC: 34.8 G/DL (ref 31–36)
MCV RBC AUTO: 79.5 FL (ref 80–100)
MONOCYTES # BLD: 0 K/UL (ref 0–1.3)
MONOCYTES NFR BLD: 2 %
NEUTROPHILS # BLD: 0.1 K/UL (ref 1.7–7.7)
NEUTROPHILS NFR BLD: 10 %
NEUTS BAND NFR BLD MANUAL: 4 % (ref 0–7)
NRBC BLD-RTO: 2 /100 WBC
PATH INTERP BLD-IMP: NO
PHOSPHATE SERPL-MCNC: 3.8 MG/DL (ref 2.5–4.9)
PLATELET # BLD AUTO: 36 K/UL (ref 135–450)
PLATELET BLD QL SMEAR: ABNORMAL
PMV BLD AUTO: 10.8 FL (ref 5–10.5)
POIKILOCYTOSIS BLD QL SMEAR: ABNORMAL
POTASSIUM SERPL-SCNC: 3.6 MMOL/L (ref 3.5–5.1)
RBC # BLD AUTO: 2.69 M/UL (ref 4.2–5.9)
SLIDE REVIEW: ABNORMAL
SMUDGE CELLS BLD QL SMEAR: PRESENT
SODIUM SERPL-SCNC: 134 MMOL/L (ref 136–145)
TOXIC GRANULES BLD QL SMEAR: PRESENT
URATE SERPL-MCNC: 3.9 MG/DL (ref 3.5–7.2)
VARIANT LYMPHS NFR BLD MANUAL: 2 % (ref 0–6)
WBC # BLD AUTO: 1 K/UL (ref 4–11)

## 2024-05-02 PROCEDURE — 2060000000 HC ICU INTERMEDIATE R&B

## 2024-05-02 PROCEDURE — 2580000003 HC RX 258: Performed by: NURSE PRACTITIONER

## 2024-05-02 PROCEDURE — 6370000000 HC RX 637 (ALT 250 FOR IP): Performed by: NURSE PRACTITIONER

## 2024-05-02 PROCEDURE — 83615 LACTATE (LD) (LDH) ENZYME: CPT

## 2024-05-02 PROCEDURE — 85025 COMPLETE CBC W/AUTO DIFF WBC: CPT

## 2024-05-02 PROCEDURE — 80048 BASIC METABOLIC PNL TOTAL CA: CPT

## 2024-05-02 PROCEDURE — 6370000000 HC RX 637 (ALT 250 FOR IP): Performed by: STUDENT IN AN ORGANIZED HEALTH CARE EDUCATION/TRAINING PROGRAM

## 2024-05-02 PROCEDURE — 84100 ASSAY OF PHOSPHORUS: CPT

## 2024-05-02 PROCEDURE — 36592 COLLECT BLOOD FROM PICC: CPT

## 2024-05-02 PROCEDURE — 2580000003 HC RX 258: Performed by: STUDENT IN AN ORGANIZED HEALTH CARE EDUCATION/TRAINING PROGRAM

## 2024-05-02 PROCEDURE — 84550 ASSAY OF BLOOD/URIC ACID: CPT

## 2024-05-02 PROCEDURE — 6370000000 HC RX 637 (ALT 250 FOR IP)

## 2024-05-02 PROCEDURE — 6360000002 HC RX W HCPCS: Performed by: NURSE PRACTITIONER

## 2024-05-02 PROCEDURE — 6360000002 HC RX W HCPCS: Performed by: STUDENT IN AN ORGANIZED HEALTH CARE EDUCATION/TRAINING PROGRAM

## 2024-05-02 PROCEDURE — 3E04305 INTRODUCTION OF OTHER ANTINEOPLASTIC INTO CENTRAL VEIN, PERCUTANEOUS APPROACH: ICD-10-PCS | Performed by: INTERNAL MEDICINE

## 2024-05-02 RX ADMIN — FIDAXOMICIN 200 MG: 200 TABLET, FILM COATED ORAL at 20:35

## 2024-05-02 RX ADMIN — ALLOPURINOL 300 MG: 300 TABLET ORAL at 08:17

## 2024-05-02 RX ADMIN — Medication: at 20:36

## 2024-05-02 RX ADMIN — SODIUM CHLORIDE: 9 INJECTION, SOLUTION INTRAVENOUS at 05:04

## 2024-05-02 RX ADMIN — SODIUM CHLORIDE: 9 INJECTION, SOLUTION INTRAVENOUS at 20:37

## 2024-05-02 RX ADMIN — DECITABINE 40 MG: 50 INJECTION, POWDER, LYOPHILIZED, FOR SOLUTION INTRAVENOUS at 15:21

## 2024-05-02 RX ADMIN — POTASSIUM CHLORIDE 20 MEQ: 1500 TABLET, EXTENDED RELEASE ORAL at 16:40

## 2024-05-02 RX ADMIN — FUROSEMIDE 40 MG: 10 INJECTION, SOLUTION INTRAMUSCULAR; INTRAVENOUS at 18:33

## 2024-05-02 RX ADMIN — CYANOCOBALAMIN TAB 1000 MCG 1000 MCG: 1000 TAB at 08:18

## 2024-05-02 RX ADMIN — CEFEPIME 2000 MG: 1 INJECTION, SOLUTION INTRAVENOUS at 21:00

## 2024-05-02 RX ADMIN — VALACYCLOVIR HYDROCHLORIDE 500 MG: 500 TABLET, FILM COATED ORAL at 20:35

## 2024-05-02 RX ADMIN — LEVETIRACETAM 1500 MG: 500 TABLET, FILM COATED ORAL at 20:35

## 2024-05-02 RX ADMIN — ONDANSETRON HYDROCHLORIDE 8 MG: 8 TABLET, FILM COATED ORAL at 14:34

## 2024-05-02 RX ADMIN — CETIRIZINE HYDROCHLORIDE 10 MG: 10 TABLET, FILM COATED ORAL at 08:17

## 2024-05-02 RX ADMIN — LEVETIRACETAM 1500 MG: 500 TABLET, FILM COATED ORAL at 08:17

## 2024-05-02 RX ADMIN — POTASSIUM CHLORIDE 20 MEQ: 1500 TABLET, EXTENDED RELEASE ORAL at 08:17

## 2024-05-02 RX ADMIN — SODIUM CHLORIDE, PRESERVATIVE FREE 10 ML: 5 INJECTION INTRAVENOUS at 08:20

## 2024-05-02 RX ADMIN — VALACYCLOVIR HYDROCHLORIDE 500 MG: 500 TABLET, FILM COATED ORAL at 08:17

## 2024-05-02 RX ADMIN — SODIUM CHLORIDE, PRESERVATIVE FREE 10 ML: 5 INJECTION INTRAVENOUS at 20:36

## 2024-05-02 RX ADMIN — FLUCONAZOLE 200 MG: 200 TABLET ORAL at 08:18

## 2024-05-02 RX ADMIN — Medication: at 08:20

## 2024-05-02 RX ADMIN — CEFEPIME 2000 MG: 1 INJECTION, SOLUTION INTRAVENOUS at 12:55

## 2024-05-02 RX ADMIN — FIDAXOMICIN 200 MG: 200 TABLET, FILM COATED ORAL at 08:17

## 2024-05-02 RX ADMIN — PANTOPRAZOLE SODIUM 40 MG: 40 TABLET, DELAYED RELEASE ORAL at 05:02

## 2024-05-02 RX ADMIN — CEFEPIME 2000 MG: 1 INJECTION, SOLUTION INTRAVENOUS at 05:03

## 2024-05-02 RX ADMIN — SODIUM CHLORIDE, PRESERVATIVE FREE 10 ML: 5 INJECTION INTRAVENOUS at 08:21

## 2024-05-02 RX ADMIN — SODIUM CHLORIDE: 9 INJECTION, SOLUTION INTRAVENOUS at 08:27

## 2024-05-02 RX ADMIN — SODIUM CHLORIDE, PRESERVATIVE FREE 10 ML: 5 INJECTION INTRAVENOUS at 20:35

## 2024-05-02 RX ADMIN — PANTOPRAZOLE SODIUM 40 MG: 40 TABLET, DELAYED RELEASE ORAL at 15:34

## 2024-05-02 NOTE — ONCOLOGY
Administration: Chemotherapy drug  independently verified with Kya Villarreal RN prior to administration.  Acknowledgement of informed consent for chemotherapy administration verified.  Original order, appropriateness of regimen, drug supplied, height, weight, BSA, dose calculations, expiration dates/times, drug appearance, and two patient identifiers were verified by both RNs.  Drug checked for vesicant/irritant status and for risk of hypersensitivity.  Most recent laboratory values and allergies, were reviewed.  Positive, brisk blood return via CVC was confirmed prior to administration. Chest x-ray for correct line placement reviewed. Petty Escoto RN and Kya Villarreal RN verified correct rate of chemotherapy and maintenance IV fluids.  Patient was educated on chemotherapy regimen prior to administration including indication for treatment related to disease & side effects of chemotherapy drug.  Patient verbalizes understanding of all instructions.    Completion of Chemotherapy: Monitoring during infusion done per policy, see Flowsheets. Blood return verified before, during, and after infusion per policy; no signs of extravasation. Pt tolerated chemotherapy well and without incident.  Chemotherapy infusion end time on the MAR.

## 2024-05-02 NOTE — PLAN OF CARE
Problem: Safety - Adult  Goal: Free from fall injury  5/2/2024 0410 by Nydia Butterfield, RN  Outcome: Progressing  Flowsheets (Taken 5/2/2024 0410)  Free From Fall Injury:   Instruct family/caregiver on patient safety   Based on caregiver fall risk screen, instruct family/caregiver to ask for assistance with transferring infant if caregiver noted to have fall risk factors  Note: Orthostatic vital signs obtained at start of shift - see flowsheet for details.  Pt does not meet criteria for orthostasis.  Pt is a Low fall risk. See Jose Fall Score and ABCDS Injury Risk assessments.   - Screening for Orthostasis AND not a [place] Risk per JOSE/ABCDS: Pt bed is in low position, side rails up, call light and belongings are in reach.  Fall risk light is on outside pts room.  Pt encouraged to call for assistance as needed. Will continue with hourly rounds for PO intake, pain needs, toileting and repositioning as needed.      Problem: Hematologic - Adult  Goal: Maintains hematologic stability  Outcome: Progressing  Flowsheets (Taken 5/2/2024 0410)  Maintains hematologic stability:   Assess for signs and symptoms of bleeding or hemorrhage   Monitor labs for bleeding or clotting disorders   Administer blood products/factors as ordered  Note: Patient's hemoglobin this AM:   Recent Labs     05/02/24  0346   HGB 7.5*     Patient's platelet count this AM:   Recent Labs     05/02/24  0346   PLT 36*    Thrombocytopenia Precautions in place.  Patient showing no signs or symptoms of active bleeding.  Transfusion not indicated at this time.  Patient verbalizes understanding of all instructions. Will continue to assess and implement POC. Call light within reach and hourly rounding in place.      Problem: Infection - Adult  Goal: Absence of fever/infection during anticipated neutropenic period  5/2/2024 0410 by Nydia Butterfield, RN  Outcome: Progressing  Note: Pt afebrile this shift

## 2024-05-02 NOTE — CARE COORDINATION
Attended MD rounds at bedside. Anticipated discharge is early next week. Pt will return home with no anticipated needs as IV ABX will be completed in the hospital.     Sharla COLON, JENNIFER   for [facility] ([facility])  [employer] Mobile: [phone]

## 2024-05-03 LAB
ALBUMIN SERPL-MCNC: 3.8 G/DL (ref 3.4–5)
ALP SERPL-CCNC: 65 U/L (ref 40–129)
ALT SERPL-CCNC: <5 U/L (ref 10–40)
ANION GAP SERPL CALCULATED.3IONS-SCNC: 8 MMOL/L (ref 3–16)
AST SERPL-CCNC: <5 U/L (ref 15–37)
BACTERIA BLD CULT: NORMAL
BASOPHILS # BLD: 0 K/UL (ref 0–0.2)
BASOPHILS NFR BLD: 0 %
BILIRUB DIRECT SERPL-MCNC: <0.2 MG/DL (ref 0–0.3)
BILIRUB INDIRECT SERPL-MCNC: ABNORMAL MG/DL (ref 0–1)
BILIRUB SERPL-MCNC: 0.7 MG/DL (ref 0–1)
BUN SERPL-MCNC: 7 MG/DL (ref 7–20)
CALCIUM SERPL-MCNC: 9.5 MG/DL (ref 8.3–10.6)
CHLORIDE SERPL-SCNC: 99 MMOL/L (ref 99–110)
CO2 SERPL-SCNC: 27 MMOL/L (ref 21–32)
CREAT SERPL-MCNC: 0.8 MG/DL (ref 0.9–1.3)
DEPRECATED RDW RBC AUTO: 15.9 % (ref 12.4–15.4)
EOSINOPHIL # BLD: 0 K/UL (ref 0–0.6)
EOSINOPHIL NFR BLD: 2.2 %
GFR SERPLBLD CREATININE-BSD FMLA CKD-EPI: >90 ML/MIN/{1.73_M2}
GLUCOSE SERPL-MCNC: 96 MG/DL (ref 70–99)
HCT VFR BLD AUTO: 21.8 % (ref 40.5–52.5)
HGB BLD-MCNC: 7.7 G/DL (ref 13.5–17.5)
LDH SERPL L TO P-CCNC: 250 U/L (ref 100–190)
LYMPHOCYTES # BLD: 0.8 K/UL (ref 1–5.1)
LYMPHOCYTES NFR BLD: 82.6 %
MAGNESIUM SERPL-MCNC: 1.6 MG/DL (ref 1.8–2.4)
MCH RBC QN AUTO: 28.1 PG (ref 26–34)
MCHC RBC AUTO-ENTMCNC: 35.6 G/DL (ref 31–36)
MCV RBC AUTO: 79 FL (ref 80–100)
MONOCYTES # BLD: 0.1 K/UL (ref 0–1.3)
MONOCYTES NFR BLD: 8.7 %
NEUTROPHILS # BLD: 0.1 K/UL (ref 1.7–7.7)
NEUTROPHILS NFR BLD: 6.5 %
PHOSPHATE SERPL-MCNC: 3.6 MG/DL (ref 2.5–4.9)
PLATELET # BLD AUTO: 38 K/UL (ref 135–450)
PMV BLD AUTO: 10.7 FL (ref 5–10.5)
POTASSIUM SERPL-SCNC: 3.9 MMOL/L (ref 3.5–5.1)
PROT SERPL-MCNC: 6.5 G/DL (ref 6.4–8.2)
RBC # BLD AUTO: 2.75 M/UL (ref 4.2–5.9)
SODIUM SERPL-SCNC: 134 MMOL/L (ref 136–145)
URATE SERPL-MCNC: 3.7 MG/DL (ref 3.5–7.2)
WBC # BLD AUTO: 1 K/UL (ref 4–11)

## 2024-05-03 PROCEDURE — 80048 BASIC METABOLIC PNL TOTAL CA: CPT

## 2024-05-03 PROCEDURE — 6360000002 HC RX W HCPCS: Performed by: STUDENT IN AN ORGANIZED HEALTH CARE EDUCATION/TRAINING PROGRAM

## 2024-05-03 PROCEDURE — 83615 LACTATE (LD) (LDH) ENZYME: CPT

## 2024-05-03 PROCEDURE — 6360000002 HC RX W HCPCS: Performed by: NURSE PRACTITIONER

## 2024-05-03 PROCEDURE — 6370000000 HC RX 637 (ALT 250 FOR IP)

## 2024-05-03 PROCEDURE — 85025 COMPLETE CBC W/AUTO DIFF WBC: CPT

## 2024-05-03 PROCEDURE — 84550 ASSAY OF BLOOD/URIC ACID: CPT

## 2024-05-03 PROCEDURE — 83735 ASSAY OF MAGNESIUM: CPT

## 2024-05-03 PROCEDURE — 6370000000 HC RX 637 (ALT 250 FOR IP): Performed by: NURSE PRACTITIONER

## 2024-05-03 PROCEDURE — 2060000000 HC ICU INTERMEDIATE R&B

## 2024-05-03 PROCEDURE — 2580000003 HC RX 258: Performed by: STUDENT IN AN ORGANIZED HEALTH CARE EDUCATION/TRAINING PROGRAM

## 2024-05-03 PROCEDURE — 36592 COLLECT BLOOD FROM PICC: CPT

## 2024-05-03 PROCEDURE — 2580000003 HC RX 258: Performed by: NURSE PRACTITIONER

## 2024-05-03 PROCEDURE — 80076 HEPATIC FUNCTION PANEL: CPT

## 2024-05-03 PROCEDURE — 6370000000 HC RX 637 (ALT 250 FOR IP): Performed by: STUDENT IN AN ORGANIZED HEALTH CARE EDUCATION/TRAINING PROGRAM

## 2024-05-03 PROCEDURE — 84100 ASSAY OF PHOSPHORUS: CPT

## 2024-05-03 RX ORDER — SENNA AND DOCUSATE SODIUM 50; 8.6 MG/1; MG/1
2 TABLET, FILM COATED ORAL 2 TIMES DAILY PRN
Status: DISCONTINUED | OUTPATIENT
Start: 2024-05-03 | End: 2024-05-07 | Stop reason: HOSPADM

## 2024-05-03 RX ORDER — MECOBALAMIN 5000 MCG
5 TABLET,DISINTEGRATING ORAL NIGHTLY PRN
Status: DISCONTINUED | OUTPATIENT
Start: 2024-05-03 | End: 2024-05-07 | Stop reason: HOSPADM

## 2024-05-03 RX ORDER — CALCIUM CARBONATE 500 MG/1
500 TABLET, CHEWABLE ORAL 3 TIMES DAILY PRN
Status: DISCONTINUED | OUTPATIENT
Start: 2024-05-03 | End: 2024-05-07 | Stop reason: HOSPADM

## 2024-05-03 RX ORDER — CEFEPIME 1 G/50ML
2000 INJECTION, SOLUTION INTRAVENOUS EVERY 8 HOURS
Status: DISCONTINUED | OUTPATIENT
Start: 2024-05-03 | End: 2024-05-06

## 2024-05-03 RX ADMIN — SODIUM CHLORIDE: 9 INJECTION, SOLUTION INTRAVENOUS at 09:25

## 2024-05-03 RX ADMIN — POTASSIUM CHLORIDE 20 MEQ: 1500 TABLET, EXTENDED RELEASE ORAL at 17:04

## 2024-05-03 RX ADMIN — SODIUM CHLORIDE, PRESERVATIVE FREE 10 ML: 5 INJECTION INTRAVENOUS at 09:26

## 2024-05-03 RX ADMIN — PANTOPRAZOLE SODIUM 40 MG: 40 TABLET, DELAYED RELEASE ORAL at 05:36

## 2024-05-03 RX ADMIN — CYANOCOBALAMIN TAB 1000 MCG 1000 MCG: 1000 TAB at 09:26

## 2024-05-03 RX ADMIN — CEFEPIME 2000 MG: 1 INJECTION, SOLUTION INTRAVENOUS at 05:38

## 2024-05-03 RX ADMIN — Medication: at 19:57

## 2024-05-03 RX ADMIN — VALACYCLOVIR HYDROCHLORIDE 500 MG: 500 TABLET, FILM COATED ORAL at 19:56

## 2024-05-03 RX ADMIN — SODIUM CHLORIDE, PRESERVATIVE FREE 10 ML: 5 INJECTION INTRAVENOUS at 19:56

## 2024-05-03 RX ADMIN — FIDAXOMICIN 200 MG: 200 TABLET, FILM COATED ORAL at 22:43

## 2024-05-03 RX ADMIN — VALACYCLOVIR HYDROCHLORIDE 500 MG: 500 TABLET, FILM COATED ORAL at 09:25

## 2024-05-03 RX ADMIN — LEVETIRACETAM 1500 MG: 500 TABLET, FILM COATED ORAL at 19:56

## 2024-05-03 RX ADMIN — CEFEPIME 2000 MG: 1 INJECTION, SOLUTION INTRAVENOUS at 22:44

## 2024-05-03 RX ADMIN — ONDANSETRON HYDROCHLORIDE 8 MG: 8 TABLET, FILM COATED ORAL at 14:35

## 2024-05-03 RX ADMIN — CEFEPIME 2000 MG: 1 INJECTION, SOLUTION INTRAVENOUS at 13:57

## 2024-05-03 RX ADMIN — DOCUSATE SODIUM 50 MG AND SENNOSIDES 8.6 MG 2 TABLET: 8.6; 5 TABLET, FILM COATED ORAL at 10:35

## 2024-05-03 RX ADMIN — POTASSIUM CHLORIDE 20 MEQ: 1500 TABLET, EXTENDED RELEASE ORAL at 09:26

## 2024-05-03 RX ADMIN — FIDAXOMICIN 200 MG: 200 TABLET, FILM COATED ORAL at 09:26

## 2024-05-03 RX ADMIN — LEVETIRACETAM 1500 MG: 500 TABLET, FILM COATED ORAL at 09:25

## 2024-05-03 RX ADMIN — ALLOPURINOL 300 MG: 300 TABLET ORAL at 09:26

## 2024-05-03 RX ADMIN — FLUCONAZOLE 200 MG: 200 TABLET ORAL at 09:26

## 2024-05-03 RX ADMIN — Medication: at 09:25

## 2024-05-03 RX ADMIN — FUROSEMIDE 40 MG: 10 INJECTION, SOLUTION INTRAMUSCULAR; INTRAVENOUS at 18:02

## 2024-05-03 RX ADMIN — DECITABINE 40 MG: 50 INJECTION, POWDER, LYOPHILIZED, FOR SOLUTION INTRAVENOUS at 15:23

## 2024-05-03 RX ADMIN — CETIRIZINE HYDROCHLORIDE 10 MG: 10 TABLET, FILM COATED ORAL at 09:26

## 2024-05-03 RX ADMIN — PANTOPRAZOLE SODIUM 40 MG: 40 TABLET, DELAYED RELEASE ORAL at 17:04

## 2024-05-03 ASSESSMENT — PAIN SCALES - GENERAL
PAINLEVEL_OUTOF10: 0
PAINLEVEL_OUTOF10: 0

## 2024-05-03 NOTE — PLAN OF CARE
Problem: Safety - Adult  Goal: Free from fall injury  Outcome: Progressing  Flowsheets (Taken 5/3/2024 0344)  Free From Fall Injury:   Instruct family/caregiver on patient safety   Based on caregiver fall risk screen, instruct family/caregiver to ask for assistance with transferring infant if caregiver noted to have fall risk factors  Note: Orthostatic vital signs obtained at start of shift - see flowsheet for details.  Pt does not meet criteria for orthostasis.  Pt is a Low fall risk. See Apple Fall Score and ABCDS Injury Risk assessments.   - Screening for Orthostasis AND not a [place] Risk per APPLE/ABCDS: Pt bed is in low position, side rails up, call light and belongings are in reach.  Fall risk light is on outside pts room.  Pt encouraged to call for assistance as needed. Will continue with hourly rounds for PO intake, pain needs, toileting and repositioning as needed.      Problem: Hematologic - Adult  Goal: Maintains hematologic stability  Outcome: Progressing  Flowsheets (Taken 5/3/2024 0344)  Maintains hematologic stability:   Assess for signs and symptoms of bleeding or hemorrhage   Monitor labs for bleeding or clotting disorders   Administer blood products/factors as ordered  Note: Patient's hemoglobin this AM:   Recent Labs     05/03/24  0330   HGB 7.7*     Patient's platelet count this AM:   Recent Labs     05/03/24  0330   PLT 38*    Thrombocytopenia Precautions in place.  Patient showing no signs or symptoms of active bleeding.  Transfusion not indicated at this time.  Patient verbalizes understanding of all instructions. Will continue to assess and implement POC. Call light within reach and hourly rounding in place.      Problem: Pain  Goal: Verbalizes/displays adequate comfort level or baseline comfort level  Outcome: Progressing  Flowsheets (Taken 5/3/2024 0344)  Verbalizes/displays adequate comfort level or baseline comfort level:   Encourage patient to monitor pain and request assistance

## 2024-05-03 NOTE — CARE COORDINATION
Attempted meeting with pt at bedside and he was unavailable.     Pt will return home when able with no anticipated needs.     SW will follow    JENNIFER Ortiz   for [facility] ([facility])  [employer] Mobile: [phone]

## 2024-05-04 LAB
ANION GAP SERPL CALCULATED.3IONS-SCNC: 9 MMOL/L (ref 3–16)
ANISOCYTOSIS BLD QL SMEAR: ABNORMAL
BASOPHILS # BLD: 0 K/UL (ref 0–0.2)
BASOPHILS NFR BLD: 0 %
BUN SERPL-MCNC: 7 MG/DL (ref 7–20)
CALCIUM SERPL-MCNC: 9.4 MG/DL (ref 8.3–10.6)
CHLORIDE SERPL-SCNC: 100 MMOL/L (ref 99–110)
CO2 SERPL-SCNC: 28 MMOL/L (ref 21–32)
CREAT SERPL-MCNC: 0.7 MG/DL (ref 0.9–1.3)
DEPRECATED RDW RBC AUTO: 15.5 % (ref 12.4–15.4)
EOSINOPHIL # BLD: 0 K/UL (ref 0–0.6)
EOSINOPHIL NFR BLD: 4 %
GFR SERPLBLD CREATININE-BSD FMLA CKD-EPI: >90 ML/MIN/{1.73_M2}
GLUCOSE SERPL-MCNC: 100 MG/DL (ref 70–99)
HCT VFR BLD AUTO: 21.2 % (ref 40.5–52.5)
HGB BLD-MCNC: 7.5 G/DL (ref 13.5–17.5)
HYPOCHROMIA BLD QL SMEAR: ABNORMAL
LDH SERPL L TO P-CCNC: 237 U/L (ref 100–190)
LYMPHOCYTES # BLD: 1 K/UL (ref 1–5.1)
LYMPHOCYTES NFR BLD: 86 %
MACROCYTES BLD QL SMEAR: ABNORMAL
MCH RBC QN AUTO: 27.9 PG (ref 26–34)
MCHC RBC AUTO-ENTMCNC: 35.2 G/DL (ref 31–36)
MCV RBC AUTO: 79.3 FL (ref 80–100)
MICROCYTES BLD QL SMEAR: ABNORMAL
MONOCYTES # BLD: 0 K/UL (ref 0–1.3)
MONOCYTES NFR BLD: 2 %
NEUTROPHILS # BLD: 0.1 K/UL (ref 1.7–7.7)
NEUTROPHILS NFR BLD: 6 %
NRBC BLD-RTO: 1 /100 WBC
OVALOCYTES BLD QL SMEAR: ABNORMAL
PATH INTERP BLD-IMP: NO
PHOSPHATE SERPL-MCNC: 3.6 MG/DL (ref 2.5–4.9)
PLATELET # BLD AUTO: 39 K/UL (ref 135–450)
PLATELET BLD QL SMEAR: ABNORMAL
PMV BLD AUTO: 10.7 FL (ref 5–10.5)
POIKILOCYTOSIS BLD QL SMEAR: ABNORMAL
POTASSIUM SERPL-SCNC: 3.9 MMOL/L (ref 3.5–5.1)
RBC # BLD AUTO: 2.68 M/UL (ref 4.2–5.9)
SCHISTOCYTES BLD QL SMEAR: ABNORMAL
SLIDE REVIEW: ABNORMAL
SODIUM SERPL-SCNC: 137 MMOL/L (ref 136–145)
URATE SERPL-MCNC: 3.4 MG/DL (ref 3.5–7.2)
VARIANT LYMPHS NFR BLD MANUAL: 2 % (ref 0–6)
WBC # BLD AUTO: 1.1 K/UL (ref 4–11)

## 2024-05-04 PROCEDURE — 80048 BASIC METABOLIC PNL TOTAL CA: CPT

## 2024-05-04 PROCEDURE — 2580000003 HC RX 258: Performed by: NURSE PRACTITIONER

## 2024-05-04 PROCEDURE — 6370000000 HC RX 637 (ALT 250 FOR IP): Performed by: STUDENT IN AN ORGANIZED HEALTH CARE EDUCATION/TRAINING PROGRAM

## 2024-05-04 PROCEDURE — 36592 COLLECT BLOOD FROM PICC: CPT

## 2024-05-04 PROCEDURE — 6370000000 HC RX 637 (ALT 250 FOR IP)

## 2024-05-04 PROCEDURE — 6370000000 HC RX 637 (ALT 250 FOR IP): Performed by: NURSE PRACTITIONER

## 2024-05-04 PROCEDURE — 2060000000 HC ICU INTERMEDIATE R&B

## 2024-05-04 PROCEDURE — 85025 COMPLETE CBC W/AUTO DIFF WBC: CPT

## 2024-05-04 PROCEDURE — 83615 LACTATE (LD) (LDH) ENZYME: CPT

## 2024-05-04 PROCEDURE — 84100 ASSAY OF PHOSPHORUS: CPT

## 2024-05-04 PROCEDURE — 6360000002 HC RX W HCPCS: Performed by: NURSE PRACTITIONER

## 2024-05-04 PROCEDURE — 84550 ASSAY OF BLOOD/URIC ACID: CPT

## 2024-05-04 RX ADMIN — POTASSIUM CHLORIDE 20 MEQ: 1500 TABLET, EXTENDED RELEASE ORAL at 16:42

## 2024-05-04 RX ADMIN — Medication: at 08:18

## 2024-05-04 RX ADMIN — ALLOPURINOL 300 MG: 300 TABLET ORAL at 08:15

## 2024-05-04 RX ADMIN — CEFEPIME 2000 MG: 1 INJECTION, SOLUTION INTRAVENOUS at 14:10

## 2024-05-04 RX ADMIN — VALACYCLOVIR HYDROCHLORIDE 500 MG: 500 TABLET, FILM COATED ORAL at 20:56

## 2024-05-04 RX ADMIN — SODIUM CHLORIDE, PRESERVATIVE FREE 10 ML: 5 INJECTION INTRAVENOUS at 08:19

## 2024-05-04 RX ADMIN — POTASSIUM CHLORIDE 20 MEQ: 1500 TABLET, EXTENDED RELEASE ORAL at 08:15

## 2024-05-04 RX ADMIN — PANTOPRAZOLE SODIUM 40 MG: 40 TABLET, DELAYED RELEASE ORAL at 16:42

## 2024-05-04 RX ADMIN — CETIRIZINE HYDROCHLORIDE 10 MG: 10 TABLET, FILM COATED ORAL at 08:15

## 2024-05-04 RX ADMIN — CEFEPIME 2000 MG: 1 INJECTION, SOLUTION INTRAVENOUS at 21:33

## 2024-05-04 RX ADMIN — CYANOCOBALAMIN TAB 1000 MCG 1000 MCG: 1000 TAB at 08:16

## 2024-05-04 RX ADMIN — CEFEPIME 2000 MG: 1 INJECTION, SOLUTION INTRAVENOUS at 06:02

## 2024-05-04 RX ADMIN — PANTOPRAZOLE SODIUM 40 MG: 40 TABLET, DELAYED RELEASE ORAL at 06:02

## 2024-05-04 RX ADMIN — Medication: at 20:59

## 2024-05-04 RX ADMIN — LEVETIRACETAM 1500 MG: 500 TABLET, FILM COATED ORAL at 08:15

## 2024-05-04 RX ADMIN — FIDAXOMICIN 200 MG: 200 TABLET, FILM COATED ORAL at 08:16

## 2024-05-04 RX ADMIN — FIDAXOMICIN 200 MG: 200 TABLET, FILM COATED ORAL at 20:56

## 2024-05-04 RX ADMIN — LEVETIRACETAM 1500 MG: 500 TABLET, FILM COATED ORAL at 20:56

## 2024-05-04 RX ADMIN — VALACYCLOVIR HYDROCHLORIDE 500 MG: 500 TABLET, FILM COATED ORAL at 08:16

## 2024-05-04 RX ADMIN — FLUCONAZOLE 200 MG: 200 TABLET ORAL at 08:16

## 2024-05-04 ASSESSMENT — PAIN DESCRIPTION - ORIENTATION: ORIENTATION: MID;INNER

## 2024-05-04 ASSESSMENT — PAIN DESCRIPTION - ONSET: ONSET: GRADUAL

## 2024-05-04 ASSESSMENT — PAIN SCALES - GENERAL
PAINLEVEL_OUTOF10: 0
PAINLEVEL_OUTOF10: 3
PAINLEVEL_OUTOF10: 0

## 2024-05-04 ASSESSMENT — PAIN DESCRIPTION - DESCRIPTORS: DESCRIPTORS: ACHING;CRAMPING

## 2024-05-04 ASSESSMENT — PAIN DESCRIPTION - PAIN TYPE: TYPE: ACUTE PAIN

## 2024-05-04 ASSESSMENT — PAIN - FUNCTIONAL ASSESSMENT: PAIN_FUNCTIONAL_ASSESSMENT: ACTIVITIES ARE NOT PREVENTED

## 2024-05-04 ASSESSMENT — PAIN DESCRIPTION - LOCATION: LOCATION: ABDOMEN

## 2024-05-04 ASSESSMENT — PAIN DESCRIPTION - FREQUENCY: FREQUENCY: INTERMITTENT

## 2024-05-04 NOTE — PLAN OF CARE
Problem: Safety - Adult  Goal: Free from fall injury  Outcome: Progressing  Orthostatic vital signs obtained at start of shift - see flowsheet for details.  Pt does not meet criteria for orthostasis.  Pt is a Med fall risk. See Jose Fall Score and ABCDS Injury Risk assessments. Pt bed is in low position, side rails up, call light and belongings are in reach.  Fall risk light is on outside pts room.  Pt encouraged to call for assistance as needed. Will continue with hourly rounds for PO intake, pain needs, toileting and repositioning as needed.       Problem: ABCDS Injury Assessment  Goal: Absence of physical injury  Outcome: Progressing  No new physical injuries noted.      Problem: Hematologic - Adult  Goal: Maintains hematologic stability  Outcome: Progressing  Patient's hemoglobin this AM:   Recent Labs     05/03/24  0330   HGB 7.7*     Patient's platelet count this AM:   Recent Labs     05/03/24  0330   PLT 38*    Thrombocytopenia Precautions in place.  Patient showing no signs or symptoms of active bleeding.  Transfusion not indicated at this time.  Patient verbalizes understanding of all instructions. Will continue to assess and implement POC. Call light within reach and hourly rounding in place.       Problem: Pain  Goal: Verbalizes/displays adequate comfort level or baseline comfort level  Outcome: Progressing  Pt did not report pain this shift. Pt educated on importance of calling for pain meds when in pain. Pt verbalized understanding.

## 2024-05-05 LAB
ANION GAP SERPL CALCULATED.3IONS-SCNC: 9 MMOL/L (ref 3–16)
ANISOCYTOSIS BLD QL SMEAR: ABNORMAL
BASOPHILS # BLD: 0 K/UL (ref 0–0.2)
BASOPHILS NFR BLD: 0 %
BUN SERPL-MCNC: 8 MG/DL (ref 7–20)
CALCIUM SERPL-MCNC: 9.6 MG/DL (ref 8.3–10.6)
CHLORIDE SERPL-SCNC: 99 MMOL/L (ref 99–110)
CO2 SERPL-SCNC: 27 MMOL/L (ref 21–32)
CREAT SERPL-MCNC: 0.7 MG/DL (ref 0.9–1.3)
DACRYOCYTES BLD QL SMEAR: ABNORMAL
DEPRECATED RDW RBC AUTO: 15.4 % (ref 12.4–15.4)
EOSINOPHIL # BLD: 0 K/UL (ref 0–0.6)
EOSINOPHIL NFR BLD: 4 %
GFR SERPLBLD CREATININE-BSD FMLA CKD-EPI: >90 ML/MIN/{1.73_M2}
GLUCOSE SERPL-MCNC: 95 MG/DL (ref 70–99)
HCT VFR BLD AUTO: 22 % (ref 40.5–52.5)
HGB BLD-MCNC: 7.7 G/DL (ref 13.5–17.5)
HYPOCHROMIA BLD QL SMEAR: ABNORMAL
LDH SERPL L TO P-CCNC: 248 U/L (ref 100–190)
LYMPHOCYTES # BLD: 1 K/UL (ref 1–5.1)
LYMPHOCYTES NFR BLD: 90 %
MACROCYTES BLD QL SMEAR: ABNORMAL
MCH RBC QN AUTO: 27.4 PG (ref 26–34)
MCHC RBC AUTO-ENTMCNC: 34.8 G/DL (ref 31–36)
MICROCYTES BLD QL SMEAR: ABNORMAL
MONOCYTES # BLD: 0 K/UL (ref 0–1.3)
MONOCYTES NFR BLD: 0 %
NEUTROPHILS # BLD: 0.1 K/UL (ref 1.7–7.7)
NEUTROPHILS NFR BLD: 6 %
OVALOCYTES BLD QL SMEAR: ABNORMAL
PHOSPHATE SERPL-MCNC: 3.7 MG/DL (ref 2.5–4.9)
PLATELET # BLD AUTO: 38 K/UL (ref 135–450)
PLATELET BLD QL SMEAR: ABNORMAL
PMV BLD AUTO: 10.8 FL (ref 5–10.5)
POIKILOCYTOSIS BLD QL SMEAR: ABNORMAL
POLYCHROMASIA BLD QL SMEAR: ABNORMAL
POTASSIUM SERPL-SCNC: 4.2 MMOL/L (ref 3.5–5.1)
RBC # BLD AUTO: 2.8 M/UL (ref 4.2–5.9)
SCHISTOCYTES BLD QL SMEAR: ABNORMAL
SLIDE REVIEW: ABNORMAL
SODIUM SERPL-SCNC: 135 MMOL/L (ref 136–145)
URATE SERPL-MCNC: 2.9 MG/DL (ref 3.5–7.2)
WBC # BLD AUTO: 1.1 K/UL (ref 4–11)

## 2024-05-05 PROCEDURE — 6370000000 HC RX 637 (ALT 250 FOR IP)

## 2024-05-05 PROCEDURE — 84550 ASSAY OF BLOOD/URIC ACID: CPT

## 2024-05-05 PROCEDURE — 80048 BASIC METABOLIC PNL TOTAL CA: CPT

## 2024-05-05 PROCEDURE — 2580000003 HC RX 258: Performed by: NURSE PRACTITIONER

## 2024-05-05 PROCEDURE — 83615 LACTATE (LD) (LDH) ENZYME: CPT

## 2024-05-05 PROCEDURE — 2060000000 HC ICU INTERMEDIATE R&B

## 2024-05-05 PROCEDURE — 6370000000 HC RX 637 (ALT 250 FOR IP): Performed by: NURSE PRACTITIONER

## 2024-05-05 PROCEDURE — 6360000002 HC RX W HCPCS: Performed by: NURSE PRACTITIONER

## 2024-05-05 PROCEDURE — 85025 COMPLETE CBC W/AUTO DIFF WBC: CPT

## 2024-05-05 PROCEDURE — 84100 ASSAY OF PHOSPHORUS: CPT

## 2024-05-05 PROCEDURE — 36592 COLLECT BLOOD FROM PICC: CPT

## 2024-05-05 PROCEDURE — 6370000000 HC RX 637 (ALT 250 FOR IP): Performed by: STUDENT IN AN ORGANIZED HEALTH CARE EDUCATION/TRAINING PROGRAM

## 2024-05-05 RX ADMIN — CYANOCOBALAMIN TAB 1000 MCG 1000 MCG: 1000 TAB at 08:28

## 2024-05-05 RX ADMIN — FLUCONAZOLE 200 MG: 200 TABLET ORAL at 08:27

## 2024-05-05 RX ADMIN — VALACYCLOVIR HYDROCHLORIDE 500 MG: 500 TABLET, FILM COATED ORAL at 21:24

## 2024-05-05 RX ADMIN — LEVETIRACETAM 1500 MG: 500 TABLET, FILM COATED ORAL at 08:28

## 2024-05-05 RX ADMIN — Medication: at 08:28

## 2024-05-05 RX ADMIN — ALLOPURINOL 300 MG: 300 TABLET ORAL at 08:27

## 2024-05-05 RX ADMIN — POTASSIUM CHLORIDE 20 MEQ: 1500 TABLET, EXTENDED RELEASE ORAL at 08:27

## 2024-05-05 RX ADMIN — CEFEPIME 2000 MG: 1 INJECTION, SOLUTION INTRAVENOUS at 14:44

## 2024-05-05 RX ADMIN — SODIUM CHLORIDE: 9 INJECTION, SOLUTION INTRAVENOUS at 20:10

## 2024-05-05 RX ADMIN — SODIUM CHLORIDE, PRESERVATIVE FREE 10 ML: 5 INJECTION INTRAVENOUS at 08:29

## 2024-05-05 RX ADMIN — CEFEPIME 2000 MG: 1 INJECTION, SOLUTION INTRAVENOUS at 21:45

## 2024-05-05 RX ADMIN — FIDAXOMICIN 200 MG: 200 TABLET, FILM COATED ORAL at 21:26

## 2024-05-05 RX ADMIN — POTASSIUM CHLORIDE 20 MEQ: 1500 TABLET, EXTENDED RELEASE ORAL at 16:19

## 2024-05-05 RX ADMIN — SODIUM CHLORIDE: 9 INJECTION, SOLUTION INTRAVENOUS at 14:48

## 2024-05-05 RX ADMIN — PANTOPRAZOLE SODIUM 40 MG: 40 TABLET, DELAYED RELEASE ORAL at 06:13

## 2024-05-05 RX ADMIN — CETIRIZINE HYDROCHLORIDE 10 MG: 10 TABLET, FILM COATED ORAL at 08:27

## 2024-05-05 RX ADMIN — LEVETIRACETAM 1500 MG: 500 TABLET, FILM COATED ORAL at 21:24

## 2024-05-05 RX ADMIN — Medication: at 21:25

## 2024-05-05 RX ADMIN — VALACYCLOVIR HYDROCHLORIDE 500 MG: 500 TABLET, FILM COATED ORAL at 08:28

## 2024-05-05 RX ADMIN — PANTOPRAZOLE SODIUM 40 MG: 40 TABLET, DELAYED RELEASE ORAL at 16:19

## 2024-05-05 RX ADMIN — SODIUM CHLORIDE, PRESERVATIVE FREE 10 ML: 5 INJECTION INTRAVENOUS at 21:26

## 2024-05-05 RX ADMIN — FIDAXOMICIN 200 MG: 200 TABLET, FILM COATED ORAL at 08:28

## 2024-05-05 RX ADMIN — CEFEPIME 2000 MG: 1 INJECTION, SOLUTION INTRAVENOUS at 06:11

## 2024-05-05 ASSESSMENT — PAIN SCALES - GENERAL
PAINLEVEL_OUTOF10: 0

## 2024-05-05 NOTE — PLAN OF CARE
Problem: Safety - Adult  Goal: Free from fall injury  5/5/2024 0032 by Morenita Tovar RN  Outcome: Progressing  Flowsheets (Taken 5/4/2024 1710 by Andi Alston RN)  Free From Fall Injury: Instruct family/caregiver on patient safety  Note: Pt in bed with bed alarm on, instructed to call for assistance. Verbalized understanding. All fall precautions in place.        Problem: ABCDS Injury Assessment  Goal: Absence of physical injury  Outcome: Progressing  Flowsheets (Taken 5/5/2024 0032)  Absence of Physical Injury: Implement safety measures based on patient assessment     Problem: Musculoskeletal - Adult  Goal: Return mobility to safest level of function  5/5/2024 0032 by Morenita Tovar RN  Outcome: Progressing  Flowsheets (Taken 5/4/2024 1710 by Andi Alston RN)  Return Mobility to Safest Level of Function: Assess patient stability and activity tolerance for standing, transferring and ambulating with or without assistive devices     Problem: Hematologic - Adult  Goal: Maintains hematologic stability  5/5/2024 0032 by Morenita Tovar RN  Outcome: Progressing  Flowsheets (Taken 5/4/2024 1710 by Andi Alston RN)  Maintains hematologic stability:   Monitor labs for bleeding or clotting disorders   Assess for signs and symptoms of bleeding or hemorrhage     Problem: Pain  Goal: Verbalizes/displays adequate comfort level or baseline comfort level  5/5/2024 0032 by Morenita Tovar RN  Outcome: Progressing  Flowsheets (Taken 5/4/2024 1710 by Andi Alston RN)  Verbalizes/displays adequate comfort level or baseline comfort level:   Encourage patient to monitor pain and request assistance   Assess pain using appropriate pain scale   Implement non-pharmacological measures as appropriate and evaluate response  Note: Pt educated on importance of calling for pain meds when in pain. Pt verbalized understanding. Pain assessment completed at least once per shift. Will continue to monitor.        Problem:

## 2024-05-05 NOTE — PLAN OF CARE
Problem: Safety - Adult  Goal: Free from fall injury  Outcome: Progressing  Flowsheets (Taken 5/5/2024 1703)  Free From Fall Injury: Instruct family/caregiver on patient safety  Note: Orthostatic vital signs obtained at start of shift - see flowsheet for details.  Pt does not meet criteria for orthostasis.  Pt is a Low fall risk. See Apple Fall Score and ABCDS Injury Risk assessments.     - Screening for Orthostasis AND not a [place] Risk per APPLE/ABCDS: Pt bed is in low position, side rails up, call light and belongings are in reach.  Fall risk light is on outside pts room.  Pt encouraged to call for assistance as needed. Will continue with hourly rounds for PO intake, pain needs, toileting and repositioning as needed.      Problem: Musculoskeletal - Adult  Goal: Return mobility to safest level of function  Outcome: Progressing  Flowsheets (Taken 5/5/2024 1703)  Return Mobility to Safest Level of Function: Assess patient stability and activity tolerance for standing, transferring and ambulating with or without assistive devices  Note: Pt independent during shift.      Problem: Hematologic - Adult  Goal: Maintains hematologic stability  Outcome: Progressing  Flowsheets (Taken 5/5/2024 1703)  Maintains hematologic stability:   Assess for signs and symptoms of bleeding or hemorrhage   Monitor labs for bleeding or clotting disorders  Note: Patient's hemoglobin this AM:   Recent Labs     05/05/24 0315   HGB 7.7*     Patient's platelet count this AM:   Recent Labs     05/05/24 0315   PLT 38*    Thrombocytopenia Precautions in place.  Patient showing no signs or symptoms of active bleeding.  Transfusion not indicated at this time.  Patient verbalizes understanding of all instructions. Will continue to assess and implement POC. Call light within reach and hourly rounding in place.      Problem: Pain  Goal: Verbalizes/displays adequate comfort level or baseline comfort level  Outcome: Progressing  Flowsheets (Taken

## 2024-05-06 ENCOUNTER — APPOINTMENT (OUTPATIENT)
Dept: GENERAL RADIOLOGY | Age: 44
DRG: 720 | End: 2024-05-06
Attending: STUDENT IN AN ORGANIZED HEALTH CARE EDUCATION/TRAINING PROGRAM
Payer: MEDICAID

## 2024-05-06 LAB
ABO + RH BLD: NORMAL
ALBUMIN SERPL-MCNC: 3.9 G/DL (ref 3.4–5)
ALP SERPL-CCNC: 60 U/L (ref 40–129)
ALT SERPL-CCNC: <5 U/L (ref 10–40)
ANION GAP SERPL CALCULATED.3IONS-SCNC: 9 MMOL/L (ref 3–16)
AST SERPL-CCNC: 7 U/L (ref 15–37)
BASOPHILS # BLD: 0 K/UL (ref 0–0.2)
BASOPHILS NFR BLD: 0 %
BILIRUB DIRECT SERPL-MCNC: <0.2 MG/DL (ref 0–0.3)
BILIRUB INDIRECT SERPL-MCNC: ABNORMAL MG/DL (ref 0–1)
BILIRUB SERPL-MCNC: 0.7 MG/DL (ref 0–1)
BILIRUB UR QL STRIP.AUTO: NEGATIVE
BLD GP AB SCN SERPL QL: NORMAL
BLOOD BANK DISPENSE STATUS: NORMAL
BLOOD BANK PRODUCT CODE: NORMAL
BPU ID: NORMAL
BUN SERPL-MCNC: 9 MG/DL (ref 7–20)
CALCIUM SERPL-MCNC: 9.5 MG/DL (ref 8.3–10.6)
CHLORIDE SERPL-SCNC: 103 MMOL/L (ref 99–110)
CLARITY UR: CLEAR
CO2 SERPL-SCNC: 27 MMOL/L (ref 21–32)
COLOR UR: YELLOW
CREAT SERPL-MCNC: 0.7 MG/DL (ref 0.9–1.3)
DEPRECATED RDW RBC AUTO: 15.2 % (ref 12.4–15.4)
DESCRIPTION BLOOD BANK: NORMAL
EOSINOPHIL # BLD: 0 K/UL (ref 0–0.6)
EOSINOPHIL NFR BLD: 2.4 %
FUNGUS BLD CULT: NORMAL
FUNGUS BLD CULT: NORMAL
FUNGUS SPEC CULT: NORMAL
GFR SERPLBLD CREATININE-BSD FMLA CKD-EPI: >90 ML/MIN/{1.73_M2}
GLUCOSE SERPL-MCNC: 100 MG/DL (ref 70–99)
GLUCOSE UR STRIP.AUTO-MCNC: NEGATIVE MG/DL
HCT VFR BLD AUTO: 20.4 % (ref 40.5–52.5)
HGB BLD-MCNC: 7 G/DL (ref 13.5–17.5)
HGB UR QL STRIP.AUTO: NEGATIVE
KETONES UR STRIP.AUTO-MCNC: NEGATIVE MG/DL
LDH SERPL L TO P-CCNC: 234 U/L (ref 100–190)
LEUKOCYTE ESTERASE UR QL STRIP.AUTO: NEGATIVE
LOEFFLER MB STN SPEC: NORMAL
LYMPHOCYTES # BLD: 0.8 K/UL (ref 1–5.1)
LYMPHOCYTES NFR BLD: 87 %
MAGNESIUM SERPL-MCNC: 1.6 MG/DL (ref 1.8–2.4)
MCH RBC QN AUTO: 27.1 PG (ref 26–34)
MCHC RBC AUTO-ENTMCNC: 34.5 G/DL (ref 31–36)
MCV RBC AUTO: 78.5 FL (ref 80–100)
MONOCYTES # BLD: 0 K/UL (ref 0–1.3)
MONOCYTES NFR BLD: 2.4 %
NEUTROPHILS # BLD: 0.1 K/UL (ref 1.7–7.7)
NEUTROPHILS NFR BLD: 8.2 %
NITRITE UR QL STRIP.AUTO: NEGATIVE
PH UR STRIP.AUTO: 6 [PH] (ref 5–8)
PHOSPHATE SERPL-MCNC: 3.6 MG/DL (ref 2.5–4.9)
PLATELET # BLD AUTO: 37 K/UL (ref 135–450)
PMV BLD AUTO: 10.8 FL (ref 5–10.5)
POTASSIUM SERPL-SCNC: 4.1 MMOL/L (ref 3.5–5.1)
PROT SERPL-MCNC: 6.3 G/DL (ref 6.4–8.2)
PROT UR STRIP.AUTO-MCNC: NEGATIVE MG/DL
RBC # BLD AUTO: 2.6 M/UL (ref 4.2–5.9)
SODIUM SERPL-SCNC: 139 MMOL/L (ref 136–145)
SP GR UR STRIP.AUTO: 1.01 (ref 1–1.03)
UA DIPSTICK W REFLEX MICRO PNL UR: NORMAL
URATE SERPL-MCNC: 2.8 MG/DL (ref 3.5–7.2)
URN SPEC COLLECT METH UR: NORMAL
UROBILINOGEN UR STRIP-ACNC: 0.2 E.U./DL
WBC # BLD AUTO: 0.9 K/UL (ref 4–11)

## 2024-05-06 PROCEDURE — A4217 STERILE WATER/SALINE, 500 ML: HCPCS | Performed by: NURSE PRACTITIONER

## 2024-05-06 PROCEDURE — 6370000000 HC RX 637 (ALT 250 FOR IP): Performed by: NURSE PRACTITIONER

## 2024-05-06 PROCEDURE — P9040 RBC LEUKOREDUCED IRRADIATED: HCPCS

## 2024-05-06 PROCEDURE — 6360000002 HC RX W HCPCS: Performed by: NURSE PRACTITIONER

## 2024-05-06 PROCEDURE — 80076 HEPATIC FUNCTION PANEL: CPT

## 2024-05-06 PROCEDURE — 84550 ASSAY OF BLOOD/URIC ACID: CPT

## 2024-05-06 PROCEDURE — 86850 RBC ANTIBODY SCREEN: CPT

## 2024-05-06 PROCEDURE — 83615 LACTATE (LD) (LDH) ENZYME: CPT

## 2024-05-06 PROCEDURE — 85025 COMPLETE CBC W/AUTO DIFF WBC: CPT

## 2024-05-06 PROCEDURE — 36592 COLLECT BLOOD FROM PICC: CPT

## 2024-05-06 PROCEDURE — 6370000000 HC RX 637 (ALT 250 FOR IP)

## 2024-05-06 PROCEDURE — 80048 BASIC METABOLIC PNL TOTAL CA: CPT

## 2024-05-06 PROCEDURE — 86900 BLOOD TYPING SEROLOGIC ABO: CPT

## 2024-05-06 PROCEDURE — 86901 BLOOD TYPING SEROLOGIC RH(D): CPT

## 2024-05-06 PROCEDURE — 83735 ASSAY OF MAGNESIUM: CPT

## 2024-05-06 PROCEDURE — 2580000003 HC RX 258: Performed by: NURSE PRACTITIONER

## 2024-05-06 PROCEDURE — 36430 TRANSFUSION BLD/BLD COMPNT: CPT

## 2024-05-06 PROCEDURE — 81003 URINALYSIS AUTO W/O SCOPE: CPT

## 2024-05-06 PROCEDURE — 86923 COMPATIBILITY TEST ELECTRIC: CPT

## 2024-05-06 PROCEDURE — 2060000000 HC ICU INTERMEDIATE R&B

## 2024-05-06 PROCEDURE — 6370000000 HC RX 637 (ALT 250 FOR IP): Performed by: STUDENT IN AN ORGANIZED HEALTH CARE EDUCATION/TRAINING PROGRAM

## 2024-05-06 PROCEDURE — 84100 ASSAY OF PHOSPHORUS: CPT

## 2024-05-06 PROCEDURE — 71045 X-RAY EXAM CHEST 1 VIEW: CPT

## 2024-05-06 RX ORDER — VANCOMYCIN HYDROCHLORIDE 125 MG/1
125 CAPSULE ORAL 4 TIMES DAILY
Qty: 40 CAPSULE | Refills: 0 | Status: CANCELLED | OUTPATIENT
Start: 2024-05-06 | End: 2024-05-16

## 2024-05-06 RX ORDER — VANCOMYCIN HYDROCHLORIDE 50 MG/ML
125 KIT ORAL 2 TIMES DAILY
Status: DISCONTINUED | OUTPATIENT
Start: 2024-05-06 | End: 2024-05-07 | Stop reason: HOSPADM

## 2024-05-06 RX ORDER — LEVOFLOXACIN 500 MG/1
500 TABLET, FILM COATED ORAL DAILY
Status: DISCONTINUED | OUTPATIENT
Start: 2024-05-06 | End: 2024-05-07 | Stop reason: HOSPADM

## 2024-05-06 RX ORDER — VANCOMYCIN HYDROCHLORIDE 50 MG/ML
125 KIT ORAL EVERY 6 HOURS SCHEDULED
Status: DISCONTINUED | OUTPATIENT
Start: 2024-05-06 | End: 2024-05-06

## 2024-05-06 RX ORDER — SODIUM CHLORIDE 9 MG/ML
INJECTION, SOLUTION INTRAVENOUS PRN
Status: DISCONTINUED | OUTPATIENT
Start: 2024-05-06 | End: 2024-05-07 | Stop reason: HOSPADM

## 2024-05-06 RX ADMIN — ALLOPURINOL 300 MG: 300 TABLET ORAL at 09:46

## 2024-05-06 RX ADMIN — FLUCONAZOLE 200 MG: 200 TABLET ORAL at 09:47

## 2024-05-06 RX ADMIN — CETIRIZINE HYDROCHLORIDE 10 MG: 10 TABLET, FILM COATED ORAL at 09:47

## 2024-05-06 RX ADMIN — FIDAXOMICIN 200 MG: 200 TABLET, FILM COATED ORAL at 09:48

## 2024-05-06 RX ADMIN — VALACYCLOVIR HYDROCHLORIDE 500 MG: 500 TABLET, FILM COATED ORAL at 09:47

## 2024-05-06 RX ADMIN — Medication: at 09:49

## 2024-05-06 RX ADMIN — LEVETIRACETAM 1500 MG: 500 TABLET, FILM COATED ORAL at 09:47

## 2024-05-06 RX ADMIN — VALACYCLOVIR HYDROCHLORIDE 500 MG: 500 TABLET, FILM COATED ORAL at 20:01

## 2024-05-06 RX ADMIN — PANTOPRAZOLE SODIUM 40 MG: 40 TABLET, DELAYED RELEASE ORAL at 06:04

## 2024-05-06 RX ADMIN — POTASSIUM CHLORIDE 20 MEQ: 1500 TABLET, EXTENDED RELEASE ORAL at 09:46

## 2024-05-06 RX ADMIN — LEVETIRACETAM 1500 MG: 500 TABLET, FILM COATED ORAL at 20:01

## 2024-05-06 RX ADMIN — SODIUM CHLORIDE, PRESERVATIVE FREE 10 ML: 5 INJECTION INTRAVENOUS at 09:49

## 2024-05-06 RX ADMIN — SODIUM CHLORIDE, PRESERVATIVE FREE 10 ML: 5 INJECTION INTRAVENOUS at 20:01

## 2024-05-06 RX ADMIN — VANCOMYCIN HYDROCHLORIDE 125 MG: 250 POWDER, FOR SOLUTION ORAL at 20:01

## 2024-05-06 RX ADMIN — CYANOCOBALAMIN TAB 1000 MCG 1000 MCG: 1000 TAB at 09:47

## 2024-05-06 RX ADMIN — CEFEPIME 2000 MG: 1 INJECTION, SOLUTION INTRAVENOUS at 06:05

## 2024-05-06 RX ADMIN — PANTOPRAZOLE SODIUM 40 MG: 40 TABLET, DELAYED RELEASE ORAL at 16:44

## 2024-05-06 RX ADMIN — Medication: at 20:01

## 2024-05-06 RX ADMIN — LEVOFLOXACIN 500 MG: 500 TABLET, FILM COATED ORAL at 09:46

## 2024-05-06 RX ADMIN — POTASSIUM CHLORIDE 20 MEQ: 1500 TABLET, EXTENDED RELEASE ORAL at 16:44

## 2024-05-06 RX ADMIN — SODIUM CHLORIDE 15 ML: 900 IRRIGANT IRRIGATION at 20:01

## 2024-05-06 ASSESSMENT — PAIN SCALES - GENERAL: PAINLEVEL_OUTOF10: 0

## 2024-05-06 NOTE — ADT AUTH CERT
day   decitabine (DACOGEN) 40 mg in sodium chloride 0.9 % 100 mL chemo IVPB  40 mg IntraVENous Q24H   ondansetron  8 mg Oral Q24H   furosemide  40 mg IntraVENous Q12H   allopurinol  300 mg Oral Daily   Venetoclax  200 mg Oral Q24H   potassium chloride  20 mEq Oral BID WC   Fidaxomicin  200 mg Oral BID   cetirizine  10 mg Oral Daily   fluconazole  200 mg Oral Daily   Hydrocerin   Topical BID   levETIRAcetam  1,500 mg Oral BID   pantoprazole  40 mg Oral BID AC   valACYclovir  500 mg Oral BID   cyanocobalamin  1,000 mcg Oral Daily   sodium chloride flush  5-40 mL IntraVENous 2 times per day   Saline Mouthwash  15 mL Swish & Spit 4x Daily AC & HS   cefepime  2,000 mg IntraVENous Q8H       Continuous Infusions:    Infusions Meds   sodium chloride     sodium chloride 75 mL/hr at 05/02/24 0827   sodium chloride         PRN Meds:.    PRN Medications  loperamide, sodium chloride flush, sodium chloride, prochlorperazine **OR** prochlorperazine, LORazepam **OR** LORazepam, dicyclomine, diphenhydrAMINE, sodium chloride flush, sodium chloride, potassium chloride, magnesium sulfate, magnesium hydroxide, Saline Mouthwash, ALTEplase (CATHFLO) 2 mg in sterile water 2 mL injection, acetaminophen       ROS:  As noted above, otherwise remainder of 10-point ROS negative     Physical Exam:      I&O:    Intake/Output Summary (Last 24 hours) at 5/2/2024 1007  Last data filed at 5/2/2024 0822    Gross per 24 hour  Intake 3144 ml  Output 3075 ml  Net 69 ml        Vital Signs:  /81   Pulse 92   Temp 98.4 °F (36.9 °C) (Oral)   Resp 16   Ht 1.778 m (5' 10\")   Wt 73.3 kg (161 lb 9.6 oz)   SpO2 98%   BMI 23.19 kg/m²      Weight:      Wt Readings from Last 3 Encounters:  05/02/24 73.3 kg (161 lb 9.6 oz)  04/16/24 80.1 kg (176 lb 9.4 oz)  04/08/24 78.6 kg (173 lb 4.5 oz)     Physical Exam Performed by Dr. Nguyen:     General: Awake, alert and oriented.  HEENT: normocephalic, PERRL, no scleral erythema or icterus, Oral mucosa moist 
carry on normal activity or to do active work     Isolation: None     Medications    Scheduled Meds:    Scheduled Medications   cefepime  2,000 mg IntraVENous Q8H   sodium chloride flush  5-40 mL IntraVENous 2 times per day   furosemide  40 mg IntraVENous Q12H   allopurinol  300 mg Oral Daily   Venetoclax  200 mg Oral Q24H   potassium chloride  20 mEq Oral BID WC   Fidaxomicin  200 mg Oral BID   cetirizine  10 mg Oral Daily   fluconazole  200 mg Oral Daily   Hydrocerin   Topical BID   levETIRAcetam  1,500 mg Oral BID   pantoprazole  40 mg Oral BID AC   valACYclovir  500 mg Oral BID   cyanocobalamin  1,000 mcg Oral Daily   Saline Mouthwash  15 mL Swish & Spit 4x Daily AC & HS       Continuous Infusions:    Infusions Meds   sodium chloride     sodium chloride 75 mL/hr at 05/03/24 0925   sodium chloride         PRN Meds:.    PRN Medications  melatonin, calcium carbonate, sennosides-docusate sodium, loperamide, sodium chloride flush, sodium chloride, prochlorperazine **OR** prochlorperazine, LORazepam **OR** LORazepam, dicyclomine, diphenhydrAMINE, sodium chloride flush, sodium chloride, potassium chloride, magnesium sulfate, magnesium hydroxide, Saline Mouthwash, ALTEplase (CATHFLO) 2 mg in sterile water 2 mL injection, acetaminophen       ROS:  As noted above, otherwise remainder of 10-point ROS negative     Physical Exam:      I&O:    Intake/Output Summary (Last 24 hours) at 5/5/2024 0816  Last data filed at 5/5/2024 0611    Gross per 24 hour  Intake 3705 ml  Output 4075 ml  Net -370 ml           Vital Signs:  BP (!) 123/92   Pulse 92   Temp 98.5 °F (36.9 °C) (Oral)   Resp 15   Ht 1.778 m (5' 10\")   Wt 73.6 kg (162 lb 4.8 oz)   SpO2 100%   BMI 23.29 kg/m²      Weight:      Wt Readings from Last 3 Encounters:  05/05/24 73.6 kg (162 lb 4.8 oz)  04/16/24 80.1 kg (176 lb 9.4 oz)  04/08/24 78.6 kg (173 lb 4.5 oz)        General: Awake, alert and oriented.  HEENT: normocephalic, no scleral erythema or icterus, Oral

## 2024-05-06 NOTE — PLAN OF CARE
Problem: Safety - Adult  Goal: Free from fall injury  Outcome: Progressing  Note: Orthostatic vital signs obtained at start of shift - see flowsheet for details.  Pt does not meet criteria for orthostasis.  Pt is a Low fall risk. See Apple Fall Score and ABCDS Injury Risk assessments.   - Screening for Orthostasis AND not a [place] Risk per APPLE/ABCDS: Pt bed is in low position, side rails up, call light and belongings are in reach.  Fall risk light is on outside pts room.  Pt encouraged to call for assistance as needed. Will continue with hourly rounds for PO intake, pain needs, toileting and repositioning as needed.      Problem: ABCDS Injury Assessment  Goal: Absence of physical injury  Outcome: Progressing  Note: No new reported or observed injuries this shift.      Problem: Musculoskeletal - Adult  Goal: Return mobility to safest level of function  Outcome: Progressing  Note: Pt up independently and ortho negative.      Problem: Hematologic - Adult  Goal: Maintains hematologic stability  Outcome: Progressing  Note: Patient's hemoglobin this AM:   Recent Labs     05/06/24  0338   HGB 7.0*     Patient's platelet count this AM:   Recent Labs     05/06/24  0338   PLT 37*    Thrombocytopenia Precautions in place.  Patient showing no signs or symptoms of active bleeding.  Patient transfused blood products per orders - see flowsheet.  Patient verbalizes understanding of all instructions. Will continue to assess and implement POC. Call light within reach and hourly rounding in place.      Problem: Pain  Goal: Verbalizes/displays adequate comfort level or baseline comfort level  Outcome: Progressing  Note: Pt did not report pain this shift. Pt educated on importance of calling for pain meds when in pain. Pt verbalized understanding.

## 2024-05-06 NOTE — CARE COORDINATION
Spoke with CNP who states anticipated discharge is tomorrow pending medical status.    Pt will return home when discharged with no anticipated needs.     SW will follow    JENNIFER Ortiz   for [facility] ([facility])  [place] [facility]: [phone]

## 2024-05-07 VITALS
BODY MASS INDEX: 23.11 KG/M2 | RESPIRATION RATE: 15 BRPM | DIASTOLIC BLOOD PRESSURE: 83 MMHG | TEMPERATURE: 98.9 F | HEIGHT: 70 IN | SYSTOLIC BLOOD PRESSURE: 127 MMHG | OXYGEN SATURATION: 100 % | WEIGHT: 161.4 LBS | HEART RATE: 88 BPM

## 2024-05-07 LAB
ANION GAP SERPL CALCULATED.3IONS-SCNC: 10 MMOL/L (ref 3–16)
BASOPHILS # BLD: 0 K/UL (ref 0–0.2)
BASOPHILS NFR BLD: 0 %
BUN SERPL-MCNC: 8 MG/DL (ref 7–20)
CALCIUM SERPL-MCNC: 9.6 MG/DL (ref 8.3–10.6)
CHLORIDE SERPL-SCNC: 99 MMOL/L (ref 99–110)
CO2 SERPL-SCNC: 25 MMOL/L (ref 21–32)
CREAT SERPL-MCNC: 0.7 MG/DL (ref 0.9–1.3)
DEPRECATED RDW RBC AUTO: 15.4 % (ref 12.4–15.4)
EOSINOPHIL # BLD: 0 K/UL (ref 0–0.6)
EOSINOPHIL NFR BLD: 2.3 %
GFR SERPLBLD CREATININE-BSD FMLA CKD-EPI: >90 ML/MIN/{1.73_M2}
GLUCOSE SERPL-MCNC: 122 MG/DL (ref 70–99)
HCT VFR BLD AUTO: 23 % (ref 40.5–52.5)
HGB BLD-MCNC: 8.2 G/DL (ref 13.5–17.5)
LDH SERPL L TO P-CCNC: 253 U/L (ref 100–190)
LYMPHOCYTES # BLD: 0.7 K/UL (ref 1–5.1)
LYMPHOCYTES NFR BLD: 84.7 %
MCH RBC QN AUTO: 28.5 PG (ref 26–34)
MCHC RBC AUTO-ENTMCNC: 35.8 G/DL (ref 31–36)
MCV RBC AUTO: 79.8 FL (ref 80–100)
MONOCYTES # BLD: 0 K/UL (ref 0–1.3)
MONOCYTES NFR BLD: 4 %
NEUTROPHILS # BLD: 0.1 K/UL (ref 1.7–7.7)
NEUTROPHILS NFR BLD: 9 %
PHOSPHATE SERPL-MCNC: 3.7 MG/DL (ref 2.5–4.9)
PLATELET # BLD AUTO: 40 K/UL (ref 135–450)
PMV BLD AUTO: 10.7 FL (ref 5–10.5)
POTASSIUM SERPL-SCNC: 3.7 MMOL/L (ref 3.5–5.1)
RBC # BLD AUTO: 2.89 M/UL (ref 4.2–5.9)
SODIUM SERPL-SCNC: 134 MMOL/L (ref 136–145)
URATE SERPL-MCNC: 3.1 MG/DL (ref 3.5–7.2)
WBC # BLD AUTO: 0.9 K/UL (ref 4–11)

## 2024-05-07 PROCEDURE — 36592 COLLECT BLOOD FROM PICC: CPT

## 2024-05-07 PROCEDURE — 84100 ASSAY OF PHOSPHORUS: CPT

## 2024-05-07 PROCEDURE — 6370000000 HC RX 637 (ALT 250 FOR IP): Performed by: STUDENT IN AN ORGANIZED HEALTH CARE EDUCATION/TRAINING PROGRAM

## 2024-05-07 PROCEDURE — 2580000003 HC RX 258: Performed by: NURSE PRACTITIONER

## 2024-05-07 PROCEDURE — 6370000000 HC RX 637 (ALT 250 FOR IP)

## 2024-05-07 PROCEDURE — 6370000000 HC RX 637 (ALT 250 FOR IP): Performed by: NURSE PRACTITIONER

## 2024-05-07 PROCEDURE — 83615 LACTATE (LD) (LDH) ENZYME: CPT

## 2024-05-07 PROCEDURE — 80048 BASIC METABOLIC PNL TOTAL CA: CPT

## 2024-05-07 PROCEDURE — 85025 COMPLETE CBC W/AUTO DIFF WBC: CPT

## 2024-05-07 PROCEDURE — 84550 ASSAY OF BLOOD/URIC ACID: CPT

## 2024-05-07 RX ORDER — LEVOFLOXACIN 500 MG/1
500 TABLET, FILM COATED ORAL DAILY
Qty: 30 TABLET | Refills: 0 | Status: SHIPPED | OUTPATIENT
Start: 2024-05-07 | End: 2024-06-06

## 2024-05-07 RX ORDER — VANCOMYCIN HYDROCHLORIDE 50 MG/ML
125 KIT ORAL 2 TIMES DAILY
Qty: 140 ML | Refills: 0 | Status: SHIPPED | OUTPATIENT
Start: 2024-05-07 | End: 2024-05-07 | Stop reason: HOSPADM

## 2024-05-07 RX ORDER — VANCOMYCIN HYDROCHLORIDE 125 MG/1
125 CAPSULE ORAL 2 TIMES DAILY
Qty: 60 CAPSULE | Refills: 0 | Status: SHIPPED | OUTPATIENT
Start: 2024-05-07 | End: 2024-06-06

## 2024-05-07 RX ADMIN — CYANOCOBALAMIN TAB 1000 MCG 1000 MCG: 1000 TAB at 09:25

## 2024-05-07 RX ADMIN — LEVETIRACETAM 1500 MG: 500 TABLET, FILM COATED ORAL at 09:25

## 2024-05-07 RX ADMIN — ALLOPURINOL 300 MG: 300 TABLET ORAL at 09:26

## 2024-05-07 RX ADMIN — FLUCONAZOLE 200 MG: 200 TABLET ORAL at 09:27

## 2024-05-07 RX ADMIN — POTASSIUM CHLORIDE 20 MEQ: 1500 TABLET, EXTENDED RELEASE ORAL at 09:26

## 2024-05-07 RX ADMIN — CETIRIZINE HYDROCHLORIDE 10 MG: 10 TABLET, FILM COATED ORAL at 09:25

## 2024-05-07 RX ADMIN — SODIUM CHLORIDE, PRESERVATIVE FREE 10 ML: 5 INJECTION INTRAVENOUS at 09:28

## 2024-05-07 RX ADMIN — VANCOMYCIN HYDROCHLORIDE 125 MG: 250 POWDER, FOR SOLUTION ORAL at 09:27

## 2024-05-07 RX ADMIN — VALACYCLOVIR HYDROCHLORIDE 500 MG: 500 TABLET, FILM COATED ORAL at 09:27

## 2024-05-07 RX ADMIN — Medication: at 09:29

## 2024-05-07 RX ADMIN — LEVOFLOXACIN 500 MG: 500 TABLET, FILM COATED ORAL at 09:27

## 2024-05-07 RX ADMIN — PANTOPRAZOLE SODIUM 40 MG: 40 TABLET, DELAYED RELEASE ORAL at 06:49

## 2024-05-07 ASSESSMENT — PAIN DESCRIPTION - DESCRIPTORS: DESCRIPTORS: CRAMPING

## 2024-05-07 ASSESSMENT — PAIN DESCRIPTION - ORIENTATION: ORIENTATION: LOWER

## 2024-05-07 ASSESSMENT — PAIN DESCRIPTION - PAIN TYPE: TYPE: ACUTE PAIN

## 2024-05-07 ASSESSMENT — PAIN DESCRIPTION - LOCATION: LOCATION: ABDOMEN

## 2024-05-07 ASSESSMENT — PAIN SCALES - GENERAL: PAINLEVEL_OUTOF10: 3

## 2024-05-07 ASSESSMENT — PAIN DESCRIPTION - FREQUENCY: FREQUENCY: INTERMITTENT

## 2024-05-07 ASSESSMENT — PAIN - FUNCTIONAL ASSESSMENT: PAIN_FUNCTIONAL_ASSESSMENT: ACTIVITIES ARE NOT PREVENTED

## 2024-05-07 NOTE — CARE COORDINATION
Case Management Assessment            Discharge Note                    Date / Time of Note: 5/7/2024 1:25 PM                  Discharge Note Completed by: JENNIFER Ortiz   for [facility] ([facility])  [place] [facility]: [phone]    Patient Name: Dennys Peguero   YOB: 1980  Diagnosis: Neutropenic fever (HCC) [D70.9, R50.81]   Date / Time: 4/26/2024  1:47 PM    Current PCP: No primary care provider on file.  Clinic patient: No    Hospitalization in the last 30 days: Yes  Readmission Assessment  Number of Days since last admission?: 8-30 days  Previous Disposition: Home with Family  Who is being Interviewed: Patient  What was the patient's/caregiver's perception as to why they think they needed to return back to the hospital?: Other (Comment) ([facility] as a direct admit for fevers)  Did you visit your Primary Care Physician after you left the hospital, before you returned this time?: No  Why weren't you able to visit your PCP?: Other (Comment) (has been going to [facility])  Did you see a specialist, such as Cardiac, Pulmonary, Orthopedic Physician, etc. after you left the hospital?: Yes  Who advised the patient to return to the hospital?: Physician  Does the patient report anything that got in the way of taking their medications?: No  In our efforts to provide the best possible care to you and others like you, can you think of anything that we could have done to help you after you left the hospital the first time, so that you might not have needed to return so soon?: Other (Comment) (nothing additional was needed per pt)    Advance Directives:  Code Status: Full Code  [place] DNR form completed and on chart: Not Indicated    Financial:  Payor: [employer] [place] MEDICAID / Plan: [facility] MEDICA / Product Type: *No Product type* /      Pharmacy:    [facility] [id number] [place], [place] - [address] [place] [address] [phone] - F

## 2024-05-07 NOTE — DISCHARGE INSTRUCTIONS
[facility] Discharge Instructions    Call for Questions/Concerns:  [phone]) [facility] office  The phone number listed above is available 24 hrs/7 days per week  [facility] is open M-F 8am-4:30pm; Sat-Sun/Holidays 8am-1pm    Symptoms to Report Immediately:    Fever of 100.5 or greater  Vomiting without relief after use of anti-nausea medication  Severe abdominal cramping  Diarrhea: More than 3 loose, watery bowel movements in a 24 hour period  Unusual or excessive bleeding from your mouth, nose, rectum, bladder or vagina  Sudden onset of shortness of breath or chest pain  Signs/symptoms of infection: redness, warmth, swelling-particularly to central line site    Report to Physician's office within 24 hours:    Pain not relieved by pain medication  Change in urination-odor, cloudiness, frequency, or pain with urination  Flu-like symptoms  Skin changes-rash, hives, redness or peeling of skin    Additional Instructions:    Avoid people with colds, flu-like symptoms, or any sign of infection  Drink plenty of fluids-attempt to consume 2-3 liters ( ounces) of fluids/24 hour period  Continue low microbial diet until instructed by physician to resume normal diet  Bring all of your medications with you to your doctor's appointments  Bring your current medicine list to each hospital and office visit    Home Health Care Agency: ***    You are being discharged with IV access due to need for ongoing therapy.  Below is pertinent information regarding your IV that your next provider may need to know:  Type:  ***                        Date of placement:  ***  Surgeon:  ***  Plan:{CHP CONTINUE, INSERT, REMOVE:13676}   Next dressing change due on: ***  Cap changes due on: ***  CVC care and maintenance was reviewed with patient and {Blank multiple:06296::\"spouse\",\"family members\",\"caregiver\"}.  Pt verbalizes understanding of line care and maintenance.      5/7/2024 2:06 PM  Paula Bowles RN            My

## 2024-05-07 NOTE — DISCHARGE INSTR - COC
difficile toxin Molecular   04/10/24 Rush Srivastava                       Nurse Assessment:  Last Vital Signs: /83   Pulse 88   Temp 98.9 °F (37.2 °C) (Oral)   Resp 15   Ht 1.778 m (5' 10\")   Wt 73.2 kg (161 lb 6.4 oz)   SpO2 100%   BMI 23.16 kg/m²     Last documented pain score (0-10 scale): Pain Level: 3  Last Weight:   Wt Readings from Last 1 Encounters:   05/07/24 73.2 kg (161 lb 6.4 oz)     Mental Status:  {IP PT MENTAL STATUS:20030}    IV Access:  { ELISEO IV ACCESS:612275691}    Nursing Mobility/ADLs:  Walking   {CHP DME ADLs:967853892}  Transfer  {CHP DME ADLs:742430830}  Bathing  {CHP DME ADLs:462415289}  Dressing  {CHP DME ADLs:374031588}  Toileting  {CHP DME ADLs:956995937}  Feeding  {P DME ADLs:835754751}  Med Admin  {P DME ADLs:604506228}  Med Delivery   { ELISEO MED Delivery:018278078}    Wound Care Documentation and Therapy:  Wound 05/02/24 Arm Right;Upper (Active)   Wound Etiology Other 05/05/24 1953   Dressing Status Clean;Dry;Intact 05/06/24 0934   Wound Cleansed Soap and water 05/07/24 0810   Dressing/Treatment Open to air 05/07/24 0810   Wound Assessment Pink/red 05/07/24 0810   Drainage Amount None (dry) 05/07/24 0810   Drainage Description Sanguinous 05/02/24 2031   Odor None 05/07/24 0810   Pattie-wound Assessment Hyperpigmented 05/07/24 0803   Margins Defined edges 05/07/24 0803   Number of days: 4       Incision 04/12/24 Abdomen Lower;Medial (Active)   Dressing Status Clean;Dry;Intact 05/07/24 0810   Incision Cleansed Not Cleansed 05/07/24 0810   Dressing/Treatment Surgical glue 05/07/24 0810   Closure Surgical glue 05/07/24 0810   Margins Approximated 05/07/24 0810   Incision Assessment Dry 05/07/24 0810   Drainage Amount None (dry) 05/07/24 0810   Odor None 05/07/24 0810   Pattie-incision Assessment Intact 05/07/24 0810   Number of days: 24        Elimination:  Continence:   Bowel: {YES / NO:19727}  Bladder: {YES / NO:19727}  Urinary Catheter: {Urinary Catheter:921933931}

## 2024-05-07 NOTE — PROGRESS NOTES
[facility] Progress Note    2024     Dennys Peguero    MRN: [id number]    : 1980    Referring MD: Eduardo Nguyen MD  [facility] [address] [facility]  1s [place] [address]      SUBJECTIVE:  Afebrile, has diarrhea. Started tx yesterday. Encouraged him to eat. PICC line today      ECOG PS:  (2) Ambulatory and capable of self care, unable to carry out work activity, up and about > 50% or waking hours    KPS: 70% Cares for self; unable to carry on normal activity or to do active work    Isolation: None    Medications    Scheduled Meds:   lidocaine 1 % injection  5 mL IntraDERmal Once    sodium chloride flush  5-40 mL IntraVENous 2 times per day    decitabine (DACOGEN) 40 mg in sodium chloride 0.9 % 100 mL chemo IVPB  40 mg IntraVENous Q24H    ondansetron  8 mg Oral Q24H    furosemide  40 mg IntraVENous Q12H    allopurinol  300 mg Oral Daily    Venetoclax  200 mg Oral Q24H    potassium chloride  20 mEq Oral BID WC    Fidaxomicin  200 mg Oral BID    cetirizine  10 mg Oral Daily    fluconazole  200 mg Oral Daily    Hydrocerin   Topical BID    levETIRAcetam  1,500 mg Oral BID    pantoprazole  40 mg Oral BID AC    valACYclovir  500 mg Oral BID    cyanocobalamin  1,000 mcg Oral Daily    sodium chloride flush  5-40 mL IntraVENous 2 times per day    Saline Mouthwash  15 mL Swish & Spit 4x Daily AC & HS    cefepime  2,000 mg IntraVENous Q8H     Continuous Infusions:   sodium chloride      sodium chloride 100 mL/hr at 24 0321    sodium chloride       PRN Meds:.sodium chloride flush, sodium chloride, prochlorperazine **OR** prochlorperazine, LORazepam **OR** LORazepam, dicyclomine, diphenhydrAMINE, sodium chloride flush, sodium chloride, potassium chloride, magnesium sulfate, magnesium hydroxide, Saline Mouthwash, ALTEplase (CATHFLO) 2 mg in sterile water 2 mL injection, acetaminophen    ROS:  As noted above, otherwise remainder of 10-point ROS negative    Physical Exam:     I&O:    Intake/Output Summary (Last 24 
    [facility] Progress Note    2024     Dennys Peguero    MRN: [id number]    : 1980    Referring MD: Eduardo Nguyen MD  [address] [facility]  [address] [place] [address]      SUBJECTIVE:  Afebrile over the weekend, eating and drinking well. Reports 3-4 BM daily, non watery and non bloody.     ECOG PS:  (2) Ambulatory and capable of self care, unable to carry out work activity, up and about > 50% or waking hours    KPS: 70% Cares for self; unable to carry on normal activity or to do active work    Isolation: None    Medications    Scheduled Meds:   levoFLOXacin  500 mg Oral Daily    sodium chloride flush  5-40 mL IntraVENous 2 times per day    furosemide  40 mg IntraVENous Q12H    allopurinol  300 mg Oral Daily    Venetoclax  200 mg Oral Q24H    potassium chloride  20 mEq Oral BID WC    Fidaxomicin  200 mg Oral BID    cetirizine  10 mg Oral Daily    fluconazole  200 mg Oral Daily    Hydrocerin   Topical BID    levETIRAcetam  1,500 mg Oral BID    pantoprazole  40 mg Oral BID AC    valACYclovir  500 mg Oral BID    cyanocobalamin  1,000 mcg Oral Daily    Saline Mouthwash  15 mL Swish & Spit 4x Daily AC & HS     Continuous Infusions:   sodium chloride      sodium chloride      sodium chloride 75 mL/hr at 24    sodium chloride       PRN Meds:.sodium chloride, melatonin, calcium carbonate, sennosides-docusate sodium, loperamide, sodium chloride flush, sodium chloride, prochlorperazine **OR** prochlorperazine, LORazepam **OR** LORazepam, dicyclomine, diphenhydrAMINE, sodium chloride flush, sodium chloride, potassium chloride, magnesium sulfate, magnesium hydroxide, Saline Mouthwash, ALTEplase (CATHFLO) 2 mg in sterile water 2 mL injection, acetaminophen    ROS:  As noted above, otherwise remainder of 10-point ROS negative    Physical Exam:     I&O:    Intake/Output Summary (Last 24 hours) at 2024 0825  Last data filed at 2024 0607  Gross per 24 hour   Intake 3563 ml   Output 3500 ml   Net 63 
    [facility] Progress Note    2024     Dennys Peguero    MRN: [id number]    : 1980    Referring MD: Eduardo Nguyen MD  [address] [facility]  1s [place] [address]      SUBJECTIVE:  Feeling okay, no fever. Diarrhea slowed down.     ECOG PS:  (2) Ambulatory and capable of self care, unable to carry out work activity, up and about > 50% or waking hours    KPS: 70% Cares for self; unable to carry on normal activity or to do active work    Isolation: None    Medications    Scheduled Meds:   Fidaxomicin  200 mg Oral BID    cetirizine  10 mg Oral Daily    fluconazole  200 mg Oral Daily    Hydrocerin   Topical BID    levETIRAcetam  1,500 mg Oral BID    pantoprazole  40 mg Oral BID AC    valACYclovir  500 mg Oral BID    cyanocobalamin  1,000 mcg Oral Daily    sodium chloride flush  5-40 mL IntraVENous 2 times per day    Saline Mouthwash  15 mL Swish & Spit 4x Daily AC & HS    cefepime  2,000 mg IntraVENous Q8H     Continuous Infusions:   sodium chloride 100 mL/hr at 24 0339    sodium chloride       PRN Meds:.ondansetron, prochlorperazine, diphenhydrAMINE, sodium chloride flush, sodium chloride, potassium chloride, magnesium sulfate, magnesium hydroxide, Saline Mouthwash, ALTEplase (CATHFLO) 2 mg in sterile water 2 mL injection, acetaminophen    ROS:  As noted above, otherwise remainder of 10-point ROS negative    Physical Exam:     I&O:    Intake/Output Summary (Last 24 hours) at 2024 0952  Last data filed at 2024 0341  Gross per 24 hour   Intake 3735 ml   Output --   Net 3735 ml         Vital Signs:  /88   Pulse 100   Temp 98.8 °F (37.1 °C) (Oral)   Resp 18   Ht 1.778 m (5' 10\")   Wt 74.9 kg (165 lb 1.6 oz)   SpO2 100%   BMI 23.69 kg/m²     Weight:    Wt Readings from Last 3 Encounters:   24 74.9 kg (165 lb 1.6 oz)   24 80.1 kg (176 lb 9.4 oz)   24 78.6 kg (173 lb 4.5 oz)     Physical Exam Performed by Dr. Nguyen:    General: Awake, alert and 
    [facility] Progress Note    2024     Dennys Peguero    MRN: [id number]    : 1980    Referring MD: Eduardo Nguyen MD  [facility] [address] [facility]  1s [place] [address]      SUBJECTIVE:  Slightly more energy today.  Diarrhea is slowing down.  Hopeful to be going home soon. No localizing complaints.    ECOG PS:  (2) Ambulatory and capable of self care, unable to carry out work activity, up and about > 50% or waking hours    KPS: 70% Cares for self; unable to carry on normal activity or to do active work    Isolation: None    Medications    Scheduled Meds:   cefepime  2,000 mg IntraVENous Q8H    sodium chloride flush  5-40 mL IntraVENous 2 times per day    furosemide  40 mg IntraVENous Q12H    allopurinol  300 mg Oral Daily    Venetoclax  200 mg Oral Q24H    potassium chloride  20 mEq Oral BID WC    Fidaxomicin  200 mg Oral BID    cetirizine  10 mg Oral Daily    fluconazole  200 mg Oral Daily    Hydrocerin   Topical BID    levETIRAcetam  1,500 mg Oral BID    pantoprazole  40 mg Oral BID AC    valACYclovir  500 mg Oral BID    cyanocobalamin  1,000 mcg Oral Daily    Saline Mouthwash  15 mL Swish & Spit 4x Daily AC & HS     Continuous Infusions:   sodium chloride      sodium chloride 75 mL/hr at 24 0925    sodium chloride       PRN Meds:.melatonin, calcium carbonate, sennosides-docusate sodium, loperamide, sodium chloride flush, sodium chloride, prochlorperazine **OR** prochlorperazine, LORazepam **OR** LORazepam, dicyclomine, diphenhydrAMINE, sodium chloride flush, sodium chloride, potassium chloride, magnesium sulfate, magnesium hydroxide, Saline Mouthwash, ALTEplase (CATHFLO) 2 mg in sterile water 2 mL injection, acetaminophen    ROS:  As noted above, otherwise remainder of 10-point ROS negative    Physical Exam:     I&O:    Intake/Output Summary (Last 24 hours) at 2024 0816  Last data filed at 2024 0611  Gross per 24 hour   Intake 3705 ml   Output 4075 ml   Net -370 ml         Vital Signs:  
    [facility] Progress Note    2024     Dennys Peguero    MRN: [id number]    : 1980    Referring MD: Eduardo Nguyen MD  [address] [place] [address] [place] [address]      SUBJECTIVE:  Afebrile, Diarrhea improved. No blood. Encouraged him to eat and drink well. Tolerating chemo well otherwise.    ECOG PS:  (2) Ambulatory and capable of self care, unable to carry out work activity, up and about > 50% or waking hours    KPS: 70% Cares for self; unable to carry on normal activity or to do active work    Isolation: None    Medications    Scheduled Meds:   sodium chloride flush  5-40 mL IntraVENous 2 times per day    decitabine (DACOGEN) 40 mg in sodium chloride 0.9 % 100 mL chemo IVPB  40 mg IntraVENous Q24H    ondansetron  8 mg Oral Q24H    furosemide  40 mg IntraVENous Q12H    allopurinol  300 mg Oral Daily    Venetoclax  200 mg Oral Q24H    potassium chloride  20 mEq Oral BID WC    Fidaxomicin  200 mg Oral BID    cetirizine  10 mg Oral Daily    fluconazole  200 mg Oral Daily    Hydrocerin   Topical BID    levETIRAcetam  1,500 mg Oral BID    pantoprazole  40 mg Oral BID AC    valACYclovir  500 mg Oral BID    cyanocobalamin  1,000 mcg Oral Daily    sodium chloride flush  5-40 mL IntraVENous 2 times per day    Saline Mouthwash  15 mL Swish & Spit 4x Daily AC & HS    cefepime  2,000 mg IntraVENous Q8H     Continuous Infusions:   sodium chloride      sodium chloride 75 mL/hr at 24 0827    sodium chloride       PRN Meds:.loperamide, sodium chloride flush, sodium chloride, prochlorperazine **OR** prochlorperazine, LORazepam **OR** LORazepam, dicyclomine, diphenhydrAMINE, sodium chloride flush, sodium chloride, potassium chloride, magnesium sulfate, magnesium hydroxide, Saline Mouthwash, ALTEplase (CATHFLO) 2 mg in sterile water 2 mL injection, acetaminophen    ROS:  As noted above, otherwise remainder of 10-point ROS negative    Physical Exam:     I&O:    Intake/Output Summary (Last 24 hours) at 
    [facility] Progress Note    2024     Dennys Peguero    MRN: [id number]    : 1980    Referring MD: Eduardo Nguyen MD  [address] [facility]  1s [place] [address]      SUBJECTIVE:  he is doing ok, having diarrhea from C diff. Afebrile. Discussed starting treatment today     ECOG PS:  (2) Ambulatory and capable of self care, unable to carry out work activity, up and about > 50% or waking hours    KPS: 70% Cares for self; unable to carry on normal activity or to do active work    Isolation: None    Medications    Scheduled Meds:   potassium chloride  20 mEq Oral BID WC    Fidaxomicin  200 mg Oral BID    cetirizine  10 mg Oral Daily    fluconazole  200 mg Oral Daily    Hydrocerin   Topical BID    levETIRAcetam  1,500 mg Oral BID    pantoprazole  40 mg Oral BID AC    valACYclovir  500 mg Oral BID    cyanocobalamin  1,000 mcg Oral Daily    sodium chloride flush  5-40 mL IntraVENous 2 times per day    Saline Mouthwash  15 mL Swish & Spit 4x Daily AC & HS    cefepime  2,000 mg IntraVENous Q8H     Continuous Infusions:   sodium chloride 100 mL/hr at 24 0558    sodium chloride       PRN Meds:.dicyclomine, ondansetron, prochlorperazine, diphenhydrAMINE, sodium chloride flush, sodium chloride, potassium chloride, magnesium sulfate, magnesium hydroxide, Saline Mouthwash, ALTEplase (CATHFLO) 2 mg in sterile water 2 mL injection, acetaminophen    ROS:  As noted above, otherwise remainder of 10-point ROS negative    Physical Exam:     I&O:    Intake/Output Summary (Last 24 hours) at 2024 0800  Last data filed at 2024 0551  Gross per 24 hour   Intake 3530 ml   Output 400 ml   Net 3130 ml         Vital Signs:  /84   Pulse 92   Temp 98.8 °F (37.1 °C)   Resp 15   Ht 1.778 m (5' 10\")   Wt 74.9 kg (165 lb 1.6 oz)   SpO2 98%   BMI 23.69 kg/m²     Weight:    Wt Readings from Last 3 Encounters:   24 74.9 kg (165 lb 1.6 oz)   24 80.1 kg (176 lb 9.4 oz)   24 78.6 kg (173 lb 
    [facility] Progress Note    2024     Dennys Peguero    MRN: [id number]    : 1980    Referring MD: Eduardo Nguyen MD  [facility] [address]      SUBJECTIVE:  Patient is doing well today. Afebrile and hemodynamically stable.     ECOG PS:  (2) Ambulatory and capable of self care, unable to carry out work activity, up and about > 50% or waking hours    KPS: 70% Cares for self; unable to carry on normal activity or to do active work    Isolation: None    Medications    Scheduled Meds:   fluconazole  200 mg Oral Daily    Hydrocerin   Topical BID    levETIRAcetam  1,500 mg Oral BID    pantoprazole  40 mg Oral BID AC    valACYclovir  500 mg Oral BID    cyanocobalamin  1,000 mcg Oral Daily    sodium chloride flush  5-40 mL IntraVENous 2 times per day    Saline Mouthwash  15 mL Swish & Spit 4x Daily AC & HS    cefepime  2,000 mg IntraVENous Q8H    vancomycin  1,000 mg IntraVENous Q12H     Continuous Infusions:   sodium chloride 100 mL/hr at 24 0640    sodium chloride       PRN Meds:.ondansetron, prochlorperazine, diphenhydrAMINE, sodium chloride flush, sodium chloride, potassium chloride, magnesium sulfate, magnesium hydroxide, Saline Mouthwash, ALTEplase (CATHFLO) 2 mg in sterile water 2 mL injection, metoprolol, acetaminophen    ROS:  As noted above, otherwise remainder of 10-point ROS negative    Physical Exam:     I&O:    Intake/Output Summary (Last 24 hours) at 2024 0719  Last data filed at 2024 0314  Gross per 24 hour   Intake 3753 ml   Output 1150 ml   Net 2603 ml       Vital Signs:  /60   Pulse 98   Temp 98 °F (36.7 °C) (Oral)   Resp 20   Ht 1.778 m (5' 10\")   Wt 74.3 kg (163 lb 14.4 oz)   SpO2 98%   BMI 23.52 kg/m²     Weight:    Wt Readings from Last 3 Encounters:   24 74.3 kg (163 lb 14.4 oz)   24 80.1 kg (176 lb 9.4 oz)   24 78.6 kg (173 lb 4.5 oz)     Physical Exam Performed by Dr. Nguyen:    General: Awake, alert and 
   04/29/24 1516   Encounter Summary   Service Provided For Patient   Referral/Consult From Nurse  (Schoenhoft, Tracy, KATHLEEN - CNP)   Last Encounter  04/29/24  (sharyn)   Complexity of Encounter Low   Begin Time 1455   End Time  1500   Total Time Calculated 5 min   Assessment/Intervention/Outcome   Assessment Calm   Intervention Active listening;Explored/Affirmed feelings, thoughts, concerns   Outcome Refused/Declined     Staff Zach Canela MA, BCC  
  Physician Progress Note      PATIENT:               SAMANTHA HAMILTON  CSN #:                  [id number]  :                       1980  ADMIT DATE:       2024 1:47 PM  DISCH DATE:  RESPONDING  PROVIDER #:        Eduardo Nguyen MD          QUERY TEXT:    Pt admitted with Fever. Pt noted to have C-diff and E-coli + cultures. If   possible, please document in the progress notes and discharge summary if you   are evaluating and /or treating any of the following:      The medical record reflects the following:  Risk Factors: 42 y/o with Fever and SIRS  Clinical Indicators: fever, SIRS: WBC: 0.8 Temp 102.9  Blood cx   (24): positive for E coli, C diff 24: Positive  Treatment: Blood cultures, Stool cultures, Cefepime day +5 (24)- Dificid   day +4(24), IVF bolus  Options provided:  -- Sepsis, present on admission due to C-diff and E-coli  -- Sepsis, present on admission due to C-diff  -- Sepsis, present on admission due to E-coli  -- C-diff and E-coli without Sepsis  -- Other - I will add my own diagnosis  -- Disagree - Not applicable / Not valid  -- Disagree - Clinically unable to determine / Unknown  -- Refer to Clinical Documentation Reviewer    PROVIDER RESPONSE TEXT:    This patient has sepsis which was present on admission, due to E-coli.    Query created by: Polina Gaona on 2024 6:04 PM      Electronically signed by:  Eduardo Nguyen MD 2024 2:50 PM          
4 Eyes Skin Assessment     NAME:  Dennys Peguero  YOB: 1980  MEDICAL RECORD NUMBER:  [id number]    The patient is being assessed for  Admission    I agree that at least one RN has performed a thorough Head to Toe Skin Assessment on the patient. ALL assessment sites listed below have been assessed.      Areas assessed by both nurses:    Head, Face, Ears, Shoulders, Back, Chest, Arms, Elbows, Hands, Sacrum. Buttock, Coccyx, Ischium, Legs. Feet and Heels, and Under Medical Devices         Does the Patient have a Wound? PICC removal site red and raised.       Alexandre Prevention initiated by RN: No  Wound Care Orders initiated by RN: No    Pressure Injury (Stage 3,4, Unstageable, DTI, NWPT, and Complex wounds) if present, place Wound referral order by RN under : No    New Ostomies, if present place, Ostomy referral order under : No     Nurse 1 eSignature: Electronically signed by CHERRY ELLER RN on 4/26/24 at 3:34 PM EDT    **SHARE this note so that the co-signing nurse can place an eSignature**    Nurse 2 eSignature: Electronically signed by Blank Vera RN on 4/26/24 at 3:35 PM EDT   
Administration: Chemotherapy drug Dacogen independently verified with Kya Villarreal RN prior to administration.  Original order, appropriateness of regimen, drug supplied, height, weight, BSA, dose calculations, expiration dates/times, drug appearance, and two patient identifiers were verified by both RNs.  Drug checked for vesicant/irritant status and for risk of hypersensitivity.  Most recent laboratory values and allergies, were reviewed.  Appropriate IV access available: Positive, brisk blood return via CVC was confirmed prior to administration. Chest x-ray for correct line placement reviewed  Suzette Roberts RN and Kya Villarreal RN verified correct rate of chemotherapy and maintenance IV fluids.  Patient was educated on chemotherapy regimen prior to administration including indication for treatment related to disease & side effects of chemotherapy drug.  Patient verbalizes understanding of all instructions.    Completion of Chemotherapy: Monitoring during infusion done per policy, see Flowsheets.  Blood return verified before, during, and after infusion per policy; no signs of extravasation.  Pt tolerating chemotherapy well and without incident.  Chemotherapy infusion end time on the MAR.  Will continue to monitor.   
Attempted visit, pt very sleepy, asked \"come back later.\" Spiritual Health will follow at a later time/date.  
Central line dressing changed using sterile technique following hospital policy. Shahla Oconnor RN, observed with procedure to ensure proper technique.    
Clinical Pharmacy Progress Note    Vancomycin has been discontinued - Pharmacy will sign off on dosing  If resumed, please re-consult Pharmacy     Please call with questions:  [phone] (Main Pharmacy)    Lisa Sherman  Pharm.D. BCPS    4/27/2024 9:28 AM            
Consulted for R Upper Arm former PICC site. Small round red wound noted on inner right upper arm. No signs of infection. Wound care orders placed. Wound Care to sign off; re-consult for changes.    R Upper Arm:    
Occupational and Physical Therapy  Orders received and chart reviewed. Pt Hgb 6.3. Will f/u later this date or 4/28 as schedule allows.     Jessy Morel OTR/SUZANNE Chang PT [phone]      
Patient admitted to [facility] via wheelchair from [facility] for diagnosis of neutropenic fever.      Patient and family oriented to patient room including call light and bed controls.  Admission assessment completed - see admission flowsheet documentation.  Patient is a medium fall risk.  Safety measures instituted per policy.    On admit, temp 102.9, , BP 94/58. Two peripheral IV's placed and labs drawn per orders. Notified CORINNE Spaulding. Stat EKG and 1000 mL bolus ordered. EKG results reviewed by NP.    Pt afebrile and HR at 125 after bolus completed. Will continue to monitor.   
Patient complaining of abdominal discomfort, described as cramps and heartburn. MD made aware. Bentyl PRN ordered and given.  
Patient is having on-going diarrhea. C-diff test results back negative. SURINDER bravo served MD Nguyen for PRN Imodium orders. See new orders. Also, MD made aware of positive blood cx partial results. Blood cultures positive for E. Coli. No changes per MD.    0100 - RN called lab to clarify stool test results.Per Lab pt resulted as \"indeterminate\" and more results will be updated latter on. Imodium orders discontinued. Plan of care on-going and contact plus precautions in place.      
Physical Therapy  Facility/Department: [address]  Physical Therapy Initial Assessment / Treatment/ Discharge    Name: Dennys Peguero  : 1980  MRN: [id number]  Date of Service: 2024    Discharge Recommendations:  24 hour supervision or assist   PT Equipment Recommendations  Equipment Needed: No      Patient Diagnosis(es): There were no encounter diagnoses.  Past Medical History:  has a past medical history of Brain cancer (HCC) and Seizure (HCC).  Past Surgical History:  has a past surgical history that includes brain surgery; Upper gastrointestinal endoscopy (N/A, 3/6/2024); Colonoscopy (N/A, 3/22/2024); and Small intestine surgery (N/A, 2024).    Assessment   Assessment: Pt is 42 yo male [facility] presenting to [facility] with fever.  At baseline he is fully independent with no use of AD.  Today he is functioning near his baseline with only primary complaint of stomach pain.  RN aware.  Today he was independent-supervision for all mobility with no AD.  No further acute PT needs at this time.  Rec home with 24hr assist, which pt's father able to provide.  Informed pt to let RN know if any further therapy concerns.  Will sign off.  Decision Making: Low Complexity  Requires PT Follow-Up: No  Activity Tolerance  Activity Tolerance: Patient tolerated treatment well     Plan   Physical Therapy Plan  General Plan:  (d/c after eval)  Safety Devices  Type of Devices: Call light within reach, Left in chair, Gait belt, Nurse notified (no chair alarm due to pt up ab darrian per RN)     Restrictions  Position Activity Restriction  Other position/activity restrictions: platelet count equal to or less than 10 --may proceed with PT/OT, up as tolerated     Subjective   General  Chart Reviewed: Yes  Patient assessed for rehabilitation services?: Yes  Additional Pertinent Hx: 42 yo male admitted to [facility] on  after presenting to [facility] with fever.  PMH of Oligodendroglioma (S/P partial resection 
Request received to place a peripheral IV in patient.  Site cleansed with chlorhexidine, 20 Gauge IV placed in right forearm. Positive blood return noted, IV flushed, normal saline locked and capped.    Alirio Schroeder RN  
Reviewed discharge instructions with patient. Reviewed discharge medications including dosing, schedule, indication, and adverse reactions.  Reviewed which medications were already taken today and next dosage due for each medication.      Reviewed signs and symptoms that prompt a call to the physician and appropriate phone numbers. Reviewed follow up appointments that have been made in [facility] and Outpatient Oncology.  Low microbial diet, activity restrictions, and increased risk of infection were reviewed.     Patient is being discharged with IV access d/t need for ongoing therapy:      Type:  L arm double lumen PICC                      Plan:continue   Next dressing change due on: 5/10  Cap changes due on: 5/8  CVC care and maintenance was reviewed with patient. Pt verbalizes understanding of line care and maintenance.      Patient verbalized understanding of all instructions and questions were answered to his. satisfaction. Patient discharged to home per self with family members.      Paula Bowles RN   
[facility] -  Clinical Pharmacy Note    Vancomycin - Management by Pharmacy    Consult Date(s): 4/26/24  Consulting Provider(s): Tracy Schoenhoft    Assessment / Plan  Febrile neutropenia- Vancomycin  Concurrent Antimicrobials: Cefepime, ppx fluconazole and valacyclovir  Day of Vanc Therapy / Ordered Duration: Day 1/ indefinite  Current Dosing Method: Bayesian-Guided AUC Dosing  Therapeutic Goal: -600 mg/L*hr  Current Dose / Plan:   Renal function - slight JOSÉ MIGUEL (SCr 4/17 was 0.7; at [facility] today was 1.2 per RN)  Will load with 2000 mg IV x1, then start 1000 mg IV q12h    Predicted AUCss ~ 526 mg/L*hr and steady-state trough ~ 14.9 mg/L on this regimen  Random level tomorrow am prior to 2nd overall dose to confirm kinetic estimates given JOSÉ MIGUEL  Will continue to monitor clinical condition and make adjustments to regimen as appropriate.    Thank you for consulting pharmacy,    Please call with any questions    Lisa Sherman  Pharm.D. BCPS  [phone] (Main Pharmacy)        Interval update:  therapy initiation     Subjective/Objective:   Dennys Peguero is a 43 y.o. male with a PMHx significant for Oligodendroglioma (S/P partial resection 2017, radiation 2019, and temodar 2019).  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  Admitted 4/26 from [facility] with febrile neutropenia     Pharmacy is consulted to vancomycin    Ht Readings from Last 1 Encounters:   04/26/24 1.778 m (5' 10\")     Wt Readings from Last 1 Encounters:   04/26/24 74.3 kg (163 lb 14.4 oz)     Current & Prior Antimicrobial Regimen(s):  Cefepime (4/26--current) -- (started at [facility])  Vancomycin  2000 mg IV x 1 (4/26)  1000 mg IV q12h (4/27--current)    Vancomycin Level(s) / Doses:    Date Time Dose Type of Level / Level Interpretation                 Note: Serum levels collected for AUC-based dosing may be high if collected in close proximity to the 
Findings: trace BLE edema; +bm 5/3; Mg 1.6, Na 134  Wounds: None  Nutrition Goals: PO intake 75% or greater, by next RD assessment     CURRENT NUTRITION THERAPIES  ADULT DIET; Regular; Low Microbial  PO Intake: %   PO Supplement Intake:None Ordered  Additional Sources of Calories/IVF:NS @ 75ml/h     COMPARATIVE STANDARDS  Energy (kcal):  8392-2639 (25-30)     Protein (g):   (1.3-1.5)       Fluid (ml/day):  1ml/kcal    ANTHROPOMETRICS  Current Height: 177.8 cm (5' 10\")  Current Weight - Scale: 72.6 kg (160 lb 1.6 oz)    Admission weight: 74.3 kg (163 lb 14.4 oz)    The patient will be monitored per nutrition standards of care. Consult dietitian if additional nutrition interventions are needed prior to RD reassessment.     BERTA FERNANDEZ, MS, RD, LD  [place]:  [phone]  Office:  [phone]        
  TREATMENT:            1. Oligodendroglioma              - Partial resection 2017              - Radiation (5400 cGy)   - Temodar:       - 1/7/19- 2/12/19       - 3/27-3/30/19: Cycle #1 Temozolomide 150 mg/m2 (4 day cycle            due to low platelet count)       - 5/1/19-5/5/19: Cycle #2 Temozolomide 150 mg/m2      2. AML w/underlying MDS  - Induction w/ Dacogen + Venetoclax (3/12/24)     3. Rectal Adenocarcinoma  - Surgical resection pending count recovery- plan for 4/12/24 with increased bleeding           ASSESSMENT AND PLAN:           1. 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  Peripheral blood smear 3/8/24: Unimpressive schistocytes and labs concerning for carlos negative hemolytic anemia, unlikely TTP or TMA. No need for PLEX. 20% blasts  BMBx 3/8/24: Hypercellular bone marrow (approaching 100% cellularity) showing increased blasts (13%) and trilineage myelodysplasia, consistent with clonal myeloid neoplasm (see comment). Flow cytometery showing 20% blasts.  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    - CG: Abnormal male karyotype 47~50,XY,juliette(3)t(3;11)(p14;q23),add(5) (q13),+8,-13,add(13)(q13), juliette(15)t(13;15) (q14;q26),juliette(15;20)t(15;20) (q11.2;p13) del(20)(q11.2q13.3) ,+juliette(15;20)t(15;20) (q11.2;p13)del(20) (q11.2q13.3)x1~2,+dup(15)(q15q22), +17,dic(17;?)(p11.1;?),+21, +1~2mar, 3~46dmin[cp20  - NGS: TP53 mutation positive.  - PCRs: FLT3 ITD/TKD- Not Detected  BMBx 4/2/24: morphology pending but flow cytometery showed 7% blasts, consistent with IN.    PLAN:   -Induction with Dacogen and Venetoclax (C1D1 3/13/24) target dose of 400 mg (dose adjust for drug interaction) for 28 days.  -Cycle 2 (re-induction) Dacogen + Ventoclax (C2D2 4/29/24) C2 D9  - Current ECOG 1, HLA typing done and pt has 4 full siblings as potential donors. ECHO- wnl. PFT pending.    - Will repeat BMBx 
Transfers  Toilet - Technique: Ambulating  Equipment Used: Standard toilet  Toilet Transfer: Supervision  AROM: Within functional limits  PROM: Within functional limits  Strength: Within functional limits  Coordination: Within functional limits  Tone: Normal  ADL  Grooming: Independent  UE Dressing: Independent  LE Dressing: Independent  Toileting: Independent  Skin Care: Chlorhexidine wipes (line care)     Activity Tolerance  Activity Tolerance: Patient tolerated treatment well  Bed mobility  Bed Mobility Comments: pt found seated on toilet at start of session  Transfers  Sit to stand: Supervision  Stand to sit: Supervision  Vision  Vision: Impaired  Vision Exceptions: Wears glasses for reading  Hearing  Hearing: Within functional limits  Cognition  Overall Cognitive Status: WFL  Orientation  Overall Orientation Status: Within Functional Limits  Orientation Level: Oriented X4                  Education Given To: Patient  Education Provided: Role of Therapy;Plan of Care;Transfer Training;Energy Conservation  Education Method: Demonstration;Verbal  Barriers to Learning: None  Education Outcome: Verbalized understanding;Demonstrated understanding                          AM-PAC - ADL  AM-PAC Daily Activity - Inpatient   How much help is needed for putting on and taking off regular lower body clothing?: None  How much help is needed for bathing (which includes washing, rinsing, drying)?: None  How much help is needed for toileting (which includes using toilet, bedpan, or urinal)?: None  How much help is needed for putting on and taking off regular upper body clothing?: None  How much help is needed for taking care of personal grooming?: None  How much help for eating meals?: None  AM-[facility] Inpatient Daily Activity Raw Score: 24  AM-PAC Inpatient ADL T-Scale Score : 57.54  ADL Inpatient CMS 0-100% Score: 0  ADL Inpatient CMS G-Code Modifier : CH        Therapy Time   Individual Concurrent Group Co-treatment   Time In 0832 
of Breast, Ovarian and Lung cancer on maternal side   Mother  d/t breast cancer- age 23 ()  Maternal cousin Breast and Ovarian Cancer ( )  Maternal Aunt- Breast and Ovarian Cancer ( )  Maternal Grandfather- Breast Cancer ( late )      TREATMENT:            1. Oligodendroglioma              - Partial resection               - Radiation (5400 cGy)   - Temodar:       - 19- 19       - 3/27-3/30/19: Cycle #1 Temozolomide 150 mg/m2 (4 day cycle            due to low platelet count)       - 19-19: Cycle #2 Temozolomide 150 mg/m2      2. AML w/underlying MDS  - Induction w/ Dacogen + Venetoclax (3/12/24)     3. Rectal Adenocarcinoma  - Surgical resection pending count recovery- plan for 24 with increased bleeding           ASSESSMENT AND PLAN:           1. 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  Peripheral blood smear 3/8/24: Unimpressive schistocytes and labs concerning for carlos negative hemolytic anemia, unlikely TTP or TMA. No need for PLEX. 20% blasts  BMBx 3/8/24: Hypercellular bone marrow (approaching 100% cellularity) showing increased blasts (13%) and trilineage myelodysplasia, consistent with clonal myeloid neoplasm (see comment). Flow cytometery showing 20% blasts.  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    - CG: Abnormal male karyotype 47~50,XY,juliette(3)t(3;11)(p14;q23),add(5) (q13),+8,-13,add(13)(q13), juliette(15)t(13;15) (q14;q26),juliette(15;20)t(15;20) (q11.2;p13) del(20)(q11.2q13.3) ,+juliette(15;20)t(15;20) (q11.2;p13)del(20) (q11.2q13.3)x1~2,+dup(15)(q15q22), +17,dic(17;?)(p11.1;?),+21, +1~2mar, 3~46dmin[cp20  - NGS: TP53 mutation positive.  - PCRs: FLT3 ITD/TKD- Not Detected  BMBx 24: morphology pending but flow cytometery showed 7% blasts, consistent with VT.    PLAN:   -Induction with Dacogen and Venetoclax (C1D1 3/13/24) 
cm rectal mass with no involvement of anal sphincter, mesorectal fascia or any mesorectal lymph nodes.  Pelvic MRI 3/25/24: Radiological Stage: T 1/2, N 1.  MRF: Clear. Sphincter involvement: No. Suspicious extra mesorectal nodes: None. EMVI: Absent  CT chest 3/25/24: No acute pathology               - CEA: 6.5  - s/p rectal mass resection 4/12/24  -refer to Dr. Russo for  rectal ca follow up post resection.   -Would likely not offer adjuvant therapy at this stage.    Nutrition:    - Low microbial diet  HX C diff Colitis:  - 3/7/24: c diff positive  - S/P Dificid (3/8/24-3/17/24)  - 4/26/24: c diff indeterminate, see ID above    Diarrhea- Multifactorial, from chemotherapy and c. Diff and IV Antibiotics. Will start Imodium 2mg q6-8 hours prn.      6. Neuro:  H/O Oligodendroglioma 2017, seizures since 2009  - Cont on Keppra 1500 mg PO BID  - s/p partial resection, radiation and Temodar     7. Cardiac  - ECHO 3/8/24:  EF 55-60% Mild tricuspid regurgitation. Mild mitral regurgitation is present.    8. Genetics  - Referral placed to [facility] genetics for possible germline mutation. Sister tested positive for BRCA.     9. Skin:  - Open wound at old PICC insertion site    10. Vascular Access   -PICC consult 4/30/24    - DVT Prophylaxis: Platelets <50,000 cells/dL - prophylactic lovenox on hold and mechanical prophylaxis with bilateral SCDs while in bed in place.  Contraindications to pharmacologic prophylaxis: Thrombocytopenia  Contraindications to mechanical prophylaxis: None    - Disposition: Uncertain at this time    The patient was seen and examined by Dr. SHANTEL Flwoers, APRN - CNP    Eduardo Nguyen MD  Hematology, Bone marrow transplant and Cellular therapy  [facility]       
with no involvement of anal sphincter, mesorectal fascia or any mesorectal lymph nodes.  Pelvic MRI 3/25/24: Radiological Stage: T 1/2, N 1.  MRF: Clear. Sphincter involvement: No. Suspicious extra mesorectal nodes: None. EMVI: Absent  CT chest 3/25/24: No acute pathology               - CEA: 6.5  - s/p rectal mass resection 4/12/24  -refer to Dr. Russo for  rectal ca follow up post resection.   -Would likely not offer adjuvant therapy at this stage.    Nutrition:    - Low microbial diet  HX C diff Colitis:  - 3/7/24: c diff positive  - S/P Dificid (3/8/24-3/17/24)  - 4/26/24: c diff indeterminate, see ID above    Diarrhea- Multifactorial, from chemotherapy and c. Diff and IV Antibiotics. Will start Imodium prn after every 3rd-4th loose stool.   6. Neuro:  H/O Oligodendroglioma 2017, seizures since 2009  - Cont on Keppra 1500 mg PO BID  - s/p partial resection, radiation and Temodar     7. Cardiac  - ECHO 3/8/24:  EF 55-60% Mild tricuspid regurgitation. Mild mitral regurgitation is present.    8. Genetics  - Referral placed to [facility] genetics for possible germline mutation. Sister tested positive for BRCA.     9. Skin:  - Open wound at old PICC insertion site    10. Vascular Access   -PICC 4/30/24    - DVT Prophylaxis: Platelets <50,000 cells/dL - prophylactic lovenox on hold and mechanical prophylaxis with bilateral SCDs while in bed in place.  Contraindications to pharmacologic prophylaxis: Thrombocytopenia  Contraindications to mechanical prophylaxis: None    - Disposition:  early next week either Mon or Tue        KATHLEEN Abdi - CNP     Chief concern at this time is his C. difficile.  This seems to be getting better.  Continue to monitor.    I reviewed his records suggest that he had an appointment made at [facility] for genetic assessment but this did not get accomplished.  Would recommend that we consider outpatient genetics referral at [facility] upon discharge    David M. Waterhouse, M.D.,

## 2024-05-07 NOTE — DISCHARGE SUMMARY
[facility] Discharge Summary             Attending Physician: Eduardo Nguyen MD    Referring MD: Eduardo Nguyen MD  [address] [place] [address]    Name: Dennys Peguero :  1980  MRN:  [id number]    Admission: 2024   Discharge:   2024    Date: 2024    Diagnosis on admit: Neutropenic Fever     Procedures: Routine chest x-ray, laboratories, EKG, IV fluid hydration, Panculture for fevers, IV antimicrobial therapy, Respiratory therapy, Oxygen therapy, Blood Product Infusions    Consultations:     Medications:      Medication List        START taking these medications      vancomycin 50 MG/ML Solr oral solution  Commonly known as: FIRVANQ  Take 2.5 mLs by mouth in the morning and at bedtime for 28 days     Venetoclax 100 MG Tabs  Take 2 tablets by mouth every 24 hours for 24 doses            CHANGE how you take these medications      levoFLOXacin 500 MG tablet  Commonly known as: LEVAQUIN  Take 1 tablet by mouth daily  What changed: when to take this            CONTINUE taking these medications      cyanocobalamin 1000 MCG tablet  Take 1 tablet by mouth daily     fluconazole 200 MG tablet  Commonly known as: DIFLUCAN  Take 1 tablet by mouth daily     Hydrocerin Crea cream  Apply topically 2 times daily     levETIRAcetam 1000 MG tablet  Commonly known as: KEPPRA     ondansetron 4 MG disintegrating tablet  Commonly known as: ZOFRAN-ODT  Place 1 tablet under the tongue 3 times daily as needed for Nausea or Vomiting     pantoprazole 40 MG tablet  Commonly known as: PROTONIX  Take 1 tablet by mouth 2 times daily (before meals)     prochlorperazine 10 MG tablet  Commonly known as: COMPAZINE  Take 1 tablet by mouth every 4 hours as needed (Nausea / Vomiting)     valACYclovir 500 MG tablet  Commonly known as: VALTREX  Take 1 tablet by mouth 2 times daily            STOP taking these medications      neomycin 500 MG tablet  Commonly known as: MYCIFRADIN               Where to Get

## 2024-05-07 NOTE — PLAN OF CARE
Problem: Safety - Adult  Goal: Free from fall injury  Outcome: Completed     Problem: ABCDS Injury Assessment  Goal: Absence of physical injury  Outcome: Completed     Problem: Musculoskeletal - Adult  Goal: Return mobility to safest level of function  Outcome: Completed     Problem: Hematologic - Adult  Goal: Maintains hematologic stability  Outcome: Completed     Problem: Infection - Adult  Goal: Absence of fever/infection during anticipated neutropenic period  Outcome: Completed     Problem: Pain  Goal: Verbalizes/displays adequate comfort level or baseline comfort level  Outcome: Completed     Problem: Metabolic/Fluid and Electrolytes - Adult  Goal: Electrolytes maintained within normal limits  Outcome: Completed

## 2024-05-13 ENCOUNTER — HOSPITAL ENCOUNTER (OUTPATIENT)
Dept: ONCOLOGY | Age: 44
Setting detail: INFUSION SERIES
Discharge: HOME | End: 2024-05-13
Payer: MEDICAID

## 2024-05-13 VITALS
RESPIRATION RATE: 16 BRPM | TEMPERATURE: 99.6 F | HEART RATE: 106 BPM | DIASTOLIC BLOOD PRESSURE: 79 MMHG | OXYGEN SATURATION: 99 % | SYSTOLIC BLOOD PRESSURE: 108 MMHG

## 2024-05-13 DIAGNOSIS — C92.00 ACUTE MYELOID LEUKEMIA NOT HAVING ACHIEVED REMISSION (HCC): Primary | ICD-10-CM

## 2024-05-13 LAB
ABO + RH BLD: NORMAL
BLD GP AB SCN SERPL QL: NORMAL
BLOOD BANK DISPENSE STATUS: NORMAL
BLOOD BANK PRODUCT CODE: NORMAL
BPU ID: NORMAL
DESCRIPTION BLOOD BANK: NORMAL
FUNGUS BLD CULT: NORMAL
FUNGUS BLD CULT: NORMAL
FUNGUS SPEC CULT: NORMAL
LOEFFLER MB STN SPEC: NORMAL

## 2024-05-13 PROCEDURE — 86900 BLOOD TYPING SEROLOGIC ABO: CPT

## 2024-05-13 PROCEDURE — P9040 RBC LEUKOREDUCED IRRADIATED: HCPCS

## 2024-05-13 PROCEDURE — 86923 COMPATIBILITY TEST ELECTRIC: CPT

## 2024-05-13 PROCEDURE — 86850 RBC ANTIBODY SCREEN: CPT

## 2024-05-13 PROCEDURE — 86901 BLOOD TYPING SEROLOGIC RH(D): CPT

## 2024-05-13 PROCEDURE — 36592 COLLECT BLOOD FROM PICC: CPT

## 2024-05-13 PROCEDURE — 36430 TRANSFUSION BLD/BLD COMPNT: CPT

## 2024-05-13 RX ORDER — SODIUM CHLORIDE 9 MG/ML
25 INJECTION, SOLUTION INTRAVENOUS PRN
Status: DISCONTINUED | OUTPATIENT
Start: 2024-05-13 | End: 2024-05-14 | Stop reason: HOSPADM

## 2024-05-13 RX ORDER — SODIUM CHLORIDE 9 MG/ML
25 INJECTION, SOLUTION INTRAVENOUS PRN
Status: CANCELLED | OUTPATIENT
Start: 2024-05-13

## 2024-05-13 RX ORDER — SODIUM CHLORIDE 0.9 % (FLUSH) 0.9 %
5-40 SYRINGE (ML) INJECTION PRN
Status: DISCONTINUED | OUTPATIENT
Start: 2024-05-13 | End: 2024-05-14 | Stop reason: HOSPADM

## 2024-05-13 RX ORDER — ONDANSETRON 2 MG/ML
8 INJECTION INTRAMUSCULAR; INTRAVENOUS
OUTPATIENT
Start: 2024-05-13

## 2024-05-13 RX ORDER — SODIUM CHLORIDE 9 MG/ML
INJECTION, SOLUTION INTRAVENOUS CONTINUOUS
OUTPATIENT
Start: 2024-05-13

## 2024-05-13 RX ORDER — SODIUM CHLORIDE 9 MG/ML
20 INJECTION, SOLUTION INTRAVENOUS CONTINUOUS
Status: CANCELLED | OUTPATIENT
Start: 2024-05-13

## 2024-05-13 RX ORDER — DIPHENHYDRAMINE HYDROCHLORIDE 50 MG/ML
50 INJECTION INTRAMUSCULAR; INTRAVENOUS
OUTPATIENT
Start: 2024-05-13

## 2024-05-13 RX ORDER — SODIUM CHLORIDE 0.9 % (FLUSH) 0.9 %
5-40 SYRINGE (ML) INJECTION PRN
Status: CANCELLED | OUTPATIENT
Start: 2024-05-13

## 2024-05-13 RX ORDER — ALBUTEROL SULFATE 90 UG/1
4 AEROSOL, METERED RESPIRATORY (INHALATION) PRN
OUTPATIENT
Start: 2024-05-13

## 2024-05-13 RX ORDER — ACETAMINOPHEN 325 MG/1
650 TABLET ORAL
OUTPATIENT
Start: 2024-05-13

## 2024-05-13 RX ORDER — EPINEPHRINE 1 MG/ML
0.3 INJECTION, SOLUTION INTRAMUSCULAR; SUBCUTANEOUS PRN
OUTPATIENT
Start: 2024-05-13

## 2024-05-13 RX ORDER — SODIUM CHLORIDE 9 MG/ML
20 INJECTION, SOLUTION INTRAVENOUS CONTINUOUS
Status: DISCONTINUED | OUTPATIENT
Start: 2024-05-13 | End: 2024-05-14 | Stop reason: HOSPADM

## 2024-05-13 NOTE — PROGRESS NOTES
Patient's hemoglobin on 5/10/24: 7.8  Patient's platelet count on 5/10/24: 25  Pt seen at [facility] today for  Packed red blood cell transfusion  for above lab values per order. Informed consent verified. No Pre-medications ordered with no previous transfusion reaction history. Transfused per policy. Pt tolerated transfusion well and without incident.  Pt verbalizes understanding of discharge instructions.  Discharged to home per self.

## 2024-05-15 ENCOUNTER — HOSPITAL ENCOUNTER (INPATIENT)
Age: 44
LOS: 21 days | Discharge: HOME | DRG: 720 | End: 2024-06-05
Attending: STUDENT IN AN ORGANIZED HEALTH CARE EDUCATION/TRAINING PROGRAM | Admitting: STUDENT IN AN ORGANIZED HEALTH CARE EDUCATION/TRAINING PROGRAM
Payer: MEDICAID

## 2024-05-15 ENCOUNTER — APPOINTMENT (OUTPATIENT)
Dept: GENERAL RADIOLOGY | Age: 44
DRG: 720 | End: 2024-05-15
Attending: STUDENT IN AN ORGANIZED HEALTH CARE EDUCATION/TRAINING PROGRAM
Payer: MEDICAID

## 2024-05-15 PROBLEM — A41.9 SEPSIS (HCC): Status: ACTIVE | Noted: 2024-05-15

## 2024-05-15 LAB
ABO + RH BLD: NORMAL
BACTERIA URNS QL MICRO: ABNORMAL /HPF
BILIRUB UR QL STRIP.AUTO: NEGATIVE
BLD GP AB SCN SERPL QL: NORMAL
BLOOD BANK DISPENSE STATUS: NORMAL
BLOOD BANK PRODUCT CODE: NORMAL
BPU ID: NORMAL
C DIFF TOX A+B STL QL IA: NORMAL
CLARITY UR: CLEAR
COLOR UR: YELLOW
CRP SERPL-MCNC: 79.3 MG/L (ref 0–5.1)
DESCRIPTION BLOOD BANK: NORMAL
EPI CELLS #/AREA URNS HPF: ABNORMAL /HPF (ref 0–5)
GLUCOSE UR STRIP.AUTO-MCNC: NEGATIVE MG/DL
HGB UR QL STRIP.AUTO: NEGATIVE
KETONES UR STRIP.AUTO-MCNC: ABNORMAL MG/DL
LACTATE BLDV-SCNC: 0.6 MMOL/L (ref 0.4–2)
LEUKOCYTE ESTERASE UR QL STRIP.AUTO: NEGATIVE
MUCOUS THREADS #/AREA URNS LPF: ABNORMAL /LPF
NITRITE UR QL STRIP.AUTO: NEGATIVE
PH UR STRIP.AUTO: 6 [PH] (ref 5–8)
PROCALCITONIN SERPL IA-MCNC: 0.13 NG/ML (ref 0–0.15)
PROT UR STRIP.AUTO-MCNC: ABNORMAL MG/DL
RBC #/AREA URNS HPF: ABNORMAL /HPF (ref 0–4)
SP GR UR STRIP.AUTO: >=1.03 (ref 1–1.03)
UA DIPSTICK W REFLEX MICRO PNL UR: YES
URN SPEC COLLECT METH UR: ABNORMAL
UROBILINOGEN UR STRIP-ACNC: 0.2 E.U./DL
WBC #/AREA URNS HPF: ABNORMAL /HPF (ref 0–5)

## 2024-05-15 PROCEDURE — 87086 URINE CULTURE/COLONY COUNT: CPT

## 2024-05-15 PROCEDURE — 87081 CULTURE SCREEN ONLY: CPT

## 2024-05-15 PROCEDURE — 2580000003 HC RX 258: Performed by: NURSE PRACTITIONER

## 2024-05-15 PROCEDURE — 71046 X-RAY EXAM CHEST 2 VIEWS: CPT

## 2024-05-15 PROCEDURE — P9040 RBC LEUKOREDUCED IRRADIATED: HCPCS

## 2024-05-15 PROCEDURE — 2060000000 HC ICU INTERMEDIATE R&B

## 2024-05-15 PROCEDURE — 6370000000 HC RX 637 (ALT 250 FOR IP): Performed by: NURSE PRACTITIONER

## 2024-05-15 PROCEDURE — 86900 BLOOD TYPING SEROLOGIC ABO: CPT

## 2024-05-15 PROCEDURE — 6370000000 HC RX 637 (ALT 250 FOR IP): Performed by: INTERNAL MEDICINE

## 2024-05-15 PROCEDURE — 83605 ASSAY OF LACTIC ACID: CPT

## 2024-05-15 PROCEDURE — 93005 ELECTROCARDIOGRAM TRACING: CPT | Performed by: NURSE PRACTITIONER

## 2024-05-15 PROCEDURE — P9036 PLATELET PHERESIS IRRADIATED: HCPCS

## 2024-05-15 PROCEDURE — 87449 NOS EACH ORGANISM AG IA: CPT

## 2024-05-15 PROCEDURE — 81001 URINALYSIS AUTO W/SCOPE: CPT

## 2024-05-15 PROCEDURE — 2580000003 HC RX 258: Performed by: STUDENT IN AN ORGANIZED HEALTH CARE EDUCATION/TRAINING PROGRAM

## 2024-05-15 PROCEDURE — 6360000002 HC RX W HCPCS: Performed by: STUDENT IN AN ORGANIZED HEALTH CARE EDUCATION/TRAINING PROGRAM

## 2024-05-15 PROCEDURE — 86901 BLOOD TYPING SEROLOGIC RH(D): CPT

## 2024-05-15 PROCEDURE — 36430 TRANSFUSION BLD/BLD COMPNT: CPT

## 2024-05-15 PROCEDURE — 0202U NFCT DS 22 TRGT SARS-COV-2: CPT

## 2024-05-15 PROCEDURE — 6360000002 HC RX W HCPCS: Performed by: NURSE PRACTITIONER

## 2024-05-15 PROCEDURE — 87324 CLOSTRIDIUM AG IA: CPT

## 2024-05-15 PROCEDURE — 94150 VITAL CAPACITY TEST: CPT

## 2024-05-15 PROCEDURE — 84145 PROCALCITONIN (PCT): CPT

## 2024-05-15 PROCEDURE — 86923 COMPATIBILITY TEST ELECTRIC: CPT

## 2024-05-15 PROCEDURE — 86850 RBC ANTIBODY SCREEN: CPT

## 2024-05-15 PROCEDURE — 86140 C-REACTIVE PROTEIN: CPT

## 2024-05-15 PROCEDURE — 94761 N-INVAS EAR/PLS OXIMETRY MLT: CPT

## 2024-05-15 RX ORDER — ONDANSETRON 4 MG/1
4 TABLET, ORALLY DISINTEGRATING ORAL 3 TIMES DAILY PRN
Status: DISCONTINUED | OUTPATIENT
Start: 2024-05-15 | End: 2024-06-05 | Stop reason: HOSPADM

## 2024-05-15 RX ORDER — FLUCONAZOLE 200 MG/1
200 TABLET ORAL DAILY
Status: DISCONTINUED | OUTPATIENT
Start: 2024-05-16 | End: 2024-05-24

## 2024-05-15 RX ORDER — VANCOMYCIN HYDROCHLORIDE 125 MG/1
125 CAPSULE ORAL 2 TIMES DAILY
Status: DISCONTINUED | OUTPATIENT
Start: 2024-05-15 | End: 2024-06-05 | Stop reason: HOSPADM

## 2024-05-15 RX ORDER — MAGNESIUM SULFATE IN WATER 40 MG/ML
4000 INJECTION, SOLUTION INTRAVENOUS PRN
Status: DISCONTINUED | OUTPATIENT
Start: 2024-05-15 | End: 2024-06-05 | Stop reason: HOSPADM

## 2024-05-15 RX ORDER — LEVETIRACETAM 500 MG/1
1500 TABLET ORAL 2 TIMES DAILY
Status: DISCONTINUED | OUTPATIENT
Start: 2024-05-15 | End: 2024-06-05 | Stop reason: HOSPADM

## 2024-05-15 RX ORDER — SODIUM CHLORIDE 9 MG/ML
INJECTION, SOLUTION INTRAVENOUS CONTINUOUS PRN
Status: DISCONTINUED | OUTPATIENT
Start: 2024-05-15 | End: 2024-06-05 | Stop reason: HOSPADM

## 2024-05-15 RX ORDER — SODIUM CHLORIDE 9 MG/ML
INJECTION, SOLUTION INTRAVENOUS CONTINUOUS
Status: DISCONTINUED | OUTPATIENT
Start: 2024-05-15 | End: 2024-05-23

## 2024-05-15 RX ORDER — CEFEPIME 1 G/50ML
2000 INJECTION, SOLUTION INTRAVENOUS ONCE
Status: DISCONTINUED | OUTPATIENT
Start: 2024-05-15 | End: 2024-05-15

## 2024-05-15 RX ORDER — SODIUM CHLORIDE 9 MG/ML
INJECTION, SOLUTION INTRAVENOUS PRN
Status: DISCONTINUED | OUTPATIENT
Start: 2024-05-15 | End: 2024-06-05 | Stop reason: HOSPADM

## 2024-05-15 RX ORDER — LOPERAMIDE HYDROCHLORIDE 2 MG/1
2 CAPSULE ORAL 4 TIMES DAILY PRN
Status: DISCONTINUED | OUTPATIENT
Start: 2024-05-15 | End: 2024-05-26

## 2024-05-15 RX ORDER — SODIUM CHLORIDE 0.9 % (FLUSH) 0.9 %
5-40 SYRINGE (ML) INJECTION EVERY 12 HOURS SCHEDULED
Status: DISCONTINUED | OUTPATIENT
Start: 2024-05-15 | End: 2024-06-05 | Stop reason: HOSPADM

## 2024-05-15 RX ORDER — VALACYCLOVIR HYDROCHLORIDE 500 MG/1
500 TABLET, FILM COATED ORAL 2 TIMES DAILY
Status: DISCONTINUED | OUTPATIENT
Start: 2024-05-15 | End: 2024-06-05 | Stop reason: HOSPADM

## 2024-05-15 RX ORDER — CEFEPIME 1 G/50ML
2000 INJECTION, SOLUTION INTRAVENOUS EVERY 8 HOURS
Status: DISCONTINUED | OUTPATIENT
Start: 2024-05-15 | End: 2024-05-20

## 2024-05-15 RX ORDER — SODIUM CHLORIDE 0.9 % (FLUSH) 0.9 %
5-40 SYRINGE (ML) INJECTION PRN
Status: DISCONTINUED | OUTPATIENT
Start: 2024-05-15 | End: 2024-06-05 | Stop reason: HOSPADM

## 2024-05-15 RX ORDER — DIPHENHYDRAMINE HCL 25 MG
25 TABLET ORAL NIGHTLY PRN
Status: DISCONTINUED | OUTPATIENT
Start: 2024-05-15 | End: 2024-06-05 | Stop reason: HOSPADM

## 2024-05-15 RX ORDER — ACETAMINOPHEN 325 MG/1
650 TABLET ORAL EVERY 4 HOURS PRN
Status: DISCONTINUED | OUTPATIENT
Start: 2024-05-15 | End: 2024-05-19

## 2024-05-15 RX ORDER — PANTOPRAZOLE SODIUM 40 MG/1
40 TABLET, DELAYED RELEASE ORAL
Status: DISCONTINUED | OUTPATIENT
Start: 2024-05-15 | End: 2024-06-05 | Stop reason: HOSPADM

## 2024-05-15 RX ORDER — LANOLIN ALCOHOL/MO/W.PET/CERES
CREAM (GRAM) TOPICAL 2 TIMES DAILY
Status: DISCONTINUED | OUTPATIENT
Start: 2024-05-15 | End: 2024-06-05 | Stop reason: HOSPADM

## 2024-05-15 RX ORDER — POTASSIUM CHLORIDE 29.8 MG/ML
20 INJECTION INTRAVENOUS PRN
Status: DISCONTINUED | OUTPATIENT
Start: 2024-05-15 | End: 2024-05-19

## 2024-05-15 RX ORDER — PROCHLORPERAZINE MALEATE 10 MG
10 TABLET ORAL EVERY 4 HOURS PRN
Status: DISCONTINUED | OUTPATIENT
Start: 2024-05-15 | End: 2024-06-05 | Stop reason: HOSPADM

## 2024-05-15 RX ADMIN — VALACYCLOVIR HYDROCHLORIDE 500 MG: 500 TABLET, FILM COATED ORAL at 20:09

## 2024-05-15 RX ADMIN — ACETAMINOPHEN 650 MG: 325 TABLET ORAL at 23:38

## 2024-05-15 RX ADMIN — CEFEPIME 2000 MG: 1 INJECTION, SOLUTION INTRAVENOUS at 20:09

## 2024-05-15 RX ADMIN — VANCOMYCIN HYDROCHLORIDE 1750 MG: 10 INJECTION, POWDER, LYOPHILIZED, FOR SOLUTION INTRAVENOUS at 19:07

## 2024-05-15 RX ADMIN — LEVETIRACETAM 1500 MG: 500 TABLET, FILM COATED ORAL at 20:09

## 2024-05-15 RX ADMIN — PANTOPRAZOLE SODIUM 40 MG: 40 TABLET, DELAYED RELEASE ORAL at 15:40

## 2024-05-15 RX ADMIN — SODIUM CHLORIDE: 9 INJECTION, SOLUTION INTRAVENOUS at 15:20

## 2024-05-15 RX ADMIN — Medication: at 20:12

## 2024-05-15 RX ADMIN — VANCOMYCIN HYDROCHLORIDE 125 MG: 125 CAPSULE ORAL at 15:39

## 2024-05-15 RX ADMIN — SODIUM CHLORIDE, PRESERVATIVE FREE 10 ML: 5 INJECTION INTRAVENOUS at 20:11

## 2024-05-15 ASSESSMENT — PAIN DESCRIPTION - LOCATION
LOCATION: OTHER (COMMENT)
LOCATION: OTHER (COMMENT)

## 2024-05-15 ASSESSMENT — PAIN DESCRIPTION - ONSET
ONSET: ON-GOING
ONSET: ON-GOING

## 2024-05-15 ASSESSMENT — PAIN DESCRIPTION - ORIENTATION
ORIENTATION: RIGHT
ORIENTATION: RIGHT

## 2024-05-15 ASSESSMENT — PAIN SCALES - GENERAL
PAINLEVEL_OUTOF10: 4
PAINLEVEL_OUTOF10: 0
PAINLEVEL_OUTOF10: 0
PAINLEVEL_OUTOF10: 4

## 2024-05-15 ASSESSMENT — PAIN DESCRIPTION - FREQUENCY
FREQUENCY: INTERMITTENT
FREQUENCY: INTERMITTENT

## 2024-05-15 ASSESSMENT — PAIN DESCRIPTION - DESCRIPTORS
DESCRIPTORS: ACHING;DISCOMFORT
DESCRIPTORS: ACHING;DISCOMFORT

## 2024-05-15 ASSESSMENT — PAIN DESCRIPTION - PAIN TYPE
TYPE: ACUTE PAIN
TYPE: ACUTE PAIN

## 2024-05-15 ASSESSMENT — PAIN - FUNCTIONAL ASSESSMENT
PAIN_FUNCTIONAL_ASSESSMENT: ACTIVITIES ARE NOT PREVENTED
PAIN_FUNCTIONAL_ASSESSMENT: ACTIVITIES ARE NOT PREVENTED

## 2024-05-15 NOTE — CARE COORDINATION
Case Management Assessment  Initial Evaluation    Date/Time of Evaluation: 5/15/2024 2:21 PM  Assessment Completed by: JENNIFER Ortiz   for [facility] ([facility])  David Mobile: [phone]    If patient is discharged prior to next notation, then this note serves as note for discharge by case management.    Patient Name: Dennys Peguero                   YOB: 1980  Diagnosis: Sepsis (HCC) [A41.9]                   Date / Time: 5/15/2024  1:01 PM    Patient Admission Status: Inpatient   Readmission Risk (Low < 19, Mod (19-27), High > 27): Readmission Risk Score: 26.7    Current PCP: No primary care provider on file.  PCP verified by CM? Yes    Chart Reviewed: Yes      History Provided by: Patient  Patient Orientation: Alert and Oriented    Patient Cognition: Alert    Hospitalization in the last 30 days (Readmission):  Yes    If yes, Readmission Assessment in CM Navigator will be completed.    Advance Directives:      Code Status: Full Code   Patient's Primary Decision Maker is: Legal Next of Kin    Primary Decision Maker: Pierre Lowry Parent - [phone]    Discharge Planning:    Patient lives with: Family Members (his father is currently staying with him) Type of Home: Apartment  Primary Care Giver: Self  Patient Support Systems include: Parent, Family Members   Current Financial resources: Other (Comment) ([employer])  Current community resources: None  Current services prior to admission: Other (Comment) ([facility])            Current DME:              Type of Home Care services:  None    ADLS  Prior functional level: Independent in ADLs/IADLs  Current functional level: Independent in ADLs/IADLs    PT AM-PAC:   /24  OT AM-PAC:   /24    Family can provide assistance at DC: Yes  Would you like Case Management to discuss the discharge plan with any other family members/significant others, and if so, who? Yes  Plans to Return to Present Housing:

## 2024-05-15 NOTE — CONSENT
Informed Consent for Blood Component Transfusion Note    I have discussed with the patient the rationale for blood component transfusion; its benefits in treating or preventing fatigue, organ damage, or death; and its risk which includes mild transfusion reactions, rare risk of blood borne infection, or more serious but rare reactions. I have discussed the alternatives to transfusion, including the risk and consequences of not receiving transfusion. The patient had an opportunity to ask questions and had agreed to proceed with transfusion of blood components.    Electronically signed by KATHLEEN Murray CNP on 5/15/24 at 1:30 PM EDT

## 2024-05-15 NOTE — H&P
[facility] History and Physical       Attending Physician: Gabby Tiwari DO    Primary Care: No primary care provider on file.       Referring MD: Lei Tiwari MD  [address] [place] [address]    Name: Dennys Peguero :  1980  MRN:  [id number]    Admission: (Not on file)      Date: 5/15/2024    Reason for Admission: Tachycardia, fatigue, low grade temperature, neutropenia    History of Present Illness:   Mr. Peguero is a 43 y.o. male with past medical history of Oligodendroglioma (S/P partial resection 2017, radiation , and temodar ), and rectal adenocarcinoma. He was diagnosed with AML with underlying MDS- Deletion 5q33, trisomy 8, gain of KMT2A/11q, Deletion of 20q. TP53 positive and cytogenetics with multiple abnormalities. With these findings he was started on Dacogen and Venetoclax as an inpatient on 3/13/24.     During the first part of his admission Dennys continued to have intermittent bloody stools. GI PCR came back positive for c diff and he completed a 10 day course of Dificid. Upon assessment he did not have any hemorrhoids but continued to have intermittent GI bleeding and GI was consulted. On 3/22/24 he underwent a colonoscopy and there was a  \"malignant appearing tumor measuring 4 cm in the proximal rectum, 10 cm from the anal verge\" that was biopsied. The biopsy came back positive for \"at least adenocarcinoma in situ.\" Colorectal surgery was consulted and he underwent a robotic low anterior resection with colorectal anastomosis secondary to bleeding rectal cancer on 24.He tolerated surgery well and his post surgical hospital course was uneventful.      He had been followed as an outpatient doing fairly well. He then presented for a scheduled appt at [facility] on 24 with temp of 103.  Cultures from admission were positive for E.Coli, sensitive to Cefepime. He received 7-days of Cefepime from first negative culture. A new PICC was placed on 24.     He also had

## 2024-05-16 ENCOUNTER — APPOINTMENT (OUTPATIENT)
Dept: CT IMAGING | Age: 44
DRG: 720 | End: 2024-05-16
Attending: STUDENT IN AN ORGANIZED HEALTH CARE EDUCATION/TRAINING PROGRAM
Payer: MEDICAID

## 2024-05-16 LAB
ALBUMIN SERPL-MCNC: 3.6 G/DL (ref 3.4–5)
ALP SERPL-CCNC: 64 U/L (ref 40–129)
ALT SERPL-CCNC: <5 U/L (ref 10–40)
ANION GAP SERPL CALCULATED.3IONS-SCNC: 11 MMOL/L (ref 3–16)
APTT BLD: 43.8 SEC (ref 22.1–36.4)
AST SERPL-CCNC: 6 U/L (ref 15–37)
BACTERIA UR CULT: NORMAL
BILIRUB DIRECT SERPL-MCNC: <0.2 MG/DL (ref 0–0.3)
BILIRUB INDIRECT SERPL-MCNC: ABNORMAL MG/DL (ref 0–1)
BILIRUB SERPL-MCNC: 1 MG/DL (ref 0–1)
BUN SERPL-MCNC: 9 MG/DL (ref 7–20)
CALCIUM SERPL-MCNC: 8.9 MG/DL (ref 8.3–10.6)
CHLORIDE SERPL-SCNC: 100 MMOL/L (ref 99–110)
CO2 SERPL-SCNC: 24 MMOL/L (ref 21–32)
CREAT SERPL-MCNC: 0.7 MG/DL (ref 0.9–1.3)
DEPRECATED RDW RBC AUTO: 14.3 % (ref 12.4–15.4)
GFR SERPLBLD CREATININE-BSD FMLA CKD-EPI: >90 ML/MIN/{1.73_M2}
GLUCOSE SERPL-MCNC: 97 MG/DL (ref 70–99)
HCT VFR BLD AUTO: 16.7 % (ref 40.5–52.5)
HCT VFR BLD AUTO: 21.2 % (ref 40.5–52.5)
HGB BLD-MCNC: 5.8 G/DL (ref 13.5–17.5)
HGB BLD-MCNC: 7.5 G/DL (ref 13.5–17.5)
INR PPP: 1.27 (ref 0.85–1.15)
LDH SERPL L TO P-CCNC: 171 U/L (ref 100–190)
MCH RBC QN AUTO: 27.4 PG (ref 26–34)
MCHC RBC AUTO-ENTMCNC: 34.6 G/DL (ref 31–36)
MCV RBC AUTO: 79.3 FL (ref 80–100)
PHOSPHATE SERPL-MCNC: 3.6 MG/DL (ref 2.5–4.9)
PLATELET # BLD AUTO: 25 K/UL (ref 135–450)
PMV BLD AUTO: 9.9 FL (ref 5–10.5)
POTASSIUM SERPL-SCNC: 3.9 MMOL/L (ref 3.5–5.1)
PROT SERPL-MCNC: 6.2 G/DL (ref 6.4–8.2)
PROTHROMBIN TIME: 16 SEC (ref 11.9–14.9)
RBC # BLD AUTO: 2.1 M/UL (ref 4.2–5.9)
REPORT: NORMAL
RESP PATH DNA+RNA PNL NPH NAA+NON-PROBE: NORMAL
SODIUM SERPL-SCNC: 135 MMOL/L (ref 136–145)
WBC # BLD AUTO: 0.5 K/UL (ref 4–11)

## 2024-05-16 PROCEDURE — 2060000000 HC ICU INTERMEDIATE R&B

## 2024-05-16 PROCEDURE — 36592 COLLECT BLOOD FROM PICC: CPT

## 2024-05-16 PROCEDURE — 2580000003 HC RX 258: Performed by: NURSE PRACTITIONER

## 2024-05-16 PROCEDURE — 80076 HEPATIC FUNCTION PANEL: CPT

## 2024-05-16 PROCEDURE — 84100 ASSAY OF PHOSPHORUS: CPT

## 2024-05-16 PROCEDURE — 71250 CT THORAX DX C-: CPT

## 2024-05-16 PROCEDURE — 6360000002 HC RX W HCPCS: Performed by: NURSE PRACTITIONER

## 2024-05-16 PROCEDURE — 36430 TRANSFUSION BLD/BLD COMPNT: CPT

## 2024-05-16 PROCEDURE — 80048 BASIC METABOLIC PNL TOTAL CA: CPT

## 2024-05-16 PROCEDURE — 85018 HEMOGLOBIN: CPT

## 2024-05-16 PROCEDURE — 85730 THROMBOPLASTIN TIME PARTIAL: CPT

## 2024-05-16 PROCEDURE — 6360000002 HC RX W HCPCS: Performed by: STUDENT IN AN ORGANIZED HEALTH CARE EDUCATION/TRAINING PROGRAM

## 2024-05-16 PROCEDURE — 87449 NOS EACH ORGANISM AG IA: CPT

## 2024-05-16 PROCEDURE — 85610 PROTHROMBIN TIME: CPT

## 2024-05-16 PROCEDURE — 6370000000 HC RX 637 (ALT 250 FOR IP): Performed by: INTERNAL MEDICINE

## 2024-05-16 PROCEDURE — 85027 COMPLETE CBC AUTOMATED: CPT

## 2024-05-16 PROCEDURE — 83615 LACTATE (LD) (LDH) ENZYME: CPT

## 2024-05-16 PROCEDURE — A4217 STERILE WATER/SALINE, 500 ML: HCPCS | Performed by: NURSE PRACTITIONER

## 2024-05-16 PROCEDURE — 85014 HEMATOCRIT: CPT

## 2024-05-16 PROCEDURE — 6370000000 HC RX 637 (ALT 250 FOR IP): Performed by: NURSE PRACTITIONER

## 2024-05-16 PROCEDURE — 2580000003 HC RX 258: Performed by: STUDENT IN AN ORGANIZED HEALTH CARE EDUCATION/TRAINING PROGRAM

## 2024-05-16 RX ADMIN — FLUCONAZOLE 200 MG: 200 TABLET ORAL at 08:11

## 2024-05-16 RX ADMIN — CEFEPIME 2000 MG: 1 INJECTION, SOLUTION INTRAVENOUS at 19:55

## 2024-05-16 RX ADMIN — PANTOPRAZOLE SODIUM 40 MG: 40 TABLET, DELAYED RELEASE ORAL at 05:31

## 2024-05-16 RX ADMIN — CEFEPIME 2000 MG: 1 INJECTION, SOLUTION INTRAVENOUS at 12:20

## 2024-05-16 RX ADMIN — SODIUM CHLORIDE 15 ML: 900 IRRIGANT IRRIGATION at 20:02

## 2024-05-16 RX ADMIN — Medication: at 20:01

## 2024-05-16 RX ADMIN — ACETAMINOPHEN 650 MG: 325 TABLET ORAL at 15:29

## 2024-05-16 RX ADMIN — SODIUM CHLORIDE, PRESERVATIVE FREE 10 ML: 5 INJECTION INTRAVENOUS at 20:01

## 2024-05-16 RX ADMIN — VALACYCLOVIR HYDROCHLORIDE 500 MG: 500 TABLET, FILM COATED ORAL at 08:11

## 2024-05-16 RX ADMIN — CEFEPIME 2000 MG: 1 INJECTION, SOLUTION INTRAVENOUS at 03:48

## 2024-05-16 RX ADMIN — VANCOMYCIN HYDROCHLORIDE 1750 MG: 10 INJECTION, POWDER, LYOPHILIZED, FOR SOLUTION INTRAVENOUS at 07:57

## 2024-05-16 RX ADMIN — LEVETIRACETAM 1500 MG: 500 TABLET, FILM COATED ORAL at 20:00

## 2024-05-16 RX ADMIN — VANCOMYCIN HYDROCHLORIDE 125 MG: 125 CAPSULE ORAL at 14:06

## 2024-05-16 RX ADMIN — VANCOMYCIN HYDROCHLORIDE 125 MG: 125 CAPSULE ORAL at 08:13

## 2024-05-16 RX ADMIN — PANTOPRAZOLE SODIUM 40 MG: 40 TABLET, DELAYED RELEASE ORAL at 15:30

## 2024-05-16 RX ADMIN — SODIUM CHLORIDE, PRESERVATIVE FREE 10 ML: 5 INJECTION INTRAVENOUS at 08:12

## 2024-05-16 RX ADMIN — ACETAMINOPHEN 650 MG: 325 TABLET ORAL at 20:13

## 2024-05-16 RX ADMIN — SODIUM CHLORIDE 15 ML: 900 IRRIGANT IRRIGATION at 12:30

## 2024-05-16 RX ADMIN — Medication: at 08:13

## 2024-05-16 RX ADMIN — VALACYCLOVIR HYDROCHLORIDE 500 MG: 500 TABLET, FILM COATED ORAL at 20:00

## 2024-05-16 RX ADMIN — SODIUM CHLORIDE: 9 INJECTION, SOLUTION INTRAVENOUS at 03:48

## 2024-05-16 RX ADMIN — ACETAMINOPHEN 650 MG: 325 TABLET ORAL at 08:10

## 2024-05-16 RX ADMIN — VANCOMYCIN HYDROCHLORIDE 1750 MG: 10 INJECTION, POWDER, LYOPHILIZED, FOR SOLUTION INTRAVENOUS at 19:56

## 2024-05-16 RX ADMIN — SODIUM CHLORIDE: 9 INJECTION, SOLUTION INTRAVENOUS at 20:15

## 2024-05-16 RX ADMIN — LEVETIRACETAM 1500 MG: 500 TABLET, FILM COATED ORAL at 08:11

## 2024-05-16 ASSESSMENT — PAIN SCALES - GENERAL
PAINLEVEL_OUTOF10: 3
PAINLEVEL_OUTOF10: 6
PAINLEVEL_OUTOF10: 3
PAINLEVEL_OUTOF10: 0
PAINLEVEL_OUTOF10: 3
PAINLEVEL_OUTOF10: 3
PAINLEVEL_OUTOF10: 10
PAINLEVEL_OUTOF10: 1
PAINLEVEL_OUTOF10: 2

## 2024-05-16 ASSESSMENT — PAIN DESCRIPTION - FREQUENCY
FREQUENCY: INTERMITTENT

## 2024-05-16 ASSESSMENT — PAIN - FUNCTIONAL ASSESSMENT
PAIN_FUNCTIONAL_ASSESSMENT: ACTIVITIES ARE NOT PREVENTED
PAIN_FUNCTIONAL_ASSESSMENT: ACTIVITIES ARE NOT PREVENTED
PAIN_FUNCTIONAL_ASSESSMENT: PREVENTS OR INTERFERES SOME ACTIVE ACTIVITIES AND ADLS
PAIN_FUNCTIONAL_ASSESSMENT: ACTIVITIES ARE NOT PREVENTED
PAIN_FUNCTIONAL_ASSESSMENT: ACTIVITIES ARE NOT PREVENTED

## 2024-05-16 ASSESSMENT — PAIN DESCRIPTION - ONSET
ONSET: ON-GOING
ONSET: PROGRESSIVE
ONSET: ON-GOING

## 2024-05-16 ASSESSMENT — PAIN DESCRIPTION - ORIENTATION
ORIENTATION: LEFT
ORIENTATION: LEFT
ORIENTATION: RIGHT
ORIENTATION: LEFT
ORIENTATION: LEFT

## 2024-05-16 ASSESSMENT — PAIN DESCRIPTION - LOCATION
LOCATION: ARM
LOCATION: OTHER (COMMENT)
LOCATION: ARM

## 2024-05-16 ASSESSMENT — PAIN DESCRIPTION - PAIN TYPE
TYPE: ACUTE PAIN

## 2024-05-16 ASSESSMENT — PAIN DESCRIPTION - DESCRIPTORS
DESCRIPTORS: SHARP
DESCRIPTORS: SHARP
DESCRIPTORS: ACHING;DISCOMFORT
DESCRIPTORS: SHARP
DESCRIPTORS: SHARP

## 2024-05-17 LAB
ACANTHOCYTES BLD QL SMEAR: ABNORMAL
ALBUMIN SERPL-MCNC: 3.5 G/DL (ref 3.4–5)
ALP SERPL-CCNC: 64 U/L (ref 40–129)
ALT SERPL-CCNC: <5 U/L (ref 10–40)
ANION GAP SERPL CALCULATED.3IONS-SCNC: 10 MMOL/L (ref 3–16)
AST SERPL-CCNC: 6 U/L (ref 15–37)
BILIRUB DIRECT SERPL-MCNC: <0.2 MG/DL (ref 0–0.3)
BILIRUB INDIRECT SERPL-MCNC: ABNORMAL MG/DL (ref 0–1)
BILIRUB SERPL-MCNC: 0.7 MG/DL (ref 0–1)
BLOOD BANK DISPENSE STATUS: NORMAL
BLOOD BANK DISPENSE STATUS: NORMAL
BLOOD BANK PRODUCT CODE: NORMAL
BLOOD BANK PRODUCT CODE: NORMAL
BPU ID: NORMAL
BPU ID: NORMAL
BUN SERPL-MCNC: 7 MG/DL (ref 7–20)
CALCIUM SERPL-MCNC: 8.7 MG/DL (ref 8.3–10.6)
CHLORIDE SERPL-SCNC: 103 MMOL/L (ref 99–110)
CO2 SERPL-SCNC: 24 MMOL/L (ref 21–32)
CREAT SERPL-MCNC: 0.6 MG/DL (ref 0.9–1.3)
DEPRECATED RDW RBC AUTO: 14.4 % (ref 12.4–15.4)
DESCRIPTION BLOOD BANK: NORMAL
DESCRIPTION BLOOD BANK: NORMAL
EKG ATRIAL RATE: 106 BPM
EKG DIAGNOSIS: NORMAL
EKG P AXIS: 81 DEGREES
EKG P-R INTERVAL: 130 MS
EKG Q-T INTERVAL: 332 MS
EKG QRS DURATION: 76 MS
EKG QTC CALCULATION (BAZETT): 441 MS
EKG R AXIS: 82 DEGREES
EKG T AXIS: 62 DEGREES
EKG VENTRICULAR RATE: 106 BPM
GFR SERPLBLD CREATININE-BSD FMLA CKD-EPI: >90 ML/MIN/{1.73_M2}
GLUCOSE SERPL-MCNC: 100 MG/DL (ref 70–99)
HCT VFR BLD AUTO: 18.2 % (ref 40.5–52.5)
HGB BLD-MCNC: 6.4 G/DL (ref 13.5–17.5)
LDH SERPL L TO P-CCNC: 153 U/L (ref 100–190)
LEGIONELLA AG UR QL: NORMAL
MAGNESIUM SERPL-MCNC: 1.6 MG/DL (ref 1.8–2.4)
MCH RBC QN AUTO: 28.3 PG (ref 26–34)
MCHC RBC AUTO-ENTMCNC: 35.3 G/DL (ref 31–36)
MCV RBC AUTO: 80.2 FL (ref 80–100)
METAMYELOCYTES NFR BLD MANUAL: 2 %
MICROCYTES BLD QL SMEAR: ABNORMAL
PATH INTERP BLD-IMP: NO
PHOSPHATE SERPL-MCNC: 3.6 MG/DL (ref 2.5–4.9)
PLATELET # BLD AUTO: 14 K/UL (ref 135–450)
PLATELET BLD QL SMEAR: ABNORMAL
PMV BLD AUTO: 10.6 FL (ref 5–10.5)
POTASSIUM SERPL-SCNC: 3.7 MMOL/L (ref 3.5–5.1)
PROT SERPL-MCNC: 5.9 G/DL (ref 6.4–8.2)
RBC # BLD AUTO: 2.27 M/UL (ref 4.2–5.9)
S PNEUM AG UR QL: NORMAL
SCHISTOCYTES BLD QL SMEAR: ABNORMAL
SLIDE REVIEW: ABNORMAL
SMUDGE CELLS BLD QL SMEAR: PRESENT
SODIUM SERPL-SCNC: 137 MMOL/L (ref 136–145)
TOXIC GRANULES BLD QL SMEAR: PRESENT
VANCOMYCIN SERPL-MCNC: 14.5 UG/ML
WBC # BLD AUTO: 0.5 K/UL (ref 4–11)

## 2024-05-17 PROCEDURE — 6360000002 HC RX W HCPCS: Performed by: NURSE PRACTITIONER

## 2024-05-17 PROCEDURE — 87506 IADNA-DNA/RNA PROBE TQ 6-11: CPT

## 2024-05-17 PROCEDURE — 93010 ELECTROCARDIOGRAM REPORT: CPT | Performed by: INTERNAL MEDICINE

## 2024-05-17 PROCEDURE — 6360000002 HC RX W HCPCS: Performed by: STUDENT IN AN ORGANIZED HEALTH CARE EDUCATION/TRAINING PROGRAM

## 2024-05-17 PROCEDURE — 99222 1ST HOSP IP/OBS MODERATE 55: CPT | Performed by: INTERNAL MEDICINE

## 2024-05-17 PROCEDURE — 2580000003 HC RX 258: Performed by: STUDENT IN AN ORGANIZED HEALTH CARE EDUCATION/TRAINING PROGRAM

## 2024-05-17 PROCEDURE — 2060000000 HC ICU INTERMEDIATE R&B

## 2024-05-17 PROCEDURE — 36430 TRANSFUSION BLD/BLD COMPNT: CPT

## 2024-05-17 PROCEDURE — 84100 ASSAY OF PHOSPHORUS: CPT

## 2024-05-17 PROCEDURE — 83735 ASSAY OF MAGNESIUM: CPT

## 2024-05-17 PROCEDURE — 80202 ASSAY OF VANCOMYCIN: CPT

## 2024-05-17 PROCEDURE — 83615 LACTATE (LD) (LDH) ENZYME: CPT

## 2024-05-17 PROCEDURE — 6370000000 HC RX 637 (ALT 250 FOR IP): Performed by: NURSE PRACTITIONER

## 2024-05-17 PROCEDURE — 85027 COMPLETE CBC AUTOMATED: CPT

## 2024-05-17 PROCEDURE — 36592 COLLECT BLOOD FROM PICC: CPT

## 2024-05-17 PROCEDURE — 80076 HEPATIC FUNCTION PANEL: CPT

## 2024-05-17 PROCEDURE — 80048 BASIC METABOLIC PNL TOTAL CA: CPT

## 2024-05-17 PROCEDURE — 2580000003 HC RX 258: Performed by: NURSE PRACTITIONER

## 2024-05-17 PROCEDURE — 6370000000 HC RX 637 (ALT 250 FOR IP): Performed by: INTERNAL MEDICINE

## 2024-05-17 RX ORDER — OXYCODONE HYDROCHLORIDE 5 MG/1
5 TABLET ORAL EVERY 4 HOURS PRN
Status: DISCONTINUED | OUTPATIENT
Start: 2024-05-17 | End: 2024-06-05 | Stop reason: HOSPADM

## 2024-05-17 RX ADMIN — FLUCONAZOLE 200 MG: 200 TABLET ORAL at 08:51

## 2024-05-17 RX ADMIN — ACETAMINOPHEN 650 MG: 325 TABLET ORAL at 03:29

## 2024-05-17 RX ADMIN — ACETAMINOPHEN 650 MG: 325 TABLET ORAL at 20:17

## 2024-05-17 RX ADMIN — VANCOMYCIN HYDROCHLORIDE 125 MG: 125 CAPSULE ORAL at 08:51

## 2024-05-17 RX ADMIN — Medication: at 20:10

## 2024-05-17 RX ADMIN — SODIUM CHLORIDE 15 ML: 900 IRRIGANT IRRIGATION at 17:16

## 2024-05-17 RX ADMIN — VANCOMYCIN HYDROCHLORIDE 125 MG: 125 CAPSULE ORAL at 14:53

## 2024-05-17 RX ADMIN — CEFEPIME 2000 MG: 1 INJECTION, SOLUTION INTRAVENOUS at 11:52

## 2024-05-17 RX ADMIN — PANTOPRAZOLE SODIUM 40 MG: 40 TABLET, DELAYED RELEASE ORAL at 16:59

## 2024-05-17 RX ADMIN — SODIUM CHLORIDE: 9 INJECTION, SOLUTION INTRAVENOUS at 10:44

## 2024-05-17 RX ADMIN — PANTOPRAZOLE SODIUM 40 MG: 40 TABLET, DELAYED RELEASE ORAL at 06:23

## 2024-05-17 RX ADMIN — VANCOMYCIN HYDROCHLORIDE 1750 MG: 10 INJECTION, POWDER, LYOPHILIZED, FOR SOLUTION INTRAVENOUS at 08:44

## 2024-05-17 RX ADMIN — SODIUM CHLORIDE 15 ML: 900 IRRIGANT IRRIGATION at 10:45

## 2024-05-17 RX ADMIN — LEVETIRACETAM 1500 MG: 500 TABLET, FILM COATED ORAL at 20:10

## 2024-05-17 RX ADMIN — CEFEPIME 2000 MG: 1 INJECTION, SOLUTION INTRAVENOUS at 20:06

## 2024-05-17 RX ADMIN — CEFEPIME 2000 MG: 1 INJECTION, SOLUTION INTRAVENOUS at 03:29

## 2024-05-17 RX ADMIN — LEVETIRACETAM 1500 MG: 500 TABLET, FILM COATED ORAL at 08:51

## 2024-05-17 RX ADMIN — SODIUM CHLORIDE, PRESERVATIVE FREE 10 ML: 5 INJECTION INTRAVENOUS at 20:14

## 2024-05-17 RX ADMIN — VALACYCLOVIR HYDROCHLORIDE 500 MG: 500 TABLET, FILM COATED ORAL at 08:51

## 2024-05-17 RX ADMIN — VALACYCLOVIR HYDROCHLORIDE 500 MG: 500 TABLET, FILM COATED ORAL at 20:11

## 2024-05-17 RX ADMIN — VANCOMYCIN HYDROCHLORIDE 1750 MG: 10 INJECTION, POWDER, LYOPHILIZED, FOR SOLUTION INTRAVENOUS at 20:09

## 2024-05-17 RX ADMIN — SODIUM CHLORIDE 15 ML: 900 IRRIGANT IRRIGATION at 20:13

## 2024-05-17 RX ADMIN — Medication: at 08:53

## 2024-05-17 RX ADMIN — SODIUM CHLORIDE, PRESERVATIVE FREE 10 ML: 5 INJECTION INTRAVENOUS at 08:53

## 2024-05-17 ASSESSMENT — PAIN DESCRIPTION - FREQUENCY
FREQUENCY: INTERMITTENT
FREQUENCY: INTERMITTENT

## 2024-05-17 ASSESSMENT — PAIN DESCRIPTION - DESCRIPTORS
DESCRIPTORS: DISCOMFORT
DESCRIPTORS: DISCOMFORT

## 2024-05-17 ASSESSMENT — PAIN DESCRIPTION - PAIN TYPE
TYPE: ACUTE PAIN
TYPE: ACUTE PAIN

## 2024-05-17 ASSESSMENT — PAIN DESCRIPTION - LOCATION
LOCATION: ARM
LOCATION: ARM

## 2024-05-17 ASSESSMENT — PAIN DESCRIPTION - ORIENTATION
ORIENTATION: LEFT
ORIENTATION: LEFT

## 2024-05-17 ASSESSMENT — PAIN SCALES - GENERAL
PAINLEVEL_OUTOF10: 3
PAINLEVEL_OUTOF10: 0
PAINLEVEL_OUTOF10: 0
PAINLEVEL_OUTOF10: 1

## 2024-05-17 ASSESSMENT — PAIN DESCRIPTION - ONSET
ONSET: ON-GOING
ONSET: ON-GOING

## 2024-05-17 NOTE — CARE COORDINATION
Attended MD rounds.     Pt will return home with his father when able with no anticipated needs from writer.     SW will follow    JENNIFER Ortiz   for [facility] ([facility])  [email or web address] Mobile: [phone]

## 2024-05-17 NOTE — CONSULTS
PULMONARY MEDICINE - INITIAL CONSULT NOTE  Reason for Consult: NAN infiltrate   Requesting Physician: Eduardo Nguyen MD    SUBJECTIVE:     CHIEF COMPLAINT / HPI:    The patient is a 43 y.o. male with significant past medical history of oligodendroglioma, rectal adenocarcinoma and AML on Dacogen and an venetoclax and recurrent C. difficile who was admitted from [facility] clinic for evaluation of fatigue, tachycardia and malaise.  Pulmonary medicine consulted for abnormal CT.  Initial workup on admission revealed hyponatremia, pancytopenia (large leukopenia and anemia requiring blood transfusions, platelets 25 K)  CT of the chest showed left upper lobe consolidation with air bronchograms suggestive of pneumonia reason why pulmonary was consulted.  Repeat labs from today showed stable leukopenia but worsening anemia and thrombocytopenia, has received 1 unit of platelets and 2 PRBC so far.    Past Medical History:      Diagnosis Date    Brain cancer (HCC)     diagnosed in 2017    Seizure (HCC)       Past Surgical History:        Procedure Laterality Date    BRAIN SURGERY      Surgery in 2017 at [facility]    COLONOSCOPY N/A 3/22/2024    COLONOSCOPY POLYPECTOMY HOT SNARE/COLD BIOPSY performed by Marcus Uribe MD at [facility] ENDOSCOPY    SMALL INTESTINE SURGERY N/A 4/12/2024    ROBOTIC LOW ANTERIOR RESECTION WITH COLORECTAL ANASTOMOSIS performed by Rakesh Montano MD at [facility] OR    UPPER GASTROINTESTINAL ENDOSCOPY N/A 3/6/2024    ESOPHAGOGASTRODUODENOSCOPY performed by Wade Cantrell MD at [facility] ENDOSCOPY     Current Medications:     fluconazole  200 mg Oral Daily    Hydrocerin   Topical BID    levETIRAcetam  1,500 mg Oral BID    pantoprazole  40 mg Oral BID AC    valACYclovir  500 mg Oral BID    vancomycin  125 mg Oral BID    sodium chloride flush  5-40 mL IntraVENous 2 times per day    Saline Mouthwash  15 mL Swish & Spit 4x Daily AC & HS    cefepime  2,000 mg IntraVENous Q8H    vancomycin  1,750 mg IntraVENous Q12H

## 2024-05-18 ENCOUNTER — APPOINTMENT (OUTPATIENT)
Dept: GENERAL RADIOLOGY | Age: 44
DRG: 720 | End: 2024-05-18
Attending: STUDENT IN AN ORGANIZED HEALTH CARE EDUCATION/TRAINING PROGRAM
Payer: MEDICAID

## 2024-05-18 LAB
ALBUMIN SERPL-MCNC: 3.5 G/DL (ref 3.4–5)
ALP SERPL-CCNC: 68 U/L (ref 40–129)
ALT SERPL-CCNC: <5 U/L (ref 10–40)
ANION GAP SERPL CALCULATED.3IONS-SCNC: 9 MMOL/L (ref 3–16)
AST SERPL-CCNC: <5 U/L (ref 15–37)
BILIRUB DIRECT SERPL-MCNC: <0.2 MG/DL (ref 0–0.3)
BILIRUB INDIRECT SERPL-MCNC: ABNORMAL MG/DL (ref 0–1)
BILIRUB SERPL-MCNC: 0.6 MG/DL (ref 0–1)
BUN SERPL-MCNC: 6 MG/DL (ref 7–20)
CALCIUM SERPL-MCNC: 8.6 MG/DL (ref 8.3–10.6)
CHLORIDE SERPL-SCNC: 102 MMOL/L (ref 99–110)
CO2 SERPL-SCNC: 24 MMOL/L (ref 21–32)
CREAT SERPL-MCNC: <0.5 MG/DL (ref 0.9–1.3)
DEPRECATED RDW RBC AUTO: 13.9 % (ref 12.4–15.4)
GFR SERPLBLD CREATININE-BSD FMLA CKD-EPI: >90 ML/MIN/{1.73_M2}
GLUCOSE SERPL-MCNC: 112 MG/DL (ref 70–99)
HCT VFR BLD AUTO: 19.8 % (ref 40.5–52.5)
HGB BLD-MCNC: 7.1 G/DL (ref 13.5–17.5)
LDH SERPL L TO P-CCNC: 153 U/L (ref 100–190)
MCH RBC QN AUTO: 28.5 PG (ref 26–34)
MCHC RBC AUTO-ENTMCNC: 35.6 G/DL (ref 31–36)
MCV RBC AUTO: 80.1 FL (ref 80–100)
MRSA SPEC QL CULT: ABNORMAL
ORGANISM: ABNORMAL
PHOSPHATE SERPL-MCNC: 2.8 MG/DL (ref 2.5–4.9)
PLATELET # BLD AUTO: 11 K/UL (ref 135–450)
PMV BLD AUTO: 7.5 FL (ref 5–10.5)
POTASSIUM SERPL-SCNC: 3.7 MMOL/L (ref 3.5–5.1)
PROT SERPL-MCNC: 6.1 G/DL (ref 6.4–8.2)
RBC # BLD AUTO: 2.48 M/UL (ref 4.2–5.9)
SODIUM SERPL-SCNC: 135 MMOL/L (ref 136–145)
WBC # BLD AUTO: 0.4 K/UL (ref 4–11)

## 2024-05-18 PROCEDURE — 87040 BLOOD CULTURE FOR BACTERIA: CPT

## 2024-05-18 PROCEDURE — 85027 COMPLETE CBC AUTOMATED: CPT

## 2024-05-18 PROCEDURE — 6360000002 HC RX W HCPCS: Performed by: STUDENT IN AN ORGANIZED HEALTH CARE EDUCATION/TRAINING PROGRAM

## 2024-05-18 PROCEDURE — 87252 VIRUS INOCULATION TISSUE: CPT

## 2024-05-18 PROCEDURE — 87070 CULTURE OTHR SPECIMN AEROBIC: CPT

## 2024-05-18 PROCEDURE — 6360000002 HC RX W HCPCS: Performed by: NURSE PRACTITIONER

## 2024-05-18 PROCEDURE — 84100 ASSAY OF PHOSPHORUS: CPT

## 2024-05-18 PROCEDURE — 83615 LACTATE (LD) (LDH) ENZYME: CPT

## 2024-05-18 PROCEDURE — 87103 BLOOD FUNGUS CULTURE: CPT

## 2024-05-18 PROCEDURE — 87253 VIRUS INOCULATE TISSUE ADDL: CPT

## 2024-05-18 PROCEDURE — 2580000003 HC RX 258: Performed by: NURSE PRACTITIONER

## 2024-05-18 PROCEDURE — 87102 FUNGUS ISOLATION CULTURE: CPT

## 2024-05-18 PROCEDURE — 71045 X-RAY EXAM CHEST 1 VIEW: CPT

## 2024-05-18 PROCEDURE — 80076 HEPATIC FUNCTION PANEL: CPT

## 2024-05-18 PROCEDURE — 6370000000 HC RX 637 (ALT 250 FOR IP): Performed by: NURSE PRACTITIONER

## 2024-05-18 PROCEDURE — 2060000000 HC ICU INTERMEDIATE R&B

## 2024-05-18 PROCEDURE — 36592 COLLECT BLOOD FROM PICC: CPT

## 2024-05-18 PROCEDURE — 87205 SMEAR GRAM STAIN: CPT

## 2024-05-18 PROCEDURE — 6370000000 HC RX 637 (ALT 250 FOR IP): Performed by: INTERNAL MEDICINE

## 2024-05-18 PROCEDURE — 2580000003 HC RX 258: Performed by: STUDENT IN AN ORGANIZED HEALTH CARE EDUCATION/TRAINING PROGRAM

## 2024-05-18 PROCEDURE — 80048 BASIC METABOLIC PNL TOTAL CA: CPT

## 2024-05-18 RX ADMIN — SODIUM CHLORIDE: 9 INJECTION, SOLUTION INTRAVENOUS at 12:30

## 2024-05-18 RX ADMIN — VANCOMYCIN HYDROCHLORIDE 125 MG: 125 CAPSULE ORAL at 14:12

## 2024-05-18 RX ADMIN — VANCOMYCIN HYDROCHLORIDE 1750 MG: 10 INJECTION, POWDER, LYOPHILIZED, FOR SOLUTION INTRAVENOUS at 09:41

## 2024-05-18 RX ADMIN — VANCOMYCIN HYDROCHLORIDE 1750 MG: 10 INJECTION, POWDER, LYOPHILIZED, FOR SOLUTION INTRAVENOUS at 20:45

## 2024-05-18 RX ADMIN — SODIUM CHLORIDE: 9 INJECTION, SOLUTION INTRAVENOUS at 20:39

## 2024-05-18 RX ADMIN — ACETAMINOPHEN 650 MG: 325 TABLET ORAL at 20:45

## 2024-05-18 RX ADMIN — SODIUM CHLORIDE, PRESERVATIVE FREE 10 ML: 5 INJECTION INTRAVENOUS at 09:46

## 2024-05-18 RX ADMIN — VALACYCLOVIR HYDROCHLORIDE 500 MG: 500 TABLET, FILM COATED ORAL at 20:45

## 2024-05-18 RX ADMIN — CEFEPIME 2000 MG: 1 INJECTION, SOLUTION INTRAVENOUS at 20:43

## 2024-05-18 RX ADMIN — FLUCONAZOLE 200 MG: 200 TABLET ORAL at 09:46

## 2024-05-18 RX ADMIN — SODIUM CHLORIDE: 9 INJECTION, SOLUTION INTRAVENOUS at 00:26

## 2024-05-18 RX ADMIN — PANTOPRAZOLE SODIUM 40 MG: 40 TABLET, DELAYED RELEASE ORAL at 06:48

## 2024-05-18 RX ADMIN — CEFEPIME 2000 MG: 1 INJECTION, SOLUTION INTRAVENOUS at 03:51

## 2024-05-18 RX ADMIN — LEVETIRACETAM 1500 MG: 500 TABLET, FILM COATED ORAL at 09:46

## 2024-05-18 RX ADMIN — Medication: at 09:47

## 2024-05-18 RX ADMIN — SODIUM CHLORIDE 15 ML: 900 IRRIGANT IRRIGATION at 21:31

## 2024-05-18 RX ADMIN — VANCOMYCIN HYDROCHLORIDE 125 MG: 125 CAPSULE ORAL at 09:46

## 2024-05-18 RX ADMIN — LEVETIRACETAM 1500 MG: 500 TABLET, FILM COATED ORAL at 20:45

## 2024-05-18 RX ADMIN — PANTOPRAZOLE SODIUM 40 MG: 40 TABLET, DELAYED RELEASE ORAL at 16:10

## 2024-05-18 RX ADMIN — VALACYCLOVIR HYDROCHLORIDE 500 MG: 500 TABLET, FILM COATED ORAL at 09:46

## 2024-05-18 RX ADMIN — SODIUM CHLORIDE 15 ML: 900 IRRIGANT IRRIGATION at 12:31

## 2024-05-18 RX ADMIN — Medication: at 21:30

## 2024-05-18 RX ADMIN — CEFEPIME 2000 MG: 1 INJECTION, SOLUTION INTRAVENOUS at 12:29

## 2024-05-18 RX ADMIN — SODIUM CHLORIDE 15 ML: 900 IRRIGANT IRRIGATION at 16:12

## 2024-05-18 RX ADMIN — SODIUM CHLORIDE, PRESERVATIVE FREE 10 ML: 5 INJECTION INTRAVENOUS at 21:31

## 2024-05-19 LAB
(1,3)-BETA-D-GLUCAN (FUNGITELL) INTERPRETATION: NEGATIVE
1,3 BETA GLUCAN SER-MCNC: 44 PG/ML
ABO + RH BLD: NORMAL
ALBUMIN SERPL-MCNC: 3.2 G/DL (ref 3.4–5)
ALP SERPL-CCNC: 61 U/L (ref 40–129)
ALT SERPL-CCNC: <5 U/L (ref 10–40)
ANION GAP SERPL CALCULATED.3IONS-SCNC: 11 MMOL/L (ref 3–16)
AST SERPL-CCNC: <5 U/L (ref 15–37)
BILIRUB DIRECT SERPL-MCNC: <0.2 MG/DL (ref 0–0.3)
BILIRUB INDIRECT SERPL-MCNC: ABNORMAL MG/DL (ref 0–1)
BILIRUB SERPL-MCNC: 0.8 MG/DL (ref 0–1)
BLD GP AB SCN SERPL QL: NORMAL
BLOOD BANK DISPENSE STATUS: NORMAL
BLOOD BANK PRODUCT CODE: NORMAL
BPU ID: NORMAL
BUN SERPL-MCNC: 5 MG/DL (ref 7–20)
CALCIUM SERPL-MCNC: 8.7 MG/DL (ref 8.3–10.6)
CHLORIDE SERPL-SCNC: 103 MMOL/L (ref 99–110)
CO2 SERPL-SCNC: 23 MMOL/L (ref 21–32)
CREAT SERPL-MCNC: 0.6 MG/DL (ref 0.9–1.3)
DEPRECATED RDW RBC AUTO: 13.4 % (ref 12.4–15.4)
DESCRIPTION BLOOD BANK: NORMAL
GFR SERPLBLD CREATININE-BSD FMLA CKD-EPI: >90 ML/MIN/{1.73_M2}
GI PATHOGENS PNL STL NAA+PROBE: NORMAL
GLUCOSE SERPL-MCNC: 92 MG/DL (ref 70–99)
HCT VFR BLD AUTO: 17.4 % (ref 40.5–52.5)
HGB BLD-MCNC: 6.3 G/DL (ref 13.5–17.5)
LDH SERPL L TO P-CCNC: 141 U/L (ref 100–190)
MCH RBC QN AUTO: 28.8 PG (ref 26–34)
MCHC RBC AUTO-ENTMCNC: 36 G/DL (ref 31–36)
MCV RBC AUTO: 80.2 FL (ref 80–100)
PHOSPHATE SERPL-MCNC: 3.3 MG/DL (ref 2.5–4.9)
PLATELET # BLD AUTO: 6 K/UL (ref 135–450)
PMV BLD AUTO: 8.9 FL (ref 5–10.5)
POTASSIUM SERPL-SCNC: 3.4 MMOL/L (ref 3.5–5.1)
PROT SERPL-MCNC: 5.8 G/DL (ref 6.4–8.2)
RBC # BLD AUTO: 2.18 M/UL (ref 4.2–5.9)
SODIUM SERPL-SCNC: 137 MMOL/L (ref 136–145)
WBC # BLD AUTO: 0.5 K/UL (ref 4–11)

## 2024-05-19 PROCEDURE — 36430 TRANSFUSION BLD/BLD COMPNT: CPT

## 2024-05-19 PROCEDURE — 80048 BASIC METABOLIC PNL TOTAL CA: CPT

## 2024-05-19 PROCEDURE — 36592 COLLECT BLOOD FROM PICC: CPT

## 2024-05-19 PROCEDURE — P9036 PLATELET PHERESIS IRRADIATED: HCPCS

## 2024-05-19 PROCEDURE — 2580000003 HC RX 258: Performed by: STUDENT IN AN ORGANIZED HEALTH CARE EDUCATION/TRAINING PROGRAM

## 2024-05-19 PROCEDURE — 2580000003 HC RX 258: Performed by: NURSE PRACTITIONER

## 2024-05-19 PROCEDURE — P9040 RBC LEUKOREDUCED IRRADIATED: HCPCS

## 2024-05-19 PROCEDURE — 6370000000 HC RX 637 (ALT 250 FOR IP): Performed by: NURSE PRACTITIONER

## 2024-05-19 PROCEDURE — 6360000002 HC RX W HCPCS: Performed by: NURSE PRACTITIONER

## 2024-05-19 PROCEDURE — 86850 RBC ANTIBODY SCREEN: CPT

## 2024-05-19 PROCEDURE — 80076 HEPATIC FUNCTION PANEL: CPT

## 2024-05-19 PROCEDURE — 86923 COMPATIBILITY TEST ELECTRIC: CPT

## 2024-05-19 PROCEDURE — 6360000002 HC RX W HCPCS: Performed by: STUDENT IN AN ORGANIZED HEALTH CARE EDUCATION/TRAINING PROGRAM

## 2024-05-19 PROCEDURE — 83615 LACTATE (LD) (LDH) ENZYME: CPT

## 2024-05-19 PROCEDURE — 86900 BLOOD TYPING SEROLOGIC ABO: CPT

## 2024-05-19 PROCEDURE — 86901 BLOOD TYPING SEROLOGIC RH(D): CPT

## 2024-05-19 PROCEDURE — 2060000000 HC ICU INTERMEDIATE R&B

## 2024-05-19 PROCEDURE — 85027 COMPLETE CBC AUTOMATED: CPT

## 2024-05-19 PROCEDURE — 87070 CULTURE OTHR SPECIMN AEROBIC: CPT

## 2024-05-19 PROCEDURE — 30233R1 TRANSFUSION OF NONAUTOLOGOUS PLATELETS INTO PERIPHERAL VEIN, PERCUTANEOUS APPROACH: ICD-10-PCS | Performed by: STUDENT IN AN ORGANIZED HEALTH CARE EDUCATION/TRAINING PROGRAM

## 2024-05-19 PROCEDURE — 84100 ASSAY OF PHOSPHORUS: CPT

## 2024-05-19 RX ORDER — ACETAMINOPHEN 325 MG/1
650 TABLET ORAL EVERY 4 HOURS PRN
Status: DISCONTINUED | OUTPATIENT
Start: 2024-05-19 | End: 2024-06-05 | Stop reason: HOSPADM

## 2024-05-19 RX ORDER — SIMETHICONE 80 MG
80 TABLET,CHEWABLE ORAL EVERY 6 HOURS PRN
Status: DISCONTINUED | OUTPATIENT
Start: 2024-05-19 | End: 2024-06-05 | Stop reason: HOSPADM

## 2024-05-19 RX ORDER — POTASSIUM CHLORIDE 7.45 MG/ML
10 INJECTION INTRAVENOUS PRN
Status: DISCONTINUED | OUTPATIENT
Start: 2024-05-19 | End: 2024-06-05 | Stop reason: HOSPADM

## 2024-05-19 RX ORDER — DICYCLOMINE HCL 20 MG
20 TABLET ORAL 4 TIMES DAILY PRN
Status: DISCONTINUED | OUTPATIENT
Start: 2024-05-19 | End: 2024-06-05 | Stop reason: HOSPADM

## 2024-05-19 RX ORDER — POTASSIUM CHLORIDE 20 MEQ/1
40 TABLET, EXTENDED RELEASE ORAL PRN
Status: DISCONTINUED | OUTPATIENT
Start: 2024-05-19 | End: 2024-06-05 | Stop reason: HOSPADM

## 2024-05-19 RX ADMIN — LEVETIRACETAM 1500 MG: 500 TABLET, FILM COATED ORAL at 09:27

## 2024-05-19 RX ADMIN — POTASSIUM CHLORIDE 20 MEQ: 400 INJECTION, SOLUTION INTRAVENOUS at 09:33

## 2024-05-19 RX ADMIN — CEFEPIME 2000 MG: 1 INJECTION, SOLUTION INTRAVENOUS at 03:25

## 2024-05-19 RX ADMIN — POTASSIUM CHLORIDE 20 MEQ: 400 INJECTION, SOLUTION INTRAVENOUS at 05:50

## 2024-05-19 RX ADMIN — SODIUM CHLORIDE 15 ML: 900 IRRIGANT IRRIGATION at 21:23

## 2024-05-19 RX ADMIN — SODIUM CHLORIDE 15 ML: 900 IRRIGANT IRRIGATION at 16:32

## 2024-05-19 RX ADMIN — Medication: at 21:23

## 2024-05-19 RX ADMIN — MUPIROCIN: 20 OINTMENT TOPICAL at 11:15

## 2024-05-19 RX ADMIN — SODIUM CHLORIDE, PRESERVATIVE FREE 10 ML: 5 INJECTION INTRAVENOUS at 21:23

## 2024-05-19 RX ADMIN — POTASSIUM CHLORIDE 20 MEQ: 400 INJECTION, SOLUTION INTRAVENOUS at 07:56

## 2024-05-19 RX ADMIN — ACETAMINOPHEN 650 MG: 325 TABLET ORAL at 20:22

## 2024-05-19 RX ADMIN — MUPIROCIN: 20 OINTMENT TOPICAL at 21:23

## 2024-05-19 RX ADMIN — PANTOPRAZOLE SODIUM 40 MG: 40 TABLET, DELAYED RELEASE ORAL at 06:24

## 2024-05-19 RX ADMIN — ACETAMINOPHEN 650 MG: 325 TABLET ORAL at 12:46

## 2024-05-19 RX ADMIN — CEFEPIME 2000 MG: 1 INJECTION, SOLUTION INTRAVENOUS at 22:17

## 2024-05-19 RX ADMIN — VANCOMYCIN HYDROCHLORIDE 1750 MG: 10 INJECTION, POWDER, LYOPHILIZED, FOR SOLUTION INTRAVENOUS at 07:56

## 2024-05-19 RX ADMIN — SODIUM CHLORIDE, PRESERVATIVE FREE 10 ML: 5 INJECTION INTRAVENOUS at 09:28

## 2024-05-19 RX ADMIN — PANTOPRAZOLE SODIUM 40 MG: 40 TABLET, DELAYED RELEASE ORAL at 16:32

## 2024-05-19 RX ADMIN — VANCOMYCIN HYDROCHLORIDE 125 MG: 125 CAPSULE ORAL at 14:55

## 2024-05-19 RX ADMIN — CEFEPIME 2000 MG: 1 INJECTION, SOLUTION INTRAVENOUS at 12:45

## 2024-05-19 RX ADMIN — VALACYCLOVIR HYDROCHLORIDE 500 MG: 500 TABLET, FILM COATED ORAL at 21:23

## 2024-05-19 RX ADMIN — Medication: at 09:27

## 2024-05-19 RX ADMIN — FLUCONAZOLE 200 MG: 200 TABLET ORAL at 09:26

## 2024-05-19 RX ADMIN — VANCOMYCIN HYDROCHLORIDE 125 MG: 125 CAPSULE ORAL at 09:31

## 2024-05-19 RX ADMIN — SODIUM CHLORIDE: 9 INJECTION, SOLUTION INTRAVENOUS at 12:44

## 2024-05-19 RX ADMIN — VALACYCLOVIR HYDROCHLORIDE 500 MG: 500 TABLET, FILM COATED ORAL at 09:27

## 2024-05-19 RX ADMIN — VANCOMYCIN HYDROCHLORIDE 1750 MG: 10 INJECTION, POWDER, LYOPHILIZED, FOR SOLUTION INTRAVENOUS at 20:07

## 2024-05-19 RX ADMIN — LEVETIRACETAM 1500 MG: 500 TABLET, FILM COATED ORAL at 21:23

## 2024-05-20 ENCOUNTER — APPOINTMENT (OUTPATIENT)
Dept: GENERAL RADIOLOGY | Age: 44
DRG: 720 | End: 2024-05-20
Attending: STUDENT IN AN ORGANIZED HEALTH CARE EDUCATION/TRAINING PROGRAM
Payer: MEDICAID

## 2024-05-20 LAB
ALBUMIN SERPL-MCNC: 3.3 G/DL (ref 3.4–5)
ALP SERPL-CCNC: 64 U/L (ref 40–129)
ALT SERPL-CCNC: <5 U/L (ref 10–40)
ANION GAP SERPL CALCULATED.3IONS-SCNC: 11 MMOL/L (ref 3–16)
APTT BLD: 44.5 SEC (ref 22.1–36.4)
AST SERPL-CCNC: <5 U/L (ref 15–37)
BACTERIA THROAT AEROBE CULT: NORMAL
BILIRUB DIRECT SERPL-MCNC: <0.2 MG/DL (ref 0–0.3)
BILIRUB INDIRECT SERPL-MCNC: ABNORMAL MG/DL (ref 0–1)
BILIRUB SERPL-MCNC: 0.8 MG/DL (ref 0–1)
BILIRUB UR QL STRIP.AUTO: NEGATIVE
BUN SERPL-MCNC: 6 MG/DL (ref 7–20)
CALCIUM SERPL-MCNC: 8.7 MG/DL (ref 8.3–10.6)
CHLORIDE SERPL-SCNC: 101 MMOL/L (ref 99–110)
CLARITY UR: CLEAR
CO2 SERPL-SCNC: 24 MMOL/L (ref 21–32)
COLOR UR: YELLOW
CREAT SERPL-MCNC: 0.6 MG/DL (ref 0.9–1.3)
DEPRECATED RDW RBC AUTO: 13.7 % (ref 12.4–15.4)
GFR SERPLBLD CREATININE-BSD FMLA CKD-EPI: >90 ML/MIN/{1.73_M2}
GLUCOSE SERPL-MCNC: 98 MG/DL (ref 70–99)
GLUCOSE UR STRIP.AUTO-MCNC: NEGATIVE MG/DL
HCT VFR BLD AUTO: 20.4 % (ref 40.5–52.5)
HGB BLD-MCNC: 7.2 G/DL (ref 13.5–17.5)
HGB UR QL STRIP.AUTO: NEGATIVE
INR PPP: 1.27 (ref 0.85–1.15)
KETONES UR STRIP.AUTO-MCNC: 15 MG/DL
LDH SERPL L TO P-CCNC: 129 U/L (ref 100–190)
LEUKOCYTE ESTERASE UR QL STRIP.AUTO: NEGATIVE
MAGNESIUM SERPL-MCNC: 1.6 MG/DL (ref 1.8–2.4)
MCH RBC QN AUTO: 28.5 PG (ref 26–34)
MCHC RBC AUTO-ENTMCNC: 35.2 G/DL (ref 31–36)
MCV RBC AUTO: 80.9 FL (ref 80–100)
NITRITE UR QL STRIP.AUTO: NEGATIVE
PH UR STRIP.AUTO: 6 [PH] (ref 5–8)
PHOSPHATE SERPL-MCNC: 2.9 MG/DL (ref 2.5–4.9)
PLATELET # BLD AUTO: 25 K/UL (ref 135–450)
PMV BLD AUTO: 9 FL (ref 5–10.5)
POTASSIUM SERPL-SCNC: 3.6 MMOL/L (ref 3.5–5.1)
PROT SERPL-MCNC: 6 G/DL (ref 6.4–8.2)
PROT UR STRIP.AUTO-MCNC: NEGATIVE MG/DL
PROTHROMBIN TIME: 16.1 SEC (ref 11.9–14.9)
RBC # BLD AUTO: 2.52 M/UL (ref 4.2–5.9)
SODIUM SERPL-SCNC: 136 MMOL/L (ref 136–145)
SP GR UR STRIP.AUTO: 1.02 (ref 1–1.03)
UA DIPSTICK W REFLEX MICRO PNL UR: ABNORMAL
URN SPEC COLLECT METH UR: ABNORMAL
UROBILINOGEN UR STRIP-ACNC: 0.2 E.U./DL
VANCOMYCIN SERPL-MCNC: 12.6 UG/ML
WBC # BLD AUTO: 0.4 K/UL (ref 4–11)

## 2024-05-20 PROCEDURE — 85610 PROTHROMBIN TIME: CPT

## 2024-05-20 PROCEDURE — 6370000000 HC RX 637 (ALT 250 FOR IP): Performed by: NURSE PRACTITIONER

## 2024-05-20 PROCEDURE — 81003 URINALYSIS AUTO W/O SCOPE: CPT

## 2024-05-20 PROCEDURE — 80076 HEPATIC FUNCTION PANEL: CPT

## 2024-05-20 PROCEDURE — 2580000003 HC RX 258: Performed by: STUDENT IN AN ORGANIZED HEALTH CARE EDUCATION/TRAINING PROGRAM

## 2024-05-20 PROCEDURE — 85027 COMPLETE CBC AUTOMATED: CPT

## 2024-05-20 PROCEDURE — 6360000002 HC RX W HCPCS: Performed by: STUDENT IN AN ORGANIZED HEALTH CARE EDUCATION/TRAINING PROGRAM

## 2024-05-20 PROCEDURE — 83735 ASSAY OF MAGNESIUM: CPT

## 2024-05-20 PROCEDURE — 2060000000 HC ICU INTERMEDIATE R&B

## 2024-05-20 PROCEDURE — 87086 URINE CULTURE/COLONY COUNT: CPT

## 2024-05-20 PROCEDURE — 2580000003 HC RX 258: Performed by: NURSE PRACTITIONER

## 2024-05-20 PROCEDURE — 36415 COLL VENOUS BLD VENIPUNCTURE: CPT

## 2024-05-20 PROCEDURE — 84100 ASSAY OF PHOSPHORUS: CPT

## 2024-05-20 PROCEDURE — 83615 LACTATE (LD) (LDH) ENZYME: CPT

## 2024-05-20 PROCEDURE — 71045 X-RAY EXAM CHEST 1 VIEW: CPT

## 2024-05-20 PROCEDURE — 6360000002 HC RX W HCPCS: Performed by: NURSE PRACTITIONER

## 2024-05-20 PROCEDURE — 85730 THROMBOPLASTIN TIME PARTIAL: CPT

## 2024-05-20 PROCEDURE — 6370000000 HC RX 637 (ALT 250 FOR IP): Performed by: INTERNAL MEDICINE

## 2024-05-20 PROCEDURE — 80202 ASSAY OF VANCOMYCIN: CPT

## 2024-05-20 PROCEDURE — 80048 BASIC METABOLIC PNL TOTAL CA: CPT

## 2024-05-20 RX ORDER — MEROPENEM AND SODIUM CHLORIDE 1 G/50ML
1000 INJECTION, SOLUTION INTRAVENOUS EVERY 8 HOURS
Status: DISCONTINUED | OUTPATIENT
Start: 2024-05-20 | End: 2024-06-03

## 2024-05-20 RX ORDER — MEROPENEM AND SODIUM CHLORIDE 1 G/50ML
1000 INJECTION, SOLUTION INTRAVENOUS ONCE
Status: COMPLETED | OUTPATIENT
Start: 2024-05-20 | End: 2024-05-20

## 2024-05-20 RX ADMIN — SODIUM CHLORIDE 15 ML: 900 IRRIGANT IRRIGATION at 08:09

## 2024-05-20 RX ADMIN — VANCOMYCIN HYDROCHLORIDE 125 MG: 125 CAPSULE ORAL at 08:00

## 2024-05-20 RX ADMIN — LOPERAMIDE HYDROCHLORIDE 2 MG: 2 CAPSULE ORAL at 09:23

## 2024-05-20 RX ADMIN — CEFEPIME 2000 MG: 1 INJECTION, SOLUTION INTRAVENOUS at 06:40

## 2024-05-20 RX ADMIN — FLUCONAZOLE 200 MG: 200 TABLET ORAL at 08:08

## 2024-05-20 RX ADMIN — SODIUM CHLORIDE, PRESERVATIVE FREE 10 ML: 5 INJECTION INTRAVENOUS at 08:09

## 2024-05-20 RX ADMIN — VALACYCLOVIR HYDROCHLORIDE 500 MG: 500 TABLET, FILM COATED ORAL at 08:08

## 2024-05-20 RX ADMIN — SODIUM CHLORIDE: 9 INJECTION, SOLUTION INTRAVENOUS at 02:10

## 2024-05-20 RX ADMIN — SODIUM CHLORIDE 15 ML: 900 IRRIGANT IRRIGATION at 12:00

## 2024-05-20 RX ADMIN — MEROPENEM AND SODIUM CHLORIDE 1000 MG: 1 INJECTION, SOLUTION INTRAVENOUS at 11:59

## 2024-05-20 RX ADMIN — DICYCLOMINE HYDROCHLORIDE 20 MG: 20 TABLET ORAL at 09:23

## 2024-05-20 RX ADMIN — Medication: at 20:24

## 2024-05-20 RX ADMIN — MUPIROCIN: 20 OINTMENT TOPICAL at 08:11

## 2024-05-20 RX ADMIN — PANTOPRAZOLE SODIUM 40 MG: 40 TABLET, DELAYED RELEASE ORAL at 07:30

## 2024-05-20 RX ADMIN — MUPIROCIN: 20 OINTMENT TOPICAL at 20:24

## 2024-05-20 RX ADMIN — VALACYCLOVIR HYDROCHLORIDE 500 MG: 500 TABLET, FILM COATED ORAL at 20:23

## 2024-05-20 RX ADMIN — MEROPENEM AND SODIUM CHLORIDE 1000 MG: 1 INJECTION, SOLUTION INTRAVENOUS at 19:04

## 2024-05-20 RX ADMIN — VANCOMYCIN HYDROCHLORIDE 1750 MG: 10 INJECTION, POWDER, LYOPHILIZED, FOR SOLUTION INTRAVENOUS at 20:26

## 2024-05-20 RX ADMIN — LEVETIRACETAM 1500 MG: 500 TABLET, FILM COATED ORAL at 08:08

## 2024-05-20 RX ADMIN — Medication: at 08:10

## 2024-05-20 RX ADMIN — ACETAMINOPHEN 650 MG: 325 TABLET ORAL at 08:12

## 2024-05-20 RX ADMIN — SODIUM CHLORIDE, PRESERVATIVE FREE 10 ML: 5 INJECTION INTRAVENOUS at 20:25

## 2024-05-20 RX ADMIN — ACETAMINOPHEN 650 MG: 325 TABLET ORAL at 15:57

## 2024-05-20 RX ADMIN — PANTOPRAZOLE SODIUM 40 MG: 40 TABLET, DELAYED RELEASE ORAL at 15:43

## 2024-05-20 RX ADMIN — VANCOMYCIN HYDROCHLORIDE 125 MG: 125 CAPSULE ORAL at 13:30

## 2024-05-20 RX ADMIN — VANCOMYCIN HYDROCHLORIDE 1750 MG: 10 INJECTION, POWDER, LYOPHILIZED, FOR SOLUTION INTRAVENOUS at 07:59

## 2024-05-20 RX ADMIN — SODIUM CHLORIDE 15 ML: 900 IRRIGANT IRRIGATION at 19:04

## 2024-05-20 RX ADMIN — LEVETIRACETAM 1500 MG: 500 TABLET, FILM COATED ORAL at 20:23

## 2024-05-20 ASSESSMENT — PAIN SCALES - GENERAL
PAINLEVEL_OUTOF10: 0

## 2024-05-20 NOTE — ADT AUTH CERT
negative  - CXray 5/15/24:  Left upper lobe airspace disease, pneumonia or neoplasm   - CT chest 5/16/24:  Airspace disease in the left upper lobe with air bronchograms consistent with pneumonia   - Respiratory panel neg 5/15/24: negative   - Pulm consulted- repeat CT chest in 6-8 weeks to evaluate for resolution- signed off  - MRSA swab 5/15/24:  Positive  -BCX 5/18/24: NGTD    -CXR 5/20/24: Left upper lobe pneumonia   -PICC pulled 5/19/24.  -Discuss with Pulm about consideration for Bronch due to recurrent febrile spikes with NAN consolidation. Asp index pending.     Current Tx:  - Continue Cefepime Day +6 (5/15/24)-> Switch to Merrem today.   - Continue Vancomycin Day +6 (5/15/24)  - Diflucan, Valtrex  - Vanc 125mg PO BID (5/6/24-current)  ppx for re-current c.diff.        3. Heme:  Pancytopenia r/t AML/MDS and recent chemotherapy.  Monitor Hgb   - Transfuse for Hgb < 7 and Platelets < 10 K   - PRBC transfusion today       4. Metabolic: Electrolytes and renal fxn stable  - Cont NS @ 75 mL/hr  - Encourage PO intake     5. GI / Nutrition:   - Continue protonix 40mg BID   Rectal Adenocarcinoma: Stage T1N0. Mismatch repair gene expression by IHC-intact with low probability for MSI-H.   - EGD ([facility]) reportedly unremarkable  - Colonoscopy 3/22/24: rectal lesion: Adenocarcinoma, at least adenocarcinoma in situ,  Bleeding with hypotension 3/23/24  - CTA abd/pelvis 3/23/24: No acute abdominal or pelvic abnormality clearly identified. No evidence for acute bowel inflammation identified.  MRI pelvis showed 2.7 cm rectal mass with no involvement of anal sphincter, mesorectal fascia or any mesorectal lymph nodes.  Pelvic MRI 3/25/24: Radiological Stage: T 1/2, N 0  MRF: Clear. Sphincter involvement: No. Suspicious extra mesorectal nodes: None. EMVI: Absent  CT chest 3/25/24: No acute pathology. CT A/P- no mets               - CEA: 6.5  - s/p rectal mass resection 4/12/24. Path staging T1N0M0.  - Refer to Dr. Russo

## 2024-05-20 NOTE — CARE COORDINATION
11:56am: Spoke with CNP who states plan to start IV ABX.     Pt will return home when able with his father.     SW will continue to follow.    Sharla COLON, JENNIFER   for [facility] ([facility])  [employer] Mobile: [phone]

## 2024-05-21 ENCOUNTER — APPOINTMENT (OUTPATIENT)
Dept: GENERAL RADIOLOGY | Age: 44
DRG: 720 | End: 2024-05-21
Attending: STUDENT IN AN ORGANIZED HEALTH CARE EDUCATION/TRAINING PROGRAM
Payer: MEDICAID

## 2024-05-21 LAB
ALBUMIN SERPL-MCNC: 3.2 G/DL (ref 3.4–5)
ALP SERPL-CCNC: 65 U/L (ref 40–129)
ALT SERPL-CCNC: <5 U/L (ref 10–40)
ANION GAP SERPL CALCULATED.3IONS-SCNC: 10 MMOL/L (ref 3–16)
AST SERPL-CCNC: <5 U/L (ref 15–37)
BACTERIA UR CULT: NORMAL
BILIRUB DIRECT SERPL-MCNC: <0.2 MG/DL (ref 0–0.3)
BILIRUB INDIRECT SERPL-MCNC: ABNORMAL MG/DL (ref 0–1)
BILIRUB SERPL-MCNC: 0.8 MG/DL (ref 0–1)
BUN SERPL-MCNC: 6 MG/DL (ref 7–20)
CALCIUM SERPL-MCNC: 8.5 MG/DL (ref 8.3–10.6)
CHLORIDE SERPL-SCNC: 102 MMOL/L (ref 99–110)
CO2 SERPL-SCNC: 23 MMOL/L (ref 21–32)
CREAT SERPL-MCNC: 0.7 MG/DL (ref 0.9–1.3)
DEPRECATED RDW RBC AUTO: 13.4 % (ref 12.4–15.4)
GFR SERPLBLD CREATININE-BSD FMLA CKD-EPI: >90 ML/MIN/{1.73_M2}
GLUCOSE SERPL-MCNC: 99 MG/DL (ref 70–99)
HCT VFR BLD AUTO: 20.9 % (ref 40.5–52.5)
HGB BLD-MCNC: 7.3 G/DL (ref 13.5–17.5)
LDH SERPL L TO P-CCNC: 120 U/L (ref 100–190)
MCH RBC QN AUTO: 28.7 PG (ref 26–34)
MCHC RBC AUTO-ENTMCNC: 35.2 G/DL (ref 31–36)
MCV RBC AUTO: 81.5 FL (ref 80–100)
PHOSPHATE SERPL-MCNC: 3.1 MG/DL (ref 2.5–4.9)
PLATELET # BLD AUTO: 17 K/UL (ref 135–450)
PMV BLD AUTO: 10.8 FL (ref 5–10.5)
POTASSIUM SERPL-SCNC: 3.3 MMOL/L (ref 3.5–5.1)
PROT SERPL-MCNC: 6 G/DL (ref 6.4–8.2)
RBC # BLD AUTO: 2.56 M/UL (ref 4.2–5.9)
SODIUM SERPL-SCNC: 135 MMOL/L (ref 136–145)
WBC # BLD AUTO: 0.5 K/UL (ref 4–11)

## 2024-05-21 PROCEDURE — 87070 CULTURE OTHR SPECIMN AEROBIC: CPT

## 2024-05-21 PROCEDURE — 6370000000 HC RX 637 (ALT 250 FOR IP): Performed by: NURSE PRACTITIONER

## 2024-05-21 PROCEDURE — 80076 HEPATIC FUNCTION PANEL: CPT

## 2024-05-21 PROCEDURE — 87385 HISTOPLASMA CAPSUL AG IA: CPT

## 2024-05-21 PROCEDURE — 0202U NFCT DS 22 TRGT SARS-COV-2: CPT

## 2024-05-21 PROCEDURE — 80048 BASIC METABOLIC PNL TOTAL CA: CPT

## 2024-05-21 PROCEDURE — 2060000000 HC ICU INTERMEDIATE R&B

## 2024-05-21 PROCEDURE — 71045 X-RAY EXAM CHEST 1 VIEW: CPT

## 2024-05-21 PROCEDURE — 83615 LACTATE (LD) (LDH) ENZYME: CPT

## 2024-05-21 PROCEDURE — 2580000003 HC RX 258: Performed by: STUDENT IN AN ORGANIZED HEALTH CARE EDUCATION/TRAINING PROGRAM

## 2024-05-21 PROCEDURE — 99223 1ST HOSP IP/OBS HIGH 75: CPT | Performed by: INTERNAL MEDICINE

## 2024-05-21 PROCEDURE — 84100 ASSAY OF PHOSPHORUS: CPT

## 2024-05-21 PROCEDURE — 87205 SMEAR GRAM STAIN: CPT

## 2024-05-21 PROCEDURE — 87040 BLOOD CULTURE FOR BACTERIA: CPT

## 2024-05-21 PROCEDURE — 6360000002 HC RX W HCPCS: Performed by: NURSE PRACTITIONER

## 2024-05-21 PROCEDURE — 36415 COLL VENOUS BLD VENIPUNCTURE: CPT

## 2024-05-21 PROCEDURE — 2580000003 HC RX 258: Performed by: NURSE PRACTITIONER

## 2024-05-21 PROCEDURE — 6360000002 HC RX W HCPCS: Performed by: STUDENT IN AN ORGANIZED HEALTH CARE EDUCATION/TRAINING PROGRAM

## 2024-05-21 PROCEDURE — 87102 FUNGUS ISOLATION CULTURE: CPT

## 2024-05-21 PROCEDURE — 87103 BLOOD FUNGUS CULTURE: CPT

## 2024-05-21 PROCEDURE — 85027 COMPLETE CBC AUTOMATED: CPT

## 2024-05-21 RX ORDER — POTASSIUM CHLORIDE 20 MEQ/1
20 TABLET, EXTENDED RELEASE ORAL
Status: DISCONTINUED | OUTPATIENT
Start: 2024-05-22 | End: 2024-06-05 | Stop reason: HOSPADM

## 2024-05-21 RX ADMIN — ACETAMINOPHEN 650 MG: 325 TABLET ORAL at 20:48

## 2024-05-21 RX ADMIN — LEVETIRACETAM 1500 MG: 500 TABLET, FILM COATED ORAL at 20:40

## 2024-05-21 RX ADMIN — MUPIROCIN: 20 OINTMENT TOPICAL at 08:33

## 2024-05-21 RX ADMIN — ACETAMINOPHEN 650 MG: 325 TABLET ORAL at 10:16

## 2024-05-21 RX ADMIN — Medication: at 08:34

## 2024-05-21 RX ADMIN — VALACYCLOVIR HYDROCHLORIDE 500 MG: 500 TABLET, FILM COATED ORAL at 08:32

## 2024-05-21 RX ADMIN — MEROPENEM AND SODIUM CHLORIDE 1000 MG: 1 INJECTION, SOLUTION INTRAVENOUS at 19:00

## 2024-05-21 RX ADMIN — SODIUM CHLORIDE, PRESERVATIVE FREE 10 ML: 5 INJECTION INTRAVENOUS at 08:32

## 2024-05-21 RX ADMIN — VALACYCLOVIR HYDROCHLORIDE 500 MG: 500 TABLET, FILM COATED ORAL at 20:40

## 2024-05-21 RX ADMIN — SODIUM CHLORIDE 15 ML: 900 IRRIGANT IRRIGATION at 14:18

## 2024-05-21 RX ADMIN — VANCOMYCIN HYDROCHLORIDE 125 MG: 125 CAPSULE ORAL at 14:17

## 2024-05-21 RX ADMIN — LEVETIRACETAM 1500 MG: 500 TABLET, FILM COATED ORAL at 08:32

## 2024-05-21 RX ADMIN — MEROPENEM AND SODIUM CHLORIDE 1000 MG: 1 INJECTION, SOLUTION INTRAVENOUS at 03:55

## 2024-05-21 RX ADMIN — POTASSIUM CHLORIDE 40 MEQ: 1500 TABLET, EXTENDED RELEASE ORAL at 06:17

## 2024-05-21 RX ADMIN — MEROPENEM AND SODIUM CHLORIDE 1000 MG: 1 INJECTION, SOLUTION INTRAVENOUS at 11:56

## 2024-05-21 RX ADMIN — SODIUM CHLORIDE: 9 INJECTION, SOLUTION INTRAVENOUS at 14:16

## 2024-05-21 RX ADMIN — VANCOMYCIN HYDROCHLORIDE 1750 MG: 10 INJECTION, POWDER, LYOPHILIZED, FOR SOLUTION INTRAVENOUS at 20:34

## 2024-05-21 RX ADMIN — HYDROCORTISONE SODIUM SUCCINATE 50 MG: 100 INJECTION, POWDER, FOR SOLUTION INTRAMUSCULAR; INTRAVENOUS at 12:05

## 2024-05-21 RX ADMIN — VANCOMYCIN HYDROCHLORIDE 125 MG: 125 CAPSULE ORAL at 08:32

## 2024-05-21 RX ADMIN — MUPIROCIN: 20 OINTMENT TOPICAL at 20:53

## 2024-05-21 RX ADMIN — ACETAMINOPHEN 650 MG: 325 TABLET ORAL at 00:22

## 2024-05-21 RX ADMIN — VANCOMYCIN HYDROCHLORIDE 1750 MG: 10 INJECTION, POWDER, LYOPHILIZED, FOR SOLUTION INTRAVENOUS at 07:46

## 2024-05-21 RX ADMIN — SODIUM CHLORIDE, PRESERVATIVE FREE 10 ML: 5 INJECTION INTRAVENOUS at 20:54

## 2024-05-21 RX ADMIN — Medication: at 20:53

## 2024-05-21 RX ADMIN — SODIUM CHLORIDE 15 ML: 900 IRRIGANT IRRIGATION at 08:33

## 2024-05-21 RX ADMIN — SODIUM CHLORIDE 15 ML: 900 IRRIGANT IRRIGATION at 16:34

## 2024-05-21 RX ADMIN — PANTOPRAZOLE SODIUM 40 MG: 40 TABLET, DELAYED RELEASE ORAL at 16:33

## 2024-05-21 RX ADMIN — PANTOPRAZOLE SODIUM 40 MG: 40 TABLET, DELAYED RELEASE ORAL at 06:17

## 2024-05-21 RX ADMIN — FLUCONAZOLE 200 MG: 200 TABLET ORAL at 08:32

## 2024-05-21 ASSESSMENT — PAIN SCALES - GENERAL
PAINLEVEL_OUTOF10: 0

## 2024-05-21 NOTE — CONSULTS
Infectious Diseases   Consult Note      Reason for Consult: fever   Requesting Physician: Dr. Nguyen     Date of Admission: 5/15/2024  Subjective:   CHIEF COMPLAINT: neutropenia and fever     HPI:    43 y.o. male with past medical history of Oligodendroglioma (S/P partial resection 2017, radiation 2019, and temodar 2019), and rectal adenocarcinoma. He was diagnosed with AML with underlying MDS and most recently rectal adenoca.      On 3/22/24 he underwent a colonoscopy and there was a  \"malignant appearing tumor measuring 4 cm in the proximal rectum, 10 cm from the anal verge\" that was biopsied. The biopsy came back positive for \"at least adenocarcinoma in situ.\" Colorectal surgery was consulted and he underwent a robotic low anterior resection with colorectal anastomosis secondary to bleeding rectal cancer on 4/12/24.     On OP followup with [facility] on 4/26/24, he presented with temp of 103.  Cultures from admission were positive for E.Coli, sensitive to Cefepime. He received 7-days of IV Cefepime from first negative culture. A new PICC was placed on 4/30/24.  He also received Dificid x10 days for treatment of re-current C.diff. Following treatment he was started on oral vancomycin for C.diff ppx.      He then presented to [facility] on 5/15 with fatigue, tachycardia and generalized malaise.  He has been having intermittent temps up to 101 and remains neutropenic. He has been on iv vanco, meropenem, valacyclovir and fluconazole, and also po vanco. All cx remains neg to date.     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  No pets.  Currently lives with his father and denies any sick contacts.  He has not traveled anywhere outside of the local region.  He has a pet dog.                     Current abx: iv vanco, meropenem, valacylovir, meropenem, po vanco.          Past Surgical History:       Diagnosis Date    Brain cancer (HCC)     diagnosed

## 2024-05-22 ENCOUNTER — APPOINTMENT (OUTPATIENT)
Dept: GENERAL RADIOLOGY | Age: 44
DRG: 720 | End: 2024-05-22
Attending: STUDENT IN AN ORGANIZED HEALTH CARE EDUCATION/TRAINING PROGRAM
Payer: MEDICAID

## 2024-05-22 ENCOUNTER — ANESTHESIA EVENT (OUTPATIENT)
Dept: ENDOSCOPY | Age: 44
End: 2024-05-22
Payer: MEDICAID

## 2024-05-22 ENCOUNTER — ANESTHESIA (OUTPATIENT)
Dept: ENDOSCOPY | Age: 44
End: 2024-05-22
Payer: MEDICAID

## 2024-05-22 PROBLEM — J18.9 PNEUMONIA OF LEFT UPPER LOBE DUE TO INFECTIOUS ORGANISM: Status: ACTIVE | Noted: 2024-05-22

## 2024-05-22 LAB
ALBUMIN SERPL-MCNC: 3.2 G/DL (ref 3.4–5)
ALP SERPL-CCNC: 64 U/L (ref 40–129)
ALT SERPL-CCNC: <5 U/L (ref 10–40)
ANION GAP SERPL CALCULATED.3IONS-SCNC: 12 MMOL/L (ref 3–16)
APPEARANCE BRONCH: ABNORMAL
APPEARANCE BRONCH: CLEAR
AST SERPL-CCNC: <5 U/L (ref 15–37)
BACTERIA BLD CULT ORG #2: NORMAL
BACTERIA BLD CULT: NORMAL
BACTERIA UR CULT: NORMAL
BILIRUB DIRECT SERPL-MCNC: <0.2 MG/DL (ref 0–0.3)
BILIRUB INDIRECT SERPL-MCNC: ABNORMAL MG/DL (ref 0–1)
BILIRUB SERPL-MCNC: 0.6 MG/DL (ref 0–1)
BILIRUB UR QL STRIP.AUTO: NEGATIVE
BLOOD BANK DISPENSE STATUS: NORMAL
BLOOD BANK DISPENSE STATUS: NORMAL
BLOOD BANK PRODUCT CODE: NORMAL
BLOOD BANK PRODUCT CODE: NORMAL
BPU ID: NORMAL
BPU ID: NORMAL
BUN SERPL-MCNC: 6 MG/DL (ref 7–20)
CALCIUM SERPL-MCNC: 8.8 MG/DL (ref 8.3–10.6)
CHLORIDE SERPL-SCNC: 103 MMOL/L (ref 99–110)
CILIATED BAL QL: 1 %
CILIATED BAL QL: 2 %
CLARITY UR: CLEAR
CLOT SPEC QL: ABNORMAL
CLOT SPEC QL: ABNORMAL
CO2 SERPL-SCNC: 23 MMOL/L (ref 21–32)
COLOR BRONCH: ABNORMAL
COLOR BRONCH: ABNORMAL
COLOR UR: YELLOW
CREAT SERPL-MCNC: 0.7 MG/DL (ref 0.9–1.3)
DEPRECATED RDW RBC AUTO: 13.4 % (ref 12.4–15.4)
DESCRIPTION BLOOD BANK: NORMAL
DESCRIPTION BLOOD BANK: NORMAL
EKG ATRIAL RATE: 117 BPM
EKG DIAGNOSIS: NORMAL
EKG P AXIS: 60 DEGREES
EKG P-R INTERVAL: 132 MS
EKG Q-T INTERVAL: 278 MS
EKG QRS DURATION: 86 MS
EKG QTC CALCULATION (BAZETT): 387 MS
EKG R AXIS: 57 DEGREES
EKG T AXIS: 41 DEGREES
EKG VENTRICULAR RATE: 117 BPM
FINAL REPORT: NORMAL
GFR SERPLBLD CREATININE-BSD FMLA CKD-EPI: >90 ML/MIN/{1.73_M2}
GLUCOSE SERPL-MCNC: 91 MG/DL (ref 70–99)
GLUCOSE UR STRIP.AUTO-MCNC: NEGATIVE MG/DL
HCT VFR BLD AUTO: 19.2 % (ref 40.5–52.5)
HGB BLD-MCNC: 6.6 G/DL (ref 13.5–17.5)
HGB UR QL STRIP.AUTO: NEGATIVE
KETONES UR STRIP.AUTO-MCNC: 15 MG/DL
LDH SERPL L TO P-CCNC: 118 U/L (ref 100–190)
LEUKOCYTE ESTERASE UR QL STRIP.AUTO: NEGATIVE
LYMPHOCYTES NFR BRONCH MANUAL: 41 % (ref 5–10)
LYMPHOCYTES NFR BRONCH MANUAL: 72 % (ref 5–10)
MACROPHAGES NFR BRONCH MANUAL: 17 % (ref 90–95)
MACROPHAGES NFR BRONCH MANUAL: 53 % (ref 90–95)
MAGNESIUM SERPL-MCNC: 1.5 MG/DL (ref 1.8–2.4)
MCH RBC QN AUTO: 28.3 PG (ref 26–34)
MCHC RBC AUTO-ENTMCNC: 34.6 G/DL (ref 31–36)
MCV RBC AUTO: 81.6 FL (ref 80–100)
MONOCYTES NFR BRONCH MANUAL: 5 %
MONOCYTES NFR BRONCH MANUAL: 9 %
NITRITE UR QL STRIP.AUTO: NEGATIVE
NUC CELL # BRONCH MANUAL: 156 /CUMM
NUC CELL # BRONCH MANUAL: 91 /CUMM
PATH REV: YES
PH UR STRIP.AUTO: 6.5 [PH] (ref 5–8)
PHOSPHATE SERPL-MCNC: 2.4 MG/DL (ref 2.5–4.9)
PLATELET # BLD AUTO: 13 K/UL (ref 135–450)
PMV BLD AUTO: 9.6 FL (ref 5–10.5)
POTASSIUM SERPL-SCNC: 3.5 MMOL/L (ref 3.5–5.1)
PRELIMINARY: NORMAL
PROT SERPL-MCNC: 6.1 G/DL (ref 6.4–8.2)
PROT UR STRIP.AUTO-MCNC: NEGATIVE MG/DL
RBC # BLD AUTO: 2.35 M/UL (ref 4.2–5.9)
RBC # FLD MANUAL: 1500 /CUMM
RBC # FLD MANUAL: 3400 /CUMM
REASON FOR REJECTION: NORMAL
REJECTED TEST: NORMAL
REPORT: NORMAL
REPORT: NORMAL
RESP PATH DNA+RNA PNL NPH NAA+NON-PROBE: NORMAL
RESP PATH DNA+RNA PNL NPH NAA+NON-PROBE: NORMAL
SODIUM SERPL-SCNC: 138 MMOL/L (ref 136–145)
SP GR UR STRIP.AUTO: 1.02 (ref 1–1.03)
TOTAL CELLS COUNTED BRONCH: 100
TOTAL CELLS COUNTED BRONCH: 100
TROPONIN, HIGH SENSITIVITY: 13 NG/L (ref 0–22)
TROPONIN, HIGH SENSITIVITY: 13 NG/L (ref 0–22)
UA DIPSTICK W REFLEX MICRO PNL UR: ABNORMAL
URN SPEC COLLECT METH UR: ABNORMAL
UROBILINOGEN UR STRIP-ACNC: 0.2 E.U./DL
WBC # BLD AUTO: 0.4 K/UL (ref 4–11)

## 2024-05-22 PROCEDURE — 6370000000 HC RX 637 (ALT 250 FOR IP): Performed by: NURSE PRACTITIONER

## 2024-05-22 PROCEDURE — 87205 SMEAR GRAM STAIN: CPT

## 2024-05-22 PROCEDURE — 87081 CULTURE SCREEN ONLY: CPT

## 2024-05-22 PROCEDURE — 87327 CRYPTOCOCCUS NEOFORM AG IA: CPT

## 2024-05-22 PROCEDURE — 36430 TRANSFUSION BLD/BLD COMPNT: CPT

## 2024-05-22 PROCEDURE — 6360000002 HC RX W HCPCS: Performed by: NURSE PRACTITIONER

## 2024-05-22 PROCEDURE — 87305 ASPERGILLUS AG IA: CPT

## 2024-05-22 PROCEDURE — 87070 CULTURE OTHR SPECIMN AEROBIC: CPT

## 2024-05-22 PROCEDURE — 80076 HEPATIC FUNCTION PANEL: CPT

## 2024-05-22 PROCEDURE — 88312 SPECIAL STAINS GROUP 1: CPT

## 2024-05-22 PROCEDURE — 6360000002 HC RX W HCPCS: Performed by: NURSE ANESTHETIST, CERTIFIED REGISTERED

## 2024-05-22 PROCEDURE — 89051 BODY FLUID CELL COUNT: CPT

## 2024-05-22 PROCEDURE — 99232 SBSQ HOSP IP/OBS MODERATE 35: CPT | Performed by: INTERNAL MEDICINE

## 2024-05-22 PROCEDURE — 87253 VIRUS INOCULATE TISSUE ADDL: CPT

## 2024-05-22 PROCEDURE — 6370000000 HC RX 637 (ALT 250 FOR IP): Performed by: STUDENT IN AN ORGANIZED HEALTH CARE EDUCATION/TRAINING PROGRAM

## 2024-05-22 PROCEDURE — 87102 FUNGUS ISOLATION CULTURE: CPT

## 2024-05-22 PROCEDURE — 87633 RESP VIRUS 12-25 TARGETS: CPT

## 2024-05-22 PROCEDURE — 36415 COLL VENOUS BLD VENIPUNCTURE: CPT

## 2024-05-22 PROCEDURE — 85027 COMPLETE CBC AUTOMATED: CPT

## 2024-05-22 PROCEDURE — 3700000001 HC ADD 15 MINUTES (ANESTHESIA): Performed by: INTERNAL MEDICINE

## 2024-05-22 PROCEDURE — 83615 LACTATE (LD) (LDH) ENZYME: CPT

## 2024-05-22 PROCEDURE — 0B9D8ZX DRAINAGE OF RIGHT MIDDLE LUNG LOBE, VIA NATURAL OR ARTIFICIAL OPENING ENDOSCOPIC, DIAGNOSTIC: ICD-10-PCS | Performed by: INTERNAL MEDICINE

## 2024-05-22 PROCEDURE — 93010 ELECTROCARDIOGRAM REPORT: CPT | Performed by: INTERNAL MEDICINE

## 2024-05-22 PROCEDURE — 93005 ELECTROCARDIOGRAM TRACING: CPT | Performed by: INTERNAL MEDICINE

## 2024-05-22 PROCEDURE — 71045 X-RAY EXAM CHEST 1 VIEW: CPT

## 2024-05-22 PROCEDURE — 87086 URINE CULTURE/COLONY COUNT: CPT

## 2024-05-22 PROCEDURE — 3700000000 HC ANESTHESIA ATTENDED CARE: Performed by: INTERNAL MEDICINE

## 2024-05-22 PROCEDURE — 2580000003 HC RX 258: Performed by: NURSE PRACTITIONER

## 2024-05-22 PROCEDURE — 86612 BLASTOMYCES ANTIBODY: CPT

## 2024-05-22 PROCEDURE — 2060000000 HC ICU INTERMEDIATE R&B

## 2024-05-22 PROCEDURE — 0B9G8ZX DRAINAGE OF LEFT UPPER LUNG LOBE, VIA NATURAL OR ARTIFICIAL OPENING ENDOSCOPIC, DIAGNOSTIC: ICD-10-PCS | Performed by: INTERNAL MEDICINE

## 2024-05-22 PROCEDURE — 87254 VIRUS INOCULATION SHELL VIA: CPT

## 2024-05-22 PROCEDURE — 87206 SMEAR FLUORESCENT/ACID STAI: CPT

## 2024-05-22 PROCEDURE — 88305 TISSUE EXAM BY PATHOLOGIST: CPT

## 2024-05-22 PROCEDURE — 2580000003 HC RX 258: Performed by: NURSE ANESTHETIST, CERTIFIED REGISTERED

## 2024-05-22 PROCEDURE — 87252 VIRUS INOCULATION TISSUE: CPT

## 2024-05-22 PROCEDURE — 87015 SPECIMEN INFECT AGNT CONCNTJ: CPT

## 2024-05-22 PROCEDURE — 87116 MYCOBACTERIA CULTURE: CPT

## 2024-05-22 PROCEDURE — 84100 ASSAY OF PHOSPHORUS: CPT

## 2024-05-22 PROCEDURE — 88112 CYTOPATH CELL ENHANCE TECH: CPT

## 2024-05-22 PROCEDURE — 6360000002 HC RX W HCPCS: Performed by: STUDENT IN AN ORGANIZED HEALTH CARE EDUCATION/TRAINING PROGRAM

## 2024-05-22 PROCEDURE — 86698 HISTOPLASMA ANTIBODY: CPT

## 2024-05-22 PROCEDURE — 7100000011 HC PHASE II RECOVERY - ADDTL 15 MIN: Performed by: INTERNAL MEDICINE

## 2024-05-22 PROCEDURE — 84484 ASSAY OF TROPONIN QUANT: CPT

## 2024-05-22 PROCEDURE — 81003 URINALYSIS AUTO W/O SCOPE: CPT

## 2024-05-22 PROCEDURE — 0202U NFCT DS 22 TRGT SARS-COV-2: CPT

## 2024-05-22 PROCEDURE — 3609010800 HC BRONCHOSCOPY ALVEOLAR LAVAGE: Performed by: INTERNAL MEDICINE

## 2024-05-22 PROCEDURE — 80048 BASIC METABOLIC PNL TOTAL CA: CPT

## 2024-05-22 PROCEDURE — 2580000003 HC RX 258: Performed by: STUDENT IN AN ORGANIZED HEALTH CARE EDUCATION/TRAINING PROGRAM

## 2024-05-22 PROCEDURE — 7100000010 HC PHASE II RECOVERY - FIRST 15 MIN: Performed by: INTERNAL MEDICINE

## 2024-05-22 PROCEDURE — 83735 ASSAY OF MAGNESIUM: CPT

## 2024-05-22 RX ORDER — MORPHINE SULFATE 2 MG/ML
2 INJECTION, SOLUTION INTRAMUSCULAR; INTRAVENOUS ONCE
Status: COMPLETED | OUTPATIENT
Start: 2024-05-22 | End: 2024-05-22

## 2024-05-22 RX ORDER — PROPOFOL 10 MG/ML
INJECTION, EMULSION INTRAVENOUS CONTINUOUS PRN
Status: DISCONTINUED | OUTPATIENT
Start: 2024-05-22 | End: 2024-05-22 | Stop reason: SDUPTHER

## 2024-05-22 RX ORDER — MIDAZOLAM HYDROCHLORIDE 1 MG/ML
INJECTION INTRAMUSCULAR; INTRAVENOUS PRN
Status: DISCONTINUED | OUTPATIENT
Start: 2024-05-22 | End: 2024-05-22 | Stop reason: SDUPTHER

## 2024-05-22 RX ORDER — FENTANYL CITRATE 50 UG/ML
INJECTION, SOLUTION INTRAMUSCULAR; INTRAVENOUS PRN
Status: DISCONTINUED | OUTPATIENT
Start: 2024-05-22 | End: 2024-05-22 | Stop reason: SDUPTHER

## 2024-05-22 RX ORDER — SODIUM CHLORIDE 9 MG/ML
INJECTION, SOLUTION INTRAVENOUS CONTINUOUS PRN
Status: DISCONTINUED | OUTPATIENT
Start: 2024-05-22 | End: 2024-05-22 | Stop reason: SDUPTHER

## 2024-05-22 RX ORDER — ONDANSETRON 2 MG/ML
INJECTION INTRAMUSCULAR; INTRAVENOUS PRN
Status: DISCONTINUED | OUTPATIENT
Start: 2024-05-22 | End: 2024-05-22 | Stop reason: SDUPTHER

## 2024-05-22 RX ORDER — LIDOCAINE HYDROCHLORIDE 20 MG/ML
INJECTION, SOLUTION INTRAVENOUS PRN
Status: DISCONTINUED | OUTPATIENT
Start: 2024-05-22 | End: 2024-05-22 | Stop reason: SDUPTHER

## 2024-05-22 RX ADMIN — SODIUM CHLORIDE: 9 INJECTION, SOLUTION INTRAVENOUS at 06:33

## 2024-05-22 RX ADMIN — MEROPENEM AND SODIUM CHLORIDE 1000 MG: 1 INJECTION, SOLUTION INTRAVENOUS at 19:40

## 2024-05-22 RX ADMIN — HYDROCORTISONE SODIUM SUCCINATE 50 MG: 100 INJECTION, POWDER, FOR SOLUTION INTRAMUSCULAR; INTRAVENOUS at 23:54

## 2024-05-22 RX ADMIN — MEROPENEM AND SODIUM CHLORIDE 1000 MG: 1 INJECTION, SOLUTION INTRAVENOUS at 03:52

## 2024-05-22 RX ADMIN — VALACYCLOVIR HYDROCHLORIDE 500 MG: 500 TABLET, FILM COATED ORAL at 19:41

## 2024-05-22 RX ADMIN — POTASSIUM CHLORIDE 40 MEQ: 1500 TABLET, EXTENDED RELEASE ORAL at 07:53

## 2024-05-22 RX ADMIN — MORPHINE SULFATE 2 MG: 2 INJECTION, SOLUTION INTRAMUSCULAR; INTRAVENOUS at 06:49

## 2024-05-22 RX ADMIN — VANCOMYCIN HYDROCHLORIDE 125 MG: 125 CAPSULE ORAL at 13:40

## 2024-05-22 RX ADMIN — LEVETIRACETAM 1500 MG: 500 TABLET, FILM COATED ORAL at 19:41

## 2024-05-22 RX ADMIN — GUAIFENESIN AND DEXTROMETHORPHAN HYDROBROMIDE 1 TABLET: 600; 30 TABLET, EXTENDED RELEASE ORAL at 12:17

## 2024-05-22 RX ADMIN — POTASSIUM CHLORIDE 20 MEQ: 1500 TABLET, EXTENDED RELEASE ORAL at 09:02

## 2024-05-22 RX ADMIN — SODIUM CHLORIDE: 9 INJECTION, SOLUTION INTRAVENOUS at 23:40

## 2024-05-22 RX ADMIN — ACETAMINOPHEN 650 MG: 325 TABLET ORAL at 20:08

## 2024-05-22 RX ADMIN — SODIUM CHLORIDE, PRESERVATIVE FREE 10 ML: 5 INJECTION INTRAVENOUS at 09:05

## 2024-05-22 RX ADMIN — Medication: at 19:44

## 2024-05-22 RX ADMIN — PANTOPRAZOLE SODIUM 40 MG: 40 TABLET, DELAYED RELEASE ORAL at 06:33

## 2024-05-22 RX ADMIN — VANCOMYCIN HYDROCHLORIDE 1750 MG: 10 INJECTION, POWDER, LYOPHILIZED, FOR SOLUTION INTRAVENOUS at 08:29

## 2024-05-22 RX ADMIN — LIDOCAINE HYDROCHLORIDE 70 MG: 20 INJECTION, SOLUTION INTRAVENOUS at 15:28

## 2024-05-22 RX ADMIN — VALACYCLOVIR HYDROCHLORIDE 500 MG: 500 TABLET, FILM COATED ORAL at 09:02

## 2024-05-22 RX ADMIN — PANTOPRAZOLE SODIUM 40 MG: 40 TABLET, DELAYED RELEASE ORAL at 17:37

## 2024-05-22 RX ADMIN — ONDANSETRON 4 MG: 2 INJECTION INTRAMUSCULAR; INTRAVENOUS at 15:33

## 2024-05-22 RX ADMIN — PROPOFOL 150 MCG/KG/MIN: 10 INJECTION, EMULSION INTRAVENOUS at 15:30

## 2024-05-22 RX ADMIN — LEVETIRACETAM 1500 MG: 500 TABLET, FILM COATED ORAL at 09:02

## 2024-05-22 RX ADMIN — MIDAZOLAM HYDROCHLORIDE 2 MG: 2 INJECTION, SOLUTION INTRAMUSCULAR; INTRAVENOUS at 15:28

## 2024-05-22 RX ADMIN — SODIUM CHLORIDE: 9 INJECTION, SOLUTION INTRAVENOUS at 13:40

## 2024-05-22 RX ADMIN — OXYCODONE 5 MG: 5 TABLET ORAL at 03:49

## 2024-05-22 RX ADMIN — MEROPENEM AND SODIUM CHLORIDE 1000 MG: 1 INJECTION, SOLUTION INTRAVENOUS at 13:39

## 2024-05-22 RX ADMIN — FENTANYL CITRATE 50 MCG: 50 INJECTION, SOLUTION INTRAMUSCULAR; INTRAVENOUS at 15:28

## 2024-05-22 RX ADMIN — PROPOFOL 80 MG: 10 INJECTION, EMULSION INTRAVENOUS at 15:29

## 2024-05-22 RX ADMIN — GUAIFENESIN AND DEXTROMETHORPHAN HYDROBROMIDE 1 TABLET: 600; 30 TABLET, EXTENDED RELEASE ORAL at 19:41

## 2024-05-22 RX ADMIN — VANCOMYCIN HYDROCHLORIDE 125 MG: 125 CAPSULE ORAL at 09:03

## 2024-05-22 RX ADMIN — MUPIROCIN: 20 OINTMENT TOPICAL at 19:44

## 2024-05-22 RX ADMIN — ACETAMINOPHEN 650 MG: 325 TABLET ORAL at 11:03

## 2024-05-22 RX ADMIN — VANCOMYCIN HYDROCHLORIDE 1750 MG: 10 INJECTION, POWDER, LYOPHILIZED, FOR SOLUTION INTRAVENOUS at 20:13

## 2024-05-22 RX ADMIN — SODIUM CHLORIDE: 9 INJECTION, SOLUTION INTRAVENOUS at 15:23

## 2024-05-22 RX ADMIN — FLUCONAZOLE 200 MG: 200 TABLET ORAL at 09:03

## 2024-05-22 ASSESSMENT — PAIN DESCRIPTION - FREQUENCY
FREQUENCY: INTERMITTENT
FREQUENCY: INTERMITTENT

## 2024-05-22 ASSESSMENT — PAIN DESCRIPTION - DESCRIPTORS
DESCRIPTORS: ACHING;CRAMPING;DISCOMFORT
DESCRIPTORS: DISCOMFORT;SQUEEZING;ACHING

## 2024-05-22 ASSESSMENT — PAIN - FUNCTIONAL ASSESSMENT
PAIN_FUNCTIONAL_ASSESSMENT: NONE - DENIES PAIN
PAIN_FUNCTIONAL_ASSESSMENT: 0-10
PAIN_FUNCTIONAL_ASSESSMENT: NONE - DENIES PAIN
PAIN_FUNCTIONAL_ASSESSMENT: NONE - DENIES PAIN
PAIN_FUNCTIONAL_ASSESSMENT: ACTIVITIES ARE NOT PREVENTED
PAIN_FUNCTIONAL_ASSESSMENT: ACTIVITIES ARE NOT PREVENTED
PAIN_FUNCTIONAL_ASSESSMENT: WONG-BAKER FACES

## 2024-05-22 ASSESSMENT — PAIN SCALES - GENERAL
PAINLEVEL_OUTOF10: 3
PAINLEVEL_OUTOF10: 0
PAINLEVEL_OUTOF10: 10
PAINLEVEL_OUTOF10: 0
PAINLEVEL_OUTOF10: 3
PAINLEVEL_OUTOF10: 6

## 2024-05-22 ASSESSMENT — PAIN DESCRIPTION - ORIENTATION
ORIENTATION: RIGHT;LEFT;MID
ORIENTATION: MID

## 2024-05-22 ASSESSMENT — PAIN DESCRIPTION - ONSET: ONSET: GRADUAL

## 2024-05-22 ASSESSMENT — PAIN SCALES - WONG BAKER: WONGBAKER_NUMERICALRESPONSE: HURTS LITTLE MORE

## 2024-05-22 ASSESSMENT — PAIN DESCRIPTION - LOCATION
LOCATION: CHEST
LOCATION: BACK

## 2024-05-22 ASSESSMENT — PAIN DESCRIPTION - PAIN TYPE: TYPE: ACUTE PAIN

## 2024-05-22 ASSESSMENT — PAIN DESCRIPTION - DIRECTION
RADIATING_TOWARDS: CHEST
RADIATING_TOWARDS: CHEST

## 2024-05-22 NOTE — BRIEF OP NOTE
Brief Postoperative Note      Patient: Dennys Peguero  YOB: 1980  MRN: [id number]    Date of Procedure: 5/22/2024    Pre-Op Diagnosis: Pneumonia due to infectious organism, unspecified laterality, unspecified part of lung [J18.9]    Post-Op Diagnosis: Same       Procedure(s):  BRONCHOSCOPY ALVEOLAR LAVAGE    Surgeon(s):  Froylan Hoang MD    Anesthesia: Monitor Anesthesia Care    Airway: Mallampati: II (soft palate, uvula, fauces visible)    ASA: Class 2 - A normal healthy patient with mild systemic disease    Estimated Blood Loss (mL): Minimal    Complications: None    Specimens:  BAL from NAN and RML    Findings:  normal airway anatomy.  No inflammatory changes in mucosa.  Increased mucoid respiratory secretions    Electronically signed by Froylan Hoang MD on 5/22/2024 at 3:52 PM

## 2024-05-22 NOTE — ANESTHESIA PRE PROCEDURE
Department of Anesthesiology  Preprocedure Note       Name:  Dennys Peguero   Age:  43 y.o.  :  1980                                          MRN:  [id number]         Date:  2024      Surgeon: Surgeon(s):  Froylan Hoang MD    Procedure: Procedure(s):  BRONCHOSCOPY ALVEOLAR LAVAGE    Medications prior to admission:   Prior to Admission medications    Medication Sig Start Date End Date Taking? Authorizing Provider   Venetoclax 100 MG TABS Take 2 tablets by mouth every 24 hours for 24 doses 24  Shanthi Flowers APRN - CNP   levoFLOXacin (LEVAQUIN) 500 MG tablet Take 1 tablet by mouth daily 24  Shanthi Flowers APRN - CNP   vancomycin (VANCOCIN) 125 MG capsule Take 1 capsule by mouth 2 times daily at 0800 and 1400 24  Shanthi Flowers APRN - CNP   ondansetron (ZOFRAN-ODT) 4 MG disintegrating tablet Place 1 tablet under the tongue 3 times daily as needed for Nausea or Vomiting 24   Rakesh Montano MD   Hydrocerin (EUCERIN) CREA cream Apply topically 2 times daily 24   Lei Tiwari MD   pantoprazole (PROTONIX) 40 MG tablet Take 1 tablet by mouth 2 times daily (before meals) 24   Lei Tiwari MD   prochlorperazine (COMPAZINE) 10 MG tablet Take 1 tablet by mouth every 4 hours as needed (Nausea / Vomiting) 24   Lei Tiwari MD   valACYclovir (VALTREX) 500 MG tablet Take 1 tablet by mouth 2 times daily 24   Lei Tiwari MD   vitamin B-12 1000 MCG tablet Take 1 tablet by mouth daily 4/3/24   Lei Tiwari MD   fluconazole (DIFLUCAN) 200 MG tablet Take 1 tablet by mouth daily 4/3/24 6/2/24  Shanthi Flowers APRN - CNP   levETIRAcetam (KEPPRA) 1000 MG tablet Take 1.5 tablets by mouth 2 times daily 3/31/18 8/2/24  Provider, MD Vanessa       Current medications:    Current Facility-Administered Medications   Medication Dose Route Frequency Provider Last Rate Last Admin    dextromethorphan-guaiFENesin (MUCINEX DM)  MG per extended release tablet

## 2024-05-22 NOTE — ANESTHESIA POSTPROCEDURE EVALUATION
Department of Anesthesiology  Postprocedure Note    Patient: Dennys Peguero  MRN: [id number]  YOB: 1980  Date of evaluation: 5/22/2024    Procedure Summary       Date: 05/22/24 Room / Location: [facility] 01 / [facility]    Anesthesia Start: 1523 Anesthesia Stop: 1601    Procedure: BRONCHOSCOPY ALVEOLAR LAVAGE Diagnosis:       Pneumonia due to infectious organism, unspecified laterality, unspecified part of lung      (Pneumonia due to infectious organism, unspecified laterality, unspecified part of lung [J18.9])    Surgeons: Froylan Hoang MD Responsible Provider: Vijay Mayfield MD    Anesthesia Type: MAC ASA Status: 4            Anesthesia Type: No value filed.    Lopez Phase I: Lopez Score: 10    Lopez Phase II: Lopez Score: 8    Anesthesia Post Evaluation    Patient location during evaluation: PACU  Patient participation: complete - patient participated  Level of consciousness: awake  Airway patency: patent  Nausea & Vomiting: no nausea and no vomiting  Cardiovascular status: blood pressure returned to baseline and hemodynamically stable  Respiratory status: acceptable  Hydration status: euvolemic  Multimodal analgesia pain management approach  Pain management: adequate    No notable events documented.

## 2024-05-23 LAB
ABO + RH BLD: NORMAL
ALBUMIN SERPL-MCNC: 3.1 G/DL (ref 3.4–5)
ALP SERPL-CCNC: 64 U/L (ref 40–129)
ALT SERPL-CCNC: <5 U/L (ref 10–40)
ANION GAP SERPL CALCULATED.3IONS-SCNC: 11 MMOL/L (ref 3–16)
APTT BLD: 40.8 SEC (ref 22.1–36.4)
AST SERPL-CCNC: <5 U/L (ref 15–37)
BACTERIA CATH TIP CULT: NORMAL
BACTERIA THROAT AEROBE CULT: NORMAL
BILIRUB DIRECT SERPL-MCNC: 0.3 MG/DL (ref 0–0.3)
BILIRUB INDIRECT SERPL-MCNC: 0.7 MG/DL (ref 0–1)
BILIRUB SERPL-MCNC: 1 MG/DL (ref 0–1)
BLD GP AB SCN SERPL QL: NORMAL
BLOOD BANK DISPENSE STATUS: NORMAL
BLOOD BANK PRODUCT CODE: NORMAL
BPU ID: NORMAL
BUN SERPL-MCNC: 6 MG/DL (ref 7–20)
CALCIUM SERPL-MCNC: 8.4 MG/DL (ref 8.3–10.6)
CHLORIDE SERPL-SCNC: 102 MMOL/L (ref 99–110)
CO2 SERPL-SCNC: 24 MMOL/L (ref 21–32)
CREAT SERPL-MCNC: 0.6 MG/DL (ref 0.9–1.3)
DEPRECATED RDW RBC AUTO: 13.7 % (ref 12.4–15.4)
DESCRIPTION BLOOD BANK: NORMAL
FERRITIN SERPL IA-MCNC: 2577 NG/ML (ref 30–400)
GFR SERPLBLD CREATININE-BSD FMLA CKD-EPI: >90 ML/MIN/{1.73_M2}
GLUCOSE SERPL-MCNC: 103 MG/DL (ref 70–99)
H CAPSUL AG UR IA-ACNC: NOT DETECTED NG/ML
H CAPSUL AG UR QL IA: NOT DETECTED
HCT VFR BLD AUTO: 19.4 % (ref 40.5–52.5)
HGB BLD-MCNC: 7 G/DL (ref 13.5–17.5)
INR PPP: 1.38 (ref 0.85–1.15)
LDH SERPL L TO P-CCNC: 104 U/L (ref 100–190)
MCH RBC QN AUTO: 29.5 PG (ref 26–34)
MCHC RBC AUTO-ENTMCNC: 35.9 G/DL (ref 31–36)
MCV RBC AUTO: 82.1 FL (ref 80–100)
PHOSPHATE SERPL-MCNC: 2.5 MG/DL (ref 2.5–4.9)
PLATELET # BLD AUTO: 10 K/UL (ref 135–450)
PMV BLD AUTO: 9.1 FL (ref 5–10.5)
POTASSIUM SERPL-SCNC: 4 MMOL/L (ref 3.5–5.1)
PROT SERPL-MCNC: 5.8 G/DL (ref 6.4–8.2)
PROTHROMBIN TIME: 17.2 SEC (ref 11.9–14.9)
RBC # BLD AUTO: 2.36 M/UL (ref 4.2–5.9)
REPORT: NORMAL
REPORT: NORMAL
RESP PATH DNA+RNA PNL L RESP NAA+NON-PRB: NORMAL
RESP PATH DNA+RNA PNL L RESP NAA+NON-PRB: NORMAL
SODIUM SERPL-SCNC: 137 MMOL/L (ref 136–145)
WBC # BLD AUTO: 0.2 K/UL (ref 4–11)

## 2024-05-23 PROCEDURE — 86901 BLOOD TYPING SEROLOGIC RH(D): CPT

## 2024-05-23 PROCEDURE — 36415 COLL VENOUS BLD VENIPUNCTURE: CPT

## 2024-05-23 PROCEDURE — 80076 HEPATIC FUNCTION PANEL: CPT

## 2024-05-23 PROCEDURE — 6370000000 HC RX 637 (ALT 250 FOR IP): Performed by: NURSE PRACTITIONER

## 2024-05-23 PROCEDURE — 99233 SBSQ HOSP IP/OBS HIGH 50: CPT | Performed by: INTERNAL MEDICINE

## 2024-05-23 PROCEDURE — 86900 BLOOD TYPING SEROLOGIC ABO: CPT

## 2024-05-23 PROCEDURE — P9040 RBC LEUKOREDUCED IRRADIATED: HCPCS

## 2024-05-23 PROCEDURE — P9036 PLATELET PHERESIS IRRADIATED: HCPCS

## 2024-05-23 PROCEDURE — 36430 TRANSFUSION BLD/BLD COMPNT: CPT

## 2024-05-23 PROCEDURE — 85730 THROMBOPLASTIN TIME PARTIAL: CPT

## 2024-05-23 PROCEDURE — 6360000002 HC RX W HCPCS: Performed by: NURSE PRACTITIONER

## 2024-05-23 PROCEDURE — 86923 COMPATIBILITY TEST ELECTRIC: CPT

## 2024-05-23 PROCEDURE — 82728 ASSAY OF FERRITIN: CPT

## 2024-05-23 PROCEDURE — 99232 SBSQ HOSP IP/OBS MODERATE 35: CPT | Performed by: INTERNAL MEDICINE

## 2024-05-23 PROCEDURE — 80048 BASIC METABOLIC PNL TOTAL CA: CPT

## 2024-05-23 PROCEDURE — 6360000002 HC RX W HCPCS: Performed by: STUDENT IN AN ORGANIZED HEALTH CARE EDUCATION/TRAINING PROGRAM

## 2024-05-23 PROCEDURE — 86850 RBC ANTIBODY SCREEN: CPT

## 2024-05-23 PROCEDURE — 83615 LACTATE (LD) (LDH) ENZYME: CPT

## 2024-05-23 PROCEDURE — 84100 ASSAY OF PHOSPHORUS: CPT

## 2024-05-23 PROCEDURE — 85610 PROTHROMBIN TIME: CPT

## 2024-05-23 PROCEDURE — 2580000003 HC RX 258: Performed by: STUDENT IN AN ORGANIZED HEALTH CARE EDUCATION/TRAINING PROGRAM

## 2024-05-23 PROCEDURE — 2060000000 HC ICU INTERMEDIATE R&B

## 2024-05-23 PROCEDURE — 2580000003 HC RX 258: Performed by: NURSE PRACTITIONER

## 2024-05-23 PROCEDURE — 85027 COMPLETE CBC AUTOMATED: CPT

## 2024-05-23 RX ADMIN — HYDROCORTISONE SODIUM SUCCINATE 50 MG: 100 INJECTION, POWDER, FOR SOLUTION INTRAMUSCULAR; INTRAVENOUS at 17:42

## 2024-05-23 RX ADMIN — Medication: at 20:41

## 2024-05-23 RX ADMIN — VALACYCLOVIR HYDROCHLORIDE 500 MG: 500 TABLET, FILM COATED ORAL at 08:16

## 2024-05-23 RX ADMIN — MEROPENEM AND SODIUM CHLORIDE 1000 MG: 1 INJECTION, SOLUTION INTRAVENOUS at 19:00

## 2024-05-23 RX ADMIN — LEVETIRACETAM 1500 MG: 500 TABLET, FILM COATED ORAL at 08:16

## 2024-05-23 RX ADMIN — VANCOMYCIN HYDROCHLORIDE 125 MG: 125 CAPSULE ORAL at 13:30

## 2024-05-23 RX ADMIN — MUPIROCIN: 20 OINTMENT TOPICAL at 20:40

## 2024-05-23 RX ADMIN — SODIUM CHLORIDE, PRESERVATIVE FREE 10 ML: 5 INJECTION INTRAVENOUS at 20:38

## 2024-05-23 RX ADMIN — MEROPENEM AND SODIUM CHLORIDE 1000 MG: 1 INJECTION, SOLUTION INTRAVENOUS at 04:00

## 2024-05-23 RX ADMIN — MEROPENEM AND SODIUM CHLORIDE 1000 MG: 1 INJECTION, SOLUTION INTRAVENOUS at 11:35

## 2024-05-23 RX ADMIN — PANTOPRAZOLE SODIUM 40 MG: 40 TABLET, DELAYED RELEASE ORAL at 06:54

## 2024-05-23 RX ADMIN — LEVETIRACETAM 1500 MG: 500 TABLET, FILM COATED ORAL at 20:38

## 2024-05-23 RX ADMIN — VALACYCLOVIR HYDROCHLORIDE 500 MG: 500 TABLET, FILM COATED ORAL at 20:37

## 2024-05-23 RX ADMIN — ACETAMINOPHEN 650 MG: 325 TABLET ORAL at 15:30

## 2024-05-23 RX ADMIN — GUAIFENESIN AND DEXTROMETHORPHAN HYDROBROMIDE 1 TABLET: 600; 30 TABLET, EXTENDED RELEASE ORAL at 08:16

## 2024-05-23 RX ADMIN — POTASSIUM CHLORIDE 20 MEQ: 1500 TABLET, EXTENDED RELEASE ORAL at 08:16

## 2024-05-23 RX ADMIN — VANCOMYCIN HYDROCHLORIDE 1750 MG: 10 INJECTION, POWDER, LYOPHILIZED, FOR SOLUTION INTRAVENOUS at 07:30

## 2024-05-23 RX ADMIN — GUAIFENESIN AND DEXTROMETHORPHAN HYDROBROMIDE 1 TABLET: 600; 30 TABLET, EXTENDED RELEASE ORAL at 20:37

## 2024-05-23 RX ADMIN — SODIUM CHLORIDE 15 ML: 900 IRRIGANT IRRIGATION at 11:41

## 2024-05-23 RX ADMIN — PANTOPRAZOLE SODIUM 40 MG: 40 TABLET, DELAYED RELEASE ORAL at 15:30

## 2024-05-23 RX ADMIN — VANCOMYCIN HYDROCHLORIDE 1750 MG: 10 INJECTION, POWDER, LYOPHILIZED, FOR SOLUTION INTRAVENOUS at 20:30

## 2024-05-23 RX ADMIN — MUPIROCIN: 20 OINTMENT TOPICAL at 08:18

## 2024-05-23 RX ADMIN — VANCOMYCIN HYDROCHLORIDE 125 MG: 125 CAPSULE ORAL at 08:15

## 2024-05-23 RX ADMIN — ACETAMINOPHEN 650 MG: 325 TABLET ORAL at 04:09

## 2024-05-23 RX ADMIN — SODIUM CHLORIDE, PRESERVATIVE FREE 10 ML: 5 INJECTION INTRAVENOUS at 08:16

## 2024-05-23 RX ADMIN — SODIUM CHLORIDE 15 ML: 900 IRRIGANT IRRIGATION at 08:17

## 2024-05-23 RX ADMIN — FLUCONAZOLE 200 MG: 200 TABLET ORAL at 08:16

## 2024-05-23 RX ADMIN — Medication: at 08:17

## 2024-05-23 ASSESSMENT — PAIN DESCRIPTION - DESCRIPTORS: DESCRIPTORS: ACHING;DISCOMFORT

## 2024-05-23 ASSESSMENT — PAIN - FUNCTIONAL ASSESSMENT: PAIN_FUNCTIONAL_ASSESSMENT: ACTIVITIES ARE NOT PREVENTED

## 2024-05-23 ASSESSMENT — PAIN DESCRIPTION - ONSET: ONSET: PROGRESSIVE

## 2024-05-23 ASSESSMENT — PAIN DESCRIPTION - ORIENTATION: ORIENTATION: MID

## 2024-05-23 ASSESSMENT — PAIN DESCRIPTION - FREQUENCY: FREQUENCY: INTERMITTENT

## 2024-05-23 ASSESSMENT — PAIN SCALES - GENERAL
PAINLEVEL_OUTOF10: 3
PAINLEVEL_OUTOF10: 0

## 2024-05-23 ASSESSMENT — PAIN DESCRIPTION - LOCATION: LOCATION: BACK;CHEST

## 2024-05-23 ASSESSMENT — PAIN DESCRIPTION - PAIN TYPE: TYPE: ACUTE PAIN

## 2024-05-23 NOTE — CARE COORDINATION
Attended MD rounds. Per MD pt will be here through the holiday weekend and no anticipated discharge at this time.     Pt will return home with his father when able with no anticipated needs from writer.     SW will follow    JENNIFER Ortiz   for [facility] ([facility])  [employer] Mobile: [phone]

## 2024-05-23 NOTE — PROCEDURES
[address] [address]                             PROCEDURE NOTE      PATIENT NAME: SAMANTHA HAMILTON                  : 1980  MED REC NO: [id number]                      ROOM: [facility]  ACCOUNT NO: [id number]                       ADMIT DATE: 05/15/2024  PROVIDER: Froylan Hoang MD      DATE OF PROCEDURE:  2024    SURGEON:  Froylan Hoang MD    :  Froylan Hoang MD.    PREOPERATIVE DIAGNOSIS:  Opportunistic pneumonia in immunocompromised host.    POSTOPERATIVE DIAGNOSIS:  Opportunistic pneumonia in immunocompromised host.    PROCEDURE:  Bronchoscopy with bronchoalveolar lavage from the left upper lobe and right middle lobe.    ANESTHESIA:  MAC.    ASA SCORE:  II.    MALLAMPATI SCORE:  II.    DESCRIPTION OF PROCEDURE:  The patient self identified and confirmed the procedure during the time-out process.  Sedation induced per Anesthesia.  The fiberoptic bronchoscope was passed transorally without difficulty.  Glottis and larynx were normal in appearance.  Vocal cords appeared normal and moved symmetrically.  Topical lidocaine was applied to the larynx, trachea, and more distal airways.  The trachea was normal in appearance.  The main hailey was sharp.  Endobronchial anatomy was normal to the subsegmental level bilaterally.  Bronchial mucosa was normal.  There was mild friability in a few areas, but no bleeding was provoked.  There were increased mucoid secretions in scattered areas.    The bronchoscope was wedged into a subsegment of the left upper lobe anterior segment.  Bronchoalveolar lavage performed with 150 mL of warm sterile saline.  Approximately, 45-50 mL turbid, slightly tannish fluid was returned.    The bronchoscope was wedged into a subsegment of the right middle lobe medial segment.  Bronchoalveolar lavage again performed with 150 mL of warm sterile saline, returning 50 mL of fluid with a similar

## 2024-05-24 ENCOUNTER — APPOINTMENT (OUTPATIENT)
Dept: GENERAL RADIOLOGY | Age: 44
DRG: 720 | End: 2024-05-24
Attending: STUDENT IN AN ORGANIZED HEALTH CARE EDUCATION/TRAINING PROGRAM
Payer: MEDICAID

## 2024-05-24 PROBLEM — D70.9 FEVER AND NEUTROPENIA (HCC): Status: ACTIVE | Noted: 2024-05-24

## 2024-05-24 PROBLEM — R50.81 FEVER AND NEUTROPENIA (HCC): Status: ACTIVE | Noted: 2024-05-24

## 2024-05-24 LAB
ALBUMIN SERPL-MCNC: 2.8 G/DL (ref 3.4–5)
ALP SERPL-CCNC: 65 U/L (ref 40–129)
ALT SERPL-CCNC: <5 U/L (ref 10–40)
ANION GAP SERPL CALCULATED.3IONS-SCNC: 8 MMOL/L (ref 3–16)
AST SERPL-CCNC: <5 U/L (ref 15–37)
BACTERIA SPEC RESP CULT: NORMAL
BACTERIA SPEC RESP CULT: NORMAL
BILIRUB DIRECT SERPL-MCNC: <0.2 MG/DL (ref 0–0.3)
BILIRUB INDIRECT SERPL-MCNC: ABNORMAL MG/DL (ref 0–1)
BILIRUB SERPL-MCNC: 0.8 MG/DL (ref 0–1)
BUN SERPL-MCNC: 8 MG/DL (ref 7–20)
CALCIUM SERPL-MCNC: 8.6 MG/DL (ref 8.3–10.6)
CHLORIDE SERPL-SCNC: 103 MMOL/L (ref 99–110)
CO2 SERPL-SCNC: 26 MMOL/L (ref 21–32)
CREAT SERPL-MCNC: 0.7 MG/DL (ref 0.9–1.3)
CRYPTOC AG SER QL IA: NEGATIVE
DEPRECATED RDW RBC AUTO: 13.6 % (ref 12.4–15.4)
GFR SERPLBLD CREATININE-BSD FMLA CKD-EPI: >90 ML/MIN/{1.73_M2}
GLUCOSE SERPL-MCNC: 118 MG/DL (ref 70–99)
GRAM STN SPEC: NORMAL
GRAM STN SPEC: NORMAL
HCT VFR BLD AUTO: 21.8 % (ref 40.5–52.5)
HGB BLD-MCNC: 7.7 G/DL (ref 13.5–17.5)
LDH SERPL L TO P-CCNC: 102 U/L (ref 100–190)
MAGNESIUM SERPL-MCNC: 1.6 MG/DL (ref 1.8–2.4)
MCH RBC QN AUTO: 29.5 PG (ref 26–34)
MCHC RBC AUTO-ENTMCNC: 35.6 G/DL (ref 31–36)
MCV RBC AUTO: 82.8 FL (ref 80–100)
PHOSPHATE SERPL-MCNC: 3.1 MG/DL (ref 2.5–4.9)
PLATELET # BLD AUTO: 25 K/UL (ref 135–450)
PMV BLD AUTO: 8.8 FL (ref 5–10.5)
POTASSIUM SERPL-SCNC: 4.1 MMOL/L (ref 3.5–5.1)
PROT SERPL-MCNC: 5.6 G/DL (ref 6.4–8.2)
RBC # BLD AUTO: 2.63 M/UL (ref 4.2–5.9)
SODIUM SERPL-SCNC: 137 MMOL/L (ref 136–145)
VANCOMYCIN TROUGH SERPL-MCNC: 9.7 UG/ML (ref 10–20)
WBC # BLD AUTO: 0.3 K/UL (ref 4–11)

## 2024-05-24 PROCEDURE — 6370000000 HC RX 637 (ALT 250 FOR IP): Performed by: NURSE PRACTITIONER

## 2024-05-24 PROCEDURE — 6370000000 HC RX 637 (ALT 250 FOR IP): Performed by: INTERNAL MEDICINE

## 2024-05-24 PROCEDURE — 80048 BASIC METABOLIC PNL TOTAL CA: CPT

## 2024-05-24 PROCEDURE — 83735 ASSAY OF MAGNESIUM: CPT

## 2024-05-24 PROCEDURE — 85027 COMPLETE CBC AUTOMATED: CPT

## 2024-05-24 PROCEDURE — 99233 SBSQ HOSP IP/OBS HIGH 50: CPT | Performed by: INTERNAL MEDICINE

## 2024-05-24 PROCEDURE — 80202 ASSAY OF VANCOMYCIN: CPT

## 2024-05-24 PROCEDURE — 80076 HEPATIC FUNCTION PANEL: CPT

## 2024-05-24 PROCEDURE — 6360000002 HC RX W HCPCS: Performed by: NURSE PRACTITIONER

## 2024-05-24 PROCEDURE — 84100 ASSAY OF PHOSPHORUS: CPT

## 2024-05-24 PROCEDURE — 36415 COLL VENOUS BLD VENIPUNCTURE: CPT

## 2024-05-24 PROCEDURE — 2060000000 HC ICU INTERMEDIATE R&B

## 2024-05-24 PROCEDURE — 83615 LACTATE (LD) (LDH) ENZYME: CPT

## 2024-05-24 PROCEDURE — 71045 X-RAY EXAM CHEST 1 VIEW: CPT

## 2024-05-24 PROCEDURE — 74018 RADEX ABDOMEN 1 VIEW: CPT

## 2024-05-24 PROCEDURE — 2580000003 HC RX 258: Performed by: STUDENT IN AN ORGANIZED HEALTH CARE EDUCATION/TRAINING PROGRAM

## 2024-05-24 PROCEDURE — 2580000003 HC RX 258: Performed by: NURSE PRACTITIONER

## 2024-05-24 PROCEDURE — 6360000002 HC RX W HCPCS: Performed by: STUDENT IN AN ORGANIZED HEALTH CARE EDUCATION/TRAINING PROGRAM

## 2024-05-24 RX ORDER — VORICONAZOLE 200 MG/1
200 TABLET, FILM COATED ORAL EVERY 12 HOURS SCHEDULED
Status: DISCONTINUED | OUTPATIENT
Start: 2024-05-24 | End: 2024-05-28

## 2024-05-24 RX ADMIN — Medication: at 11:28

## 2024-05-24 RX ADMIN — VANCOMYCIN HYDROCHLORIDE 125 MG: 125 CAPSULE ORAL at 14:38

## 2024-05-24 RX ADMIN — GUAIFENESIN AND DEXTROMETHORPHAN HYDROBROMIDE 1 TABLET: 600; 30 TABLET, EXTENDED RELEASE ORAL at 20:33

## 2024-05-24 RX ADMIN — SODIUM CHLORIDE, PRESERVATIVE FREE 10 ML: 5 INJECTION INTRAVENOUS at 09:08

## 2024-05-24 RX ADMIN — Medication: at 20:54

## 2024-05-24 RX ADMIN — MEROPENEM AND SODIUM CHLORIDE 1000 MG: 1 INJECTION, SOLUTION INTRAVENOUS at 03:27

## 2024-05-24 RX ADMIN — LEVETIRACETAM 1500 MG: 500 TABLET, FILM COATED ORAL at 09:08

## 2024-05-24 RX ADMIN — VALACYCLOVIR HYDROCHLORIDE 500 MG: 500 TABLET, FILM COATED ORAL at 09:08

## 2024-05-24 RX ADMIN — MUPIROCIN: 20 OINTMENT TOPICAL at 11:26

## 2024-05-24 RX ADMIN — ACETAMINOPHEN 650 MG: 325 TABLET ORAL at 11:45

## 2024-05-24 RX ADMIN — VANCOMYCIN HYDROCHLORIDE 1750 MG: 10 INJECTION, POWDER, LYOPHILIZED, FOR SOLUTION INTRAVENOUS at 09:23

## 2024-05-24 RX ADMIN — GUAIFENESIN AND DEXTROMETHORPHAN HYDROBROMIDE 1 TABLET: 600; 30 TABLET, EXTENDED RELEASE ORAL at 09:09

## 2024-05-24 RX ADMIN — POTASSIUM CHLORIDE 20 MEQ: 1500 TABLET, EXTENDED RELEASE ORAL at 09:08

## 2024-05-24 RX ADMIN — VANCOMYCIN HYDROCHLORIDE 125 MG: 125 CAPSULE ORAL at 09:08

## 2024-05-24 RX ADMIN — FLUCONAZOLE 200 MG: 200 TABLET ORAL at 09:08

## 2024-05-24 RX ADMIN — VORICONAZOLE 200 MG: 200 TABLET ORAL at 20:33

## 2024-05-24 RX ADMIN — HYDROCORTISONE SODIUM SUCCINATE 50 MG: 100 INJECTION, POWDER, FOR SOLUTION INTRAMUSCULAR; INTRAVENOUS at 22:23

## 2024-05-24 RX ADMIN — MEROPENEM AND SODIUM CHLORIDE 1000 MG: 1 INJECTION, SOLUTION INTRAVENOUS at 19:12

## 2024-05-24 RX ADMIN — SODIUM CHLORIDE, PRESERVATIVE FREE 10 ML: 5 INJECTION INTRAVENOUS at 20:33

## 2024-05-24 RX ADMIN — MUPIROCIN: 20 OINTMENT TOPICAL at 20:55

## 2024-05-24 RX ADMIN — MEROPENEM AND SODIUM CHLORIDE 1000 MG: 1 INJECTION, SOLUTION INTRAVENOUS at 11:54

## 2024-05-24 RX ADMIN — VALACYCLOVIR HYDROCHLORIDE 500 MG: 500 TABLET, FILM COATED ORAL at 20:35

## 2024-05-24 RX ADMIN — ACETAMINOPHEN 650 MG: 325 TABLET ORAL at 00:28

## 2024-05-24 RX ADMIN — PANTOPRAZOLE SODIUM 40 MG: 40 TABLET, DELAYED RELEASE ORAL at 06:37

## 2024-05-24 RX ADMIN — LEVETIRACETAM 1500 MG: 500 TABLET, FILM COATED ORAL at 20:33

## 2024-05-24 RX ADMIN — VANCOMYCIN HYDROCHLORIDE 1750 MG: 10 INJECTION, POWDER, LYOPHILIZED, FOR SOLUTION INTRAVENOUS at 20:48

## 2024-05-24 RX ADMIN — ACETAMINOPHEN 650 MG: 325 TABLET ORAL at 20:32

## 2024-05-24 ASSESSMENT — PAIN SCALES - GENERAL
PAINLEVEL_OUTOF10: 0

## 2024-05-24 NOTE — CARE COORDINATION
11:17am: Attended MD rounds. Plan to start tube feeds.     Pt will return home with his father when able with no anticipated needs from writer.     SW will continue to follow.    Sharla COLON, JENNIFER   for [facility] ([facility])  [employer] Mobile: [phone]

## 2024-05-24 NOTE — CONSULTS
Comprehensive Nutrition Assessment    RECOMMENDATIONS:  Nutrition Support:  Start: Jevity 1.5 (Standard Formula with Fiber ) @ 20mL/hr  Advance: As tolerated, increase by 10 mL/hr q 4 hours  Goal: 50 mL/hr  Water Flushes: 30ml q4 (Maintenance)  Modulars: none  PO Diet: Continue low microbial diet  ONS: pt does not like ONS  Nutrition Education: Education completed (high kcal/pro)     NUTRITION ASSESSMENT:   Nutritional summary & status: Follow-up.  Pt PO intake decreasing since previous RD visit.  Pt w/ muscle/fat wasting per NFPE.  Pt now refusing ONS d/t diarrhea output.  Plans to start TF today via [employer].   Admission // PMH: Sepsis // Oligodendroglioma (S/P partial resection 2017, radiation 2019, and temodar 2019), and rectal adenocarcinoma; AML underlying MDS    MALNUTRITION ASSESSMENT  Context of Malnutrition: Chronic Illness   Malnutrition Status: Moderate malnutrition  Findings of the 6 clinical characteristics of malnutrition (Minimum of 2 out of 6 clinical characteristics is required to make the diagnosis of moderate or severe Protein Calorie Malnutrition based on AND/ASPEN Guidelines):  Energy Intake:  No significant decrease in energy intake  Weight Loss:  Greater than 5% over 1 month (-9% x 1mo)     Body Fat Loss:  Mild body fat loss Triceps, Buccal region   Muscle Mass Loss:  Mild muscle mass loss Thigh (quadriceps), Clavicles (pectoralis & deltoids)    NUTRITION DIAGNOSIS   Moderate malnutrition related to inadequate protein-energy intake as evidenced by Criteria as identified in malnutrition assessment    Nutrition Monitoring and Evaluation:   Food/Nutrient Intake Outcomes:  Food and Nutrient Intake, Supplement Intake  Physical Signs/Symptoms Outcomes:  Biochemical Data, Nutrition Focused Physical Findings, Weight     OBJECTIVE DATA: Significant to nutrition assessment  Nutrition Related Findings: +bm 5/24; BLE/BUE edema  Wounds: None  Nutrition Goals: Meet at least 75% of estimated needs, by

## 2024-05-25 ENCOUNTER — APPOINTMENT (OUTPATIENT)
Dept: CT IMAGING | Age: 44
DRG: 720 | End: 2024-05-25
Attending: STUDENT IN AN ORGANIZED HEALTH CARE EDUCATION/TRAINING PROGRAM
Payer: MEDICAID

## 2024-05-25 LAB
ALBUMIN SERPL-MCNC: 2.7 G/DL (ref 3.4–5)
ALP SERPL-CCNC: 75 U/L (ref 40–129)
ALT SERPL-CCNC: <5 U/L (ref 10–40)
ANION GAP SERPL CALCULATED.3IONS-SCNC: 11 MMOL/L (ref 3–16)
ASPERGILLUS GALACTO AG: NEGATIVE
ASPERGILLUS GALACTO AG: NEGATIVE
AST SERPL-CCNC: 6 U/L (ref 15–37)
BACTERIA BLD CULT ORG #2: NORMAL
BACTERIA BLD CULT: NORMAL
BACTERIA URNS QL MICRO: ABNORMAL /HPF
BILIRUB DIRECT SERPL-MCNC: <0.2 MG/DL (ref 0–0.3)
BILIRUB INDIRECT SERPL-MCNC: ABNORMAL MG/DL (ref 0–1)
BILIRUB SERPL-MCNC: 1 MG/DL (ref 0–1)
BILIRUB UR QL STRIP.AUTO: NEGATIVE
BUN SERPL-MCNC: 7 MG/DL (ref 7–20)
CALCIUM SERPL-MCNC: 8.3 MG/DL (ref 8.3–10.6)
CHLORIDE SERPL-SCNC: 100 MMOL/L (ref 99–110)
CLARITY UR: CLEAR
CO2 SERPL-SCNC: 22 MMOL/L (ref 21–32)
COLOR UR: YELLOW
CREAT SERPL-MCNC: 0.6 MG/DL (ref 0.9–1.3)
DEPRECATED RDW RBC AUTO: 13.5 % (ref 12.4–15.4)
FINAL REPORT: NORMAL
FINAL REPORT: NORMAL
GALACTOMANNAN AG SERPL IA-ACNC: 0.16
GALACTOMANNAN AG SERPL IA-ACNC: 0.25
GFR SERPLBLD CREATININE-BSD FMLA CKD-EPI: >90 ML/MIN/{1.73_M2}
GLUCOSE SERPL-MCNC: 130 MG/DL (ref 70–99)
GLUCOSE UR STRIP.AUTO-MCNC: NEGATIVE MG/DL
HCT VFR BLD AUTO: 22 % (ref 40.5–52.5)
HGB BLD-MCNC: 7.8 G/DL (ref 13.5–17.5)
HGB UR QL STRIP.AUTO: NEGATIVE
KETONES UR STRIP.AUTO-MCNC: NEGATIVE MG/DL
LDH SERPL L TO P-CCNC: 108 U/L (ref 100–190)
LEUKOCYTE ESTERASE UR QL STRIP.AUTO: NEGATIVE
MCH RBC QN AUTO: 29.3 PG (ref 26–34)
MCHC RBC AUTO-ENTMCNC: 35.5 G/DL (ref 31–36)
MCV RBC AUTO: 82.5 FL (ref 80–100)
NITRITE UR QL STRIP.AUTO: NEGATIVE
PH UR STRIP.AUTO: 7 [PH] (ref 5–8)
PHOSPHATE SERPL-MCNC: 2.4 MG/DL (ref 2.5–4.9)
PLATELET # BLD AUTO: 15 K/UL (ref 135–450)
PMV BLD AUTO: 8.5 FL (ref 5–10.5)
POTASSIUM SERPL-SCNC: 3.6 MMOL/L (ref 3.5–5.1)
PRELIMINARY: NORMAL
PRELIMINARY: NORMAL
PROCALCITONIN SERPL IA-MCNC: 0.33 NG/ML (ref 0–0.15)
PROT SERPL-MCNC: 6.1 G/DL (ref 6.4–8.2)
PROT UR STRIP.AUTO-MCNC: 30 MG/DL
RBC # BLD AUTO: 2.66 M/UL (ref 4.2–5.9)
RBC #/AREA URNS HPF: ABNORMAL /HPF (ref 0–4)
SODIUM SERPL-SCNC: 133 MMOL/L (ref 136–145)
SP GR UR STRIP.AUTO: 1.02 (ref 1–1.03)
UA DIPSTICK W REFLEX MICRO PNL UR: YES
URN SPEC COLLECT METH UR: ABNORMAL
UROBILINOGEN UR STRIP-ACNC: 0.2 E.U./DL
WBC # BLD AUTO: 0.2 K/UL (ref 4–11)
WBC #/AREA URNS HPF: ABNORMAL /HPF (ref 0–5)

## 2024-05-25 PROCEDURE — 71260 CT THORAX DX C+: CPT

## 2024-05-25 PROCEDURE — 81001 URINALYSIS AUTO W/SCOPE: CPT

## 2024-05-25 PROCEDURE — 85027 COMPLETE CBC AUTOMATED: CPT

## 2024-05-25 PROCEDURE — 6360000002 HC RX W HCPCS: Performed by: STUDENT IN AN ORGANIZED HEALTH CARE EDUCATION/TRAINING PROGRAM

## 2024-05-25 PROCEDURE — 70487 CT MAXILLOFACIAL W/DYE: CPT

## 2024-05-25 PROCEDURE — 87070 CULTURE OTHR SPECIMN AEROBIC: CPT

## 2024-05-25 PROCEDURE — 87102 FUNGUS ISOLATION CULTURE: CPT

## 2024-05-25 PROCEDURE — 84100 ASSAY OF PHOSPHORUS: CPT

## 2024-05-25 PROCEDURE — 94761 N-INVAS EAR/PLS OXIMETRY MLT: CPT

## 2024-05-25 PROCEDURE — 6370000000 HC RX 637 (ALT 250 FOR IP): Performed by: NURSE PRACTITIONER

## 2024-05-25 PROCEDURE — 83615 LACTATE (LD) (LDH) ENZYME: CPT

## 2024-05-25 PROCEDURE — 6370000000 HC RX 637 (ALT 250 FOR IP): Performed by: INTERNAL MEDICINE

## 2024-05-25 PROCEDURE — 6360000002 HC RX W HCPCS: Performed by: NURSE PRACTITIONER

## 2024-05-25 PROCEDURE — 2580000003 HC RX 258: Performed by: STUDENT IN AN ORGANIZED HEALTH CARE EDUCATION/TRAINING PROGRAM

## 2024-05-25 PROCEDURE — 87205 SMEAR GRAM STAIN: CPT

## 2024-05-25 PROCEDURE — 99233 SBSQ HOSP IP/OBS HIGH 50: CPT | Performed by: INTERNAL MEDICINE

## 2024-05-25 PROCEDURE — 80076 HEPATIC FUNCTION PANEL: CPT

## 2024-05-25 PROCEDURE — 2580000003 HC RX 258: Performed by: NURSE PRACTITIONER

## 2024-05-25 PROCEDURE — 84145 PROCALCITONIN (PCT): CPT

## 2024-05-25 PROCEDURE — 2060000000 HC ICU INTERMEDIATE R&B

## 2024-05-25 PROCEDURE — 87103 BLOOD FUNGUS CULTURE: CPT

## 2024-05-25 PROCEDURE — 36415 COLL VENOUS BLD VENIPUNCTURE: CPT

## 2024-05-25 PROCEDURE — 87040 BLOOD CULTURE FOR BACTERIA: CPT

## 2024-05-25 PROCEDURE — 87086 URINE CULTURE/COLONY COUNT: CPT

## 2024-05-25 PROCEDURE — 80048 BASIC METABOLIC PNL TOTAL CA: CPT

## 2024-05-25 PROCEDURE — 6360000004 HC RX CONTRAST MEDICATION: Performed by: INTERNAL MEDICINE

## 2024-05-25 RX ADMIN — PANTOPRAZOLE SODIUM 40 MG: 40 TABLET, DELAYED RELEASE ORAL at 06:39

## 2024-05-25 RX ADMIN — SODIUM CHLORIDE 15 ML: 900 IRRIGANT IRRIGATION at 21:23

## 2024-05-25 RX ADMIN — GUAIFENESIN AND DEXTROMETHORPHAN HYDROBROMIDE 1 TABLET: 600; 30 TABLET, EXTENDED RELEASE ORAL at 08:36

## 2024-05-25 RX ADMIN — HYDROCORTISONE SODIUM SUCCINATE 50 MG: 100 INJECTION, POWDER, FOR SOLUTION INTRAMUSCULAR; INTRAVENOUS at 22:24

## 2024-05-25 RX ADMIN — ACETAMINOPHEN 650 MG: 325 TABLET ORAL at 08:40

## 2024-05-25 RX ADMIN — VORICONAZOLE 200 MG: 200 TABLET ORAL at 21:20

## 2024-05-25 RX ADMIN — Medication: at 21:25

## 2024-05-25 RX ADMIN — MEROPENEM AND SODIUM CHLORIDE 1000 MG: 1 INJECTION, SOLUTION INTRAVENOUS at 04:42

## 2024-05-25 RX ADMIN — ACETAMINOPHEN 650 MG: 325 TABLET ORAL at 16:22

## 2024-05-25 RX ADMIN — LEVETIRACETAM 1500 MG: 500 TABLET, FILM COATED ORAL at 21:20

## 2024-05-25 RX ADMIN — VANCOMYCIN HYDROCHLORIDE 1750 MG: 10 INJECTION, POWDER, LYOPHILIZED, FOR SOLUTION INTRAVENOUS at 20:42

## 2024-05-25 RX ADMIN — IOPAMIDOL 100 ML: 755 INJECTION, SOLUTION INTRAVENOUS at 11:51

## 2024-05-25 RX ADMIN — VORICONAZOLE 200 MG: 200 TABLET ORAL at 08:35

## 2024-05-25 RX ADMIN — POTASSIUM CHLORIDE 20 MEQ: 1500 TABLET, EXTENDED RELEASE ORAL at 08:35

## 2024-05-25 RX ADMIN — GUAIFENESIN AND DEXTROMETHORPHAN HYDROBROMIDE 1 TABLET: 600; 30 TABLET, EXTENDED RELEASE ORAL at 21:20

## 2024-05-25 RX ADMIN — VANCOMYCIN HYDROCHLORIDE 125 MG: 125 CAPSULE ORAL at 14:35

## 2024-05-25 RX ADMIN — VALACYCLOVIR HYDROCHLORIDE 500 MG: 500 TABLET, FILM COATED ORAL at 08:35

## 2024-05-25 RX ADMIN — VANCOMYCIN HYDROCHLORIDE 125 MG: 125 CAPSULE ORAL at 08:35

## 2024-05-25 RX ADMIN — LEVETIRACETAM 1500 MG: 500 TABLET, FILM COATED ORAL at 08:35

## 2024-05-25 RX ADMIN — MEROPENEM AND SODIUM CHLORIDE 1000 MG: 1 INJECTION, SOLUTION INTRAVENOUS at 19:46

## 2024-05-25 RX ADMIN — MUPIROCIN: 20 OINTMENT TOPICAL at 21:25

## 2024-05-25 RX ADMIN — SODIUM CHLORIDE, PRESERVATIVE FREE 10 ML: 5 INJECTION INTRAVENOUS at 21:20

## 2024-05-25 RX ADMIN — SODIUM CHLORIDE, PRESERVATIVE FREE 10 ML: 5 INJECTION INTRAVENOUS at 08:36

## 2024-05-25 RX ADMIN — VANCOMYCIN HYDROCHLORIDE 1750 MG: 10 INJECTION, POWDER, LYOPHILIZED, FOR SOLUTION INTRAVENOUS at 09:17

## 2024-05-25 RX ADMIN — PANTOPRAZOLE SODIUM 40 MG: 40 TABLET, DELAYED RELEASE ORAL at 16:22

## 2024-05-25 RX ADMIN — ACETAMINOPHEN 650 MG: 325 TABLET ORAL at 21:18

## 2024-05-25 RX ADMIN — MUPIROCIN: 20 OINTMENT TOPICAL at 08:39

## 2024-05-25 RX ADMIN — VALACYCLOVIR HYDROCHLORIDE 500 MG: 500 TABLET, FILM COATED ORAL at 21:20

## 2024-05-25 RX ADMIN — MEROPENEM AND SODIUM CHLORIDE 1000 MG: 1 INJECTION, SOLUTION INTRAVENOUS at 12:55

## 2024-05-25 RX ADMIN — Medication: at 08:40

## 2024-05-25 ASSESSMENT — PAIN SCALES - GENERAL: PAINLEVEL_OUTOF10: 0

## 2024-05-26 LAB
ALBUMIN SERPL-MCNC: 3 G/DL (ref 3.4–5)
ALP SERPL-CCNC: 79 U/L (ref 40–129)
ALT SERPL-CCNC: 7 U/L (ref 10–40)
ANION GAP SERPL CALCULATED.3IONS-SCNC: 12 MMOL/L (ref 3–16)
ANION GAP SERPL CALCULATED.3IONS-SCNC: 8 MMOL/L (ref 3–16)
AST SERPL-CCNC: 6 U/L (ref 15–37)
BACTERIA UR CULT: NORMAL
BILIRUB DIRECT SERPL-MCNC: <0.2 MG/DL (ref 0–0.3)
BILIRUB INDIRECT SERPL-MCNC: ABNORMAL MG/DL (ref 0–1)
BILIRUB SERPL-MCNC: 0.6 MG/DL (ref 0–1)
BUN SERPL-MCNC: 9 MG/DL (ref 7–20)
BUN SERPL-MCNC: 9 MG/DL (ref 7–20)
CALCIUM SERPL-MCNC: 8.4 MG/DL (ref 8.3–10.6)
CALCIUM SERPL-MCNC: 8.8 MG/DL (ref 8.3–10.6)
CHLORIDE SERPL-SCNC: 101 MMOL/L (ref 99–110)
CHLORIDE SERPL-SCNC: 99 MMOL/L (ref 99–110)
CO2 SERPL-SCNC: 26 MMOL/L (ref 21–32)
CO2 SERPL-SCNC: 29 MMOL/L (ref 21–32)
CREAT SERPL-MCNC: 0.6 MG/DL (ref 0.9–1.3)
CREAT SERPL-MCNC: 0.7 MG/DL (ref 0.9–1.3)
DEPRECATED RDW RBC AUTO: 13.7 % (ref 12.4–15.4)
FINAL REPORT: NORMAL
FINAL REPORT: NORMAL
GFR SERPLBLD CREATININE-BSD FMLA CKD-EPI: >90 ML/MIN/{1.73_M2}
GFR SERPLBLD CREATININE-BSD FMLA CKD-EPI: >90 ML/MIN/{1.73_M2}
GLUCOSE SERPL-MCNC: 127 MG/DL (ref 70–99)
GLUCOSE SERPL-MCNC: 97 MG/DL (ref 70–99)
HCT VFR BLD AUTO: 21.8 % (ref 40.5–52.5)
HGB BLD-MCNC: 7.7 G/DL (ref 13.5–17.5)
LDH SERPL L TO P-CCNC: 96 U/L (ref 100–190)
MAGNESIUM SERPL-MCNC: 1.7 MG/DL (ref 1.8–2.4)
MCH RBC QN AUTO: 29.6 PG (ref 26–34)
MCHC RBC AUTO-ENTMCNC: 35.2 G/DL (ref 31–36)
MCV RBC AUTO: 84 FL (ref 80–100)
PHOSPHATE SERPL-MCNC: 2.4 MG/DL (ref 2.5–4.9)
PLATELET # BLD AUTO: 13 K/UL (ref 135–450)
PMV BLD AUTO: 7 FL (ref 5–10.5)
POTASSIUM SERPL-SCNC: 3.5 MMOL/L (ref 3.5–5.1)
POTASSIUM SERPL-SCNC: 4 MMOL/L (ref 3.5–5.1)
PRELIMINARY: NORMAL
PRELIMINARY: NORMAL
PROT SERPL-MCNC: 6.1 G/DL (ref 6.4–8.2)
RBC # BLD AUTO: 2.59 M/UL (ref 4.2–5.9)
SODIUM SERPL-SCNC: 137 MMOL/L (ref 136–145)
SODIUM SERPL-SCNC: 138 MMOL/L (ref 136–145)
WBC # BLD AUTO: 0.2 K/UL (ref 4–11)

## 2024-05-26 PROCEDURE — 2580000003 HC RX 258: Performed by: INTERNAL MEDICINE

## 2024-05-26 PROCEDURE — 83615 LACTATE (LD) (LDH) ENZYME: CPT

## 2024-05-26 PROCEDURE — 6370000000 HC RX 637 (ALT 250 FOR IP): Performed by: PHYSICIAN ASSISTANT

## 2024-05-26 PROCEDURE — 6360000002 HC RX W HCPCS: Performed by: NURSE PRACTITIONER

## 2024-05-26 PROCEDURE — 80076 HEPATIC FUNCTION PANEL: CPT

## 2024-05-26 PROCEDURE — 2580000003 HC RX 258: Performed by: STUDENT IN AN ORGANIZED HEALTH CARE EDUCATION/TRAINING PROGRAM

## 2024-05-26 PROCEDURE — 6370000000 HC RX 637 (ALT 250 FOR IP): Performed by: NURSE PRACTITIONER

## 2024-05-26 PROCEDURE — 84100 ASSAY OF PHOSPHORUS: CPT

## 2024-05-26 PROCEDURE — 83735 ASSAY OF MAGNESIUM: CPT

## 2024-05-26 PROCEDURE — 2580000003 HC RX 258: Performed by: NURSE PRACTITIONER

## 2024-05-26 PROCEDURE — 80048 BASIC METABOLIC PNL TOTAL CA: CPT

## 2024-05-26 PROCEDURE — 6360000002 HC RX W HCPCS: Performed by: STUDENT IN AN ORGANIZED HEALTH CARE EDUCATION/TRAINING PROGRAM

## 2024-05-26 PROCEDURE — 87449 NOS EACH ORGANISM AG IA: CPT

## 2024-05-26 PROCEDURE — 6370000000 HC RX 637 (ALT 250 FOR IP): Performed by: INTERNAL MEDICINE

## 2024-05-26 PROCEDURE — 85027 COMPLETE CBC AUTOMATED: CPT

## 2024-05-26 PROCEDURE — 36415 COLL VENOUS BLD VENIPUNCTURE: CPT

## 2024-05-26 PROCEDURE — 2060000000 HC ICU INTERMEDIATE R&B

## 2024-05-26 RX ORDER — LOPERAMIDE HYDROCHLORIDE 2 MG/1
2 CAPSULE ORAL 4 TIMES DAILY
Status: DISCONTINUED | OUTPATIENT
Start: 2024-05-26 | End: 2024-05-28

## 2024-05-26 RX ORDER — SODIUM CHLORIDE 9 MG/ML
INJECTION, SOLUTION INTRAVENOUS CONTINUOUS
Status: DISCONTINUED | OUTPATIENT
Start: 2024-05-26 | End: 2024-06-03

## 2024-05-26 RX ADMIN — VANCOMYCIN HYDROCHLORIDE 1750 MG: 10 INJECTION, POWDER, LYOPHILIZED, FOR SOLUTION INTRAVENOUS at 09:12

## 2024-05-26 RX ADMIN — MEROPENEM AND SODIUM CHLORIDE 1000 MG: 1 INJECTION, SOLUTION INTRAVENOUS at 18:57

## 2024-05-26 RX ADMIN — HYDROCORTISONE SODIUM SUCCINATE 50 MG: 100 INJECTION, POWDER, FOR SOLUTION INTRAMUSCULAR; INTRAVENOUS at 20:16

## 2024-05-26 RX ADMIN — VALACYCLOVIR HYDROCHLORIDE 500 MG: 500 TABLET, FILM COATED ORAL at 20:18

## 2024-05-26 RX ADMIN — PANTOPRAZOLE SODIUM 40 MG: 40 TABLET, DELAYED RELEASE ORAL at 17:42

## 2024-05-26 RX ADMIN — VORICONAZOLE 200 MG: 200 TABLET ORAL at 09:18

## 2024-05-26 RX ADMIN — LEVETIRACETAM 1500 MG: 500 TABLET, FILM COATED ORAL at 09:14

## 2024-05-26 RX ADMIN — SODIUM CHLORIDE, PRESERVATIVE FREE 10 ML: 5 INJECTION INTRAVENOUS at 09:14

## 2024-05-26 RX ADMIN — MUPIROCIN: 20 OINTMENT TOPICAL at 20:20

## 2024-05-26 RX ADMIN — SODIUM CHLORIDE 15 ML: 900 IRRIGANT IRRIGATION at 20:21

## 2024-05-26 RX ADMIN — MEROPENEM AND SODIUM CHLORIDE 1000 MG: 1 INJECTION, SOLUTION INTRAVENOUS at 11:40

## 2024-05-26 RX ADMIN — VANCOMYCIN HYDROCHLORIDE 125 MG: 125 CAPSULE ORAL at 14:29

## 2024-05-26 RX ADMIN — SODIUM CHLORIDE: 9 INJECTION, SOLUTION INTRAVENOUS at 11:35

## 2024-05-26 RX ADMIN — LOPERAMIDE HYDROCHLORIDE 2 MG: 2 CAPSULE ORAL at 20:18

## 2024-05-26 RX ADMIN — Medication: at 09:13

## 2024-05-26 RX ADMIN — VORICONAZOLE 200 MG: 200 TABLET ORAL at 20:18

## 2024-05-26 RX ADMIN — LOPERAMIDE HYDROCHLORIDE 2 MG: 2 CAPSULE ORAL at 14:29

## 2024-05-26 RX ADMIN — LEVETIRACETAM 1500 MG: 500 TABLET, FILM COATED ORAL at 20:18

## 2024-05-26 RX ADMIN — SODIUM CHLORIDE, PRESERVATIVE FREE 10 ML: 5 INJECTION INTRAVENOUS at 20:16

## 2024-05-26 RX ADMIN — GUAIFENESIN AND DEXTROMETHORPHAN HYDROBROMIDE 1 TABLET: 600; 30 TABLET, EXTENDED RELEASE ORAL at 20:18

## 2024-05-26 RX ADMIN — LOPERAMIDE HYDROCHLORIDE 2 MG: 2 CAPSULE ORAL at 17:42

## 2024-05-26 RX ADMIN — GUAIFENESIN AND DEXTROMETHORPHAN HYDROBROMIDE 1 TABLET: 600; 30 TABLET, EXTENDED RELEASE ORAL at 09:14

## 2024-05-26 RX ADMIN — SODIUM CHLORIDE: 9 INJECTION, SOLUTION INTRAVENOUS at 17:43

## 2024-05-26 RX ADMIN — VANCOMYCIN HYDROCHLORIDE 1750 MG: 10 INJECTION, POWDER, LYOPHILIZED, FOR SOLUTION INTRAVENOUS at 20:27

## 2024-05-26 RX ADMIN — SODIUM CHLORIDE 15 ML: 900 IRRIGANT IRRIGATION at 17:50

## 2024-05-26 RX ADMIN — VALACYCLOVIR HYDROCHLORIDE 500 MG: 500 TABLET, FILM COATED ORAL at 09:13

## 2024-05-26 RX ADMIN — VANCOMYCIN HYDROCHLORIDE 125 MG: 125 CAPSULE ORAL at 09:14

## 2024-05-26 RX ADMIN — PANTOPRAZOLE SODIUM 40 MG: 40 TABLET, DELAYED RELEASE ORAL at 06:32

## 2024-05-26 RX ADMIN — Medication: at 20:19

## 2024-05-26 RX ADMIN — MUPIROCIN: 20 OINTMENT TOPICAL at 09:14

## 2024-05-26 RX ADMIN — MEROPENEM AND SODIUM CHLORIDE 1000 MG: 1 INJECTION, SOLUTION INTRAVENOUS at 04:19

## 2024-05-26 RX ADMIN — SODIUM CHLORIDE 15 ML: 900 IRRIGANT IRRIGATION at 11:40

## 2024-05-26 RX ADMIN — POTASSIUM CHLORIDE 20 MEQ: 1500 TABLET, EXTENDED RELEASE ORAL at 09:14

## 2024-05-26 ASSESSMENT — PAIN SCALES - GENERAL
PAINLEVEL_OUTOF10: 0

## 2024-05-27 ENCOUNTER — APPOINTMENT (OUTPATIENT)
Dept: GENERAL RADIOLOGY | Age: 44
DRG: 720 | End: 2024-05-27
Attending: STUDENT IN AN ORGANIZED HEALTH CARE EDUCATION/TRAINING PROGRAM
Payer: MEDICAID

## 2024-05-27 LAB
ABO + RH BLD: NORMAL
ALBUMIN SERPL-MCNC: 3 G/DL (ref 3.4–5)
ALP SERPL-CCNC: 71 U/L (ref 40–129)
ALT SERPL-CCNC: <5 U/L (ref 10–40)
ANION GAP SERPL CALCULATED.3IONS-SCNC: 6 MMOL/L (ref 3–16)
APTT BLD: 39.1 SEC (ref 22.1–36.4)
AST SERPL-CCNC: <5 U/L (ref 15–37)
B DERMAT AB SER-ACNC: 1 IV
BILIRUB DIRECT SERPL-MCNC: <0.2 MG/DL (ref 0–0.3)
BILIRUB INDIRECT SERPL-MCNC: ABNORMAL MG/DL (ref 0–1)
BILIRUB SERPL-MCNC: 0.7 MG/DL (ref 0–1)
BLD GP AB SCN SERPL QL: NORMAL
BLOOD BANK DISPENSE STATUS: NORMAL
BLOOD BANK DISPENSE STATUS: NORMAL
BLOOD BANK PRODUCT CODE: NORMAL
BLOOD BANK PRODUCT CODE: NORMAL
BPU ID: NORMAL
BPU ID: NORMAL
BUN SERPL-MCNC: 9 MG/DL (ref 7–20)
CALCIUM SERPL-MCNC: 8.6 MG/DL (ref 8.3–10.6)
CHLORIDE SERPL-SCNC: 103 MMOL/L (ref 99–110)
CO2 SERPL-SCNC: 30 MMOL/L (ref 21–32)
CREAT SERPL-MCNC: 0.6 MG/DL (ref 0.9–1.3)
DEPRECATED RDW RBC AUTO: 13.6 % (ref 12.4–15.4)
DESCRIPTION BLOOD BANK: NORMAL
DESCRIPTION BLOOD BANK: NORMAL
GFR SERPLBLD CREATININE-BSD FMLA CKD-EPI: >90 ML/MIN/{1.73_M2}
GLUCOSE SERPL-MCNC: 118 MG/DL (ref 70–99)
HCT VFR BLD AUTO: 18.4 % (ref 40.5–52.5)
HGB BLD-MCNC: 6.5 G/DL (ref 13.5–17.5)
INR PPP: 1.21 (ref 0.85–1.15)
LDH SERPL L TO P-CCNC: 114 U/L (ref 100–190)
MAGNESIUM SERPL-MCNC: 1.7 MG/DL (ref 1.8–2.4)
MCH RBC QN AUTO: 29.8 PG (ref 26–34)
MCHC RBC AUTO-ENTMCNC: 35.4 G/DL (ref 31–36)
MCV RBC AUTO: 84.2 FL (ref 80–100)
PHOSPHATE SERPL-MCNC: 2.8 MG/DL (ref 2.5–4.9)
PLATELET # BLD AUTO: 10 K/UL (ref 135–450)
PMV BLD AUTO: 6.8 FL (ref 5–10.5)
POTASSIUM SERPL-SCNC: 4.1 MMOL/L (ref 3.5–5.1)
PROT SERPL-MCNC: 5.8 G/DL (ref 6.4–8.2)
PROTHROMBIN TIME: 15.5 SEC (ref 11.9–14.9)
RBC # BLD AUTO: 2.19 M/UL (ref 4.2–5.9)
SODIUM SERPL-SCNC: 139 MMOL/L (ref 136–145)
WBC # BLD AUTO: 0.2 K/UL (ref 4–11)

## 2024-05-27 PROCEDURE — 80076 HEPATIC FUNCTION PANEL: CPT

## 2024-05-27 PROCEDURE — 36415 COLL VENOUS BLD VENIPUNCTURE: CPT

## 2024-05-27 PROCEDURE — 86923 COMPATIBILITY TEST ELECTRIC: CPT

## 2024-05-27 PROCEDURE — 83735 ASSAY OF MAGNESIUM: CPT

## 2024-05-27 PROCEDURE — 85027 COMPLETE CBC AUTOMATED: CPT

## 2024-05-27 PROCEDURE — 2060000000 HC ICU INTERMEDIATE R&B

## 2024-05-27 PROCEDURE — 2580000003 HC RX 258: Performed by: INTERNAL MEDICINE

## 2024-05-27 PROCEDURE — 6360000002 HC RX W HCPCS: Performed by: NURSE PRACTITIONER

## 2024-05-27 PROCEDURE — 2580000003 HC RX 258: Performed by: STUDENT IN AN ORGANIZED HEALTH CARE EDUCATION/TRAINING PROGRAM

## 2024-05-27 PROCEDURE — 6370000000 HC RX 637 (ALT 250 FOR IP): Performed by: NURSE PRACTITIONER

## 2024-05-27 PROCEDURE — 85730 THROMBOPLASTIN TIME PARTIAL: CPT

## 2024-05-27 PROCEDURE — 2580000003 HC RX 258: Performed by: NURSE PRACTITIONER

## 2024-05-27 PROCEDURE — P9040 RBC LEUKOREDUCED IRRADIATED: HCPCS

## 2024-05-27 PROCEDURE — 71045 X-RAY EXAM CHEST 1 VIEW: CPT

## 2024-05-27 PROCEDURE — 36430 TRANSFUSION BLD/BLD COMPNT: CPT

## 2024-05-27 PROCEDURE — 86901 BLOOD TYPING SEROLOGIC RH(D): CPT

## 2024-05-27 PROCEDURE — 6360000002 HC RX W HCPCS: Performed by: STUDENT IN AN ORGANIZED HEALTH CARE EDUCATION/TRAINING PROGRAM

## 2024-05-27 PROCEDURE — 6370000000 HC RX 637 (ALT 250 FOR IP): Performed by: INTERNAL MEDICINE

## 2024-05-27 PROCEDURE — 84100 ASSAY OF PHOSPHORUS: CPT

## 2024-05-27 PROCEDURE — 85610 PROTHROMBIN TIME: CPT

## 2024-05-27 PROCEDURE — 80048 BASIC METABOLIC PNL TOTAL CA: CPT

## 2024-05-27 PROCEDURE — 6370000000 HC RX 637 (ALT 250 FOR IP): Performed by: PHYSICIAN ASSISTANT

## 2024-05-27 PROCEDURE — 83615 LACTATE (LD) (LDH) ENZYME: CPT

## 2024-05-27 PROCEDURE — 86900 BLOOD TYPING SEROLOGIC ABO: CPT

## 2024-05-27 PROCEDURE — P9036 PLATELET PHERESIS IRRADIATED: HCPCS

## 2024-05-27 PROCEDURE — 86850 RBC ANTIBODY SCREEN: CPT

## 2024-05-27 RX ORDER — SODIUM CHLORIDE 9 MG/ML
INJECTION, SOLUTION INTRAVENOUS PRN
Status: DISCONTINUED | OUTPATIENT
Start: 2024-05-27 | End: 2024-06-05 | Stop reason: HOSPADM

## 2024-05-27 RX ADMIN — Medication: at 19:55

## 2024-05-27 RX ADMIN — MEROPENEM AND SODIUM CHLORIDE 1000 MG: 1 INJECTION, SOLUTION INTRAVENOUS at 22:22

## 2024-05-27 RX ADMIN — GUAIFENESIN AND DEXTROMETHORPHAN HYDROBROMIDE 1 TABLET: 600; 30 TABLET, EXTENDED RELEASE ORAL at 08:26

## 2024-05-27 RX ADMIN — GUAIFENESIN AND DEXTROMETHORPHAN HYDROBROMIDE 1 TABLET: 600; 30 TABLET, EXTENDED RELEASE ORAL at 19:55

## 2024-05-27 RX ADMIN — PANTOPRAZOLE SODIUM 40 MG: 40 TABLET, DELAYED RELEASE ORAL at 06:48

## 2024-05-27 RX ADMIN — VANCOMYCIN HYDROCHLORIDE 125 MG: 125 CAPSULE ORAL at 08:26

## 2024-05-27 RX ADMIN — MUPIROCIN: 20 OINTMENT TOPICAL at 19:55

## 2024-05-27 RX ADMIN — MEROPENEM AND SODIUM CHLORIDE 1000 MG: 1 INJECTION, SOLUTION INTRAVENOUS at 13:00

## 2024-05-27 RX ADMIN — VORICONAZOLE 200 MG: 200 TABLET ORAL at 19:55

## 2024-05-27 RX ADMIN — SODIUM CHLORIDE, PRESERVATIVE FREE 10 ML: 5 INJECTION INTRAVENOUS at 19:56

## 2024-05-27 RX ADMIN — MUPIROCIN: 20 OINTMENT TOPICAL at 08:26

## 2024-05-27 RX ADMIN — ACETAMINOPHEN 650 MG: 325 TABLET ORAL at 06:48

## 2024-05-27 RX ADMIN — PANTOPRAZOLE SODIUM 40 MG: 40 TABLET, DELAYED RELEASE ORAL at 15:02

## 2024-05-27 RX ADMIN — LOPERAMIDE HYDROCHLORIDE 2 MG: 2 CAPSULE ORAL at 16:39

## 2024-05-27 RX ADMIN — VANCOMYCIN HYDROCHLORIDE 125 MG: 125 CAPSULE ORAL at 14:45

## 2024-05-27 RX ADMIN — SODIUM CHLORIDE 15 ML: 900 IRRIGANT IRRIGATION at 19:56

## 2024-05-27 RX ADMIN — VANCOMYCIN HYDROCHLORIDE 1750 MG: 10 INJECTION, POWDER, LYOPHILIZED, FOR SOLUTION INTRAVENOUS at 19:39

## 2024-05-27 RX ADMIN — SODIUM CHLORIDE: 9 INJECTION, SOLUTION INTRAVENOUS at 15:03

## 2024-05-27 RX ADMIN — Medication: at 08:27

## 2024-05-27 RX ADMIN — LEVETIRACETAM 1500 MG: 500 TABLET, FILM COATED ORAL at 19:54

## 2024-05-27 RX ADMIN — POTASSIUM CHLORIDE 20 MEQ: 1500 TABLET, EXTENDED RELEASE ORAL at 08:25

## 2024-05-27 RX ADMIN — LEVETIRACETAM 1500 MG: 500 TABLET, FILM COATED ORAL at 08:25

## 2024-05-27 RX ADMIN — LOPERAMIDE HYDROCHLORIDE 2 MG: 2 CAPSULE ORAL at 08:26

## 2024-05-27 RX ADMIN — SODIUM CHLORIDE, PRESERVATIVE FREE 10 ML: 5 INJECTION INTRAVENOUS at 08:26

## 2024-05-27 RX ADMIN — VALACYCLOVIR HYDROCHLORIDE 500 MG: 500 TABLET, FILM COATED ORAL at 08:26

## 2024-05-27 RX ADMIN — MEROPENEM AND SODIUM CHLORIDE 1000 MG: 1 INJECTION, SOLUTION INTRAVENOUS at 03:44

## 2024-05-27 RX ADMIN — VANCOMYCIN HYDROCHLORIDE 1750 MG: 10 INJECTION, POWDER, LYOPHILIZED, FOR SOLUTION INTRAVENOUS at 08:06

## 2024-05-27 RX ADMIN — VORICONAZOLE 200 MG: 200 TABLET ORAL at 08:26

## 2024-05-27 RX ADMIN — ACETAMINOPHEN 650 MG: 325 TABLET ORAL at 15:02

## 2024-05-27 RX ADMIN — VALACYCLOVIR HYDROCHLORIDE 500 MG: 500 TABLET, FILM COATED ORAL at 19:55

## 2024-05-27 RX ADMIN — LOPERAMIDE HYDROCHLORIDE 2 MG: 2 CAPSULE ORAL at 13:02

## 2024-05-27 RX ADMIN — LOPERAMIDE HYDROCHLORIDE 2 MG: 2 CAPSULE ORAL at 19:55

## 2024-05-27 RX ADMIN — HYDROCORTISONE SODIUM SUCCINATE 50 MG: 100 INJECTION, POWDER, FOR SOLUTION INTRAMUSCULAR; INTRAVENOUS at 13:02

## 2024-05-27 RX ADMIN — ACETAMINOPHEN 650 MG: 325 TABLET ORAL at 10:50

## 2024-05-28 ENCOUNTER — APPOINTMENT (OUTPATIENT)
Dept: GENERAL RADIOLOGY | Age: 44
DRG: 720 | End: 2024-05-28
Attending: STUDENT IN AN ORGANIZED HEALTH CARE EDUCATION/TRAINING PROGRAM
Payer: MEDICAID

## 2024-05-28 LAB
(1,3)-BETA-D-GLUCAN (FUNGITELL) INTERPRETATION: NEGATIVE
1,3 BETA GLUCAN SER-MCNC: <31 PG/ML
ALBUMIN SERPL-MCNC: 2.6 G/DL (ref 3.4–5)
ALP SERPL-CCNC: 73 U/L (ref 40–129)
ALT SERPL-CCNC: <5 U/L (ref 10–40)
AMORPH SED URNS QL MICRO: ABNORMAL /HPF
ANION GAP SERPL CALCULATED.3IONS-SCNC: 10 MMOL/L (ref 3–16)
APTT BLD: 41.5 SEC (ref 22.1–36.4)
AST SERPL-CCNC: 8 U/L (ref 15–37)
BACTERIA THROAT AEROBE CULT: NORMAL
BACTERIA URNS QL MICRO: ABNORMAL /HPF
BILIRUB DIRECT SERPL-MCNC: <0.2 MG/DL (ref 0–0.3)
BILIRUB INDIRECT SERPL-MCNC: ABNORMAL MG/DL (ref 0–1)
BILIRUB SERPL-MCNC: 0.6 MG/DL (ref 0–1)
BILIRUB UR QL STRIP.AUTO: NEGATIVE
BLOOD BANK DISPENSE STATUS: NORMAL
BLOOD BANK PRODUCT CODE: NORMAL
BPU ID: NORMAL
BUN SERPL-MCNC: 8 MG/DL (ref 7–20)
C DIFF TOX A+B STL QL IA: NORMAL
CALCIUM SERPL-MCNC: 8 MG/DL (ref 8.3–10.6)
CHLORIDE SERPL-SCNC: 104 MMOL/L (ref 99–110)
CLARITY UR: CLEAR
CO2 SERPL-SCNC: 22 MMOL/L (ref 21–32)
COLOR UR: YELLOW
CREAT SERPL-MCNC: 0.6 MG/DL (ref 0.9–1.3)
DEPRECATED RDW RBC AUTO: 13.5 % (ref 12.4–15.4)
DESCRIPTION BLOOD BANK: NORMAL
EPI CELLS #/AREA URNS HPF: ABNORMAL /HPF (ref 0–5)
FINAL REPORT: NORMAL
GFR SERPLBLD CREATININE-BSD FMLA CKD-EPI: >90 ML/MIN/{1.73_M2}
GLUCOSE SERPL-MCNC: 87 MG/DL (ref 70–99)
GLUCOSE UR STRIP.AUTO-MCNC: NEGATIVE MG/DL
H CAPSUL AB TITR SER ID: NOT DETECTED {TITER}
HCT VFR BLD AUTO: 23.2 % (ref 40.5–52.5)
HGB BLD-MCNC: 7.9 G/DL (ref 13.5–17.5)
HGB UR QL STRIP.AUTO: NEGATIVE
INR PPP: 1.23 (ref 0.85–1.15)
KETONES UR STRIP.AUTO-MCNC: NEGATIVE MG/DL
LACTATE BLDV-SCNC: 0.9 MMOL/L (ref 0.4–2)
LDH SERPL L TO P-CCNC: 172 U/L (ref 100–190)
LEUKOCYTE ESTERASE UR QL STRIP.AUTO: NEGATIVE
MCH RBC QN AUTO: 29.1 PG (ref 26–34)
MCHC RBC AUTO-ENTMCNC: 34.2 G/DL (ref 31–36)
MCV RBC AUTO: 85.3 FL (ref 80–100)
MUCOUS THREADS #/AREA URNS LPF: ABNORMAL /LPF
NITRITE UR QL STRIP.AUTO: NEGATIVE
PH UR STRIP.AUTO: 6 [PH] (ref 5–8)
PHOSPHATE SERPL-MCNC: 3 MG/DL (ref 2.5–4.9)
PLATELET # BLD AUTO: 6 K/UL (ref 135–450)
PMV BLD AUTO: 8.8 FL (ref 5–10.5)
POTASSIUM SERPL-SCNC: 4.2 MMOL/L (ref 3.5–5.1)
PROT SERPL-MCNC: 5.6 G/DL (ref 6.4–8.2)
PROT UR STRIP.AUTO-MCNC: 30 MG/DL
PROTHROMBIN TIME: 15.7 SEC (ref 11.9–14.9)
RBC # BLD AUTO: 2.73 M/UL (ref 4.2–5.9)
RBC #/AREA URNS HPF: ABNORMAL /HPF (ref 0–4)
SODIUM SERPL-SCNC: 136 MMOL/L (ref 136–145)
SP GR UR STRIP.AUTO: >=1.03 (ref 1–1.03)
UA DIPSTICK W REFLEX MICRO PNL UR: YES
URN SPEC COLLECT METH UR: ABNORMAL
UROBILINOGEN UR STRIP-ACNC: 0.2 E.U./DL
WBC # BLD AUTO: 0.4 K/UL (ref 4–11)
WBC #/AREA URNS HPF: ABNORMAL /HPF (ref 0–5)

## 2024-05-28 PROCEDURE — 6360000002 HC RX W HCPCS: Performed by: STUDENT IN AN ORGANIZED HEALTH CARE EDUCATION/TRAINING PROGRAM

## 2024-05-28 PROCEDURE — 83605 ASSAY OF LACTIC ACID: CPT

## 2024-05-28 PROCEDURE — 80076 HEPATIC FUNCTION PANEL: CPT

## 2024-05-28 PROCEDURE — 87253 VIRUS INOCULATE TISSUE ADDL: CPT

## 2024-05-28 PROCEDURE — 6370000000 HC RX 637 (ALT 250 FOR IP): Performed by: NURSE PRACTITIONER

## 2024-05-28 PROCEDURE — 2580000003 HC RX 258: Performed by: INTERNAL MEDICINE

## 2024-05-28 PROCEDURE — 6370000000 HC RX 637 (ALT 250 FOR IP): Performed by: PHYSICIAN ASSISTANT

## 2024-05-28 PROCEDURE — 87102 FUNGUS ISOLATION CULTURE: CPT

## 2024-05-28 PROCEDURE — 87252 VIRUS INOCULATION TISSUE: CPT

## 2024-05-28 PROCEDURE — 87324 CLOSTRIDIUM AG IA: CPT

## 2024-05-28 PROCEDURE — 6370000000 HC RX 637 (ALT 250 FOR IP): Performed by: INTERNAL MEDICINE

## 2024-05-28 PROCEDURE — 87205 SMEAR GRAM STAIN: CPT

## 2024-05-28 PROCEDURE — 99233 SBSQ HOSP IP/OBS HIGH 50: CPT | Performed by: INTERNAL MEDICINE

## 2024-05-28 PROCEDURE — 85730 THROMBOPLASTIN TIME PARTIAL: CPT

## 2024-05-28 PROCEDURE — 85027 COMPLETE CBC AUTOMATED: CPT

## 2024-05-28 PROCEDURE — 87040 BLOOD CULTURE FOR BACTERIA: CPT

## 2024-05-28 PROCEDURE — 84100 ASSAY OF PHOSPHORUS: CPT

## 2024-05-28 PROCEDURE — 2580000003 HC RX 258: Performed by: NURSE PRACTITIONER

## 2024-05-28 PROCEDURE — 87086 URINE CULTURE/COLONY COUNT: CPT

## 2024-05-28 PROCEDURE — 6360000002 HC RX W HCPCS: Performed by: NURSE PRACTITIONER

## 2024-05-28 PROCEDURE — 2060000000 HC ICU INTERMEDIATE R&B

## 2024-05-28 PROCEDURE — 6370000000 HC RX 637 (ALT 250 FOR IP): Performed by: STUDENT IN AN ORGANIZED HEALTH CARE EDUCATION/TRAINING PROGRAM

## 2024-05-28 PROCEDURE — 71045 X-RAY EXAM CHEST 1 VIEW: CPT

## 2024-05-28 PROCEDURE — 81001 URINALYSIS AUTO W/SCOPE: CPT

## 2024-05-28 PROCEDURE — 85610 PROTHROMBIN TIME: CPT

## 2024-05-28 PROCEDURE — 83615 LACTATE (LD) (LDH) ENZYME: CPT

## 2024-05-28 PROCEDURE — 6360000002 HC RX W HCPCS: Performed by: INTERNAL MEDICINE

## 2024-05-28 PROCEDURE — 80048 BASIC METABOLIC PNL TOTAL CA: CPT

## 2024-05-28 PROCEDURE — 36430 TRANSFUSION BLD/BLD COMPNT: CPT

## 2024-05-28 PROCEDURE — 87070 CULTURE OTHR SPECIMN AEROBIC: CPT

## 2024-05-28 PROCEDURE — 2580000003 HC RX 258: Performed by: STUDENT IN AN ORGANIZED HEALTH CARE EDUCATION/TRAINING PROGRAM

## 2024-05-28 PROCEDURE — 87103 BLOOD FUNGUS CULTURE: CPT

## 2024-05-28 PROCEDURE — 87449 NOS EACH ORGANISM AG IA: CPT

## 2024-05-28 RX ORDER — VORICONAZOLE 200 MG/1
200 TABLET, FILM COATED ORAL EVERY 12 HOURS SCHEDULED
Status: DISCONTINUED | OUTPATIENT
Start: 2024-05-28 | End: 2024-05-28

## 2024-05-28 RX ORDER — METHOCARBAMOL 500 MG/1
1000 TABLET, FILM COATED ORAL ONCE
Status: COMPLETED | OUTPATIENT
Start: 2024-05-28 | End: 2024-05-28

## 2024-05-28 RX ORDER — SODIUM CHLORIDE 9 MG/ML
INJECTION, SOLUTION INTRAVENOUS PRN
Status: DISCONTINUED | OUTPATIENT
Start: 2024-05-28 | End: 2024-06-05 | Stop reason: HOSPADM

## 2024-05-28 RX ORDER — VORICONAZOLE 50 MG/1
100 TABLET, FILM COATED ORAL ONCE
Status: COMPLETED | OUTPATIENT
Start: 2024-05-28 | End: 2024-05-28

## 2024-05-28 RX ORDER — LOPERAMIDE HYDROCHLORIDE 2 MG/1
2 CAPSULE ORAL 4 TIMES DAILY PRN
Status: DISCONTINUED | OUTPATIENT
Start: 2024-05-28 | End: 2024-06-05 | Stop reason: HOSPADM

## 2024-05-28 RX ADMIN — VORICONAZOLE 300 MG: 200 TABLET ORAL at 20:41

## 2024-05-28 RX ADMIN — MUPIROCIN: 20 OINTMENT TOPICAL at 20:45

## 2024-05-28 RX ADMIN — METHOCARBAMOL TABLETS 1000 MG: 500 TABLET, COATED ORAL at 12:43

## 2024-05-28 RX ADMIN — PANTOPRAZOLE SODIUM 40 MG: 40 TABLET, DELAYED RELEASE ORAL at 15:50

## 2024-05-28 RX ADMIN — POTASSIUM CHLORIDE 20 MEQ: 1500 TABLET, EXTENDED RELEASE ORAL at 08:56

## 2024-05-28 RX ADMIN — ACETAMINOPHEN 650 MG: 325 TABLET ORAL at 05:07

## 2024-05-28 RX ADMIN — Medication: at 20:45

## 2024-05-28 RX ADMIN — VANCOMYCIN HYDROCHLORIDE 1750 MG: 10 INJECTION, POWDER, LYOPHILIZED, FOR SOLUTION INTRAVENOUS at 20:40

## 2024-05-28 RX ADMIN — VORICONAZOLE 200 MG: 200 TABLET ORAL at 08:56

## 2024-05-28 RX ADMIN — VANCOMYCIN HYDROCHLORIDE 125 MG: 125 CAPSULE ORAL at 08:56

## 2024-05-28 RX ADMIN — MEROPENEM AND SODIUM CHLORIDE 1000 MG: 1 INJECTION, SOLUTION INTRAVENOUS at 20:45

## 2024-05-28 RX ADMIN — HYDROCORTISONE SODIUM SUCCINATE 50 MG: 100 INJECTION, POWDER, FOR SOLUTION INTRAMUSCULAR; INTRAVENOUS at 10:45

## 2024-05-28 RX ADMIN — SODIUM CHLORIDE, PRESERVATIVE FREE 10 ML: 5 INJECTION INTRAVENOUS at 08:58

## 2024-05-28 RX ADMIN — OXYCODONE 5 MG: 5 TABLET ORAL at 12:21

## 2024-05-28 RX ADMIN — Medication: at 08:57

## 2024-05-28 RX ADMIN — SODIUM CHLORIDE, PRESERVATIVE FREE 10 ML: 5 INJECTION INTRAVENOUS at 20:46

## 2024-05-28 RX ADMIN — SODIUM CHLORIDE: 9 INJECTION, SOLUTION INTRAVENOUS at 10:50

## 2024-05-28 RX ADMIN — VORICONAZOLE 100 MG: 200 TABLET ORAL at 12:26

## 2024-05-28 RX ADMIN — GUAIFENESIN AND DEXTROMETHORPHAN HYDROBROMIDE 1 TABLET: 600; 30 TABLET, EXTENDED RELEASE ORAL at 20:41

## 2024-05-28 RX ADMIN — GUAIFENESIN AND DEXTROMETHORPHAN HYDROBROMIDE 1 TABLET: 600; 30 TABLET, EXTENDED RELEASE ORAL at 08:56

## 2024-05-28 RX ADMIN — ACETAMINOPHEN 650 MG: 325 TABLET ORAL at 00:08

## 2024-05-28 RX ADMIN — MUPIROCIN: 20 OINTMENT TOPICAL at 08:57

## 2024-05-28 RX ADMIN — VALACYCLOVIR HYDROCHLORIDE 500 MG: 500 TABLET, FILM COATED ORAL at 20:41

## 2024-05-28 RX ADMIN — MEROPENEM AND SODIUM CHLORIDE 1000 MG: 1 INJECTION, SOLUTION INTRAVENOUS at 12:26

## 2024-05-28 RX ADMIN — VALACYCLOVIR HYDROCHLORIDE 500 MG: 500 TABLET, FILM COATED ORAL at 08:56

## 2024-05-28 RX ADMIN — MEROPENEM AND SODIUM CHLORIDE 1000 MG: 1 INJECTION, SOLUTION INTRAVENOUS at 05:05

## 2024-05-28 RX ADMIN — VANCOMYCIN HYDROCHLORIDE 125 MG: 125 CAPSULE ORAL at 13:15

## 2024-05-28 RX ADMIN — LEVETIRACETAM 1500 MG: 500 TABLET, FILM COATED ORAL at 08:55

## 2024-05-28 RX ADMIN — PANTOPRAZOLE SODIUM 40 MG: 40 TABLET, DELAYED RELEASE ORAL at 05:08

## 2024-05-28 RX ADMIN — LEVETIRACETAM 1500 MG: 500 TABLET, FILM COATED ORAL at 20:41

## 2024-05-28 RX ADMIN — ACETAMINOPHEN 650 MG: 325 TABLET ORAL at 09:06

## 2024-05-28 RX ADMIN — VANCOMYCIN HYDROCHLORIDE 1750 MG: 10 INJECTION, POWDER, LYOPHILIZED, FOR SOLUTION INTRAVENOUS at 08:09

## 2024-05-28 RX ADMIN — ACETAMINOPHEN 650 MG: 325 TABLET ORAL at 12:23

## 2024-05-28 RX ADMIN — LOPERAMIDE HYDROCHLORIDE 2 MG: 2 CAPSULE ORAL at 08:56

## 2024-05-28 ASSESSMENT — PAIN DESCRIPTION - DESCRIPTORS
DESCRIPTORS: PATIENT UNABLE TO DESCRIBE
DESCRIPTORS: PATIENT UNABLE TO DESCRIBE

## 2024-05-28 ASSESSMENT — PAIN SCALES - GENERAL
PAINLEVEL_OUTOF10: 0
PAINLEVEL_OUTOF10: 10
PAINLEVEL_OUTOF10: 0
PAINLEVEL_OUTOF10: 10
PAINLEVEL_OUTOF10: 0

## 2024-05-28 ASSESSMENT — PAIN - FUNCTIONAL ASSESSMENT: PAIN_FUNCTIONAL_ASSESSMENT: PREVENTS OR INTERFERES SOME ACTIVE ACTIVITIES AND ADLS

## 2024-05-28 NOTE — CARE COORDINATION
Attended MD rounds.     Pt plans on returning home with his father when able.    SW will continue to follow    JENNIFER Ortiz   for [facility] ([facility])  [employer] Mobile: [phone]

## 2024-05-29 ENCOUNTER — APPOINTMENT (OUTPATIENT)
Dept: CT IMAGING | Age: 44
DRG: 720 | End: 2024-05-29
Attending: STUDENT IN AN ORGANIZED HEALTH CARE EDUCATION/TRAINING PROGRAM
Payer: MEDICAID

## 2024-05-29 LAB
ALBUMIN SERPL-MCNC: 2.8 G/DL (ref 3.4–5)
ALP SERPL-CCNC: 68 U/L (ref 40–129)
ALT SERPL-CCNC: <5 U/L (ref 10–40)
ANION GAP SERPL CALCULATED.3IONS-SCNC: 9 MMOL/L (ref 3–16)
AST SERPL-CCNC: <5 U/L (ref 15–37)
BACTERIA BLD CULT ORG #2: NORMAL
BACTERIA BLD CULT: NORMAL
BACTERIA UR CULT: NORMAL
BILIRUB DIRECT SERPL-MCNC: <0.2 MG/DL (ref 0–0.3)
BILIRUB INDIRECT SERPL-MCNC: ABNORMAL MG/DL (ref 0–1)
BILIRUB SERPL-MCNC: 0.6 MG/DL (ref 0–1)
BLOOD BANK DISPENSE STATUS: NORMAL
BLOOD BANK PRODUCT CODE: NORMAL
BPU ID: NORMAL
BUN SERPL-MCNC: 7 MG/DL (ref 7–20)
CALCIUM SERPL-MCNC: 8.2 MG/DL (ref 8.3–10.6)
CHLORIDE SERPL-SCNC: 101 MMOL/L (ref 99–110)
CO2 SERPL-SCNC: 27 MMOL/L (ref 21–32)
CREAT SERPL-MCNC: 0.7 MG/DL (ref 0.9–1.3)
DEPRECATED RDW RBC AUTO: 13.4 % (ref 12.4–15.4)
DESCRIPTION BLOOD BANK: NORMAL
GFR SERPLBLD CREATININE-BSD FMLA CKD-EPI: >90 ML/MIN/{1.73_M2}
GLUCOSE SERPL-MCNC: 101 MG/DL (ref 70–99)
HCT VFR BLD AUTO: 19.7 % (ref 40.5–52.5)
HGB BLD-MCNC: 6.9 G/DL (ref 13.5–17.5)
LDH SERPL L TO P-CCNC: 102 U/L (ref 100–190)
LOEFFLER MB STN SPEC: NORMAL
LOEFFLER MB STN SPEC: NORMAL
MAGNESIUM SERPL-MCNC: 1.6 MG/DL (ref 1.8–2.4)
MCH RBC QN AUTO: 29.4 PG (ref 26–34)
MCHC RBC AUTO-ENTMCNC: 35 G/DL (ref 31–36)
MCV RBC AUTO: 84 FL (ref 80–100)
PHOSPHATE SERPL-MCNC: 2.5 MG/DL (ref 2.5–4.9)
PLATELET # BLD AUTO: 35 K/UL (ref 135–450)
PMV BLD AUTO: 8.4 FL (ref 5–10.5)
POTASSIUM SERPL-SCNC: 3.8 MMOL/L (ref 3.5–5.1)
PROT SERPL-MCNC: 5.7 G/DL (ref 6.4–8.2)
RBC # BLD AUTO: 2.34 M/UL (ref 4.2–5.9)
SODIUM SERPL-SCNC: 137 MMOL/L (ref 136–145)
WBC # BLD AUTO: 0.4 K/UL (ref 4–11)

## 2024-05-29 PROCEDURE — 88185 FLOWCYTOMETRY/TC ADD-ON: CPT

## 2024-05-29 PROCEDURE — 88342 IMHCHEM/IMCYTCHM 1ST ANTB: CPT

## 2024-05-29 PROCEDURE — 83520 IMMUNOASSAY QUANT NOS NONAB: CPT

## 2024-05-29 PROCEDURE — 88184 FLOWCYTOMETRY/ TC 1 MARKER: CPT

## 2024-05-29 PROCEDURE — 84100 ASSAY OF PHOSPHORUS: CPT

## 2024-05-29 PROCEDURE — 2580000003 HC RX 258: Performed by: NURSE PRACTITIONER

## 2024-05-29 PROCEDURE — 99232 SBSQ HOSP IP/OBS MODERATE 35: CPT | Performed by: INTERNAL MEDICINE

## 2024-05-29 PROCEDURE — 2580000003 HC RX 258: Performed by: INTERNAL MEDICINE

## 2024-05-29 PROCEDURE — 6360000002 HC RX W HCPCS: Performed by: RADIOLOGY

## 2024-05-29 PROCEDURE — 36430 TRANSFUSION BLD/BLD COMPNT: CPT

## 2024-05-29 PROCEDURE — 6370000000 HC RX 637 (ALT 250 FOR IP): Performed by: NURSE PRACTITIONER

## 2024-05-29 PROCEDURE — 2060000000 HC ICU INTERMEDIATE R&B

## 2024-05-29 PROCEDURE — 83735 ASSAY OF MAGNESIUM: CPT

## 2024-05-29 PROCEDURE — 80076 HEPATIC FUNCTION PANEL: CPT

## 2024-05-29 PROCEDURE — 2500000003 HC RX 250 WO HCPCS: Performed by: RADIOLOGY

## 2024-05-29 PROCEDURE — 6360000002 HC RX W HCPCS: Performed by: NURSE PRACTITIONER

## 2024-05-29 PROCEDURE — 80048 BASIC METABOLIC PNL TOTAL CA: CPT

## 2024-05-29 PROCEDURE — 6370000000 HC RX 637 (ALT 250 FOR IP): Performed by: INTERNAL MEDICINE

## 2024-05-29 PROCEDURE — 85027 COMPLETE CBC AUTOMATED: CPT

## 2024-05-29 PROCEDURE — 07DR3ZX EXTRACTION OF ILIAC BONE MARROW, PERCUTANEOUS APPROACH, DIAGNOSTIC: ICD-10-PCS | Performed by: INTERNAL MEDICINE

## 2024-05-29 PROCEDURE — C1830 POWER BONE MARROW BX NEEDLE: HCPCS

## 2024-05-29 PROCEDURE — 83615 LACTATE (LD) (LDH) ENZYME: CPT

## 2024-05-29 PROCEDURE — 88311 DECALCIFY TISSUE: CPT

## 2024-05-29 PROCEDURE — 88305 TISSUE EXAM BY PATHOLOGIST: CPT

## 2024-05-29 PROCEDURE — 88313 SPECIAL STAINS GROUP 2: CPT

## 2024-05-29 RX ORDER — LIDOCAINE HYDROCHLORIDE 10 MG/ML
INJECTION, SOLUTION EPIDURAL; INFILTRATION; INTRACAUDAL; PERINEURAL PRN
Status: COMPLETED | OUTPATIENT
Start: 2024-05-29 | End: 2024-05-29

## 2024-05-29 RX ORDER — FENTANYL CITRATE 50 UG/ML
INJECTION, SOLUTION INTRAMUSCULAR; INTRAVENOUS PRN
Status: COMPLETED | OUTPATIENT
Start: 2024-05-29 | End: 2024-05-29

## 2024-05-29 RX ORDER — BUPIVACAINE HYDROCHLORIDE 5 MG/ML
INJECTION, SOLUTION EPIDURAL; INTRACAUDAL PRN
Status: COMPLETED | OUTPATIENT
Start: 2024-05-29 | End: 2024-05-29

## 2024-05-29 RX ORDER — MIDAZOLAM HYDROCHLORIDE 1 MG/ML
INJECTION INTRAMUSCULAR; INTRAVENOUS PRN
Status: COMPLETED | OUTPATIENT
Start: 2024-05-29 | End: 2024-05-29

## 2024-05-29 RX ADMIN — FENTANYL CITRATE 50 MCG: 50 INJECTION, SOLUTION INTRAMUSCULAR; INTRAVENOUS at 11:52

## 2024-05-29 RX ADMIN — PANTOPRAZOLE SODIUM 40 MG: 40 TABLET, DELAYED RELEASE ORAL at 15:09

## 2024-05-29 RX ADMIN — VALACYCLOVIR HYDROCHLORIDE 500 MG: 500 TABLET, FILM COATED ORAL at 07:56

## 2024-05-29 RX ADMIN — GUAIFENESIN AND DEXTROMETHORPHAN HYDROBROMIDE 1 TABLET: 600; 30 TABLET, EXTENDED RELEASE ORAL at 07:57

## 2024-05-29 RX ADMIN — PANTOPRAZOLE SODIUM 40 MG: 40 TABLET, DELAYED RELEASE ORAL at 06:30

## 2024-05-29 RX ADMIN — SODIUM CHLORIDE, PRESERVATIVE FREE 10 ML: 5 INJECTION INTRAVENOUS at 08:01

## 2024-05-29 RX ADMIN — ACETAMINOPHEN 650 MG: 325 TABLET ORAL at 20:10

## 2024-05-29 RX ADMIN — SODIUM CHLORIDE: 9 INJECTION, SOLUTION INTRAVENOUS at 12:56

## 2024-05-29 RX ADMIN — GUAIFENESIN AND DEXTROMETHORPHAN HYDROBROMIDE 1 TABLET: 600; 30 TABLET, EXTENDED RELEASE ORAL at 21:25

## 2024-05-29 RX ADMIN — HYDROCORTISONE SODIUM SUCCINATE 50 MG: 100 INJECTION, POWDER, FOR SOLUTION INTRAMUSCULAR; INTRAVENOUS at 16:37

## 2024-05-29 RX ADMIN — VORICONAZOLE 300 MG: 200 TABLET ORAL at 07:56

## 2024-05-29 RX ADMIN — LEVETIRACETAM 1500 MG: 500 TABLET, FILM COATED ORAL at 21:30

## 2024-05-29 RX ADMIN — LIDOCAINE HYDROCHLORIDE 15 ML: 10 INJECTION, SOLUTION EPIDURAL; INFILTRATION; INTRACAUDAL; PERINEURAL at 11:56

## 2024-05-29 RX ADMIN — BUPIVACAINE HYDROCHLORIDE 15 ML: 5 INJECTION, SOLUTION EPIDURAL; INTRACAUDAL at 11:57

## 2024-05-29 RX ADMIN — LEVETIRACETAM 1500 MG: 500 TABLET, FILM COATED ORAL at 07:56

## 2024-05-29 RX ADMIN — MEROPENEM AND SODIUM CHLORIDE 1000 MG: 1 INJECTION, SOLUTION INTRAVENOUS at 04:46

## 2024-05-29 RX ADMIN — MIDAZOLAM HYDROCHLORIDE 1 MG: 2 INJECTION, SOLUTION INTRAMUSCULAR; INTRAVENOUS at 11:52

## 2024-05-29 RX ADMIN — VANCOMYCIN HYDROCHLORIDE 125 MG: 125 CAPSULE ORAL at 12:36

## 2024-05-29 RX ADMIN — MEROPENEM AND SODIUM CHLORIDE 1000 MG: 1 INJECTION, SOLUTION INTRAVENOUS at 12:36

## 2024-05-29 RX ADMIN — ACETAMINOPHEN 650 MG: 325 TABLET ORAL at 04:49

## 2024-05-29 RX ADMIN — VORICONAZOLE 300 MG: 200 TABLET ORAL at 21:25

## 2024-05-29 RX ADMIN — POTASSIUM CHLORIDE 20 MEQ: 1500 TABLET, EXTENDED RELEASE ORAL at 07:57

## 2024-05-29 RX ADMIN — MEROPENEM AND SODIUM CHLORIDE 1000 MG: 1 INJECTION, SOLUTION INTRAVENOUS at 21:33

## 2024-05-29 RX ADMIN — FILGRASTIM-AAFI 300 MCG: 300 INJECTION, SOLUTION SUBCUTANEOUS at 12:36

## 2024-05-29 RX ADMIN — VANCOMYCIN HYDROCHLORIDE 125 MG: 125 CAPSULE ORAL at 07:56

## 2024-05-29 RX ADMIN — SODIUM CHLORIDE, PRESERVATIVE FREE 10 ML: 5 INJECTION INTRAVENOUS at 21:25

## 2024-05-29 RX ADMIN — MUPIROCIN: 20 OINTMENT TOPICAL at 21:33

## 2024-05-29 RX ADMIN — Medication: at 21:33

## 2024-05-29 RX ADMIN — HYDROCORTISONE SODIUM SUCCINATE 50 MG: 100 INJECTION, POWDER, FOR SOLUTION INTRAMUSCULAR; INTRAVENOUS at 03:01

## 2024-05-29 RX ADMIN — ACETAMINOPHEN 650 MG: 325 TABLET ORAL at 01:02

## 2024-05-29 RX ADMIN — ACETAMINOPHEN 650 MG: 325 TABLET ORAL at 15:48

## 2024-05-29 RX ADMIN — VALACYCLOVIR HYDROCHLORIDE 500 MG: 500 TABLET, FILM COATED ORAL at 21:25

## 2024-05-29 ASSESSMENT — PAIN SCALES - GENERAL
PAINLEVEL_OUTOF10: 0

## 2024-05-29 NOTE — PRE SEDATION
Patient:  Dennys Peguero   :   1980      Relevant clinical history, particularly as it involves the pending procedure, was reviewed and discussed.    The procedure including risks, benefits, and alternatives was discussed at length with the patient (or designated family member) and all questions were answered.  Informed consent to proceed with the procedure was given.    Vital signs were monitored and documented by the Radiology nurse.    Targeted physical examination  Heart : regular rate and rhythm  Lungs : clear, breathing easily  Condition : stable    [facility] nurses notes reviewed and agreed.    Past Medical History:        Diagnosis Date    Brain cancer (HCC)     diagnosed in 2017    Seizure (HCC)        Past Surgical History:           Procedure Laterality Date    BRAIN SURGERY      Surgery in 2017 at [facility]    BRONCHOSCOPY N/A 2024    BRONCHOSCOPY ALVEOLAR LAVAGE performed by Froylan Hoang MD at [facility] ENDOSCOPY    COLONOSCOPY N/A 3/22/2024    COLONOSCOPY POLYPECTOMY HOT SNARE/COLD BIOPSY performed by Marcus Uribe MD at [facility] ENDOSCOPY    SMALL INTESTINE SURGERY N/A 2024    ROBOTIC LOW ANTERIOR RESECTION WITH COLORECTAL ANASTOMOSIS performed by Rakesh Montano MD at [facility] OR    UPPER GASTROINTESTINAL ENDOSCOPY N/A 3/6/2024    ESOPHAGOGASTRODUODENOSCOPY performed by Wade Cantrell MD at [facility] ENDOSCOPY       Allergies:  Oceano meal    Medications:   Home Meds  No current facility-administered medications on file prior to encounter.     Current Outpatient Medications on File Prior to Encounter   Medication Sig Dispense Refill    Venetoclax 100 MG TABS Take 2 tablets by mouth every 24 hours for 24 doses 48 tablet 0    levoFLOXacin (LEVAQUIN) 500 MG tablet Take 1 tablet by mouth daily 30 tablet 0    vancomycin (VANCOCIN) 125 MG capsule Take 1 capsule by mouth 2 times daily at 0800 and 1400 60 capsule 0    ondansetron (ZOFRAN-ODT) 4 MG disintegrating tablet Place 1 tablet under 
minimum assist (75% patients effort)

## 2024-05-29 NOTE — CONSULTS
[facility] - Clinical Pharmacy Note    Pharmacy was consulted to dose vancomycin.    Vancomycin therapy has been discontinued. Pharmacy will sign off at this time. Please consider consulting pharmacy again if vancomycin is re-started.    Thank you for allowing us to participate in the care of this patient.    Saida Earl, PharmD  [facility] Clinical Pharmacist  Phone: [phone]

## 2024-05-29 NOTE — BRIEF OP NOTE
Brief Postoperative Note    Dennys Peguero  YOB: 1980  [id number]    Pre-operative Diagnosis: AML    Post-operative Diagnosis: Same    Procedure: CT guided bone marrow biopsy     Anesthesia: moderate sedation     Surgeons/Assistants: Julia Branch CNP; Obed Florian MD    Estimated Blood Loss: Minimal    Complications: none    Specimens: were obtained      KATHLEEN Herman CNP   5/29/2024

## 2024-05-30 LAB
ALBUMIN SERPL-MCNC: 3 G/DL (ref 3.4–5)
ALP SERPL-CCNC: 82 U/L (ref 40–129)
ALT SERPL-CCNC: 6 U/L (ref 10–40)
ANION GAP SERPL CALCULATED.3IONS-SCNC: 11 MMOL/L (ref 3–16)
APTT BLD: 43.4 SEC (ref 22.1–36.4)
AST SERPL-CCNC: 7 U/L (ref 15–37)
B DERMAT AB SER QL ID: NOT DETECTED
BACTERIA THROAT AEROBE CULT: NORMAL
BILIRUB DIRECT SERPL-MCNC: <0.2 MG/DL (ref 0–0.3)
BILIRUB INDIRECT SERPL-MCNC: ABNORMAL MG/DL (ref 0–1)
BILIRUB SERPL-MCNC: 0.6 MG/DL (ref 0–1)
BUN SERPL-MCNC: 9 MG/DL (ref 7–20)
CALCIUM SERPL-MCNC: 8.5 MG/DL (ref 8.3–10.6)
CHLORIDE SERPL-SCNC: 103 MMOL/L (ref 99–110)
CO2 SERPL-SCNC: 27 MMOL/L (ref 21–32)
CREAT SERPL-MCNC: 0.6 MG/DL (ref 0.9–1.3)
DEPRECATED RDW RBC AUTO: 13.5 % (ref 12.4–15.4)
FERRITIN SERPL IA-MCNC: 3658 NG/ML (ref 30–400)
FIBRINOGEN PPP-MCNC: 465 MG/DL (ref 227–534)
FINAL REPORT: NORMAL
GFR SERPLBLD CREATININE-BSD FMLA CKD-EPI: >90 ML/MIN/{1.73_M2}
GLUCOSE SERPL-MCNC: 97 MG/DL (ref 70–99)
HCT VFR BLD AUTO: 25.5 % (ref 40.5–52.5)
HGB BLD-MCNC: 8.8 G/DL (ref 13.5–17.5)
INR PPP: 1.2 (ref 0.85–1.15)
LDH SERPL L TO P-CCNC: 119 U/L (ref 100–190)
MCH RBC QN AUTO: 29.3 PG (ref 26–34)
MCHC RBC AUTO-ENTMCNC: 34.6 G/DL (ref 31–36)
MCV RBC AUTO: 84.8 FL (ref 80–100)
PHOSPHATE SERPL-MCNC: 2.9 MG/DL (ref 2.5–4.9)
PLATELET # BLD AUTO: 37 K/UL (ref 135–450)
PLATELET BLD QL SMEAR: ABNORMAL
PMV BLD AUTO: 9 FL (ref 5–10.5)
POTASSIUM SERPL-SCNC: 3.9 MMOL/L (ref 3.5–5.1)
PRELIMINARY: NORMAL
PROT SERPL-MCNC: 6.3 G/DL (ref 6.4–8.2)
PROTHROMBIN TIME: 15.4 SEC (ref 11.9–14.9)
RBC # BLD AUTO: 3.01 M/UL (ref 4.2–5.9)
RBC MORPH BLD: NORMAL
SODIUM SERPL-SCNC: 141 MMOL/L (ref 136–145)
TRIGL SERPL-MCNC: 71 MG/DL (ref 0–150)
WBC # BLD AUTO: 0.5 K/UL (ref 4–11)

## 2024-05-30 PROCEDURE — 84100 ASSAY OF PHOSPHORUS: CPT

## 2024-05-30 PROCEDURE — 2580000003 HC RX 258: Performed by: NURSE PRACTITIONER

## 2024-05-30 PROCEDURE — 80048 BASIC METABOLIC PNL TOTAL CA: CPT

## 2024-05-30 PROCEDURE — 80076 HEPATIC FUNCTION PANEL: CPT

## 2024-05-30 PROCEDURE — 6370000000 HC RX 637 (ALT 250 FOR IP): Performed by: NURSE PRACTITIONER

## 2024-05-30 PROCEDURE — 6360000002 HC RX W HCPCS: Performed by: NURSE PRACTITIONER

## 2024-05-30 PROCEDURE — 82728 ASSAY OF FERRITIN: CPT

## 2024-05-30 PROCEDURE — 85384 FIBRINOGEN ACTIVITY: CPT

## 2024-05-30 PROCEDURE — 83615 LACTATE (LD) (LDH) ENZYME: CPT

## 2024-05-30 PROCEDURE — 85610 PROTHROMBIN TIME: CPT

## 2024-05-30 PROCEDURE — 6370000000 HC RX 637 (ALT 250 FOR IP): Performed by: INTERNAL MEDICINE

## 2024-05-30 PROCEDURE — 84478 ASSAY OF TRIGLYCERIDES: CPT

## 2024-05-30 PROCEDURE — 2060000000 HC ICU INTERMEDIATE R&B

## 2024-05-30 PROCEDURE — 85730 THROMBOPLASTIN TIME PARTIAL: CPT

## 2024-05-30 PROCEDURE — 2580000003 HC RX 258: Performed by: INTERNAL MEDICINE

## 2024-05-30 PROCEDURE — 36415 COLL VENOUS BLD VENIPUNCTURE: CPT

## 2024-05-30 PROCEDURE — 83520 IMMUNOASSAY QUANT NOS NONAB: CPT

## 2024-05-30 PROCEDURE — 85027 COMPLETE CBC AUTOMATED: CPT

## 2024-05-30 PROCEDURE — 99232 SBSQ HOSP IP/OBS MODERATE 35: CPT | Performed by: INTERNAL MEDICINE

## 2024-05-30 RX ADMIN — SODIUM CHLORIDE: 9 INJECTION, SOLUTION INTRAVENOUS at 09:44

## 2024-05-30 RX ADMIN — VORICONAZOLE 300 MG: 200 TABLET ORAL at 09:28

## 2024-05-30 RX ADMIN — MUPIROCIN: 20 OINTMENT TOPICAL at 09:29

## 2024-05-30 RX ADMIN — GUAIFENESIN AND DEXTROMETHORPHAN HYDROBROMIDE 1 TABLET: 600; 30 TABLET, EXTENDED RELEASE ORAL at 22:00

## 2024-05-30 RX ADMIN — VANCOMYCIN HYDROCHLORIDE 125 MG: 125 CAPSULE ORAL at 09:30

## 2024-05-30 RX ADMIN — ACETAMINOPHEN 650 MG: 325 TABLET ORAL at 23:31

## 2024-05-30 RX ADMIN — LEVETIRACETAM 1500 MG: 500 TABLET, FILM COATED ORAL at 21:17

## 2024-05-30 RX ADMIN — VALACYCLOVIR HYDROCHLORIDE 500 MG: 500 TABLET, FILM COATED ORAL at 22:00

## 2024-05-30 RX ADMIN — SODIUM CHLORIDE, PRESERVATIVE FREE 10 ML: 5 INJECTION INTRAVENOUS at 09:28

## 2024-05-30 RX ADMIN — LEVETIRACETAM 1500 MG: 500 TABLET, FILM COATED ORAL at 09:28

## 2024-05-30 RX ADMIN — MEROPENEM AND SODIUM CHLORIDE 1000 MG: 1 INJECTION, SOLUTION INTRAVENOUS at 21:28

## 2024-05-30 RX ADMIN — GUAIFENESIN AND DEXTROMETHORPHAN HYDROBROMIDE 1 TABLET: 600; 30 TABLET, EXTENDED RELEASE ORAL at 09:28

## 2024-05-30 RX ADMIN — POTASSIUM CHLORIDE 20 MEQ: 1500 TABLET, EXTENDED RELEASE ORAL at 09:28

## 2024-05-30 RX ADMIN — Medication: at 09:29

## 2024-05-30 RX ADMIN — MEROPENEM AND SODIUM CHLORIDE 1000 MG: 1 INJECTION, SOLUTION INTRAVENOUS at 12:05

## 2024-05-30 RX ADMIN — Medication: at 21:59

## 2024-05-30 RX ADMIN — MEROPENEM AND SODIUM CHLORIDE 1000 MG: 1 INJECTION, SOLUTION INTRAVENOUS at 05:49

## 2024-05-30 RX ADMIN — DICYCLOMINE HYDROCHLORIDE 20 MG: 20 TABLET ORAL at 21:17

## 2024-05-30 RX ADMIN — VALACYCLOVIR HYDROCHLORIDE 500 MG: 500 TABLET, FILM COATED ORAL at 09:28

## 2024-05-30 RX ADMIN — VORICONAZOLE 300 MG: 200 TABLET ORAL at 21:59

## 2024-05-30 RX ADMIN — FILGRASTIM-AAFI 300 MCG: 300 INJECTION, SOLUTION SUBCUTANEOUS at 12:03

## 2024-05-30 RX ADMIN — SODIUM CHLORIDE, PRESERVATIVE FREE 10 ML: 5 INJECTION INTRAVENOUS at 22:00

## 2024-05-30 RX ADMIN — PANTOPRAZOLE SODIUM 40 MG: 40 TABLET, DELAYED RELEASE ORAL at 05:52

## 2024-05-30 RX ADMIN — PANTOPRAZOLE SODIUM 40 MG: 40 TABLET, DELAYED RELEASE ORAL at 16:47

## 2024-05-30 RX ADMIN — VANCOMYCIN HYDROCHLORIDE 125 MG: 125 CAPSULE ORAL at 15:05

## 2024-05-30 ASSESSMENT — PAIN SCALES - GENERAL
PAINLEVEL_OUTOF10: 0

## 2024-05-31 ENCOUNTER — APPOINTMENT (OUTPATIENT)
Dept: CT IMAGING | Age: 44
DRG: 720 | End: 2024-05-31
Attending: STUDENT IN AN ORGANIZED HEALTH CARE EDUCATION/TRAINING PROGRAM
Payer: MEDICAID

## 2024-05-31 LAB
ABO + RH BLD: NORMAL
ALBUMIN SERPL-MCNC: 2.7 G/DL (ref 3.4–5)
ALP SERPL-CCNC: 74 U/L (ref 40–129)
ALT SERPL-CCNC: 6 U/L (ref 10–40)
ANION GAP SERPL CALCULATED.3IONS-SCNC: 9 MMOL/L (ref 3–16)
AST SERPL-CCNC: 7 U/L (ref 15–37)
BILIRUB DIRECT SERPL-MCNC: <0.2 MG/DL (ref 0–0.3)
BILIRUB INDIRECT SERPL-MCNC: ABNORMAL MG/DL (ref 0–1)
BILIRUB SERPL-MCNC: 0.5 MG/DL (ref 0–1)
BLD GP AB SCN SERPL QL: NORMAL
BLOOD BANK DISPENSE STATUS: NORMAL
BLOOD BANK PRODUCT CODE: NORMAL
BPU ID: NORMAL
BUN SERPL-MCNC: 7 MG/DL (ref 7–20)
CALCIUM SERPL-MCNC: 8.2 MG/DL (ref 8.3–10.6)
CHLORIDE SERPL-SCNC: 102 MMOL/L (ref 99–110)
CO2 SERPL-SCNC: 26 MMOL/L (ref 21–32)
CREAT SERPL-MCNC: 0.6 MG/DL (ref 0.9–1.3)
DEPRECATED RDW RBC AUTO: 13.2 % (ref 12.4–15.4)
DESCRIPTION BLOOD BANK: NORMAL
FINAL REPORT: NORMAL
FINAL REPORT: NORMAL
GFR SERPLBLD CREATININE-BSD FMLA CKD-EPI: >90 ML/MIN/{1.73_M2}
GLUCOSE SERPL-MCNC: 101 MG/DL (ref 70–99)
HCT VFR BLD AUTO: 20.1 % (ref 40.5–52.5)
HGB BLD-MCNC: 7 G/DL (ref 13.5–17.5)
LDH SERPL L TO P-CCNC: 122 U/L (ref 100–190)
MAGNESIUM SERPL-MCNC: 1.8 MG/DL (ref 1.8–2.4)
MCH RBC QN AUTO: 29.1 PG (ref 26–34)
MCHC RBC AUTO-ENTMCNC: 34.8 G/DL (ref 31–36)
MCV RBC AUTO: 83.4 FL (ref 80–100)
MICROCYTES BLD QL SMEAR: ABNORMAL
MISCELLANEOUS LAB TEST ORDER: NORMAL
OVALOCYTES BLD QL SMEAR: ABNORMAL
PHOSPHATE SERPL-MCNC: 3 MG/DL (ref 2.5–4.9)
PLATELET # BLD AUTO: 21 K/UL (ref 135–450)
PLATELET BLD QL SMEAR: ABNORMAL
PMV BLD AUTO: 8.7 FL (ref 5–10.5)
POTASSIUM SERPL-SCNC: 4 MMOL/L (ref 3.5–5.1)
PRELIMINARY: NORMAL
PRELIMINARY: NORMAL
PROT SERPL-MCNC: 5.6 G/DL (ref 6.4–8.2)
RBC # BLD AUTO: 2.41 M/UL (ref 4.2–5.9)
SCHISTOCYTES BLD QL SMEAR: ABNORMAL
SODIUM SERPL-SCNC: 137 MMOL/L (ref 136–145)
WBC # BLD AUTO: 0.4 K/UL (ref 4–11)

## 2024-05-31 PROCEDURE — 83735 ASSAY OF MAGNESIUM: CPT

## 2024-05-31 PROCEDURE — 80076 HEPATIC FUNCTION PANEL: CPT

## 2024-05-31 PROCEDURE — 36415 COLL VENOUS BLD VENIPUNCTURE: CPT

## 2024-05-31 PROCEDURE — 2500000003 HC RX 250 WO HCPCS: Performed by: INTERNAL MEDICINE

## 2024-05-31 PROCEDURE — 2580000003 HC RX 258: Performed by: NURSE PRACTITIONER

## 2024-05-31 PROCEDURE — 36430 TRANSFUSION BLD/BLD COMPNT: CPT

## 2024-05-31 PROCEDURE — 86901 BLOOD TYPING SEROLOGIC RH(D): CPT

## 2024-05-31 PROCEDURE — 6360000002 HC RX W HCPCS: Performed by: STUDENT IN AN ORGANIZED HEALTH CARE EDUCATION/TRAINING PROGRAM

## 2024-05-31 PROCEDURE — 84100 ASSAY OF PHOSPHORUS: CPT

## 2024-05-31 PROCEDURE — 6360000002 HC RX W HCPCS: Performed by: NURSE PRACTITIONER

## 2024-05-31 PROCEDURE — P9040 RBC LEUKOREDUCED IRRADIATED: HCPCS

## 2024-05-31 PROCEDURE — 6370000000 HC RX 637 (ALT 250 FOR IP): Performed by: INTERNAL MEDICINE

## 2024-05-31 PROCEDURE — 6360000004 HC RX CONTRAST MEDICATION: Performed by: INTERNAL MEDICINE

## 2024-05-31 PROCEDURE — 86900 BLOOD TYPING SEROLOGIC ABO: CPT

## 2024-05-31 PROCEDURE — 6370000000 HC RX 637 (ALT 250 FOR IP): Performed by: NURSE PRACTITIONER

## 2024-05-31 PROCEDURE — 87533 HHV-6 DNA QUANT: CPT

## 2024-05-31 PROCEDURE — 85027 COMPLETE CBC AUTOMATED: CPT

## 2024-05-31 PROCEDURE — 99232 SBSQ HOSP IP/OBS MODERATE 35: CPT | Performed by: INTERNAL MEDICINE

## 2024-05-31 PROCEDURE — 86923 COMPATIBILITY TEST ELECTRIC: CPT

## 2024-05-31 PROCEDURE — 86850 RBC ANTIBODY SCREEN: CPT

## 2024-05-31 PROCEDURE — 83615 LACTATE (LD) (LDH) ENZYME: CPT

## 2024-05-31 PROCEDURE — 2580000003 HC RX 258: Performed by: INTERNAL MEDICINE

## 2024-05-31 PROCEDURE — 2060000000 HC ICU INTERMEDIATE R&B

## 2024-05-31 PROCEDURE — 74177 CT ABD & PELVIS W/CONTRAST: CPT

## 2024-05-31 PROCEDURE — 80048 BASIC METABOLIC PNL TOTAL CA: CPT

## 2024-05-31 RX ADMIN — MEROPENEM AND SODIUM CHLORIDE 1000 MG: 1 INJECTION, SOLUTION INTRAVENOUS at 04:26

## 2024-05-31 RX ADMIN — VALACYCLOVIR HYDROCHLORIDE 500 MG: 500 TABLET, FILM COATED ORAL at 20:39

## 2024-05-31 RX ADMIN — LEVETIRACETAM 1500 MG: 500 TABLET, FILM COATED ORAL at 20:40

## 2024-05-31 RX ADMIN — LEVETIRACETAM 1500 MG: 500 TABLET, FILM COATED ORAL at 08:20

## 2024-05-31 RX ADMIN — PANTOPRAZOLE SODIUM 40 MG: 40 TABLET, DELAYED RELEASE ORAL at 16:49

## 2024-05-31 RX ADMIN — SODIUM CHLORIDE: 9 INJECTION, SOLUTION INTRAVENOUS at 04:54

## 2024-05-31 RX ADMIN — LOPERAMIDE HYDROCHLORIDE 2 MG: 2 CAPSULE ORAL at 21:04

## 2024-05-31 RX ADMIN — BARIUM SULFATE 900 ML: 20 SUSPENSION ORAL at 15:48

## 2024-05-31 RX ADMIN — MEROPENEM AND SODIUM CHLORIDE 1000 MG: 1 INJECTION, SOLUTION INTRAVENOUS at 21:04

## 2024-05-31 RX ADMIN — FILGRASTIM-AAFI 300 MCG: 300 INJECTION, SOLUTION SUBCUTANEOUS at 16:49

## 2024-05-31 RX ADMIN — VALACYCLOVIR HYDROCHLORIDE 500 MG: 500 TABLET, FILM COATED ORAL at 08:20

## 2024-05-31 RX ADMIN — SODIUM CHLORIDE, PRESERVATIVE FREE 10 ML: 5 INJECTION INTRAVENOUS at 08:20

## 2024-05-31 RX ADMIN — PANTOPRAZOLE SODIUM 40 MG: 40 TABLET, DELAYED RELEASE ORAL at 07:09

## 2024-05-31 RX ADMIN — VANCOMYCIN HYDROCHLORIDE 125 MG: 125 CAPSULE ORAL at 14:52

## 2024-05-31 RX ADMIN — MUPIROCIN: 20 OINTMENT TOPICAL at 08:21

## 2024-05-31 RX ADMIN — VORICONAZOLE 300 MG: 200 TABLET ORAL at 08:20

## 2024-05-31 RX ADMIN — MEROPENEM AND SODIUM CHLORIDE 1000 MG: 1 INJECTION, SOLUTION INTRAVENOUS at 12:29

## 2024-05-31 RX ADMIN — VANCOMYCIN HYDROCHLORIDE 125 MG: 125 CAPSULE ORAL at 08:21

## 2024-05-31 RX ADMIN — GUAIFENESIN AND DEXTROMETHORPHAN HYDROBROMIDE 1 TABLET: 600; 30 TABLET, EXTENDED RELEASE ORAL at 08:20

## 2024-05-31 RX ADMIN — MUPIROCIN: 20 OINTMENT TOPICAL at 20:43

## 2024-05-31 RX ADMIN — POTASSIUM CHLORIDE 20 MEQ: 1500 TABLET, EXTENDED RELEASE ORAL at 08:20

## 2024-05-31 RX ADMIN — Medication: at 20:43

## 2024-05-31 RX ADMIN — VORICONAZOLE 300 MG: 200 TABLET ORAL at 20:40

## 2024-05-31 RX ADMIN — IOPAMIDOL 75 ML: 755 INJECTION, SOLUTION INTRAVENOUS at 15:48

## 2024-05-31 RX ADMIN — GUAIFENESIN AND DEXTROMETHORPHAN HYDROBROMIDE 1 TABLET: 600; 30 TABLET, EXTENDED RELEASE ORAL at 20:39

## 2024-05-31 RX ADMIN — Medication: at 08:21

## 2024-05-31 RX ADMIN — SODIUM CHLORIDE, PRESERVATIVE FREE 10 ML: 5 INJECTION INTRAVENOUS at 20:40

## 2024-05-31 ASSESSMENT — PAIN SCALES - GENERAL
PAINLEVEL_OUTOF10: 0

## 2024-05-31 NOTE — CARE COORDINATION
Attended MD rounds.     Pt plans on returning home with his father and no anticipated needs when able.     SW will continue to follow    JENNIFER Ortiz   for [facility] ([facility])  [employer] Mobile: [phone]

## 2024-06-01 ENCOUNTER — APPOINTMENT (OUTPATIENT)
Dept: GENERAL RADIOLOGY | Age: 44
DRG: 720 | End: 2024-06-01
Attending: STUDENT IN AN ORGANIZED HEALTH CARE EDUCATION/TRAINING PROGRAM
Payer: MEDICAID

## 2024-06-01 LAB
ALBUMIN SERPL-MCNC: 2.9 G/DL (ref 3.4–5)
ALP SERPL-CCNC: 77 U/L (ref 40–129)
ALT SERPL-CCNC: 8 U/L (ref 10–40)
ANION GAP SERPL CALCULATED.3IONS-SCNC: 7 MMOL/L (ref 3–16)
AST SERPL-CCNC: 7 U/L (ref 15–37)
BACTERIA BLD CULT ORG #2: NORMAL
BACTERIA BLD CULT: NORMAL
BILIRUB DIRECT SERPL-MCNC: <0.2 MG/DL (ref 0–0.3)
BILIRUB INDIRECT SERPL-MCNC: ABNORMAL MG/DL (ref 0–1)
BILIRUB SERPL-MCNC: 0.6 MG/DL (ref 0–1)
BUN SERPL-MCNC: 8 MG/DL (ref 7–20)
CALCIUM SERPL-MCNC: 8.3 MG/DL (ref 8.3–10.6)
CHLORIDE SERPL-SCNC: 101 MMOL/L (ref 99–110)
CO2 SERPL-SCNC: 27 MMOL/L (ref 21–32)
CREAT SERPL-MCNC: 0.7 MG/DL (ref 0.9–1.3)
DEPRECATED RDW RBC AUTO: 15.1 % (ref 12.4–15.4)
GFR SERPLBLD CREATININE-BSD FMLA CKD-EPI: >90 ML/MIN/{1.73_M2}
GLUCOSE SERPL-MCNC: 103 MG/DL (ref 70–99)
HCT VFR BLD AUTO: 22 % (ref 40.5–52.5)
HGB BLD-MCNC: 7.5 G/DL (ref 13.5–17.5)
LDH SERPL L TO P-CCNC: 105 U/L (ref 100–190)
MCH RBC QN AUTO: 28.1 PG (ref 26–34)
MCHC RBC AUTO-ENTMCNC: 34.2 G/DL (ref 31–36)
MCV RBC AUTO: 82.2 FL (ref 80–100)
PHOSPHATE SERPL-MCNC: 3.1 MG/DL (ref 2.5–4.9)
PLATELET # BLD AUTO: 20 K/UL (ref 135–450)
PMV BLD AUTO: 6.5 FL (ref 5–10.5)
POTASSIUM SERPL-SCNC: 4.1 MMOL/L (ref 3.5–5.1)
PROT SERPL-MCNC: 5.7 G/DL (ref 6.4–8.2)
RBC # BLD AUTO: 2.67 M/UL (ref 4.2–5.9)
SODIUM SERPL-SCNC: 135 MMOL/L (ref 136–145)
WBC # BLD AUTO: 0.5 K/UL (ref 4–11)

## 2024-06-01 PROCEDURE — 6370000000 HC RX 637 (ALT 250 FOR IP): Performed by: NURSE PRACTITIONER

## 2024-06-01 PROCEDURE — 6360000002 HC RX W HCPCS: Performed by: NURSE PRACTITIONER

## 2024-06-01 PROCEDURE — 2060000000 HC ICU INTERMEDIATE R&B

## 2024-06-01 PROCEDURE — 83615 LACTATE (LD) (LDH) ENZYME: CPT

## 2024-06-01 PROCEDURE — 85027 COMPLETE CBC AUTOMATED: CPT

## 2024-06-01 PROCEDURE — 74018 RADEX ABDOMEN 1 VIEW: CPT

## 2024-06-01 PROCEDURE — 6370000000 HC RX 637 (ALT 250 FOR IP): Performed by: INTERNAL MEDICINE

## 2024-06-01 PROCEDURE — 80076 HEPATIC FUNCTION PANEL: CPT

## 2024-06-01 PROCEDURE — 2580000003 HC RX 258: Performed by: INTERNAL MEDICINE

## 2024-06-01 PROCEDURE — 87799 DETECT AGENT NOS DNA QUANT: CPT

## 2024-06-01 PROCEDURE — 2580000003 HC RX 258: Performed by: NURSE PRACTITIONER

## 2024-06-01 PROCEDURE — 6360000002 HC RX W HCPCS: Performed by: STUDENT IN AN ORGANIZED HEALTH CARE EDUCATION/TRAINING PROGRAM

## 2024-06-01 PROCEDURE — 80048 BASIC METABOLIC PNL TOTAL CA: CPT

## 2024-06-01 PROCEDURE — 84100 ASSAY OF PHOSPHORUS: CPT

## 2024-06-01 RX ADMIN — GUAIFENESIN AND DEXTROMETHORPHAN HYDROBROMIDE 1 TABLET: 600; 30 TABLET, EXTENDED RELEASE ORAL at 20:27

## 2024-06-01 RX ADMIN — VALACYCLOVIR HYDROCHLORIDE 500 MG: 500 TABLET, FILM COATED ORAL at 20:27

## 2024-06-01 RX ADMIN — VANCOMYCIN HYDROCHLORIDE 125 MG: 125 CAPSULE ORAL at 09:27

## 2024-06-01 RX ADMIN — MEROPENEM AND SODIUM CHLORIDE 1000 MG: 1 INJECTION, SOLUTION INTRAVENOUS at 20:31

## 2024-06-01 RX ADMIN — FILGRASTIM-AAFI 300 MCG: 300 INJECTION, SOLUTION SUBCUTANEOUS at 18:49

## 2024-06-01 RX ADMIN — VORICONAZOLE 300 MG: 200 TABLET ORAL at 20:26

## 2024-06-01 RX ADMIN — POTASSIUM CHLORIDE 20 MEQ: 1500 TABLET, EXTENDED RELEASE ORAL at 09:28

## 2024-06-01 RX ADMIN — VALACYCLOVIR HYDROCHLORIDE 500 MG: 500 TABLET, FILM COATED ORAL at 09:27

## 2024-06-01 RX ADMIN — VANCOMYCIN HYDROCHLORIDE 125 MG: 125 CAPSULE ORAL at 14:32

## 2024-06-01 RX ADMIN — SODIUM CHLORIDE, PRESERVATIVE FREE 10 ML: 5 INJECTION INTRAVENOUS at 20:29

## 2024-06-01 RX ADMIN — VORICONAZOLE 300 MG: 200 TABLET ORAL at 09:27

## 2024-06-01 RX ADMIN — MUPIROCIN: 20 OINTMENT TOPICAL at 09:28

## 2024-06-01 RX ADMIN — LEVETIRACETAM 1500 MG: 500 TABLET, FILM COATED ORAL at 09:27

## 2024-06-01 RX ADMIN — Medication: at 09:28

## 2024-06-01 RX ADMIN — LOPERAMIDE HYDROCHLORIDE 2 MG: 2 CAPSULE ORAL at 09:34

## 2024-06-01 RX ADMIN — PANTOPRAZOLE SODIUM 40 MG: 40 TABLET, DELAYED RELEASE ORAL at 05:39

## 2024-06-01 RX ADMIN — PANTOPRAZOLE SODIUM 40 MG: 40 TABLET, DELAYED RELEASE ORAL at 14:32

## 2024-06-01 RX ADMIN — GUAIFENESIN AND DEXTROMETHORPHAN HYDROBROMIDE 1 TABLET: 600; 30 TABLET, EXTENDED RELEASE ORAL at 09:27

## 2024-06-01 RX ADMIN — MEROPENEM AND SODIUM CHLORIDE 1000 MG: 1 INJECTION, SOLUTION INTRAVENOUS at 05:39

## 2024-06-01 RX ADMIN — MEROPENEM AND SODIUM CHLORIDE 1000 MG: 1 INJECTION, SOLUTION INTRAVENOUS at 12:59

## 2024-06-01 RX ADMIN — LEVETIRACETAM 1500 MG: 500 TABLET, FILM COATED ORAL at 20:27

## 2024-06-01 RX ADMIN — SODIUM CHLORIDE: 9 INJECTION, SOLUTION INTRAVENOUS at 03:40

## 2024-06-01 ASSESSMENT — PAIN SCALES - GENERAL
PAINLEVEL_OUTOF10: 0
PAINLEVEL_OUTOF10: 0

## 2024-06-02 LAB
ALBUMIN SERPL-MCNC: 2.9 G/DL (ref 3.4–5)
ALP SERPL-CCNC: 81 U/L (ref 40–129)
ALT SERPL-CCNC: 12 U/L (ref 10–40)
ANION GAP SERPL CALCULATED.3IONS-SCNC: 8 MMOL/L (ref 3–16)
AST SERPL-CCNC: 10 U/L (ref 15–37)
BILIRUB DIRECT SERPL-MCNC: <0.2 MG/DL (ref 0–0.3)
BILIRUB INDIRECT SERPL-MCNC: ABNORMAL MG/DL (ref 0–1)
BILIRUB SERPL-MCNC: 0.4 MG/DL (ref 0–1)
BUN SERPL-MCNC: 8 MG/DL (ref 7–20)
CALCIUM SERPL-MCNC: 8.3 MG/DL (ref 8.3–10.6)
CHLORIDE SERPL-SCNC: 104 MMOL/L (ref 99–110)
CO2 SERPL-SCNC: 26 MMOL/L (ref 21–32)
CREAT SERPL-MCNC: 0.6 MG/DL (ref 0.9–1.3)
DEPRECATED RDW RBC AUTO: 14.7 % (ref 12.4–15.4)
GFR SERPLBLD CREATININE-BSD FMLA CKD-EPI: >90 ML/MIN/{1.73_M2}
GLUCOSE SERPL-MCNC: 104 MG/DL (ref 70–99)
HCT VFR BLD AUTO: 20.8 % (ref 40.5–52.5)
HGB BLD-MCNC: 7.3 G/DL (ref 13.5–17.5)
LDH SERPL L TO P-CCNC: 104 U/L (ref 100–190)
MCH RBC QN AUTO: 28.7 PG (ref 26–34)
MCHC RBC AUTO-ENTMCNC: 34.9 G/DL (ref 31–36)
MCV RBC AUTO: 82.2 FL (ref 80–100)
PHOSPHATE SERPL-MCNC: 3.1 MG/DL (ref 2.5–4.9)
PLATELET # BLD AUTO: 19 K/UL (ref 135–450)
PMV BLD AUTO: 7.9 FL (ref 5–10.5)
POTASSIUM SERPL-SCNC: 4.4 MMOL/L (ref 3.5–5.1)
PROT SERPL-MCNC: 5.8 G/DL (ref 6.4–8.2)
RBC # BLD AUTO: 2.53 M/UL (ref 4.2–5.9)
SODIUM SERPL-SCNC: 138 MMOL/L (ref 136–145)
WBC # BLD AUTO: 0.5 K/UL (ref 4–11)

## 2024-06-02 PROCEDURE — 85027 COMPLETE CBC AUTOMATED: CPT

## 2024-06-02 PROCEDURE — 6360000002 HC RX W HCPCS: Performed by: NURSE PRACTITIONER

## 2024-06-02 PROCEDURE — 2580000003 HC RX 258: Performed by: NURSE PRACTITIONER

## 2024-06-02 PROCEDURE — 6370000000 HC RX 637 (ALT 250 FOR IP): Performed by: NURSE PRACTITIONER

## 2024-06-02 PROCEDURE — 2580000003 HC RX 258: Performed by: INTERNAL MEDICINE

## 2024-06-02 PROCEDURE — 6370000000 HC RX 637 (ALT 250 FOR IP): Performed by: INTERNAL MEDICINE

## 2024-06-02 PROCEDURE — 84100 ASSAY OF PHOSPHORUS: CPT

## 2024-06-02 PROCEDURE — 2060000000 HC ICU INTERMEDIATE R&B

## 2024-06-02 PROCEDURE — 6360000002 HC RX W HCPCS: Performed by: STUDENT IN AN ORGANIZED HEALTH CARE EDUCATION/TRAINING PROGRAM

## 2024-06-02 PROCEDURE — 80048 BASIC METABOLIC PNL TOTAL CA: CPT

## 2024-06-02 PROCEDURE — 80076 HEPATIC FUNCTION PANEL: CPT

## 2024-06-02 PROCEDURE — 83615 LACTATE (LD) (LDH) ENZYME: CPT

## 2024-06-02 RX ADMIN — FILGRASTIM-AAFI 300 MCG: 300 INJECTION, SOLUTION SUBCUTANEOUS at 17:30

## 2024-06-02 RX ADMIN — GUAIFENESIN AND DEXTROMETHORPHAN HYDROBROMIDE 1 TABLET: 600; 30 TABLET, EXTENDED RELEASE ORAL at 20:38

## 2024-06-02 RX ADMIN — VALACYCLOVIR HYDROCHLORIDE 500 MG: 500 TABLET, FILM COATED ORAL at 08:03

## 2024-06-02 RX ADMIN — MEROPENEM AND SODIUM CHLORIDE 1000 MG: 1 INJECTION, SOLUTION INTRAVENOUS at 12:21

## 2024-06-02 RX ADMIN — GUAIFENESIN AND DEXTROMETHORPHAN HYDROBROMIDE 1 TABLET: 600; 30 TABLET, EXTENDED RELEASE ORAL at 08:03

## 2024-06-02 RX ADMIN — POTASSIUM CHLORIDE 20 MEQ: 1500 TABLET, EXTENDED RELEASE ORAL at 08:03

## 2024-06-02 RX ADMIN — MEROPENEM AND SODIUM CHLORIDE 1000 MG: 1 INJECTION, SOLUTION INTRAVENOUS at 20:42

## 2024-06-02 RX ADMIN — MEROPENEM AND SODIUM CHLORIDE 1000 MG: 1 INJECTION, SOLUTION INTRAVENOUS at 05:32

## 2024-06-02 RX ADMIN — SODIUM CHLORIDE: 9 INJECTION, SOLUTION INTRAVENOUS at 00:02

## 2024-06-02 RX ADMIN — PANTOPRAZOLE SODIUM 40 MG: 40 TABLET, DELAYED RELEASE ORAL at 05:33

## 2024-06-02 RX ADMIN — MUPIROCIN: 20 OINTMENT TOPICAL at 08:05

## 2024-06-02 RX ADMIN — PANTOPRAZOLE SODIUM 40 MG: 40 TABLET, DELAYED RELEASE ORAL at 14:54

## 2024-06-02 RX ADMIN — LEVETIRACETAM 1500 MG: 500 TABLET, FILM COATED ORAL at 08:03

## 2024-06-02 RX ADMIN — Medication: at 08:04

## 2024-06-02 RX ADMIN — LEVETIRACETAM 1500 MG: 500 TABLET, FILM COATED ORAL at 20:38

## 2024-06-02 RX ADMIN — SODIUM CHLORIDE: 9 INJECTION, SOLUTION INTRAVENOUS at 16:28

## 2024-06-02 RX ADMIN — VORICONAZOLE 300 MG: 200 TABLET ORAL at 08:03

## 2024-06-02 RX ADMIN — VANCOMYCIN HYDROCHLORIDE 125 MG: 125 CAPSULE ORAL at 14:54

## 2024-06-02 RX ADMIN — VANCOMYCIN HYDROCHLORIDE 125 MG: 125 CAPSULE ORAL at 08:03

## 2024-06-02 RX ADMIN — SODIUM CHLORIDE, PRESERVATIVE FREE 10 ML: 5 INJECTION INTRAVENOUS at 20:39

## 2024-06-02 RX ADMIN — VALACYCLOVIR HYDROCHLORIDE 500 MG: 500 TABLET, FILM COATED ORAL at 20:38

## 2024-06-02 RX ADMIN — SODIUM CHLORIDE, PRESERVATIVE FREE 10 ML: 5 INJECTION INTRAVENOUS at 08:04

## 2024-06-02 RX ADMIN — VORICONAZOLE 300 MG: 200 TABLET ORAL at 20:38

## 2024-06-02 ASSESSMENT — PAIN SCALES - GENERAL
PAINLEVEL_OUTOF10: 0

## 2024-06-03 ENCOUNTER — APPOINTMENT (OUTPATIENT)
Dept: GENERAL RADIOLOGY | Age: 44
DRG: 720 | End: 2024-06-03
Attending: STUDENT IN AN ORGANIZED HEALTH CARE EDUCATION/TRAINING PROGRAM
Payer: MEDICAID

## 2024-06-03 LAB
ALBUMIN SERPL-MCNC: 3 G/DL (ref 3.4–5)
ALP SERPL-CCNC: 83 U/L (ref 40–129)
ALT SERPL-CCNC: 19 U/L (ref 10–40)
ANION GAP SERPL CALCULATED.3IONS-SCNC: 8 MMOL/L (ref 3–16)
APTT BLD: 43.1 SEC (ref 22.1–36.4)
AST SERPL-CCNC: 14 U/L (ref 15–37)
BILIRUB DIRECT SERPL-MCNC: <0.2 MG/DL (ref 0–0.3)
BILIRUB INDIRECT SERPL-MCNC: ABNORMAL MG/DL (ref 0–1)
BILIRUB SERPL-MCNC: 0.5 MG/DL (ref 0–1)
BILIRUB UR QL STRIP.AUTO: NEGATIVE
BUN SERPL-MCNC: 8 MG/DL (ref 7–20)
CALCIUM SERPL-MCNC: 8.5 MG/DL (ref 8.3–10.6)
CHLORIDE SERPL-SCNC: 103 MMOL/L (ref 99–110)
CLARITY UR: CLEAR
CMV DNA SERPL NAA+PROBE-ACNC: NOT DETECTED IU/ML
CMV DNA SERPL NAA+PROBE-LOG IU: NOT DETECTED LOG IU/ML
CMV DNA SERPL QL NAA+PROBE: NOT DETECTED
CO2 SERPL-SCNC: 28 MMOL/L (ref 21–32)
COLOR UR: YELLOW
CREAT SERPL-MCNC: 0.5 MG/DL (ref 0.9–1.3)
DEPRECATED RDW RBC AUTO: 14.3 % (ref 12.4–15.4)
EBV DNA SERPL NAA+PROBE-ACNC: NOT DETECTED IU/ML
EBV DNA SERPL NAA+PROBE-LOG#: NOT DETECTED LOG IU/ML
EBV DNA SPEC QL NAA+PROBE: NOT DETECTED
FINAL REPORT: NORMAL
FINAL REPORT: NORMAL
FUNGUS BLD CULT: NORMAL
FUNGUS SPEC CULT: NORMAL
GFR SERPLBLD CREATININE-BSD FMLA CKD-EPI: >90 ML/MIN/{1.73_M2}
GLUCOSE SERPL-MCNC: 103 MG/DL (ref 70–99)
GLUCOSE UR STRIP.AUTO-MCNC: NEGATIVE MG/DL
HCT VFR BLD AUTO: 20.1 % (ref 40.5–52.5)
HGB BLD-MCNC: 7.1 G/DL (ref 13.5–17.5)
HGB UR QL STRIP.AUTO: NEGATIVE
INR PPP: 1.19 (ref 0.85–1.15)
KETONES UR STRIP.AUTO-MCNC: NEGATIVE MG/DL
LDH SERPL L TO P-CCNC: 112 U/L (ref 100–190)
LEUKOCYTE ESTERASE UR QL STRIP.AUTO: NEGATIVE
LOEFFLER MB STN SPEC: NORMAL
MAGNESIUM SERPL-MCNC: 2 MG/DL (ref 1.8–2.4)
MCH RBC QN AUTO: 29.1 PG (ref 26–34)
MCHC RBC AUTO-ENTMCNC: 35.3 G/DL (ref 31–36)
MCV RBC AUTO: 82.3 FL (ref 80–100)
NITRITE UR QL STRIP.AUTO: NEGATIVE
PH UR STRIP.AUTO: 7.5 [PH] (ref 5–8)
PHOSPHATE SERPL-MCNC: 3.3 MG/DL (ref 2.5–4.9)
PLATELET # BLD AUTO: 28 K/UL (ref 135–450)
PLATELET BLD QL SMEAR: ABNORMAL
PMV BLD AUTO: 7.8 FL (ref 5–10.5)
POTASSIUM SERPL-SCNC: 4.5 MMOL/L (ref 3.5–5.1)
PRELIMINARY: NORMAL
PRELIMINARY: NORMAL
PROT SERPL-MCNC: 6.1 G/DL (ref 6.4–8.2)
PROT UR STRIP.AUTO-MCNC: NEGATIVE MG/DL
PROTHROMBIN TIME: 15.3 SEC (ref 11.9–14.9)
RBC # BLD AUTO: 2.45 M/UL (ref 4.2–5.9)
SCHISTOCYTES BLD QL SMEAR: ABNORMAL
SODIUM SERPL-SCNC: 139 MMOL/L (ref 136–145)
SP GR UR STRIP.AUTO: 1.01 (ref 1–1.03)
TARGETS BLD QL SMEAR: ABNORMAL
UA DIPSTICK W REFLEX MICRO PNL UR: NORMAL
URN SPEC COLLECT METH UR: NORMAL
UROBILINOGEN UR STRIP-ACNC: 0.2 E.U./DL
WBC # BLD AUTO: 0.5 K/UL (ref 4–11)

## 2024-06-03 PROCEDURE — 36415 COLL VENOUS BLD VENIPUNCTURE: CPT

## 2024-06-03 PROCEDURE — 81003 URINALYSIS AUTO W/O SCOPE: CPT

## 2024-06-03 PROCEDURE — 2580000003 HC RX 258: Performed by: NURSE PRACTITIONER

## 2024-06-03 PROCEDURE — 6370000000 HC RX 637 (ALT 250 FOR IP): Performed by: NURSE PRACTITIONER

## 2024-06-03 PROCEDURE — 80048 BASIC METABOLIC PNL TOTAL CA: CPT

## 2024-06-03 PROCEDURE — 85027 COMPLETE CBC AUTOMATED: CPT

## 2024-06-03 PROCEDURE — 83615 LACTATE (LD) (LDH) ENZYME: CPT

## 2024-06-03 PROCEDURE — 71045 X-RAY EXAM CHEST 1 VIEW: CPT

## 2024-06-03 PROCEDURE — 6360000002 HC RX W HCPCS: Performed by: NURSE PRACTITIONER

## 2024-06-03 PROCEDURE — 6370000000 HC RX 637 (ALT 250 FOR IP): Performed by: INTERNAL MEDICINE

## 2024-06-03 PROCEDURE — 80076 HEPATIC FUNCTION PANEL: CPT

## 2024-06-03 PROCEDURE — 2060000000 HC ICU INTERMEDIATE R&B

## 2024-06-03 PROCEDURE — 83735 ASSAY OF MAGNESIUM: CPT

## 2024-06-03 PROCEDURE — 2580000003 HC RX 258: Performed by: INTERNAL MEDICINE

## 2024-06-03 PROCEDURE — 84100 ASSAY OF PHOSPHORUS: CPT

## 2024-06-03 PROCEDURE — 6360000002 HC RX W HCPCS: Performed by: STUDENT IN AN ORGANIZED HEALTH CARE EDUCATION/TRAINING PROGRAM

## 2024-06-03 PROCEDURE — 85610 PROTHROMBIN TIME: CPT

## 2024-06-03 PROCEDURE — 85730 THROMBOPLASTIN TIME PARTIAL: CPT

## 2024-06-03 RX ORDER — VORICONAZOLE 50 MG/1
300 TABLET, FILM COATED ORAL EVERY 12 HOURS SCHEDULED
Qty: 360 TABLET | Refills: 0 | Status: SHIPPED | OUTPATIENT
Start: 2024-06-03 | End: 2024-07-03

## 2024-06-03 RX ORDER — LEVOFLOXACIN 500 MG/1
500 TABLET, FILM COATED ORAL DAILY
Status: DISCONTINUED | OUTPATIENT
Start: 2024-06-03 | End: 2024-06-05 | Stop reason: HOSPADM

## 2024-06-03 RX ADMIN — PANTOPRAZOLE SODIUM 40 MG: 40 TABLET, DELAYED RELEASE ORAL at 15:47

## 2024-06-03 RX ADMIN — VALACYCLOVIR HYDROCHLORIDE 500 MG: 500 TABLET, FILM COATED ORAL at 09:15

## 2024-06-03 RX ADMIN — MEROPENEM AND SODIUM CHLORIDE 1000 MG: 1 INJECTION, SOLUTION INTRAVENOUS at 05:12

## 2024-06-03 RX ADMIN — LEVETIRACETAM 1500 MG: 500 TABLET, FILM COATED ORAL at 09:14

## 2024-06-03 RX ADMIN — GUAIFENESIN AND DEXTROMETHORPHAN HYDROBROMIDE 1 TABLET: 600; 30 TABLET, EXTENDED RELEASE ORAL at 09:15

## 2024-06-03 RX ADMIN — VANCOMYCIN HYDROCHLORIDE 125 MG: 125 CAPSULE ORAL at 14:29

## 2024-06-03 RX ADMIN — Medication: at 20:42

## 2024-06-03 RX ADMIN — SODIUM CHLORIDE, PRESERVATIVE FREE 10 ML: 5 INJECTION INTRAVENOUS at 20:40

## 2024-06-03 RX ADMIN — SODIUM CHLORIDE, PRESERVATIVE FREE 10 ML: 5 INJECTION INTRAVENOUS at 09:22

## 2024-06-03 RX ADMIN — VANCOMYCIN HYDROCHLORIDE 125 MG: 125 CAPSULE ORAL at 09:14

## 2024-06-03 RX ADMIN — VORICONAZOLE 300 MG: 200 TABLET ORAL at 10:18

## 2024-06-03 RX ADMIN — FILGRASTIM-AAFI 300 MCG: 300 INJECTION, SOLUTION SUBCUTANEOUS at 17:56

## 2024-06-03 RX ADMIN — VORICONAZOLE 300 MG: 200 TABLET ORAL at 22:04

## 2024-06-03 RX ADMIN — MUPIROCIN: 20 OINTMENT TOPICAL at 10:18

## 2024-06-03 RX ADMIN — POTASSIUM CHLORIDE 20 MEQ: 1500 TABLET, EXTENDED RELEASE ORAL at 09:14

## 2024-06-03 RX ADMIN — LEVOFLOXACIN 500 MG: 500 TABLET, FILM COATED ORAL at 11:31

## 2024-06-03 RX ADMIN — VALACYCLOVIR HYDROCHLORIDE 500 MG: 500 TABLET, FILM COATED ORAL at 20:39

## 2024-06-03 RX ADMIN — MUPIROCIN: 20 OINTMENT TOPICAL at 20:41

## 2024-06-03 RX ADMIN — Medication: at 09:23

## 2024-06-03 RX ADMIN — LEVETIRACETAM 1500 MG: 500 TABLET, FILM COATED ORAL at 20:39

## 2024-06-03 RX ADMIN — GUAIFENESIN AND DEXTROMETHORPHAN HYDROBROMIDE 1 TABLET: 600; 30 TABLET, EXTENDED RELEASE ORAL at 20:39

## 2024-06-03 RX ADMIN — PANTOPRAZOLE SODIUM 40 MG: 40 TABLET, DELAYED RELEASE ORAL at 05:13

## 2024-06-03 RX ADMIN — SODIUM CHLORIDE: 9 INJECTION, SOLUTION INTRAVENOUS at 05:09

## 2024-06-03 ASSESSMENT — PAIN SCALES - GENERAL
PAINLEVEL_OUTOF10: 0

## 2024-06-03 NOTE — CARE COORDINATION
PA sent for Voriconazole in preparation of discharge likely within the next 2 days.     4:45 PM:  Voriconazole has been approved and will be available in outpatient pharmacy 6/4/24.

## 2024-06-03 NOTE — CARE COORDINATION
Addendum at 3:32pm: Received return call from Yvonne at [facility] and referral was given. She stated that they will check benefits and have [employer] come out to do teaching/hookup.    Addendum at 3:18pm: Received voicemail from Yvonne at [facility] requesting call back at [phone]. Return message left    11:45am: Attended MD rounds at bedside. Final plan for home with tube feeds is pending pt's progress over the next few days. Discussed infusion companies verbally after MD left bedside incase he would need home tube feeds. Pt stated referral to [facility].     Updated MD/CNP.    Message left for Yvonne at [facility] requesting call back for the referral.    Sharla COLON, GENIA-S   for [facility] ([facility])  [employer] Mobile: [phone]

## 2024-06-04 LAB
ACID FAST STN SPEC QL: NORMAL
ACID FAST STN SPEC QL: NORMAL
ALBUMIN SERPL-MCNC: 3.4 G/DL (ref 3.4–5)
ALP SERPL-CCNC: 90 U/L (ref 40–129)
ALT SERPL-CCNC: 25 U/L (ref 10–40)
ANION GAP SERPL CALCULATED.3IONS-SCNC: 9 MMOL/L (ref 3–16)
AST SERPL-CCNC: 13 U/L (ref 15–37)
BILIRUB DIRECT SERPL-MCNC: <0.2 MG/DL (ref 0–0.3)
BILIRUB INDIRECT SERPL-MCNC: ABNORMAL MG/DL (ref 0–1)
BILIRUB SERPL-MCNC: 0.5 MG/DL (ref 0–1)
BUN SERPL-MCNC: 10 MG/DL (ref 7–20)
CALCIUM SERPL-MCNC: 8.9 MG/DL (ref 8.3–10.6)
CHLORIDE SERPL-SCNC: 100 MMOL/L (ref 99–110)
CO2 SERPL-SCNC: 27 MMOL/L (ref 21–32)
CREAT SERPL-MCNC: 0.6 MG/DL (ref 0.9–1.3)
DEPRECATED RDW RBC AUTO: 14.6 % (ref 12.4–15.4)
GFR SERPLBLD CREATININE-BSD FMLA CKD-EPI: >90 ML/MIN/{1.73_M2}
GLUCOSE SERPL-MCNC: 108 MG/DL (ref 70–99)
HCT VFR BLD AUTO: 21.4 % (ref 40.5–52.5)
HGB BLD-MCNC: 7.3 G/DL (ref 13.5–17.5)
LDH SERPL L TO P-CCNC: 111 U/L (ref 100–190)
Lab: NORMAL
MCH RBC QN AUTO: 28 PG (ref 26–34)
MCHC RBC AUTO-ENTMCNC: 34 G/DL (ref 31–36)
MCV RBC AUTO: 82.2 FL (ref 80–100)
MYCOBACTERIUM SPEC CULT: NORMAL
MYCOBACTERIUM SPEC CULT: NORMAL
PHOSPHATE SERPL-MCNC: 3.4 MG/DL (ref 2.5–4.9)
PLATELET # BLD AUTO: 24 K/UL (ref 135–450)
PMV BLD AUTO: 6.1 FL (ref 5–10.5)
POTASSIUM SERPL-SCNC: 4.6 MMOL/L (ref 3.5–5.1)
PROT SERPL-MCNC: 6.8 G/DL (ref 6.4–8.2)
RBC # BLD AUTO: 2.61 M/UL (ref 4.2–5.9)
REPORT: NORMAL
SODIUM SERPL-SCNC: 136 MMOL/L (ref 136–145)
THIS TEST SENT TO: NORMAL
WBC # BLD AUTO: 0.5 K/UL (ref 4–11)

## 2024-06-04 PROCEDURE — 6360000002 HC RX W HCPCS: Performed by: STUDENT IN AN ORGANIZED HEALTH CARE EDUCATION/TRAINING PROGRAM

## 2024-06-04 PROCEDURE — 6370000000 HC RX 637 (ALT 250 FOR IP): Performed by: NURSE PRACTITIONER

## 2024-06-04 PROCEDURE — 2060000000 HC ICU INTERMEDIATE R&B

## 2024-06-04 PROCEDURE — 6370000000 HC RX 637 (ALT 250 FOR IP): Performed by: INTERNAL MEDICINE

## 2024-06-04 PROCEDURE — 80048 BASIC METABOLIC PNL TOTAL CA: CPT

## 2024-06-04 PROCEDURE — 2580000003 HC RX 258: Performed by: NURSE PRACTITIONER

## 2024-06-04 PROCEDURE — 84100 ASSAY OF PHOSPHORUS: CPT

## 2024-06-04 PROCEDURE — 83615 LACTATE (LD) (LDH) ENZYME: CPT

## 2024-06-04 PROCEDURE — 85027 COMPLETE CBC AUTOMATED: CPT

## 2024-06-04 PROCEDURE — 83520 IMMUNOASSAY QUANT NOS NONAB: CPT

## 2024-06-04 PROCEDURE — 80076 HEPATIC FUNCTION PANEL: CPT

## 2024-06-04 RX ADMIN — VANCOMYCIN HYDROCHLORIDE 125 MG: 125 CAPSULE ORAL at 14:34

## 2024-06-04 RX ADMIN — VALACYCLOVIR HYDROCHLORIDE 500 MG: 500 TABLET, FILM COATED ORAL at 09:02

## 2024-06-04 RX ADMIN — LEVETIRACETAM 1500 MG: 500 TABLET, FILM COATED ORAL at 09:03

## 2024-06-04 RX ADMIN — VANCOMYCIN HYDROCHLORIDE 125 MG: 125 CAPSULE ORAL at 09:03

## 2024-06-04 RX ADMIN — GUAIFENESIN AND DEXTROMETHORPHAN HYDROBROMIDE 1 TABLET: 600; 30 TABLET, EXTENDED RELEASE ORAL at 20:39

## 2024-06-04 RX ADMIN — VORICONAZOLE 300 MG: 200 TABLET ORAL at 10:26

## 2024-06-04 RX ADMIN — MUPIROCIN: 20 OINTMENT TOPICAL at 09:04

## 2024-06-04 RX ADMIN — GUAIFENESIN AND DEXTROMETHORPHAN HYDROBROMIDE 1 TABLET: 600; 30 TABLET, EXTENDED RELEASE ORAL at 09:03

## 2024-06-04 RX ADMIN — VALACYCLOVIR HYDROCHLORIDE 500 MG: 500 TABLET, FILM COATED ORAL at 20:39

## 2024-06-04 RX ADMIN — SODIUM CHLORIDE, PRESERVATIVE FREE 10 ML: 5 INJECTION INTRAVENOUS at 20:41

## 2024-06-04 RX ADMIN — VORICONAZOLE 300 MG: 200 TABLET ORAL at 20:39

## 2024-06-04 RX ADMIN — POTASSIUM CHLORIDE 20 MEQ: 1500 TABLET, EXTENDED RELEASE ORAL at 09:02

## 2024-06-04 RX ADMIN — FILGRASTIM-AAFI 300 MCG: 300 INJECTION, SOLUTION SUBCUTANEOUS at 17:56

## 2024-06-04 RX ADMIN — SODIUM CHLORIDE, PRESERVATIVE FREE 10 ML: 5 INJECTION INTRAVENOUS at 09:04

## 2024-06-04 RX ADMIN — LEVOFLOXACIN 500 MG: 500 TABLET, FILM COATED ORAL at 10:25

## 2024-06-04 RX ADMIN — LEVETIRACETAM 1500 MG: 500 TABLET, FILM COATED ORAL at 20:39

## 2024-06-04 RX ADMIN — PANTOPRAZOLE SODIUM 40 MG: 40 TABLET, DELAYED RELEASE ORAL at 06:27

## 2024-06-04 RX ADMIN — PANTOPRAZOLE SODIUM 40 MG: 40 TABLET, DELAYED RELEASE ORAL at 16:04

## 2024-06-04 ASSESSMENT — PAIN SCALES - GENERAL
PAINLEVEL_OUTOF10: 0

## 2024-06-04 NOTE — CARE COORDINATION
Received notification that pt's tube feeds are being stopped today. Yvonne at [facility] was updated via phone call.     SW will follow    JENNIFER Ortiz   for [facility] ([facility])  [employer] Mobile: [phone]

## 2024-06-05 VITALS
TEMPERATURE: 98.8 F | WEIGHT: 153.2 LBS | HEART RATE: 97 BPM | RESPIRATION RATE: 18 BRPM | BODY MASS INDEX: 21.93 KG/M2 | SYSTOLIC BLOOD PRESSURE: 112 MMHG | DIASTOLIC BLOOD PRESSURE: 89 MMHG | OXYGEN SATURATION: 100 % | HEIGHT: 70 IN

## 2024-06-05 LAB
ABO + RH BLD: NORMAL
ALBUMIN SERPL-MCNC: 3.2 G/DL (ref 3.4–5)
ALP SERPL-CCNC: 93 U/L (ref 40–129)
ALT SERPL-CCNC: 22 U/L (ref 10–40)
ANION GAP SERPL CALCULATED.3IONS-SCNC: 9 MMOL/L (ref 3–16)
AST SERPL-CCNC: 8 U/L (ref 15–37)
BILIRUB DIRECT SERPL-MCNC: <0.2 MG/DL (ref 0–0.3)
BILIRUB INDIRECT SERPL-MCNC: ABNORMAL MG/DL (ref 0–1)
BILIRUB SERPL-MCNC: 0.4 MG/DL (ref 0–1)
BLD GP AB SCN SERPL QL: NORMAL
BLOOD BANK DISPENSE STATUS: NORMAL
BLOOD BANK PRODUCT CODE: NORMAL
BPU ID: NORMAL
BUN SERPL-MCNC: 13 MG/DL (ref 7–20)
CALCIUM SERPL-MCNC: 9 MG/DL (ref 8.3–10.6)
CHLORIDE SERPL-SCNC: 100 MMOL/L (ref 99–110)
CO2 SERPL-SCNC: 27 MMOL/L (ref 21–32)
CREAT SERPL-MCNC: 0.6 MG/DL (ref 0.9–1.3)
DEPRECATED RDW RBC AUTO: 14.2 % (ref 12.4–15.4)
DESCRIPTION BLOOD BANK: NORMAL
GFR SERPLBLD CREATININE-BSD FMLA CKD-EPI: >90 ML/MIN/{1.73_M2}
GLUCOSE SERPL-MCNC: 117 MG/DL (ref 70–99)
HCT VFR BLD AUTO: 21 % (ref 40.5–52.5)
HGB BLD-MCNC: 7.2 G/DL (ref 13.5–17.5)
HHV6 DNA # SPEC NAA+PROBE: <1000 CPY/ML
HHV6 DNA SPEC NAA+PROBE-LOG#: <3 LOG
HHV6 DNA SPEC NAA+PROBE: NORMAL
HHV6 IGG+IGM SER-IMP: NOT DETECTED
LDH SERPL L TO P-CCNC: 101 U/L (ref 100–190)
MAGNESIUM SERPL-MCNC: 1.9 MG/DL (ref 1.8–2.4)
MCH RBC QN AUTO: 27.9 PG (ref 26–34)
MCHC RBC AUTO-ENTMCNC: 34.2 G/DL (ref 31–36)
MCV RBC AUTO: 81.7 FL (ref 80–100)
PHOSPHATE SERPL-MCNC: 3.7 MG/DL (ref 2.5–4.9)
PLATELET # BLD AUTO: 27 K/UL (ref 135–450)
PMV BLD AUTO: 5.9 FL (ref 5–10.5)
POTASSIUM SERPL-SCNC: 4.4 MMOL/L (ref 3.5–5.1)
PROT SERPL-MCNC: 6.8 G/DL (ref 6.4–8.2)
RBC # BLD AUTO: 2.57 M/UL (ref 4.2–5.9)
SODIUM SERPL-SCNC: 136 MMOL/L (ref 136–145)
SPECIMEN SOURCE: NORMAL
WBC # BLD AUTO: 0.5 K/UL (ref 4–11)

## 2024-06-05 PROCEDURE — 86850 RBC ANTIBODY SCREEN: CPT

## 2024-06-05 PROCEDURE — 6370000000 HC RX 637 (ALT 250 FOR IP): Performed by: INTERNAL MEDICINE

## 2024-06-05 PROCEDURE — 36430 TRANSFUSION BLD/BLD COMPNT: CPT

## 2024-06-05 PROCEDURE — 6360000002 HC RX W HCPCS: Performed by: STUDENT IN AN ORGANIZED HEALTH CARE EDUCATION/TRAINING PROGRAM

## 2024-06-05 PROCEDURE — 36415 COLL VENOUS BLD VENIPUNCTURE: CPT

## 2024-06-05 PROCEDURE — 6370000000 HC RX 637 (ALT 250 FOR IP): Performed by: NURSE PRACTITIONER

## 2024-06-05 PROCEDURE — 80076 HEPATIC FUNCTION PANEL: CPT

## 2024-06-05 PROCEDURE — 86900 BLOOD TYPING SEROLOGIC ABO: CPT

## 2024-06-05 PROCEDURE — 80048 BASIC METABOLIC PNL TOTAL CA: CPT

## 2024-06-05 PROCEDURE — 97161 PT EVAL LOW COMPLEX 20 MIN: CPT

## 2024-06-05 PROCEDURE — 84100 ASSAY OF PHOSPHORUS: CPT

## 2024-06-05 PROCEDURE — 85027 COMPLETE CBC AUTOMATED: CPT

## 2024-06-05 PROCEDURE — 83735 ASSAY OF MAGNESIUM: CPT

## 2024-06-05 PROCEDURE — 2580000003 HC RX 258: Performed by: NURSE PRACTITIONER

## 2024-06-05 PROCEDURE — 97116 GAIT TRAINING THERAPY: CPT

## 2024-06-05 PROCEDURE — 83615 LACTATE (LD) (LDH) ENZYME: CPT

## 2024-06-05 PROCEDURE — P9040 RBC LEUKOREDUCED IRRADIATED: HCPCS

## 2024-06-05 PROCEDURE — 86901 BLOOD TYPING SEROLOGIC RH(D): CPT

## 2024-06-05 PROCEDURE — 86923 COMPATIBILITY TEST ELECTRIC: CPT

## 2024-06-05 RX ADMIN — LEVOFLOXACIN 500 MG: 500 TABLET, FILM COATED ORAL at 10:19

## 2024-06-05 RX ADMIN — POTASSIUM CHLORIDE 20 MEQ: 1500 TABLET, EXTENDED RELEASE ORAL at 10:18

## 2024-06-05 RX ADMIN — VANCOMYCIN HYDROCHLORIDE 125 MG: 125 CAPSULE ORAL at 15:02

## 2024-06-05 RX ADMIN — VANCOMYCIN HYDROCHLORIDE 125 MG: 125 CAPSULE ORAL at 10:18

## 2024-06-05 RX ADMIN — SODIUM CHLORIDE, PRESERVATIVE FREE 10 ML: 5 INJECTION INTRAVENOUS at 10:17

## 2024-06-05 RX ADMIN — VORICONAZOLE 300 MG: 200 TABLET ORAL at 10:18

## 2024-06-05 RX ADMIN — LEVETIRACETAM 1500 MG: 500 TABLET, FILM COATED ORAL at 10:18

## 2024-06-05 RX ADMIN — PANTOPRAZOLE SODIUM 40 MG: 40 TABLET, DELAYED RELEASE ORAL at 15:02

## 2024-06-05 RX ADMIN — FILGRASTIM-AAFI 300 MCG: 300 INJECTION, SOLUTION SUBCUTANEOUS at 15:02

## 2024-06-05 RX ADMIN — VALACYCLOVIR HYDROCHLORIDE 500 MG: 500 TABLET, FILM COATED ORAL at 10:19

## 2024-06-05 RX ADMIN — GUAIFENESIN AND DEXTROMETHORPHAN HYDROBROMIDE 1 TABLET: 600; 30 TABLET, EXTENDED RELEASE ORAL at 10:19

## 2024-06-05 ASSESSMENT — PAIN SCALES - GENERAL
PAINLEVEL_OUTOF10: 0
PAINLEVEL_OUTOF10: 0

## 2024-06-05 NOTE — CARE COORDINATION
Completed AVS Summary for patient.  Voriconazole was approved and filled by outpatient pharm.  Medication was delivered to patient room and handed to patient father.      Appointment for follow up with [facility] will be on Friday.  He will be notified of the time.  Venetoclax is on hold at this time.  Patient also will be taking Vancomycin tablets 125 mg 2 x daily as a prophylactic due to low WBC and at risk for c-diff re-occurrence.      Patient discharging today.

## 2024-06-05 NOTE — DISCHARGE SUMMARY
Colitis: see ID above  - GI panel: negative 3/17/24  -Repeat C.diff: negative 5/28/24     Rectal Adenocarcinoma: Stage T1N0. Mismatch repair gene expression by IHC-intact with low probability for MSI-H.   - EGD ([facility]) reportedly unremarkable  - Colonoscopy 3/22/24: rectal lesion: Adenocarcinoma, at least adenocarcinoma in situ,  MRI pelvis showed 2.7 cm rectal mass with no involvement of anal sphincter, mesorectal fascia or any mesorectal lymph nodes.  - Pelvic MRI 3/25/24: Radiological Stage: T 1/2, N 0  MRF: Clear. Sphincter involvement: No. Suspicious extra mesorectal nodes: None. EMVI: Absent  - CT chest 3/25/24: No acute pathology. CT A/P- no mets               - CEA: 6.5  - s/p rectal mass resection 4/12/24. Path staging T1N0M0.  - Refer to Dr. Russo for rectal ca follow up post resection.--> Needs to follow-up as an outpatient.   - Would likely not offer adjuvant therapy at this stage. Follow up on Oncology recs  Abd Pain  - CT a/p 5/31/24: small bilateral pleural effusions;Right middle lobe and bibasilar pulmonary airspace disease, likely persistent pneumonia. Component of superimposed edema is also possible; Punctate left nephrolithiasis.      6. Neuro:  H/O Oligodendroglioma 2017, seizures since 2009  - Cont on Keppra 1500 mg PO BID  - s/p partial resection, radiation and Temodar     7. Cardiac  - ECHO 3/8/24:  EF 55-60% Mild tricuspid regurgitation. Mild mitral regurgitation     8. Genetics  - Referral placed to [facility] genetics for possible germline mutation. Sister tested positive for BRCA.     Condition on discharge: Stable     Discharge Instructions:    The patient was advised on activity and dietary restrictions.    The patient was advised to follow up in the emergency department or contact the physician with any unresolved nausea/vomiting/diarrhea/pain or temperature greater than 100.5 F or any other unusual symptoms.       This discharge summary and plan was discussed and agreed upon with

## 2024-06-05 NOTE — PROGRESS NOTES
Comprehensive Nutrition Assessment    RECOMMENDATIONS:  Nutrition Support:  Continue: Jevity 1.5 (Standard Formula with Fiber ) @ 50mL/hr  Water Flushes: 30ml q4 (Maintenance)  Decrease Banatrol to once daily  PO Diet: Continue low microbial diet  ONS: pt does not like ONS  Encouraged trial of Ensure Plant  Nutrition Education: Education completed (high kcal/pro)     NUTRITION ASSESSMENT:   Nutritional summary & status: Follow-up.  PO intake improving, however pt continues to fall below 50% of kcal and protein needs.  Noted pt w/o +bm since 6/1- rec'd holding Banatrol- RN aware.  RD reviewed high kcal/pro meals/snacks.  Provided handouts outlining kcal needs, sample menu, high kcal/pro options.  RD wrote recommendations on board.  Pt seems motivated to increase kcal intake and have Corpak removed prior to d/c.  Will continue monitoring.   Admission // PMH: Sepsis // Oligodendroglioma (S/P partial resection 2017, radiation 2019, and temodar 2019), and rectal adenocarcinoma; AML underlying MDS    MALNUTRITION ASSESSMENT  Context of Malnutrition: Chronic Illness   Malnutrition Status: Moderate malnutrition  Findings of the 6 clinical characteristics of malnutrition (Minimum of 2 out of 6 clinical characteristics is required to make the diagnosis of moderate or severe Protein Calorie Malnutrition based on AND/ASPEN Guidelines):  Energy Intake:  No significant decrease in energy intake  Weight Loss:  Greater than 5% over 1 month (-9% x 1mo)     Body Fat Loss:  Mild body fat loss Triceps, Buccal region   Muscle Mass Loss:  Mild muscle mass loss Thigh (quadriceps), Clavicles (pectoralis & deltoids)    NUTRITION DIAGNOSIS   Moderate malnutrition related to inadequate protein-energy intake as evidenced by Criteria as identified in malnutrition assessment    Nutrition Monitoring and Evaluation:   Food/Nutrient Intake Outcomes:  Food and Nutrient Intake, Supplement Intake  Physical Signs/Symptoms Outcomes:  Biochemical 
      Comprehensive Nutrition Assessment    RECOMMENDATIONS:  Nutrition Support:  Increase: Jevity 1.5 (Standard Formula with Fiber ) @ 30mL/hr  Start Banatrol  Administer 1 dose (RN administering during RD encounter- 12am)  Advanced to 30ml/h   If no increase in stool output, advance to 40ml 2h after Banatrol adm (~2pm)  Goal: 50 mL/hr  Water Flushes: 30ml q4 (Maintenance)  PO Diet: Continue low microbial diet  ONS: pt does not like ONS  Nutrition Education: Education completed (high kcal/pro)     NUTRITION ASSESSMENT:   Nutritional summary & status: Follow-up.  Pt previously at goal rate, however decreased d/t diarrhea.  RD adding fiber supplement to decrease stool output.  Rec'd advancing toward goal rate to assess need for fiber supplement modification.  PO intake remains variable.  Discussed need for consistent PO intake for TF to be d/c'd.   Admission // PMH: Sepsis // Oligodendroglioma (S/P partial resection 2017, radiation 2019, and temodar 2019), and rectal adenocarcinoma; AML underlying MDS    MALNUTRITION ASSESSMENT  Context of Malnutrition: Chronic Illness   Malnutrition Status: Moderate malnutrition  Findings of the 6 clinical characteristics of malnutrition (Minimum of 2 out of 6 clinical characteristics is required to make the diagnosis of moderate or severe Protein Calorie Malnutrition based on AND/ASPEN Guidelines):  Energy Intake:  No significant decrease in energy intake  Weight Loss:  Greater than 5% over 1 month (-9% x 1mo)     Body Fat Loss:  Mild body fat loss Triceps, Buccal region   Muscle Mass Loss:  Mild muscle mass loss Thigh (quadriceps), Clavicles (pectoralis & deltoids)    NUTRITION DIAGNOSIS   Moderate malnutrition related to inadequate protein-energy intake as evidenced by Criteria as identified in malnutrition assessment    Nutrition Monitoring and Evaluation:   Food/Nutrient Intake Outcomes:  Food and Nutrient Intake, Supplement Intake  Physical Signs/Symptoms Outcomes:  
      Comprehensive Nutrition Assessment    RECOMMENDATIONS:  Nutrition Support:  Increase: Jevity 1.5 (Standard Formula with Fiber ) @ 40mL/hr  Advance 10ml q4 to goal rate of 50ml/h  Continue Banatrol- BID  Water Flushes: 30ml q4 (Maintenance)  PO Diet: Continue low microbial diet  ONS: pt does not like ONS  Willing to trial Ensure Plant (second attempt to get pt to try ONS)  Nutrition Education: Education completed (high kcal/pro)     NUTRITION ASSESSMENT:   Nutritional summary & status: Follow-up.  Pt unable to reach goal rate overnight d/t abd pain/cramping. TF held at 30ml/h.  Bentyl provided.  Will start kcal count to determine PO intake and need for continued nutrition support.  Pt reports appetite comes and goes.  Pt agreeable to trial plant based Ensure- provided to pt.  Pt reported no PO intake as of 11 o'clock encounter- RD encouraged pt to consume breakfast earlier and discussed food safety concerns w/ allowing tray to sit for too long.  Pt expressed understanding.   Admission // PMH: Sepsis // Oligodendroglioma (S/P partial resection 2017, radiation 2019, and temodar 2019), and rectal adenocarcinoma; AML underlying MDS    MALNUTRITION ASSESSMENT  Context of Malnutrition: Chronic Illness   Malnutrition Status: Moderate malnutrition  Findings of the 6 clinical characteristics of malnutrition (Minimum of 2 out of 6 clinical characteristics is required to make the diagnosis of moderate or severe Protein Calorie Malnutrition based on AND/ASPEN Guidelines):  Energy Intake:  No significant decrease in energy intake  Weight Loss:  Greater than 5% over 1 month (-9% x 1mo)     Body Fat Loss:  Mild body fat loss Triceps, Buccal region   Muscle Mass Loss:  Mild muscle mass loss Thigh (quadriceps), Clavicles (pectoralis & deltoids)    NUTRITION DIAGNOSIS   Moderate malnutrition related to inadequate protein-energy intake as evidenced by Criteria as identified in malnutrition assessment    Nutrition Monitoring and 
      Comprehensive Nutrition Assessment    RECOMMENDATIONS:  PO Diet: Continue low microbial diet  ONS: Start Ensure Plant BID  Nutrition Education: Education completed (high kcal/pro)     NUTRITION ASSESSMENT:   Nutritional summary & status: +MST for wt loss PTA.  Pt admitted w/ sepsis.  Pt endorses increase appetite lately, however, pt w/ severe wt loss since previous adm.  Pt has been avoiding some food groups because he wasn't sure he was allow to have them.  RD discussed high kcal/pro meals and encouraged pt to consume more frequent meals/snacks.  Pt expressed understanding.  Agreeable to ONS- will add to order.   Admission // PMH: Sepsis // Oligodendroglioma (S/P partial resection 2017, radiation 2019, and temodar 2019), and rectal adenocarcinoma; AML underlying MDS    MALNUTRITION ASSESSMENT  Context of Malnutrition: Chronic Illness   Malnutrition Status: Moderate malnutrition  Findings of the 6 clinical characteristics of malnutrition (Minimum of 2 out of 6 clinical characteristics is required to make the diagnosis of moderate or severe Protein Calorie Malnutrition based on AND/ASPEN Guidelines):  Energy Intake:  No significant decrease in energy intake  Weight Loss:  Greater than 5% over 1 month (-9% x 1mo)     Body Fat Loss:  Mild body fat loss Triceps, Buccal region   Muscle Mass Loss:  Mild muscle mass loss Thigh (quadriceps), Clavicles (pectoralis & deltoids)    NUTRITION DIAGNOSIS   Moderate malnutrition related to inadequate protein-energy intake as evidenced by Criteria as identified in malnutrition assessment    Nutrition Monitoring and Evaluation:   Food/Nutrient Intake Outcomes:  Food and Nutrient Intake, Supplement Intake  Physical Signs/Symptoms Outcomes:  Biochemical Data, Nutrition Focused Physical Findings, Weight     OBJECTIVE DATA: Significant to nutrition assessment  Nutrition Related Findings: 50ml stool output 5/15; Na 135;  Wounds: None  Nutrition Goals: Meet at least 75% of estimated 
    [facility] Progress Note    2024     Dennys Peguero    MRN: [id number]    : 1980    Referring MD: Lei Tiwari MD  [address] [place] [address]      SUBJECTIVE:  The patient has no complaints.  He states at 1 point his legs are swollen, but they are now back to normal.    ECOG PS:  (2) Ambulatory and capable of self care, unable to carry out work activity, up and about > 50% or waking hours    KPS: 80% Normal activity with effort; some signs or symptoms of disease    Isolation: None    Medications    Scheduled Meds:   filgrastim/filgrastim biosimilar  300 mcg SubCUTAneous QPM    voriconazole  300 mg Oral 2 times per day    dextromethorphan-guaiFENesin  1 tablet Oral BID    potassium chloride  20 mEq Oral Daily with breakfast    meropenem  1,000 mg IntraVENous Q8H    mupirocin   Topical BID    Hydrocerin   Topical BID    levETIRAcetam  1,500 mg Oral BID    pantoprazole  40 mg Oral BID AC    valACYclovir  500 mg Oral BID    vancomycin  125 mg Oral BID    sodium chloride flush  5-40 mL IntraVENous 2 times per day    Saline Mouthwash  15 mL Swish & Spit 4x Daily AC & HS     Continuous Infusions:   sodium chloride      sodium chloride      sodium chloride 50 mL/hr at 24 0002    sodium chloride      sodium chloride      sodium chloride       PRN Meds:.sodium chloride, loperamide, sodium chloride, hydrocortisone sodium succinate PF, acetaminophen, potassium chloride **OR** potassium alternative oral replacement **OR** potassium chloride, dicyclomine, simethicone, oxyCODONE, ondansetron, prochlorperazine, diphenhydrAMINE, sodium chloride, sodium chloride flush, sodium chloride, magnesium sulfate, magnesium hydroxide, Saline Mouthwash, ALTEplase (CATHFLO) 2 mg in sterile water 2 mL injection, sodium chloride    ROS:  As noted above, otherwise remainder of 10-point ROS negative    Physical Exam:     I&O:    Intake/Output Summary (Last 24 hours) at 2024 0712  Last data filed at 2024 
    [facility] Progress Note    2024     Dennys Peguero    MRN: [id number]    : 1980    Referring MD: Lei Tiwari MD  [address] [place] [place] [address]      SUBJECTIVE:    Picc site quite tender, small open sore, will pull today.  Still has tenesmus. Eating quite a bit of fiber.    ECOG PS:  (2) Ambulatory and capable of self care, unable to carry out work activity, up and about > 50% or waking hours    KPS: 80% Normal activity with effort; some signs or symptoms of disease    Isolation: None    Medications    Scheduled Meds:   Venetoclax  200 mg Oral Q24H    fluconazole  200 mg Oral Daily    Hydrocerin   Topical BID    levETIRAcetam  1,500 mg Oral BID    pantoprazole  40 mg Oral BID AC    valACYclovir  500 mg Oral BID    vancomycin  125 mg Oral BID    sodium chloride flush  5-40 mL IntraVENous 2 times per day    Saline Mouthwash  15 mL Swish & Spit 4x Daily AC & HS    cefepime  2,000 mg IntraVENous Q8H    vancomycin  1,750 mg IntraVENous Q12H     Continuous Infusions:   sodium chloride      sodium chloride      sodium chloride      sodium chloride 75 mL/hr at 24     PRN Meds:.oxyCODONE, ondansetron, prochlorperazine, diphenhydrAMINE, sodium chloride, sodium chloride flush, sodium chloride, potassium chloride, magnesium sulfate, magnesium hydroxide, Saline Mouthwash, ALTEplase (CATHFLO) 2 mg in sterile water 2 mL injection, sodium chloride, loperamide, acetaminophen    ROS:  As noted above, otherwise remainder of 10-point ROS negative    Physical Exam:     I&O:    Intake/Output Summary (Last 24 hours) at 2024 0822  Last data filed at 2024 0630  Gross per 24 hour   Intake 2277 ml   Output 3350 ml   Net -1073 ml         Vital Signs:  /67   Pulse 94   Temp 98.9 °F (37.2 °C)   Resp 18   Ht 1.778 m (5' 10\")   Wt 72.7 kg (160 lb 3.2 oz)   SpO2 99%   BMI 22.99 kg/m²     Weight:    Wt Readings from Last 3 Encounters:   24 72.7 kg (160 lb 3.2 oz)   24 73.2 kg 
    [facility] Progress Note    6/3/2024     Dennys Peguero    MRN: [id number]    : 1980    Referring MD: Lei Tiwari MD  [address] [place] [address]      SUBJECTIVE:  The patient has no complaints.  Trying to eat and drink more. Afebrile for the last 36 hours.     ECOG PS:  (2) Ambulatory and capable of self care, unable to carry out work activity, up and about > 50% or waking hours    KPS: 80% Normal activity with effort; some signs or symptoms of disease    Isolation: None    Medications    Scheduled Meds:   filgrastim/filgrastim biosimilar  300 mcg SubCUTAneous QPM    voriconazole  300 mg Oral 2 times per day    dextromethorphan-guaiFENesin  1 tablet Oral BID    potassium chloride  20 mEq Oral Daily with breakfast    meropenem  1,000 mg IntraVENous Q8H    mupirocin   Topical BID    Hydrocerin   Topical BID    levETIRAcetam  1,500 mg Oral BID    pantoprazole  40 mg Oral BID AC    valACYclovir  500 mg Oral BID    vancomycin  125 mg Oral BID    sodium chloride flush  5-40 mL IntraVENous 2 times per day    Saline Mouthwash  15 mL Swish & Spit 4x Daily AC & HS     Continuous Infusions:   sodium chloride      sodium chloride      sodium chloride 50 mL/hr at 24 0509    sodium chloride      sodium chloride      sodium chloride       PRN Meds:.sodium chloride, loperamide, sodium chloride, hydrocortisone sodium succinate PF, acetaminophen, potassium chloride **OR** potassium alternative oral replacement **OR** potassium chloride, dicyclomine, simethicone, oxyCODONE, ondansetron, prochlorperazine, diphenhydrAMINE, sodium chloride, sodium chloride flush, sodium chloride, magnesium sulfate, magnesium hydroxide, Saline Mouthwash, ALTEplase (CATHFLO) 2 mg in sterile water 2 mL injection, sodium chloride    ROS:  As noted above, otherwise remainder of 10-point ROS negative    Physical Exam:     I&O:    Intake/Output Summary (Last 24 hours) at 6/3/2024 0845  Last data filed at 6/3/2024 0521  Gross per 
    [facility] Progress Note    2024     Dennys Peguero    MRN: [id number]    : 1980    Referring MD: Lei Tiwari MD  [address] [place] [address]      SUBJECTIVE:  Continues to have recurrent fever spikes. PICC line removed  Reports productive cough with greenish sputum and right sided chest pain. Reduced appetite and feeling fatigue.  ECOG PS:  (2) Ambulatory and capable of self care, unable to carry out work activity, up and about > 50% or waking hours    KPS: 80% Normal activity with effort; some signs or symptoms of disease    Isolation: None    Medications    Scheduled Meds:   dextromethorphan-guaiFENesin  1 tablet Oral BID    potassium chloride  20 mEq Oral Daily with breakfast    meropenem  1,000 mg IntraVENous Q8H    mupirocin   Topical BID    Venetoclax  200 mg Oral Q24H    fluconazole  200 mg Oral Daily    Hydrocerin   Topical BID    levETIRAcetam  1,500 mg Oral BID    pantoprazole  40 mg Oral BID AC    valACYclovir  500 mg Oral BID    vancomycin  125 mg Oral BID    sodium chloride flush  5-40 mL IntraVENous 2 times per day    Saline Mouthwash  15 mL Swish & Spit 4x Daily AC & HS    vancomycin  1,750 mg IntraVENous Q12H     Continuous Infusions:   sodium chloride      sodium chloride      sodium chloride      sodium chloride 75 mL/hr at 24 0633     PRN Meds:.hydrocortisone sodium succinate PF, acetaminophen, potassium chloride **OR** potassium alternative oral replacement **OR** potassium chloride, dicyclomine, simethicone, oxyCODONE, ondansetron, prochlorperazine, diphenhydrAMINE, sodium chloride, sodium chloride flush, sodium chloride, magnesium sulfate, magnesium hydroxide, Saline Mouthwash, ALTEplase (CATHFLO) 2 mg in sterile water 2 mL injection, sodium chloride, loperamide    ROS:  As noted above, otherwise remainder of 10-point ROS negative    Physical Exam:     I&O:    Intake/Output Summary (Last 24 hours) at 2024 1135  Last data filed at 2024 1104  Gross 
    [facility] Progress Note    2024     Dennys Peguero    MRN: [id number]    : 1980    Referring MD: Lei Tiwari MD  [address] [place] [place] [address]      SUBJECTIVE:  The patient feels that he is eating a little better.  We did put his feeding tube back in yesterday.  X-rays were reviewed this morning which showed it was in stable positioning.  It did not come all the way out.    ECOG PS:  (2) Ambulatory and capable of self care, unable to carry out work activity, up and about > 50% or waking hours    KPS: 80% Normal activity with effort; some signs or symptoms of disease    Isolation: None    Medications    Scheduled Meds:   filgrastim/filgrastim biosimilar  300 mcg SubCUTAneous QPM    voriconazole  300 mg Oral 2 times per day    dextromethorphan-guaiFENesin  1 tablet Oral BID    potassium chloride  20 mEq Oral Daily with breakfast    meropenem  1,000 mg IntraVENous Q8H    mupirocin   Topical BID    Hydrocerin   Topical BID    levETIRAcetam  1,500 mg Oral BID    pantoprazole  40 mg Oral BID AC    valACYclovir  500 mg Oral BID    vancomycin  125 mg Oral BID    sodium chloride flush  5-40 mL IntraVENous 2 times per day    Saline Mouthwash  15 mL Swish & Spit 4x Daily AC & HS     Continuous Infusions:   sodium chloride      sodium chloride      sodium chloride 50 mL/hr at 24 0340    sodium chloride      sodium chloride      sodium chloride       PRN Meds:.sodium chloride, loperamide, sodium chloride, hydrocortisone sodium succinate PF, acetaminophen, potassium chloride **OR** potassium alternative oral replacement **OR** potassium chloride, dicyclomine, simethicone, oxyCODONE, ondansetron, prochlorperazine, diphenhydrAMINE, sodium chloride, sodium chloride flush, sodium chloride, magnesium sulfate, magnesium hydroxide, Saline Mouthwash, ALTEplase (CATHFLO) 2 mg in sterile water 2 mL injection, sodium chloride    ROS:  As noted above, otherwise remainder of 10-point ROS negative    Physical 
    [facility] Progress Note    2024     Dennys Peguero    MRN: [id number]    : 1980    Referring MD: Lei Tiwari MD  [address] [place] [address]      SUBJECTIVE:  Feeling weak, trying to work on his oral intake. Tolerating TF well. Continues to have recurrent fever spikes. HDS.    ECOG PS:  (2) Ambulatory and capable of self care, unable to carry out work activity, up and about > 50% or waking hours    KPS: 80% Normal activity with effort; some signs or symptoms of disease    Isolation: None    Medications    Scheduled Meds:   voriconazole  200 mg Oral 2 times per day    dextromethorphan-guaiFENesin  1 tablet Oral BID    potassium chloride  20 mEq Oral Daily with breakfast    meropenem  1,000 mg IntraVENous Q8H    mupirocin   Topical BID    Hydrocerin   Topical BID    levETIRAcetam  1,500 mg Oral BID    pantoprazole  40 mg Oral BID AC    valACYclovir  500 mg Oral BID    vancomycin  125 mg Oral BID    sodium chloride flush  5-40 mL IntraVENous 2 times per day    Saline Mouthwash  15 mL Swish & Spit 4x Daily AC & HS    vancomycin  1,750 mg IntraVENous Q12H     Continuous Infusions:   sodium chloride      sodium chloride      sodium chloride       PRN Meds:.hydrocortisone sodium succinate PF, acetaminophen, potassium chloride **OR** potassium alternative oral replacement **OR** potassium chloride, dicyclomine, simethicone, oxyCODONE, ondansetron, prochlorperazine, diphenhydrAMINE, sodium chloride, sodium chloride flush, sodium chloride, magnesium sulfate, magnesium hydroxide, Saline Mouthwash, ALTEplase (CATHFLO) 2 mg in sterile water 2 mL injection, sodium chloride, loperamide    ROS:  As noted above, otherwise remainder of 10-point ROS negative    Physical Exam:     I&O:    Intake/Output Summary (Last 24 hours) at 2024 0746  Last data filed at 2024 0442  Gross per 24 hour   Intake 588 ml   Output 1300 ml   Net -712 ml         Vital Signs:  /74   Pulse (!) 107   Temp 98.8 °F 
    [facility] Progress Note    2024     Dennys Peguero    MRN: [id number]    : 1980    Referring MD: eLi Tiwari MD  [address] [place] [place] [address]      SUBJECTIVE:  He is doing ok. Still with persistent fevers, has LLQ pain will get Ct abd pelvis with IV and oral contrast. He has intermittent diarrhea. No emesis.     ECOG PS:  (2) Ambulatory and capable of self care, unable to carry out work activity, up and about > 50% or waking hours    KPS: 80% Normal activity with effort; some signs or symptoms of disease    Isolation: None    Medications    Scheduled Meds:   voriconazole  300 mg Oral 2 times per day    dextromethorphan-guaiFENesin  1 tablet Oral BID    potassium chloride  20 mEq Oral Daily with breakfast    meropenem  1,000 mg IntraVENous Q8H    mupirocin   Topical BID    Hydrocerin   Topical BID    levETIRAcetam  1,500 mg Oral BID    pantoprazole  40 mg Oral BID AC    valACYclovir  500 mg Oral BID    vancomycin  125 mg Oral BID    sodium chloride flush  5-40 mL IntraVENous 2 times per day    Saline Mouthwash  15 mL Swish & Spit 4x Daily AC & HS     Continuous Infusions:   sodium chloride      sodium chloride      sodium chloride 50 mL/hr at 24 0454    sodium chloride      sodium chloride      sodium chloride       PRN Meds:.sodium chloride, loperamide, sodium chloride, hydrocortisone sodium succinate PF, acetaminophen, potassium chloride **OR** potassium alternative oral replacement **OR** potassium chloride, dicyclomine, simethicone, oxyCODONE, ondansetron, prochlorperazine, diphenhydrAMINE, sodium chloride, sodium chloride flush, sodium chloride, magnesium sulfate, magnesium hydroxide, Saline Mouthwash, ALTEplase (CATHFLO) 2 mg in sterile water 2 mL injection, sodium chloride    ROS:  As noted above, otherwise remainder of 10-point ROS negative    Physical Exam:     I&O:    Intake/Output Summary (Last 24 hours) at 2024 0759  Last data filed at 2024 0710  Gross per 24 
    [facility] Progress Note    2024     Dennys Peguero    MRN: [id number]    : 1980    Referring MD: Lei Tiwari MD  [address] [place] [address]      SUBJECTIVE:  Continues to have recurrent fever spikes. PICC line removed  Reports productive cough with greenish sputum. Reduced appetite and feeling fatigue.  ECOG PS:  (2) Ambulatory and capable of self care, unable to carry out work activity, up and about > 50% or waking hours    KPS: 80% Normal activity with effort; some signs or symptoms of disease    Isolation: None    Medications    Scheduled Meds:   meropenem  1,000 mg IntraVENous Q8H    mupirocin   Topical BID    Venetoclax  200 mg Oral Q24H    fluconazole  200 mg Oral Daily    Hydrocerin   Topical BID    levETIRAcetam  1,500 mg Oral BID    pantoprazole  40 mg Oral BID AC    valACYclovir  500 mg Oral BID    vancomycin  125 mg Oral BID    sodium chloride flush  5-40 mL IntraVENous 2 times per day    Saline Mouthwash  15 mL Swish & Spit 4x Daily AC & HS    vancomycin  1,750 mg IntraVENous Q12H     Continuous Infusions:   sodium chloride      sodium chloride      sodium chloride      sodium chloride 75 mL/hr at 24 0210     PRN Meds:.acetaminophen, potassium chloride **OR** potassium alternative oral replacement **OR** potassium chloride, dicyclomine, simethicone, oxyCODONE, ondansetron, prochlorperazine, diphenhydrAMINE, sodium chloride, sodium chloride flush, sodium chloride, magnesium sulfate, magnesium hydroxide, Saline Mouthwash, ALTEplase (CATHFLO) 2 mg in sterile water 2 mL injection, sodium chloride, loperamide    ROS:  As noted above, otherwise remainder of 10-point ROS negative    Physical Exam:     I&O:    Intake/Output Summary (Last 24 hours) at 2024 0852  Last data filed at 2024 0837  Gross per 24 hour   Intake 240 ml   Output 2850 ml   Net -2610 ml         Vital Signs:  /75   Pulse 100   Temp 99.5 °F (37.5 °C) (Oral)   Resp 18   Ht 1.778 m (5' 10\")  
    [facility] Progress Note    2024     Dennys Peguero    MRN: [id number]    : 1980    Referring MD: Lei Tiwari MD  [address] [place] [address]      SUBJECTIVE:  Patient afebrile and HDS. PICC site tender. Feeling fatigue. No N/V/D or SOB or cough.    ECOG PS:  (2) Ambulatory and capable of self care, unable to carry out work activity, up and about > 50% or waking hours    KPS: 80% Normal activity with effort; some signs or symptoms of disease    Isolation: None    Medications    Scheduled Meds:   fluconazole  200 mg Oral Daily    Hydrocerin   Topical BID    levETIRAcetam  1,500 mg Oral BID    pantoprazole  40 mg Oral BID AC    valACYclovir  500 mg Oral BID    vancomycin  125 mg Oral BID    sodium chloride flush  5-40 mL IntraVENous 2 times per day    Saline Mouthwash  15 mL Swish & Spit 4x Daily AC & HS    cefepime  2,000 mg IntraVENous Q8H    vancomycin  1,750 mg IntraVENous Q12H     Continuous Infusions:   sodium chloride      sodium chloride      sodium chloride      sodium chloride 75 mL/hr at 24     PRN Meds:.ondansetron, prochlorperazine, diphenhydrAMINE, sodium chloride, sodium chloride flush, sodium chloride, potassium chloride, magnesium sulfate, magnesium hydroxide, Saline Mouthwash, ALTEplase (CATHFLO) 2 mg in sterile water 2 mL injection, sodium chloride, loperamide, acetaminophen    ROS:  As noted above, otherwise remainder of 10-point ROS negative    Physical Exam:     I&O:    Intake/Output Summary (Last 24 hours) at 2024 0852  Last data filed at 2024 0838  Gross per 24 hour   Intake 4271 ml   Output 2740 ml   Net 1531 ml         Vital Signs:  /68   Pulse 95   Temp 98.6 °F (37 °C) (Oral)   Resp 16   Ht 1.778 m (5' 10\")   Wt 71.8 kg (158 lb 6.4 oz)   SpO2 100%   BMI 22.73 kg/m²     Weight:    Wt Readings from Last 3 Encounters:   24 71.8 kg (158 lb 6.4 oz)   24 73.2 kg (161 lb 6.4 oz)   24 80.1 kg (176 lb 9.4 oz) 
    [facility] Progress Note    2024     Dennys Peguero    MRN: [id number]    : 1980    Referring MD: Lei Tiwari MD  [address] [place] [address]      SUBJECTIVE:    Picc site continues to be tender but better with pain meds  Has runny nose  Still has GI issues.    ECOG PS:  (2) Ambulatory and capable of self care, unable to carry out work activity, up and about > 50% or waking hours    KPS: 80% Normal activity with effort; some signs or symptoms of disease    Isolation: None    Medications    Scheduled Meds:   Venetoclax  200 mg Oral Q24H    fluconazole  200 mg Oral Daily    Hydrocerin   Topical BID    levETIRAcetam  1,500 mg Oral BID    pantoprazole  40 mg Oral BID AC    valACYclovir  500 mg Oral BID    vancomycin  125 mg Oral BID    sodium chloride flush  5-40 mL IntraVENous 2 times per day    Saline Mouthwash  15 mL Swish & Spit 4x Daily AC & HS    cefepime  2,000 mg IntraVENous Q8H    vancomycin  1,750 mg IntraVENous Q12H     Continuous Infusions:   sodium chloride      sodium chloride      sodium chloride      sodium chloride 75 mL/hr at 24 0026     PRN Meds:.oxyCODONE, ondansetron, prochlorperazine, diphenhydrAMINE, sodium chloride, sodium chloride flush, sodium chloride, potassium chloride, magnesium sulfate, magnesium hydroxide, Saline Mouthwash, ALTEplase (CATHFLO) 2 mg in sterile water 2 mL injection, sodium chloride, loperamide, acetaminophen    ROS:  As noted above, otherwise remainder of 10-point ROS negative    Physical Exam:     I&O:    Intake/Output Summary (Last 24 hours) at 2024 0905  Last data filed at 2024 0643  Gross per 24 hour   Intake 3484 ml   Output 2770 ml   Net 714 ml         Vital Signs:  BP (!) 104/59   Pulse 91   Temp 99.5 °F (37.5 °C) (Oral)   Resp 16   Ht 1.778 m (5' 10\")   Wt 72.3 kg (159 lb 4.8 oz)   SpO2 99%   BMI 22.86 kg/m²     Weight:    Wt Readings from Last 3 Encounters:   24 72.3 kg (159 lb 4.8 oz)   24 73.2 kg 
    [facility] Progress Note    2024     Dennys Peguero    MRN: [id number]    : 1980    Referring MD: Lei Tiwari MD  [address] [place] [place] [address]      SUBJECTIVE:  Not able to sleep at night due to vital check, and getting tired of needle poke. No diarrhea.  Trying to eat and drink more. Afebrile for the last 48 hours.     ECOG PS:  (2) Ambulatory and capable of self care, unable to carry out work activity, up and about > 50% or waking hours    KPS: 80% Normal activity with effort; some signs or symptoms of disease    Isolation: None    Medications    Scheduled Meds:   levoFLOXacin  500 mg Oral Daily    filgrastim/filgrastim biosimilar  300 mcg SubCUTAneous QPM    voriconazole  300 mg Oral 2 times per day    dextromethorphan-guaiFENesin  1 tablet Oral BID    potassium chloride  20 mEq Oral Daily with breakfast    mupirocin   Topical BID    Hydrocerin   Topical BID    levETIRAcetam  1,500 mg Oral BID    pantoprazole  40 mg Oral BID AC    valACYclovir  500 mg Oral BID    vancomycin  125 mg Oral BID    sodium chloride flush  5-40 mL IntraVENous 2 times per day    Saline Mouthwash  15 mL Swish & Spit 4x Daily AC & HS     Continuous Infusions:   sodium chloride      sodium chloride      sodium chloride      sodium chloride      sodium chloride       PRN Meds:.sodium chloride, loperamide, sodium chloride, hydrocortisone sodium succinate PF, acetaminophen, potassium chloride **OR** potassium alternative oral replacement **OR** potassium chloride, dicyclomine, simethicone, oxyCODONE, ondansetron, prochlorperazine, diphenhydrAMINE, sodium chloride, sodium chloride flush, sodium chloride, magnesium sulfate, magnesium hydroxide, Saline Mouthwash, ALTEplase (CATHFLO) 2 mg in sterile water 2 mL injection, sodium chloride    ROS:  As noted above, otherwise remainder of 10-point ROS negative    Physical Exam:     I&O:    Intake/Output Summary (Last 24 hours) at 2024 0834  Last data filed at 2024 
    [facility] Progress Note    2024     Dennys Peguero    MRN: [id number]    : 1980    Referring MD: Lei Tiwari MD  [address] [place] [address]      SUBJECTIVE:  Tolerating TF well except diarrhea. Will reduce it to 20 ml/hr today. Continues to have recurrent fever spikes. HDS. No SOB at rest.    ECOG PS:  (2) Ambulatory and capable of self care, unable to carry out work activity, up and about > 50% or waking hours    KPS: 80% Normal activity with effort; some signs or symptoms of disease    Isolation: None    Medications    Scheduled Meds:   loperamide  2 mg Oral 4x Daily    voriconazole  200 mg Oral 2 times per day    dextromethorphan-guaiFENesin  1 tablet Oral BID    potassium chloride  20 mEq Oral Daily with breakfast    meropenem  1,000 mg IntraVENous Q8H    mupirocin   Topical BID    Hydrocerin   Topical BID    levETIRAcetam  1,500 mg Oral BID    pantoprazole  40 mg Oral BID AC    valACYclovir  500 mg Oral BID    vancomycin  125 mg Oral BID    sodium chloride flush  5-40 mL IntraVENous 2 times per day    Saline Mouthwash  15 mL Swish & Spit 4x Daily AC & HS    vancomycin  1,750 mg IntraVENous Q12H     Continuous Infusions:   sodium chloride      sodium chloride 50 mL/hr at 24 1743    sodium chloride      sodium chloride      sodium chloride       PRN Meds:.sodium chloride, hydrocortisone sodium succinate PF, acetaminophen, potassium chloride **OR** potassium alternative oral replacement **OR** potassium chloride, dicyclomine, simethicone, oxyCODONE, ondansetron, prochlorperazine, diphenhydrAMINE, sodium chloride, sodium chloride flush, sodium chloride, magnesium sulfate, magnesium hydroxide, Saline Mouthwash, ALTEplase (CATHFLO) 2 mg in sterile water 2 mL injection, sodium chloride    ROS:  As noted above, otherwise remainder of 10-point ROS negative    Physical Exam:     I&O:    Intake/Output Summary (Last 24 hours) at 2024 1146  Last data filed at 2024 0839  Gross 
   IMAGING SERVICES NURSING PROGRESS NOTE    Procedure:  BMBX  May 29, 2024  Dennys Peguero      Allergies:    Allergies   Allergen Reactions    Ossian Meal Anaphylaxis       Vitals:    05/29/24 1151   BP: 135/85   Pulse: 89   Resp: 18   Temp:    SpO2: 98%       Recent lab work reviewed with MD: yes    Procedure explained to patient by MD: yes   Informed consent obtained:yes  Family with patient:no    Mental Status:  Normal  Readiness to learn:  Yes  Barriers to learning: No    Pain Assessment Pre-Procedure:  Pain Present:  yes: abdominal pain  Pain Score:  3  Pain Quality/Description:  Aching    Time out Procedure Verification with:  [x] RN  [x] Physician  [x] Patient  [x] Other: CT Technologist  Procedure site marked, if applicable:  Yes    Note: Patient arrived A & O x 4, breathing easily on room air, Spoke to Dr. Florian & Julia Branch NP prior to procedure.  Procedural sedation:  Fentanyl:  50mcg  Versed:    1mg  Post Procedureal Note:  Patient tolerated procedure well.  Breathing easily on room air.  Report given to [facility] RN.  Patient transported in stable conditon to room 3522.    Pain Assessment Post-Procedure:  Pain Present:  no  Pain Score:  0  Pain Quality/Description:  na    Plan of Care Goals:  Safety measures met:  Yes  Patient understands explanation of procedure:  Yes    Time in:  1150  Time out:  1205  Larissa Jack RN.  R.N. 5/29/2024                
4 Eyes Skin Assessment     NAME:  Dennys Peguero  YOB: 1980  MEDICAL RECORD NUMBER:  [id number]    The patient is being assessed for  Admission    I agree that at least one RN has performed a thorough Head to Toe Skin Assessment on the patient. ALL assessment sites listed below have been assessed.      Areas assessed by both nurses:    Head, Face, Ears, Shoulders, Back, Chest, Arms, Elbows, Hands, Sacrum. Buttock, Coccyx, Ischium, and Legs. Feet and Heels        Does the Patient have a Wound? No noted wound(s)       Alexandre Prevention initiated by RN: No  Wound Care Orders initiated by RN: No    Pressure Injury (Stage 3,4, Unstageable, DTI, NWPT, and Complex wounds) if present, place Wound referral order by RN under : No    New Ostomies, if present place, Ostomy referral order under : No     Nurse 1 eSignature: Electronically signed by Paula Bowles RN on 5/16/24 at 6:49 AM EDT    **SHARE this note so that the co-signing nurse can place an eSignature**    Nurse 2 eSignature: Electronically signed by Courtney Graves RN on 5/16/24 at 7:49 AM EDT    
CC: Opportunistic pneumonia    Feeling a little better today, not so congested.  Can take a deeper breath more easily.  Has somewhat of an appetite today.  Tmax 101.6 °F  WBC 0.2, platelets 10  BAL Gram stain and AFB negative from both sites.  Other studies are pending.  Continuing empiric antibiotic therapy with meropenem and vancomycin  /73.  NSR.  S1, S2 normal.  Breath sounds normal bilaterally  O2 saturation 100%, O2 at 2 L/min  Abdomen benign.  No peripheral edema.    A&P: Bilateral pneumonia, in a compromised host with neutropenia.  Etiology is not certain, although bacterial pneumonia is most suspect.  We do not have cultures to guide therapy.  He continues to have progressive infiltrates and fever, which may be either a function of poor immune response because of neutropenia, or an opportunistic infection we have not covered.  Awaiting further studies from bronchoscopy/BAL.  Continuing empiric antibiotic therapy.  
CC: Opportunistic pneumonia.    No complaint of dyspnea.  Coughing productively, more readily than before bronchoscopy.  Sputum is mucopurulent, with occasional globs of red blood.  Tmax 101.6 °F.  Lower fever curve since yesterday afternoon, through the night.  WBC remains 0.3  No growth on bacterial cultures from BAL.  Acid-fast and fungal stains negative, galactomannan pending.  Continuing vancomycin and meropenem  /74.  NSR.  S1, S2 normal.  Coarse breath sounds with rhonchi in left upper and right lower lung fields.  O2 saturation 93%, room air.  Abdomen benign.  Appetite not as good today.  No peripheral edema.  Creatinine 0.7, electrolytes normal.    A&P: Pneumonia in compromised host with neutropenia.  Involvement primarily in left upper lobe and right middle lobe.  No positive cultures to guide therapy.  No fungal or acid-fast acid-fast organisms identified on stain from BAL specimen.  Cultures pending on those.  It is quite likely that antibiotic therapy is appropriate, but host response is inadequate to control infection.  Continuing empiric broad-spectrum antibiotic coverage.  
CC: Pneumonia    Mr. Peguero is seen again at request of oncology service because of pneumonia.  He remains neutropenic.  Chest imaging shows progressive infiltrates.  He has continued fever  He complains of having some soreness in his right chest but not pleuritic pain.  No dyspnea.  He does not feel well overall.  Appetite is poor, he has not eaten today.  Tmax 101.9 °F  Breath sounds mildly decreased bilaterally.  No adventitious sounds.  Reviewed chest imaging over the past week.  Chest x-ray today shows increased infiltrates in left upper lobe and right lower lobe.  Pneumonia PCR panel from yesterday shows no reactivity.  We have no positive cultures.  Current antibiotic therapy is meropenem and vancomycin.    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  Given his lack of response to broad-spectrum antibiotic therapy the question is raised whether we have the appropriate agent for his infection, does he have an opportunistic organism, or is his lack of response simply because of his neutropenia.  We do not have a positive culture to help guide therapy.  Bronchoscopy with BAL is reasonable, given the progressive nature of his illness, to collect multiple cultures and samples for PCR studies.  He agrees to proceed, understanding modest risk of hypoxemia associated with bronchoscopy.  
During morning vitals, pt complained of intermittent pain of 10/10 at L double lumen PICC insertion site. When asked if pain was radiating, denied. Pain increased with movement. Did not increase when line was flushed. No edema present circumventing site. At inspection of site, there is erythema and some open irritated skin.     RN notified, Polina SUN and Dr. Nguyen who inspected site at morning rounds. They plan to continue IV abx with PICC line and monitor site.   
Encounter:   met with pt through a consult.  and pt explored supports, pt not Orthodoxy/spiritual. Pt offered support, pt will ask for  if needed.  Staff robert Gallegos Mdiv., [facility]   05/15/24 3962   Encounter Summary   Encounter Overview/Reason Initial Encounter   Service Provided For Patient   Referral/Consult From Nurse   Last Encounter  05/15/24  (MSR)   Begin Time 1550   End Time  1553   Total Time Calculated 3 min       
ID Follow-up NOTE    CC:   Fever and neutropenia, LRTI  Antibiotics: Vancomycin, meropenem, Fluconazole, po Vancomycin     Admit Date: 5/15/2024  Hospital Day: 10    Subjective:     Patient c/o cough, productive green sputum      Objective:     Tm 102.1    Patient Vitals for the past 8 hrs:   BP Temp Temp src Pulse Resp SpO2 Weight   05/24/24 0845 -- -- -- -- -- -- 76 kg (167 lb 8.8 oz)   05/24/24 0748 131/74 99.9 °F (37.7 °C) Oral (!) 111 28 93 % --   05/24/24 0318 119/82 98.7 °F (37.1 °C) Oral 90 24 94 % --     I/O last 3 completed shifts:  In: 3220 [P.O.:360; I.V.:2335; Blood:525]  Out: 3800 [Urine:3750; Stool:50]  I/O this shift:  In: -   Out: 200 [Urine:200]    EXAM:  GENERAL: No apparent distress.  RA - 96% SaO2  HEENT: Membranes moist, no oral lesion  NECK:  Supple, no lymphadenopathy  LUNGS: B/l rhonchi  CARDIAC: RRR, no murmur appreciated  ABD:  + BS, soft / NT  EXT:  No rash, no edema, no lesions  NEURO: No focal neurologic findings  PSYCH: Orientation, sensorium, mood normal  LINES:  Peripheral iv       Data Review:  Lab Results   Component Value Date    WBC 0.3 (LL) 05/24/2024    HGB 7.7 (L) 05/24/2024    HCT 21.8 (L) 05/24/2024    MCV 82.8 05/24/2024    PLT 25 (L) 05/24/2024     Lab Results   Component Value Date    CREATININE 0.7 (L) 05/24/2024    BUN 8 05/24/2024     05/24/2024    K 4.1 05/24/2024     05/24/2024    CO2 26 05/24/2024       Hepatic Function Panel:   Lab Results   Component Value Date/Time    ALKPHOS 65 05/24/2024 04:42 AM    ALT <5 05/24/2024 04:42 AM    AST <5 05/24/2024 04:42 AM    BILITOT 0.8 05/24/2024 04:42 AM    BILIDIR <0.2 05/24/2024 04:42 AM    IBILI see below 05/24/2024 04:42 AM       MICRO:  5/15 Nasal MRSA DNA probe neg  5/17 Stool pathogen PCR neg  5/18 BC no growth  5/19 PICC tip - no growth   5/20 Fever w/u - BC no growth, urine cult no growth  5/21 Fever w/u - BC no growth to date, throat normal steven  5/21 Histo urinary ag - negative  5/22 BAL cult - no 
ID Follow-up NOTE    CC:   Fever and neutropenia, LRTI  Antibiotics: Vancomycin, meropenem, Fluconazole, po Vancomycin     Admit Date: 5/15/2024  Hospital Day: 11    Subjective:     Feels better   Patient reports less cough, less green sputum      Objective:     Tm 100.4  Tm 102.1 on 5/24    Patient Vitals for the past 8 hrs:   BP Temp Temp src Pulse Resp SpO2   05/25/24 1035 -- 99 °F (37.2 °C) Oral -- -- --   05/25/24 0829 115/71 100.4 °F (38 °C) Oral (!) 107 24 95 %   05/25/24 0442 116/74 98.8 °F (37.1 °C) Oral (!) 107 29 95 %       I/O last 3 completed shifts:  In: 2648 [NG/GT:2060; IV Piggyback:588]  Out: 2250 [Urine:2250]  I/O this shift:  In: 1358 [NG/GT:1249; IV Piggyback:109]  Out: 583 [Urine:400; Emesis/NG output:183]    EXAM:  GENERAL: No apparent distress.  RA - 97% SaO2  HEENT: Membranes moist, no oral lesion  NECK:  Supple, no lymphadenopathy  LUNGS: Clear b/l  CARDIAC: RRR, no murmur appreciated  ABD:  + BS, soft / NT  EXT:  No rash, no edema, no lesions  NEURO: No focal neurologic findings  PSYCH: Orientation, sensorium, mood normal  LINES:  Peripheral iv       Data Review:  Lab Results   Component Value Date    WBC 0.2 (LL) 05/25/2024    HGB 7.8 (L) 05/25/2024    HCT 22.0 (L) 05/25/2024    MCV 82.5 05/25/2024    PLT 15 (LL) 05/25/2024     Lab Results   Component Value Date    CREATININE 0.6 (L) 05/25/2024    BUN 7 05/25/2024     (L) 05/25/2024    K 3.6 05/25/2024     05/25/2024    CO2 22 05/25/2024       Hepatic Function Panel:   Lab Results   Component Value Date/Time    ALKPHOS 75 05/25/2024 02:21 AM    ALT <5 05/25/2024 02:21 AM    AST 6 05/25/2024 02:21 AM    BILITOT 1.0 05/25/2024 02:21 AM    BILIDIR <0.2 05/25/2024 02:21 AM    IBILI see below 05/25/2024 02:21 AM       MICRO:  5/15 Nasal MRSA DNA probe neg  5/17 Stool pathogen PCR neg  5/18 BC no growth  5/19 PICC tip - no growth   5/20 Fever w/u - BC no growth, urine cult no growth  5/21 Fever w/u - BC no growth to date, throat 
ID Follow-up NOTE    CC:   Fever, neutropenia  Antibiotics: Vancomycin, meropenem, valacyclovir, fluconazole, p.o. vancomycin    Admit Date: 5/15/2024  Hospital Day: 8    Subjective:     Patient having fever today as high as 101.9.  States feels a bit worse.  Sputum culture collected yesterday without any thing detected on PCR.      Objective:     Patient Vitals for the past 8 hrs:   BP Temp Temp src Pulse Resp SpO2 Weight   05/22/24 1512 139/82 98.6 °F (37 °C) Temporal (!) 101 18 97 % --   05/22/24 1326 120/76 100.1 °F (37.8 °C) -- 95 20 95 % --   05/22/24 1225 124/68 99.8 °F (37.7 °C) Oral (!) 107 23 95 % --   05/22/24 1053 126/71 (!) 101.9 °F (38.8 °C) Oral (!) 107 (!) 31 96 % --   05/22/24 1039 125/71 (!) 101.1 °F (38.4 °C) Oral (!) 108 (!) 36 95 % --   05/22/24 0836 120/81 99.2 °F (37.3 °C) Oral (!) 115 -- 95 % --   05/22/24 0738 -- -- -- -- -- -- 70.9 kg (156 lb 3.2 oz)     I/O last 3 completed shifts:  In: 1518 [P.O.:240; I.V.:1278]  Out: 3000 [Urine:3000]  I/O this shift:  In: 328 [Blood:328]  Out: 300 [Urine:300]    EXAM:  GENERAL: No apparent distress.    HEENT: Membranes moist, no oral lesion  NECK:  Supple, no lymphadenopathy  LUNGS: Patient with decreased air entry in the bilateral bases, on room air.  Patient states some right-sided chest pain especially when taking deep breaths.  CARDIAC: RRR, no murmur appreciated  ABD:  + BS, soft / NT  EXT:  No rash, no edema, no lesions  NEURO: No focal neurologic findings  PSYCH: Orientation, sensorium, mood normal,   LINES:  Peripheral iv, picc removed, no induration at the previous insertion point       Data Review:  Lab Results   Component Value Date    WBC 0.4 (LL) 05/22/2024    HGB 6.6 (LL) 05/22/2024    HCT 19.2 (LL) 05/22/2024    MCV 81.6 05/22/2024    PLT 13 (LL) 05/22/2024     Lab Results   Component Value Date    CREATININE 0.7 (L) 05/22/2024    BUN 6 (L) 05/22/2024     05/22/2024    K 3.5 05/22/2024     05/22/2024    CO2 23 05/22/2024 
ID Follow-up NOTE    CC:   Fever, neutropenia  Antibiotics: Vancomycin, meropenem, valacyclovir, voriconazole, p.o. vancomycin    Admit Date: 5/15/2024  Hospital Day: 15    Subjective:     Patient continue to have fevers up to 102 temp. Cough with scant purulent sputum. Bone marrow bx today.     Objective:     Patient Vitals for the past 8 hrs:   BP Temp Temp src Pulse Resp SpO2   05/29/24 1503 126/75 (!) 100.7 °F (38.2 °C) Oral 96 29 99 %   05/29/24 1400 132/82 -- -- 90 28 --   05/29/24 1345 133/85 -- -- 92 16 --   05/29/24 1330 (!) 149/87 -- -- (!) 112 24 --   05/29/24 1315 135/86 -- -- 77 23 --   05/29/24 1300 134/83 -- -- 83 19 --   05/29/24 1245 (!) 143/88 -- -- 91 30 --   05/29/24 1231 (!) 142/89 -- -- 88 30 --   05/29/24 1229 (!) 145/89 99.6 °F (37.6 °C) Oral (!) 107 20 100 %   05/29/24 1203 132/79 -- -- 91 18 98 %   05/29/24 1158 132/81 -- -- 91 18 96 %   05/29/24 1156 130/87 -- -- 90 18 98 %   05/29/24 1151 135/85 -- -- 89 18 98 %       I/O last 3 completed shifts:  In: 6438 [P.O.:700; I.V.:2649; Blood:241; NG/GT:2293; IV Piggyback:555]  Out: 3375 [Urine:3175; Stool:200]  I/O this shift:  In: 699 [I.V.:184; Blood:291; NG/GT:224]  Out: 950 [Urine:950]    EXAM:  GENERAL: No apparent distress.    HEENT: Membranes moist, no oral lesion, NG tube in place  NECK:  Supple, no lymphadenopathy  LUNGS: Patient with decreased air entry in the bilateral bases, on room air.    CARDIAC: RRR, no murmur appreciated  ABD:  + BS, soft / NT  EXT:  No rash, no edema, no lesions  NEURO: No focal neurologic findings  PSYCH: Orientation, sensorium, mood normal,   LINES:  Peripheral iv, PICC        Data Review:  Lab Results   Component Value Date    WBC 0.4 (LL) 05/29/2024    HGB 6.9 (LL) 05/29/2024    HCT 19.7 (LL) 05/29/2024    MCV 84.0 05/29/2024    PLT 35 (L) 05/29/2024     Lab Results   Component Value Date    CREATININE 0.7 (L) 05/29/2024    BUN 7 05/29/2024     05/29/2024    K 3.8 05/29/2024     05/29/2024    CO2 
ID Follow-up NOTE    CC:   Fever, neutropenia  Antibiotics: meropenem, valacyclovir, voriconazole, p.o. vancomycin    Admit Date: 5/15/2024  Hospital Day: 16    Subjective:     Patient feels a bit better, continue to have intermittent fevers although not as high.  Tmax today 99.9. Cough with scant purulent sputum. Bone marrow bx yesterday    Objective:     Patient Vitals for the past 8 hrs:   BP Temp Temp src Pulse Resp SpO2 Weight   05/30/24 1202 128/87 99.9 °F (37.7 °C) Oral (!) 101 18 99 % --   05/30/24 0914 130/84 99.6 °F (37.6 °C) Oral (!) 101 18 98 % --   05/30/24 0911 -- -- -- -- -- -- 81.7 kg (180 lb 3.2 oz)       I/O last 3 completed shifts:  In: 4788 [P.O.:750; I.V.:2262; Blood:291; NG/GT:1485]  Out: 4475 [Urine:4475]  I/O this shift:  In: 240 [P.O.:240]  Out: 650 [Urine:650]    EXAM:  GENERAL: No apparent distress.    HEENT: Membranes moist, no oral lesion, NG tube in place  NECK:  Supple, no lymphadenopathy  LUNGS: Patient with decreased air entry in the bilateral bases, on room air.    CARDIAC: RRR, no murmur appreciated  ABD:  + BS, soft / NT  EXT:  No rash, no edema, no lesions  NEURO: No focal neurologic findings  PSYCH: Orientation, sensorium, mood normal,   LINES:  Peripheral iv, PICC        Data Review:  Lab Results   Component Value Date    WBC 0.5 (LL) 05/30/2024    HGB 8.8 (L) 05/30/2024    HCT 25.5 (L) 05/30/2024    MCV 84.8 05/30/2024    PLT 37 (L) 05/30/2024     Lab Results   Component Value Date    CREATININE 0.6 (L) 05/30/2024    BUN 9 05/30/2024     05/30/2024    K 3.9 05/30/2024     05/30/2024    CO2 27 05/30/2024       Hepatic Function Panel:   Lab Results   Component Value Date/Time    ALKPHOS 82 05/30/2024 06:38 AM    ALT 6 05/30/2024 06:38 AM    AST 7 05/30/2024 06:38 AM    BILITOT 0.6 05/30/2024 06:38 AM    BILIDIR <0.2 05/30/2024 06:38 AM    IBILI see below 05/30/2024 06:38 AM       MICRO:  C. difficile toxin/antigen 5/28: Negative  BAL culture 5/22: Negative, PCR 
ID Follow-up NOTE    CC:   Fever, neutropenia  Antibiotics: meropenem, valacyclovir, voriconazole, p.o. vancomycin    Admit Date: 5/15/2024  Hospital Day: 17    Subjective:     Patient feels a bit better, continue to have intermittent fevers although not as high.  Has some lower abd pain, some diarrhea. Team planning CT abd pelvis.    Objective:     Patient Vitals for the past 8 hrs:   BP Temp Temp src Pulse Resp SpO2 Weight   05/31/24 1220 130/75 99.8 °F (37.7 °C) Oral 98 18 100 % --   05/31/24 1012 127/78 100.1 °F (37.8 °C) -- 98 26 99 % --   05/31/24 0952 129/79 100 °F (37.8 °C) Oral 99 18 98 % --   05/31/24 0815 125/78 99.9 °F (37.7 °C) Oral 90 24 98 % 81.3 kg (179 lb 3.2 oz)       I/O last 3 completed shifts:  In: 4018 [P.O.:1580; I.V.:1411; NG/GT:1027]  Out: 4225 [Urine:4225]  I/O this shift:  In: 1675 [P.O.:200; I.V.:740; Blood:350; NG/GT:385]  Out: 2100 [Urine:2100]    EXAM:  GENERAL: No apparent distress.    HEENT: Membranes moist, no oral lesion, NG tube in place  NECK:  Supple, no lymphadenopathy  LUNGS: Patient with decreased air entry in the bilateral bases, on room air.    CARDIAC: RRR, no murmur appreciated  ABD:  + BS, soft / NT  EXT:  No rash, no edema, no lesions  NEURO: No focal neurologic findings  PSYCH: Orientation, sensorium, mood normal,   LINES:  Peripheral iv, PICC        Data Review:  Lab Results   Component Value Date    WBC 0.4 (LL) 05/31/2024    HGB 7.0 (L) 05/31/2024    HCT 20.1 (LL) 05/31/2024    MCV 83.4 05/31/2024    PLT 21 (L) 05/31/2024     Lab Results   Component Value Date    CREATININE 0.6 (L) 05/31/2024    BUN 7 05/31/2024     05/31/2024    K 4.0 05/31/2024     05/31/2024    CO2 26 05/31/2024       Hepatic Function Panel:   Lab Results   Component Value Date/Time    ALKPHOS 74 05/31/2024 04:29 AM    ALT 6 05/31/2024 04:29 AM    AST 7 05/31/2024 04:29 AM    BILITOT 0.5 05/31/2024 04:29 AM    BILIDIR <0.2 05/31/2024 04:29 AM    IBILI see below 05/31/2024 04:29 AM 
NUTRITION NOTE: Calorie Count      Diet Orders / Intake / Nutrition Support  Current diet/supplement order: ADULT DIET; Regular; Low Microbial  ADULT TUBE FEEDING; Nasogastric; Standard with Fiber; Continuous; 30; Yes; 10; Q 4 hours; 50; 30; Q 4 hours; Fiber; 1 Dose; BID       COMPARATIVE STANDARDS  Energy (kcal):  7465-2295 (25-30); Weight Used for Energy Requirements:  Current     Protein (g):   (1.3-1.5); Weight Used for Protein Requirements:  Current        Fluid (ml/day):  1ml/kcal; Method Used for Fluid Requirements:  1 ml/kcal      Date Consumed PO Intake Kcal %   Kcal met PO Intake grams protein %  Protein met   Comments   5031-24 520 28% 42 45% B:0%  L: % Chicken Caesar Salad w/dressing  D: % Chicken Tenders, Green Beans                                   **Results will be posted as available.     Molina Hayward RD  [place]:  [phone]  Office:  [phone]      
Neutropenic Pathway  If patient oral temp > or = to 38.0 or if axillary temp > or = to 37.4 initiate protocol per orders.  Draw 2 sets of blood cultures from different sites. If patient remains febrile, redraw PAN culture every 68-72 hours.             Temp 100.6    Site(s) / Line Type Time Cultures obtained   Date 05/18 1945    picc   Purple and red lumen  2033 purple  2035 red         
Neutropenic Pathway  If patient oral temp > or = to 38.0 or if axillary temp > or = to 37.4 initiate protocol per orders.  Draw 2 sets of blood cultures from different sites. If patient remains febrile, redraw PAN culture every 68-72 hours.             Temp 101.5    Site(s) / Line Type Time Cultures obtained   Date 5/28/2024 @0500      No lines  5332         
Notified physician on call about patient's CT results. No new orders.  
Occupational Therapy/Physical Therapy  Discharge    OT/PT evaluation orders received. Per RN, pt is up ad darrian and is independent with ADLs. Pt has no skilled therapy needs. Will sign off therapy. Please re-order therapy should pt have a change in status.     Ceci Parikh, OT [id number]  [place] [id number]  
PICC Team asked to evaluate PICC, concern for possible pain with flushing,  More CADENA and JARVIS Zayas RN to pt room.  Both lumens of PICC flushed without pain, brisk blood return.  Site noted to be mildly red with small white edge skin irritation around PICC insertion site.  Pt denies any allergies to CHG/Silver.  Only pain noted was to touch around site. SALMA Esparza NP aware.  
Patient admitted to [facility], ambulatory, from [facility] for diagnosis of AML with infection of unknown etiology.     Patient oriented to patient room including call light and bed controls.  Admission assessment completed - see admission flowsheet documentation.  Patient is a low fall risk.  Safety measures instituted per policy.    Patient oriented to unit policies and procedures including: pain management practices, unit safety precautions, family rapid response, q4h vital signs and assessments, daily 4am lab draws, weekly chest x-rays, weekly VRE rectal swabs for surveillance, daily chlorhexidine bathing, standing transfusion orders, and routine central line care.  Also discussed use of call light and how to get in touch with nursing staff.  Stressed the importance of calling out immediately for any changes in condition including but not limited to: pain, chills, fever, nausea, vomiting, diarrhea, chest pain, sob/acosta, assistance with toileting, bleeding, or any other symptoms that are out of the ordinary for the patient.  Patient verbalizes understanding of all instructions and will call for assistance as needed.   
Patient discharged to home with father. Peripheral IV removed. Paperwork reviewed with patient, all questions and concerns addressed. Prescriptions sent with patient home, including venetoclax. All belongings also sent with patient.   
Patient seen and assessed for standard line care needs.  CVC site remains free of visible signs and symptoms of infection. Pt. Reports pain at site and with arm movement. Redness noted with skin excoriation ( chg gel or biopatch not used due to irritation.  No drainage, edema, itching or warmth noted at and around the insertion site .  Line care performed by Julia Post RN.  The need for continued use of the CVC is due to ongoing therapy.  Patient verbalizes understanding of line care education provided by line care RN .  Staff RNs will continue to monitor and assess the CVC site throughout the patient's hospital stay.  Sterile dressing changes will continue to be changed per policy.    Julia Post RN  
Provider had ordered three labs around 11am and this RN tubed the labels to lab. Lab showed up as patient's blood transfusion had just finished and said that they will have to come back in an hour as patient just received blood. Lab had not made it back up so this RN called them and they said those three labs will have to be drawn in the morning with the other AM labs. Will pass on this info to nightshift.   
Pt NG tube has been at 69 cm at the clamp. During vitals, NG tube was found at 44 cm. Pt stated he had been blowing his nose while holding tube so it wouldn't come out. Marcus Mac MD notified about tube placement; ordered to stop feed, advance tube, get KUB and don't start until MD can observe on rounds. This RN complied. Pt was educated on orders, and to not blow nose anymore.   
Pt back up to floor by transport. VSS. A/0x4.  
Pt c/o chest pain 10/10. NP Polina called to bedside, new orders for STAT EKG, Chest x-ray, 1 x dose of morphine 2mg and troponin level. Pt denies shortness of breath, states chest pain is stationary, does not radiate to other limbs or parts of the body. Productive cough noted with thick green sputum. Pt placed on telemetry per new orders. Plan of care on going. Pt resting with HOB elevated, call light in reach, bedside table at bedside.   
Pt called out r/t stomach cramping. This RN lowered tube feed rate from 50 ml/hr to 30 ml/hr and notified charge RN. Pt was able to tolerate PRN bentyl and scheduled evening meds PO. Provider notified on night call, ok to keep tube feed at 30 ml/hr. Pt is tolerating tube feed.   
Pt has been taking Venetoclax and it is on home medication list. Medication has not been ordered. On call MD made aware. Dr. Nguyen communicated that we are holding the Venetoclax for now.     Pts C.diff resulted: negative. On call MD made aware. Dr. Nguyen ordered PRN Imodium. See new orders.     Plan of care on going.         
Pt hgb noted as 7.0 and platelets noted as 10. Per orders pt is due for a blood and platelet transfusion. Due to low supply of platelets, pt will not receive platelet transfusion until after 0800 per hospital protocol. MD made aware. No s/s of active bleed at this time. Pt resting with HOB elevated, call light in reach.   
Pt with temp of 100.6 at 1945. This RN reached out to Dr. SHANTEL Tiwari via [place] on doing cultures and Dr ERENDIRA Tiwari advised to re calderon and continue antibiotics unless vitals change or fever worsen.  
RN messaged Dr. Nguyen as follows, \"Pt's chest x-ray returned with this impression: IMPRESSION:  1. Left upper lobe airspace disease, pneumonia or neoplasm. Computed tomography  of thorax is recommended enhanced.   CRP was 79.3, lactic acid was 0.6.   Pt is on IV cefepime and oral vanc. Would you like any new interventions at this time?\"      MD called RN and asked her to put in pharmacy to dose vancomycin order, as well as obtain Respiratory Virus Panel and nasal MRSA swab.     RN placed orders and obtained labs.     Pharmacy dosed vancomycin and RN hung first bag at 1900.       
[facility] -  Clinical Pharmacy Note    Vancomycin - Management by Pharmacy    Consult Date(s): 5/15/24  Consulting Provider(s): Dr.Kruti Tiwari    Assessment / Plan  Febrile Neutropenia / SSTI/PNA - Vancomycin  Concurrent Antimicrobials: Meropenem, Voriconazole, and proph Valtrex/ oral Vancomycin  Day of Vanc Therapy / Ordered Duration:  day 13 / TBD  (anticipating 14 days total )  Current Dosing Method: Bayesian-Guided AUC Dosing  Therapeutic Goal: -600 mg/L*hr  Current Dose / Plan:   Scr stable at 0.6 today  Continues on 1750 mg every 12 hours    Per level checked on 5/24, calculated AUC = 445 mg/L*hr   Will continue current regimen.   SCr remains stable, likely no need for further levels, given plan for 14 day course.  Will consider level if clinically indicated or course extended.    Will continue to monitor clinical condition and make adjustments to regimen as appropriate.    Valdemar Banks, PharmD  PGY1 Pharmacy Resident  Phone: [phone]  Main Pharmacy: [id number]  5/27/2024 12:36 PM      Interval update:  Patient continues to have fever spikes; Tmax 101 degF. ID following. Per Heme/Onc, plan to continue IV vanc for full 14 days.      Subjective/Objective:   Dennys Peguero is a 43 y.o. 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  PMH also significant for  Oligodendroglioma (S/P partial resection 2017, radiation 2019, and temodar 2019), rectal adenocarcinoma and recurrent C-diff who is admitted with sepsis.     Pharmacy is consulted to dose vancomycin.    Ht Readings from Last 1 Encounters:   05/15/24 1.778 m (5' 10\")     Wt Readings from Last 1 Encounters:   05/27/24 79 kg (174 lb 2.6 oz)       Vancomycin Level(s) / Doses:    Date Time Dose Type of Level / Level Interpretation   5/17  1750 mg IV q12h Random= 14.5 mg/L AUC = 440 mg/L*hr   Continue current dosing   5/20  1750mg IV q12h Random = 12.6 mg/L AUC =444 mg/L*hr  Continue current dosing   5/24 0752 1750 mg IV 
[facility] -  Clinical Pharmacy Note    Vancomycin - Management by Pharmacy    Consult Date(s): 5/15/24  Consulting Provider(s): Dr.Kruti Tiwari    Assessment / Plan  Febrile Neutropenia/ SSTI/PNA - Vancomycin  Concurrent Antimicrobials: Meropenem, Voriconazole, and proph Valtrex/ oral Vancomycin  Day of Vanc Therapy / Ordered Duration:  day 11 / TBD  (anticipating 14 days total )  Current Dosing Method: Bayesian-Guided AUC Dosing  Therapeutic Goal: -600 mg/L*hr  Current Dose / Plan:   Scr stable at 0.6 today  Continues on 1750 mg every 12 hours    Per level checked on 5/24, calculated AUC = 495 mg/L*hr   Will continue current regimen.   Plan for next level TBD given plan for 14 day course.  Will continue to monitor clinical condition and make adjustments to regimen as appropriate.    Please call with questions-  Julee Butterfield PharmD., BCPS   5/25/2024 3:08 PM  Wireless: [phone]          Interval update:  Patient continues to have fevers. ID following. Per Heme/Onc, plan to continue IV vanc for full 14 days.      Subjective/Objective:   Dennys Peguero is a 43 y.o. 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  PMH also significant for  Oligodendroglioma (S/P partial resection 2017, radiation 2019, and temodar 2019), rectal adenocarcinoma and recurrent C-diff who is admitted with sepsis.     Pharmacy is consulted to dose vancomycin.    Ht Readings from Last 1 Encounters:   05/15/24 1.778 m (5' 10\")     Wt Readings from Last 1 Encounters:   05/24/24 77.2 kg (170 lb 3.2 oz)       Vancomycin Level(s) / Doses:    Date Time Dose Type of Level / Level Interpretation   5/17  1750 mg IV q12h Random= 14.5 mg/L AUC = 440 mg/L*hr   Continue current dosing   5/20  1750mg IV q12h Random = 12.6 mg/L AUC =444 mg/L*hr  Continue current dosing   5/24 0752 1750 mg IV q12h Trough= 9.7 mg/L AUC=  495 mg/L*hr  Continue current dosing   Note: Serum levels collected for AUC-based dosing 
[facility] -  Clinical Pharmacy Note    Vancomycin - Management by Pharmacy    Consult Date(s): 5/15/24  Consulting Provider(s): Dr.Kruti Tiwari    Assessment / Plan  Febrile Neutropenia/ SSTI/PNA - Vancomycin  Concurrent Antimicrobials: Cefepime and proph Diflucan/Valtrex/ oral Vancomycin  Day of Vanc Therapy / Ordered Duration:  day 6 / TBD  Current Dosing Method: Bayesian-Guided AUC Dosing  Therapeutic Goal: -600 mg/L*hr  Current Dose / Plan:   Continues on 1750 mg every 12 hours   Scr stable at 0.6 with good UOP documented.  Random level this AM = 12.6, ~8 hours after last dose.  Calculated AUC at steady state = 444 mg/L*hr   Will continue current regimen. Will check level in ~5-7 days or sooner if clinically indicated  Will continue to monitor clinical condition and make adjustments to regimen as appropriate.    Please call with questions--  Thanks--  Saida Earl, PharmD  [facility] Clinical Pharmacist  Phone: [phone]    5/20/2024 11:32 AM        Interval update:  PICC pulled and tip sent for culture on 5/20. Patient continues to fever overnight. Complaining of urinary symptoms this AM, urine culture ordered.    Subjective/Objective:   Dennys Peguero is a 43 y.o. 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  PMH also significant for  Oligodendroglioma (S/P partial resection 2017, radiation 2019, and temodar 2019), rectal adenocarcinoma and recurrent C-diff who is admitted with sepsis.     Pharmacy is consulted to dose vancomycin.    Ht Readings from Last 1 Encounters:   05/15/24 1.778 m (5' 10\")     Wt Readings from Last 1 Encounters:   05/20/24 73 kg (161 lb)       Vancomycin Level(s) / Doses:    Date Time Dose Type of Level / Level Interpretation   5/17  1750 mg IV q12h Random= 14.5 mg/L AUC = 440 mg/L*hr   Continue current dosing   5/20  1750mg IV q12h Random = 12.6 mg/L AUC =444 mg/L*hr  Continue current dosing   Note: Serum levels collected for AUC-based dosing 
[facility] -  Clinical Pharmacy Note    Vancomycin - Management by Pharmacy    Consult Date(s): 5/15/24  Consulting Provider(s): Dr.Kruti Tiwari    Assessment / Plan  Febrile Neutropenia  - Vancomycin  Concurrent Antimicrobials: cefepime  Day of Vanc Therapy / Ordered Duration: not indicated  Current Dosing Method: Bayesian-Guided AUC Dosing  Therapeutic Goal: -600 mg/L*hr  Current Dose / Plan:   1750 mg every 12 hours  Estimated AUC of 561mg/L.hr   Estimated trough of 12.2mg/L   Will continue to monitor clinical condition and make adjustments to regimen as appropriate.    Thank you for consulting pharmacy,    Gloria Arredondo, PharmD  Main Pharmacy: [id number]        Interval update:  therapy initiation     Subjective/Objective:   Dennys Peguero is a 43 y.o. male with a PMHx significant for of Oligodendroglioma (S/P partial resection 2017, radiation 2019, and temodar 2019), and rectal adenocarcinoma. who is admitted with sepsis.     Pharmacy is consulted to dose vancomycin.    Ht Readings from Last 1 Encounters:   05/15/24 1.778 m (5' 10\")     Wt Readings from Last 1 Encounters:   05/15/24 70.9 kg (156 lb 3.2 oz)     Current & Prior Antimicrobial Regimen(s):  Cefepime     Vancomycin Level(s) / Doses:    Date Time Dose Type of Level / Level Interpretation                 Note: Serum levels collected for AUC-based dosing may be high if collected in close proximity to the dose administered. This is not necessarily indicative of toxicity.    Cultures & Sensitivities:    Date Site Micro Susceptibility / Result                 No results for input(s): \"CREATININE\", \"BUN\", \"WBC\" in the last 72 hours.    Invalid input(s): \"CRCL\"    Estimated Creatinine Clearance: 135 mL/min (A) (based on SCr of 0.7 mg/dL (L)).    Additional Lab Values / Findings of Note:    Recent Labs     05/15/24  1418   PROCAL 0.13      
The [facility] -  Clinical Pharmacy Note    Vancomycin - Management by Pharmacy    Consult Date(s): 5/15/24  Consulting Provider(s): Dr.Kruti Tiwari    Assessment / Plan  Febrile Neutropenia/ SSTI - Vancomycin  Concurrent Antimicrobials: Cefepime and proph Diflucan/Valtrex/ oral Vancomycin  Day of Vanc Therapy / Ordered Duration:  day 5 / TBD  Current Dosing Method: Bayesian-Guided AUC Dosing  Therapeutic Goal: -600 mg/L*hr  Current Dose / Plan:   Continues on 1750 mg every 12 hours   Scr stable at 0.6 with good UOP documented.  Calculated AUC at steady state=   440 mg/L*hr based on level 5/17.  Random level is ordered for 5/20 AM to further evaluate above regimen..  Will continue to monitor clinical condition and make adjustments to regimen as appropriate.    Please call with questions--  Thanks--  Missy Garcia, PharmD, BCPS, BCGP  [phone] ([facility])   5/19/2024 1:01 PM        Interval update:  PICC site  with open sore  - PICC pulled and tip sent for culture.    Subjective/Objective:   Dennys Peguero is a 43 y.o. 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  PMH also significant for  Oligodendroglioma (S/P partial resection 2017, radiation 2019, and temodar 2019), rectal adenocarcinoma and recurrent C-diff who is admitted with sepsis.     Pharmacy is consulted to dose vancomycin.    Ht Readings from Last 1 Encounters:   05/15/24 1.778 m (5' 10\")     Wt Readings from Last 1 Encounters:   05/19/24 73 kg (161 lb)       Vancomycin Level(s) / Doses:    Date Time Dose Type of Level / Level Interpretation   5/17  1750 mg IV q12h Random= 14.5 mg/L AUC = 440 mg/L*hr   Continue current dosing   5/20  1750mg IV q12h Random = ordered    Note: Serum levels collected for AUC-based dosing may be high if collected in close proximity to the dose administered. This is not necessarily indicative of toxicity.    Cultures & Sensitivities:    Date Site Micro 
The [facility] -  Clinical Pharmacy Note    Vancomycin - Management by Pharmacy    Consult Date(s): 5/15/24  Consulting Provider(s): Dr.Kruti Tiwari    Assessment / Plan  Febrile Neutropenia/ SSTI - Vancomycin  Concurrent Antimicrobials: Cefepime and proph Diflucan/Valtrex/ oral Vancomycin  Day of Vanc Therapy / Ordered Duration:  day 3/ TBD  Current Dosing Method: Bayesian-Guided AUC Dosing  Therapeutic Goal: -600 mg/L*hr  Current Dose / Plan:   1750 mg every 12 hours started on 5/15  Scr stable 0.6 with estimated CrCl 161 mL/min  Random Vancomycin level=  14.5 mg/L (drawn 6.5 hours after last dose) with calculated AUC at steady state=   440 mg/L*hr    Will continue current dose for now  Will continue to monitor clinical condition and make adjustments to regimen as appropriate.    Thank you for consulting pharmacy,    Nicci Mcintosh, PharmD  [facility] Clinical Pharmacist  [phone]        Interval update:  Vancomycin initially started for neutropenic fever and questionable pneumonia but now also has PICC line tenderness and per Dr Nguyen on rounds today would like Vancomycin continued over the weekend (original plan was to dc if MRSA nasal swab comes back negative.)  Remains afebrile.  Will reassess on Monday.    Subjective/Objective:   Dennys Peguero is a 43 y.o. 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  PMH also significant for  Oligodendroglioma (S/P partial resection 2017, radiation 2019, and temodar 2019), rectal adenocarcinoma and recurrent C-diff who is admitted with sepsis.     Pharmacy is consulted to dose vancomycin.    Ht Readings from Last 1 Encounters:   05/15/24 1.778 m (5' 10\")     Wt Readings from Last 1 Encounters:   05/17/24 72.3 kg (159 lb 4.8 oz)       Vancomycin Level(s) / Doses:    Date Time Dose Type of Level / Level Interpretation   5/15  1750 mg IV q12h     5/17  1750 mg IV q12h Random= 14.5 mg/L AUC = 440 mg/L*hr   Continue current dosing       
The [facility] -  Clinical Pharmacy Note    Vancomycin - Management by Pharmacy    Consult Date(s): 5/15/24  Consulting Provider(s): Dr.Kruti Tiwari    Assessment / Plan  Febrile Neutropenia / SSTI/PNA - Vancomycin  Concurrent Antimicrobials: Meropenem, Voriconazole, and proph Valtrex/ oral Vancomycin  Day of Vanc Therapy / Ordered Duration:  day 12 / TBD  (anticipating 14 days total )  Current Dosing Method: Bayesian-Guided AUC Dosing  Therapeutic Goal: -600 mg/L*hr  Current Dose / Plan:   Scr stable at 0.7 today  Continues on 1750 mg every 12 hours    Per level checked on 5/24, calculated AUC = 495 mg/L*hr   Will continue current regimen.   If SCr remains stable, likely no need for further levels, given plan for 14 day course.  Will consider level if clinically indicated or course extended.    Will continue to monitor clinical condition and make adjustments to regimen as appropriate.    Please call with questions-  Julee BullardD., BCPS   5/26/2024 10:24 AM  Wireless: [phone]          Interval update:  Patient continues to have fever spikes; Tmax 101.4 degF. ID following. Per Heme/Onc, plan to continue IV vanc for full 14 days.      Subjective/Objective:   Dennys Peguero is a 43 y.o. 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  PMH also significant for  Oligodendroglioma (S/P partial resection 2017, radiation 2019, and temodar 2019), rectal adenocarcinoma and recurrent C-diff who is admitted with sepsis.     Pharmacy is consulted to dose vancomycin.    Ht Readings from Last 1 Encounters:   05/15/24 1.778 m (5' 10\")     Wt Readings from Last 1 Encounters:   05/24/24 77.2 kg (170 lb 3.2 oz)       Vancomycin Level(s) / Doses:    Date Time Dose Type of Level / Level Interpretation   5/17  1750 mg IV q12h Random= 14.5 mg/L AUC = 440 mg/L*hr   Continue current dosing   5/20  1750mg IV q12h Random = 12.6 mg/L AUC =444 mg/L*hr  Continue current dosing   5/24 0153 
Troponin collected at 0830 and sent to lab.  No results at 1030. Lab called to run lab that was sent this morning. Still no result at noon. Lab resent.  
05/23/2024 03:43 AM    IBILI 0.7 05/23/2024 03:43 AM       MICRO:  BAL culture 5/22: Negative to date, PCR without any detected pathogens  Picc tip cx 5/19: neg  Ucx 5/20: neg   Bcx x 2 5/18: neg  Stool PCR 5/17: neg   Mrsa nares 5/15: neg     IMAGING:I have independently reviewed the images and reports.       Portable chest x-ray 5/22:  Worsening multifocal consolidation worst in the left upper lung and medial right  lower lung.  Normal cardiomediastinal silhouette.  Trace right pleural effusion.    Portable CXR 5/20:  No significant change in the left upper lobe consolidation.  Right lung is clear.  Normal cardiomediastinal silhouette.     Ct Chest w/o contrast 5/16:  Airspace disease in the left upper lobe with air bronchograms consistent with  pneumonia.    Scheduled Meds:   dextromethorphan-guaiFENesin  1 tablet Oral BID    potassium chloride  20 mEq Oral Daily with breakfast    meropenem  1,000 mg IntraVENous Q8H    mupirocin   Topical BID    Venetoclax  200 mg Oral Q24H    fluconazole  200 mg Oral Daily    Hydrocerin   Topical BID    levETIRAcetam  1,500 mg Oral BID    pantoprazole  40 mg Oral BID AC    valACYclovir  500 mg Oral BID    vancomycin  125 mg Oral BID    sodium chloride flush  5-40 mL IntraVENous 2 times per day    Saline Mouthwash  15 mL Swish & Spit 4x Daily AC & HS    vancomycin  1,750 mg IntraVENous Q12H       Continuous Infusions:   sodium chloride      sodium chloride      sodium chloride         PRN Meds:  hydrocortisone sodium succinate PF, acetaminophen, potassium chloride **OR** potassium alternative oral replacement **OR** potassium chloride, dicyclomine, simethicone, oxyCODONE, ondansetron, prochlorperazine, diphenhydrAMINE, sodium chloride, sodium chloride flush, sodium chloride, magnesium sulfate, magnesium hydroxide, Saline Mouthwash, ALTEplase (CATHFLO) 2 mg in sterile water 2 mL injection, sodium chloride, loperamide      Assessment:     Patient Active Problem List   Diagnosis    
AM    IBILI see below 05/28/2024 04:50 AM       MICRO:  C. difficile toxin/antigen 5/28: Negative  BAL culture 5/22: Negative, PCR without any detected pathogens  Picc tip cx 5/19: neg  Ucx 5/20: neg   Bcx x 2 5/18: neg  Stool PCR 5/17: neg   Mrsa nares 5/15: neg     IMAGING:I have independently reviewed the images and reports.       Portable chest x-ray 5/28:  Stable appearance of the chest.    CT sinus with contrast 5/25:  Mild sinus disease    Portable chest x-ray 5/22:  Worsening multifocal consolidation worst in the left upper lung and medial right  lower lung.  Normal cardiomediastinal silhouette.  Trace right pleural effusion.    Portable CXR 5/20:  No significant change in the left upper lobe consolidation.  Right lung is clear.  Normal cardiomediastinal silhouette.     Ct Chest w/o contrast 5/16:  Airspace disease in the left upper lobe with air bronchograms consistent with  pneumonia.    Scheduled Meds:   loperamide  2 mg Oral 4x Daily    voriconazole  200 mg Oral 2 times per day    dextromethorphan-guaiFENesin  1 tablet Oral BID    potassium chloride  20 mEq Oral Daily with breakfast    meropenem  1,000 mg IntraVENous Q8H    mupirocin   Topical BID    Hydrocerin   Topical BID    levETIRAcetam  1,500 mg Oral BID    pantoprazole  40 mg Oral BID AC    valACYclovir  500 mg Oral BID    vancomycin  125 mg Oral BID    sodium chloride flush  5-40 mL IntraVENous 2 times per day    Saline Mouthwash  15 mL Swish & Spit 4x Daily AC & HS    vancomycin  1,750 mg IntraVENous Q12H       Continuous Infusions:   sodium chloride      sodium chloride      sodium chloride 50 mL/hr at 05/27/24 1503    sodium chloride      sodium chloride      sodium chloride         PRN Meds:  sodium chloride, sodium chloride, hydrocortisone sodium succinate PF, acetaminophen, potassium chloride **OR** potassium alternative oral replacement **OR** potassium chloride, dicyclomine, simethicone, oxyCODONE, ondansetron, prochlorperazine, 
AUC=  495 mg/L*hr  Continue current dosing   Note: Serum levels collected for AUC-based dosing may be high if collected in close proximity to the dose administered. This is not necessarily indicative of toxicity.    Cultures & Sensitivities:    Date Site Micro Susceptibility / Result   5/15  BC  NGTD    5/15  Resp panel No growth    5/15 MRSA nasal screening culture postive    5/17 GI pathogens PCR None detected    5/19 PICC cath tip NGTD    5/20 Urine ngtd    5/22 BAL ngtd      Recent Labs     05/22/24  0543 05/22/24  0544 05/23/24  0343 05/24/24  0442   CREATININE 0.7*  --  0.6* 0.7*   BUN 6*  --  6* 8   WBC  --  0.4* 0.2* 0.3*         Estimated Creatinine Clearance: 139 mL/min (A) (based on SCr of 0.7 mg/dL (L)).    Additional Lab Values / Findings of Note:    No results for input(s): \"PROCAL\" in the last 72 hours.     
NUTRITION THERAPIES  ADULT DIET; Regular; Low Microbial  PO Intake: %   PO Supplement Intake:None Ordered  Additional Sources of Calories/IVF:NS @ 75ml/h     COMPARATIVE STANDARDS  Energy (kcal):  0700-6864 (25-30)     Protein (g):   (1.3-1.5)       Fluid (ml/day):  1ml/kcal    ANTHROPOMETRICS  Current Height: 177.8 cm (5' 10\")  Current Weight - Scale: 73 kg (161 lb)    Admission weight: 70.9 kg (156 lb 3.2 oz)    The patient will be monitored per nutrition standards of care. Consult dietitian if additional nutrition interventions are needed prior to RD reassessment.     BERTA FERNANDEZ, MS, RD, LD  David:  [phone]  Office:  [phone]        
Oligodendroglioma (S/P partial resection 2017, radiation 2019, and temodar 2019), and rectal adenocarcinoma, AML  Referring Practitioner: CORINNE Flowers  Diagnosis: sepsis  Subjective  Subjective: Pt found semisupine in bed.  Agrees to PT.  Denies pain.  Plans to d/c home today.         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  Vision/Hearing  Vision  Vision: Within Functional Limits  Hearing  Hearing: Within functional limits    Cognition   Orientation  Overall Orientation Status: Within Functional Limits  Cognition  Overall Cognitive Status: WFL     Objective         Gross Assessment  AROM: Within functional limits  Strength: Within functional limits                    Bed mobility  Supine to Sit: Independent  Sit to Supine: Independent  Bed Mobility Comments: HOB flat  Transfers  Sit to Stand: Independent  Stand to Sit: Independent  Ambulation  Device: No Device  Assistance: Independent  Quality of Gait: appropriate jose angel and stride length, decreased trunk rotation and arm swing, 1 slightly LOB but pt recovered on his own  Distance: 200 ft     Balance  Sitting - Static: Good  Sitting - Dynamic: Good  Standing - Static: Good  Standing - Dynamic: Good  Comments: pt able to reach into upper portion of closet and into low drawer with good stability and balance           OutComes Score                                              
toxicity.    Cultures & Sensitivities:    Date Site Micro Susceptibility / Result   5/15  BC  NGTD    5/15  Resp panel No growth    5/15 MRSA nasal screening culture postiive            Recent Labs     05/16/24  0401 05/17/24  0323 05/18/24  0401 05/18/24  0402   CREATININE 0.7* 0.6*  --  <0.5*   BUN 9 7  --  6*   WBC 0.5* 0.5* 0.4*  --          Estimated Creatinine Clearance: 194 mL/min (based on SCr of 0.5 mg/dL).    Additional Lab Values / Findings of Note:    Recent Labs     05/15/24  1418   PROCAL 0.13        
5/15/24 negative  - CXray 5/15/24:  Left upper lobe airspace disease, pneumonia or neoplasm   - Respiratory panel neg 5/15/24  - MRSA swab pending 5/15/24     Current Tx:  - Continue Cefepime Day +2 (5/15/24)  - Continue Vancomycin Day +2 (5/15/24)  - Diflucan, Valtrex  - Vanc 125mg PO BID (5/6/24-current)  ppx for re-current c.diff.      3. Heme:  Pancytopenia r/t AML/MDS and recent chemotherapy.  Monitor Hgb   - Transfuse for Hgb < 7 and Platelets < 10 K   - PRBC transfusion today       4. Metabolic: Electrolytes and renal fxn stable  - Cont NS @ 75 mL/hr  - Encourage PO intake     5. GI / Nutrition:   - Continue protonix 40mg BID   Rectal Adenocarcinoma: Stage T1N0. Mismatch repair gene expression by IHC-intact with low probability for MSI-H.   - EGD ([facility]) reportedly unremarkable  - Colonoscopy 3/22/24: rectal lesion: Adenocarcinoma, at least adenocarcinoma in situ,  Bleeding with hypotension 3/23/24  - CTA abd/pelvis 3/23/24: No acute abdominal or pelvic abnormality clearly identified. No evidence for acute bowel inflammation identified.  MRI pelvis showed 2.7 cm rectal mass with no involvement of anal sphincter, mesorectal fascia or any mesorectal lymph nodes.  Pelvic MRI 3/25/24: Radiological Stage: T 1/2, N 0  MRF: Clear. Sphincter involvement: No. Suspicious extra mesorectal nodes: None. EMVI: Absent  CT chest 3/25/24: No acute pathology. CT A/P- no mets               - CEA: 6.5  - s/p rectal mass resection 4/12/24. Path staging T1N0M0.  - Refer to Dr. Russo for  rectal ca follow up post resection.  - Would likely not offer adjuvant therapy at this stage. Follow up on Oncology recs    Nutrition:    - Low microbial diet  - Avoid greasy/spicy food  HX Re-current C diff Colitis: see ID above    6. Neuro:  H/O Oligodendroglioma 2017, seizures since 2009  - Cont on Keppra 1500 mg PO BID  - s/p partial resection, radiation and Temodar     7. Cardiac  - ECHO 3/8/24:  EF 55-60% Mild tricuspid 
Dacogen + Ventoclax (200 mg) (C2D2 4/29/24)    -Venetoclax started 4/29/24. HELD with infection starting 5/15/24. Resumed 5/17/24 to ensure he received full 21 days.  Completed 5/23/2024.  - Will repeat BMBx on Tuesday 5/28/24 and if pt still has persistent disease, will do salvage induction therapy with Vyxeos followed by MAC based alloSCT using BuFlu/PTCy based regimen depending on post surgical course for rectal adenoCa.    2. ID:   Neutropenic fever/sepsis POA, likely r/t opportunistic pneumonia or other unknown infection  - C diff 3/7/24, 4/26/24: Positive, on vanc ppx- repeat 5/15/24 negative  - PICC pulled 5/19/24.   -Tip culture no growth to date  - Respiratory panel neg 5/15/24: negative   - MRSA swab 5/15/24:  Positive  - CXray 5/15/24:  Left upper lobe airspace disease, pneumonia or neoplasm   - CT chest 5/16/24:  Airspace disease in the left upper lobe with air bronchograms consistent with pneumonia   -BCX 5/18/24: NG   - CXR 5/22/24: multifocal pneumonia   - Send sputum for culture. RPP negative.   - Follow up on cx for Bronchoscopy with BAL (5/22/24): Pending    -Respiratory culture no growth to date   -Pneumonia panel not detected       - Consider repeat CT C/A/P and add sinuses if he continues to be culture neg with neg micro/BAL work up.  -Consider broadening antifungal coverage pending ID rec's . Follow up on ID recs.    Current Tx:  - s/p Cefepime 6 days (5/15/24)   - Merrem DAY + 5 (5/20/24)   - Continue Vancomycin Day + 10/14 (5/15/24)  -Fluconazole and Valtrex prophylaxis  - Vanc 125mg PO BID (5/6/24-current)  ppx for re-current c.diff.       3. Heme:  Pancytopenia r/t AML/MDS and recent chemotherapy.  Monitor Hgb   - Transfuse for Hgb < 7 and Platelets < 10 K   - NO transfusion today       4. Metabolic: Electrolytes and renal fxn stable  - Encourage PO intake  Hypomagnesemia  -Continue sliding scale electrolyte replacement    5. GI / Nutrition:   GERD:  - Continue protonix 40mg BID 
PFT pending.     Cycle 2 Day 28 Dacogen + Ventoclax (200 mg) (C2D2 4/29/24)    - Venetoclax started 4/29/24. HELD with infection starting 5/15/24. Resumed 5/17/24 to ensure he received full 21 days.  Completed 5/23/2024.  - Repeat BMBx 5/28/24 and if pt still has persistent disease, will do salvage induction therapy with Vyxeos followed by MAC based alloSCT using BuFlu/PTCy based regimen depending on post surgical course for rectal adenoCa.     2. ID:  Neutropenic fever/sepsis POA, likely r/t opportunistic pneumonia or other unknown infection  - C diff 3/7/24, 4/26/24: Positive, on vanc ppx- repeat 5/15/24 negative  - PICC pulled 5/19/24.Tip culture no growth to date  - Respiratory panel neg 5/15/24: negative   - MRSA swab 5/15/24:  Positive  - CXray 5/15/24: NAN airspace disease, pneumonia or neoplasm   - CT chest 5/16/24:  Airspace disease in the NAN with air bronchograms consistent with pneumonia   - BCX 5/18/24: NG   - CXR 5/22/24: multifocal pneumonia   - Send sputum for culture. RPP negative.   - Follow up on cx for Bronchoscopy with BAL (5/22/24): Pending               - Respiratory culture no growth to date              - Pneumonia panel not detected              - CT C/A/P & sinuses (5/25/24): mucosal thickening seen inferiorly in the bilateral maxillary sinuses, moderate-sized bilateral pleural effusions are present with patchy bilateral airspace disease with areas of dense parenchymal consolidation and areas of   groundglass opacity seen in the upper and lower lobes bilaterally. There is also dense consolidation seen medially in the RML. There is also a prominent area of dense confluent parenchymal consolidation seen laterally in the RUL the appearance is most suggestive of multifocal pneumonia      Current Tx:  - s/p Cefepime 6 days (5/15/24)   - Merrem day +6 (5/20/24)   - Continue Vancomycin Day + 11/14 (5/15/24)  - Vfend (broadened antifungal coverage 5/24/24 per ID recs) and Valtrex prophylaxis  - 
(5/22/24)     Current Tx:  - s/p Cefepime 6 days (5/15/24)   - Merrem day +10 (5/20/24)   -s/p Vancomycin Day + 14/14 (5/15/24)  - Vfend (broadened antifungal coverage 5/24/24 per ID recs)--> Change to tx dose Vfend 300 mg PO QD given positive blastomyces on 5/28/24  -Valtrex prophylaxis  - Vanc 125mg PO BID (5/6/24-current)  ppx for re-current c.diff.   - Will send labs for sec HLH- including ferritin, TG, Fibrinogen level, sIL2 level. Look for hemophagocytosis in BMBx     3. Heme:  Pancytopenia r/t AML/MDS and recent chemotherapy.  Monitor Hgb   - Transfuse for Hgb < 7 and Platelets < 10 K   - pRBC transfusion today       4. Metabolic: Electrolytes and renal fxn stable  - Encourage PO intake  - Replace Mag and K per PRN orders     5. GI / Nutrition:   GERD:  - Continue protonix 40mg BID   Nutrition:    - Low microbial diet  - Avoid greasy/spicy food  - Encourage oral nutrition.   Malnutrition:   - Feeding tube  -TFs 5/24/24: Jevity 1.5 (Standard Formula with Fiber ) @ 20mL/hr. Advance: As tolerated, increase by 10 mL/hr q 4 hours. Goal: 50 mL/hr, patient having TF related diarrhea--> 20 ml/hr    Diarrhea:   - HX Re-current C diff Colitis: see ID above  - GI panel: negative 3/17/24  -Repeat C.diff: negative 5/28/24   - Imodium scheduled.   Rectal Adenocarcinoma: Stage T1N0. Mismatch repair gene expression by IHC-intact with low probability for MSI-H.   - EGD ([facility]) reportedly unremarkable  - Colonoscopy 3/22/24: rectal lesion: Adenocarcinoma, at least adenocarcinoma in situ,  Bleeding with hypotension 3/23/24  - CTA abd/pelvis 3/23/24: No acute abdominal or pelvic abnormality clearly identified. No evidence for acute bowel inflammation identified.  MRI pelvis showed 2.7 cm rectal mass with no involvement of anal sphincter, mesorectal fascia or any mesorectal lymph nodes.  - Pelvic MRI 3/25/24: Radiological Stage: T 1/2, N 0  MRF: Clear. Sphincter involvement: No. Suspicious extra mesorectal nodes: None. EMVI: 
75 mL/hr  - Encourage PO intake     5. GI / Nutrition:   - Continue protonix 40mg BID   Rectal Adenocarcinoma: Stage T1N0. Mismatch repair gene expression by IHC-intact with low probability for MSI-H.   - EGD ([facility]) reportedly unremarkable  - Colonoscopy 3/22/24: rectal lesion: Adenocarcinoma, at least adenocarcinoma in situ,  Bleeding with hypotension 3/23/24  - CTA abd/pelvis 3/23/24: No acute abdominal or pelvic abnormality clearly identified. No evidence for acute bowel inflammation identified.  MRI pelvis showed 2.7 cm rectal mass with no involvement of anal sphincter, mesorectal fascia or any mesorectal lymph nodes.  Pelvic MRI 3/25/24: Radiological Stage: T 1/2, N 0  MRF: Clear. Sphincter involvement: No. Suspicious extra mesorectal nodes: None. EMVI: Absent  CT chest 3/25/24: No acute pathology. CT A/P- no mets               - CEA: 6.5  - s/p rectal mass resection 4/12/24. Path staging T1N0M0.  - Refer to Dr. Russo for  rectal ca follow up post resection.- re consult while inpatient   - Would likely not offer adjuvant therapy at this stage. Follow up on Oncology recs    Nutrition:    - Low microbial diet  - Avoid greasy/spicy food  -Encourage oral nutrition.   Diarrhea:   - HX Re-current C diff Colitis: see ID above  - GI panel: negative 3/17/24  - Imodium prn     6. Neuro:  H/O Oligodendroglioma 2017, seizures since 2009  - Cont on Keppra 1500 mg PO BID  - s/p partial resection, radiation and Temodar     7. Cardiac  - ECHO 3/8/24:  EF 55-60% Mild tricuspid regurgitation. Mild mitral regurgitation is present.    Right sided chest pain- 2/2 Msk from recurrent pain, will start Mucinex and prn Oxycodone for pain control.     8. Genetics  - Referral placed to [facility] genetics for possible germline mutation. Sister tested positive for BRCA.    - DVT Prophylaxis: Platelets <50,000 cells/dL - prophylactic lovenox on hold and mechanical prophylaxis with bilateral SCDs while in bed in 
Day + 14/14 (5/15/24)  - Vfend (broadened antifungal coverage 5/24/24 per ID recs)--> Change to tx dose Vfend 300 mg PO QD given positive blastomyces on 5/28/24  -Valtrex prophylaxis  - Vanc 125mg PO BID (5/6/24-current)  ppx for re-current c.diff.   - Will send labs for sec HLH- including ferritin, TG, Fibrinogen level (WNL), sIL2 level. Look for hemophagocytosis in BMBx     3. Heme:  Pancytopenia r/t AML/MDS and recent chemotherapy.  Monitor Hgb   - Transfuse for Hgb < 7 and Platelets < 10 K   - No transfusion today       4. Metabolic: Electrolytes and renal fxn stable  - Encourage PO intake  - Replace Mag and K per PRN orders     5. GI / Nutrition:   GERD:  - Continue protonix 40mg BID   Nutrition:    - Low microbial diet  - Avoid greasy/spicy food  - Encourage oral nutrition.   Malnutrition:   - Feeding tube  -TFs 5/24/24: Jevity 1.5 (Standard Formula with Fiber ) @ 20mL/hr. Advance: As tolerated, increase by 10 mL/hr q 4 hours. Goal: 50 mL/hr, patient having TF related diarrhea--> 20 ml/hr    Diarrhea:   - HX Re-current C diff Colitis: see ID above  - GI panel: negative 3/17/24  -Repeat C.diff: negative 5/28/24   - Imodium scheduled.   Rectal Adenocarcinoma: Stage T1N0. Mismatch repair gene expression by IHC-intact with low probability for MSI-H.   - EGD ([facility]) reportedly unremarkable  - Colonoscopy 3/22/24: rectal lesion: Adenocarcinoma, at least adenocarcinoma in situ,  MRI pelvis showed 2.7 cm rectal mass with no involvement of anal sphincter, mesorectal fascia or any mesorectal lymph nodes.  - Pelvic MRI 3/25/24: Radiological Stage: T 1/2, N 0  MRF: Clear. Sphincter involvement: No. Suspicious extra mesorectal nodes: None. EMVI: Absent  - CT chest 3/25/24: No acute pathology. CT A/P- no mets               - CEA: 6.5  - s/p rectal mass resection 4/12/24. Path staging T1N0M0.  - Refer to Dr. Russo for rectal ca follow up post resection.- re consult while inpatient   - Would likely not offer 
abdominal or pelvic abnormality clearly identified. No evidence for acute bowel inflammation identified.  MRI pelvis showed 2.7 cm rectal mass with no involvement of anal sphincter, mesorectal fascia or any mesorectal lymph nodes.  Pelvic MRI 3/25/24: Radiological Stage: T 1/2, N 0  MRF: Clear. Sphincter involvement: No. Suspicious extra mesorectal nodes: None. EMVI: Absent  CT chest 3/25/24: No acute pathology. CT A/P- no mets               - CEA: 6.5  - s/p rectal mass resection 4/12/24. Path staging T1N0M0.  - Refer to Dr. Russo for  rectal ca follow up post resection.- re consult while inpatient   - Would likely not offer adjuvant therapy at this stage. Follow up on Oncology recs    Nutrition:    - Low microbial diet  - Avoid greasy/spicy food  HX Re-current C diff Colitis: see ID above    6. Neuro:  H/O Oligodendroglioma 2017, seizures since 2009  - Cont on Keppra 1500 mg PO BID  - s/p partial resection, radiation and Temodar     7. Cardiac  - ECHO 3/8/24:  EF 55-60% Mild tricuspid regurgitation. Mild mitral regurgitation is present.    8. Genetics  - Referral placed to [facility] genetics for possible germline mutation. Sister tested positive for BRCA.    - DVT Prophylaxis: Platelets <50,000 cells/dL - prophylactic lovenox on hold and mechanical prophylaxis with bilateral SCDs while in bed in place.  Contraindications to pharmacologic prophylaxis: Thrombocytopenia  Contraindications to mechanical prophylaxis: None    - Disposition: Unknown at this time    The patient was seen and examined by Dr. Patrick Flowers APRN - CNP    Eduardo Nguyen MD  Hematology, Bone marrow transplant and Cellular therapy  [facility]    
CT A/P- no mets               - CEA: 6.5  - s/p rectal mass resection 4/12/24. Path staging T1N0M0.  - Refer to Dr. Russo for rectal ca follow up post resection.- re consult while inpatient   - Would likely not offer adjuvant therapy at this stage. Follow up on Oncology recs    6. Neuro:  H/O Oligodendroglioma 2017, seizures since 2009  - Cont on Keppra 1500 mg PO BID  - s/p partial resection, radiation and Temodar     7. Cardiac  - ECHO 3/8/24:  EF 55-60% Mild tricuspid regurgitation. Mild mitral regurgitation is present.    8. Genetics  - Referral placed to [facility] genetics for possible germline mutation. Sister tested positive for BRCA.     9. MSK   - Right sided chest pain- 2/2 Msk from recurrent cough - Mucinex   -Oxycodone prn for pain control.     - DVT Prophylaxis: Platelets <50,000 cells/dL - prophylactic lovenox on hold and mechanical prophylaxis with bilateral SCDs while in bed in place.  Contraindications to pharmacologic prophylaxis: Thrombocytopenia  Contraindications to mechanical prophylaxis: None    - Disposition: Unknown at this time. Encourage ambulation, oral intake and PT/OT.    The patient was seen and examined by Dr. Patrick Flowers, APRN - CNP    Eduardo Nguyen MD  Hematology, Bone marrow transplant and Cellular therapy  [facility] - [facility]

## 2024-06-05 NOTE — DISCHARGE INSTRUCTIONS
[facility] Discharge Instructions    Call for Questions/Concerns:  [phone]) [facility] office  The phone number listed above is available 24 hrs/7 days per week  [facility] is open M-F 8am-4:30pm; Sat-Sun/Holidays 8am-1pm    Symptoms to Report Immediately:    Fever of 100.5 or greater  Vomiting without relief after use of anti-nausea medication  Severe abdominal cramping  Diarrhea: More than 3 loose, watery bowel movements in a 24 hour period  Unusual or excessive bleeding from your mouth, nose, rectum, bladder or vagina  Sudden onset of shortness of breath or chest pain  Signs/symptoms of infection: redness, warmth, swelling-particularly to central line site    Report to Physician's office within 24 hours:    Pain not relieved by pain medication  Change in urination-odor, cloudiness, frequency, or pain with urination  Flu-like symptoms  Skin changes-rash, hives, redness or peeling of skin    Additional Instructions:    Avoid people with colds, flu-like symptoms, or any sign of infection  Drink plenty of fluids-attempt to consume 2-3 liters ( ounces) of fluids/24 hour period  Continue low microbial diet until instructed by physician to resume normal diet  Bring all of your medications with you to your doctor's appointments  Bring your current medicine list to each hospital and office visit          6/5/2024 12:01 PM  Mary Kate Baez            My Discharge Checklist    [facility] we want to make sure you have the help you will need once you leave the hospital.  We are going to go over your discharge instructions with you. We give these to you in writing so you will have a reference if you have questions about symptoms or problems to look for after you leave the hospital.     We know you want to feel better and get home soon. Please answer these questions so we can be sure you have what you need, your questions are answered, and you feel prepared for

## 2024-06-05 NOTE — CARE COORDINATION
Case Management Assessment            Discharge Note                    Date / Time of Note: 6/5/2024 11:19 AM                  Discharge Note Completed by: JENNIFER Ortiz   for [facility] ([facility])  [employer]: [phone]    Patient Name: Dennys Peguero   YOB: 1980  Diagnosis: Sepsis (HCC) [A41.9]   Date / Time: 5/15/2024  1:01 PM    Current PCP: No primary care provider on file.  Clinic patient: No    Hospitalization in the last 30 days: Yes  Readmission Assessment  Number of Days since last admission?: 8-30 days  Previous Disposition: Home with Family  Who is being Interviewed: Patient  What was the patient's/caregiver's perception as to why they think they needed to return back to the hospital?: Other (Comment) ([facility] sent him to the [facility] as a direct admit as he wasn't feeling well.)  Did you visit your Primary Care Physician after you left the hospital, before you returned this time?: No (pt has been going to [facility])  Why weren't you able to visit your PCP?: Other (Comment) (pt has been going to [facility])  Did you see a specialist, such as Cardiac, Pulmonary, Orthopedic Physician, etc. after you left the hospital?: Yes  Who advised the patient to return to the hospital?: Physician  Does the patient report anything that got in the way of taking their medications?: No  In our efforts to provide the best possible care to you and others like you, can you think of anything that we could have done to help you after you left the hospital the first time, so that you might not have needed to return so soon?: Other (Comment) (pt stated nothing additional was needed)    Advance Directives:  Code Status: Full Code  [place] DNR form completed and on chart: Not Indicated    Financial:  Payor: Spoke [place] MEDICAID / Plan: [place]oke [place] MEDICA / Product Type: *No Product type* /      Pharmacy:    [facility] [id number] - [place], [place] - [address]

## 2024-06-05 NOTE — PLAN OF CARE
Problem: Genitourinary - Adult  Goal: Absence of urinary retention  Outcome: Progressing  Poor intake this shift. See intake.     Problem: Metabolic/Fluid and Electrolytes - Adult  Goal: Electrolytes maintained within normal limits  5/24/2024 2004 by Arti Almeida, RN  Outcome: Progressing   See MAR. No electrolyte replacements this shift.    Problem: Nutrition Deficit:  Goal: Optimize nutritional status  5/24/2024 2004 by Arti Almeida, RN  Outcome: Not Progressing  NG placed this shift. Awaiting KUB results, then will start tube feeding. Poor intake this shift. See intake.     
  Problem: Hematologic - Adult  Goal: Maintains hematologic stability  5/19/2024 1406 by Iraida Roe, RN  Outcome: Progressing   Patient's hemoglobin this AM:   Recent Labs     05/19/24  0325   HGB 6.3*     Patient's platelet count this AM:   Recent Labs     05/19/24  0325   PLT 6*    Thrombocytopenia not present at this time.  Patient showing no signs or symptoms of active bleeding.  Patient transfused blood products per orders - see flowsheet.  Patient verbalizes understanding of all instructions. Will continue to assess and implement POC. Call light within reach and hourly rounding in place.      Problem: Nutrition Deficit:  Goal: Optimize nutritional status  5/19/2024 1406 by Iraida Roe, RN  Outcome: Progressing     Problem: Pain  Goal: Verbalizes/displays adequate comfort level or baseline comfort level  5/19/2024 1406 by Iraida Roe, RN  Outcome: Progressing      
  Problem: Nutrition Deficit:  Goal: Optimize nutritional status  Outcome: Not Progressing  Flowsheets (Taken 5/27/2024 1423)  Nutrient intake appropriate for improving, restoring, or maintaining nutritional needs: Monitor oral intake, labs, and treatment plans  Note: Minimal PO intake during shift        Problem: Safety - Adult  Goal: Free from fall injury  Outcome: Progressing  Flowsheets (Taken 5/27/2024 1423)  Free From Fall Injury: Instruct family/caregiver on patient safety  Note: Orthostatic vital signs obtained at start of shift - see flowsheet for details.  Pt does not meet criteria for orthostasis.  Pt is a Med fall risk. See Apple Fall Score and ABCDS Injury Risk assessments.  - Screening for Orthostasis AND not a [place] Risk per APPLE/ABCDS: Pt bed is in low position, side rails up, call light and belongings are in reach.  Fall risk light is on outside pts room.  Pt encouraged to call for assistance as needed. Will continue with hourly rounds for PO intake, pain needs, toileting and repositioning as needed.       Problem: ABCDS Injury Assessment  Goal: Absence of physical injury  Outcome: Progressing  Flowsheets (Taken 5/27/2024 1423)  Absence of Physical Injury: Implement safety measures based on patient assessment     Problem: Pain  Goal: Verbalizes/displays adequate comfort level or baseline comfort level  Outcome: Progressing  Flowsheets (Taken 5/27/2024 1423)  Verbalizes/displays adequate comfort level or baseline comfort level:   Encourage patient to monitor pain and request assistance   Assess pain using appropriate pain scale  Note: No complaints of pain at this time      Problem: Hematologic - Adult  Goal: Maintains hematologic stability  Outcome: Progressing  Flowsheets (Taken 5/27/2024 1423)  Maintains hematologic stability:   Assess for signs and symptoms of bleeding or hemorrhage   Monitor labs for bleeding or clotting disorders   Administer blood products/factors as ordered  Note: 
  Problem: Safety - Adult  Goal: Free from fall injury  5/15/2024 1858 by Paula Bowles RN  Outcome: Progressing  Note: Orthostatic vital signs obtained at start of shift - see flowsheet for details.  Pt does not meet criteria for orthostasis.  Pt is a Low fall risk. See Apple Fall Score and ABCDS Injury Risk assessments.   - Screening for Orthostasis AND not a [place] Risk per APPLE/ABCDS: Pt bed is in low position, side rails up, call light and belongings are in reach.  Fall risk light is on outside pts room.  Pt encouraged to call for assistance as needed. Will continue with hourly rounds for PO intake, pain needs, toileting and repositioning as needed.      Problem: ABCDS Injury Assessment  Goal: Absence of physical injury  5/15/2024 1858 by Paula Bowles RN  Outcome: Progressing  Note: No new observed or reported injuries this shift.      Problem: Pain  Goal: Verbalizes/displays adequate comfort level or baseline comfort level  5/15/2024 1858 by Paula Bowles RN  Outcome: Progressing  Note: Pt did not report pain this shift. Pt educated on importance of calling for pain meds when in pain. Pt verbalized understanding.     Problem: Hematologic - Adult  Goal: Maintains hematologic stability  5/15/2024 1858 by Paula Bowles RN  Outcome: Progressing  Note: Thrombocytopenia Precautions in place.  Patient showing no signs or symptoms of active bleeding.  Patient transfused blood products per orders - see flowsheet.  Patient verbalizes understanding of all instructions. Will continue to assess and implement POC. Call light within reach and hourly rounding in place.      
  Problem: Safety - Adult  Goal: Free from fall injury  5/16/2024 0144 by Courtney Graves, RN  Outcome: Progressing  Orthostatic vital signs obtained at start of shift - see flowsheet for details.  Pt does not meet criteria for orthostasis.  Pt is a Med fall risk. See Apple Fall Score and ABCDS Injury Risk assessments.   - Screening for Orthostasis AND not a [place] Risk per APPLE/ABCDS: Pt bed is in low position, side rails up, call light and belongings are in reach.  Fall risk light is on outside pts room.  Pt encouraged to call for assistance as needed. Will continue with hourly rounds for PO intake, pain needs, toileting and repositioning as needed.      Problem: Hematologic - Adult  Goal: Maintains hematologic stability  5/16/2024 0144 by Courtney Graves, RN  Outcome: Progressing  Patient's hemoglobin this AM:   Recent Labs     05/16/24  0401   HGB 5.8*     Patient's platelet count this AM:   Recent Labs     05/16/24  0401   PLT 25*    Thrombocytopenia Precautions in place.  Patient showing no signs or symptoms of active bleeding.  Patient transfused blood products per orders - see flowsheet.  Patient verbalizes understanding of all instructions. Will continue to assess and implement POC. Call light within reach and hourly rounding in place.        
  Problem: Safety - Adult  Goal: Free from fall injury  5/17/2024 0149 by Courtney Graves, RN  Outcome: Progressing  Orthostatic vital signs obtained at start of shift - see flowsheet for details.  Pt does not meet criteria for orthostasis.  Pt is a Med fall risk. See Apple Fall Score and ABCDS Injury Risk assessments.   - Screening for Orthostasis AND not a [place] Risk per APPLE/ABCDS: Pt bed is in low position, side rails up, call light and belongings are in reach.  Fall risk light is on outside pts room.  Pt encouraged to call for assistance as needed. Will continue with hourly rounds for PO intake, pain needs, toileting and repositioning as needed.      Problem: Pain  Goal: Verbalizes/displays adequate comfort level or baseline comfort level  5/17/2024 0149 by Courtney Graves, RN  Outcome: Progressing  Pt able to appropriately rate pain on scale of 0-10. C/o pain with his PICC line , PRN pain meds given per MAR with moderate relief per pt. Educated on pain control measures, verbalized understanding. Will monitor.     Problem: Hematologic - Adult  Goal: Maintains hematologic stability  5/17/2024 0149 by Courtney Graves, RN  Outcome: Progressing  Patient's hemoglobin this AM:   Recent Labs     05/17/24  0323   HGB 6.4*     Patient's platelet count this AM:   Recent Labs     05/17/24  0323   PLT 14*    Thrombocytopenia Precautions in place.  Patient showing no signs or symptoms of active bleeding.  Patient transfused blood products per orders - see flowsheet.  Patient verbalizes understanding of all instructions. Will continue to assess and implement POC. Call light within reach and hourly rounding in place.      
  Problem: Safety - Adult  Goal: Free from fall injury  5/17/2024 1403 by Paula Bowles RN  Outcome: Progressing  Note: Orthostatic vital signs obtained at start of shift - see flowsheet for details.  Pt does not meet criteria for orthostasis.  Pt is a Low fall risk. See Apple Fall Score and ABCDS Injury Risk assessments.   - Screening for Orthostasis AND not a [place] Risk per APPLE/ABCDS: Pt bed is in low position, side rails up, call light and belongings are in reach.  Fall risk light is on outside pts room.  Pt encouraged to call for assistance as needed. Will continue with hourly rounds for PO intake, pain needs, toileting and repositioning as needed.      Problem: ABCDS Injury Assessment  Goal: Absence of physical injury  5/17/2024 1403 by Paula Bowles RN  Outcome: Progressing  Note: No new reported or observed physical injuries.      Problem: Pain  Goal: Verbalizes/displays adequate comfort level or baseline comfort level  5/17/2024 1403 by Paula Bowles RN  Outcome: Progressing  Note: Pt's pain improved today. No PRN pain meds asked for or given.      Problem: Hematologic - Adult  Goal: Maintains hematologic stability  5/17/2024 1403 by Paula Bowles RN  Outcome: Progressing  Note: Patient's hemoglobin this AM:   Recent Labs     05/17/24  0323   HGB 6.4*     Patient's platelet count this AM:   Recent Labs     05/17/24  0323   PLT 14*   Thrombocytopenia precautions in place.  Patient showing no signs or symptoms of active bleeding.  Patient transfused blood products per orders - see flowsheet.  Patient verbalizes understanding of all instructions. Will continue to assess and implement POC. Call light within reach and hourly rounding in place.      Problem: Nutrition Deficit:  Goal: Optimize nutritional status  5/17/2024 1403 by Paula Bowles RN  Outcome: Progressing  Note: Pt's appetite has improved since admission. Encouraged to drink protein supplements.      
  Problem: Safety - Adult  Goal: Free from fall injury  5/18/2024 1624 by Iraida Roe, RN  Outcome: Progressing   Orthostatic vital signs obtained at start of shift - see flowsheet for details.  Pt does not meet criteria for orthostasis.  Pt is a Low fall risk. See Jose Fall Score and ABCDS Injury Risk assessments.   t bed is in low position, side rails up, call light and belongings are in reach.  Fall risk light is on outside pts room.  Pt encouraged to call for assistance as needed. Will continue with hourly rounds for PO intake, pain needs, toileting and repositioning as needed.       Problem: Hematologic - Adult  Goal: Maintains hematologic stability  5/18/2024 1624 by Iraida Roe, RN  Outcome: Progressing     Problem: Nutrition Deficit:  Goal: Optimize nutritional status  5/18/2024 1624 by Iraida Roe, RN  Outcome: Progressing     
  Problem: Safety - Adult  Goal: Free from fall injury  5/19/2024 0529 by Iván Millan, RN  Outcome: Progressing  Note:   - Screening for Orthostasis AND not a [place] Risk per APPLE/ABCDS: Pt bed is in low position, side rails up, call light and belongings are in reach.  Fall risk light is on outside pts room.  Pt encouraged to call for assistance as needed. Will continue with hourly rounds for PO intake, pain needs, toileting and repositioning as needed.        Problem: Pain  Goal: Verbalizes/displays adequate comfort level or baseline comfort level  5/19/2024 0529 by Iván Millan, RN  Outcome: Progressing  Note: Pt did not complain of any pain this shift     Problem: Hematologic - Adult  Goal: Maintains hematologic stability  5/19/2024 0529 by Iván Millan, RN  Outcome: Progressing  Note: Patient's hemoglobin this AM:   Recent Labs     05/19/24  0325   HGB 6.3*     Patient's platelet count this AM:   Recent Labs     05/19/24  0325   PLT 6*    Thrombocytopenia Precautions in place.  Patient showing no signs or symptoms of active bleeding.  Patient transfused blood products per orders - see flowsheet.  Patient verbalizes understanding of all instructions. Will continue to assess and implement POC. Call light within reach and hourly rounding in place.        Problem: Nutrition Deficit:  Goal: Optimize nutritional status  5/19/2024 0529 by Iván Millan, RN  Outcome: Progressing     
  Problem: Safety - Adult  Goal: Free from fall injury  5/20/2024 1618 by Elina Medellin, RN  Outcome: Progressing      Pt with activity orders for up ad darrian.  Encouraged pt to be up OOB as much as possible throughout the day and for all meals.  Encouraged frequent short naps as necessary to preserve energy but instructed that while awake, pt should be OOB.  Pt in isolation and is unable to ambulate in halls d/t restrictions.  Continued to encourage activity in room as much as possible. Pt is visualized to be OOB 26-50% of the time this shift.  Will continue to encourage frequent activity.      Problem: Pain  Goal: Verbalizes/displays adequate comfort level or baseline comfort level  Outcome: Progressing   Pt had some cramping in abdomen. Medicated with bentyl. Pt verbalize some relief. Encourage pt to call out with any needs or status changes.     Problem: Hematologic - Adult  Goal: Maintains hematologic stability  5/20/2024 1618 by Elina Medellin, RN  Outcome: Progressing   Patient's hemoglobin this AM:   Recent Labs     05/20/24 0428   HGB 7.2*     Patient's platelet count this AM:   Recent Labs     05/20/24 0428   PLT 25*    Thrombocytopenia Precautions in place.  Patient showing no signs or symptoms of active bleeding.  Transfusion not indicated at this time.  Patient verbalizes understanding of all instructions. Will continue to assess and implement POC. Call light within reach and hourly rounding in place.     Problem: Nutrition Deficit:  Goal: Optimize nutritional status  Outcome: Progressing   Encourage small frequent meals.     Problem: Gastrointestinal - Adult  Goal: Minimal or absence of nausea and vomiting  Outcome: Progressing   Patient denies nausea today. Pt had a couple bm that were soft but formed.     Problem: Genitourinary - Adult  Goal: Absence of urinary retention  Outcome: Progressing   Urine culture sent today.     Problem: ABCDS Injury Assessment  Goal: Absence of physical 
  Problem: Safety - Adult  Goal: Free from fall injury  5/21/2024 0421 by Cameron Carter RN  Outcome: Progressing  Flowsheets (Taken 5/21/2024 0421)  Free From Fall Injury: Instruct family/caregiver on patient safety     Problem: ABCDS Injury Assessment  Goal: Absence of physical injury  5/21/2024 0421 by Cameron Carter RN  Outcome: Progressing  Flowsheets (Taken 5/21/2024 0421)  Absence of Physical Injury: Implement safety measures based on patient assessment     Problem: Pain  Goal: Verbalizes/displays adequate comfort level or baseline comfort level  5/21/2024 0421 by Cameron Carter RN  Outcome: Progressing  Flowsheets (Taken 5/21/2024 0421)  Verbalizes/displays adequate comfort level or baseline comfort level:   Encourage patient to monitor pain and request assistance   Administer analgesics based on type and severity of pain and evaluate response   Consider cultural and social influences on pain and pain management   Notify Licensed Independent Practitioner if interventions unsuccessful or patient reports new pain   Implement non-pharmacological measures as appropriate and evaluate response   Assess pain using appropriate pain scale     Problem: Hematologic - Adult  Goal: Maintains hematologic stability  5/21/2024 0421 by Cameron Carter RN  Outcome: Progressing  Flowsheets (Taken 5/21/2024 0421)  Maintains hematologic stability:   Assess for signs and symptoms of bleeding or hemorrhage   Administer blood products/factors as ordered   Monitor labs for bleeding or clotting disorders     Problem: Nutrition Deficit:  Goal: Optimize nutritional status  5/21/2024 0421 by Cameron Carter RN  Outcome: Progressing  Flowsheets (Taken 5/21/2024 0421)  Nutrient intake appropriate for improving, restoring, or maintaining nutritional needs:   Assess nutritional status and recommend course of action   Monitor oral intake, labs, and treatment plans   Provide specific nutrition education to patient or family as appropriate   
  Problem: Safety - Adult  Goal: Free from fall injury  5/24/2024 0645 by Cameron Carter RN  Outcome: Progressing  Flowsheets (Taken 5/24/2024 0645)  Free From Fall Injury: Instruct family/caregiver on patient safety     Problem: ABCDS Injury Assessment  Goal: Absence of physical injury  5/24/2024 0645 by Cameron Carter RN  Outcome: Progressing  Flowsheets (Taken 5/24/2024 0645)  Absence of Physical Injury: Implement safety measures based on patient assessment     Problem: Pain  Goal: Verbalizes/displays adequate comfort level or baseline comfort level  5/24/2024 0645 by Cameron Carter RN  Outcome: Progressing  Flowsheets (Taken 5/24/2024 0645)  Verbalizes/displays adequate comfort level or baseline comfort level:   Encourage patient to monitor pain and request assistance   Administer analgesics based on type and severity of pain and evaluate response   Consider cultural and social influences on pain and pain management   Notify Licensed Independent Practitioner if interventions unsuccessful or patient reports new pain   Implement non-pharmacological measures as appropriate and evaluate response   Assess pain using appropriate pain scale     Problem: Hematologic - Adult  Goal: Maintains hematologic stability  5/24/2024 0645 by Cameron Carter RN  Outcome: Progressing  Flowsheets (Taken 5/24/2024 0645)  Maintains hematologic stability:   Assess for signs and symptoms of bleeding or hemorrhage   Administer blood products/factors as ordered   Monitor labs for bleeding or clotting disorders     Problem: Nutrition Deficit:  Goal: Optimize nutritional status  5/24/2024 0645 by Cameron Carter RN  Outcome: Progressing  Flowsheets (Taken 5/24/2024 0645)  Nutrient intake appropriate for improving, restoring, or maintaining nutritional needs:   Assess nutritional status and recommend course of action   Provide specific nutrition education to patient or family as appropriate     Problem: Gastrointestinal - Adult  Goal: Minimal or 
  Problem: Safety - Adult  Goal: Free from fall injury  5/25/2024 2018 by Arti Almeida RN  Outcome: Progressing     Problem: Pain  Goal: Verbalizes/displays adequate comfort level or baseline comfort level  5/25/2024 2018 by Arti Almeida RN  Outcome: Progressing  Note: No pain noted during this shift.      Problem: Hematologic - Adult  Goal: Maintains hematologic stability  5/25/2024 2018 by Arti Almeida RN  Outcome: Progressing  Recent Labs     05/25/24  0221   HGB 7.8*     Patient's platelet count this AM:   Recent Labs     05/25/24  0221   PLT 15*    Thrombocytopenia not present at this time.  Patient showing no signs or symptoms of active bleeding.  Transfusion not indicated at this time.  Patient verbalizes understanding of all instructions. Will continue to assess and implement POC. Call light within reach and hourly rounding in place.       Problem: Nutrition Deficit:  Goal: Optimize nutritional status  5/25/2024 2018 by Arti Almeida RN  Outcome: Progressing       Problem: Gastrointestinal - Adult  Goal: Minimal or absence of nausea and vomiting  5/25/2024 2018 by Arti Almeida RN  Outcome: Progressing    Note: No nausea or vomiting noted during this shift.      Problem: Genitourinary - Adult  Goal: Absence of urinary retention  Outcome: Progressing     Problem: Metabolic/Fluid and Electrolytes - Adult  Goal: Electrolytes maintained within normal limits  Outcome: Progressing     
  Problem: Safety - Adult  Goal: Free from fall injury  5/28/2024 1916 by Arti Almeida RN  Outcome: Progressing  5/28/2024 0743 by Yudelka Jaimes RN  Outcome: Progressing  Flowsheets (Taken 5/27/2024 1423 by Kenya Alston RN)  Free From Fall Injury: Instruct family/caregiver on patient safety     Problem: ABCDS Injury Assessment  Goal: Absence of physical injury  5/28/2024 1916 by Arti Almeida RN  Outcome: Progressing  5/28/2024 0743 by Yudelka Jaimes RN  Outcome: Progressing  Flowsheets (Taken 5/27/2024 1423 by Kenya Alston, RN)  Absence of Physical Injury: Implement safety measures based on patient assessment     Problem: Pain  Goal: Verbalizes/displays adequate comfort level or baseline comfort level  5/28/2024 1916 by Arti Almeida RN  Outcome: Progressing  5/28/2024 0743 by Yudelka Jaimes RN  Outcome: Progressing  Flowsheets (Taken 5/27/2024 1423 by Kenya Alston, RN)  Verbalizes/displays adequate comfort level or baseline comfort level:   Encourage patient to monitor pain and request assistance   Assess pain using appropriate pain scale     Problem: Hematologic - Adult  Goal: Maintains hematologic stability  5/28/2024 1916 by Arti Almeida RN  Outcome: Progressing  5/28/2024 0743 by Yudelka Jaimes RN  Outcome: Progressing  Flowsheets (Taken 5/27/2024 1423 by Kenya Alston, RN)  Maintains hematologic stability:   Assess for signs and symptoms of bleeding or hemorrhage   Monitor labs for bleeding or clotting disorders   Administer blood products/factors as ordered     Problem: Nutrition Deficit:  Goal: Optimize nutritional status  5/28/2024 1916 by Arti Almeida RN  Outcome: Progressing  5/28/2024 0743 by Yudelka Jaimes RN  Outcome: Progressing  Flowsheets (Taken 5/27/2024 1423 by Kenya Alston, RN)  Nutrient intake appropriate for improving, restoring, or maintaining nutritional needs: Monitor oral intake, labs, and treatment plans     Problem: Gastrointestinal - Adult  Goal: 
  Problem: Safety - Adult  Goal: Free from fall injury  5/29/2024 0312 by Cameron Carter RN  Outcome: Progressing  Flowsheets (Taken 5/29/2024 0312)  Free From Fall Injury: Instruct family/caregiver on patient safety     Problem: ABCDS Injury Assessment  Goal: Absence of physical injury  5/29/2024 0312 by Cameron Carter RN  Outcome: Progressing  Flowsheets (Taken 5/29/2024 0312)  Absence of Physical Injury: Implement safety measures based on patient assessment     Problem: Pain  Goal: Verbalizes/displays adequate comfort level or baseline comfort level  5/29/2024 0312 by Cameron Carter RN  Outcome: Progressing  Flowsheets (Taken 5/29/2024 0312)  Verbalizes/displays adequate comfort level or baseline comfort level:   Encourage patient to monitor pain and request assistance   Administer analgesics based on type and severity of pain and evaluate response   Consider cultural and social influences on pain and pain management   Assess pain using appropriate pain scale   Implement non-pharmacological measures as appropriate and evaluate response   Notify Licensed Independent Practitioner if interventions unsuccessful or patient reports new pain     Problem: Hematologic - Adult  Goal: Maintains hematologic stability  5/29/2024 0312 by Cameron Carter RN  Outcome: Progressing  Flowsheets (Taken 5/29/2024 0312)  Maintains hematologic stability:   Assess for signs and symptoms of bleeding or hemorrhage   Administer blood products/factors as ordered   Monitor labs for bleeding or clotting disorders     Problem: Nutrition Deficit:  Goal: Optimize nutritional status  5/29/2024 0312 by Cameron Carter RN  Outcome: Progressing  Flowsheets (Taken 5/29/2024 0312)  Nutrient intake appropriate for improving, restoring, or maintaining nutritional needs:   Assess nutritional status and recommend course of action   Monitor oral intake, labs, and treatment plans   Recommend appropriate diets, oral nutritional supplements, and vitamin/mineral 
  Problem: Safety - Adult  Goal: Free from fall injury  6/2/2024 1242 by Kenya Alston RN  Outcome: Progressing  Flowsheets (Taken 6/2/2024 1242)  Free From Fall Injury: Instruct family/caregiver on patient safety  Note: Orthostatic vital signs obtained at start of shift - see flowsheet for details.  Pt does not meet criteria for orthostasis.  Pt is a Med fall risk. See Jose Fall Score and ABCDS Injury Risk assessments.   - Screening for Orthostasis AND not a [place] Risk per JOSE/ABCDS: Pt bed is in low position, side rails up, call light and belongings are in reach.  Fall risk light is on outside pts room.  Pt encouraged to call for assistance as needed. Will continue with hourly rounds for PO intake, pain needs, toileting and repositioning as needed.        Problem: ABCDS Injury Assessment  Goal: Absence of physical injury  6/2/2024 1242 by Kenya Alston RN  Outcome: Progressing  Flowsheets (Taken 6/2/2024 1242)  Absence of Physical Injury: Implement safety measures based on patient assessment     Problem: Pain  Goal: Verbalizes/displays adequate comfort level or baseline comfort level  6/2/2024 1242 by Kenya Alston RN  Outcome: Progressing  Flowsheets (Taken 6/2/2024 1242)  Verbalizes/displays adequate comfort level or baseline comfort level:   Encourage patient to monitor pain and request assistance   Assess pain using appropriate pain scale  Note: No complaints of pain at this time      Problem: Hematologic - Adult  Goal: Maintains hematologic stability  6/2/2024 1242 by Kenya Alston RN  Outcome: Progressing  Flowsheets (Taken 6/2/2024 1242)  Maintains hematologic stability:   Administer blood products/factors as ordered   Assess for signs and symptoms of bleeding or hemorrhage   Monitor labs for bleeding or clotting disorders  Note: Patient's hemoglobin this AM:   Recent Labs     06/02/24  0407   HGB 7.3*     Patient's platelet count this AM:   Recent Labs     06/02/24  0407   PLT 19*    
  Problem: Safety - Adult  Goal: Free from fall injury  Outcome: Progressing      Pt with activity orders for up with assist due to weakness. Bed alarm in use. Call light within reach.  Encouraged pt to be up OOB as much as possible throughout the day and for all meals.  Encouraged frequent short naps as necessary to preserve energy but instructed that while awake, pt should be OOB.  Pt in isolation and is unable to ambulate in halls d/t restrictions.  Continued to encourage activity in room as much as possible. Pt is visualized to be OOB 1-25% of the time this shift.  Will continue to encourage frequent activity.    Problem: Pain  Goal: Verbalizes/displays adequate comfort level or baseline comfort level  Outcome: Progressing   Patient has discomfort at times in chest. Patient repositions and feels better. Patient denies need for pain medication.     Problem: Hematologic - Adult  Goal: Maintains hematologic stability  Outcome: Progressing   Patient's hemoglobin this AM:   Recent Labs     05/23/24  0343   HGB 7.0*     Patient's platelet count this AM:   Recent Labs     05/23/24  0343   PLT 10*    Thrombocytopenia Precautions in place.  Patient showing no signs or symptoms of active bleeding.  Patient transfused blood products per orders - see flowsheet.  Patient verbalizes understanding of all instructions. Will continue to assess and implement POC. Call light within reach and hourly rounding in place.     Problem: Nutrition Deficit:  Goal: Optimize nutritional status  Outcome: Progressing   Encourage small frequent meals.     Problem: Metabolic/Fluid and Electrolytes - Adult  Goal: Hemodynamic stability and optimal renal function maintained  Outcome: Progressing   Patient continues with fevers. Patient medicated with tylenol. Fever did not decrease. Solu-cortef given.     Problem: ABCDS Injury Assessment  Goal: Absence of physical injury  Outcome: Progressing   Patient continues in isolation for MRSA . Patient 
  Problem: Safety - Adult  Goal: Free from fall injury  Outcome: Progressing     Problem: ABCDS Injury Assessment  Goal: Absence of physical injury  Outcome: Progressing     Problem: Pain  Goal: Verbalizes/displays adequate comfort level or baseline comfort level  Outcome: Progressing     Problem: Hematologic - Adult  Goal: Maintains hematologic stability  Outcome: Progressing     
  Problem: Safety - Adult  Goal: Free from fall injury  Outcome: Progressing     Problem: ABCDS Injury Assessment  Goal: Absence of physical injury  Outcome: Progressing     Problem: Pain  Goal: Verbalizes/displays adequate comfort level or baseline comfort level  Outcome: Progressing     Problem: Hematologic - Adult  Goal: Maintains hematologic stability  Outcome: Progressing     
  Problem: Safety - Adult  Goal: Free from fall injury  Outcome: Progressing     Problem: ABCDS Injury Assessment  Goal: Absence of physical injury  Outcome: Progressing     Problem: Pain  Goal: Verbalizes/displays adequate comfort level or baseline comfort level  Outcome: Progressing     Problem: Hematologic - Adult  Goal: Maintains hematologic stability  Outcome: Progressing     Problem: Nutrition Deficit:  Goal: Optimize nutritional status  Outcome: Progressing     Problem: Gastrointestinal - Adult  Goal: Minimal or absence of nausea and vomiting  Outcome: Progressing     Problem: Genitourinary - Adult  Goal: Absence of urinary retention  Outcome: Progressing     Problem: Metabolic/Fluid and Electrolytes - Adult  Goal: Electrolytes maintained within normal limits  Outcome: Progressing     
  Problem: Safety - Adult  Goal: Free from fall injury  Outcome: Progressing     Problem: Pain  Goal: Verbalizes/displays adequate comfort level or baseline comfort level  Outcome: Progressing     Problem: Hematologic - Adult  Goal: Maintains hematologic stability  Outcome: Progressing     Problem: Nutrition Deficit:  Goal: Optimize nutritional status  Outcome: Progressing     Problem: Gastrointestinal - Adult  Goal: Minimal or absence of nausea and vomiting  Outcome: Progressing     Problem: Genitourinary - Adult  Goal: Absence of urinary retention  Outcome: Progressing     Problem: Metabolic/Fluid and Electrolytes - Adult  Goal: Electrolytes maintained within normal limits  Outcome: Progressing     
  Problem: Safety - Adult  Goal: Free from fall injury  Outcome: Progressing     Problem: Pain  Goal: Verbalizes/displays adequate comfort level or baseline comfort level  Outcome: Progressing     Problem: Hematologic - Adult  Goal: Maintains hematologic stability  Outcome: Progressing     Problem: Nutrition Deficit:  Goal: Optimize nutritional status  Outcome: Progressing     Problem: Gastrointestinal - Adult  Goal: Minimal or absence of nausea and vomiting  Outcome: Progressing     Problem: Metabolic/Fluid and Electrolytes - Adult  Goal: Electrolytes maintained within normal limits  Outcome: Progressing     
  Problem: Safety - Adult  Goal: Free from fall injury  Outcome: Progressing  Flowsheets (Taken 5/22/2024 0501)  Free From Fall Injury: Instruct family/caregiver on patient safety     Problem: ABCDS Injury Assessment  Goal: Absence of physical injury  Outcome: Progressing  Flowsheets (Taken 5/22/2024 0501)  Absence of Physical Injury: Implement safety measures based on patient assessment     Problem: Pain  Goal: Verbalizes/displays adequate comfort level or baseline comfort level  Outcome: Progressing  Flowsheets (Taken 5/22/2024 0501)  Verbalizes/displays adequate comfort level or baseline comfort level:   Encourage patient to monitor pain and request assistance   Administer analgesics based on type and severity of pain and evaluate response   Consider cultural and social influences on pain and pain management   Notify Licensed Independent Practitioner if interventions unsuccessful or patient reports new pain   Implement non-pharmacological measures as appropriate and evaluate response   Assess pain using appropriate pain scale     Problem: Hematologic - Adult  Goal: Maintains hematologic stability  Outcome: Progressing  Flowsheets (Taken 5/22/2024 0501)  Maintains hematologic stability:   Assess for signs and symptoms of bleeding or hemorrhage   Administer blood products/factors as ordered   Monitor labs for bleeding or clotting disorders     Problem: Nutrition Deficit:  Goal: Optimize nutritional status  Outcome: Progressing  Flowsheets (Taken 5/22/2024 0501)  Nutrient intake appropriate for improving, restoring, or maintaining nutritional needs:   Assess nutritional status and recommend course of action   Monitor oral intake, labs, and treatment plans   Provide specific nutrition education to patient or family as appropriate     Problem: Gastrointestinal - Adult  Goal: Minimal or absence of nausea and vomiting  Outcome: Progressing  Flowsheets (Taken 5/22/2024 0501)  Minimal or absence of nausea and vomiting: 
  Problem: Safety - Adult  Goal: Free from fall injury  Outcome: Progressing  Flowsheets (Taken 6/2/2024 0331)  Free From Fall Injury: Instruct family/caregiver on patient safety     Problem: ABCDS Injury Assessment  Goal: Absence of physical injury  Outcome: Progressing  Flowsheets (Taken 6/2/2024 0331)  Absence of Physical Injury: Implement safety measures based on patient assessment     Problem: Pain  Goal: Verbalizes/displays adequate comfort level or baseline comfort level  Outcome: Progressing  Flowsheets (Taken 6/2/2024 0331)  Verbalizes/displays adequate comfort level or baseline comfort level:   Encourage patient to monitor pain and request assistance   Administer analgesics based on type and severity of pain and evaluate response   Consider cultural and social influences on pain and pain management   Assess pain using appropriate pain scale   Implement non-pharmacological measures as appropriate and evaluate response   Notify Licensed Independent Practitioner if interventions unsuccessful or patient reports new pain     Problem: Hematologic - Adult  Goal: Maintains hematologic stability  Outcome: Progressing  Flowsheets (Taken 6/2/2024 0331)  Maintains hematologic stability:   Assess for signs and symptoms of bleeding or hemorrhage   Administer blood products/factors as ordered   Monitor labs for bleeding or clotting disorders     Problem: Nutrition Deficit:  Goal: Optimize nutritional status  Outcome: Progressing  Flowsheets (Taken 6/2/2024 0331)  Nutrient intake appropriate for improving, restoring, or maintaining nutritional needs:   Assess nutritional status and recommend course of action   Monitor oral intake, labs, and treatment plans   Recommend appropriate diets, oral nutritional supplements, and vitamin/mineral supplements   Recommend, monitor, and adjust tube feedings and TPN/PPN based on assessed needs   Provide specific nutrition education to patient or family as appropriate   Order, calculate, 
  Problem: Safety - Adult  Goal: Free from fall injury  Outcome: Progressing  Free From Fall Injury:   Instruct family/caregiver on patient safety   Based on caregiver fall risk screen, instruct family/caregiver to ask for assistance with transferring infant if caregiver noted to have fall risk factors  Note: Orthostatic vital signs obtained at start of shift - see flowsheet for details.  Pt does not meet criteria for orthostasis.  Pt is a Med fall risk. See Apple Fall Score and ABCDS Injury Risk assessments.   - Screening for Orthostasis AND not a [place] Risk per APPLE/ABCDS: Pt bed is in low position, side rails up, call light and belongings are in reach.  Fall risk light is on outside pts room.  Pt encouraged to call for assistance as needed. Will continue with hourly rounds for PO intake, pain needs, toileting and repositioning as needed.        Problem: Hematologic - Adult  Goal: Maintains hematologic stability  Outcome: Progressing  Maintains hematologic stability:   Assess for signs and symptoms of bleeding or hemorrhage   Monitor labs for bleeding or clotting disorders   Administer blood products/factors as ordered  Note: Patient's hemoglobin this AM:   Recent Labs     05/20/24  0428   HGB 7.2*     Patient's platelet count this AM:   Recent Labs     05/20/24  0428   PLT 25*    Thrombocytopenia Precautions in place.  Patient showing no signs or symptoms of active bleeding.  Transfusion not indicated at this time.  Patient verbalizes understanding of all instructions. Will continue to assess and implement POC. Call light within reach and hourly rounding in place.        Problem: Gastrointestinal - Adult  Goal: Maintains or returns to baseline bowel function  Outcome: Progressing  Maintains or returns to baseline bowel function:   Assess bowel function   Encourage oral fluids to ensure adequate hydration   Administer IV fluids as ordered to ensure adequate hydration   Administer ordered medications as 
  Problem: Safety - Adult  Goal: Free from fall injury  Outcome: Progressing  Free From Fall Injury: Instruct family/caregiver on patient safety     Problem: ABCDS Injury Assessment  Goal: Absence of physical injury  Outcome: Progressing  Absence of Physical Injury: Implement safety measures based on patient assessment     Problem: Pain  Goal: Verbalizes/displays adequate comfort level or baseline comfort level  Outcome: Progressing  Verbalizes/displays adequate comfort level or baseline comfort level:   Encourage patient to monitor pain and request assistance   Assess pain using appropriate pain scale     Problem: Hematologic - Adult  Goal: Maintains hematologic stability  Outcome: Progressing  Maintains hematologic stability:   Assess for signs and symptoms of bleeding or hemorrhage   Monitor labs for bleeding or clotting disorders   Administer blood products/factors as ordered     Problem: Nutrition Deficit:  Goal: Optimize nutritional status  Outcome: Progressing  Nutrient intake appropriate for improving, restoring, or maintaining nutritional needs: Monitor oral intake, labs, and treatment plans     Problem: Gastrointestinal - Adult  Goal: Minimal or absence of nausea and vomiting  Outcome: Progressing     Problem: Gastrointestinal - Adult  Goal: Maintains or returns to baseline bowel function  Outcome: Progressing     Problem: Gastrointestinal - Adult  Goal: Maintains adequate nutritional intake  Outcome: Progressing     
  Problem: Safety - Adult  Goal: Free from fall injury  Outcome: Progressing  Free From Fall Injury: Instruct family/caregiver on patient safety  Note: Orthostatic vital signs obtained at start of shift - see flowsheet for details.  Pt does not meet criteria for orthostasis.  Pt is a Med fall risk. See Apple Fall Score and ABCDS Injury Risk assessments.   - Screening for Orthostasis AND not a [place] Risk per APPLE/ABCDS: Pt bed is in low position, side rails up, call light and belongings are in reach.  Fall risk light is on outside pts room.  Pt encouraged to call for assistance as needed. Will continue with hourly rounds for PO intake, pain needs, toileting and repositioning as needed.        Problem: Hematologic - Adult  Goal: Maintains hematologic stability  Outcome: Progressing  Maintains hematologic stability:   Assess for signs and symptoms of bleeding or hemorrhage   Administer blood products/factors as ordered   Monitor labs for bleeding or clotting disorders  Note: Patient's hemoglobin this AM:   Recent Labs     06/05/24  0338   HGB 7.2*     Patient's platelet count this AM:   Recent Labs     06/05/24  0338   PLT 27*    Thrombocytopenia Precautions in place.  Patient showing no signs or symptoms of active bleeding.  Transfusion not indicated at this time.  Patient verbalizes understanding of all instructions. Will continue to assess and implement POC. Call light within reach and hourly rounding in place.        Problem: Nutrition Deficit:  Goal: Optimize nutritional status  Outcome: Progressing       Problem: Gastrointestinal - Adult  Goal: Minimal or absence of nausea and vomiting  Outcome: Progressing     
  Problem: Safety - Adult  Goal: Free from fall injury  Outcome: Progressing  Note:    - Screening for Orthostasis AND not a [place] Risk per APPLE/ABCDS: Pt bed is in low position, side rails up, call light and belongings are in reach.  Fall risk light is on outside pts room.  Pt encouraged to call for assistance as needed. Will continue with hourly rounds for PO intake, pain needs, toileting and repositioning as needed.        Problem: Pain  Goal: Verbalizes/displays adequate comfort level or baseline comfort level  Outcome: Progressing  Note: Pt complain of mild pain in picc site, PRN tylenol given     Problem: Hematologic - Adult  Goal: Maintains hematologic stability  Outcome: Progressing  Note:   Patient's hemoglobin this AM:   Recent Labs     05/18/24  0401   HGB 7.1*     Patient's platelet count this AM:   Recent Labs     05/18/24  0401   PLT 11*    Thrombocytopenia Precautions in place.  Patient showing no signs or symptoms of active bleeding.  Transfusion not indicated at this time.  Patient verbalizes understanding of all instructions. Will continue to assess and implement POC. Call light within reach and hourly rounding in place.        
  Problem: Safety - Adult  Goal: Free from fall injury  Outcome: Progressing  Note: Pt is a Low fall risk. See Apple Fall Score and ABCDS Injury Risk assessments.   - Screening for Orthostasis AND not a [place] Risk per APPLE/ABCDS: Pt bed is in low position, side rails up, call light and belongings are in reach.  Fall risk light is on outside pts room.  Pt encouraged to call for assistance as needed. Will continue with hourly rounds for PO intake, pain needs, toileting and repositioning as needed.      Problem: ABCDS Injury Assessment  Goal: Absence of physical injury  Outcome: Progressing  Note: No new observed or reported physical injuries     Problem: Pain  Goal: Verbalizes/displays adequate comfort level or baseline comfort level  Outcome: Progressing  Note: Pt reported pain at PICC insertion site this shift. PRN tylenol given per MAR. Pt educated on importance of calling for pain meds when in pain. Pt verbalized understanding.      Problem: Hematologic - Adult  Goal: Maintains hematologic stability  Outcome: Progressing  Note: Patient's hemoglobin this AM:   Recent Labs     05/16/24  1256   HGB 7.5*     Patient's platelet count this AM:   Recent Labs     05/16/24  0401   PLT 25*    Thrombocytopenia Precautions in place.  Patient showing no signs or symptoms of active bleeding.  Transfusion not indicated at this time.  Patient verbalizes understanding of all instructions. Will continue to assess and implement POC. Call light within reach and hourly rounding in place.      Problem: Nutrition Deficit:  Goal: Optimize nutritional status  Outcome: Progressing  Note: Pt eating  percent of meals.      
Problem: Nutrition Deficit:  Goal: Optimize nutritional status  Outcome: Progressing  Nutrient intake appropriate for improving, restoring, or maintaining nutritional needs:   Assess nutritional status and recommend course of action   Recommend appropriate diets, oral nutritional supplements, and vitamin/mineral supplements   Recommend, monitor, and adjust tube feedings and TPN/PPN based on assessed needs   Monitor oral intake, labs, and treatment plans   Order, calculate, and assess calorie counts as needed   Provide specific nutrition education to patient or family as appropriate    Problem: ABCDS Injury Assessment  Goal: Absence of physical injury  Outcome: Progressing  Absence of Physical Injury: Implement safety measures based on patient assessment  Note: Orthostatic vital signs obtained at start of shift - see flowsheet for details.  Pt meets criteria for orthostasis.  Pt is a Med fall risk. See Apple Fall Score and ABCDS Injury Risk assessments.   + Screening for Orthostasis and/or + High Fall Risk per APPLE/ABCDS: Explained fall risk precautions to pt and family and rationale behind their use to keep the patient safe. Pt bed is in low position, side rails up, call light and belongings are in reach. Fall wristband applied and present on pts wrist.  Bed alarm on.  Pt encouraged to call for assistance. Will continue with hourly rounds for PO intake, pain needs, toileting and repositioning as needed.       Problem: Pain  Goal: Verbalizes/displays adequate comfort level or baseline comfort level  Outcome: Progressing  Verbalizes/displays adequate comfort level or baseline comfort level:   Encourage patient to monitor pain and request assistance   Administer analgesics based on type and severity of pain and evaluate response   Consider cultural and social influences on pain and pain management   Assess pain using appropriate pain scale   Implement non-pharmacological measures as appropriate and evaluate response   
Problem: Safety - Adult  Goal: Free from fall injury  6/3/2024 1629 by Jessy Morgan RN  Outcome: Progressing  Pt up ad darrian, ortho negative, call light within reach     Problem: ABCDS Injury Assessment  Goal: Absence of physical injury  6/3/2024 1629 by Jessy Morgan RN  Outcome: Progressing  Pt up ad darrian, ortho negative, call light within reach    Problem: Pain  Goal: Verbalizes/displays adequate comfort level or baseline comfort level  6/3/2024 1629 by Jessy Morgan RN  Outcome: Progressing  No complaints of pain this shift    Problem: Hematologic - Adult  Goal: Maintains hematologic stability  6/3/2024 1629 by Jessy Morgan RN  Outcome: Progressing  Patient's hemoglobin this AM:   Recent Labs     06/03/24  0548   HGB 7.1*     Patient's platelet count this AM:   Recent Labs     06/03/24  0548   PLT 28*    Thrombocytopenia Precautions in place.  Patient showing no signs or symptoms of active bleeding.  Transfusion not indicated at this time.  Patient verbalizes understanding of all instructions. Will continue to assess and implement POC. Call light within reach and hourly rounding in place.      Problem: Nutrition Deficit:  Goal: Optimize nutritional status  6/3/2024 1629 by Jessy Morgan RN  Outcome: Progressing  Pt up out of bed for all meals, pt successfully finished 100% of his lunch; tolerating tube feeding well    Problem: Gastrointestinal - Adult  Goal: Maintains or returns to baseline bowel function  6/3/2024 1629 by Jessy Morgan, RN  Outcome: Progressing  Pt states he had a bowel movement this shift.     Problem: Metabolic/Fluid and Electrolytes - Adult  Goal: Electrolytes maintained within normal limits  6/3/2024 1629 by Jessy Morgan, RN  Outcome: Progressing  No replacements needed this shift.   
Problem: Safety - Adult  Goal: Free from fall injury  Outcome: Progressing  Pt up ad darrian, ortho negative, call light within reach     Problem: ABCDS Injury Assessment  Goal: Absence of physical injury  Outcome: Progressing  Pt up ad darrian, ortho negative, call light within reach     Problem: Pain  Goal: Verbalizes/displays adequate comfort level or baseline comfort level  Outcome: Progressing  No complaints of pain this shift.     Problem: Hematologic - Adult  Goal: Maintains hematologic stability  Outcome: Progressing   Patient's hemoglobin this AM:   Recent Labs     06/04/24  0308   HGB 7.3*     Patient's platelet count this AM:   Recent Labs     06/04/24  0308   PLT 24*    Thrombocytopenia Precautions in place.  Patient showing no signs or symptoms of active bleeding.  Transfusion not indicated at this time.  Patient verbalizes understanding of all instructions. Will continue to assess and implement POC. Call light within reach and hourly rounding in place.     Problem: Nutrition Deficit:  Goal: Optimize nutritional status  Outcome: Progressing  Pt up out of bed for all meals, successfully eating 75% of his tray at each meal, tolerating tube feeding well     Problem: Gastrointestinal - Adult  Goal: Minimal or absence of nausea and vomiting  Outcome: Progressing  No complaints of nausea or vomiting this shift.     Problem: Metabolic/Fluid and Electrolytes - Adult  Goal: Electrolytes maintained within normal limits  Outcome: Progressing  No replacements needed this shift.   
Pt with temp of 100.1 at 1206. This RN reached out to Dr. SHANTEL Tiwari via [facility] on doing cultures now or waiting for a temp of 100.4.    1227: Dr. Tiwari responded to wait until temp is 100.4  
RN with orders to pull PICC line. PICC line removed and tip culture sent to lab. Site dressed with mupirocin, petroleum gauze and Tegaderm. Pressure held for 5 minutes at site.    Pt received 3/4 bags of standing order potassium this AM. Spoke to CORINNE Fish about remaining bag and was told not to hang it.     
Absence of physical injury  Outcome: Progressing   Patient continues with fevers today.  Pt continues on vancomycin and merrem. Patient medicated with solu-cortef after tylenol when still have fever.      
Transfusion not indicated at this time.  Patient verbalizes understanding of all instructions. Will continue to assess and implement POC. Call light within reach and hourly rounding in place.        Problem: Nutrition Deficit:  Goal: Optimize nutritional status  6/1/2024 1150 by Kenya Alston RN  Outcome: Progressing  Flowsheets (Taken 6/1/2024 1150)  Nutrient intake appropriate for improving, restoring, or maintaining nutritional needs: Monitor oral intake, labs, and treatment plans  Note: Pt receiving enteral feedings. TF restarted @ 30mL/hr this AM. Will increase TF 10mL Q4hr per orders until goal rate of 50mL/hr reached as long as patient tolerates. Pt tolerating TF at this time, no complaints      Problem: Gastrointestinal - Adult  Goal: Minimal or absence of nausea and vomiting  6/1/2024 1150 by Kenya Alston RN  Outcome: Progressing  Flowsheets (Taken 6/1/2024 1150)  Minimal or absence of nausea and vomiting:   Administer ordered antiemetic medications as needed   Provide nonpharmacologic comfort measures as appropriate   Administer IV fluids as ordered to ensure adequate hydration  Note: No complaints at this time      Problem: Genitourinary - Adult  Goal: Absence of urinary retention  6/1/2024 1150 by Kenya Alston RN  Outcome: Progressing  Flowsheets (Taken 6/1/2024 1150)  Absence of urinary retention: Monitor intake/output and perform bladder scan as needed  Note: Adequate UOP so far this shift      Problem: Metabolic/Fluid and Electrolytes - Adult  Goal: Electrolytes maintained within normal limits  6/1/2024 1150 by Kenya Alston RN  Outcome: Progressing  Flowsheets (Taken 6/1/2024 1150)  Electrolytes maintained within normal limits:   Monitor labs and assess patient for signs and symptoms of electrolyte imbalances   Administer electrolyte replacement as ordered  Note: Electrolytes WNL this AM.      
appropriate diets, oral nutritional supplements, and vitamin/mineral supplements   Recommend, monitor, and adjust tube feedings and TPN/PPN based on assessed needs   Order, calculate, and assess calorie counts as needed     Problem: Gastrointestinal - Adult  Goal: Minimal or absence of nausea and vomiting  6/4/2024 0153 by Cameron Carter RN  Outcome: Progressing  Flowsheets (Taken 6/4/2024 0153)  Minimal or absence of nausea and vomiting:   Advance diet as tolerated, if ordered   Provide nonpharmacologic comfort measures as appropriate     Problem: Gastrointestinal - Adult  Goal: Maintains or returns to baseline bowel function  6/4/2024 0153 by Cameron Carter RN  Outcome: Progressing  Flowsheets (Taken 6/4/2024 0153)  Maintains or returns to baseline bowel function:   Assess bowel function   Encourage mobilization and activity   Encourage oral fluids to ensure adequate hydration   Administer ordered medications as needed     Problem: Gastrointestinal - Adult  Goal: Maintains adequate nutritional intake  6/4/2024 0153 by Cameron Carter RN  Outcome: Progressing  Flowsheets (Taken 6/4/2024 0153)  Maintains adequate nutritional intake:   Monitor percentage of each meal consumed   Identify factors contributing to decreased intake, treat as appropriate   Monitor intake and output, weight and lab values     Problem: Metabolic/Fluid and Electrolytes - Adult  Goal: Electrolytes maintained within normal limits  6/4/2024 0153 by Cameron Carter RN  Outcome: Progressing  Flowsheets (Taken 6/4/2024 0153)  Electrolytes maintained within normal limits:   Monitor labs and assess patient for signs and symptoms of electrolyte imbalances   Administer electrolyte replacement as ordered   Monitor response to electrolyte replacements, including repeat lab results as appropriate     Problem: Metabolic/Fluid and Electrolytes - Adult  Goal: Hemodynamic stability and optimal renal function maintained  6/4/2024 0153 by Cameron Carter RN  Outcome: 
bleeding or hemorrhage   Monitor labs for bleeding or clotting disorders   Administer blood products/factors as ordered     Problem: Nutrition Deficit:  Goal: Optimize nutritional status  5/26/2024 0144 by Christiana Krueger, RN  Outcome: Progressing  Flowsheets (Taken 5/26/2024 0144)  Nutrient intake appropriate for improving, restoring, or maintaining nutritional needs:   Assess nutritional status and recommend course of action   Monitor oral intake, labs, and treatment plans   Recommend, monitor, and adjust tube feedings and TPN/PPN based on assessed needs   Provide specific nutrition education to patient or family as appropriate  Note: Patient remains on continuous tube feeding. Pt ate 0% of dinner. Pt stated he did not have an appetite. Plan of care ongoing.     Problem: Metabolic/Fluid and Electrolytes - Adult  Goal: Electrolytes maintained within normal limits  5/26/2024 0144 by Christiana Krueger, RN  Outcome: Progressing  Flowsheets (Taken 5/26/2024 0144)  Electrolytes maintained within normal limits:   Monitor labs and assess patient for signs and symptoms of electrolyte imbalances   Administer electrolyte replacement as ordered   Monitor response to electrolyte replacements, including repeat lab results as appropriate     
oral intake, labs, and treatment plans     Problem: Gastrointestinal - Adult  Goal: Minimal or absence of nausea and vomiting  Outcome: Progressing  Flowsheets (Taken 6/3/2024 0251)  Minimal or absence of nausea and vomiting:   Administer IV fluids as ordered to ensure adequate hydration   Provide nonpharmacologic comfort measures as appropriate     Problem: Gastrointestinal - Adult  Goal: Maintains or returns to baseline bowel function  Outcome: Progressing  Flowsheets (Taken 6/3/2024 0251)  Maintains or returns to baseline bowel function:   Assess bowel function   Administer IV fluids as ordered to ensure adequate hydration   Encourage oral fluids to ensure adequate hydration   Administer ordered medications as needed   Encourage mobilization and activity     Problem: Gastrointestinal - Adult  Goal: Maintains adequate nutritional intake  Outcome: Progressing  Flowsheets (Taken 6/3/2024 0251)  Maintains adequate nutritional intake:   Monitor percentage of each meal consumed   Monitor intake and output, weight and lab values   Identify factors contributing to decreased intake, treat as appropriate     Problem: Metabolic/Fluid and Electrolytes - Adult  Goal: Electrolytes maintained within normal limits  Outcome: Progressing  Flowsheets (Taken 6/3/2024 0251)  Electrolytes maintained within normal limits:   Monitor labs and assess patient for signs and symptoms of electrolyte imbalances   Administer electrolyte replacement as ordered     Problem: Metabolic/Fluid and Electrolytes - Adult  Goal: Hemodynamic stability and optimal renal function maintained  Outcome: Progressing  Flowsheets (Taken 6/3/2024 0251)  Hemodynamic stability and optimal renal function maintained:   Monitor labs and assess for signs and symptoms of volume excess or deficit   Monitor intake, output and patient weight   Encourage oral intake as appropriate

## 2024-06-07 LAB — MISCELLANEOUS LAB TEST ORDER: NORMAL

## 2024-06-10 LAB
FUNGUS BLD CULT: NORMAL
FUNGUS SPEC CULT: NORMAL
LOEFFLER MB STN SPEC: NORMAL

## 2024-06-11 LAB
ACID FAST STN SPEC QL: NORMAL
ACID FAST STN SPEC QL: NORMAL
MYCOBACTERIUM SPEC CULT: NORMAL
MYCOBACTERIUM SPEC CULT: NORMAL

## 2024-06-12 ENCOUNTER — HOSPITAL ENCOUNTER (OUTPATIENT)
Dept: ONCOLOGY | Age: 44
Setting detail: INFUSION SERIES
Discharge: HOME | End: 2024-06-12
Payer: MEDICAID

## 2024-06-12 VITALS
DIASTOLIC BLOOD PRESSURE: 72 MMHG | SYSTOLIC BLOOD PRESSURE: 119 MMHG | TEMPERATURE: 98.4 F | OXYGEN SATURATION: 100 % | RESPIRATION RATE: 18 BRPM | HEART RATE: 85 BPM

## 2024-06-12 DIAGNOSIS — C92.00 ACUTE MYELOID LEUKEMIA NOT HAVING ACHIEVED REMISSION (HCC): Primary | ICD-10-CM

## 2024-06-12 PROCEDURE — 86901 BLOOD TYPING SEROLOGIC RH(D): CPT

## 2024-06-12 PROCEDURE — P9040 RBC LEUKOREDUCED IRRADIATED: HCPCS

## 2024-06-12 PROCEDURE — 86900 BLOOD TYPING SEROLOGIC ABO: CPT

## 2024-06-12 PROCEDURE — 86923 COMPATIBILITY TEST ELECTRIC: CPT

## 2024-06-12 PROCEDURE — 36430 TRANSFUSION BLD/BLD COMPNT: CPT

## 2024-06-12 PROCEDURE — 86850 RBC ANTIBODY SCREEN: CPT

## 2024-06-12 RX ORDER — ACETAMINOPHEN 325 MG/1
650 TABLET ORAL
OUTPATIENT
Start: 2024-06-12

## 2024-06-12 RX ORDER — SODIUM CHLORIDE 9 MG/ML
INJECTION, SOLUTION INTRAVENOUS CONTINUOUS
OUTPATIENT
Start: 2024-06-12

## 2024-06-12 RX ORDER — EPINEPHRINE 1 MG/ML
0.3 INJECTION, SOLUTION INTRAMUSCULAR; SUBCUTANEOUS PRN
OUTPATIENT
Start: 2024-06-12

## 2024-06-12 RX ORDER — ONDANSETRON 2 MG/ML
8 INJECTION INTRAMUSCULAR; INTRAVENOUS
OUTPATIENT
Start: 2024-06-12

## 2024-06-12 RX ORDER — ALBUTEROL SULFATE 90 UG/1
4 AEROSOL, METERED RESPIRATORY (INHALATION) PRN
OUTPATIENT
Start: 2024-06-12

## 2024-06-12 RX ORDER — DIPHENHYDRAMINE HYDROCHLORIDE 50 MG/ML
50 INJECTION INTRAMUSCULAR; INTRAVENOUS
OUTPATIENT
Start: 2024-06-12

## 2024-06-12 NOTE — PROGRESS NOTES
Patient's hemoglobin this AM: 6.9  Pt seen and assessed at [facility].  Seen at [facility] today for PRBC per standing orders for above lab values.  Blood products transfused per [facility] policy. No premeds given, no history of a reaction. Pt tolerated transfusion well and without incident. Pt verbalizes understanding of discharge instructions.  Discharged ambulatory to home with self.

## 2024-06-17 ENCOUNTER — HOSPITAL ENCOUNTER (OUTPATIENT)
Dept: ONCOLOGY | Age: 44
Setting detail: INFUSION SERIES
Discharge: HOME | End: 2024-06-17
Payer: MEDICAID

## 2024-06-17 VITALS
OXYGEN SATURATION: 99 % | RESPIRATION RATE: 16 BRPM | HEART RATE: 80 BPM | SYSTOLIC BLOOD PRESSURE: 120 MMHG | DIASTOLIC BLOOD PRESSURE: 79 MMHG | TEMPERATURE: 98.2 F

## 2024-06-17 DIAGNOSIS — C92.00 ACUTE MYELOID LEUKEMIA NOT HAVING ACHIEVED REMISSION (HCC): Primary | ICD-10-CM

## 2024-06-17 LAB
BLD GP AB SCN SERPL QL: NORMAL
BLOOD BANK DISPENSE STATUS: NORMAL
BLOOD BANK PRODUCT CODE: NORMAL
BPU ID: NORMAL
FUNGUS BLD CULT: NORMAL
FUNGUS SPEC CULT: NORMAL
LOEFFLER MB STN SPEC: NORMAL

## 2024-06-17 PROCEDURE — P9040 RBC LEUKOREDUCED IRRADIATED: HCPCS

## 2024-06-17 PROCEDURE — 36430 TRANSFUSION BLD/BLD COMPNT: CPT

## 2024-06-17 PROCEDURE — 86901 BLOOD TYPING SEROLOGIC RH(D): CPT

## 2024-06-17 PROCEDURE — 86850 RBC ANTIBODY SCREEN: CPT

## 2024-06-17 PROCEDURE — 86923 COMPATIBILITY TEST ELECTRIC: CPT

## 2024-06-17 PROCEDURE — 86900 BLOOD TYPING SEROLOGIC ABO: CPT

## 2024-06-17 RX ORDER — ALBUTEROL SULFATE 90 UG/1
4 AEROSOL, METERED RESPIRATORY (INHALATION) PRN
OUTPATIENT
Start: 2024-06-17

## 2024-06-17 RX ORDER — ONDANSETRON 2 MG/ML
8 INJECTION INTRAMUSCULAR; INTRAVENOUS
OUTPATIENT
Start: 2024-06-17

## 2024-06-17 RX ORDER — EPINEPHRINE 1 MG/ML
0.3 INJECTION, SOLUTION INTRAMUSCULAR; SUBCUTANEOUS PRN
OUTPATIENT
Start: 2024-06-17

## 2024-06-17 RX ORDER — SODIUM CHLORIDE 9 MG/ML
INJECTION, SOLUTION INTRAVENOUS CONTINUOUS
OUTPATIENT
Start: 2024-06-17

## 2024-06-17 RX ORDER — DIPHENHYDRAMINE HYDROCHLORIDE 50 MG/ML
50 INJECTION INTRAMUSCULAR; INTRAVENOUS
OUTPATIENT
Start: 2024-06-17

## 2024-06-17 RX ORDER — ACETAMINOPHEN 325 MG/1
650 TABLET ORAL
OUTPATIENT
Start: 2024-06-17

## 2024-06-17 NOTE — PROGRESS NOTES
Patient's hemoglobin this AM: 7.0    Pt seen and assessed at [facility].  Seen at [facility] today for PRBC per standing orders for above lab values.  Blood products transfused per [facility] policy. No premeds given, no history of a reaction. Pt tolerated transfusion well and without incident. Pt verbalizes understanding of discharge instructions.  Discharged ambulatory to home with self.

## 2024-06-19 ENCOUNTER — HOSPITAL ENCOUNTER (OUTPATIENT)
Dept: ONCOLOGY | Age: 44
Setting detail: INFUSION SERIES
Discharge: HOME | End: 2024-06-19

## 2024-06-19 LAB
Lab: NORMAL
Lab: NORMAL
REPORT: NORMAL
REPORT: NORMAL
THIS TEST SENT TO: NORMAL
THIS TEST SENT TO: NORMAL

## 2024-06-19 NOTE — PSYCHOTHERAPY
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 Dennys Peguero was informed of the following risk factors that have been identified and recommendations that are being made to the treatment team:     Pre recommendations:  Patient scored significantly below cutoff on brief cognitive screen with deficits in executive functioning, attention, language, and delayed recall.  Patient admits that it is difficult for him to retain information given to him at appointments.  These challenges appear most likely related to history of oligodendroglioma with resection, seizures, as well as low educational attainment. Patient would need strong and consistent caregiver support to navigate transplant.  Strength is that patient is receptive to feedback from treatment team and caregivers.  He appears adherent with treatment, but risk factor is that he may forget important information.   Patient and caregiver had difficulties with health literacy, as well as challenges on a screen of their ability to manage medications.  Patient and identified caregiver would likely need significant education and oversight over treatment recommendations.  Would be ideal to identify an additional caregiver to assist.  Complete advanced directive  Concern regarding cost of gas to and from appointments    Primary caregiver: Jordana Lowry (father)   Back-up caregiver: Mar Schroeder (Aunt), Rajni Sudhir (Sister), Tiki Lowry (Step-mother)  DSM-5 Diagnoses:   Unspecified Neurocognitive Disorder  Tobacco Use Disorder, In sustained remission (since 2017)  ____________________________________________________________________________________________    Dennys Peguero is a 43 y.o.  male with AML referred for a psychological

## 2024-06-20 LAB
Lab: NORMAL
REPORT: NORMAL
THIS TEST SENT TO: NORMAL

## 2024-06-24 LAB
FUNGUS BLD CULT: NORMAL
FUNGUS SPEC CULT: NORMAL
LOEFFLER MB STN SPEC: NORMAL

## 2024-06-27 ENCOUNTER — HOSPITAL ENCOUNTER (OUTPATIENT)
Dept: ONCOLOGY | Age: 44
Setting detail: INFUSION SERIES
Discharge: HOME | End: 2024-06-27
Payer: MEDICAID

## 2024-06-27 VITALS
HEART RATE: 82 BPM | TEMPERATURE: 98.4 F | RESPIRATION RATE: 16 BRPM | SYSTOLIC BLOOD PRESSURE: 118 MMHG | OXYGEN SATURATION: 100 % | DIASTOLIC BLOOD PRESSURE: 76 MMHG

## 2024-06-27 DIAGNOSIS — C92.00 ACUTE MYELOID LEUKEMIA NOT HAVING ACHIEVED REMISSION (HCC): Primary | ICD-10-CM

## 2024-06-27 PROCEDURE — 86901 BLOOD TYPING SEROLOGIC RH(D): CPT

## 2024-06-27 PROCEDURE — 86900 BLOOD TYPING SEROLOGIC ABO: CPT

## 2024-06-27 PROCEDURE — 86923 COMPATIBILITY TEST ELECTRIC: CPT

## 2024-06-27 PROCEDURE — P9040 RBC LEUKOREDUCED IRRADIATED: HCPCS

## 2024-06-27 PROCEDURE — 86850 RBC ANTIBODY SCREEN: CPT

## 2024-06-27 PROCEDURE — 36430 TRANSFUSION BLD/BLD COMPNT: CPT

## 2024-06-27 RX ORDER — DIPHENHYDRAMINE HYDROCHLORIDE 50 MG/ML
50 INJECTION INTRAMUSCULAR; INTRAVENOUS
OUTPATIENT
Start: 2024-06-27

## 2024-06-27 RX ORDER — ONDANSETRON 2 MG/ML
8 INJECTION INTRAMUSCULAR; INTRAVENOUS
OUTPATIENT
Start: 2024-06-27

## 2024-06-27 RX ORDER — SODIUM CHLORIDE 9 MG/ML
INJECTION, SOLUTION INTRAVENOUS CONTINUOUS
OUTPATIENT
Start: 2024-06-27

## 2024-06-27 RX ORDER — ALBUTEROL SULFATE 90 UG/1
4 AEROSOL, METERED RESPIRATORY (INHALATION) PRN
OUTPATIENT
Start: 2024-06-27

## 2024-06-27 RX ORDER — EPINEPHRINE 1 MG/ML
0.3 INJECTION, SOLUTION INTRAMUSCULAR; SUBCUTANEOUS PRN
OUTPATIENT
Start: 2024-06-27

## 2024-06-27 RX ORDER — ACETAMINOPHEN 325 MG/1
650 TABLET ORAL
OUTPATIENT
Start: 2024-06-27

## 2024-06-27 NOTE — PROGRESS NOTES
Patient's hemoglobin this AM: 6.8   Patient's platelet count this AM: 24  Pt seen at [facility] today for  Packed red blood cell transfusion  for above lab values per order. Informed consent verified. No Pre-medications ordered with no previous transfusion reaction history. Transfused per policy. Pt tolerated transfusion well and without incident.  Pt verbalizes understanding of discharge instructions.  Discharged to his home.    Alirio Schroeder RN

## 2024-06-27 NOTE — FLOWSHEET NOTE
06/27/24 1400   AVS Reviewed   AVS & discharge instructions reviewed with patient and/or representative? Yes   Reviewed instructions with Patient   Level of Understanding Questions answered;Teach back completed;Verbalized understanding     Alirio Schroeder RN

## 2024-07-01 LAB
FUNGUS BLD CULT: NORMAL
FUNGUS BLD CULT: NORMAL
FUNGUS SPEC CULT: NORMAL
FUNGUS SPEC CULT: NORMAL
LOEFFLER MB STN SPEC: NORMAL
LOEFFLER MB STN SPEC: NORMAL

## 2024-07-05 ENCOUNTER — HOSPITAL ENCOUNTER (OUTPATIENT)
Dept: ONCOLOGY | Age: 44
Setting detail: INFUSION SERIES
Discharge: HOME | End: 2024-07-05
Payer: MEDICAID

## 2024-07-05 VITALS
TEMPERATURE: 98.6 F | OXYGEN SATURATION: 100 % | SYSTOLIC BLOOD PRESSURE: 116 MMHG | RESPIRATION RATE: 18 BRPM | HEART RATE: 73 BPM | DIASTOLIC BLOOD PRESSURE: 73 MMHG

## 2024-07-05 DIAGNOSIS — C92.00 ACUTE MYELOID LEUKEMIA NOT HAVING ACHIEVED REMISSION (HCC): Primary | ICD-10-CM

## 2024-07-05 LAB
ABO + RH BLD: NORMAL
BLD GP AB SCN SERPL QL: NORMAL
BLOOD BANK DISPENSE STATUS: NORMAL
BLOOD BANK DISPENSE STATUS: NORMAL
BLOOD BANK PRODUCT CODE: NORMAL
BLOOD BANK PRODUCT CODE: NORMAL
BPU ID: NORMAL
BPU ID: NORMAL
DESCRIPTION BLOOD BANK: NORMAL
DESCRIPTION BLOOD BANK: NORMAL

## 2024-07-05 PROCEDURE — P9036 PLATELET PHERESIS IRRADIATED: HCPCS

## 2024-07-05 PROCEDURE — 86900 BLOOD TYPING SEROLOGIC ABO: CPT

## 2024-07-05 PROCEDURE — 36430 TRANSFUSION BLD/BLD COMPNT: CPT

## 2024-07-05 PROCEDURE — P9040 RBC LEUKOREDUCED IRRADIATED: HCPCS

## 2024-07-05 PROCEDURE — 86850 RBC ANTIBODY SCREEN: CPT

## 2024-07-05 PROCEDURE — 2580000003 HC RX 258: Performed by: INTERNAL MEDICINE

## 2024-07-05 PROCEDURE — 86901 BLOOD TYPING SEROLOGIC RH(D): CPT

## 2024-07-05 PROCEDURE — 86923 COMPATIBILITY TEST ELECTRIC: CPT

## 2024-07-05 RX ORDER — DIPHENHYDRAMINE HYDROCHLORIDE 50 MG/ML
50 INJECTION INTRAMUSCULAR; INTRAVENOUS
OUTPATIENT
Start: 2024-07-05

## 2024-07-05 RX ORDER — EPINEPHRINE 1 MG/ML
0.3 INJECTION, SOLUTION INTRAMUSCULAR; SUBCUTANEOUS PRN
OUTPATIENT
Start: 2024-07-05

## 2024-07-05 RX ORDER — SODIUM CHLORIDE 0.9 % (FLUSH) 0.9 %
5-40 SYRINGE (ML) INJECTION PRN
OUTPATIENT
Start: 2024-07-05

## 2024-07-05 RX ORDER — SODIUM CHLORIDE 9 MG/ML
20 INJECTION, SOLUTION INTRAVENOUS CONTINUOUS
OUTPATIENT
Start: 2024-07-05

## 2024-07-05 RX ORDER — ACETAMINOPHEN 325 MG/1
650 TABLET ORAL
OUTPATIENT
Start: 2024-07-05

## 2024-07-05 RX ORDER — SODIUM CHLORIDE 9 MG/ML
INJECTION, SOLUTION INTRAVENOUS CONTINUOUS
OUTPATIENT
Start: 2024-07-05

## 2024-07-05 RX ORDER — ONDANSETRON 2 MG/ML
8 INJECTION INTRAMUSCULAR; INTRAVENOUS
OUTPATIENT
Start: 2024-07-05

## 2024-07-05 RX ORDER — ALBUTEROL SULFATE 90 UG/1
4 AEROSOL, METERED RESPIRATORY (INHALATION) PRN
OUTPATIENT
Start: 2024-07-05

## 2024-07-05 RX ORDER — SODIUM CHLORIDE 9 MG/ML
25 INJECTION, SOLUTION INTRAVENOUS PRN
OUTPATIENT
Start: 2024-07-05

## 2024-07-05 RX ORDER — SODIUM CHLORIDE 0.9 % (FLUSH) 0.9 %
5-40 SYRINGE (ML) INJECTION PRN
Status: DISCONTINUED | OUTPATIENT
Start: 2024-07-05 | End: 2024-07-06 | Stop reason: HOSPADM

## 2024-07-05 RX ADMIN — SODIUM CHLORIDE, PRESERVATIVE FREE 10 ML: 5 INJECTION INTRAVENOUS at 11:35

## 2024-07-05 RX ADMIN — SODIUM CHLORIDE, PRESERVATIVE FREE 10 ML: 5 INJECTION INTRAVENOUS at 14:13

## 2024-07-05 NOTE — PROGRESS NOTES
Patient's hemoglobin this AM: 6.9   Patient's platelet count this AM: 10  Pt seen at [facility] today for   1 unit of PRBCs and 1 unit of Platelets   for above lab values per order. Informed consent verified. No Pre-medications ordered with no previous transfusion reaction history. Transfused per policy. Pt tolerated transfusion well and without incident.  Pt verbalizes understanding of discharge instructions.  Discharged to his home with his relative.    Alirio Schroeder RN

## 2024-07-05 NOTE — FLOWSHEET NOTE
07/05/24 1258   AVS Reviewed   AVS & discharge instructions reviewed with patient and/or representative? Yes   Reviewed instructions with Patient   Level of Understanding Questions answered;Teach back completed;Verbalized understanding     .dorothy Schroeder RN

## 2024-07-10 ENCOUNTER — HOSPITAL ENCOUNTER (OUTPATIENT)
Dept: ONCOLOGY | Age: 44
Setting detail: INFUSION SERIES
Discharge: HOME | End: 2024-07-10
Payer: MEDICAID

## 2024-07-10 VITALS
SYSTOLIC BLOOD PRESSURE: 107 MMHG | RESPIRATION RATE: 16 BRPM | HEART RATE: 86 BPM | TEMPERATURE: 98.3 F | DIASTOLIC BLOOD PRESSURE: 69 MMHG | OXYGEN SATURATION: 100 %

## 2024-07-10 DIAGNOSIS — C92.00 ACUTE MYELOID LEUKEMIA NOT HAVING ACHIEVED REMISSION (HCC): Primary | ICD-10-CM

## 2024-07-10 PROCEDURE — 86900 BLOOD TYPING SEROLOGIC ABO: CPT

## 2024-07-10 PROCEDURE — 86850 RBC ANTIBODY SCREEN: CPT

## 2024-07-10 PROCEDURE — P9040 RBC LEUKOREDUCED IRRADIATED: HCPCS

## 2024-07-10 PROCEDURE — 86901 BLOOD TYPING SEROLOGIC RH(D): CPT

## 2024-07-10 PROCEDURE — 86923 COMPATIBILITY TEST ELECTRIC: CPT

## 2024-07-10 PROCEDURE — P9036 PLATELET PHERESIS IRRADIATED: HCPCS

## 2024-07-10 PROCEDURE — 36430 TRANSFUSION BLD/BLD COMPNT: CPT

## 2024-07-10 RX ORDER — ONDANSETRON 2 MG/ML
8 INJECTION INTRAMUSCULAR; INTRAVENOUS
OUTPATIENT
Start: 2024-07-10

## 2024-07-10 RX ORDER — DIPHENHYDRAMINE HYDROCHLORIDE 50 MG/ML
50 INJECTION INTRAMUSCULAR; INTRAVENOUS
OUTPATIENT
Start: 2024-07-10

## 2024-07-10 RX ORDER — SODIUM CHLORIDE 9 MG/ML
25 INJECTION, SOLUTION INTRAVENOUS PRN
OUTPATIENT
Start: 2024-07-10

## 2024-07-10 RX ORDER — SODIUM CHLORIDE 0.9 % (FLUSH) 0.9 %
5-40 SYRINGE (ML) INJECTION PRN
OUTPATIENT
Start: 2024-07-10

## 2024-07-10 RX ORDER — ACETAMINOPHEN 325 MG/1
650 TABLET ORAL
OUTPATIENT
Start: 2024-07-10

## 2024-07-10 RX ORDER — ALBUTEROL SULFATE 90 UG/1
4 AEROSOL, METERED RESPIRATORY (INHALATION) PRN
OUTPATIENT
Start: 2024-07-10

## 2024-07-10 RX ORDER — EPINEPHRINE 1 MG/ML
0.3 INJECTION, SOLUTION INTRAMUSCULAR; SUBCUTANEOUS PRN
OUTPATIENT
Start: 2024-07-10

## 2024-07-10 RX ORDER — SODIUM CHLORIDE 9 MG/ML
INJECTION, SOLUTION INTRAVENOUS CONTINUOUS
OUTPATIENT
Start: 2024-07-10

## 2024-07-10 RX ORDER — SODIUM CHLORIDE 9 MG/ML
20 INJECTION, SOLUTION INTRAVENOUS CONTINUOUS
OUTPATIENT
Start: 2024-07-10

## 2024-07-10 NOTE — PROGRESS NOTES
Patient's hemoglobin this AM: 7.4  Patient's platelet count this AM: 6  Pt seen at [facility] today for  Platelet transfusion  for above lab values per order. Informed consent verified. No Pre-medications ordered with no previous transfusion reaction history. Transfused per policy. Pt tolerated transfusion well and without incident.  Pt verbalizes understanding of discharge instructions.  Discharged to his home with his relative.    Rissa Madera RN

## 2024-07-12 ENCOUNTER — HOSPITAL ENCOUNTER (OUTPATIENT)
Dept: ONCOLOGY | Age: 44
Setting detail: INFUSION SERIES
Discharge: HOME | End: 2024-07-12
Payer: MEDICAID

## 2024-07-12 VITALS
DIASTOLIC BLOOD PRESSURE: 67 MMHG | OXYGEN SATURATION: 100 % | TEMPERATURE: 98.4 F | RESPIRATION RATE: 16 BRPM | SYSTOLIC BLOOD PRESSURE: 108 MMHG | HEART RATE: 77 BPM

## 2024-07-12 DIAGNOSIS — C92.00 ACUTE MYELOID LEUKEMIA NOT HAVING ACHIEVED REMISSION (HCC): Primary | ICD-10-CM

## 2024-07-12 DIAGNOSIS — C92.00 ACUTE MYELOID LEUKEMIA NOT HAVING ACHIEVED REMISSION (HCC): ICD-10-CM

## 2024-07-12 DIAGNOSIS — Z01.818 ENCOUNTER FOR PRE-TRANSPLANT EVALUATION FOR STEM CELL TRANSPLANT: Primary | ICD-10-CM

## 2024-07-12 PROCEDURE — 36430 TRANSFUSION BLD/BLD COMPNT: CPT

## 2024-07-12 RX ORDER — ACETAMINOPHEN 325 MG/1
650 TABLET ORAL
OUTPATIENT
Start: 2024-07-12

## 2024-07-12 RX ORDER — DIPHENHYDRAMINE HYDROCHLORIDE 50 MG/ML
50 INJECTION INTRAMUSCULAR; INTRAVENOUS
OUTPATIENT
Start: 2024-07-12

## 2024-07-12 RX ORDER — SODIUM CHLORIDE 9 MG/ML
INJECTION, SOLUTION INTRAVENOUS CONTINUOUS
OUTPATIENT
Start: 2024-07-12

## 2024-07-12 RX ORDER — SODIUM CHLORIDE 0.9 % (FLUSH) 0.9 %
5-40 SYRINGE (ML) INJECTION PRN
OUTPATIENT
Start: 2024-07-12

## 2024-07-12 RX ORDER — ONDANSETRON 2 MG/ML
8 INJECTION INTRAMUSCULAR; INTRAVENOUS
OUTPATIENT
Start: 2024-07-12

## 2024-07-12 RX ORDER — ALBUTEROL SULFATE 90 UG/1
4 AEROSOL, METERED RESPIRATORY (INHALATION) PRN
OUTPATIENT
Start: 2024-07-12

## 2024-07-12 RX ORDER — EPINEPHRINE 1 MG/ML
0.3 INJECTION, SOLUTION INTRAMUSCULAR; SUBCUTANEOUS PRN
OUTPATIENT
Start: 2024-07-12

## 2024-07-12 RX ORDER — SODIUM CHLORIDE 9 MG/ML
25 INJECTION, SOLUTION INTRAVENOUS PRN
OUTPATIENT
Start: 2024-07-12

## 2024-07-12 RX ORDER — SODIUM CHLORIDE 9 MG/ML
20 INJECTION, SOLUTION INTRAVENOUS CONTINUOUS
OUTPATIENT
Start: 2024-07-12

## 2024-07-12 NOTE — PROGRESS NOTES
Patient's hemoglobin this AM: 6.5  Patient's platelet count this AM: 17  Pt seen at [facility] today for  Packed red blood cell transfusion  for above lab values per order. Informed consent verified. No Pre-medications ordered with no previous transfusion reaction history. Transfused per policy. Pt tolerated transfusion well and without incident.  Pt verbalizes understanding of discharge instructions.  Discharged to his home with his relative.    Rissa Madera RN

## 2024-07-15 ENCOUNTER — HOSPITAL ENCOUNTER (OUTPATIENT)
Dept: ONCOLOGY | Age: 44
Setting detail: INFUSION SERIES
Discharge: HOME | End: 2024-07-15
Payer: MEDICAID

## 2024-07-15 VITALS
DIASTOLIC BLOOD PRESSURE: 63 MMHG | RESPIRATION RATE: 14 BRPM | HEART RATE: 78 BPM | TEMPERATURE: 97.3 F | OXYGEN SATURATION: 100 % | SYSTOLIC BLOOD PRESSURE: 101 MMHG

## 2024-07-15 DIAGNOSIS — C92.00 ACUTE MYELOID LEUKEMIA NOT HAVING ACHIEVED REMISSION (HCC): Primary | ICD-10-CM

## 2024-07-15 PROCEDURE — 86900 BLOOD TYPING SEROLOGIC ABO: CPT

## 2024-07-15 PROCEDURE — 36430 TRANSFUSION BLD/BLD COMPNT: CPT

## 2024-07-15 PROCEDURE — 86923 COMPATIBILITY TEST ELECTRIC: CPT

## 2024-07-15 PROCEDURE — 86850 RBC ANTIBODY SCREEN: CPT

## 2024-07-15 PROCEDURE — P9040 RBC LEUKOREDUCED IRRADIATED: HCPCS

## 2024-07-15 PROCEDURE — P9036 PLATELET PHERESIS IRRADIATED: HCPCS

## 2024-07-15 PROCEDURE — 86901 BLOOD TYPING SEROLOGIC RH(D): CPT

## 2024-07-15 RX ORDER — ACETAMINOPHEN 325 MG/1
650 TABLET ORAL
OUTPATIENT
Start: 2024-07-15

## 2024-07-15 RX ORDER — SODIUM CHLORIDE 0.9 % (FLUSH) 0.9 %
5-40 SYRINGE (ML) INJECTION PRN
OUTPATIENT
Start: 2024-07-15

## 2024-07-15 RX ORDER — ALBUTEROL SULFATE 90 UG/1
4 AEROSOL, METERED RESPIRATORY (INHALATION) PRN
OUTPATIENT
Start: 2024-07-15

## 2024-07-15 RX ORDER — ONDANSETRON 2 MG/ML
8 INJECTION INTRAMUSCULAR; INTRAVENOUS
OUTPATIENT
Start: 2024-07-15

## 2024-07-15 RX ORDER — EPINEPHRINE 1 MG/ML
0.3 INJECTION, SOLUTION INTRAMUSCULAR; SUBCUTANEOUS PRN
OUTPATIENT
Start: 2024-07-15

## 2024-07-15 RX ORDER — SODIUM CHLORIDE 9 MG/ML
INJECTION, SOLUTION INTRAVENOUS CONTINUOUS
OUTPATIENT
Start: 2024-07-15

## 2024-07-15 RX ORDER — DIPHENHYDRAMINE HYDROCHLORIDE 50 MG/ML
50 INJECTION INTRAMUSCULAR; INTRAVENOUS
OUTPATIENT
Start: 2024-07-15

## 2024-07-15 RX ORDER — SODIUM CHLORIDE 9 MG/ML
20 INJECTION, SOLUTION INTRAVENOUS CONTINUOUS
OUTPATIENT
Start: 2024-07-15

## 2024-07-15 RX ORDER — SODIUM CHLORIDE 9 MG/ML
25 INJECTION, SOLUTION INTRAVENOUS PRN
OUTPATIENT
Start: 2024-07-15

## 2024-07-15 NOTE — PROGRESS NOTES
Patient's hemoglobin this AM: 7  Patient's platelet count this AM: 8  Pt seen at [facility] today for 1 pack plts and 1 unit Packed red blood cell transfusion  for above lab values per order. Informed consent verified. No Pre-medications ordered with no previous transfusion reaction history. Transfused per policy. Pt tolerated transfusion well and without incident.  Pt verbalizes understanding of discharge instructions.  Discharged to his home with his relative.    Rissa Madera RN

## 2024-07-16 DIAGNOSIS — Z01.818 PRE-TRANSPLANT EVALUATION FOR STEM CELL TRANSPLANT: Primary | ICD-10-CM

## 2024-07-16 DIAGNOSIS — C92.00 ACUTE MYELOID LEUKEMIA NOT HAVING ACHIEVED REMISSION (HCC): ICD-10-CM

## 2024-07-25 ENCOUNTER — HOSPITAL ENCOUNTER (OUTPATIENT)
Dept: ONCOLOGY | Age: 44
Setting detail: INFUSION SERIES
Discharge: HOME | End: 2024-07-25
Payer: MEDICAID

## 2024-07-25 VITALS
RESPIRATION RATE: 16 BRPM | TEMPERATURE: 98.7 F | HEART RATE: 98 BPM | OXYGEN SATURATION: 100 % | DIASTOLIC BLOOD PRESSURE: 68 MMHG | SYSTOLIC BLOOD PRESSURE: 109 MMHG

## 2024-07-25 DIAGNOSIS — C92.00 ACUTE MYELOID LEUKEMIA NOT HAVING ACHIEVED REMISSION (HCC): Primary | ICD-10-CM

## 2024-07-25 PROCEDURE — P9036 PLATELET PHERESIS IRRADIATED: HCPCS

## 2024-07-25 PROCEDURE — 86901 BLOOD TYPING SEROLOGIC RH(D): CPT

## 2024-07-25 PROCEDURE — 86923 COMPATIBILITY TEST ELECTRIC: CPT

## 2024-07-25 PROCEDURE — 86900 BLOOD TYPING SEROLOGIC ABO: CPT

## 2024-07-25 PROCEDURE — P9040 RBC LEUKOREDUCED IRRADIATED: HCPCS

## 2024-07-25 PROCEDURE — 86850 RBC ANTIBODY SCREEN: CPT

## 2024-07-25 PROCEDURE — 36430 TRANSFUSION BLD/BLD COMPNT: CPT

## 2024-07-25 RX ORDER — DIPHENHYDRAMINE HYDROCHLORIDE 50 MG/ML
50 INJECTION INTRAMUSCULAR; INTRAVENOUS
OUTPATIENT
Start: 2024-07-25

## 2024-07-25 RX ORDER — ACETAMINOPHEN 325 MG/1
650 TABLET ORAL
OUTPATIENT
Start: 2024-07-25

## 2024-07-25 RX ORDER — ALBUTEROL SULFATE 90 UG/1
4 AEROSOL, METERED RESPIRATORY (INHALATION) PRN
OUTPATIENT
Start: 2024-07-25

## 2024-07-25 RX ORDER — SODIUM CHLORIDE 9 MG/ML
INJECTION, SOLUTION INTRAVENOUS CONTINUOUS
OUTPATIENT
Start: 2024-07-25

## 2024-07-25 RX ORDER — ONDANSETRON 2 MG/ML
8 INJECTION INTRAMUSCULAR; INTRAVENOUS
OUTPATIENT
Start: 2024-07-25

## 2024-07-25 RX ORDER — SODIUM CHLORIDE 9 MG/ML
25 INJECTION, SOLUTION INTRAVENOUS PRN
OUTPATIENT
Start: 2024-07-25

## 2024-07-25 RX ORDER — EPINEPHRINE 1 MG/ML
0.3 INJECTION, SOLUTION INTRAMUSCULAR; SUBCUTANEOUS PRN
OUTPATIENT
Start: 2024-07-25

## 2024-07-25 RX ORDER — SODIUM CHLORIDE 0.9 % (FLUSH) 0.9 %
5-40 SYRINGE (ML) INJECTION PRN
OUTPATIENT
Start: 2024-07-25

## 2024-07-25 RX ORDER — SODIUM CHLORIDE 9 MG/ML
20 INJECTION, SOLUTION INTRAVENOUS CONTINUOUS
OUTPATIENT
Start: 2024-07-25

## 2024-07-25 NOTE — PROGRESS NOTES
Patient's hemoglobin this AM: 6.1  Patient's platelet count this AM: 15  Pt seen at [facility] today for 1 pack plts and 1 unit Packed red blood cell transfusion  for above lab values per order. Informed consent verified. No Pre-medications ordered with no previous transfusion reaction history. Transfused per policy. Pt tolerated transfusion well and without incident.  Pt verbalizes understanding of discharge instructions.  Discharged to his home with his relative.    Merly Win RN

## 2024-07-29 ENCOUNTER — HOSPITAL ENCOUNTER (OUTPATIENT)
Dept: ONCOLOGY | Age: 44
Setting detail: INFUSION SERIES
Discharge: HOME | End: 2024-07-29
Payer: MEDICAID

## 2024-07-29 VITALS
DIASTOLIC BLOOD PRESSURE: 73 MMHG | RESPIRATION RATE: 14 BRPM | HEART RATE: 76 BPM | SYSTOLIC BLOOD PRESSURE: 116 MMHG | TEMPERATURE: 98.4 F | OXYGEN SATURATION: 100 %

## 2024-07-29 DIAGNOSIS — C92.00 ACUTE MYELOID LEUKEMIA NOT HAVING ACHIEVED REMISSION (HCC): Primary | ICD-10-CM

## 2024-07-29 LAB
ABO + RH BLD: NORMAL
ALBUMIN SERPL-MCNC: 4 G/DL (ref 3.4–5)
ALBUMIN/GLOB SERPL: 1.5 {RATIO} (ref 1.1–2.2)
ALP SERPL-CCNC: 104 U/L (ref 40–129)
ALT SERPL-CCNC: 7 U/L (ref 10–40)
ANION GAP SERPL CALCULATED.3IONS-SCNC: 10 MMOL/L (ref 3–16)
ANISOCYTOSIS BLD QL SMEAR: ABNORMAL
APTT BLD: 33.9 SEC (ref 22.1–36.4)
AST SERPL-CCNC: 6 U/L (ref 15–37)
BASOPHILS # BLD: 0 K/UL (ref 0–0.2)
BASOPHILS NFR BLD: 0 %
BILIRUB DIRECT SERPL-MCNC: <0.2 MG/DL (ref 0–0.3)
BILIRUB INDIRECT SERPL-MCNC: NORMAL MG/DL (ref 0–1)
BILIRUB SERPL-MCNC: 0.8 MG/DL (ref 0–1)
BLD GP AB SCN SERPL QL: NORMAL
BLOOD BANK DISPENSE STATUS: NORMAL
BLOOD BANK PRODUCT CODE: NORMAL
BPU ID: NORMAL
BUN SERPL-MCNC: 7 MG/DL (ref 7–20)
CALCIUM SERPL-MCNC: 8.8 MG/DL (ref 8.3–10.6)
CHLORIDE SERPL-SCNC: 101 MMOL/L (ref 99–110)
CO2 SERPL-SCNC: 26 MMOL/L (ref 21–32)
CREAT SERPL-MCNC: 0.7 MG/DL (ref 0.9–1.3)
DEPRECATED RDW RBC AUTO: 16.7 % (ref 12.4–15.4)
DESCRIPTION BLOOD BANK: NORMAL
EOSINOPHIL # BLD: 0 K/UL (ref 0–0.6)
EOSINOPHIL NFR BLD: 0 %
FERRITIN SERPL IA-MCNC: 1154 NG/ML (ref 30–400)
GFR SERPLBLD CREATININE-BSD FMLA CKD-EPI: >90 ML/MIN/{1.73_M2}
GLUCOSE SERPL-MCNC: 90 MG/DL (ref 70–99)
HCT VFR BLD AUTO: 18.9 % (ref 40.5–52.5)
HGB BLD-MCNC: 6.4 G/DL (ref 13.5–17.5)
INR PPP: 1.19 (ref 0.85–1.15)
LDH SERPL L TO P-CCNC: 161 U/L (ref 100–190)
LYMPHOCYTES # BLD: 0.6 K/UL (ref 1–5.1)
LYMPHOCYTES NFR BLD: 87 %
MAGNESIUM SERPL-MCNC: 1.8 MG/DL (ref 1.8–2.4)
MCH RBC QN AUTO: 29.6 PG (ref 26–34)
MCHC RBC AUTO-ENTMCNC: 33.7 G/DL (ref 31–36)
MCV RBC AUTO: 87.9 FL (ref 80–100)
MONOCYTES # BLD: 0 K/UL (ref 0–1.3)
MONOCYTES NFR BLD: 3 %
NEUTROPHILS # BLD: 0.1 K/UL (ref 1.7–7.7)
NEUTROPHILS NFR BLD: 9 %
NEUTS BAND NFR BLD MANUAL: 1 % (ref 0–7)
NRBC BLD-RTO: 4 /100 WBC
PHOSPHATE SERPL-MCNC: 4.3 MG/DL (ref 2.5–4.9)
PLATELET # BLD AUTO: 24 K/UL (ref 135–450)
PLATELET BLD QL SMEAR: ABNORMAL
PMV BLD AUTO: 7.6 FL (ref 5–10.5)
POTASSIUM SERPL-SCNC: 3.9 MMOL/L (ref 3.5–5.1)
PROT SERPL-MCNC: 6.7 G/DL (ref 6.4–8.2)
PROTHROMBIN TIME: 15.3 SEC (ref 11.9–14.9)
RBC # BLD AUTO: 2.15 M/UL (ref 4.2–5.9)
SLIDE REVIEW: ABNORMAL
SODIUM SERPL-SCNC: 137 MMOL/L (ref 136–145)
URATE SERPL-MCNC: 3.6 MG/DL (ref 3.5–7.2)
WBC # BLD AUTO: 0.7 K/UL (ref 4–11)

## 2024-07-29 PROCEDURE — 86900 BLOOD TYPING SEROLOGIC ABO: CPT

## 2024-07-29 PROCEDURE — 83615 LACTATE (LD) (LDH) ENZYME: CPT

## 2024-07-29 PROCEDURE — 85610 PROTHROMBIN TIME: CPT

## 2024-07-29 PROCEDURE — P9040 RBC LEUKOREDUCED IRRADIATED: HCPCS

## 2024-07-29 PROCEDURE — 86709 HEPATITIS A IGM ANTIBODY: CPT

## 2024-07-29 PROCEDURE — 80053 COMPREHEN METABOLIC PANEL: CPT

## 2024-07-29 PROCEDURE — 85025 COMPLETE CBC W/AUTO DIFF WBC: CPT

## 2024-07-29 PROCEDURE — 86850 RBC ANTIBODY SCREEN: CPT

## 2024-07-29 PROCEDURE — 86787 VARICELLA-ZOSTER ANTIBODY: CPT

## 2024-07-29 PROCEDURE — 86665 EPSTEIN-BARR CAPSID VCA: CPT

## 2024-07-29 PROCEDURE — 86694 HERPES SIMPLEX NES ANTBDY: CPT

## 2024-07-29 PROCEDURE — 36430 TRANSFUSION BLD/BLD COMPNT: CPT

## 2024-07-29 PROCEDURE — 84550 ASSAY OF BLOOD/URIC ACID: CPT

## 2024-07-29 PROCEDURE — 85730 THROMBOPLASTIN TIME PARTIAL: CPT

## 2024-07-29 PROCEDURE — 86707 HEPATITIS BE ANTIBODY: CPT

## 2024-07-29 PROCEDURE — 86645 CMV ANTIBODY IGM: CPT

## 2024-07-29 PROCEDURE — 86923 COMPATIBILITY TEST ELECTRIC: CPT

## 2024-07-29 PROCEDURE — 86901 BLOOD TYPING SEROLOGIC RH(D): CPT

## 2024-07-29 PROCEDURE — 82248 BILIRUBIN DIRECT: CPT

## 2024-07-29 PROCEDURE — 86777 TOXOPLASMA ANTIBODY: CPT

## 2024-07-29 PROCEDURE — 83735 ASSAY OF MAGNESIUM: CPT

## 2024-07-29 PROCEDURE — 86778 TOXOPLASMA ANTIBODY IGM: CPT

## 2024-07-29 PROCEDURE — 82955 ASSAY OF G6PD ENZYME: CPT

## 2024-07-29 PROCEDURE — 82728 ASSAY OF FERRITIN: CPT

## 2024-07-29 PROCEDURE — 84100 ASSAY OF PHOSPHORUS: CPT

## 2024-07-29 PROCEDURE — 86706 HEP B SURFACE ANTIBODY: CPT

## 2024-07-29 RX ORDER — ACETAMINOPHEN 325 MG/1
650 TABLET ORAL
Status: CANCELLED | OUTPATIENT
Start: 2024-07-29

## 2024-07-29 RX ORDER — ONDANSETRON 2 MG/ML
8 INJECTION INTRAMUSCULAR; INTRAVENOUS
Status: CANCELLED | OUTPATIENT
Start: 2024-07-29

## 2024-07-29 RX ORDER — DIPHENHYDRAMINE HYDROCHLORIDE 50 MG/ML
50 INJECTION INTRAMUSCULAR; INTRAVENOUS
Status: CANCELLED | OUTPATIENT
Start: 2024-07-29

## 2024-07-29 RX ORDER — ALBUTEROL SULFATE 90 UG/1
4 AEROSOL, METERED RESPIRATORY (INHALATION) PRN
Status: CANCELLED | OUTPATIENT
Start: 2024-07-29

## 2024-07-29 RX ORDER — SODIUM CHLORIDE 9 MG/ML
INJECTION, SOLUTION INTRAVENOUS CONTINUOUS
Status: CANCELLED | OUTPATIENT
Start: 2024-07-29

## 2024-07-29 RX ORDER — EPINEPHRINE 1 MG/ML
0.3 INJECTION, SOLUTION INTRAMUSCULAR; SUBCUTANEOUS PRN
Status: CANCELLED | OUTPATIENT
Start: 2024-07-29

## 2024-07-29 RX ORDER — SODIUM CHLORIDE 9 MG/ML
25 INJECTION, SOLUTION INTRAVENOUS PRN
Status: CANCELLED | OUTPATIENT
Start: 2024-07-29

## 2024-07-29 RX ORDER — SODIUM CHLORIDE 0.9 % (FLUSH) 0.9 %
5-40 SYRINGE (ML) INJECTION PRN
Status: CANCELLED | OUTPATIENT
Start: 2024-07-29

## 2024-07-29 RX ORDER — SODIUM CHLORIDE 9 MG/ML
20 INJECTION, SOLUTION INTRAVENOUS CONTINUOUS
Status: CANCELLED | OUTPATIENT
Start: 2024-07-29

## 2024-07-29 NOTE — PROGRESS NOTES
Patient's hemoglobin this AM: 6.7  Patient's platelet count this AM: 19  Pt seen at [facility] today for Packed red blood cell transfusion  for above lab values per order. Informed consent verified. No Pre-medications ordered with no previous transfusion reaction history. Transfused per policy. Pt tolerated transfusion well and without incident.  Pt verbalizes understanding of discharge instructions.  Discharged to his home with his relative.    Rissa Madera RN

## 2024-07-30 ENCOUNTER — HOSPITAL ENCOUNTER (OUTPATIENT)
Age: 44
Setting detail: SPECIMEN
Discharge: HOME | End: 2024-07-30

## 2024-07-30 LAB
HAV IGM SERPL QL IA: NORMAL
HBV SURFACE AB SERPL IA-ACNC: <3.5 MIU/ML

## 2024-07-31 LAB
G6PD RBC-CCNC: 11 U/G HB (ref 9.9–16.6)
HBV E AB SERPL QL IA: NEGATIVE

## 2024-08-01 ENCOUNTER — HOSPITAL ENCOUNTER (OUTPATIENT)
Dept: ONCOLOGY | Age: 44
Setting detail: INFUSION SERIES
Discharge: HOME | End: 2024-08-01
Payer: MEDICAID

## 2024-08-01 VITALS
TEMPERATURE: 98.7 F | SYSTOLIC BLOOD PRESSURE: 103 MMHG | HEART RATE: 98 BPM | DIASTOLIC BLOOD PRESSURE: 65 MMHG | RESPIRATION RATE: 16 BRPM | OXYGEN SATURATION: 100 %

## 2024-08-01 DIAGNOSIS — C92.00 ACUTE MYELOID LEUKEMIA NOT HAVING ACHIEVED REMISSION (HCC): Primary | ICD-10-CM

## 2024-08-01 LAB
BLOOD BANK DISPENSE STATUS: NORMAL
BLOOD BANK DISPENSE STATUS: NORMAL
BLOOD BANK PRODUCT CODE: NORMAL
BLOOD BANK PRODUCT CODE: NORMAL
BPU ID: NORMAL
BPU ID: NORMAL
CMV IGM SERPL IA-ACNC: <8 AU/ML
DESCRIPTION BLOOD BANK: NORMAL
DESCRIPTION BLOOD BANK: NORMAL
EBV VCA IGG SER IA-ACNC: 50.4 U/ML (ref 0–21.9)
EBV VCA IGM SER IA-ACNC: <10 U/ML (ref 0–43.9)
HSV1+2 IGG SER IA-ACNC: 0.68 IV
HSV1+2 IGM SER IA-ACNC: 0.46 IV
T GONDII IGG SER-ACNC: 93.2 IU/ML
T GONDII IGM SER-ACNC: <3 AU/ML
VZV IGG SER IA-ACNC: 3452 IV
VZV IGM SER IA-ACNC: 0.24 ISR

## 2024-08-01 PROCEDURE — 2500000003 HC RX 250 WO HCPCS: Performed by: INTERNAL MEDICINE

## 2024-08-01 PROCEDURE — 36569 INSJ PICC 5 YR+ W/O IMAGING: CPT

## 2024-08-01 PROCEDURE — C1751 CATH, INF, PER/CENT/MIDLINE: HCPCS

## 2024-08-01 PROCEDURE — 36430 TRANSFUSION BLD/BLD COMPNT: CPT

## 2024-08-01 PROCEDURE — P9036 PLATELET PHERESIS IRRADIATED: HCPCS

## 2024-08-01 RX ORDER — LIDOCAINE HYDROCHLORIDE 10 MG/ML
50 INJECTION, SOLUTION EPIDURAL; INFILTRATION; INTRACAUDAL; PERINEURAL ONCE
Status: COMPLETED | OUTPATIENT
Start: 2024-08-01 | End: 2024-08-01

## 2024-08-01 RX ORDER — ONDANSETRON 2 MG/ML
8 INJECTION INTRAMUSCULAR; INTRAVENOUS
OUTPATIENT
Start: 2024-08-01

## 2024-08-01 RX ORDER — SODIUM CHLORIDE 0.9 % (FLUSH) 0.9 %
5-40 SYRINGE (ML) INJECTION EVERY 12 HOURS SCHEDULED
Status: DISCONTINUED | OUTPATIENT
Start: 2024-08-01 | End: 2024-08-02 | Stop reason: HOSPADM

## 2024-08-01 RX ORDER — SODIUM CHLORIDE 9 MG/ML
INJECTION, SOLUTION INTRAVENOUS CONTINUOUS
OUTPATIENT
Start: 2024-08-01

## 2024-08-01 RX ORDER — ACETAMINOPHEN 325 MG/1
650 TABLET ORAL
OUTPATIENT
Start: 2024-08-01

## 2024-08-01 RX ORDER — DIPHENHYDRAMINE HYDROCHLORIDE 50 MG/ML
50 INJECTION INTRAMUSCULAR; INTRAVENOUS
OUTPATIENT
Start: 2024-08-01

## 2024-08-01 RX ORDER — EPINEPHRINE 1 MG/ML
0.3 INJECTION, SOLUTION INTRAMUSCULAR; SUBCUTANEOUS PRN
OUTPATIENT
Start: 2024-08-01

## 2024-08-01 RX ORDER — SODIUM CHLORIDE 0.9 % (FLUSH) 0.9 %
5-40 SYRINGE (ML) INJECTION PRN
Status: DISCONTINUED | OUTPATIENT
Start: 2024-08-01 | End: 2024-08-02 | Stop reason: HOSPADM

## 2024-08-01 RX ORDER — ALBUTEROL SULFATE 90 UG/1
4 AEROSOL, METERED RESPIRATORY (INHALATION) PRN
OUTPATIENT
Start: 2024-08-01

## 2024-08-01 RX ORDER — SODIUM CHLORIDE 9 MG/ML
INJECTION, SOLUTION INTRAVENOUS PRN
Status: DISCONTINUED | OUTPATIENT
Start: 2024-08-01 | End: 2024-08-02 | Stop reason: HOSPADM

## 2024-08-01 RX ORDER — SODIUM CHLORIDE 9 MG/ML
25 INJECTION, SOLUTION INTRAVENOUS PRN
OUTPATIENT
Start: 2024-08-01

## 2024-08-01 RX ORDER — SODIUM CHLORIDE 0.9 % (FLUSH) 0.9 %
5-40 SYRINGE (ML) INJECTION PRN
OUTPATIENT
Start: 2024-08-01

## 2024-08-01 RX ORDER — SODIUM CHLORIDE 9 MG/ML
20 INJECTION, SOLUTION INTRAVENOUS CONTINUOUS
OUTPATIENT
Start: 2024-08-01

## 2024-08-01 RX ADMIN — LIDOCAINE HYDROCHLORIDE ANHYDROUS 50 MG: 10 INJECTION, SOLUTION INFILTRATION at 14:58

## 2024-08-01 NOTE — PROGRESS NOTES
Patient's hemoglobin this AM:   Recent Labs     07/29/24  1432   HGB 6.4*      Patient's platelet count this AM:   Recent Labs     07/29/24  1432   PLT 24*     Pt seen at [facility] today for  Platelet transfusion  prior to PICC line placement. Informed consent verified. No Pre-medications ordered with no previous transfusion reaction history. Transfused per policy. Pt tolerated transfusion well and without incident.   Picc line placed by Shamar CADENA.  Line care needs completed.  Pt verbalizes understanding of discharge instructions.  Discharged to his home.    Alirio Schroeder RN

## 2024-08-01 NOTE — DISCHARGE INSTRUCTIONS
Caring for Your Peripherally Inserted Central Catheter (PICC)      You are going home with a peripherally inserted catheter (PICC). This small, soft tube has been placed in a vein in your arm. It is often used when treatment requires medications or nutrition for weeks or months. At home, you need to take care of your PICC to keep it working. And since a PICC line has such a high infection risk, you must take extra care washing your hands and preventing the spread of germs. This sheet will help you remember what to do to care for your PICC at home.    Understanding Your Role  . A nurse or other healthcare provider will teach you and your caregivers how to care for the PICC. Before leaving the hospital, make sure that you understand what to do at home, how long you may need the PICC, and when to have a follow up visit.  . You will likely be told to flush the PICC with saline or heparin solution. You may also be told to change the catheter’s injection caps and change the dressing (Bandage). Or, a nurse may do this for you during a follow-up visit. Only do these things if you are told to, following the instructions you were given.    Protecting the PICC  If the PICC gets damaged, it won’t work right and could raise you chance of infection. Call your healthcare team right away if any damage occurs. To protect your PICC at home:  . Prevent infection. Use good hand hygiene by following the guidelines on this sheet. Don’t touch the catheter or the dressing unless you need to. Always clean your hands before and after you come in contact with any part of the PICC. Your caregivers, family members, any visitors should use good hand hygiene also.  . Keep the PICC dry. The catheter and dressing must stay dry. Don’t take baths, go swimming, use a hot tub, or do other activities that could get the PICC wet. When showering, cover the area with plastic wrap or another cover as recommended by your healthcare provider. Keep the area

## 2024-08-01 NOTE — PROCEDURES
PROCEDURE NOTE  Date: 2024   Name: Dennys Peguero  YOB: 1980    Procedures                                                                   DOUBLE PICC PROCEDURE NOTE  Chart reviewed for allergies, diagnosis, labs, known contraindications, reason for line placement and planned length of treatment.  Informed consent noted to be signed and on chart.  Insertion procedure discussed with patient/family member.  Three patient identifiers - Patient name,   and MRN -  completed &  confirmed verbally.         Time out performed.  Hat, mask and eye shield donned.  PICC site cleaned with chlorhexidine wipes then scrubbed with Chloraprep one-step applicator for 30 seconds x 1.   Hand Hygiene  performed with 3% Chlorhexidine surgical scrub x1 min prior to  sterile gloves, sterile gown being donned.  Patient draped using maximal sterile barrier technique ( head to toe ).  PICC site scrubbed a 2nd time with Chloraprep One-Step applicator x 30 sec. Modified Seldinger technique/ultrasound assisted and tip locating system utilized for insertion and 1% Lidocaine 5 ml injected intradermal pre-insertion.  PICC tip location in the SVC confirmed by ECG technology.   Positive brisk blood return obtained from all lumens.  Valves placed to all lumens and  flushed with 10 mls 0.9% Sterile Sodium Chloride.  All lumens flush easily with no resistance.  Skin prep applied to site.   Catheter secured with non-sutured locking device per hospital protocol. Bio-patch/CHG impregnated sterile tegaderm dressing applied.  Alcohol Swab Caps applied to each valve.  Sterile field maintained during procedure.  PICC insertion, rhythm and positioning wire (utilized prn) accounted  for post procedure and disposed of in sharps.  Appearance of site is Clean dry and intact without bleeding or edema. All edges of Tegaderm occlusive.   Site marked with date and initials of RN placing line. Teaching performed to pt/family and noted in education

## 2024-08-02 ENCOUNTER — HOSPITAL ENCOUNTER (OUTPATIENT)
Dept: ONCOLOGY | Age: 44
Setting detail: INFUSION SERIES
Discharge: HOME | End: 2024-08-02
Payer: MEDICAID

## 2024-08-02 ENCOUNTER — HOSPITAL ENCOUNTER (OUTPATIENT)
Dept: PULMONOLOGY | Age: 44
Discharge: HOME | End: 2024-08-02
Attending: INTERNAL MEDICINE
Payer: MEDICAID

## 2024-08-02 ENCOUNTER — HOSPITAL ENCOUNTER (OUTPATIENT)
Dept: GENERAL RADIOLOGY | Age: 44
Discharge: HOME | End: 2024-08-02
Attending: INTERNAL MEDICINE
Payer: MEDICAID

## 2024-08-02 VITALS
SYSTOLIC BLOOD PRESSURE: 111 MMHG | OXYGEN SATURATION: 100 % | DIASTOLIC BLOOD PRESSURE: 63 MMHG | HEART RATE: 76 BPM | TEMPERATURE: 98.8 F | RESPIRATION RATE: 16 BRPM

## 2024-08-02 DIAGNOSIS — C92.00 ACUTE MYELOID LEUKEMIA NOT HAVING ACHIEVED REMISSION (HCC): ICD-10-CM

## 2024-08-02 DIAGNOSIS — Z01.818 ENCOUNTER FOR PRE-TRANSPLANT EVALUATION FOR STEM CELL TRANSPLANT: ICD-10-CM

## 2024-08-02 DIAGNOSIS — C92.00 ACUTE MYELOID LEUKEMIA NOT HAVING ACHIEVED REMISSION (HCC): Primary | ICD-10-CM

## 2024-08-02 DIAGNOSIS — Z01.818 PRE-TRANSPLANT EVALUATION FOR STEM CELL TRANSPLANT: ICD-10-CM

## 2024-08-02 PROCEDURE — 94726 PLETHYSMOGRAPHY LUNG VOLUMES: CPT

## 2024-08-02 PROCEDURE — P9040 RBC LEUKOREDUCED IRRADIATED: HCPCS

## 2024-08-02 PROCEDURE — 86900 BLOOD TYPING SEROLOGIC ABO: CPT

## 2024-08-02 PROCEDURE — 94729 DIFFUSING CAPACITY: CPT

## 2024-08-02 PROCEDURE — 71046 X-RAY EXAM CHEST 2 VIEWS: CPT

## 2024-08-02 PROCEDURE — 94010 BREATHING CAPACITY TEST: CPT

## 2024-08-02 PROCEDURE — 86850 RBC ANTIBODY SCREEN: CPT

## 2024-08-02 PROCEDURE — 94760 N-INVAS EAR/PLS OXIMETRY 1: CPT

## 2024-08-02 PROCEDURE — 36592 COLLECT BLOOD FROM PICC: CPT

## 2024-08-02 PROCEDURE — 86923 COMPATIBILITY TEST ELECTRIC: CPT

## 2024-08-02 PROCEDURE — 86901 BLOOD TYPING SEROLOGIC RH(D): CPT

## 2024-08-02 PROCEDURE — 94664 DEMO&/EVAL PT USE INHALER: CPT

## 2024-08-02 PROCEDURE — 36430 TRANSFUSION BLD/BLD COMPNT: CPT

## 2024-08-02 RX ORDER — SODIUM CHLORIDE 9 MG/ML
INJECTION, SOLUTION INTRAVENOUS CONTINUOUS
OUTPATIENT
Start: 2024-08-02

## 2024-08-02 RX ORDER — ONDANSETRON 2 MG/ML
8 INJECTION INTRAMUSCULAR; INTRAVENOUS
OUTPATIENT
Start: 2024-08-02

## 2024-08-02 RX ORDER — SODIUM CHLORIDE 0.9 % (FLUSH) 0.9 %
5-40 SYRINGE (ML) INJECTION PRN
OUTPATIENT
Start: 2024-08-02

## 2024-08-02 RX ORDER — ACETAMINOPHEN 325 MG/1
650 TABLET ORAL
OUTPATIENT
Start: 2024-08-02

## 2024-08-02 RX ORDER — DIPHENHYDRAMINE HYDROCHLORIDE 50 MG/ML
50 INJECTION INTRAMUSCULAR; INTRAVENOUS
OUTPATIENT
Start: 2024-08-02

## 2024-08-02 RX ORDER — SODIUM CHLORIDE 9 MG/ML
20 INJECTION, SOLUTION INTRAVENOUS CONTINUOUS
OUTPATIENT
Start: 2024-08-02

## 2024-08-02 RX ORDER — EPINEPHRINE 1 MG/ML
0.3 INJECTION, SOLUTION INTRAMUSCULAR; SUBCUTANEOUS PRN
OUTPATIENT
Start: 2024-08-02

## 2024-08-02 RX ORDER — SODIUM CHLORIDE 9 MG/ML
25 INJECTION, SOLUTION INTRAVENOUS PRN
OUTPATIENT
Start: 2024-08-02

## 2024-08-02 RX ORDER — ALBUTEROL SULFATE 90 UG/1
4 AEROSOL, METERED RESPIRATORY (INHALATION) PRN
OUTPATIENT
Start: 2024-08-02

## 2024-08-02 NOTE — CARE COORDINATION
Patient was scheduled for ECHO on 7/31/24. Patient was reminded on 7/29/24 to attend ECHO appointment, but patient did not complete yesterday. Patient states that he forgot about the appointment. Rescheduled for next available on 8/12/24. Patient informed.

## 2024-08-02 NOTE — CARE COORDINATION
Patient seen at [facility] earlier in the day to remind him to complete PFT and chest x-ray prior to reporting to Outpatient Infusion. Family was not present at this time, so writer confirmed with patient that they will be attending the education session at two which he stated they would be. Patient did complete these tests and arrived to Outpatient shortly after two but alone. PRBC infusion was started by Outpatient RN and team was notified to inform writer when family arrived. At three, patient still had not arrived so writer woke patient to discuss their location. Patient initially said \"they said you left for the day, so I told them to go home\". Writer informed patient that I have been present and awaiting their arrival. Writer inquired how long since they had left which patient stated \"they left like 20-30 minutes ago, they were out in the lobby\". Writer informed patient that she had checked the lobby several times and they were not present. Patient then said \"Well, I don't know where they were then. Guess we will just have to reschedule\". Patient is scheduled in [facility] on Monday, 8/5, instructed to bring dad and aunt to appointment. Will attempt to reach out to them directly and educate on Monday if present.

## 2024-08-02 NOTE — CARE COORDINATION
Patient scheduled for education session related to ALLO transplant. Father and aunt identified as primary caregivers and previously informed of education session. This writer saw patient at [facility] appointment scheduled earlier in the day. His father was present but aunt was not. Writer inquired if aunt would be joining for education session. Patient was unaware where aunt was or if she would be attending the session. Patient later presented to Outpatient for blood transfusion which his father was no longer present. This writer inquired about his whereabouts which the patient informed me that he left and we would need to complete the eduction another day. Education session rescheduled for the end of the week. Patient and family notified.

## 2024-08-02 NOTE — PROGRESS NOTES
Patient's hemoglobin this AM: 6.3  Patient's platelet count this AM: 73    Pt seen at [facility] today for Packed red blood cell transfusion  for above lab values per order. Informed consent verified. No Pre-medications ordered with no previous transfusion reaction history. Transfused per policy. Pt tolerated transfusion well and without incident.  Pt verbalizes understanding of discharge instructions.  Discharged to his home.    Catrina Higginbotham RN

## 2024-08-05 ENCOUNTER — HOSPITAL ENCOUNTER (OUTPATIENT)
Dept: ONCOLOGY | Age: 44
Discharge: HOME | End: 2024-08-05

## 2024-08-05 NOTE — CARE COORDINATION
Patient scheduled for the third time to complete family education session. He presented to [facility] office for a routine office visit with no caregivers. Patient states his dad and step mom had to check on his maternal aunt who was sick. He also stated that paternal aunt dropped him of for appointment but did not stay since dad was not present either. Patient informed again that education session must be completed in order to proceed with transplant. Patient instructed to discuss with family to determine day that everyone can be available for education and evaluation. All appointments for today have been canceled.

## 2024-08-07 ENCOUNTER — HOSPITAL ENCOUNTER (OUTPATIENT)
Dept: ONCOLOGY | Age: 44
Setting detail: INFUSION SERIES
Discharge: HOME | End: 2024-08-07
Payer: MEDICAID

## 2024-08-07 VITALS
RESPIRATION RATE: 18 BRPM | OXYGEN SATURATION: 100 % | SYSTOLIC BLOOD PRESSURE: 112 MMHG | TEMPERATURE: 98.1 F | HEART RATE: 76 BPM | DIASTOLIC BLOOD PRESSURE: 66 MMHG

## 2024-08-07 DIAGNOSIS — C92.00 ACUTE MYELOID LEUKEMIA NOT HAVING ACHIEVED REMISSION (HCC): Primary | ICD-10-CM

## 2024-08-07 PROCEDURE — 36592 COLLECT BLOOD FROM PICC: CPT

## 2024-08-07 PROCEDURE — 86923 COMPATIBILITY TEST ELECTRIC: CPT

## 2024-08-07 PROCEDURE — 86901 BLOOD TYPING SEROLOGIC RH(D): CPT

## 2024-08-07 PROCEDURE — 86850 RBC ANTIBODY SCREEN: CPT

## 2024-08-07 PROCEDURE — 36430 TRANSFUSION BLD/BLD COMPNT: CPT

## 2024-08-07 PROCEDURE — P9040 RBC LEUKOREDUCED IRRADIATED: HCPCS

## 2024-08-07 PROCEDURE — 86900 BLOOD TYPING SEROLOGIC ABO: CPT

## 2024-08-07 RX ORDER — ACETAMINOPHEN 325 MG/1
650 TABLET ORAL
OUTPATIENT
Start: 2024-08-07

## 2024-08-07 RX ORDER — EPINEPHRINE 1 MG/ML
0.3 INJECTION, SOLUTION INTRAMUSCULAR; SUBCUTANEOUS PRN
OUTPATIENT
Start: 2024-08-07

## 2024-08-07 RX ORDER — ALBUTEROL SULFATE 90 UG/1
4 AEROSOL, METERED RESPIRATORY (INHALATION) PRN
OUTPATIENT
Start: 2024-08-07

## 2024-08-07 RX ORDER — SODIUM CHLORIDE 9 MG/ML
INJECTION, SOLUTION INTRAVENOUS CONTINUOUS
OUTPATIENT
Start: 2024-08-07

## 2024-08-07 RX ORDER — DIPHENHYDRAMINE HYDROCHLORIDE 50 MG/ML
50 INJECTION INTRAMUSCULAR; INTRAVENOUS
OUTPATIENT
Start: 2024-08-07

## 2024-08-07 RX ORDER — ONDANSETRON 2 MG/ML
8 INJECTION INTRAMUSCULAR; INTRAVENOUS
OUTPATIENT
Start: 2024-08-07

## 2024-08-07 RX ORDER — SODIUM CHLORIDE 9 MG/ML
25 INJECTION, SOLUTION INTRAVENOUS PRN
OUTPATIENT
Start: 2024-08-07

## 2024-08-07 RX ORDER — SODIUM CHLORIDE 0.9 % (FLUSH) 0.9 %
5-40 SYRINGE (ML) INJECTION PRN
OUTPATIENT
Start: 2024-08-07

## 2024-08-07 RX ORDER — SODIUM CHLORIDE 9 MG/ML
20 INJECTION, SOLUTION INTRAVENOUS CONTINUOUS
OUTPATIENT
Start: 2024-08-07

## 2024-08-07 NOTE — PROGRESS NOTES
Patient's hemoglobin this AM: 6.8  Patient's platelet count this AM: 20  Pt seen and assessed at [facility].  Seen at [facility] today forPRBC per standing orders for above lab values.  Blood products transfused per [facility] policy.  Pt tolerated transfusion well and without incident.  Pt verbalizes understanding of discharge instructions.  Discharged ambulatory to home with self

## 2024-08-09 ENCOUNTER — HOSPITAL ENCOUNTER (OUTPATIENT)
Dept: ONCOLOGY | Age: 44
Setting detail: INFUSION SERIES
Discharge: HOME | End: 2024-08-09
Payer: MEDICAID

## 2024-08-09 VITALS
TEMPERATURE: 98.3 F | OXYGEN SATURATION: 99 % | HEART RATE: 88 BPM | DIASTOLIC BLOOD PRESSURE: 66 MMHG | RESPIRATION RATE: 18 BRPM | SYSTOLIC BLOOD PRESSURE: 112 MMHG

## 2024-08-09 DIAGNOSIS — C92.00 ACUTE MYELOID LEUKEMIA NOT HAVING ACHIEVED REMISSION (HCC): Primary | ICD-10-CM

## 2024-08-09 LAB
AMPHETAMINES UR QL SCN>1000 NG/ML: NORMAL
BARBITURATES UR QL SCN>200 NG/ML: NORMAL
BENZODIAZ UR QL SCN>200 NG/ML: NORMAL
BILIRUB UR QL STRIP.AUTO: NEGATIVE
BLOOD BANK DISPENSE STATUS: NORMAL
BLOOD BANK PRODUCT CODE: NORMAL
BPU ID: NORMAL
CANNABINOIDS UR QL SCN>50 NG/ML: NORMAL
CLARITY UR: CLEAR
COLOR UR: YELLOW
DESCRIPTION BLOOD BANK: NORMAL
DRUG SCREEN COMMENT UR-IMP: NORMAL
FENTANYL SCREEN, URINE: NORMAL
GLUCOSE UR STRIP.AUTO-MCNC: NEGATIVE MG/DL
HGB UR QL STRIP.AUTO: ABNORMAL
KETONES UR STRIP.AUTO-MCNC: NEGATIVE MG/DL
LEUKOCYTE ESTERASE UR QL STRIP.AUTO: NEGATIVE
METHADONE UR QL SCN>300 NG/ML: NORMAL
NITRITE UR QL STRIP.AUTO: NEGATIVE
OPIATES UR QL SCN>300 NG/ML: NORMAL
OXYCODONE UR QL SCN: NORMAL
PCP UR QL SCN>25 NG/ML: NORMAL
PH UR STRIP.AUTO: 7 [PH] (ref 5–8)
PROT UR STRIP.AUTO-MCNC: NEGATIVE MG/DL
SP GR UR STRIP.AUTO: 1.02 (ref 1–1.03)
UA DIPSTICK W REFLEX MICRO PNL UR: YES
URN SPEC COLLECT METH UR: ABNORMAL
UROBILINOGEN UR STRIP-ACNC: 0.2 E.U./DL

## 2024-08-09 PROCEDURE — P9036 PLATELET PHERESIS IRRADIATED: HCPCS

## 2024-08-09 PROCEDURE — 36430 TRANSFUSION BLD/BLD COMPNT: CPT

## 2024-08-09 RX ORDER — ALBUTEROL SULFATE 90 UG/1
4 AEROSOL, METERED RESPIRATORY (INHALATION) PRN
OUTPATIENT
Start: 2024-08-09

## 2024-08-09 RX ORDER — ACETAMINOPHEN 325 MG/1
650 TABLET ORAL
OUTPATIENT
Start: 2024-08-09

## 2024-08-09 RX ORDER — ONDANSETRON 2 MG/ML
8 INJECTION INTRAMUSCULAR; INTRAVENOUS
OUTPATIENT
Start: 2024-08-09

## 2024-08-09 RX ORDER — EPINEPHRINE 1 MG/ML
0.3 INJECTION, SOLUTION INTRAMUSCULAR; SUBCUTANEOUS PRN
OUTPATIENT
Start: 2024-08-09

## 2024-08-09 RX ORDER — SODIUM CHLORIDE 9 MG/ML
INJECTION, SOLUTION INTRAVENOUS CONTINUOUS
OUTPATIENT
Start: 2024-08-09

## 2024-08-09 RX ORDER — SODIUM CHLORIDE 9 MG/ML
25 INJECTION, SOLUTION INTRAVENOUS PRN
OUTPATIENT
Start: 2024-08-09

## 2024-08-09 RX ORDER — DIPHENHYDRAMINE HYDROCHLORIDE 50 MG/ML
50 INJECTION INTRAMUSCULAR; INTRAVENOUS
OUTPATIENT
Start: 2024-08-09

## 2024-08-09 RX ORDER — SODIUM CHLORIDE 9 MG/ML
20 INJECTION, SOLUTION INTRAVENOUS CONTINUOUS
OUTPATIENT
Start: 2024-08-09

## 2024-08-09 RX ORDER — SODIUM CHLORIDE 0.9 % (FLUSH) 0.9 %
5-40 SYRINGE (ML) INJECTION PRN
OUTPATIENT
Start: 2024-08-09

## 2024-08-09 NOTE — CARE COORDINATION
I met with patient and family to provide education regarding potential allo stem cell transplant. Discussed evaluation process, donor search, chemo regimen, cell infusion, and recovery along with side effects and complications. Risk for infection was emphasized with strategies to prevent infection. Also discussed risk for GVH and failure to engraft along with treatment options. Patient was informed that continuous caregiver will be required with no driving until cleared by physician. Provided patient with tentative calendar and discussed timeline of how care may be provided through outpatient Infusion Center, inpatient [facility], and scheduled [facility] appointments. Also provided with our Transplant Patient handbook and business card.  Answered questions appropriately and instructed to call with further questions. Will follow up during next appointment.

## 2024-08-09 NOTE — PROGRESS NOTES
Patient arrived at [facility] for teaching with transplant coordinator, Jessy.    Jessy Nolan RN

## 2024-08-09 NOTE — PROGRESS NOTES
Patient's hemoglobin this AM: 7.7  Patient's platelet count this AM: 7  Pt seen and assessed at [facility].  Seen at [facility] today for platelet transfusion per standing orders for above lab values.  Blood products transfused per [facility] policy.  Pt tolerated transfusion well and without incident.  Pt verbalizes understanding of discharge instructions.  Discharged ambulatory to home with self.

## 2024-08-12 ENCOUNTER — HOSPITAL ENCOUNTER (OUTPATIENT)
Age: 44
Discharge: HOME | End: 2024-08-14
Attending: INTERNAL MEDICINE
Payer: MEDICAID

## 2024-08-12 ENCOUNTER — HOSPITAL ENCOUNTER (OUTPATIENT)
Dept: ONCOLOGY | Age: 44
Setting detail: INFUSION SERIES
Discharge: HOME | End: 2024-08-12
Payer: MEDICAID

## 2024-08-12 VITALS
OXYGEN SATURATION: 100 % | HEART RATE: 77 BPM | DIASTOLIC BLOOD PRESSURE: 73 MMHG | RESPIRATION RATE: 16 BRPM | TEMPERATURE: 98 F | SYSTOLIC BLOOD PRESSURE: 112 MMHG

## 2024-08-12 VITALS
BODY MASS INDEX: 21.9 KG/M2 | WEIGHT: 153 LBS | HEIGHT: 70 IN | DIASTOLIC BLOOD PRESSURE: 73 MMHG | SYSTOLIC BLOOD PRESSURE: 112 MMHG

## 2024-08-12 DIAGNOSIS — Z01.818 ENCOUNTER FOR PRE-TRANSPLANT EVALUATION FOR STEM CELL TRANSPLANT: ICD-10-CM

## 2024-08-12 DIAGNOSIS — C92.00 ACUTE MYELOID LEUKEMIA NOT HAVING ACHIEVED REMISSION (HCC): ICD-10-CM

## 2024-08-12 DIAGNOSIS — C92.00 ACUTE MYELOID LEUKEMIA NOT HAVING ACHIEVED REMISSION (HCC): Primary | ICD-10-CM

## 2024-08-12 PROCEDURE — 86900 BLOOD TYPING SEROLOGIC ABO: CPT

## 2024-08-12 PROCEDURE — 86850 RBC ANTIBODY SCREEN: CPT

## 2024-08-12 PROCEDURE — 86923 COMPATIBILITY TEST ELECTRIC: CPT

## 2024-08-12 PROCEDURE — 93306 TTE W/DOPPLER COMPLETE: CPT

## 2024-08-12 PROCEDURE — 36430 TRANSFUSION BLD/BLD COMPNT: CPT

## 2024-08-12 PROCEDURE — 36592 COLLECT BLOOD FROM PICC: CPT

## 2024-08-12 PROCEDURE — 86901 BLOOD TYPING SEROLOGIC RH(D): CPT

## 2024-08-12 PROCEDURE — P9040 RBC LEUKOREDUCED IRRADIATED: HCPCS

## 2024-08-12 RX ORDER — EPINEPHRINE 1 MG/ML
0.3 INJECTION, SOLUTION INTRAMUSCULAR; SUBCUTANEOUS PRN
OUTPATIENT
Start: 2024-08-12

## 2024-08-12 RX ORDER — ALBUTEROL SULFATE 90 UG/1
4 AEROSOL, METERED RESPIRATORY (INHALATION) PRN
OUTPATIENT
Start: 2024-08-12

## 2024-08-12 RX ORDER — ONDANSETRON 2 MG/ML
8 INJECTION INTRAMUSCULAR; INTRAVENOUS
OUTPATIENT
Start: 2024-08-12

## 2024-08-12 RX ORDER — SODIUM CHLORIDE 9 MG/ML
20 INJECTION, SOLUTION INTRAVENOUS CONTINUOUS
OUTPATIENT
Start: 2024-08-12

## 2024-08-12 RX ORDER — SODIUM CHLORIDE 9 MG/ML
25 INJECTION, SOLUTION INTRAVENOUS PRN
OUTPATIENT
Start: 2024-08-12

## 2024-08-12 RX ORDER — DIPHENHYDRAMINE HYDROCHLORIDE 50 MG/ML
50 INJECTION INTRAMUSCULAR; INTRAVENOUS
OUTPATIENT
Start: 2024-08-12

## 2024-08-12 RX ORDER — ACETAMINOPHEN 325 MG/1
650 TABLET ORAL
OUTPATIENT
Start: 2024-08-12

## 2024-08-12 RX ORDER — SODIUM CHLORIDE 9 MG/ML
INJECTION, SOLUTION INTRAVENOUS CONTINUOUS
OUTPATIENT
Start: 2024-08-12

## 2024-08-12 RX ORDER — SODIUM CHLORIDE 0.9 % (FLUSH) 0.9 %
5-40 SYRINGE (ML) INJECTION PRN
OUTPATIENT
Start: 2024-08-12

## 2024-08-12 NOTE — PROGRESS NOTES
Patient's hemoglobin this AM: 6.7  Patient's platelet count this AM: 15    Pt seen at [facility] today for Packed red blood cell transfusion  for above lab values per order. Informed consent verified. No Pre-medications ordered with no previous transfusion reaction history. Transfused per policy. Pt tolerated transfusion well and without incident.  Pt verbalizes understanding of discharge instructions.  Discharged to his home.    Rissa Madera RN

## 2024-08-13 LAB
ECHO AO ASC DIAM: 3 CM
ECHO AO ASCENDING AORTA INDEX: 1.61 CM/M2
ECHO AO ROOT DIAM: 2.9 CM
ECHO AO ROOT INDEX: 1.56 CM/M2
ECHO AV AREA PEAK VELOCITY: 2.6 CM2
ECHO AV AREA/BSA PEAK VELOCITY: 1.4 CM2/M2
ECHO AV MEAN GRADIENT: 2 MMHG
ECHO AV MEAN VELOCITY: 0.7 M/S
ECHO AV PEAK GRADIENT: 4 MMHG
ECHO AV PEAK VELOCITY: 1 M/S
ECHO AV VELOCITY RATIO: 0.8
ECHO AV VTI: 21 CM
ECHO BSA: 1.85 M2
ECHO LA AREA 2C: 24.4 CM2
ECHO LA AREA 4C: 16.8 CM2
ECHO LA DIAMETER INDEX: 1.99 CM/M2
ECHO LA DIAMETER: 3.7 CM
ECHO LA MAJOR AXIS: 5 CM
ECHO LA MINOR AXIS: 6 CM
ECHO LA TO AORTIC ROOT RATIO: 1.28
ECHO LA VOL BP: 68 ML (ref 18–58)
ECHO LA VOL MOD A2C: 82 ML (ref 18–58)
ECHO LA VOL MOD A4C: 47 ML (ref 18–58)
ECHO LA VOL/BSA BIPLANE: 37 ML/M2 (ref 16–34)
ECHO LA VOLUME INDEX MOD A2C: 44 ML/M2 (ref 16–34)
ECHO LA VOLUME INDEX MOD A4C: 25 ML/M2 (ref 16–34)
ECHO LV E' LATERAL VELOCITY: 13 CM/S
ECHO LV E' SEPTAL VELOCITY: 11 CM/S
ECHO LV EDV A2C: 105 ML
ECHO LV EDV A4C: 111 ML
ECHO LV EDV INDEX A4C: 60 ML/M2
ECHO LV EDV NDEX A2C: 56 ML/M2
ECHO LV EJECTION FRACTION A2C: 61 %
ECHO LV EJECTION FRACTION A4C: 59 %
ECHO LV EJECTION FRACTION BIPLANE: 59 % (ref 55–100)
ECHO LV ESV A2C: 42 ML
ECHO LV ESV A4C: 45 ML
ECHO LV ESV INDEX A2C: 23 ML/M2
ECHO LV ESV INDEX A4C: 24 ML/M2
ECHO LV FRACTIONAL SHORTENING: 39 % (ref 28–44)
ECHO LV INTERNAL DIMENSION DIASTOLE INDEX: 2.63 CM/M2
ECHO LV INTERNAL DIMENSION DIASTOLIC: 4.9 CM (ref 4.2–5.9)
ECHO LV INTERNAL DIMENSION SYSTOLIC INDEX: 1.61 CM/M2
ECHO LV INTERNAL DIMENSION SYSTOLIC: 3 CM
ECHO LV IVSD: 1 CM (ref 0.6–1)
ECHO LV MASS 2D: 176 G (ref 88–224)
ECHO LV MASS INDEX 2D: 94.6 G/M2 (ref 49–115)
ECHO LV POSTERIOR WALL DIASTOLIC: 1 CM (ref 0.6–1)
ECHO LV RELATIVE WALL THICKNESS RATIO: 0.41
ECHO LVOT AREA: 3.1 CM2
ECHO LVOT DIAM: 2 CM
ECHO LVOT PEAK GRADIENT: 3 MMHG
ECHO LVOT PEAK VELOCITY: 0.8 M/S
ECHO MV A VELOCITY: 0.85 M/S
ECHO MV E DECELERATION TIME (DT): 207 MS
ECHO MV E VELOCITY: 0.89 M/S
ECHO MV E/A RATIO: 1.05
ECHO MV E/E' LATERAL: 6.85
ECHO MV E/E' RATIO (AVERAGED): 7.47
ECHO MV E/E' SEPTAL: 8.09
ECHO MV MAX VELOCITY: 1.2 M/S
ECHO MV MEAN GRADIENT: 3 MMHG
ECHO MV MEAN VELOCITY: 0.8 M/S
ECHO MV PEAK GRADIENT: 6 MMHG
ECHO MV VTI: 30.2 CM
ECHO RV FREE WALL PEAK S': 15 CM/S
ECHO RV TAPSE: 2.4 CM (ref 1.7–?)

## 2024-08-13 PROCEDURE — 93306 TTE W/DOPPLER COMPLETE: CPT | Performed by: INTERNAL MEDICINE

## 2024-08-15 ENCOUNTER — HOSPITAL ENCOUNTER (OUTPATIENT)
Dept: ONCOLOGY | Age: 44
Setting detail: INFUSION SERIES
Discharge: HOME | End: 2024-08-15
Payer: MEDICAID

## 2024-08-15 VITALS
RESPIRATION RATE: 16 BRPM | DIASTOLIC BLOOD PRESSURE: 71 MMHG | OXYGEN SATURATION: 100 % | TEMPERATURE: 98.9 F | SYSTOLIC BLOOD PRESSURE: 115 MMHG | HEART RATE: 76 BPM

## 2024-08-15 DIAGNOSIS — C92.00 ACUTE MYELOID LEUKEMIA NOT HAVING ACHIEVED REMISSION (HCC): Primary | ICD-10-CM

## 2024-08-15 LAB
ABO + RH BLD: NORMAL
ANABASINE UR-MCNC: <5 NG/ML
BLD GP AB SCN SERPL QL: NORMAL
BLOOD BANK DISPENSE STATUS: NORMAL
BLOOD BANK DISPENSE STATUS: NORMAL
BLOOD BANK PRODUCT CODE: NORMAL
BLOOD BANK PRODUCT CODE: NORMAL
BPU ID: NORMAL
BPU ID: NORMAL
COTININE UR-MCNC: <15 NG/ML
DESCRIPTION BLOOD BANK: NORMAL
DESCRIPTION BLOOD BANK: NORMAL
NICOTINE UR-MCNC: <15 NG/ML
TRANS-3-OH-COTININE UR-MCNC: <50 NG/ML

## 2024-08-15 PROCEDURE — 86900 BLOOD TYPING SEROLOGIC ABO: CPT

## 2024-08-15 PROCEDURE — 36592 COLLECT BLOOD FROM PICC: CPT

## 2024-08-15 PROCEDURE — 36430 TRANSFUSION BLD/BLD COMPNT: CPT

## 2024-08-15 PROCEDURE — P9073 PLATELETS PHERESIS PATH REDU: HCPCS

## 2024-08-15 PROCEDURE — 86850 RBC ANTIBODY SCREEN: CPT

## 2024-08-15 PROCEDURE — 86901 BLOOD TYPING SEROLOGIC RH(D): CPT

## 2024-08-15 PROCEDURE — 86923 COMPATIBILITY TEST ELECTRIC: CPT

## 2024-08-15 PROCEDURE — P9040 RBC LEUKOREDUCED IRRADIATED: HCPCS

## 2024-08-15 RX ORDER — SODIUM CHLORIDE 0.9 % (FLUSH) 0.9 %
5-40 SYRINGE (ML) INJECTION PRN
OUTPATIENT
Start: 2024-08-15

## 2024-08-15 RX ORDER — ALBUTEROL SULFATE 90 UG/1
4 AEROSOL, METERED RESPIRATORY (INHALATION) PRN
OUTPATIENT
Start: 2024-08-15

## 2024-08-15 RX ORDER — SODIUM CHLORIDE 9 MG/ML
25 INJECTION, SOLUTION INTRAVENOUS PRN
OUTPATIENT
Start: 2024-08-15

## 2024-08-15 RX ORDER — EPINEPHRINE 1 MG/ML
0.3 INJECTION, SOLUTION INTRAMUSCULAR; SUBCUTANEOUS PRN
OUTPATIENT
Start: 2024-08-15

## 2024-08-15 RX ORDER — ONDANSETRON 2 MG/ML
8 INJECTION INTRAMUSCULAR; INTRAVENOUS
OUTPATIENT
Start: 2024-08-15

## 2024-08-15 RX ORDER — ACETAMINOPHEN 325 MG/1
650 TABLET ORAL
OUTPATIENT
Start: 2024-08-15

## 2024-08-15 RX ORDER — SODIUM CHLORIDE 9 MG/ML
20 INJECTION, SOLUTION INTRAVENOUS CONTINUOUS
OUTPATIENT
Start: 2024-08-15

## 2024-08-15 RX ORDER — SODIUM CHLORIDE 9 MG/ML
INJECTION, SOLUTION INTRAVENOUS CONTINUOUS
OUTPATIENT
Start: 2024-08-15

## 2024-08-15 RX ORDER — DIPHENHYDRAMINE HYDROCHLORIDE 50 MG/ML
50 INJECTION INTRAMUSCULAR; INTRAVENOUS
OUTPATIENT
Start: 2024-08-15

## 2024-08-15 NOTE — PROGRESS NOTES
Patient's hemoglobin this AM: 6.4  Patient's platelet count this AM: 4    Pt seen at [facility] today for Packed red blood cell transfusion and 1 unit of platelets for above lab values per order. Informed consent verified. No Pre-medications ordered with no previous transfusion reaction history. Transfused per policy. Pt tolerated transfusion well and without incident.  Pt verbalizes understanding of discharge instructions.  Discharged to his home.    Merly Win RN

## 2024-08-16 ENCOUNTER — HOSPITAL ENCOUNTER (OUTPATIENT)
Dept: ONCOLOGY | Age: 44
Discharge: HOME | End: 2024-08-16

## 2024-08-16 NOTE — BH NOTE
Behavioral Health Note    No showed appointment for completing remainder of psychological evaluation for allogeneic stem cell transplant.      Lisette Craig Psy.D., MSCP  Clinical Psychologist

## 2024-08-19 ENCOUNTER — HOSPITAL ENCOUNTER (OUTPATIENT)
Dept: ONCOLOGY | Age: 44
Setting detail: INFUSION SERIES
Discharge: HOME | End: 2024-08-19
Payer: MEDICAID

## 2024-08-19 VITALS
HEART RATE: 75 BPM | RESPIRATION RATE: 16 BRPM | TEMPERATURE: 98.4 F | OXYGEN SATURATION: 100 % | DIASTOLIC BLOOD PRESSURE: 64 MMHG | SYSTOLIC BLOOD PRESSURE: 97 MMHG

## 2024-08-19 DIAGNOSIS — C92.00 ACUTE MYELOID LEUKEMIA NOT HAVING ACHIEVED REMISSION (HCC): Primary | ICD-10-CM

## 2024-08-19 PROCEDURE — P9036 PLATELET PHERESIS IRRADIATED: HCPCS

## 2024-08-19 PROCEDURE — 86850 RBC ANTIBODY SCREEN: CPT

## 2024-08-19 PROCEDURE — P9040 RBC LEUKOREDUCED IRRADIATED: HCPCS

## 2024-08-19 PROCEDURE — 86900 BLOOD TYPING SEROLOGIC ABO: CPT

## 2024-08-19 PROCEDURE — 36430 TRANSFUSION BLD/BLD COMPNT: CPT

## 2024-08-19 PROCEDURE — 86923 COMPATIBILITY TEST ELECTRIC: CPT

## 2024-08-19 PROCEDURE — 36592 COLLECT BLOOD FROM PICC: CPT

## 2024-08-19 PROCEDURE — 86901 BLOOD TYPING SEROLOGIC RH(D): CPT

## 2024-08-19 RX ORDER — ALBUTEROL SULFATE 90 UG/1
4 AEROSOL, METERED RESPIRATORY (INHALATION) PRN
OUTPATIENT
Start: 2024-08-19

## 2024-08-19 RX ORDER — SODIUM CHLORIDE 0.9 % (FLUSH) 0.9 %
5-40 SYRINGE (ML) INJECTION PRN
OUTPATIENT
Start: 2024-08-19

## 2024-08-19 RX ORDER — SODIUM CHLORIDE 9 MG/ML
INJECTION, SOLUTION INTRAVENOUS CONTINUOUS
OUTPATIENT
Start: 2024-08-19

## 2024-08-19 RX ORDER — ACETAMINOPHEN 325 MG/1
650 TABLET ORAL
OUTPATIENT
Start: 2024-08-19

## 2024-08-19 RX ORDER — ONDANSETRON 2 MG/ML
8 INJECTION INTRAMUSCULAR; INTRAVENOUS
OUTPATIENT
Start: 2024-08-19

## 2024-08-19 RX ORDER — DIPHENHYDRAMINE HYDROCHLORIDE 50 MG/ML
50 INJECTION INTRAMUSCULAR; INTRAVENOUS
OUTPATIENT
Start: 2024-08-19

## 2024-08-19 RX ORDER — EPINEPHRINE 1 MG/ML
0.3 INJECTION, SOLUTION INTRAMUSCULAR; SUBCUTANEOUS PRN
OUTPATIENT
Start: 2024-08-19

## 2024-08-19 RX ORDER — SODIUM CHLORIDE 9 MG/ML
20 INJECTION, SOLUTION INTRAVENOUS CONTINUOUS
OUTPATIENT
Start: 2024-08-19

## 2024-08-19 RX ORDER — SODIUM CHLORIDE 9 MG/ML
25 INJECTION, SOLUTION INTRAVENOUS PRN
OUTPATIENT
Start: 2024-08-19

## 2024-08-19 NOTE — PROGRESS NOTES
Patient's hemoglobin this AM: 6.8  Patient's platelet count this AM: 12    Pt seen at [facility] today for Packed red blood cell transfusion and 1 unit of platelets for above lab values per order. Informed consent verified. No Pre-medications ordered with no previous transfusion reaction history. Transfused per policy. Pt tolerated transfusion well and without incident.  Pt verbalizes understanding of discharge instructions.  Discharged to his home.    Catrina Higginbotham RN

## 2024-08-22 LAB
AMPHETAMINES UR QL SCN>1000 NG/ML: NORMAL
BARBITURATES UR QL SCN>200 NG/ML: NORMAL
BENZODIAZ UR QL SCN>200 NG/ML: NORMAL
BILIRUB UR QL STRIP.AUTO: NEGATIVE
CANNABINOIDS UR QL SCN>50 NG/ML: NORMAL
CLARITY UR: CLEAR
COLOR UR: YELLOW
DRUG SCREEN COMMENT UR-IMP: NORMAL
FENTANYL SCREEN, URINE: NORMAL
GLUCOSE UR STRIP.AUTO-MCNC: NEGATIVE MG/DL
HGB UR QL STRIP.AUTO: ABNORMAL
KETONES UR STRIP.AUTO-MCNC: NEGATIVE MG/DL
LEUKOCYTE ESTERASE UR QL STRIP.AUTO: NEGATIVE
METHADONE UR QL SCN>300 NG/ML: NORMAL
NITRITE UR QL STRIP.AUTO: NEGATIVE
OPIATES UR QL SCN>300 NG/ML: NORMAL
OXYCODONE UR QL SCN: NORMAL
PCP UR QL SCN>25 NG/ML: NORMAL
PH UR STRIP.AUTO: 7 [PH] (ref 5–8)
PH UR STRIP: 6 [PH]
PROT UR STRIP.AUTO-MCNC: NEGATIVE MG/DL
SP GR UR STRIP.AUTO: 1.02 (ref 1–1.03)
UA COMPLETE W REFLEX CULTURE PNL UR: NO
UA DIPSTICK W REFLEX MICRO PNL UR: YES
URN SPEC COLLECT METH UR: ABNORMAL
UROBILINOGEN UR STRIP-ACNC: 0.2 E.U./DL

## 2024-08-23 ENCOUNTER — HOSPITAL ENCOUNTER (OUTPATIENT)
Dept: ONCOLOGY | Age: 44
Setting detail: INFUSION SERIES
Discharge: HOME | End: 2024-08-23
Payer: MEDICAID

## 2024-08-23 VITALS
TEMPERATURE: 98.5 F | OXYGEN SATURATION: 100 % | HEART RATE: 87 BPM | DIASTOLIC BLOOD PRESSURE: 70 MMHG | SYSTOLIC BLOOD PRESSURE: 109 MMHG | RESPIRATION RATE: 16 BRPM

## 2024-08-23 DIAGNOSIS — C92.00 ACUTE MYELOID LEUKEMIA NOT HAVING ACHIEVED REMISSION (HCC): Primary | ICD-10-CM

## 2024-08-23 PROCEDURE — 86850 RBC ANTIBODY SCREEN: CPT

## 2024-08-23 PROCEDURE — P9040 RBC LEUKOREDUCED IRRADIATED: HCPCS

## 2024-08-23 PROCEDURE — 86901 BLOOD TYPING SEROLOGIC RH(D): CPT

## 2024-08-23 PROCEDURE — 86900 BLOOD TYPING SEROLOGIC ABO: CPT

## 2024-08-23 PROCEDURE — P9036 PLATELET PHERESIS IRRADIATED: HCPCS

## 2024-08-23 PROCEDURE — 36592 COLLECT BLOOD FROM PICC: CPT

## 2024-08-23 PROCEDURE — 86923 COMPATIBILITY TEST ELECTRIC: CPT

## 2024-08-23 PROCEDURE — 36430 TRANSFUSION BLD/BLD COMPNT: CPT

## 2024-08-23 RX ORDER — SODIUM CHLORIDE 9 MG/ML
INJECTION, SOLUTION INTRAVENOUS CONTINUOUS
OUTPATIENT
Start: 2024-08-23

## 2024-08-23 RX ORDER — ACETAMINOPHEN 325 MG/1
650 TABLET ORAL
OUTPATIENT
Start: 2024-08-23

## 2024-08-23 RX ORDER — EPINEPHRINE 1 MG/ML
0.3 INJECTION, SOLUTION INTRAMUSCULAR; SUBCUTANEOUS PRN
OUTPATIENT
Start: 2024-08-23

## 2024-08-23 RX ORDER — DIPHENHYDRAMINE HYDROCHLORIDE 50 MG/ML
50 INJECTION INTRAMUSCULAR; INTRAVENOUS
OUTPATIENT
Start: 2024-08-23

## 2024-08-23 RX ORDER — SODIUM CHLORIDE 9 MG/ML
20 INJECTION, SOLUTION INTRAVENOUS CONTINUOUS
OUTPATIENT
Start: 2024-08-23

## 2024-08-23 RX ORDER — ONDANSETRON 2 MG/ML
8 INJECTION INTRAMUSCULAR; INTRAVENOUS
OUTPATIENT
Start: 2024-08-23

## 2024-08-23 RX ORDER — ALBUTEROL SULFATE 90 UG/1
4 AEROSOL, METERED RESPIRATORY (INHALATION) PRN
OUTPATIENT
Start: 2024-08-23

## 2024-08-23 RX ORDER — SODIUM CHLORIDE 9 MG/ML
25 INJECTION, SOLUTION INTRAVENOUS PRN
OUTPATIENT
Start: 2024-08-23

## 2024-08-23 RX ORDER — SODIUM CHLORIDE 0.9 % (FLUSH) 0.9 %
5-40 SYRINGE (ML) INJECTION PRN
OUTPATIENT
Start: 2024-08-23

## 2024-08-26 ENCOUNTER — HOSPITAL ENCOUNTER (OUTPATIENT)
Dept: ONCOLOGY | Age: 44
Setting detail: INFUSION SERIES
Discharge: HOME | End: 2024-08-26
Payer: MEDICAID

## 2024-08-26 VITALS
OXYGEN SATURATION: 100 % | RESPIRATION RATE: 18 BRPM | DIASTOLIC BLOOD PRESSURE: 65 MMHG | HEART RATE: 76 BPM | TEMPERATURE: 98.2 F | SYSTOLIC BLOOD PRESSURE: 105 MMHG

## 2024-08-26 DIAGNOSIS — C92.00 ACUTE MYELOID LEUKEMIA NOT HAVING ACHIEVED REMISSION (HCC): Primary | ICD-10-CM

## 2024-08-26 PROCEDURE — 86850 RBC ANTIBODY SCREEN: CPT

## 2024-08-26 PROCEDURE — 86900 BLOOD TYPING SEROLOGIC ABO: CPT

## 2024-08-26 PROCEDURE — 36592 COLLECT BLOOD FROM PICC: CPT

## 2024-08-26 PROCEDURE — P9040 RBC LEUKOREDUCED IRRADIATED: HCPCS

## 2024-08-26 PROCEDURE — 86901 BLOOD TYPING SEROLOGIC RH(D): CPT

## 2024-08-26 PROCEDURE — 36430 TRANSFUSION BLD/BLD COMPNT: CPT

## 2024-08-26 RX ORDER — DIPHENHYDRAMINE HYDROCHLORIDE 50 MG/ML
50 INJECTION INTRAMUSCULAR; INTRAVENOUS
Status: CANCELLED | OUTPATIENT
Start: 2024-08-26

## 2024-08-26 RX ORDER — ACETAMINOPHEN 325 MG/1
650 TABLET ORAL
Status: CANCELLED | OUTPATIENT
Start: 2024-08-26

## 2024-08-26 RX ORDER — SODIUM CHLORIDE 9 MG/ML
20 INJECTION, SOLUTION INTRAVENOUS CONTINUOUS
Status: CANCELLED | OUTPATIENT
Start: 2024-08-26

## 2024-08-26 RX ORDER — SODIUM CHLORIDE 9 MG/ML
INJECTION, SOLUTION INTRAVENOUS CONTINUOUS
Status: CANCELLED | OUTPATIENT
Start: 2024-08-26

## 2024-08-26 RX ORDER — ONDANSETRON 2 MG/ML
8 INJECTION INTRAMUSCULAR; INTRAVENOUS
Status: CANCELLED | OUTPATIENT
Start: 2024-08-26

## 2024-08-26 RX ORDER — SODIUM CHLORIDE 0.9 % (FLUSH) 0.9 %
5-40 SYRINGE (ML) INJECTION PRN
Status: CANCELLED | OUTPATIENT
Start: 2024-08-26

## 2024-08-26 RX ORDER — ALBUTEROL SULFATE 90 UG/1
4 AEROSOL, METERED RESPIRATORY (INHALATION) PRN
Status: CANCELLED | OUTPATIENT
Start: 2024-08-26

## 2024-08-26 RX ORDER — EPINEPHRINE 1 MG/ML
0.3 INJECTION, SOLUTION INTRAMUSCULAR; SUBCUTANEOUS PRN
Status: CANCELLED | OUTPATIENT
Start: 2024-08-26

## 2024-08-26 RX ORDER — SODIUM CHLORIDE 9 MG/ML
25 INJECTION, SOLUTION INTRAVENOUS PRN
Status: CANCELLED | OUTPATIENT
Start: 2024-08-26

## 2024-08-26 NOTE — PROGRESS NOTES
Patient's hemoglobin this AM: 6.9  Patient's platelet count this AM: 17    Pt seen at [facility] today for Packed red blood cell transfusion  for above lab values per order. Informed consent verified. No Pre-medications ordered with no previous transfusion reaction history. Transfused per policy. Pt tolerated transfusion well and without incident.  Pt verbalizes understanding of discharge instructions.  Discharged to his home.    Niya Dinero RN

## 2024-08-26 NOTE — PROGRESS NOTES
Patient's hemoglobin this AM: 6.8  Patient's platelet count this AM: 17    Pt seen at [facility] today for Packed red blood cell transfusion for above lab values per order. Informed consent verified. No Pre-medications ordered with no previous transfusion reaction history. Transfused per policy. Pt tolerated transfusion well and without incident.  Pt verbalizes understanding of discharge instructions.  Discharged to his home.    Camila Powell RN

## 2024-08-28 ENCOUNTER — HOSPITAL ENCOUNTER (OUTPATIENT)
Dept: ONCOLOGY | Age: 44
Setting detail: INFUSION SERIES
Discharge: HOME | End: 2024-08-28
Payer: MEDICAID

## 2024-08-28 VITALS
SYSTOLIC BLOOD PRESSURE: 107 MMHG | OXYGEN SATURATION: 100 % | TEMPERATURE: 98.4 F | DIASTOLIC BLOOD PRESSURE: 69 MMHG | RESPIRATION RATE: 18 BRPM | HEART RATE: 86 BPM

## 2024-08-28 DIAGNOSIS — C92.00 ACUTE MYELOID LEUKEMIA NOT HAVING ACHIEVED REMISSION (HCC): Primary | ICD-10-CM

## 2024-08-28 PROCEDURE — 36430 TRANSFUSION BLD/BLD COMPNT: CPT

## 2024-08-28 PROCEDURE — P9036 PLATELET PHERESIS IRRADIATED: HCPCS

## 2024-08-28 RX ORDER — SODIUM CHLORIDE 0.9 % (FLUSH) 0.9 %
5-40 SYRINGE (ML) INJECTION PRN
OUTPATIENT
Start: 2024-08-28

## 2024-08-28 RX ORDER — ALBUTEROL SULFATE 90 UG/1
4 AEROSOL, METERED RESPIRATORY (INHALATION) PRN
OUTPATIENT
Start: 2024-08-28

## 2024-08-28 RX ORDER — DIPHENHYDRAMINE HYDROCHLORIDE 50 MG/ML
50 INJECTION INTRAMUSCULAR; INTRAVENOUS
OUTPATIENT
Start: 2024-08-28

## 2024-08-28 RX ORDER — EPINEPHRINE 1 MG/ML
0.3 INJECTION, SOLUTION INTRAMUSCULAR; SUBCUTANEOUS PRN
OUTPATIENT
Start: 2024-08-28

## 2024-08-28 RX ORDER — ONDANSETRON 2 MG/ML
8 INJECTION INTRAMUSCULAR; INTRAVENOUS
OUTPATIENT
Start: 2024-08-28

## 2024-08-28 RX ORDER — SODIUM CHLORIDE 9 MG/ML
INJECTION, SOLUTION INTRAVENOUS CONTINUOUS
OUTPATIENT
Start: 2024-08-28

## 2024-08-28 RX ORDER — ACETAMINOPHEN 325 MG/1
650 TABLET ORAL
OUTPATIENT
Start: 2024-08-28

## 2024-08-28 RX ORDER — SODIUM CHLORIDE 9 MG/ML
25 INJECTION, SOLUTION INTRAVENOUS PRN
OUTPATIENT
Start: 2024-08-28

## 2024-08-28 RX ORDER — SODIUM CHLORIDE 9 MG/ML
20 INJECTION, SOLUTION INTRAVENOUS CONTINUOUS
OUTPATIENT
Start: 2024-08-28

## 2024-08-28 NOTE — PROGRESS NOTES
Patient's hemoglobin this AM: 7.3   Patient's platelet count this AM: 9.  Pt seen and assessed at [facility].  Seen at [facility] today for Platelet transfusion per standing orders for above lab values.  Blood products transfused per [facility] policy.  Pt tolerated transfusion well and without incident.  Pt verbalizes understanding of discharge instructions.  Discharged ambulatory to home  with self  .

## 2024-08-30 ENCOUNTER — APPOINTMENT (OUTPATIENT)
Dept: ONCOLOGY | Age: 44
End: 2024-08-30
Payer: MEDICAID

## 2024-08-30 ENCOUNTER — HOSPITAL ENCOUNTER (OUTPATIENT)
Dept: ONCOLOGY | Age: 44
Setting detail: INFUSION SERIES
Discharge: HOME | End: 2024-08-30
Payer: MEDICAID

## 2024-08-30 VITALS
OXYGEN SATURATION: 100 % | SYSTOLIC BLOOD PRESSURE: 109 MMHG | RESPIRATION RATE: 18 BRPM | DIASTOLIC BLOOD PRESSURE: 68 MMHG | TEMPERATURE: 99 F | HEART RATE: 88 BPM

## 2024-08-30 DIAGNOSIS — C92.00 ACUTE MYELOID LEUKEMIA NOT HAVING ACHIEVED REMISSION (HCC): Primary | ICD-10-CM

## 2024-08-30 PROCEDURE — 86923 COMPATIBILITY TEST ELECTRIC: CPT

## 2024-08-30 PROCEDURE — P9040 RBC LEUKOREDUCED IRRADIATED: HCPCS

## 2024-08-30 PROCEDURE — 36430 TRANSFUSION BLD/BLD COMPNT: CPT

## 2024-08-30 PROCEDURE — 86901 BLOOD TYPING SEROLOGIC RH(D): CPT

## 2024-08-30 PROCEDURE — 36592 COLLECT BLOOD FROM PICC: CPT

## 2024-08-30 PROCEDURE — 86850 RBC ANTIBODY SCREEN: CPT

## 2024-08-30 PROCEDURE — 86900 BLOOD TYPING SEROLOGIC ABO: CPT

## 2024-08-30 RX ORDER — SODIUM CHLORIDE 9 MG/ML
INJECTION, SOLUTION INTRAVENOUS CONTINUOUS
OUTPATIENT
Start: 2024-08-30

## 2024-08-30 RX ORDER — ALBUTEROL SULFATE 90 UG/1
4 AEROSOL, METERED RESPIRATORY (INHALATION) PRN
OUTPATIENT
Start: 2024-08-30

## 2024-08-30 RX ORDER — SODIUM CHLORIDE 9 MG/ML
20 INJECTION, SOLUTION INTRAVENOUS CONTINUOUS
OUTPATIENT
Start: 2024-08-30

## 2024-08-30 RX ORDER — DIPHENHYDRAMINE HYDROCHLORIDE 50 MG/ML
50 INJECTION INTRAMUSCULAR; INTRAVENOUS
OUTPATIENT
Start: 2024-08-30

## 2024-08-30 RX ORDER — ONDANSETRON 2 MG/ML
8 INJECTION INTRAMUSCULAR; INTRAVENOUS
OUTPATIENT
Start: 2024-08-30

## 2024-08-30 RX ORDER — EPINEPHRINE 1 MG/ML
0.3 INJECTION, SOLUTION INTRAMUSCULAR; SUBCUTANEOUS PRN
OUTPATIENT
Start: 2024-08-30

## 2024-08-30 RX ORDER — ACETAMINOPHEN 325 MG/1
650 TABLET ORAL
OUTPATIENT
Start: 2024-08-30

## 2024-08-30 RX ORDER — SODIUM CHLORIDE 0.9 % (FLUSH) 0.9 %
5-40 SYRINGE (ML) INJECTION PRN
OUTPATIENT
Start: 2024-08-30

## 2024-08-30 RX ORDER — SODIUM CHLORIDE 9 MG/ML
25 INJECTION, SOLUTION INTRAVENOUS PRN
OUTPATIENT
Start: 2024-08-30

## 2024-08-30 NOTE — PROGRESS NOTES
Patient's hemoglobin this AM: 6.6  Patient's platelet count this AM: 16  Pt seen and assessed at [facility].  Seen at [facility] today for PRBCs per standing orders for above lab values.  Blood products transfused per [facility] policy.  Pt tolerated transfusion well and without incident.  Pt verbalizes understanding of discharge instructions.  Discharged ambulatory to home  with self  .

## 2024-08-31 NOTE — ONCOLOGY
Patient currently in the process of evaluation  of potential allogeneic transplant.  The team recommended as part of the process to implement plan to educate patient and caregivers early in this process. The education plan will include  management of medications and lifestyle changes needed to have an allogeneic bone marrow transplant.  Education is to include caregivers whom have been identified as his father and aunt.     Appointment for today is to include patient and caregivers to review current medications and educate on current medications. This includes why patient is taking the medication, side effects, when to take the medication and  why it is important to take medication as directed.  Patient and caregivers will also  set up a medication organizer.This organizer will be used weekly.  Patient and caregivers will be educated weekly on the use and monitor them setting this up independently.    Patient arrived to [facility] for scheduled appointment.  This writer was notified that the patient stated that \"he forgot his medication bag at home\".  Education today was unable to take place.     Transplant Coordinator will reschedule this appointment with patient and caregivers.

## 2024-09-02 ENCOUNTER — HOSPITAL ENCOUNTER (OUTPATIENT)
Dept: ONCOLOGY | Age: 44
Setting detail: INFUSION SERIES
Discharge: HOME | End: 2024-09-02
Payer: MEDICAID

## 2024-09-02 VITALS
DIASTOLIC BLOOD PRESSURE: 65 MMHG | RESPIRATION RATE: 16 BRPM | SYSTOLIC BLOOD PRESSURE: 105 MMHG | OXYGEN SATURATION: 100 % | HEART RATE: 76 BPM | TEMPERATURE: 97.9 F

## 2024-09-02 DIAGNOSIS — C92.00 ACUTE MYELOID LEUKEMIA NOT HAVING ACHIEVED REMISSION (HCC): Primary | ICD-10-CM

## 2024-09-02 PROCEDURE — 36430 TRANSFUSION BLD/BLD COMPNT: CPT

## 2024-09-02 PROCEDURE — P9036 PLATELET PHERESIS IRRADIATED: HCPCS

## 2024-09-02 PROCEDURE — 2580000003 HC RX 258: Performed by: INTERNAL MEDICINE

## 2024-09-02 RX ORDER — SODIUM CHLORIDE 9 MG/ML
INJECTION, SOLUTION INTRAVENOUS CONTINUOUS
Status: CANCELLED | OUTPATIENT
Start: 2024-09-02

## 2024-09-02 RX ORDER — ALBUTEROL SULFATE 90 UG/1
4 AEROSOL, METERED RESPIRATORY (INHALATION) PRN
Status: CANCELLED | OUTPATIENT
Start: 2024-09-02

## 2024-09-02 RX ORDER — EPINEPHRINE 1 MG/ML
0.3 INJECTION, SOLUTION INTRAMUSCULAR; SUBCUTANEOUS PRN
Status: CANCELLED | OUTPATIENT
Start: 2024-09-02

## 2024-09-02 RX ORDER — DIPHENHYDRAMINE HYDROCHLORIDE 50 MG/ML
50 INJECTION INTRAMUSCULAR; INTRAVENOUS
Status: CANCELLED | OUTPATIENT
Start: 2024-09-02

## 2024-09-02 RX ORDER — SODIUM CHLORIDE 0.9 % (FLUSH) 0.9 %
5-40 SYRINGE (ML) INJECTION PRN
Status: CANCELLED | OUTPATIENT
Start: 2024-09-02

## 2024-09-02 RX ORDER — SODIUM CHLORIDE 9 MG/ML
25 INJECTION, SOLUTION INTRAVENOUS PRN
Status: CANCELLED | OUTPATIENT
Start: 2024-09-02

## 2024-09-02 RX ORDER — ACETAMINOPHEN 325 MG/1
650 TABLET ORAL
Status: CANCELLED | OUTPATIENT
Start: 2024-09-02

## 2024-09-02 RX ORDER — ONDANSETRON 2 MG/ML
8 INJECTION INTRAMUSCULAR; INTRAVENOUS
Status: CANCELLED | OUTPATIENT
Start: 2024-09-02

## 2024-09-02 RX ORDER — SODIUM CHLORIDE 9 MG/ML
20 INJECTION, SOLUTION INTRAVENOUS CONTINUOUS
Status: CANCELLED | OUTPATIENT
Start: 2024-09-02

## 2024-09-02 RX ORDER — SODIUM CHLORIDE 0.9 % (FLUSH) 0.9 %
5-40 SYRINGE (ML) INJECTION PRN
Status: DISCONTINUED | OUTPATIENT
Start: 2024-09-02 | End: 2024-09-03 | Stop reason: HOSPADM

## 2024-09-02 RX ADMIN — SODIUM CHLORIDE, PRESERVATIVE FREE 20 ML: 5 INJECTION INTRAVENOUS at 10:46

## 2024-09-02 RX ADMIN — SODIUM CHLORIDE, PRESERVATIVE FREE 10 ML: 5 INJECTION INTRAVENOUS at 12:54

## 2024-09-02 NOTE — FLOWSHEET NOTE
09/02/24 1143   AVS Reviewed   AVS & discharge instructions reviewed with patient and/or representative? Yes   Reviewed instructions with Patient   Level of Understanding Questions answered;Teach back completed;Verbalized understanding     An After Visit Summary was printed and given to the patient.    Alirio Schroeder RN

## 2024-09-02 NOTE — PROGRESS NOTES
Patient's hemoglobin this AM: 6.7   Patient's platelet count this AM: 3.0  Pt seen at [facility] today for   1 unit of PRBCs and 1 unit of Platelets   for above lab values per order. Informed consent verified. No Pre-medications ordered with no previous transfusion reaction history. Transfused per policy. Pt tolerated transfusion well and without incident.  Pt verbalizes understanding of discharge instructions.  Discharged to his home.    Alirio Schroeder RN

## 2024-09-06 ENCOUNTER — HOSPITAL ENCOUNTER (OUTPATIENT)
Dept: ONCOLOGY | Age: 44
Setting detail: INFUSION SERIES
Discharge: HOME | End: 2024-09-06

## 2024-09-06 DIAGNOSIS — C92.00 ACUTE MYELOID LEUKEMIA NOT HAVING ACHIEVED REMISSION (HCC): Primary | ICD-10-CM

## 2024-09-06 RX ORDER — ONDANSETRON 2 MG/ML
8 INJECTION INTRAMUSCULAR; INTRAVENOUS
OUTPATIENT
Start: 2024-09-06

## 2024-09-06 RX ORDER — ALBUTEROL SULFATE 90 UG/1
4 AEROSOL, METERED RESPIRATORY (INHALATION) PRN
OUTPATIENT
Start: 2024-09-06

## 2024-09-06 RX ORDER — EPINEPHRINE 1 MG/ML
0.3 INJECTION, SOLUTION INTRAMUSCULAR; SUBCUTANEOUS PRN
OUTPATIENT
Start: 2024-09-06

## 2024-09-06 RX ORDER — DIPHENHYDRAMINE HYDROCHLORIDE 50 MG/ML
50 INJECTION INTRAMUSCULAR; INTRAVENOUS
OUTPATIENT
Start: 2024-09-06

## 2024-09-06 RX ORDER — SODIUM CHLORIDE 0.9 % (FLUSH) 0.9 %
5-40 SYRINGE (ML) INJECTION PRN
Status: CANCELLED | OUTPATIENT
Start: 2024-09-06

## 2024-09-06 RX ORDER — SODIUM CHLORIDE 9 MG/ML
25 INJECTION, SOLUTION INTRAVENOUS PRN
OUTPATIENT
Start: 2024-09-06

## 2024-09-06 RX ORDER — SODIUM CHLORIDE 9 MG/ML
20 INJECTION, SOLUTION INTRAVENOUS CONTINUOUS
OUTPATIENT
Start: 2024-09-06

## 2024-09-06 RX ORDER — ACETAMINOPHEN 325 MG/1
650 TABLET ORAL
OUTPATIENT
Start: 2024-09-06

## 2024-09-06 RX ORDER — SODIUM CHLORIDE 9 MG/ML
INJECTION, SOLUTION INTRAVENOUS CONTINUOUS
OUTPATIENT
Start: 2024-09-06

## 2024-09-07 ENCOUNTER — HOSPITAL ENCOUNTER (OUTPATIENT)
Dept: ONCOLOGY | Age: 44
Setting detail: INFUSION SERIES
Discharge: HOME | End: 2024-09-07
Payer: MEDICAID

## 2024-09-07 VITALS
OXYGEN SATURATION: 100 % | SYSTOLIC BLOOD PRESSURE: 102 MMHG | TEMPERATURE: 98 F | RESPIRATION RATE: 16 BRPM | DIASTOLIC BLOOD PRESSURE: 65 MMHG | HEART RATE: 91 BPM

## 2024-09-07 DIAGNOSIS — C92.00 ACUTE MYELOID LEUKEMIA NOT HAVING ACHIEVED REMISSION (HCC): Primary | ICD-10-CM

## 2024-09-07 PROCEDURE — 36430 TRANSFUSION BLD/BLD COMPNT: CPT

## 2024-09-07 RX ORDER — ACETAMINOPHEN 325 MG/1
650 TABLET ORAL
OUTPATIENT
Start: 2024-09-07

## 2024-09-07 RX ORDER — DIPHENHYDRAMINE HYDROCHLORIDE 50 MG/ML
50 INJECTION INTRAMUSCULAR; INTRAVENOUS
OUTPATIENT
Start: 2024-09-07

## 2024-09-07 RX ORDER — EPINEPHRINE 1 MG/ML
0.3 INJECTION, SOLUTION INTRAMUSCULAR; SUBCUTANEOUS PRN
OUTPATIENT
Start: 2024-09-07

## 2024-09-07 RX ORDER — ONDANSETRON 2 MG/ML
8 INJECTION INTRAMUSCULAR; INTRAVENOUS
OUTPATIENT
Start: 2024-09-07

## 2024-09-07 RX ORDER — SODIUM CHLORIDE 0.9 % (FLUSH) 0.9 %
5-40 SYRINGE (ML) INJECTION PRN
OUTPATIENT
Start: 2024-09-07

## 2024-09-07 RX ORDER — ALBUTEROL SULFATE 90 UG/1
4 AEROSOL, METERED RESPIRATORY (INHALATION) PRN
OUTPATIENT
Start: 2024-09-07

## 2024-09-07 RX ORDER — SODIUM CHLORIDE 9 MG/ML
20 INJECTION, SOLUTION INTRAVENOUS CONTINUOUS
OUTPATIENT
Start: 2024-09-07

## 2024-09-07 RX ORDER — SODIUM CHLORIDE 9 MG/ML
INJECTION, SOLUTION INTRAVENOUS CONTINUOUS
OUTPATIENT
Start: 2024-09-07

## 2024-09-07 RX ORDER — SODIUM CHLORIDE 0.9 % (FLUSH) 0.9 %
5-40 SYRINGE (ML) INJECTION PRN
Status: DISCONTINUED | OUTPATIENT
Start: 2024-09-07 | End: 2024-09-08 | Stop reason: HOSPADM

## 2024-09-07 RX ORDER — SODIUM CHLORIDE 9 MG/ML
25 INJECTION, SOLUTION INTRAVENOUS PRN
OUTPATIENT
Start: 2024-09-07

## 2024-09-07 NOTE — PROGRESS NOTES
Patient's hemoglobin this AM: 7.4   Patient's platelet count this AM: 8.0  Pt seen at [facility] for  Platelet transfusion  for above lab values per order. Informed consent verified. No Pre-medications ordered with no previous transfusion reaction history. Transfused per policy. Pt tolerated transfusion well and without incident.  Pt verbalizes understanding of discharge instructions.  Discharged to his home with his father.    Alirio Schroeder RN

## 2024-09-07 NOTE — FLOWSHEET NOTE
09/07/24 1216   AVS Reviewed   AVS & discharge instructions reviewed with patient and/or representative? Yes   Reviewed instructions with Patient   Level of Understanding Questions answered;Teach back completed;Verbalized understanding     Alirio Schroeder RN

## 2024-09-10 ENCOUNTER — HOSPITAL ENCOUNTER (OUTPATIENT)
Dept: ONCOLOGY | Age: 44
Setting detail: INFUSION SERIES
Discharge: HOME | End: 2024-09-10
Payer: MEDICAID

## 2024-09-10 VITALS
TEMPERATURE: 98 F | OXYGEN SATURATION: 100 % | DIASTOLIC BLOOD PRESSURE: 62 MMHG | SYSTOLIC BLOOD PRESSURE: 103 MMHG | RESPIRATION RATE: 16 BRPM | HEART RATE: 72 BPM

## 2024-09-10 DIAGNOSIS — C92.00 ACUTE MYELOID LEUKEMIA NOT HAVING ACHIEVED REMISSION (HCC): Primary | ICD-10-CM

## 2024-09-10 LAB
ABO + RH BLD: NORMAL
BLD GP AB SCN SERPL QL: NORMAL

## 2024-09-10 PROCEDURE — 36430 TRANSFUSION BLD/BLD COMPNT: CPT

## 2024-09-10 PROCEDURE — 86923 COMPATIBILITY TEST ELECTRIC: CPT

## 2024-09-10 PROCEDURE — P9040 RBC LEUKOREDUCED IRRADIATED: HCPCS

## 2024-09-10 PROCEDURE — 86900 BLOOD TYPING SEROLOGIC ABO: CPT

## 2024-09-10 PROCEDURE — 86850 RBC ANTIBODY SCREEN: CPT

## 2024-09-10 PROCEDURE — 86901 BLOOD TYPING SEROLOGIC RH(D): CPT

## 2024-09-10 PROCEDURE — 36592 COLLECT BLOOD FROM PICC: CPT

## 2024-09-10 RX ORDER — ACETAMINOPHEN 325 MG/1
650 TABLET ORAL
OUTPATIENT
Start: 2024-09-10

## 2024-09-10 RX ORDER — EPINEPHRINE 1 MG/ML
0.3 INJECTION, SOLUTION INTRAMUSCULAR; SUBCUTANEOUS PRN
OUTPATIENT
Start: 2024-09-10

## 2024-09-10 RX ORDER — SODIUM CHLORIDE 9 MG/ML
25 INJECTION, SOLUTION INTRAVENOUS PRN
OUTPATIENT
Start: 2024-09-10

## 2024-09-10 RX ORDER — ALBUTEROL SULFATE 90 UG/1
4 AEROSOL, METERED RESPIRATORY (INHALATION) PRN
OUTPATIENT
Start: 2024-09-10

## 2024-09-10 RX ORDER — ONDANSETRON 2 MG/ML
8 INJECTION INTRAMUSCULAR; INTRAVENOUS
OUTPATIENT
Start: 2024-09-10

## 2024-09-10 RX ORDER — SODIUM CHLORIDE 0.9 % (FLUSH) 0.9 %
5-40 SYRINGE (ML) INJECTION PRN
OUTPATIENT
Start: 2024-09-10

## 2024-09-10 RX ORDER — SODIUM CHLORIDE 9 MG/ML
INJECTION, SOLUTION INTRAVENOUS CONTINUOUS
OUTPATIENT
Start: 2024-09-10

## 2024-09-10 RX ORDER — DIPHENHYDRAMINE HYDROCHLORIDE 50 MG/ML
50 INJECTION INTRAMUSCULAR; INTRAVENOUS
OUTPATIENT
Start: 2024-09-10

## 2024-09-10 RX ORDER — SODIUM CHLORIDE 9 MG/ML
20 INJECTION, SOLUTION INTRAVENOUS CONTINUOUS
OUTPATIENT
Start: 2024-09-10

## 2024-09-11 ENCOUNTER — HOSPITAL ENCOUNTER (OUTPATIENT)
Dept: PHYSICAL THERAPY | Age: 44
Setting detail: THERAPIES SERIES
Discharge: HOME | End: 2024-09-11
Attending: INTERNAL MEDICINE
Payer: MEDICAID

## 2024-09-11 DIAGNOSIS — R53.0 NEOPLASTIC (MALIGNANT) RELATED FATIGUE: Primary | ICD-10-CM

## 2024-09-11 PROCEDURE — 97110 THERAPEUTIC EXERCISES: CPT | Performed by: PHYSICAL THERAPIST

## 2024-09-11 PROCEDURE — 97161 PT EVAL LOW COMPLEX 20 MIN: CPT | Performed by: PHYSICAL THERAPIST

## 2024-09-12 ENCOUNTER — HOSPITAL ENCOUNTER (OUTPATIENT)
Dept: ONCOLOGY | Age: 44
Setting detail: INFUSION SERIES
Discharge: HOME | End: 2024-09-12
Payer: MEDICAID

## 2024-09-12 VITALS
HEART RATE: 65 BPM | RESPIRATION RATE: 16 BRPM | DIASTOLIC BLOOD PRESSURE: 66 MMHG | SYSTOLIC BLOOD PRESSURE: 104 MMHG | TEMPERATURE: 98.3 F | OXYGEN SATURATION: 100 %

## 2024-09-12 DIAGNOSIS — C92.00 ACUTE MYELOID LEUKEMIA NOT HAVING ACHIEVED REMISSION (HCC): Primary | ICD-10-CM

## 2024-09-12 PROCEDURE — 86900 BLOOD TYPING SEROLOGIC ABO: CPT

## 2024-09-12 PROCEDURE — P9036 PLATELET PHERESIS IRRADIATED: HCPCS

## 2024-09-12 PROCEDURE — 36592 COLLECT BLOOD FROM PICC: CPT

## 2024-09-12 PROCEDURE — 36430 TRANSFUSION BLD/BLD COMPNT: CPT

## 2024-09-12 PROCEDURE — 86901 BLOOD TYPING SEROLOGIC RH(D): CPT

## 2024-09-12 PROCEDURE — 86850 RBC ANTIBODY SCREEN: CPT

## 2024-09-12 RX ORDER — ACETAMINOPHEN 325 MG/1
650 TABLET ORAL
OUTPATIENT
Start: 2024-09-12

## 2024-09-12 RX ORDER — ONDANSETRON 2 MG/ML
8 INJECTION INTRAMUSCULAR; INTRAVENOUS
OUTPATIENT
Start: 2024-09-12

## 2024-09-12 RX ORDER — DIPHENHYDRAMINE HYDROCHLORIDE 50 MG/ML
50 INJECTION INTRAMUSCULAR; INTRAVENOUS
OUTPATIENT
Start: 2024-09-12

## 2024-09-12 RX ORDER — EPINEPHRINE 1 MG/ML
0.3 INJECTION, SOLUTION INTRAMUSCULAR; SUBCUTANEOUS PRN
OUTPATIENT
Start: 2024-09-12

## 2024-09-12 RX ORDER — SODIUM CHLORIDE 9 MG/ML
20 INJECTION, SOLUTION INTRAVENOUS CONTINUOUS
OUTPATIENT
Start: 2024-09-12

## 2024-09-12 RX ORDER — SODIUM CHLORIDE 0.9 % (FLUSH) 0.9 %
5-40 SYRINGE (ML) INJECTION PRN
OUTPATIENT
Start: 2024-09-12

## 2024-09-12 RX ORDER — SODIUM CHLORIDE 9 MG/ML
INJECTION, SOLUTION INTRAVENOUS CONTINUOUS
OUTPATIENT
Start: 2024-09-12

## 2024-09-12 RX ORDER — ALBUTEROL SULFATE 90 UG/1
4 AEROSOL, METERED RESPIRATORY (INHALATION) PRN
OUTPATIENT
Start: 2024-09-12

## 2024-09-12 RX ORDER — SODIUM CHLORIDE 9 MG/ML
25 INJECTION, SOLUTION INTRAVENOUS PRN
OUTPATIENT
Start: 2024-09-12

## 2024-09-13 ENCOUNTER — TELEPHONE (OUTPATIENT)
Dept: CASE MANAGEMENT | Age: 44
End: 2024-09-13

## 2024-09-16 ENCOUNTER — HOSPITAL ENCOUNTER (OUTPATIENT)
Dept: ONCOLOGY | Age: 44
Setting detail: INFUSION SERIES
Discharge: HOME | End: 2024-09-16

## 2024-09-18 ENCOUNTER — HOSPITAL ENCOUNTER (OUTPATIENT)
Dept: ONCOLOGY | Age: 44
Setting detail: INFUSION SERIES
Discharge: HOME | End: 2024-09-18
Payer: MEDICAID

## 2024-09-18 VITALS
RESPIRATION RATE: 16 BRPM | SYSTOLIC BLOOD PRESSURE: 106 MMHG | DIASTOLIC BLOOD PRESSURE: 68 MMHG | HEART RATE: 85 BPM | TEMPERATURE: 98.5 F | OXYGEN SATURATION: 100 %

## 2024-09-18 DIAGNOSIS — C92.00 ACUTE MYELOID LEUKEMIA NOT HAVING ACHIEVED REMISSION (HCC): Primary | ICD-10-CM

## 2024-09-18 PROCEDURE — 86850 RBC ANTIBODY SCREEN: CPT

## 2024-09-18 PROCEDURE — P9036 PLATELET PHERESIS IRRADIATED: HCPCS

## 2024-09-18 PROCEDURE — 86900 BLOOD TYPING SEROLOGIC ABO: CPT

## 2024-09-18 PROCEDURE — 36592 COLLECT BLOOD FROM PICC: CPT

## 2024-09-18 PROCEDURE — 86901 BLOOD TYPING SEROLOGIC RH(D): CPT

## 2024-09-18 PROCEDURE — 86923 COMPATIBILITY TEST ELECTRIC: CPT

## 2024-09-18 PROCEDURE — P9040 RBC LEUKOREDUCED IRRADIATED: HCPCS

## 2024-09-18 PROCEDURE — 36430 TRANSFUSION BLD/BLD COMPNT: CPT

## 2024-09-18 RX ORDER — SODIUM CHLORIDE 9 MG/ML
20 INJECTION, SOLUTION INTRAVENOUS CONTINUOUS
OUTPATIENT
Start: 2024-09-18

## 2024-09-18 RX ORDER — ONDANSETRON 2 MG/ML
8 INJECTION INTRAMUSCULAR; INTRAVENOUS
OUTPATIENT
Start: 2024-09-18

## 2024-09-18 RX ORDER — SODIUM CHLORIDE 9 MG/ML
INJECTION, SOLUTION INTRAVENOUS CONTINUOUS
OUTPATIENT
Start: 2024-09-18

## 2024-09-18 RX ORDER — DIPHENHYDRAMINE HYDROCHLORIDE 50 MG/ML
50 INJECTION INTRAMUSCULAR; INTRAVENOUS
OUTPATIENT
Start: 2024-09-18

## 2024-09-18 RX ORDER — SODIUM CHLORIDE 9 MG/ML
25 INJECTION, SOLUTION INTRAVENOUS PRN
OUTPATIENT
Start: 2024-09-18

## 2024-09-18 RX ORDER — ACETAMINOPHEN 325 MG/1
650 TABLET ORAL
OUTPATIENT
Start: 2024-09-18

## 2024-09-18 RX ORDER — EPINEPHRINE 1 MG/ML
0.3 INJECTION, SOLUTION INTRAMUSCULAR; SUBCUTANEOUS PRN
OUTPATIENT
Start: 2024-09-18

## 2024-09-18 RX ORDER — ALBUTEROL SULFATE 90 UG/1
4 INHALANT RESPIRATORY (INHALATION) PRN
OUTPATIENT
Start: 2024-09-18

## 2024-09-18 RX ORDER — SODIUM CHLORIDE 0.9 % (FLUSH) 0.9 %
5-40 SYRINGE (ML) INJECTION PRN
OUTPATIENT
Start: 2024-09-18

## 2024-09-23 ENCOUNTER — HOSPITAL ENCOUNTER (OUTPATIENT)
Dept: ONCOLOGY | Age: 44
Setting detail: INFUSION SERIES
Discharge: HOME | End: 2024-09-23
Payer: MEDICAID

## 2024-09-23 VITALS
HEART RATE: 75 BPM | DIASTOLIC BLOOD PRESSURE: 67 MMHG | RESPIRATION RATE: 16 BRPM | SYSTOLIC BLOOD PRESSURE: 105 MMHG | TEMPERATURE: 98.2 F | OXYGEN SATURATION: 100 %

## 2024-09-23 DIAGNOSIS — C92.00 ACUTE MYELOID LEUKEMIA NOT HAVING ACHIEVED REMISSION (HCC): Primary | ICD-10-CM

## 2024-09-23 PROCEDURE — P9036 PLATELET PHERESIS IRRADIATED: HCPCS

## 2024-09-23 PROCEDURE — 86923 COMPATIBILITY TEST ELECTRIC: CPT

## 2024-09-23 PROCEDURE — 86850 RBC ANTIBODY SCREEN: CPT

## 2024-09-23 PROCEDURE — 86901 BLOOD TYPING SEROLOGIC RH(D): CPT

## 2024-09-23 PROCEDURE — 36430 TRANSFUSION BLD/BLD COMPNT: CPT

## 2024-09-23 PROCEDURE — 86900 BLOOD TYPING SEROLOGIC ABO: CPT

## 2024-09-23 PROCEDURE — 36592 COLLECT BLOOD FROM PICC: CPT

## 2024-09-23 PROCEDURE — P9040 RBC LEUKOREDUCED IRRADIATED: HCPCS

## 2024-09-23 RX ORDER — SODIUM CHLORIDE 9 MG/ML
25 INJECTION, SOLUTION INTRAVENOUS PRN
Status: CANCELLED | OUTPATIENT
Start: 2024-09-23

## 2024-09-23 RX ORDER — DIPHENHYDRAMINE HYDROCHLORIDE 50 MG/ML
50 INJECTION INTRAMUSCULAR; INTRAVENOUS
Status: CANCELLED | OUTPATIENT
Start: 2024-09-23

## 2024-09-23 RX ORDER — ALBUTEROL SULFATE 90 UG/1
4 INHALANT RESPIRATORY (INHALATION) PRN
Status: CANCELLED | OUTPATIENT
Start: 2024-09-23

## 2024-09-23 RX ORDER — SODIUM CHLORIDE 0.9 % (FLUSH) 0.9 %
5-40 SYRINGE (ML) INJECTION PRN
Status: CANCELLED | OUTPATIENT
Start: 2024-09-23

## 2024-09-23 RX ORDER — EPINEPHRINE 1 MG/ML
0.3 INJECTION, SOLUTION INTRAMUSCULAR; SUBCUTANEOUS PRN
Status: CANCELLED | OUTPATIENT
Start: 2024-09-23

## 2024-09-23 RX ORDER — SODIUM CHLORIDE 9 MG/ML
20 INJECTION, SOLUTION INTRAVENOUS CONTINUOUS
Status: CANCELLED | OUTPATIENT
Start: 2024-09-23

## 2024-09-23 RX ORDER — SODIUM CHLORIDE 9 MG/ML
INJECTION, SOLUTION INTRAVENOUS CONTINUOUS
Status: CANCELLED | OUTPATIENT
Start: 2024-09-23

## 2024-09-23 RX ORDER — ACETAMINOPHEN 325 MG/1
650 TABLET ORAL
Status: CANCELLED | OUTPATIENT
Start: 2024-09-23

## 2024-09-23 RX ORDER — ONDANSETRON 2 MG/ML
8 INJECTION INTRAMUSCULAR; INTRAVENOUS
Status: CANCELLED | OUTPATIENT
Start: 2024-09-23

## 2024-09-25 ENCOUNTER — HOSPITAL ENCOUNTER (OUTPATIENT)
Dept: ONCOLOGY | Age: 44
Setting detail: INFUSION SERIES
Discharge: HOME | End: 2024-09-25
Payer: MEDICAID

## 2024-09-25 VITALS
HEART RATE: 76 BPM | SYSTOLIC BLOOD PRESSURE: 107 MMHG | OXYGEN SATURATION: 100 % | RESPIRATION RATE: 17 BRPM | DIASTOLIC BLOOD PRESSURE: 67 MMHG | TEMPERATURE: 98.2 F

## 2024-09-25 DIAGNOSIS — C92.00 ACUTE MYELOID LEUKEMIA NOT HAVING ACHIEVED REMISSION (HCC): Primary | ICD-10-CM

## 2024-09-25 PROCEDURE — 86901 BLOOD TYPING SEROLOGIC RH(D): CPT

## 2024-09-25 PROCEDURE — 86923 COMPATIBILITY TEST ELECTRIC: CPT

## 2024-09-25 PROCEDURE — 36430 TRANSFUSION BLD/BLD COMPNT: CPT

## 2024-09-25 PROCEDURE — 86850 RBC ANTIBODY SCREEN: CPT

## 2024-09-25 PROCEDURE — P9040 RBC LEUKOREDUCED IRRADIATED: HCPCS

## 2024-09-25 PROCEDURE — 36592 COLLECT BLOOD FROM PICC: CPT

## 2024-09-25 PROCEDURE — 86900 BLOOD TYPING SEROLOGIC ABO: CPT

## 2024-09-25 RX ORDER — SODIUM CHLORIDE 9 MG/ML
20 INJECTION, SOLUTION INTRAVENOUS CONTINUOUS
OUTPATIENT
Start: 2024-09-25

## 2024-09-25 RX ORDER — DIPHENHYDRAMINE HYDROCHLORIDE 50 MG/ML
50 INJECTION INTRAMUSCULAR; INTRAVENOUS
OUTPATIENT
Start: 2024-09-25

## 2024-09-25 RX ORDER — ALBUTEROL SULFATE 90 UG/1
4 INHALANT RESPIRATORY (INHALATION) PRN
OUTPATIENT
Start: 2024-09-25

## 2024-09-25 RX ORDER — SODIUM CHLORIDE 9 MG/ML
INJECTION, SOLUTION INTRAVENOUS CONTINUOUS
OUTPATIENT
Start: 2024-09-25

## 2024-09-25 RX ORDER — SODIUM CHLORIDE 9 MG/ML
25 INJECTION, SOLUTION INTRAVENOUS PRN
OUTPATIENT
Start: 2024-09-25

## 2024-09-25 RX ORDER — ACETAMINOPHEN 325 MG/1
650 TABLET ORAL
OUTPATIENT
Start: 2024-09-25

## 2024-09-25 RX ORDER — SODIUM CHLORIDE 0.9 % (FLUSH) 0.9 %
5-40 SYRINGE (ML) INJECTION PRN
OUTPATIENT
Start: 2024-09-25

## 2024-09-25 RX ORDER — ONDANSETRON 2 MG/ML
8 INJECTION INTRAMUSCULAR; INTRAVENOUS
OUTPATIENT
Start: 2024-09-25

## 2024-09-25 RX ORDER — EPINEPHRINE 1 MG/ML
0.3 INJECTION, SOLUTION INTRAMUSCULAR; SUBCUTANEOUS PRN
OUTPATIENT
Start: 2024-09-25

## 2024-09-27 ENCOUNTER — HOSPITAL ENCOUNTER (OUTPATIENT)
Dept: ONCOLOGY | Age: 44
Setting detail: INFUSION SERIES
Discharge: HOME | End: 2024-09-27
Payer: MEDICAID

## 2024-09-27 VITALS
TEMPERATURE: 98.4 F | RESPIRATION RATE: 16 BRPM | OXYGEN SATURATION: 100 % | HEART RATE: 84 BPM | SYSTOLIC BLOOD PRESSURE: 117 MMHG | DIASTOLIC BLOOD PRESSURE: 74 MMHG

## 2024-09-27 DIAGNOSIS — C92.00 ACUTE MYELOID LEUKEMIA NOT HAVING ACHIEVED REMISSION (HCC): Primary | ICD-10-CM

## 2024-09-27 PROCEDURE — P9036 PLATELET PHERESIS IRRADIATED: HCPCS

## 2024-09-27 PROCEDURE — 36430 TRANSFUSION BLD/BLD COMPNT: CPT

## 2024-09-27 RX ORDER — ACETAMINOPHEN 325 MG/1
650 TABLET ORAL
OUTPATIENT
Start: 2024-09-27

## 2024-09-27 RX ORDER — DIPHENHYDRAMINE HYDROCHLORIDE 50 MG/ML
50 INJECTION INTRAMUSCULAR; INTRAVENOUS
OUTPATIENT
Start: 2024-09-27

## 2024-09-27 RX ORDER — EPINEPHRINE 1 MG/ML
0.3 INJECTION, SOLUTION INTRAMUSCULAR; SUBCUTANEOUS PRN
OUTPATIENT
Start: 2024-09-27

## 2024-09-27 RX ORDER — SODIUM CHLORIDE 9 MG/ML
25 INJECTION, SOLUTION INTRAVENOUS PRN
OUTPATIENT
Start: 2024-09-27

## 2024-09-27 RX ORDER — SODIUM CHLORIDE 9 MG/ML
INJECTION, SOLUTION INTRAVENOUS CONTINUOUS
OUTPATIENT
Start: 2024-09-27

## 2024-09-27 RX ORDER — SODIUM CHLORIDE 0.9 % (FLUSH) 0.9 %
5-40 SYRINGE (ML) INJECTION PRN
OUTPATIENT
Start: 2024-09-27

## 2024-09-27 RX ORDER — ONDANSETRON 2 MG/ML
8 INJECTION INTRAMUSCULAR; INTRAVENOUS
OUTPATIENT
Start: 2024-09-27

## 2024-09-27 RX ORDER — SODIUM CHLORIDE 9 MG/ML
20 INJECTION, SOLUTION INTRAVENOUS CONTINUOUS
OUTPATIENT
Start: 2024-09-27

## 2024-09-27 RX ORDER — ALBUTEROL SULFATE 90 UG/1
4 INHALANT RESPIRATORY (INHALATION) PRN
OUTPATIENT
Start: 2024-09-27

## 2024-09-30 ENCOUNTER — APPOINTMENT (OUTPATIENT)
Dept: GENERAL RADIOLOGY | Age: 44
DRG: 346 | End: 2024-09-30
Attending: INTERNAL MEDICINE
Payer: MEDICAID

## 2024-09-30 ENCOUNTER — APPOINTMENT (OUTPATIENT)
Dept: MRI IMAGING | Age: 44
DRG: 346 | End: 2024-09-30
Attending: INTERNAL MEDICINE
Payer: MEDICAID

## 2024-09-30 ENCOUNTER — HOSPITAL ENCOUNTER (INPATIENT)
Age: 44
LOS: 2 days | Discharge: HOME | DRG: 346 | End: 2024-10-02
Attending: INTERNAL MEDICINE | Admitting: INTERNAL MEDICINE
Payer: MEDICAID

## 2024-09-30 PROBLEM — C92.02 AML (ACUTE MYELOID LEUKEMIA) IN RELAPSE (HCC): Status: ACTIVE | Noted: 2024-09-30

## 2024-09-30 PROBLEM — C95.02 ACUTE LEUKEMIA IN RELAPSE (HCC): Status: ACTIVE | Noted: 2024-09-30

## 2024-09-30 LAB
ABO + RH BLD: NORMAL
ALBUMIN SERPL-MCNC: 4.3 G/DL (ref 3.4–5)
ALP SERPL-CCNC: 126 U/L (ref 40–129)
ALT SERPL-CCNC: 17 U/L (ref 10–40)
ANION GAP SERPL CALCULATED.3IONS-SCNC: 8 MMOL/L (ref 3–16)
APPEARANCE CSF: CLEAR
APTT BLD: 36.7 SEC (ref 22.1–36.4)
AST SERPL-CCNC: 7 U/L (ref 15–37)
BASOPHILS # BLD: 0 K/UL (ref 0–0.2)
BASOPHILS NFR BLD: 0 %
BILIRUB DIRECT SERPL-MCNC: 0.2 MG/DL (ref 0–0.3)
BILIRUB INDIRECT SERPL-MCNC: 0.7 MG/DL (ref 0–1)
BILIRUB SERPL-MCNC: 0.9 MG/DL (ref 0–1)
BLD GP AB SCN SERPL QL: NORMAL
BLOOD BANK DISPENSE STATUS: NORMAL
BLOOD BANK PRODUCT CODE: NORMAL
BPU ID: NORMAL
BUN SERPL-MCNC: 14 MG/DL (ref 7–20)
CALCIUM SERPL-MCNC: 8.9 MG/DL (ref 8.3–10.6)
CHLORIDE SERPL-SCNC: 102 MMOL/L (ref 99–110)
CLOT EVALUATION CSF: NORMAL
CO2 SERPL-SCNC: 28 MMOL/L (ref 21–32)
COLOR CSF: COLORLESS
CREAT SERPL-MCNC: 0.6 MG/DL (ref 0.9–1.3)
DEPRECATED RDW RBC AUTO: 13.6 % (ref 12.4–15.4)
DESCRIPTION BLOOD BANK: NORMAL
EKG ATRIAL RATE: 87 BPM
EKG DIAGNOSIS: NORMAL
EKG P AXIS: 77 DEGREES
EKG P-R INTERVAL: 118 MS
EKG Q-T INTERVAL: 348 MS
EKG QRS DURATION: 86 MS
EKG QTC CALCULATION (BAZETT): 418 MS
EKG R AXIS: 50 DEGREES
EKG T AXIS: 9 DEGREES
EKG VENTRICULAR RATE: 87 BPM
EOSINOPHIL # BLD: 0 K/UL (ref 0–0.6)
EOSINOPHIL NFR BLD: 0 %
FIBRINOGEN PPP-MCNC: 293 MG/DL (ref 227–534)
GFR SERPLBLD CREATININE-BSD FMLA CKD-EPI: >90 ML/MIN/{1.73_M2}
GLUCOSE CSF-MCNC: 60 MG/DL (ref 40–80)
GLUCOSE SERPL-MCNC: 94 MG/DL (ref 70–99)
HCT VFR BLD AUTO: 19.6 % (ref 40.5–52.5)
HGB BLD-MCNC: 6.7 G/DL (ref 13.5–17.5)
INR PPP: 1.21 (ref 0.85–1.15)
LDH SERPL L TO P-CCNC: 100 U/L (ref 100–190)
LYMPHOCYTES # BLD: 0.6 K/UL (ref 1–5.1)
LYMPHOCYTES NFR BLD: 98 %
MAGNESIUM SERPL-MCNC: 1.9 MG/DL (ref 1.8–2.4)
MANUAL DIF COMMENT CSF-IMP: NORMAL
MCH RBC QN AUTO: 28.5 PG (ref 26–34)
MCHC RBC AUTO-ENTMCNC: 34.2 G/DL (ref 31–36)
MCV RBC AUTO: 83.4 FL (ref 80–100)
MONOCYTES # BLD: 0 K/UL (ref 0–1.3)
MONOCYTES NFR BLD: 2 %
NEUTROPHILS # BLD: 0 K/UL (ref 1.7–7.7)
NEUTROPHILS NFR BLD: 0 %
NRBC BLD-RTO: 3 /100 WBC
NUC CELL # FLD MANUAL: 0 /CUMM (ref 0–5)
PATH INTERP BLD-IMP: YES
PHOSPHATE SERPL-MCNC: 4 MG/DL (ref 2.5–4.9)
PLATELET # BLD AUTO: 17 K/UL (ref 135–450)
PLATELET BLD QL SMEAR: ABNORMAL
PMV BLD AUTO: 8.5 FL (ref 5–10.5)
POTASSIUM SERPL-SCNC: 4.5 MMOL/L (ref 3.5–5.1)
PROT CSF-MCNC: 32 MG/DL (ref 15–45)
PROT SERPL-MCNC: 7 G/DL (ref 6.4–8.2)
PROTHROMBIN TIME: 15.5 SEC (ref 11.9–14.9)
RBC # BLD AUTO: 2.35 M/UL (ref 4.2–5.9)
RBC # FLD MANUAL: 0 /CUMM
RBC MORPH BLD: NORMAL
SLIDE REVIEW: ABNORMAL
SODIUM SERPL-SCNC: 138 MMOL/L (ref 136–145)
URATE SERPL-MCNC: 3.5 MG/DL (ref 3.5–7.2)
WBC # BLD AUTO: 0.6 K/UL (ref 4–11)

## 2024-09-30 PROCEDURE — 71046 X-RAY EXAM CHEST 2 VIEWS: CPT

## 2024-09-30 PROCEDURE — 009U3ZX DRAINAGE OF SPINAL CANAL, PERCUTANEOUS APPROACH, DIAGNOSTIC: ICD-10-PCS | Performed by: RADIOLOGY

## 2024-09-30 PROCEDURE — 6370000000 HC RX 637 (ALT 250 FOR IP): Performed by: PHYSICIAN ASSISTANT

## 2024-09-30 PROCEDURE — 83735 ASSAY OF MAGNESIUM: CPT

## 2024-09-30 PROCEDURE — 82945 GLUCOSE OTHER FLUID: CPT

## 2024-09-30 PROCEDURE — 84100 ASSAY OF PHOSPHORUS: CPT

## 2024-09-30 PROCEDURE — 30233N1 TRANSFUSION OF NONAUTOLOGOUS RED BLOOD CELLS INTO PERIPHERAL VEIN, PERCUTANEOUS APPROACH: ICD-10-PCS | Performed by: INTERNAL MEDICINE

## 2024-09-30 PROCEDURE — 87483 CNS DNA AMP PROBE TYPE 12-25: CPT

## 2024-09-30 PROCEDURE — 85025 COMPLETE CBC W/AUTO DIFF WBC: CPT

## 2024-09-30 PROCEDURE — 86900 BLOOD TYPING SEROLOGIC ABO: CPT

## 2024-09-30 PROCEDURE — 83615 LACTATE (LD) (LDH) ENZYME: CPT

## 2024-09-30 PROCEDURE — 30233R1 TRANSFUSION OF NONAUTOLOGOUS PLATELETS INTO PERIPHERAL VEIN, PERCUTANEOUS APPROACH: ICD-10-PCS | Performed by: INTERNAL MEDICINE

## 2024-09-30 PROCEDURE — 84550 ASSAY OF BLOOD/URIC ACID: CPT

## 2024-09-30 PROCEDURE — 86901 BLOOD TYPING SEROLOGIC RH(D): CPT

## 2024-09-30 PROCEDURE — 88185 FLOWCYTOMETRY/TC ADD-ON: CPT

## 2024-09-30 PROCEDURE — 6360000004 HC RX CONTRAST MEDICATION: Performed by: NEUROLOGICAL SURGERY

## 2024-09-30 PROCEDURE — 70553 MRI BRAIN STEM W/O & W/DYE: CPT

## 2024-09-30 PROCEDURE — 84157 ASSAY OF PROTEIN OTHER: CPT

## 2024-09-30 PROCEDURE — 86850 RBC ANTIBODY SCREEN: CPT

## 2024-09-30 PROCEDURE — 80048 BASIC METABOLIC PNL TOTAL CA: CPT

## 2024-09-30 PROCEDURE — B01B1ZZ FLUOROSCOPY OF SPINAL CORD USING LOW OSMOLAR CONTRAST: ICD-10-PCS | Performed by: RADIOLOGY

## 2024-09-30 PROCEDURE — P9040 RBC LEUKOREDUCED IRRADIATED: HCPCS

## 2024-09-30 PROCEDURE — 80076 HEPATIC FUNCTION PANEL: CPT

## 2024-09-30 PROCEDURE — 2580000003 HC RX 258: Performed by: PHYSICIAN ASSISTANT

## 2024-09-30 PROCEDURE — 89050 BODY FLUID CELL COUNT: CPT

## 2024-09-30 PROCEDURE — 62328 DX LMBR SPI PNXR W/FLUOR/CT: CPT

## 2024-09-30 PROCEDURE — 93010 ELECTROCARDIOGRAM REPORT: CPT | Performed by: INTERNAL MEDICINE

## 2024-09-30 PROCEDURE — 85730 THROMBOPLASTIN TIME PARTIAL: CPT

## 2024-09-30 PROCEDURE — 88112 CYTOPATH CELL ENHANCE TECH: CPT

## 2024-09-30 PROCEDURE — 93005 ELECTROCARDIOGRAM TRACING: CPT | Performed by: PHYSICIAN ASSISTANT

## 2024-09-30 PROCEDURE — 36430 TRANSFUSION BLD/BLD COMPNT: CPT

## 2024-09-30 PROCEDURE — 2060000000 HC ICU INTERMEDIATE R&B

## 2024-09-30 PROCEDURE — 86923 COMPATIBILITY TEST ELECTRIC: CPT

## 2024-09-30 PROCEDURE — 85384 FIBRINOGEN ACTIVITY: CPT

## 2024-09-30 PROCEDURE — P9036 PLATELET PHERESIS IRRADIATED: HCPCS

## 2024-09-30 PROCEDURE — A9576 INJ PROHANCE MULTIPACK: HCPCS | Performed by: NEUROLOGICAL SURGERY

## 2024-09-30 PROCEDURE — 99253 IP/OBS CNSLTJ NEW/EST LOW 45: CPT | Performed by: NURSE PRACTITIONER

## 2024-09-30 PROCEDURE — 88184 FLOWCYTOMETRY/ TC 1 MARKER: CPT

## 2024-09-30 PROCEDURE — 2500000003 HC RX 250 WO HCPCS

## 2024-09-30 PROCEDURE — 85610 PROTHROMBIN TIME: CPT

## 2024-09-30 RX ORDER — PROCHLORPERAZINE MALEATE 10 MG
10 TABLET ORAL EVERY 4 HOURS PRN
Status: DISCONTINUED | OUTPATIENT
Start: 2024-09-30 | End: 2024-10-02 | Stop reason: HOSPADM

## 2024-09-30 RX ORDER — LIDOCAINE HYDROCHLORIDE 10 MG/ML
5 INJECTION, SOLUTION EPIDURAL; INFILTRATION; INTRACAUDAL; PERINEURAL ONCE
Status: COMPLETED | OUTPATIENT
Start: 2024-09-30 | End: 2024-09-30

## 2024-09-30 RX ORDER — SODIUM CHLORIDE 9 MG/ML
INJECTION, SOLUTION INTRAVENOUS PRN
Status: DISCONTINUED | OUTPATIENT
Start: 2024-09-30 | End: 2024-10-02 | Stop reason: HOSPADM

## 2024-09-30 RX ORDER — SODIUM CHLORIDE 9 MG/ML
INJECTION, SOLUTION INTRAVENOUS CONTINUOUS
Status: DISCONTINUED | OUTPATIENT
Start: 2024-09-30 | End: 2024-10-02 | Stop reason: HOSPADM

## 2024-09-30 RX ORDER — LEVETIRACETAM 500 MG/1
1500 TABLET ORAL 2 TIMES DAILY
Status: DISCONTINUED | OUTPATIENT
Start: 2024-09-30 | End: 2024-10-02 | Stop reason: HOSPADM

## 2024-09-30 RX ORDER — SODIUM CHLORIDE 9 MG/ML
INJECTION, SOLUTION INTRAVENOUS CONTINUOUS PRN
Status: DISCONTINUED | OUTPATIENT
Start: 2024-09-30 | End: 2024-10-02 | Stop reason: HOSPADM

## 2024-09-30 RX ORDER — GUAIFENESIN 200 MG/10ML
LIQUID ORAL 2 TIMES DAILY
Status: DISCONTINUED | OUTPATIENT
Start: 2024-09-30 | End: 2024-10-02 | Stop reason: HOSPADM

## 2024-09-30 RX ORDER — VALACYCLOVIR HYDROCHLORIDE 500 MG/1
500 TABLET, FILM COATED ORAL 2 TIMES DAILY
Status: DISCONTINUED | OUTPATIENT
Start: 2024-09-30 | End: 2024-10-02 | Stop reason: HOSPADM

## 2024-09-30 RX ORDER — VANCOMYCIN HYDROCHLORIDE 125 MG/1
125 CAPSULE ORAL 2 TIMES DAILY
Status: DISCONTINUED | OUTPATIENT
Start: 2024-09-30 | End: 2024-10-02 | Stop reason: HOSPADM

## 2024-09-30 RX ORDER — ONDANSETRON 4 MG/1
4 TABLET, ORALLY DISINTEGRATING ORAL EVERY 8 HOURS PRN
Status: DISCONTINUED | OUTPATIENT
Start: 2024-09-30 | End: 2024-10-02 | Stop reason: HOSPADM

## 2024-09-30 RX ORDER — VORICONAZOLE 200 MG/1
200 TABLET, FILM COATED ORAL EVERY 12 HOURS SCHEDULED
Status: DISCONTINUED | OUTPATIENT
Start: 2024-09-30 | End: 2024-10-02 | Stop reason: HOSPADM

## 2024-09-30 RX ORDER — POTASSIUM CHLORIDE 29.8 MG/ML
20 INJECTION INTRAVENOUS PRN
Status: DISCONTINUED | OUTPATIENT
Start: 2024-09-30 | End: 2024-10-02 | Stop reason: HOSPADM

## 2024-09-30 RX ORDER — MAGNESIUM SULFATE IN WATER 40 MG/ML
4000 INJECTION, SOLUTION INTRAVENOUS PRN
Status: DISCONTINUED | OUTPATIENT
Start: 2024-09-30 | End: 2024-10-02 | Stop reason: HOSPADM

## 2024-09-30 RX ORDER — LEVOFLOXACIN 500 MG/1
500 TABLET, FILM COATED ORAL DAILY
Status: DISCONTINUED | OUTPATIENT
Start: 2024-09-30 | End: 2024-10-02 | Stop reason: HOSPADM

## 2024-09-30 RX ORDER — PANTOPRAZOLE SODIUM 40 MG/1
40 TABLET, DELAYED RELEASE ORAL
Status: DISCONTINUED | OUTPATIENT
Start: 2024-09-30 | End: 2024-10-02 | Stop reason: HOSPADM

## 2024-09-30 RX ORDER — LANOLIN ALCOHOL/MO/W.PET/CERES
1000 CREAM (GRAM) TOPICAL DAILY
Status: DISCONTINUED | OUTPATIENT
Start: 2024-10-01 | End: 2024-10-02 | Stop reason: HOSPADM

## 2024-09-30 RX ORDER — SODIUM CHLORIDE 0.9 % (FLUSH) 0.9 %
5-40 SYRINGE (ML) INJECTION EVERY 12 HOURS SCHEDULED
Status: DISCONTINUED | OUTPATIENT
Start: 2024-09-30 | End: 2024-10-02 | Stop reason: HOSPADM

## 2024-09-30 RX ORDER — DIPHENHYDRAMINE HCL 25 MG
25 TABLET ORAL NIGHTLY PRN
Status: DISCONTINUED | OUTPATIENT
Start: 2024-09-30 | End: 2024-10-02 | Stop reason: HOSPADM

## 2024-09-30 RX ORDER — SODIUM CHLORIDE 0.9 % (FLUSH) 0.9 %
5-40 SYRINGE (ML) INJECTION PRN
Status: DISCONTINUED | OUTPATIENT
Start: 2024-09-30 | End: 2024-10-02 | Stop reason: HOSPADM

## 2024-09-30 RX ADMIN — GADOTERIDOL 15 ML: 279.3 INJECTION, SOLUTION INTRAVENOUS at 17:59

## 2024-09-30 RX ADMIN — PANTOPRAZOLE SODIUM 40 MG: 40 TABLET, DELAYED RELEASE ORAL at 16:51

## 2024-09-30 RX ADMIN — VALACYCLOVIR HYDROCHLORIDE 500 MG: 500 TABLET, FILM COATED ORAL at 21:16

## 2024-09-30 RX ADMIN — LIDOCAINE HYDROCHLORIDE 5 ML: 10 INJECTION, SOLUTION EPIDURAL; INFILTRATION; INTRACAUDAL; PERINEURAL at 16:07

## 2024-09-30 RX ADMIN — VORICONAZOLE 200 MG: 200 TABLET ORAL at 21:16

## 2024-09-30 RX ADMIN — Medication: at 21:40

## 2024-09-30 RX ADMIN — VANCOMYCIN HYDROCHLORIDE 125 MG: 125 CAPSULE ORAL at 21:16

## 2024-09-30 RX ADMIN — SODIUM CHLORIDE: 9 INJECTION, SOLUTION INTRAVENOUS at 14:13

## 2024-09-30 RX ADMIN — SODIUM CHLORIDE: 9 INJECTION, SOLUTION INTRAVENOUS at 21:22

## 2024-09-30 RX ADMIN — LEVETIRACETAM 1500 MG: 500 TABLET, FILM COATED ORAL at 21:15

## 2024-09-30 RX ADMIN — SODIUM CHLORIDE, PRESERVATIVE FREE 10 ML: 5 INJECTION INTRAVENOUS at 21:16

## 2024-09-30 NOTE — CONSENT
Informed Consent for Blood Component Transfusion Note    I have discussed with the patient the rationale for blood component transfusion; its benefits in treating or preventing fatigue, organ damage, or death; and its risk which includes mild transfusion reactions, rare risk of blood borne infection, or more serious but rare reactions. I have discussed the alternatives to transfusion, including the risk and consequences of not receiving transfusion. The patient had an opportunity to ask questions and had agreed to proceed with transfusion of blood components.    Electronically signed by KATHLEEN Morel CNP on 9/30/24 at 1:28 PM EDT

## 2024-09-30 NOTE — H&P
airspace disease, likely persistent pneumonia. Component of superimposed edema is also possible; Punctate left nephrolithiasis.     6. Neuro:  H/O Oligodendroglioma 2017, seizures since 2009  - Cont on Keppra 1500 mg PO BID  - s/p partial resection, radiation and Temodar  - MRI 8/28/24: Multiple intracranial, intra-axial enhancing foci have developed since the prior examination on 1/18/2024, compatible with interval development of intracranial metastatic disease  - See AML section above regarding further work up      7. Cardiac  - ECHO 3/8/24:  EF 55-60% Mild tricuspid regurgitation. Mild mitral regurgitation    8. Genetics  - Referral placed to [facility] genetics for possible germline mutation. Sister tested positive for BRCA.  - Per [facility] genetics: He has been referred to [facility] genetics as diagnosis of AML requires skin biopsy.  Will try to expedite.    9. Social:  - He was alone for his visit 8/21/24  - Father present at visit 9/9/24 and engaged throughoutx  - Aunt present 9/23/24      - DVT Prophylaxis: Prophylaxis on hold d/t contraindication  Contraindications to pharmacologic prophylaxis: Thrombocytopenia  Contraindications to mechanical prophylaxis: None      - Disposition: Uncertain    The patient was seen and examined by Dr. Nguyen. This admission history and physical has been discussed and agreed upon by Dr. Nguyen.    OCTAVIO Rutledge

## 2024-09-30 NOTE — CONSULTS
NEUROSURGERY CONSULT NOTE    SAMANTHA HAMILTON  [id number]   1980   9/30/2024    Requesting physician: Eduardo Nguyen MD    Reason for consultation: Brain Lesions concerning for CNS AML    History of present illness: Patient is a 43 y.o. male  has a past medical history of Brain cancer (HCC) and Seizure (HCC). Patient  presented on 9/30/2024 as a direct admit by [facility] for further w/u regarding changes on most recent MRI Brain. Patient has AML (Date of Dx:03/08/2024), and a history of Oligodendroglioma (Resected in 2017 with Temodar and radiation treatment afterward). His recent MRI brain showed multiple enhancing lesions concerning for disease relapse- Oligodendroglioma vs new CNS AML. Neurosurgery was consulted about possiblity of biopsy to establish path.    ROS:   GENERAL:  Denies fever or recent illness. Denies weight changes   EYES:  Denies vision change or diplopia  EARS:  Denies hearing loss  CARDIAC:  Denies chest pain  RESPIRATORY:  Denies shortness of breath  SKIN:  Denies rash or lesions   HEM:  Denies excessive bruising  PSYCH:  Denies anxiety or depression  NEURO:  Denies headache, numbness or tingling or lateralizing weakness   :  Denies urinary difficulty  GI: Denies nausea, vomiting, diarrhea or constipation  MUSCULOSKELETAL:  No arthralgias    Allergies   Allergen Reactions    [place] Anaphylaxis       Past Medical History:   Diagnosis Date    Brain cancer (HCC)     diagnosed in 2017    Seizure (HCC)         Past Surgical History:   Procedure Laterality Date    BRAIN SURGERY      Surgery in 2017 at [facility]    BRONCHOSCOPY N/A 5/22/2024    BRONCHOSCOPY ALVEOLAR LAVAGE performed by Froylan Hoang MD at [facility] ENDOSCOPY    COLONOSCOPY N/A 3/22/2024    COLONOSCOPY POLYPECTOMY HOT SNARE/COLD BIOPSY performed by Marcus Uribe MD at [facility] ENDOSCOPY    SMALL INTESTINE SURGERY N/A 4/12/2024    ROBOTIC LOW ANTERIOR RESECTION WITH COLORECTAL ANASTOMOSIS performed by Rakesh Montano MD at

## 2024-10-01 LAB
ANION GAP SERPL CALCULATED.3IONS-SCNC: 5 MMOL/L (ref 3–16)
BASOPHILS # BLD: 0 K/UL (ref 0–0.2)
BASOPHILS NFR BLD: 0 %
BLOOD BANK DISPENSE STATUS: NORMAL
BLOOD BANK PRODUCT CODE: NORMAL
BPU ID: NORMAL
BUN SERPL-MCNC: 15 MG/DL (ref 7–20)
CALCIUM SERPL-MCNC: 8.6 MG/DL (ref 8.3–10.6)
CHLORIDE SERPL-SCNC: 104 MMOL/L (ref 99–110)
CO2 SERPL-SCNC: 28 MMOL/L (ref 21–32)
CREAT SERPL-MCNC: 0.6 MG/DL (ref 0.9–1.3)
DEPRECATED RDW RBC AUTO: 13.3 % (ref 12.4–15.4)
DESCRIPTION BLOOD BANK: NORMAL
EOSINOPHIL # BLD: 0 K/UL (ref 0–0.6)
EOSINOPHIL NFR BLD: 0 %
GFR SERPLBLD CREATININE-BSD FMLA CKD-EPI: >90 ML/MIN/{1.73_M2}
GLUCOSE SERPL-MCNC: 109 MG/DL (ref 70–99)
HCT VFR BLD AUTO: 19.7 % (ref 40.5–52.5)
HGB BLD-MCNC: 6.7 G/DL (ref 13.5–17.5)
LYMPHOCYTES # BLD: 0.7 K/UL (ref 1–5.1)
LYMPHOCYTES NFR BLD: 99 %
MCH RBC QN AUTO: 29.1 PG (ref 26–34)
MCHC RBC AUTO-ENTMCNC: 33.8 G/DL (ref 31–36)
MCV RBC AUTO: 86 FL (ref 80–100)
MENING+ENC PNL CSF NAA+NON-PROBE: NORMAL
MONOCYTES # BLD: 0 K/UL (ref 0–1.3)
MONOCYTES NFR BLD: 1 %
NEUTROPHILS # BLD: 0 K/UL (ref 1.7–7.7)
NEUTROPHILS NFR BLD: 0 %
PATH INTERP BLD-IMP: NO
PHOSPHATE SERPL-MCNC: 4.4 MG/DL (ref 2.5–4.9)
PLATELET # BLD AUTO: 44 K/UL (ref 135–450)
PLATELET BLD QL SMEAR: ABNORMAL
PMV BLD AUTO: 8.3 FL (ref 5–10.5)
POTASSIUM SERPL-SCNC: 4.5 MMOL/L (ref 3.5–5.1)
RBC # BLD AUTO: 2.29 M/UL (ref 4.2–5.9)
RBC MORPH BLD: NORMAL
REPORT: NORMAL
SLIDE REVIEW: ABNORMAL
SODIUM SERPL-SCNC: 137 MMOL/L (ref 136–145)
URATE SERPL-MCNC: 3.6 MG/DL (ref 3.5–7.2)
WBC # BLD AUTO: 0.7 K/UL (ref 4–11)

## 2024-10-01 PROCEDURE — 36430 TRANSFUSION BLD/BLD COMPNT: CPT

## 2024-10-01 PROCEDURE — 84550 ASSAY OF BLOOD/URIC ACID: CPT

## 2024-10-01 PROCEDURE — 6370000000 HC RX 637 (ALT 250 FOR IP): Performed by: PHYSICIAN ASSISTANT

## 2024-10-01 PROCEDURE — A4217 STERILE WATER/SALINE, 500 ML: HCPCS | Performed by: PHYSICIAN ASSISTANT

## 2024-10-01 PROCEDURE — 80048 BASIC METABOLIC PNL TOTAL CA: CPT

## 2024-10-01 PROCEDURE — 85025 COMPLETE CBC W/AUTO DIFF WBC: CPT

## 2024-10-01 PROCEDURE — 84100 ASSAY OF PHOSPHORUS: CPT

## 2024-10-01 PROCEDURE — 36592 COLLECT BLOOD FROM PICC: CPT

## 2024-10-01 PROCEDURE — 2060000000 HC ICU INTERMEDIATE R&B

## 2024-10-01 PROCEDURE — 2580000003 HC RX 258: Performed by: PHYSICIAN ASSISTANT

## 2024-10-01 PROCEDURE — 99231 SBSQ HOSP IP/OBS SF/LOW 25: CPT | Performed by: NURSE PRACTITIONER

## 2024-10-01 RX ADMIN — SODIUM CHLORIDE, PRESERVATIVE FREE 10 ML: 5 INJECTION INTRAVENOUS at 20:44

## 2024-10-01 RX ADMIN — VALACYCLOVIR HYDROCHLORIDE 500 MG: 500 TABLET, FILM COATED ORAL at 08:19

## 2024-10-01 RX ADMIN — LEVETIRACETAM 1500 MG: 500 TABLET, FILM COATED ORAL at 08:18

## 2024-10-01 RX ADMIN — VANCOMYCIN HYDROCHLORIDE 125 MG: 125 CAPSULE ORAL at 08:19

## 2024-10-01 RX ADMIN — CYANOCOBALAMIN TAB 1000 MCG 1000 MCG: 1000 TAB at 08:19

## 2024-10-01 RX ADMIN — VANCOMYCIN HYDROCHLORIDE 125 MG: 125 CAPSULE ORAL at 20:44

## 2024-10-01 RX ADMIN — SODIUM CHLORIDE, PRESERVATIVE FREE 10 ML: 5 INJECTION INTRAVENOUS at 08:20

## 2024-10-01 RX ADMIN — SODIUM CHLORIDE 15 ML: 900 IRRIGANT IRRIGATION at 20:52

## 2024-10-01 RX ADMIN — VORICONAZOLE 200 MG: 200 TABLET ORAL at 08:19

## 2024-10-01 RX ADMIN — PANTOPRAZOLE SODIUM 40 MG: 40 TABLET, DELAYED RELEASE ORAL at 06:54

## 2024-10-01 RX ADMIN — LEVETIRACETAM 1500 MG: 500 TABLET, FILM COATED ORAL at 20:44

## 2024-10-01 RX ADMIN — PANTOPRAZOLE SODIUM 40 MG: 40 TABLET, DELAYED RELEASE ORAL at 15:39

## 2024-10-01 RX ADMIN — LEVOFLOXACIN 500 MG: 500 TABLET, FILM COATED ORAL at 08:18

## 2024-10-01 RX ADMIN — SODIUM CHLORIDE: 9 INJECTION, SOLUTION INTRAVENOUS at 20:52

## 2024-10-01 RX ADMIN — VORICONAZOLE 200 MG: 200 TABLET ORAL at 20:44

## 2024-10-01 RX ADMIN — VALACYCLOVIR HYDROCHLORIDE 500 MG: 500 TABLET, FILM COATED ORAL at 20:44

## 2024-10-01 NOTE — CARE COORDINATION
Case Management Assessment  Initial Evaluation    Date/Time of Evaluation: 10/1/2024 4:54 PM  Assessment Completed by: DARLENE Teran, TRISTONW    If patient is discharged prior to next notation, then this note serves as note for discharge by case management.    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  PCP verified by CM? No    Chart Reviewed: Yes      History Provided by: Medical Record  Patient Orientation: Alert and Oriented    Patient Cognition: Alert    Hospitalization in the last 30 days (Readmission):  No    If yes, Readmission Assessment in CM Navigator will be completed.    Advance Directives:      Code Status: Full Code   Patient's Primary Decision Maker is: Legal Next of Kin    Primary Decision Maker: Pierre Lowry [facility] - [phone]    Discharge Planning:    Patient lives with: Alone, Parent Type of Home: Apartment  Primary Care Giver: Self  Patient Support Systems include: Parent   Current Financial resources: Medicaid  Current community resources:    Current services prior to admission: None            Current DME:              Type of Home Care services:  None    ADLS  Prior functional level: Independent in ADLs/IADLs  Current functional level: Independent in ADLs/IADLs    PT AM-PAC:   /24  OT AM-PAC:   /24    Family can provide assistance at DC: Yes  Would you like Case Management to discuss the discharge plan with any other family members/significant others, and if so, who? No  Plans to Return to Present Housing: Yes  Other Identified Issues/Barriers to RETURNING to current housing: med stability   Potential Assistance needed at discharge:

## 2024-10-01 NOTE — PROGRESS NOTES
NEUROSURGERY PROGRESS NOTE    10/1/2024 4:06 PM                               Dennys Peguero                      LOS: 1 day     Subjective:  No acute events overnight. Patient has no specific complaints.         Physical Exam:  Patient seen and examined    Vitals:    10/01/24 1540   BP: 120/79   Pulse: 78   Resp: 16   Temp: 98.4 °F (36.9 °C)   SpO2: 100%     GCS:  4 - Opens eyes on own  5 - Alert and oriented  6 - Follows simple motor commands  General: Well developed. Alert and cooperative in no acute distress.     HENT: atraumatic, neck supple  Eyes: Optic discs: Not tested  Pulmonary: unlabored respiratory effort  Cardiovascular:  Warm well perfused. No peripheral edema  Gastrointestinal: abdomen soft, NT, ND     Neurological:  Mental Status: Awake, alert, oriented x 4, speech clear and appropriate  Attention: Intact  Language: No aphasia or dysarthria noted  Sensation: Intact to all extremities to light touch  Coordination: Intact     Cranial Nerves:  II: Visual acuity not tested, denies new visual changes / diplopia  III, IV, VI: PERRL, 3 mm bilaterally, EOMI, no nystagmus noted  V: Facial sensation intact bilaterally to touch  VII: Face symmetric  VIII: Hearing intact bilaterally to spoken voice  IX: Palate movement equal bilaterally  XI: Shoulder shrug equal bilaterally  XII: Tongue midline     Musculoskeletal:   Gait: Not tested   Assist devices: None   Tone: Normal  Motor strength:     Right  Left      Right  Left    Deltoid  5 5   Hip Flex  5 5   Biceps  5 5   Knee Extensors  5 5   Triceps  5 5   Knee Flexors  5 5   Wrist Ext  5 5   Ankle Dorsiflex.  5 5   Wrist Flex  5 5   Ankle Plantarflex.  5 5   Handgrip  5 5   Ext Anjel Longus  5 5   Thumb Ext  5 5              Radiological Findings:  MRI BRAIN W WO CONTRAST  Result Date: 10/1/2024  1. Progressive accumulation of numerous foci of susceptibility involving the bilateral parietal and occipital temporal lobes with no progression in size of the previously 
   10/01/24 1521   Encounter Summary   Encounter Overview/Reason Initial Encounter   Service Provided For Patient   Referral/Consult From Rounding   Last Encounter  10/01/24  (sharyn)   Complexity of Encounter Low   Begin Time 1430   End Time  1435   Total Time Calculated 5 min   Assessment/Intervention/Outcome   Assessment Calm   Intervention Active listening;Explored/Affirmed feelings, thoughts, concerns   Outcome Expressed feelings, needs, and concerns;Expressed Gratitude;Refused/Declined     Spiritual Care was introduced.   Staff Zach MA, BCC  
4 Eyes Admission Assessment     I agree as the admission nurse that 2 RN's have performed a thorough Head to Toe Skin Assessment on the patient. ALL assessment sites listed below have been assessed on admission.       Areas assessed by both nurses:   [x]   Head, Face, and Ears   [x]   Shoulders, Back, and Chest  [x]   Arms, Elbows, and Hands   [x]   Coccyx, Sacrum, and Ischium  [x]   Legs, Feet, and Heels        Does the Patient have Skin Breakdown?  No         Alexandre Prevention initiated:  Yes   Wound Care Orders initiated:  NA      [facility] nurse consulted for Pressure Injury (Stage 3,4, Unstageable, DTI, NWPT, and Complex wounds) or Alexandre score 18 or lower:  NA      Nurse 1 eSignature: Electronically signed by Daiana Millan RN on 9/30/24 at 12:53 PM EDT    **SHARE this note so that the co-signing nurse is able to place an eSignature**    Nurse 2 eSignature: Electronically signed by Aaron Drew RN on 9/30/24 at 2:58 PM EDT     
Patient admitted to [facility] via direct admit from [facility] for diagnosis of AML.      Patient oriented to patient room including call light and bed controls.  Admission assessment completed - see admission flowsheet documentation.  Patient is a medium fall risk.  Safety measures instituted per policy.    Patient oriented to unit policies and procedures including: pain management practices, unit safety precautions, family rapid response, q4h vital signs and assessments, daily 4am lab draws, weekly chest x-rays, daily chlorhexidine bathing, standing transfusion orders, and routine central line care.  Also discussed use of call light and how to get in touch with nursing staff.  Stressed the importance of calling out immediately for any changes in condition including but not limited to: pain, chills, fever, nausea, vomiting, diarrhea, chest pain, sob/acosta, assistance with toileting, bleeding, or any other symptoms that are out of the ordinary for the patient.  Patient verbalizes understanding of all instructions and will call for assistance as needed.    
Physical Therapy and Occupational Therapy  Discharge Note    PT/OT orders received for \"mobility program.\"  Pt is not a mobility program patient as he is currently not admitted for transplant.  Spoke with RN who reports pt is independent without concerns about mobility or ADLs.  Will sign off at this time.  Should pt have an acute change from functional baseline, then reconsult.  RN in agreement with plan.  Will discharge acute PT/OT services.    Kimberly Medellin, PT #[id number]   Renate Butterfield, OTR/L [id number]      
RN talked to radiology about patient's LP today. Platelet count of 17 today. Radiology stated that patient is okay to come down after 2 units of platelets given with no recount. RN ordered 2 units of platelets and will notify radiology when 2nd bag has started.   
bibasilar pulmonary airspace disease, likely persistent pneumonia. Component of superimposed edema is also possible; Punctate left nephrolithiasis.     6. Neuro:  H/O Oligodendroglioma 2017, seizures since 2009  - Cont on Keppra 1500 mg PO BID  - s/p partial resection, radiation and Temodar  - MRI 8/28/24: Multiple intracranial, intra-axial enhancing foci have developed since the prior examination on 1/18/2024, compatible with interval development of intracranial metastatic disease  - See AML section above regarding further work up      7. Cardiac  - ECHO 3/8/24:  EF 55-60% Mild tricuspid regurgitation. Mild mitral regurgitation    8. Genetics  - Referral placed to [facility] genetics for possible germline mutation. Sister tested positive for BRCA.  - Per [facility] genetics: He has been referred to [facility] genetics as diagnosis of AML requires skin biopsy.  Will try to expedite.    9. Social:  - He was alone for his visit 8/21/24  - Father present at visit 9/9/24 and engaged throughoutx  - Aunt present 9/23/24        - DVT Prophylaxis: Prophylaxis on hold d/t contraindication  Contraindications to pharmacologic prophylaxis: Thrombocytopenia  Contraindications to mechanical prophylaxis: None        - Disposition: Uncertain    The patient was seen and examined by Dr. Bao Flowers, APRN - CNP    Gary Gore MD  [facility]  [phone]

## 2024-10-02 VITALS
DIASTOLIC BLOOD PRESSURE: 82 MMHG | TEMPERATURE: 98.3 F | SYSTOLIC BLOOD PRESSURE: 126 MMHG | HEART RATE: 71 BPM | HEIGHT: 70 IN | BODY MASS INDEX: 22.1 KG/M2 | WEIGHT: 154.4 LBS | OXYGEN SATURATION: 100 % | RESPIRATION RATE: 18 BRPM

## 2024-10-02 LAB
ALBUMIN SERPL-MCNC: 3.7 G/DL (ref 3.4–5)
ALP SERPL-CCNC: 113 U/L (ref 40–129)
ALT SERPL-CCNC: 20 U/L (ref 10–40)
ANION GAP SERPL CALCULATED.3IONS-SCNC: 6 MMOL/L (ref 3–16)
ANISOCYTOSIS BLD QL SMEAR: ABNORMAL
AST SERPL-CCNC: 8 U/L (ref 15–37)
BASOPHILS # BLD: 0 K/UL (ref 0–0.2)
BASOPHILS NFR BLD: 0 %
BILIRUB DIRECT SERPL-MCNC: 0.2 MG/DL (ref 0–0.3)
BILIRUB INDIRECT SERPL-MCNC: 1 MG/DL (ref 0–1)
BILIRUB SERPL-MCNC: 1.2 MG/DL (ref 0–1)
BUN SERPL-MCNC: 8 MG/DL (ref 7–20)
CALCIUM SERPL-MCNC: 9 MG/DL (ref 8.3–10.6)
CHLORIDE SERPL-SCNC: 104 MMOL/L (ref 99–110)
CO2 SERPL-SCNC: 29 MMOL/L (ref 21–32)
CREAT SERPL-MCNC: 0.6 MG/DL (ref 0.9–1.3)
DEPRECATED RDW RBC AUTO: 13.7 % (ref 12.4–15.4)
EOSINOPHIL # BLD: 0 K/UL (ref 0–0.6)
EOSINOPHIL NFR BLD: 0 %
GFR SERPLBLD CREATININE-BSD FMLA CKD-EPI: >90 ML/MIN/{1.73_M2}
GLUCOSE SERPL-MCNC: 106 MG/DL (ref 70–99)
HCT VFR BLD AUTO: 21.8 % (ref 40.5–52.5)
HGB BLD-MCNC: 7.6 G/DL (ref 13.5–17.5)
LDH SERPL L TO P-CCNC: 99 U/L (ref 100–190)
LYMPHOCYTES # BLD: 0.8 K/UL (ref 1–5.1)
LYMPHOCYTES NFR BLD: 96 %
MACROCYTES BLD QL SMEAR: ABNORMAL
MAGNESIUM SERPL-MCNC: 1.7 MG/DL (ref 1.8–2.4)
MCH RBC QN AUTO: 29.8 PG (ref 26–34)
MCHC RBC AUTO-ENTMCNC: 35 G/DL (ref 31–36)
MCV RBC AUTO: 85.2 FL (ref 80–100)
MONOCYTES # BLD: 0 K/UL (ref 0–1.3)
MONOCYTES NFR BLD: 0 %
NEUTROPHILS # BLD: 0 K/UL (ref 1.7–7.7)
NEUTROPHILS NFR BLD: 4 %
PHOSPHATE SERPL-MCNC: 4 MG/DL (ref 2.5–4.9)
PLATELET # BLD AUTO: 32 K/UL (ref 135–450)
PMV BLD AUTO: 7.6 FL (ref 5–10.5)
POTASSIUM SERPL-SCNC: 3.8 MMOL/L (ref 3.5–5.1)
PROT SERPL-MCNC: 6.3 G/DL (ref 6.4–8.2)
RBC # BLD AUTO: 2.56 M/UL (ref 4.2–5.9)
SODIUM SERPL-SCNC: 139 MMOL/L (ref 136–145)
URATE SERPL-MCNC: 3.2 MG/DL (ref 3.5–7.2)
WBC # BLD AUTO: 0.8 K/UL (ref 4–11)

## 2024-10-02 PROCEDURE — 83735 ASSAY OF MAGNESIUM: CPT

## 2024-10-02 PROCEDURE — 80048 BASIC METABOLIC PNL TOTAL CA: CPT

## 2024-10-02 PROCEDURE — 83615 LACTATE (LD) (LDH) ENZYME: CPT

## 2024-10-02 PROCEDURE — 84100 ASSAY OF PHOSPHORUS: CPT

## 2024-10-02 PROCEDURE — 36592 COLLECT BLOOD FROM PICC: CPT

## 2024-10-02 PROCEDURE — 2580000003 HC RX 258: Performed by: PHYSICIAN ASSISTANT

## 2024-10-02 PROCEDURE — 85025 COMPLETE CBC W/AUTO DIFF WBC: CPT

## 2024-10-02 PROCEDURE — 84550 ASSAY OF BLOOD/URIC ACID: CPT

## 2024-10-02 PROCEDURE — 6370000000 HC RX 637 (ALT 250 FOR IP): Performed by: PHYSICIAN ASSISTANT

## 2024-10-02 PROCEDURE — 80076 HEPATIC FUNCTION PANEL: CPT

## 2024-10-02 RX ORDER — VANCOMYCIN HYDROCHLORIDE 125 MG/1
125 CAPSULE ORAL 2 TIMES DAILY
Qty: 20 CAPSULE | Refills: 0 | Status: SHIPPED | OUTPATIENT
Start: 2024-10-02 | End: 2024-10-12

## 2024-10-02 RX ORDER — LEVOFLOXACIN 500 MG/1
500 TABLET, FILM COATED ORAL DAILY
Qty: 10 TABLET | Refills: 0 | Status: SHIPPED | OUTPATIENT
Start: 2024-10-03 | End: 2024-10-13

## 2024-10-02 RX ORDER — VORICONAZOLE 200 MG/1
200 TABLET, FILM COATED ORAL EVERY 12 HOURS SCHEDULED
Qty: 30 TABLET | Refills: 1
Start: 2024-10-02 | End: 2024-11-01

## 2024-10-02 RX ORDER — LEVETIRACETAM 750 MG/1
1500 TABLET ORAL 2 TIMES DAILY
Qty: 60 TABLET | Refills: 3 | Status: SHIPPED | OUTPATIENT
Start: 2024-10-02

## 2024-10-02 RX ADMIN — VORICONAZOLE 200 MG: 200 TABLET ORAL at 08:11

## 2024-10-02 RX ADMIN — PANTOPRAZOLE SODIUM 40 MG: 40 TABLET, DELAYED RELEASE ORAL at 06:50

## 2024-10-02 RX ADMIN — LEVOFLOXACIN 500 MG: 500 TABLET, FILM COATED ORAL at 08:11

## 2024-10-02 RX ADMIN — LEVETIRACETAM 1500 MG: 500 TABLET, FILM COATED ORAL at 08:11

## 2024-10-02 RX ADMIN — VANCOMYCIN HYDROCHLORIDE 125 MG: 125 CAPSULE ORAL at 08:11

## 2024-10-02 RX ADMIN — SODIUM CHLORIDE: 9 INJECTION, SOLUTION INTRAVENOUS at 10:29

## 2024-10-02 RX ADMIN — VALACYCLOVIR HYDROCHLORIDE 500 MG: 500 TABLET, FILM COATED ORAL at 08:12

## 2024-10-02 RX ADMIN — CYANOCOBALAMIN TAB 1000 MCG 1000 MCG: 1000 TAB at 08:11

## 2024-10-02 RX ADMIN — SODIUM CHLORIDE, PRESERVATIVE FREE 10 ML: 5 INJECTION INTRAVENOUS at 08:14

## 2024-10-02 RX ADMIN — Medication: at 08:12

## 2024-10-02 NOTE — DISCHARGE SUMMARY
3/25/24: No acute pathology. CT A/P- no mets               - CEA: 6.5  - s/p rectal mass resection 4/12/24. Path staging [id number].  - Referred to Dr. Russo for rectal ca follow up post resection.--> Needs to follow-up as an outpatient.  - Would likely not offer adjuvant therapy at this stage. Follow up on Oncology recs  Abd Pain  - CT a/p 5/31/24: small bilateral pleural effusions; Right middle lobe and bibasilar pulmonary airspace disease, likely persistent pneumonia. Component of superimposed edema is also possible; Punctate left nephrolithiasis.     6. Neuro:  H/O Oligodendroglioma 2017, seizures since 2009  - Cont Keppra 1500 mg PO BID  - s/p partial resection, radiation and Temodar  - MRI 8/28/24: Multiple intracranial, intra-axial enhancing foci have developed since the prior examination on 1/18/2024, compatible with interval development of intracranial metastatic disease  - See AML section above regarding further work up      7. Cardiac  - ECHO 3/8/24:  EF 55-60% Mild tricuspid regurgitation. Mild mitral regurgitation    8. Genetics  - Referral placed to [facility] genetics for possible germline mutation. Sister tested positive for BRCA.  - Per [facility] genetics: He has been referred to [facility] genetics as diagnosis of AML requires skin biopsy.  Will try to expedite.    9. Social:  - He was alone for his visit 8/21/24  - Father present at visit 9/9/24 and engaged throughoutx  - Aunt present 9/23/24      Condition on discharge: Stable     Discharge Instructions:  Follow-up at [facility] on Friday, 10/4/24 for a provider visit and labs.     The patient was advised on activity and dietary restrictions.    The patient was advised to follow up in the emergency department or contact the physician with any unresolved nausea/vomiting/diarrhea/pain or temperature greater than 100.5 F or any other unusual symptoms.       This discharge summary and plan was discussed and agreed upon with Dr. Gore.    KATHLEEN Abdi - CNP

## 2024-10-02 NOTE — CARE COORDINATION
Reviewed discharge instructions with patient and  family members (father).  Reviewed discharge medications including dosing, schedule, indication, and adverse reactions.  Reviewed which medications were already taken today and next dosage due for each medication.      Reviewed signs and symptoms that prompt a call to the physician and appropriate phone numbers. Reviewed follow up appointments that have been made in [facility] and Outpatient Oncology.  Low microbial diet, activity restrictions, and increased risk of infection were reviewed.     Patient is being discharged with IV access d/t need for ongoing therapy:      Type:  PICC                         Plan:continue   Next dressing change due on: 10/7  Cap changes due on: 10/4  CVC care and maintenance was reviewed with patient and family members.  Pt verbalizes understanding of line care and maintenance.      Patient verbalized understanding of all instructions and questions were answered to his. satisfaction.  Signed discharge instructions were given to the patient and a copy placed in the paper-lite chart.  Patient discharged to home per self with family members.    Reviewed medications with patient in detail assisted patient will setting up his med box for the week.  Reviewed doses. Patient is making progress on understanding medications and is compliant with using the medication organizer.      Mary Kate Baez

## 2024-10-02 NOTE — PLAN OF CARE
Problem: Safety - Adult  Goal: Free from fall injury  10/1/2024 1659 by Merly Win, RN  Outcome: Progressing  Orthostatic vital signs obtained at start of shift - see flowsheet for details.  Pt does not meet criteria for orthostasis.  Pt is a Med fall risk. See Apple Fall Score and ABCDS Injury Risk assessments.     - Screening for Orthostasis AND not a [place] Risk per APPLE/ABCDS: Pt bed is in low position, side rails up, call light and belongings are in reach.  Fall risk light is on outside pts room.  Pt encouraged to call for assistance as needed. Will continue with hourly rounds for PO intake, pain needs, toileting and repositioning as needed.      Problem: Hematologic - Adult  Goal: Maintains hematologic stability  10/1/2024 1659 by Merly Win, RN  Outcome: Progressing  Patient's hemoglobin this AM:   Recent Labs     10/01/24  0408   HGB 6.7*     Patient's platelet count this AM:   Recent Labs     10/01/24  0408   PLT 44*    Thrombocytopenia Precautions in place.  Patient showing no signs or symptoms of active bleeding. Patient received 1 unit PRBCs. Patient verbalizes understanding of all instructions. Will continue to assess and implement POC. Call light within reach and hourly rounding in place.      
  Problem: Safety - Adult  Goal: Free from fall injury  10/2/2024 1247 by Jennifer Mooney RN  Outcome: Completed   Instruct family/caregiver on patient safety   Based on caregiver fall risk screen, instruct family/caregiver to ask for assistance with transferring infant if caregiver noted to have fall risk factors     Problem: ABCDS Injury Assessment  Goal: Absence of physical injury  10/2/2024 1247 by Jennifer Mooney RN  Outcome: Completed     Problem: Hematologic - Adult  Goal: Maintains hematologic stability  10/2/2024 1247 by Jennifer Mooney RN  Outcome: Completed   Assess for signs and symptoms of bleeding or hemorrhage   Administer blood products/factors as ordered   Monitor labs for bleeding or clotting disorders       Problem: Pain  Goal: Verbalizes/displays adequate comfort level or baseline comfort level  10/2/2024 1247 by Jennifer Mooney RN  Outcome: Completed  Verbalizes/displays adequate comfort level or baseline comfort level:   Encourage patient to monitor pain and request assistance   Administer analgesics based on type and severity of pain and evaluate response   Consider cultural and social influences on pain and pain management   Implement non-pharmacological measures as appropriate and evaluate response   Assess pain using appropriate pain scale   Notify Licensed Independent Practitioner if interventions unsuccessful or patient reports new pain       
Problem: Safety - Adult  Goal: Free from fall injury  Outcome: Progressing  Free From Fall Injury:   Instruct family/caregiver on patient safety   Based on caregiver fall risk screen, instruct family/caregiver to ask for assistance with transferring infant if caregiver noted to have fall risk factors      Problem: ABCDS Injury Assessment  Goal: Absence of physical injury  Outcome: Progressing  Absence of Physical Injury: Implement safety measures based on patient assessment  Note: - Screening for Orthostasis AND not a [place] Risk per APPLE/ABCDS: Pt bed is in low position, side rails up, call light and belongings are in reach.  Fall risk light is on outside pts room.  Pt encouraged to call for assistance as needed. Will continue with hourly rounds for PO intake, pain needs, toileting and repositioning as needed.        Problem: Hematologic - Adult  Goal: Maintains hematologic stability  Outcome: Progressing  Maintains hematologic stability:   Assess for signs and symptoms of bleeding or hemorrhage   Administer blood products/factors as ordered   Monitor labs for bleeding or clotting disorders  Note: Patient's hemoglobin this AM:   Recent Labs     10/02/24  0419   HGB 7.6*     Patient's platelet count this AM:   Recent Labs     10/02/24  0419   PLT 32*    Thrombocytopenia not present at this time.  Patient showing no signs or symptoms of active bleeding.  Transfusion not indicated at this time.  Patient verbalizes understanding of all instructions. Will continue to assess and implement POC. Call light within reach and hourly rounding in place.        Problem: Pain  Goal: Verbalizes/displays adequate comfort level or baseline comfort level  Outcome: Progressing  Verbalizes/displays adequate comfort level or baseline comfort level:   Encourage patient to monitor pain and request assistance   Administer analgesics based on type and severity of pain and evaluate response   Consider cultural and social influences on 
Problem: Safety - Adult  Goal: Free from fall injury  Outcome: Progressing  Free From Fall Injury:   Instruct family/caregiver on patient safety   Based on caregiver fall risk screen, instruct family/caregiver to ask for assistance with transferring infant if caregiver noted to have fall risk factors      Problem: ABCDS Injury Assessment  Goal: Absence of physical injury  Outcome: Progressing  Absence of Physical Injury: Implement safety measures based on patient assessment  Note: - Screening for Orthostasis AND not a [place] Risk per APPLE/ABCDS: Pt bed is in low position, side rails up, call light and belongings are in reach.  Fall risk light is on outside pts room.  Pt encouraged to call for assistance as needed. Will continue with hourly rounds for PO intake, pain needs, toileting and repositioning as needed.        Problem: Hematologic - Adult  Goal: Maintains hematologic stability  Outcome: Progressing  Maintains hematologic stability:   Assess for signs and symptoms of bleeding or hemorrhage   Administer blood products/factors as ordered   Monitor labs for bleeding or clotting disorders  Note: Patient's hemoglobin this AM:   Recent Labs     10/01/24  0408   HGB 6.7*     Patient's platelet count this AM:   Recent Labs     10/01/24  0408   PLT 44*    Thrombocytopenia Precautions in place.  Patient showing no signs or symptoms of active bleeding.  Patient transfused blood products per orders - see flowsheet.  Patient verbalizes understanding of all instructions. Will continue to assess and implement POC. Call light within reach and hourly rounding in place.        Problem: Pain  Goal: Verbalizes/displays adequate comfort level or baseline comfort level  Outcome: Progressing  Verbalizes/displays adequate comfort level or baseline comfort level:   Encourage patient to monitor pain and request assistance   Administer analgesics based on type and severity of pain and evaluate response   Consider cultural and 
appropriate and evaluate response  Note: Patient with no pain so far this shift.

## 2024-10-02 NOTE — DISCHARGE INSTRUCTIONS
[facility] Discharge Instructions    Call for Questions/Concerns:  [phone] ([phone]) [facility] office  The phone number listed above is available 24 hrs/7 days per week  [facility] is open M-F 8am-4:30pm; Sat-Sun/Holidays 8am-1pm    Symptoms to Report Immediately:    Fever of 100.5 or greater  Vomiting without relief after use of anti-nausea medication  Severe abdominal cramping  Diarrhea: More than 3 loose, watery bowel movements in a 24 hour period  Unusual or excessive bleeding from your mouth, nose, rectum, bladder or vagina  Sudden onset of shortness of breath or chest pain  Signs/symptoms of infection: redness, warmth, swelling-particularly to central line site    Report to Physician's office within 24 hours:    Pain not relieved by pain medication  Change in urination-odor, cloudiness, frequency, or pain with urination  Flu-like symptoms  Skin changes-rash, hives, redness or peeling of skin    Additional Instructions:    Avoid people with colds, flu-like symptoms, or any sign of infection  Drink plenty of fluids-attempt to consume 2-3 liters ( ounces) of fluids/24 hour period  Continue low microbial diet until instructed by physician to resume normal diet  Bring all of your medications with you to your doctor's appointments  Bring your current medicine list to each hospital and office visit    You are being discharged with IV access due to need for ongoing therapy.  Below is pertinent information regarding your IV that your next provider may need to know:  Type:  PICC                         Plan:continue   Next dressing change due on: 10/7  Cap changes due on: 10/3  CVC care and maintenance was reviewed with patient and family members.  Pt verbalizes understanding of line care and maintenance.      10/2/2024 11:17 AM  Mary Kate Baez            My Discharge Checklist    Here at the [facility], we want to make sure you have the help you will need once you leave the hospital.

## 2024-10-04 ENCOUNTER — HOSPITAL ENCOUNTER (OUTPATIENT)
Dept: CT IMAGING | Age: 44
Discharge: HOME | End: 2024-10-04
Payer: MEDICAID

## 2024-10-04 ENCOUNTER — HOSPITAL ENCOUNTER (OUTPATIENT)
Dept: ONCOLOGY | Age: 44
Setting detail: INFUSION SERIES
Discharge: HOME | End: 2024-10-04
Payer: MEDICAID

## 2024-10-04 VITALS
DIASTOLIC BLOOD PRESSURE: 73 MMHG | TEMPERATURE: 98.1 F | OXYGEN SATURATION: 100 % | HEART RATE: 78 BPM | RESPIRATION RATE: 16 BRPM | SYSTOLIC BLOOD PRESSURE: 117 MMHG

## 2024-10-04 DIAGNOSIS — R51.9 NONINTRACTABLE HEADACHE, UNSPECIFIED CHRONICITY PATTERN, UNSPECIFIED HEADACHE TYPE: ICD-10-CM

## 2024-10-04 DIAGNOSIS — C92.00 ACUTE MYELOID LEUKEMIA NOT HAVING ACHIEVED REMISSION (HCC): Primary | ICD-10-CM

## 2024-10-04 DIAGNOSIS — C92.A0 ACUTE MYELOID LEUKEMIA WITH MULTILINEAGE DYSPLASIA NOT HAVING ACHIEVED REMISSION (HCC): ICD-10-CM

## 2024-10-04 PROCEDURE — 70450 CT HEAD/BRAIN W/O DYE: CPT

## 2024-10-04 PROCEDURE — 36430 TRANSFUSION BLD/BLD COMPNT: CPT

## 2024-10-04 PROCEDURE — P9036 PLATELET PHERESIS IRRADIATED: HCPCS

## 2024-10-04 RX ORDER — DIPHENHYDRAMINE HYDROCHLORIDE 50 MG/ML
50 INJECTION INTRAMUSCULAR; INTRAVENOUS
OUTPATIENT
Start: 2024-10-04

## 2024-10-04 RX ORDER — ONDANSETRON 2 MG/ML
8 INJECTION INTRAMUSCULAR; INTRAVENOUS
OUTPATIENT
Start: 2024-10-04

## 2024-10-04 RX ORDER — ALBUTEROL SULFATE 90 UG/1
4 INHALANT RESPIRATORY (INHALATION) PRN
OUTPATIENT
Start: 2024-10-04

## 2024-10-04 RX ORDER — SODIUM CHLORIDE 0.9 % (FLUSH) 0.9 %
5-40 SYRINGE (ML) INJECTION PRN
OUTPATIENT
Start: 2024-10-04

## 2024-10-04 RX ORDER — SODIUM CHLORIDE 9 MG/ML
INJECTION, SOLUTION INTRAVENOUS CONTINUOUS
OUTPATIENT
Start: 2024-10-04

## 2024-10-04 RX ORDER — ACETAMINOPHEN 325 MG/1
650 TABLET ORAL
OUTPATIENT
Start: 2024-10-04

## 2024-10-04 RX ORDER — SODIUM CHLORIDE 9 MG/ML
20 INJECTION, SOLUTION INTRAVENOUS CONTINUOUS
OUTPATIENT
Start: 2024-10-04

## 2024-10-04 RX ORDER — EPINEPHRINE 1 MG/ML
0.3 INJECTION, SOLUTION INTRAMUSCULAR; SUBCUTANEOUS PRN
OUTPATIENT
Start: 2024-10-04

## 2024-10-04 RX ORDER — SODIUM CHLORIDE 9 MG/ML
25 INJECTION, SOLUTION INTRAVENOUS PRN
OUTPATIENT
Start: 2024-10-04

## 2024-10-04 NOTE — PROGRESS NOTES
Patient's platelet count this AM: 13  Pt seen at [facility] today for Platelet transfusion  for above lab values per order. Informed consent verified. No Pre-medications ordered with no previous transfusion reaction history. Transfused per policy. Pt tolerated transfusion well and without incident. Pt verbalizes understanding of discharge instructions.  Discharged to home.    Yudelka Peres RN

## 2024-10-07 ENCOUNTER — HOSPITAL ENCOUNTER (OUTPATIENT)
Dept: ONCOLOGY | Age: 44
Setting detail: INFUSION SERIES
Discharge: HOME | End: 2024-10-07
Payer: MEDICAID

## 2024-10-07 VITALS
TEMPERATURE: 98.7 F | SYSTOLIC BLOOD PRESSURE: 114 MMHG | DIASTOLIC BLOOD PRESSURE: 71 MMHG | RESPIRATION RATE: 16 BRPM | HEART RATE: 80 BPM | OXYGEN SATURATION: 100 %

## 2024-10-07 DIAGNOSIS — C92.00 ACUTE MYELOID LEUKEMIA NOT HAVING ACHIEVED REMISSION (HCC): Primary | ICD-10-CM

## 2024-10-07 PROCEDURE — P9036 PLATELET PHERESIS IRRADIATED: HCPCS

## 2024-10-07 PROCEDURE — 36430 TRANSFUSION BLD/BLD COMPNT: CPT

## 2024-10-07 RX ORDER — SODIUM CHLORIDE 9 MG/ML
INJECTION, SOLUTION INTRAVENOUS CONTINUOUS
OUTPATIENT
Start: 2024-10-07

## 2024-10-07 RX ORDER — EPINEPHRINE 1 MG/ML
0.3 INJECTION, SOLUTION INTRAMUSCULAR; SUBCUTANEOUS PRN
OUTPATIENT
Start: 2024-10-07

## 2024-10-07 RX ORDER — ACETAMINOPHEN 325 MG/1
650 TABLET ORAL
OUTPATIENT
Start: 2024-10-07

## 2024-10-07 RX ORDER — SODIUM CHLORIDE 9 MG/ML
20 INJECTION, SOLUTION INTRAVENOUS CONTINUOUS
OUTPATIENT
Start: 2024-10-07

## 2024-10-07 RX ORDER — DIPHENHYDRAMINE HYDROCHLORIDE 50 MG/ML
50 INJECTION INTRAMUSCULAR; INTRAVENOUS
OUTPATIENT
Start: 2024-10-07

## 2024-10-07 RX ORDER — ALBUTEROL SULFATE 90 UG/1
4 INHALANT RESPIRATORY (INHALATION) PRN
OUTPATIENT
Start: 2024-10-07

## 2024-10-07 RX ORDER — ONDANSETRON 2 MG/ML
8 INJECTION INTRAMUSCULAR; INTRAVENOUS
OUTPATIENT
Start: 2024-10-07

## 2024-10-07 RX ORDER — SODIUM CHLORIDE 9 MG/ML
25 INJECTION, SOLUTION INTRAVENOUS PRN
OUTPATIENT
Start: 2024-10-07

## 2024-10-07 RX ORDER — SODIUM CHLORIDE 0.9 % (FLUSH) 0.9 %
5-40 SYRINGE (ML) INJECTION PRN
OUTPATIENT
Start: 2024-10-07

## 2024-10-07 NOTE — PROGRESS NOTES
Patient's hemoglobin this AM: 7.8  Patient's platelet count this AM: 12  Pt seen at [facility] today for  Platelet transfusion  for above lab values per order from Dr. Eduardo Nguyen. Informed consent verified. No Pre-medications ordered with no previous transfusion reaction history. Transfused per policy. Pt tolerated transfusion well and without incident.  Pt verbalizes understanding of discharge instructions.  Discharged ambulatory to home with self.    Jessy Nolan RN

## 2024-10-10 ENCOUNTER — APPOINTMENT (OUTPATIENT)
Dept: CT IMAGING | Age: 44
DRG: 690 | End: 2024-10-10
Attending: INTERNAL MEDICINE
Payer: MEDICAID

## 2024-10-10 ENCOUNTER — HOSPITAL ENCOUNTER (INPATIENT)
Age: 44
LOS: 4 days | Discharge: HOME | DRG: 690 | End: 2024-10-14
Attending: INTERNAL MEDICINE | Admitting: INTERNAL MEDICINE
Payer: MEDICAID

## 2024-10-10 ENCOUNTER — APPOINTMENT (OUTPATIENT)
Dept: GENERAL RADIOLOGY | Age: 44
DRG: 690 | End: 2024-10-10
Attending: INTERNAL MEDICINE
Payer: MEDICAID

## 2024-10-10 ENCOUNTER — HOSPITAL ENCOUNTER (OUTPATIENT)
Dept: ONCOLOGY | Age: 44
Setting detail: INFUSION SERIES
Discharge: HOME | End: 2024-10-10
Payer: MEDICAID

## 2024-10-10 VITALS
TEMPERATURE: 98.3 F | SYSTOLIC BLOOD PRESSURE: 118 MMHG | DIASTOLIC BLOOD PRESSURE: 70 MMHG | OXYGEN SATURATION: 100 % | RESPIRATION RATE: 20 BRPM | HEART RATE: 98 BPM

## 2024-10-10 DIAGNOSIS — D70.9 NEUTROPENIC FEVER (HCC): Primary | ICD-10-CM

## 2024-10-10 DIAGNOSIS — C92.00 ACUTE MYELOID LEUKEMIA NOT HAVING ACHIEVED REMISSION (HCC): Primary | ICD-10-CM

## 2024-10-10 DIAGNOSIS — R50.81 NEUTROPENIC FEVER (HCC): Primary | ICD-10-CM

## 2024-10-10 PROBLEM — R10.9 ABDOMINAL PAIN: Status: ACTIVE | Noted: 2024-10-10

## 2024-10-10 LAB
ABO + RH BLD: NORMAL
AMYLASE SERPL-CCNC: 50 U/L (ref 25–115)
ANION GAP SERPL CALCULATED.3IONS-SCNC: 9 MMOL/L (ref 3–16)
APTT BLD: 39.9 SEC (ref 22.1–36.4)
BLD GP AB SCN SERPL QL: NORMAL
BLOOD BANK DISPENSE STATUS: NORMAL
BLOOD BANK PRODUCT CODE: NORMAL
BPU ID: NORMAL
BUN SERPL-MCNC: 11 MG/DL (ref 7–20)
C DIFF TOX A+B STL QL IA: NEGATIVE
CALCIUM SERPL-MCNC: 8.3 MG/DL (ref 8.3–10.6)
CHLORIDE SERPL-SCNC: 99 MMOL/L (ref 99–110)
CO2 SERPL-SCNC: 29 MMOL/L (ref 21–32)
CREAT SERPL-MCNC: <0.5 MG/DL (ref 0.9–1.3)
DESCRIPTION BLOOD BANK: NORMAL
GFR SERPLBLD CREATININE-BSD FMLA CKD-EPI: >90 ML/MIN/{1.73_M2}
GLUCOSE SERPL-MCNC: 101 MG/DL (ref 70–99)
INR PPP: 1.29 (ref 0.85–1.15)
LIPASE SERPL-CCNC: 13 U/L (ref 13–60)
MAGNESIUM SERPL-MCNC: 1.7 MG/DL (ref 1.8–2.4)
POTASSIUM SERPL-SCNC: 3.9 MMOL/L (ref 3.5–5.1)
PROTHROMBIN TIME: 16.3 SEC (ref 11.9–14.9)
SODIUM SERPL-SCNC: 137 MMOL/L (ref 136–145)

## 2024-10-10 PROCEDURE — 2580000003 HC RX 258: Performed by: NURSE PRACTITIONER

## 2024-10-10 PROCEDURE — 93005 ELECTROCARDIOGRAM TRACING: CPT | Performed by: NURSE PRACTITIONER

## 2024-10-10 PROCEDURE — 83735 ASSAY OF MAGNESIUM: CPT

## 2024-10-10 PROCEDURE — 85730 THROMBOPLASTIN TIME PARTIAL: CPT

## 2024-10-10 PROCEDURE — 86850 RBC ANTIBODY SCREEN: CPT

## 2024-10-10 PROCEDURE — 70450 CT HEAD/BRAIN W/O DYE: CPT

## 2024-10-10 PROCEDURE — A4217 STERILE WATER/SALINE, 500 ML: HCPCS | Performed by: NURSE PRACTITIONER

## 2024-10-10 PROCEDURE — 85610 PROTHROMBIN TIME: CPT

## 2024-10-10 PROCEDURE — 80048 BASIC METABOLIC PNL TOTAL CA: CPT

## 2024-10-10 PROCEDURE — 82150 ASSAY OF AMYLASE: CPT

## 2024-10-10 PROCEDURE — 86900 BLOOD TYPING SEROLOGIC ABO: CPT

## 2024-10-10 PROCEDURE — 74177 CT ABD & PELVIS W/CONTRAST: CPT

## 2024-10-10 PROCEDURE — P9040 RBC LEUKOREDUCED IRRADIATED: HCPCS

## 2024-10-10 PROCEDURE — 6370000000 HC RX 637 (ALT 250 FOR IP): Performed by: NURSE PRACTITIONER

## 2024-10-10 PROCEDURE — 6360000004 HC RX CONTRAST MEDICATION: Performed by: INTERNAL MEDICINE

## 2024-10-10 PROCEDURE — 87324 CLOSTRIDIUM AG IA: CPT

## 2024-10-10 PROCEDURE — 86923 COMPATIBILITY TEST ELECTRIC: CPT

## 2024-10-10 PROCEDURE — 86901 BLOOD TYPING SEROLOGIC RH(D): CPT

## 2024-10-10 PROCEDURE — 83690 ASSAY OF LIPASE: CPT

## 2024-10-10 PROCEDURE — 71045 X-RAY EXAM CHEST 1 VIEW: CPT

## 2024-10-10 PROCEDURE — 6360000002 HC RX W HCPCS: Performed by: NURSE PRACTITIONER

## 2024-10-10 PROCEDURE — 36430 TRANSFUSION BLD/BLD COMPNT: CPT

## 2024-10-10 PROCEDURE — 36592 COLLECT BLOOD FROM PICC: CPT

## 2024-10-10 PROCEDURE — 2060000000 HC ICU INTERMEDIATE R&B

## 2024-10-10 RX ORDER — MORPHINE SULFATE 4 MG/ML
4 INJECTION INTRAVENOUS
Status: DISCONTINUED | OUTPATIENT
Start: 2024-10-10 | End: 2024-10-11

## 2024-10-10 RX ORDER — PANTOPRAZOLE SODIUM 40 MG/1
40 TABLET, DELAYED RELEASE ORAL
Status: DISCONTINUED | OUTPATIENT
Start: 2024-10-10 | End: 2024-10-14 | Stop reason: HOSPADM

## 2024-10-10 RX ORDER — ALBUTEROL SULFATE 90 UG/1
4 INHALANT RESPIRATORY (INHALATION) PRN
OUTPATIENT
Start: 2024-10-10

## 2024-10-10 RX ORDER — SODIUM CHLORIDE 0.9 % (FLUSH) 0.9 %
5-40 SYRINGE (ML) INJECTION PRN
OUTPATIENT
Start: 2024-10-10

## 2024-10-10 RX ORDER — LEVOFLOXACIN 500 MG/1
500 TABLET, FILM COATED ORAL DAILY
Status: DISCONTINUED | OUTPATIENT
Start: 2024-10-11 | End: 2024-10-11

## 2024-10-10 RX ORDER — MORPHINE SULFATE 2 MG/ML
2 INJECTION, SOLUTION INTRAMUSCULAR; INTRAVENOUS
Status: DISCONTINUED | OUTPATIENT
Start: 2024-10-10 | End: 2024-10-11

## 2024-10-10 RX ORDER — LEVETIRACETAM 500 MG/1
1500 TABLET ORAL 2 TIMES DAILY
Status: DISCONTINUED | OUTPATIENT
Start: 2024-10-10 | End: 2024-10-14 | Stop reason: HOSPADM

## 2024-10-10 RX ORDER — ACETAMINOPHEN 325 MG/1
650 TABLET ORAL EVERY 6 HOURS PRN
Status: DISCONTINUED | OUTPATIENT
Start: 2024-10-10 | End: 2024-10-14 | Stop reason: HOSPADM

## 2024-10-10 RX ORDER — PROCHLORPERAZINE MALEATE 10 MG
10 TABLET ORAL EVERY 4 HOURS PRN
Status: DISCONTINUED | OUTPATIENT
Start: 2024-10-10 | End: 2024-10-14 | Stop reason: HOSPADM

## 2024-10-10 RX ORDER — DIPHENHYDRAMINE HYDROCHLORIDE 50 MG/ML
50 INJECTION INTRAMUSCULAR; INTRAVENOUS
OUTPATIENT
Start: 2024-10-10

## 2024-10-10 RX ORDER — ACETAMINOPHEN 325 MG/1
650 TABLET ORAL
OUTPATIENT
Start: 2024-10-10

## 2024-10-10 RX ORDER — LANOLIN ALCOHOL/MO/W.PET/CERES
1000 CREAM (GRAM) TOPICAL DAILY
Status: DISCONTINUED | OUTPATIENT
Start: 2024-10-11 | End: 2024-10-14 | Stop reason: HOSPADM

## 2024-10-10 RX ORDER — SODIUM CHLORIDE 9 MG/ML
25 INJECTION, SOLUTION INTRAVENOUS PRN
OUTPATIENT
Start: 2024-10-10

## 2024-10-10 RX ORDER — DIPHENHYDRAMINE HCL 25 MG
25 TABLET ORAL NIGHTLY PRN
Status: COMPLETED | OUTPATIENT
Start: 2024-10-10 | End: 2024-10-11

## 2024-10-10 RX ORDER — IOPAMIDOL 755 MG/ML
75 INJECTION, SOLUTION INTRAVASCULAR
Status: COMPLETED | OUTPATIENT
Start: 2024-10-10 | End: 2024-10-10

## 2024-10-10 RX ORDER — SODIUM CHLORIDE 9 MG/ML
INJECTION, SOLUTION INTRAVENOUS CONTINUOUS
OUTPATIENT
Start: 2024-10-10

## 2024-10-10 RX ORDER — SODIUM CHLORIDE 9 MG/ML
INJECTION, SOLUTION INTRAVENOUS PRN
Status: DISCONTINUED | OUTPATIENT
Start: 2024-10-10 | End: 2024-10-14 | Stop reason: HOSPADM

## 2024-10-10 RX ORDER — EPINEPHRINE 1 MG/ML
0.3 INJECTION, SOLUTION INTRAMUSCULAR; SUBCUTANEOUS PRN
OUTPATIENT
Start: 2024-10-10

## 2024-10-10 RX ORDER — SODIUM CHLORIDE 9 MG/ML
20 INJECTION, SOLUTION INTRAVENOUS CONTINUOUS
OUTPATIENT
Start: 2024-10-10

## 2024-10-10 RX ORDER — MAGNESIUM SULFATE IN WATER 40 MG/ML
4000 INJECTION, SOLUTION INTRAVENOUS PRN
Status: DISCONTINUED | OUTPATIENT
Start: 2024-10-10 | End: 2024-10-14 | Stop reason: HOSPADM

## 2024-10-10 RX ORDER — VANCOMYCIN HYDROCHLORIDE 125 MG/1
125 CAPSULE ORAL 2 TIMES DAILY
Status: DISCONTINUED | OUTPATIENT
Start: 2024-10-10 | End: 2024-10-14 | Stop reason: HOSPADM

## 2024-10-10 RX ORDER — SODIUM CHLORIDE 0.9 % (FLUSH) 0.9 %
5-40 SYRINGE (ML) INJECTION EVERY 12 HOURS SCHEDULED
Status: DISCONTINUED | OUTPATIENT
Start: 2024-10-10 | End: 2024-10-14 | Stop reason: HOSPADM

## 2024-10-10 RX ORDER — SODIUM CHLORIDE 9 MG/ML
INJECTION, SOLUTION INTRAVENOUS CONTINUOUS
Status: DISCONTINUED | OUTPATIENT
Start: 2024-10-10 | End: 2024-10-14 | Stop reason: HOSPADM

## 2024-10-10 RX ORDER — ONDANSETRON 2 MG/ML
8 INJECTION INTRAMUSCULAR; INTRAVENOUS
OUTPATIENT
Start: 2024-10-10

## 2024-10-10 RX ORDER — VORICONAZOLE 200 MG/1
200 TABLET, FILM COATED ORAL EVERY 12 HOURS SCHEDULED
Status: DISCONTINUED | OUTPATIENT
Start: 2024-10-10 | End: 2024-10-14 | Stop reason: HOSPADM

## 2024-10-10 RX ORDER — VALACYCLOVIR HYDROCHLORIDE 500 MG/1
500 TABLET, FILM COATED ORAL 2 TIMES DAILY
Status: DISCONTINUED | OUTPATIENT
Start: 2024-10-10 | End: 2024-10-14 | Stop reason: HOSPADM

## 2024-10-10 RX ORDER — POTASSIUM CHLORIDE 29.8 MG/ML
20 INJECTION INTRAVENOUS PRN
Status: DISCONTINUED | OUTPATIENT
Start: 2024-10-10 | End: 2024-10-14 | Stop reason: HOSPADM

## 2024-10-10 RX ORDER — SODIUM CHLORIDE 0.9 % (FLUSH) 0.9 %
5-40 SYRINGE (ML) INJECTION PRN
Status: DISCONTINUED | OUTPATIENT
Start: 2024-10-10 | End: 2024-10-14 | Stop reason: HOSPADM

## 2024-10-10 RX ORDER — ONDANSETRON 4 MG/1
8 TABLET, ORALLY DISINTEGRATING ORAL EVERY 8 HOURS PRN
Status: DISCONTINUED | OUTPATIENT
Start: 2024-10-10 | End: 2024-10-11

## 2024-10-10 RX ORDER — GUAIFENESIN 200 MG/10ML
LIQUID ORAL 2 TIMES DAILY
Status: DISCONTINUED | OUTPATIENT
Start: 2024-10-10 | End: 2024-10-14 | Stop reason: HOSPADM

## 2024-10-10 RX ADMIN — SODIUM CHLORIDE: 9 INJECTION, SOLUTION INTRAVENOUS at 18:44

## 2024-10-10 RX ADMIN — VORICONAZOLE 200 MG: 200 TABLET ORAL at 20:36

## 2024-10-10 RX ADMIN — VANCOMYCIN HYDROCHLORIDE 125 MG: 125 CAPSULE ORAL at 20:36

## 2024-10-10 RX ADMIN — LEVETIRACETAM 1500 MG: 500 TABLET, FILM COATED ORAL at 20:36

## 2024-10-10 RX ADMIN — Medication: at 20:37

## 2024-10-10 RX ADMIN — VALACYCLOVIR HYDROCHLORIDE 500 MG: 500 TABLET, FILM COATED ORAL at 20:36

## 2024-10-10 RX ADMIN — IOPAMIDOL 75 ML: 755 INJECTION, SOLUTION INTRAVENOUS at 20:01

## 2024-10-10 RX ADMIN — SODIUM CHLORIDE, PRESERVATIVE FREE 10 ML: 5 INJECTION INTRAVENOUS at 20:37

## 2024-10-10 RX ADMIN — MORPHINE SULFATE 4 MG: 4 INJECTION INTRAVENOUS at 16:41

## 2024-10-10 RX ADMIN — SODIUM CHLORIDE 15 ML: 900 IRRIGANT IRRIGATION at 20:38

## 2024-10-10 ASSESSMENT — PAIN SCALES - GENERAL
PAINLEVEL_OUTOF10: 1
PAINLEVEL_OUTOF10: 0
PAINLEVEL_OUTOF10: 7
PAINLEVEL_OUTOF10: 0
PAINLEVEL_OUTOF10: 6

## 2024-10-10 ASSESSMENT — PAIN DESCRIPTION - PAIN TYPE: TYPE: ACUTE PAIN

## 2024-10-10 ASSESSMENT — PAIN DESCRIPTION - ONSET: ONSET: GRADUAL

## 2024-10-10 ASSESSMENT — PAIN DESCRIPTION - ORIENTATION
ORIENTATION: MID
ORIENTATION: LOWER
ORIENTATION: MID

## 2024-10-10 ASSESSMENT — PAIN DESCRIPTION - FREQUENCY: FREQUENCY: INTERMITTENT

## 2024-10-10 ASSESSMENT — PAIN SCALES - WONG BAKER: WONGBAKER_NUMERICALRESPONSE: NO HURT

## 2024-10-10 ASSESSMENT — PAIN DESCRIPTION - DESCRIPTORS
DESCRIPTORS: CRAMPING
DESCRIPTORS: ACHING;CRAMPING
DESCRIPTORS: ACHING

## 2024-10-10 ASSESSMENT — PAIN - FUNCTIONAL ASSESSMENT
PAIN_FUNCTIONAL_ASSESSMENT: PREVENTS OR INTERFERES SOME ACTIVE ACTIVITIES AND ADLS
PAIN_FUNCTIONAL_ASSESSMENT: ACTIVITIES ARE NOT PREVENTED

## 2024-10-10 ASSESSMENT — PAIN DESCRIPTION - LOCATION
LOCATION: BACK
LOCATION: ABDOMEN
LOCATION: ABDOMEN

## 2024-10-10 NOTE — PLAN OF CARE
Problem: Pain  Goal: Verbalizes/displays adequate comfort level or baseline comfort level  Outcome: Progressing  Flowsheets (Taken 10/10/2024 1744)  Verbalizes/displays adequate comfort level or baseline comfort level:   Encourage patient to monitor pain and request assistance   Assess pain using appropriate pain scale   Implement non-pharmacological measures as appropriate and evaluate response     Problem: Safety - Adult  Goal: Free from fall injury  Outcome: Progressing  Flowsheets (Taken 10/10/2024 1744)  Free From Fall Injury:   Instruct family/caregiver on patient safety   Based on caregiver fall risk screen, instruct family/caregiver to ask for assistance with transferring infant if caregiver noted to have fall risk factors

## 2024-10-10 NOTE — H&P
mL/hr for poor PO intake for >5 days     5. GI / Nutrition:   GERD:  - Continue protonix 40mg BID   Nutrition:    - Low microbial diet  - Avoid greasy/spicy food  - Encourage oral nutrition.  Malnutrition--> Improved, s/p feeding tube  Diarrhea--> Stable   - HX Re-current C diff Colitis: see ID above  - GI panel: negative 3/17/24  - Repeat C.diff: negative 5/28/24  Diarrhea  - Imodium prn   - Will refer to GI 9/9/24 as he reports these bowel movement are formed but keep him in bathroom for hours after eating  Abdominal Pain:  - CT a/p 5/31/24: small bilateral pleural effusions; Right middle lobe and bibasilar pulmonary airspace disease, likely persistent pneumonia. Component of superimposed edema is also possible; Punctate left nephrolithiasis.  - CT a/p 10/10/24: pending on admission  - Amylase/Lipase 10/10/24: WNL    Rectal Adenocarcinoma: Stage T1N0. Mismatch repair gene expression by IHC-intact with low probability for MSI-H.   - EGD ([facility]) reportedly unremarkable  - Colonoscopy 3/22/24: rectal lesion: Adenocarcinoma, at least adenocarcinoma in situ,  MRI pelvis showed 2.7 cm rectal mass with no involvement of anal sphincter, mesorectal fascia or any mesorectal lymph nodes.  - Pelvic MRI 3/25/24: Radiological Stage: T 1/2, N 0  MRF: Clear. Sphincter involvement: No. Suspicious extra mesorectal nodes: None. EMVI: Absent  - CT chest 3/25/24: No acute pathology. CT A/P- no mets               - CEA: 6.5  - s/p rectal mass resection 4/12/24. Path staging T1N0M0.  - Referred to Dr. Russo for rectal ca follow up post resection.--> Needs to follow-up as an outpatient.  - Would likely not offer adjuvant therapy at this stage. Follow up on Oncology recs       6. Neuro:  H/O Oligodendroglioma 2017, seizures since 2009  - Cont on Keppra 1500 mg PO BID  - s/p partial resection, radiation and Temodar  - MRI 8/28/24: Multiple intracranial, intra-axial enhancing foci have developed since the prior examination on

## 2024-10-10 NOTE — PROGRESS NOTES
Patient's hemoglobin this AM: 6.7  Patient's platelet count this AM: 14  Pt seen at [facility] today for PRBCs for above lab values per order from Dr. Eduardo Nguyen. Informed consent verified. No Pre-medications ordered with no previous transfusion reaction history. Transfused per policy. Pt tolerated transfusion well and without incident.  Pt verbalizes understanding of discharge instructions.  Discharged ambulatory to home with family.    Niya Dinero RN

## 2024-10-11 LAB
ABO + RH BLD: NORMAL
ALBUMIN SERPL-MCNC: 3.5 G/DL (ref 3.4–5)
ALP SERPL-CCNC: 81 U/L (ref 40–129)
ALT SERPL-CCNC: 9 U/L (ref 10–40)
ANION GAP SERPL CALCULATED.3IONS-SCNC: 7 MMOL/L (ref 3–16)
AST SERPL-CCNC: <5 U/L (ref 15–37)
BILIRUB DIRECT SERPL-MCNC: 0.3 MG/DL (ref 0–0.3)
BILIRUB INDIRECT SERPL-MCNC: 1 MG/DL (ref 0–1)
BILIRUB SERPL-MCNC: 1.3 MG/DL (ref 0–1)
BILIRUB UR QL STRIP.AUTO: NEGATIVE
BLD GP AB SCN SERPL QL: NORMAL
BUN SERPL-MCNC: 9 MG/DL (ref 7–20)
CALCIUM SERPL-MCNC: 8.5 MG/DL (ref 8.3–10.6)
CHLORIDE SERPL-SCNC: 98 MMOL/L (ref 99–110)
CLARITY UR: CLEAR
CO2 SERPL-SCNC: 29 MMOL/L (ref 21–32)
COLOR UR: YELLOW
CREAT SERPL-MCNC: 0.6 MG/DL (ref 0.9–1.3)
DEPRECATED RDW RBC AUTO: 13.7 % (ref 12.4–15.4)
GFR SERPLBLD CREATININE-BSD FMLA CKD-EPI: >90 ML/MIN/{1.73_M2}
GLUCOSE SERPL-MCNC: 101 MG/DL (ref 70–99)
GLUCOSE UR STRIP.AUTO-MCNC: NEGATIVE MG/DL
HCT VFR BLD AUTO: 20.3 % (ref 40.5–52.5)
HGB BLD-MCNC: 7 G/DL (ref 13.5–17.5)
HGB UR QL STRIP.AUTO: NEGATIVE
KETONES UR STRIP.AUTO-MCNC: NEGATIVE MG/DL
LACTATE BLDV-SCNC: 0.4 MMOL/L (ref 0.4–2)
LDH SERPL L TO P-CCNC: 83 U/L (ref 100–190)
LEUKOCYTE ESTERASE UR QL STRIP.AUTO: NEGATIVE
MAGNESIUM SERPL-MCNC: 1.8 MG/DL (ref 1.8–2.4)
MCH RBC QN AUTO: 29.5 PG (ref 26–34)
MCHC RBC AUTO-ENTMCNC: 34.3 G/DL (ref 31–36)
MCV RBC AUTO: 85.9 FL (ref 80–100)
NITRITE UR QL STRIP.AUTO: NEGATIVE
PH UR STRIP.AUTO: 6.5 [PH] (ref 5–8)
PHOSPHATE SERPL-MCNC: 3.4 MG/DL (ref 2.5–4.9)
PLATELET # BLD AUTO: 12 K/UL (ref 135–450)
PMV BLD AUTO: 8.9 FL (ref 5–10.5)
POTASSIUM SERPL-SCNC: 3.9 MMOL/L (ref 3.5–5.1)
PROCALCITONIN SERPL IA-MCNC: 0.16 NG/ML (ref 0–0.15)
PROT SERPL-MCNC: 5.9 G/DL (ref 6.4–8.2)
PROT UR STRIP.AUTO-MCNC: NEGATIVE MG/DL
RBC # BLD AUTO: 2.37 M/UL (ref 4.2–5.9)
SODIUM SERPL-SCNC: 134 MMOL/L (ref 136–145)
SP GR UR STRIP.AUTO: <=1.005 (ref 1–1.03)
UA DIPSTICK W REFLEX MICRO PNL UR: NORMAL
URATE SERPL-MCNC: 3.2 MG/DL (ref 3.5–7.2)
URN SPEC COLLECT METH UR: NORMAL
UROBILINOGEN UR STRIP-ACNC: 0.2 E.U./DL
WBC # BLD AUTO: 0.4 K/UL (ref 4–11)

## 2024-10-11 PROCEDURE — 87252 VIRUS INOCULATION TISSUE: CPT

## 2024-10-11 PROCEDURE — 2060000000 HC ICU INTERMEDIATE R&B

## 2024-10-11 PROCEDURE — 86900 BLOOD TYPING SEROLOGIC ABO: CPT

## 2024-10-11 PROCEDURE — 80048 BASIC METABOLIC PNL TOTAL CA: CPT

## 2024-10-11 PROCEDURE — 86923 COMPATIBILITY TEST ELECTRIC: CPT

## 2024-10-11 PROCEDURE — 6370000000 HC RX 637 (ALT 250 FOR IP): Performed by: INTERNAL MEDICINE

## 2024-10-11 PROCEDURE — 86901 BLOOD TYPING SEROLOGIC RH(D): CPT

## 2024-10-11 PROCEDURE — 83615 LACTATE (LD) (LDH) ENZYME: CPT

## 2024-10-11 PROCEDURE — P9036 PLATELET PHERESIS IRRADIATED: HCPCS

## 2024-10-11 PROCEDURE — 84550 ASSAY OF BLOOD/URIC ACID: CPT

## 2024-10-11 PROCEDURE — 3E04305 INTRODUCTION OF OTHER ANTINEOPLASTIC INTO CENTRAL VEIN, PERCUTANEOUS APPROACH: ICD-10-PCS | Performed by: INTERNAL MEDICINE

## 2024-10-11 PROCEDURE — 6370000000 HC RX 637 (ALT 250 FOR IP): Performed by: NURSE PRACTITIONER

## 2024-10-11 PROCEDURE — 6360000002 HC RX W HCPCS: Performed by: INTERNAL MEDICINE

## 2024-10-11 PROCEDURE — 87040 BLOOD CULTURE FOR BACTERIA: CPT

## 2024-10-11 PROCEDURE — 87086 URINE CULTURE/COLONY COUNT: CPT

## 2024-10-11 PROCEDURE — P9040 RBC LEUKOREDUCED IRRADIATED: HCPCS

## 2024-10-11 PROCEDURE — 86850 RBC ANTIBODY SCREEN: CPT

## 2024-10-11 PROCEDURE — 87253 VIRUS INOCULATE TISSUE ADDL: CPT

## 2024-10-11 PROCEDURE — 87070 CULTURE OTHR SPECIMN AEROBIC: CPT

## 2024-10-11 PROCEDURE — 85027 COMPLETE CBC AUTOMATED: CPT

## 2024-10-11 PROCEDURE — 87150 DNA/RNA AMPLIFIED PROBE: CPT

## 2024-10-11 PROCEDURE — 2580000003 HC RX 258: Performed by: NURSE PRACTITIONER

## 2024-10-11 PROCEDURE — 6360000002 HC RX W HCPCS: Performed by: NURSE PRACTITIONER

## 2024-10-11 PROCEDURE — 81003 URINALYSIS AUTO W/O SCOPE: CPT

## 2024-10-11 PROCEDURE — 83735 ASSAY OF MAGNESIUM: CPT

## 2024-10-11 PROCEDURE — 87103 BLOOD FUNGUS CULTURE: CPT

## 2024-10-11 PROCEDURE — 80076 HEPATIC FUNCTION PANEL: CPT

## 2024-10-11 PROCEDURE — 84100 ASSAY OF PHOSPHORUS: CPT

## 2024-10-11 PROCEDURE — 84145 PROCALCITONIN (PCT): CPT

## 2024-10-11 PROCEDURE — 36430 TRANSFUSION BLD/BLD COMPNT: CPT

## 2024-10-11 PROCEDURE — 87102 FUNGUS ISOLATION CULTURE: CPT

## 2024-10-11 PROCEDURE — 87186 SC STD MICRODIL/AGAR DIL: CPT

## 2024-10-11 PROCEDURE — 36592 COLLECT BLOOD FROM PICC: CPT

## 2024-10-11 PROCEDURE — 2580000003 HC RX 258: Performed by: INTERNAL MEDICINE

## 2024-10-11 PROCEDURE — 87205 SMEAR GRAM STAIN: CPT

## 2024-10-11 PROCEDURE — 30233R1 TRANSFUSION OF NONAUTOLOGOUS PLATELETS INTO PERIPHERAL VEIN, PERCUTANEOUS APPROACH: ICD-10-PCS | Performed by: INTERNAL MEDICINE

## 2024-10-11 PROCEDURE — 83605 ASSAY OF LACTIC ACID: CPT

## 2024-10-11 RX ORDER — LORAZEPAM 0.5 MG/1
0.5 TABLET ORAL EVERY 4 HOURS PRN
Status: DISCONTINUED | OUTPATIENT
Start: 2024-10-11 | End: 2024-10-14 | Stop reason: HOSPADM

## 2024-10-11 RX ORDER — BISACODYL 5 MG/1
10 TABLET, DELAYED RELEASE ORAL 2 TIMES DAILY
Status: DISPENSED | OUTPATIENT
Start: 2024-10-11 | End: 2024-10-12

## 2024-10-11 RX ORDER — MORPHINE SULFATE 2 MG/ML
1 INJECTION, SOLUTION INTRAMUSCULAR; INTRAVENOUS EVERY 6 HOURS PRN
Status: DISCONTINUED | OUTPATIENT
Start: 2024-10-11 | End: 2024-10-14 | Stop reason: HOSPADM

## 2024-10-11 RX ORDER — FUROSEMIDE 10 MG/ML
40 INJECTION INTRAMUSCULAR; INTRAVENOUS 2 TIMES DAILY
Status: DISCONTINUED | OUTPATIENT
Start: 2024-10-12 | End: 2024-10-14

## 2024-10-11 RX ORDER — PROCHLORPERAZINE MALEATE 10 MG
10 TABLET ORAL ONCE
Status: COMPLETED | OUTPATIENT
Start: 2024-10-11 | End: 2024-10-11

## 2024-10-11 RX ORDER — LORAZEPAM 2 MG/ML
0.5 INJECTION INTRAMUSCULAR EVERY 4 HOURS PRN
Status: DISCONTINUED | OUTPATIENT
Start: 2024-10-11 | End: 2024-10-14 | Stop reason: HOSPADM

## 2024-10-11 RX ORDER — POLYETHYLENE GLYCOL 3350 17 G/17G
17 POWDER, FOR SOLUTION ORAL DAILY
Status: DISCONTINUED | OUTPATIENT
Start: 2024-10-11 | End: 2024-10-12

## 2024-10-11 RX ADMIN — BISACODYL 10 MG: 5 TABLET, COATED ORAL at 13:03

## 2024-10-11 RX ADMIN — DECITABINE 19 MG: 50 INJECTION, POWDER, LYOPHILIZED, FOR SOLUTION INTRAVENOUS at 15:13

## 2024-10-11 RX ADMIN — VORICONAZOLE 200 MG: 200 TABLET ORAL at 21:21

## 2024-10-11 RX ADMIN — VANCOMYCIN HYDROCHLORIDE 125 MG: 125 CAPSULE ORAL at 09:01

## 2024-10-11 RX ADMIN — PANTOPRAZOLE SODIUM 40 MG: 40 TABLET, DELAYED RELEASE ORAL at 16:28

## 2024-10-11 RX ADMIN — POLYETHYLENE GLYCOL 3350 17 G: 17 POWDER, FOR SOLUTION ORAL at 13:03

## 2024-10-11 RX ADMIN — SODIUM CHLORIDE 15 ML: 900 IRRIGANT IRRIGATION at 21:21

## 2024-10-11 RX ADMIN — PROCHLORPERAZINE MALEATE 10 MG: 10 TABLET ORAL at 14:28

## 2024-10-11 RX ADMIN — CEFEPIME 2000 MG: 2 INJECTION, POWDER, FOR SOLUTION INTRAVENOUS at 16:28

## 2024-10-11 RX ADMIN — VORICONAZOLE 200 MG: 200 TABLET ORAL at 09:01

## 2024-10-11 RX ADMIN — CYANOCOBALAMIN TAB 1000 MCG 1000 MCG: 1000 TAB at 09:01

## 2024-10-11 RX ADMIN — VALACYCLOVIR HYDROCHLORIDE 500 MG: 500 TABLET, FILM COATED ORAL at 09:01

## 2024-10-11 RX ADMIN — CEFEPIME 2000 MG: 2 INJECTION, POWDER, FOR SOLUTION INTRAVENOUS at 00:14

## 2024-10-11 RX ADMIN — SODIUM CHLORIDE, PRESERVATIVE FREE 10 ML: 5 INJECTION INTRAVENOUS at 21:21

## 2024-10-11 RX ADMIN — LEVETIRACETAM 1500 MG: 500 TABLET, FILM COATED ORAL at 21:21

## 2024-10-11 RX ADMIN — VANCOMYCIN HYDROCHLORIDE 125 MG: 125 CAPSULE ORAL at 21:22

## 2024-10-11 RX ADMIN — ACETAMINOPHEN 650 MG: 325 TABLET ORAL at 15:08

## 2024-10-11 RX ADMIN — SODIUM CHLORIDE: 9 INJECTION, SOLUTION INTRAVENOUS at 21:33

## 2024-10-11 RX ADMIN — DIPHENHYDRAMINE HYDROCHLORIDE 25 MG: 25 TABLET ORAL at 21:21

## 2024-10-11 RX ADMIN — ACETAMINOPHEN 650 MG: 325 TABLET ORAL at 23:58

## 2024-10-11 RX ADMIN — SODIUM CHLORIDE: 9 INJECTION, SOLUTION INTRAVENOUS at 00:13

## 2024-10-11 RX ADMIN — Medication: at 21:22

## 2024-10-11 RX ADMIN — VALACYCLOVIR HYDROCHLORIDE 500 MG: 500 TABLET, FILM COATED ORAL at 21:22

## 2024-10-11 RX ADMIN — CEFEPIME 2000 MG: 2 INJECTION, POWDER, FOR SOLUTION INTRAVENOUS at 09:01

## 2024-10-11 RX ADMIN — PANTOPRAZOLE SODIUM 40 MG: 40 TABLET, DELAYED RELEASE ORAL at 06:30

## 2024-10-11 RX ADMIN — ACETAMINOPHEN 650 MG: 325 TABLET ORAL at 00:15

## 2024-10-11 RX ADMIN — SODIUM CHLORIDE, PRESERVATIVE FREE 10 ML: 5 INJECTION INTRAVENOUS at 09:02

## 2024-10-11 RX ADMIN — LEVETIRACETAM 1500 MG: 500 TABLET, FILM COATED ORAL at 09:02

## 2024-10-11 ASSESSMENT — PAIN SCALES - GENERAL
PAINLEVEL_OUTOF10: 0

## 2024-10-11 NOTE — ONCOLOGY
Original chemotherapy orders reviewed and acknowledged. Appropriateness of chemotherapy treatment regimen Decitabine for diagnosis of AML was verified.  Patient educated on chemotherapy regimen.  Consent for chemotherapy obtained.      Estimated body surface area is 1.88 meters squared as calculated from the following:    Height as of this encounter: 1.778 m (5' 10\").    Weight as of this encounter: 71.8 kg (158 lb 6.4 oz). verified.  Appropriate dosing calculations of chemotherapy based on above height, weight, and BSA verified.      Elina Medellin RN10/11/2024  12:47 PM

## 2024-10-11 NOTE — CONSENT
Informed Consent for Blood Component Transfusion Note    I have discussed with the patient the rationale for blood component transfusion; its benefits in treating or preventing fatigue, organ damage, or death; and its risk which includes mild transfusion reactions, rare risk of blood borne infection, or more serious but rare reactions. I have discussed the alternatives to transfusion, including the risk and consequences of not receiving transfusion. The patient had an opportunity to ask questions and had agreed to proceed with transfusion of blood components.    Electronically signed by KATHLEEN Abdi CNP on 10/11/24 at 8:23 AM EDT

## 2024-10-11 NOTE — PLAN OF CARE
Problem: Pain  Goal: Verbalizes/displays adequate comfort level or baseline comfort level  Outcome: Progressing     Problem: Safety - Adult  Goal: Free from fall injury  Outcome: Progressing     Problem: ABCDS Injury Assessment  Goal: Absence of physical injury  Outcome: Progressing     Problem: Hematologic - Adult  Goal: Maintains hematologic stability  Outcome: Progressing     Problem: Nutrition Deficit:  Goal: Optimize nutritional status  Outcome: Progressing

## 2024-10-11 NOTE — PLAN OF CARE
Problem: Pain  Goal: Verbalizes/displays adequate comfort level or baseline comfort level  10/10/2024 2203 by Courtney Graves RN  Outcome: Progressing       Problem: Safety - Adult  Goal: Free from fall injury  10/10/2024 2203 by Courtney Graves RN  Outcome: Progressing  Orthostatic vital signs obtained at start of shift - see flowsheet for details.  Pt does not meet criteria for orthostasis.  Pt is a Med fall risk. See Apple Fall Score and ABCDS Injury Risk assessments.   - Screening for Orthostasis AND not a [place] Risk per APPLE/ABCDS: Pt bed is in low position, side rails up, call light and belongings are in reach.  Fall risk light is on outside pts room.  Pt encouraged to call for assistance as needed. Will continue with hourly rounds for PO intake, pain needs, toileting and repositioning as needed.      Problem: Hematologic - Adult  Goal: Maintains hematologic stability  Outcome: Progressing    Patient's hemoglobin this AM:   Recent Labs     10/11/24  0330   HGB 7.0*     Patient's platelet count this AM:   Recent Labs     10/11/24  0330   PLT 12*    Thrombocytopenia Precautions in place.  Patient showing no signs or symptoms of active bleeding.  Patient transfused blood products per orders - see flowsheet.  Patient verbalizes understanding of all instructions. Will continue to assess and implement POC. Call light within reach and hourly rounding in place.

## 2024-10-11 NOTE — ONCOLOGY
Administration: Chemotherapy drug Decitiabine independently verified with Suzette Roberts RN prior to administration.  Acknowledgement of informed consent for chemotherapy administration verified.  Original order, appropriateness of regimen, drug supplied, height, weight, BSA, dose calculations, expiration dates/times, drug appearance, and two patient identifiers were verified by both RNs.  Drug checked for vesicant/irritant status and for risk of hypersensitivity.  Most recent laboratory values and allergies, were reviewed.  Positive, brisk blood return via CVC was confirmed prior to administration. Chest x-ray for correct line placement reviewed. Blank Vera RN and Suzette Roberts RN verified correct rate of chemotherapy and maintenance IV fluids.  Patient was educated on chemotherapy regimen prior to administration including indication for treatment related to disease & side effects of chemotherapy drug.  Patient verbalizes understanding of all instructions.      Completion of Chemotherapy: Monitoring during infusion done per policy, see Flowsheets.  Blood return verified before, during, and after infusion per policy; no signs of extravasation.  Pt tolerated chemotherapy well and without incident.  Chemotherapy infusion end time on the MAR.

## 2024-10-11 NOTE — CARE COORDINATION
Case Management Assessment  Initial Evaluation    Date/Time of Evaluation: 10/11/2024 10:10 AM  Assessment Completed by: JENNIFER Ortiz   for [facility] [place] ([facility])  [place] Mobile: [phone]    If patient is discharged prior to next notation, then this note serves as note for discharge by case management.    Patient Name: Dennys Peguero                   YOB: 1980  Diagnosis: Abdominal pain [R10.9]                   Date / Time: 10/10/2024  4:02 PM    Patient Admission Status: Inpatient   Readmission Risk (Low < 19, Mod (19-27), High > 27): Readmission Risk Score: 26.9    Current PCP: No primary care provider on file.  PCP verified by CM? Yes (has been going to [facility])    Chart Reviewed: Yes      History Provided by: Patient  Patient Orientation: Alert and Oriented    Patient Cognition: Alert    Hospitalization in the last 30 days (Readmission):  Yes    If yes, Readmission Assessment in CM Navigator will be completed.    Advance Directives:      Code Status: Full Code   Patient's Primary Decision Maker is: Legal Next of Kin    Primary Decision Maker: Pierre Lowry - [phone]    Discharge Planning:    Patient lives with: Parent Type of Home: Apartment  Primary Care Giver: Self  Patient Support Systems include: Family Members, Parent   Current Financial resources: Medicaid ([employer] Medicaid)  Current community resources: None  Current services prior to admission: Other (Comment) ([facility])            Current DME:              Type of Home Care services:  None    ADLS  Prior functional level: Independent in ADLs/IADLs  Current functional level: Independent in ADLs/IADLs    PT AM-PAC:   /24  OT AM-PAC:   /24    Family can provide assistance at DC: Yes  Would you like Case Management to discuss the discharge plan with any other family members/significant others, and if so, who? Yes  Plans to Return to Present Housing: Yes  Other Identified

## 2024-10-12 LAB
ANION GAP SERPL CALCULATED.3IONS-SCNC: 6 MMOL/L (ref 3–16)
BACTERIA UR CULT: NORMAL
BLOOD BANK DISPENSE STATUS: NORMAL
BLOOD BANK DISPENSE STATUS: NORMAL
BLOOD BANK PRODUCT CODE: NORMAL
BLOOD BANK PRODUCT CODE: NORMAL
BPU ID: NORMAL
BPU ID: NORMAL
BUN SERPL-MCNC: 8 MG/DL (ref 7–20)
CALCIUM SERPL-MCNC: 8.5 MG/DL (ref 8.3–10.6)
CHLORIDE SERPL-SCNC: 100 MMOL/L (ref 99–110)
CO2 SERPL-SCNC: 29 MMOL/L (ref 21–32)
CREAT SERPL-MCNC: 0.6 MG/DL (ref 0.9–1.3)
DEPRECATED RDW RBC AUTO: 13.6 % (ref 12.4–15.4)
DESCRIPTION BLOOD BANK: NORMAL
DESCRIPTION BLOOD BANK: NORMAL
GFR SERPLBLD CREATININE-BSD FMLA CKD-EPI: >90 ML/MIN/{1.73_M2}
GLUCOSE SERPL-MCNC: 141 MG/DL (ref 70–99)
HCT VFR BLD AUTO: 19.8 % (ref 40.5–52.5)
HGB BLD-MCNC: 6.8 G/DL (ref 13.5–17.5)
LOEFFLER MB STN SPEC: NORMAL
LOEFFLER MB STN SPEC: NORMAL
MCH RBC QN AUTO: 29.3 PG (ref 26–34)
MCHC RBC AUTO-ENTMCNC: 34.4 G/DL (ref 31–36)
MCV RBC AUTO: 85.3 FL (ref 80–100)
PHOSPHATE SERPL-MCNC: 3 MG/DL (ref 2.5–4.9)
PLATELET # BLD AUTO: 9 K/UL (ref 135–450)
PMV BLD AUTO: 9.3 FL (ref 5–10.5)
POTASSIUM SERPL-SCNC: 3.5 MMOL/L (ref 3.5–5.1)
RBC # BLD AUTO: 2.32 M/UL (ref 4.2–5.9)
REPORT: NORMAL
SODIUM SERPL-SCNC: 135 MMOL/L (ref 136–145)
URATE SERPL-MCNC: 2.9 MG/DL (ref 3.5–7.2)
WBC # BLD AUTO: 0.4 K/UL (ref 4–11)

## 2024-10-12 PROCEDURE — 6360000002 HC RX W HCPCS: Performed by: NURSE PRACTITIONER

## 2024-10-12 PROCEDURE — 36592 COLLECT BLOOD FROM PICC: CPT

## 2024-10-12 PROCEDURE — 36430 TRANSFUSION BLD/BLD COMPNT: CPT

## 2024-10-12 PROCEDURE — 2580000003 HC RX 258: Performed by: NURSE PRACTITIONER

## 2024-10-12 PROCEDURE — 6360000002 HC RX W HCPCS: Performed by: INTERNAL MEDICINE

## 2024-10-12 PROCEDURE — 84100 ASSAY OF PHOSPHORUS: CPT

## 2024-10-12 PROCEDURE — 6370000000 HC RX 637 (ALT 250 FOR IP): Performed by: NURSE PRACTITIONER

## 2024-10-12 PROCEDURE — 2060000000 HC ICU INTERMEDIATE R&B

## 2024-10-12 PROCEDURE — 85027 COMPLETE CBC AUTOMATED: CPT

## 2024-10-12 PROCEDURE — 2580000003 HC RX 258: Performed by: INTERNAL MEDICINE

## 2024-10-12 PROCEDURE — 84550 ASSAY OF BLOOD/URIC ACID: CPT

## 2024-10-12 PROCEDURE — 80048 BASIC METABOLIC PNL TOTAL CA: CPT

## 2024-10-12 PROCEDURE — 30233N1 TRANSFUSION OF NONAUTOLOGOUS RED BLOOD CELLS INTO PERIPHERAL VEIN, PERCUTANEOUS APPROACH: ICD-10-PCS | Performed by: INTERNAL MEDICINE

## 2024-10-12 RX ORDER — POLYETHYLENE GLYCOL 3350 17 G/17G
17 POWDER, FOR SOLUTION ORAL DAILY PRN
Status: DISCONTINUED | OUTPATIENT
Start: 2024-10-12 | End: 2024-10-14 | Stop reason: HOSPADM

## 2024-10-12 RX ADMIN — LEVETIRACETAM 1500 MG: 500 TABLET, FILM COATED ORAL at 20:40

## 2024-10-12 RX ADMIN — VALACYCLOVIR HYDROCHLORIDE 500 MG: 500 TABLET, FILM COATED ORAL at 09:02

## 2024-10-12 RX ADMIN — Medication: at 20:41

## 2024-10-12 RX ADMIN — CYANOCOBALAMIN TAB 1000 MCG 1000 MCG: 1000 TAB at 09:02

## 2024-10-12 RX ADMIN — FUROSEMIDE 40 MG: 10 INJECTION, SOLUTION INTRAMUSCULAR; INTRAVENOUS at 19:10

## 2024-10-12 RX ADMIN — VANCOMYCIN HYDROCHLORIDE 125 MG: 125 CAPSULE ORAL at 20:40

## 2024-10-12 RX ADMIN — CEFEPIME 2000 MG: 2 INJECTION, POWDER, FOR SOLUTION INTRAVENOUS at 00:01

## 2024-10-12 RX ADMIN — SODIUM CHLORIDE, PRESERVATIVE FREE 10 ML: 5 INJECTION INTRAVENOUS at 20:41

## 2024-10-12 RX ADMIN — VORICONAZOLE 200 MG: 200 TABLET ORAL at 20:40

## 2024-10-12 RX ADMIN — SODIUM CHLORIDE, PRESERVATIVE FREE 10 ML: 5 INJECTION INTRAVENOUS at 09:02

## 2024-10-12 RX ADMIN — VALACYCLOVIR HYDROCHLORIDE 500 MG: 500 TABLET, FILM COATED ORAL at 20:40

## 2024-10-12 RX ADMIN — LEVETIRACETAM 1500 MG: 500 TABLET, FILM COATED ORAL at 09:02

## 2024-10-12 RX ADMIN — SODIUM CHLORIDE: 9 INJECTION, SOLUTION INTRAVENOUS at 15:28

## 2024-10-12 RX ADMIN — VORICONAZOLE 200 MG: 200 TABLET ORAL at 09:02

## 2024-10-12 RX ADMIN — CEFEPIME 2000 MG: 2 INJECTION, POWDER, FOR SOLUTION INTRAVENOUS at 08:56

## 2024-10-12 RX ADMIN — Medication: at 09:03

## 2024-10-12 RX ADMIN — PANTOPRAZOLE SODIUM 40 MG: 40 TABLET, DELAYED RELEASE ORAL at 06:49

## 2024-10-12 RX ADMIN — VANCOMYCIN HYDROCHLORIDE 125 MG: 125 CAPSULE ORAL at 09:02

## 2024-10-12 RX ADMIN — SODIUM CHLORIDE 15 ML: 900 IRRIGANT IRRIGATION at 20:41

## 2024-10-12 RX ADMIN — POLYETHYLENE GLYCOL 3350 17 G: 17 POWDER, FOR SOLUTION ORAL at 17:23

## 2024-10-12 RX ADMIN — CEFEPIME 2000 MG: 2 INJECTION, POWDER, FOR SOLUTION INTRAVENOUS at 17:25

## 2024-10-12 RX ADMIN — PANTOPRAZOLE SODIUM 40 MG: 40 TABLET, DELAYED RELEASE ORAL at 17:23

## 2024-10-12 ASSESSMENT — PAIN DESCRIPTION - FREQUENCY
FREQUENCY: INTERMITTENT

## 2024-10-12 ASSESSMENT — PAIN DESCRIPTION - ONSET
ONSET: ON-GOING

## 2024-10-12 ASSESSMENT — PAIN - FUNCTIONAL ASSESSMENT
PAIN_FUNCTIONAL_ASSESSMENT: ACTIVITIES ARE NOT PREVENTED

## 2024-10-12 ASSESSMENT — PAIN DESCRIPTION - PAIN TYPE
TYPE: ACUTE PAIN

## 2024-10-12 ASSESSMENT — PAIN SCALES - GENERAL
PAINLEVEL_OUTOF10: 2
PAINLEVEL_OUTOF10: 3
PAINLEVEL_OUTOF10: 2

## 2024-10-12 ASSESSMENT — PAIN DESCRIPTION - DESCRIPTORS
DESCRIPTORS: ACHING

## 2024-10-12 ASSESSMENT — PAIN DESCRIPTION - ORIENTATION
ORIENTATION: RIGHT;LOWER

## 2024-10-12 ASSESSMENT — PAIN DESCRIPTION - LOCATION
LOCATION: ABDOMEN

## 2024-10-12 NOTE — PLAN OF CARE
Problem: Pain  Goal: Verbalizes/displays adequate comfort level or baseline comfort level  10/12/2024 0410 by Ivette Bernard RN  Outcome: Progressing  Flowsheets (Taken 10/12/2024 0410)  Verbalizes/displays adequate comfort level or baseline comfort level:   Encourage patient to monitor pain and request assistance   Assess pain using appropriate pain scale   Administer analgesics based on type and severity of pain and evaluate response   Implement non-pharmacological measures as appropriate and evaluate response   Consider cultural and social influences on pain and pain management   Notify Licensed Independent Practitioner if interventions unsuccessful or patient reports new pain     Problem: Safety - Adult  Goal: Free from fall injury  10/12/2024 0410 by Ivette Bernard, RN  Outcome: Progressing  Flowsheets (Taken 10/12/2024 0410)  Free From Fall Injury:   Instruct family/caregiver on patient safety   Based on caregiver fall risk screen, instruct family/caregiver to ask for assistance with transferring infant if caregiver noted to have fall risk factors

## 2024-10-12 NOTE — PLAN OF CARE
Problem: Pain  Goal: Verbalizes/displays adequate comfort level or baseline comfort level  10/12/2024 1736 by Iraida Roe, RN  Outcome: Progressing   Multiple attempts to give pt pain medication. Refused each time. Pain increases with food and then goes back to a constant pain.      Problem: Safety - Adult  Goal: Free from fall injury  10/12/2024 1736 by Iraida Roe, RN  Outcome: Progressing     Problem: Hematologic - Adult  Goal: Maintains hematologic stability  Outcome: Progressing

## 2024-10-13 LAB
ANION GAP SERPL CALCULATED.3IONS-SCNC: 8 MMOL/L (ref 3–16)
BACTERIA THROAT AEROBE CULT: NORMAL
BLOOD BANK DISPENSE STATUS: NORMAL
BLOOD BANK DISPENSE STATUS: NORMAL
BLOOD BANK PRODUCT CODE: NORMAL
BLOOD BANK PRODUCT CODE: NORMAL
BPU ID: NORMAL
BPU ID: NORMAL
BUN SERPL-MCNC: 8 MG/DL (ref 7–20)
CALCIUM SERPL-MCNC: 8.4 MG/DL (ref 8.3–10.6)
CHLORIDE SERPL-SCNC: 100 MMOL/L (ref 99–110)
CO2 SERPL-SCNC: 29 MMOL/L (ref 21–32)
CREAT SERPL-MCNC: 0.6 MG/DL (ref 0.9–1.3)
DEPRECATED RDW RBC AUTO: 13.5 % (ref 12.4–15.4)
DESCRIPTION BLOOD BANK: NORMAL
DESCRIPTION BLOOD BANK: NORMAL
EKG ATRIAL RATE: 106 BPM
EKG DIAGNOSIS: NORMAL
EKG P AXIS: 82 DEGREES
EKG P-R INTERVAL: 132 MS
EKG Q-T INTERVAL: 324 MS
EKG QRS DURATION: 86 MS
EKG QTC CALCULATION (BAZETT): 430 MS
EKG R AXIS: 88 DEGREES
EKG T AXIS: 58 DEGREES
EKG VENTRICULAR RATE: 106 BPM
FINAL REPORT: NORMAL
GFR SERPLBLD CREATININE-BSD FMLA CKD-EPI: >90 ML/MIN/{1.73_M2}
GLUCOSE SERPL-MCNC: 100 MG/DL (ref 70–99)
HCT VFR BLD AUTO: 21.2 % (ref 40.5–52.5)
HGB BLD-MCNC: 7.5 G/DL (ref 13.5–17.5)
MCH RBC QN AUTO: 30.2 PG (ref 26–34)
MCHC RBC AUTO-ENTMCNC: 35.3 G/DL (ref 31–36)
MCV RBC AUTO: 85.4 FL (ref 80–100)
PHOSPHATE SERPL-MCNC: 3.3 MG/DL (ref 2.5–4.9)
PLATELET # BLD AUTO: 20 K/UL (ref 135–450)
PMV BLD AUTO: 7.2 FL (ref 5–10.5)
POTASSIUM SERPL-SCNC: 3.7 MMOL/L (ref 3.5–5.1)
PRELIMINARY: NORMAL
RBC # BLD AUTO: 2.49 M/UL (ref 4.2–5.9)
SODIUM SERPL-SCNC: 137 MMOL/L (ref 136–145)
URATE SERPL-MCNC: 2.7 MG/DL (ref 3.5–7.2)
WBC # BLD AUTO: 0.5 K/UL (ref 4–11)

## 2024-10-13 PROCEDURE — 80048 BASIC METABOLIC PNL TOTAL CA: CPT

## 2024-10-13 PROCEDURE — 84550 ASSAY OF BLOOD/URIC ACID: CPT

## 2024-10-13 PROCEDURE — 85027 COMPLETE CBC AUTOMATED: CPT

## 2024-10-13 PROCEDURE — 2060000000 HC ICU INTERMEDIATE R&B

## 2024-10-13 PROCEDURE — 36592 COLLECT BLOOD FROM PICC: CPT

## 2024-10-13 PROCEDURE — 36430 TRANSFUSION BLD/BLD COMPNT: CPT

## 2024-10-13 PROCEDURE — 6360000002 HC RX W HCPCS: Performed by: INTERNAL MEDICINE

## 2024-10-13 PROCEDURE — 93010 ELECTROCARDIOGRAM REPORT: CPT | Performed by: INTERNAL MEDICINE

## 2024-10-13 PROCEDURE — 6360000002 HC RX W HCPCS: Performed by: NURSE PRACTITIONER

## 2024-10-13 PROCEDURE — 2580000003 HC RX 258: Performed by: NURSE PRACTITIONER

## 2024-10-13 PROCEDURE — 84100 ASSAY OF PHOSPHORUS: CPT

## 2024-10-13 PROCEDURE — 6370000000 HC RX 637 (ALT 250 FOR IP): Performed by: NURSE PRACTITIONER

## 2024-10-13 RX ADMIN — Medication: at 21:58

## 2024-10-13 RX ADMIN — CYANOCOBALAMIN TAB 1000 MCG 1000 MCG: 1000 TAB at 09:38

## 2024-10-13 RX ADMIN — CEFEPIME 2000 MG: 2 INJECTION, POWDER, FOR SOLUTION INTRAVENOUS at 09:42

## 2024-10-13 RX ADMIN — SODIUM CHLORIDE, PRESERVATIVE FREE 10 ML: 5 INJECTION INTRAVENOUS at 09:38

## 2024-10-13 RX ADMIN — VANCOMYCIN HYDROCHLORIDE 125 MG: 125 CAPSULE ORAL at 09:38

## 2024-10-13 RX ADMIN — PANTOPRAZOLE SODIUM 40 MG: 40 TABLET, DELAYED RELEASE ORAL at 17:17

## 2024-10-13 RX ADMIN — CEFEPIME 2000 MG: 2 INJECTION, POWDER, FOR SOLUTION INTRAVENOUS at 00:21

## 2024-10-13 RX ADMIN — VALACYCLOVIR HYDROCHLORIDE 500 MG: 500 TABLET, FILM COATED ORAL at 09:38

## 2024-10-13 RX ADMIN — VORICONAZOLE 200 MG: 200 TABLET ORAL at 09:38

## 2024-10-13 RX ADMIN — VORICONAZOLE 200 MG: 200 TABLET ORAL at 21:57

## 2024-10-13 RX ADMIN — LEVETIRACETAM 1500 MG: 500 TABLET, FILM COATED ORAL at 09:38

## 2024-10-13 RX ADMIN — PANTOPRAZOLE SODIUM 40 MG: 40 TABLET, DELAYED RELEASE ORAL at 06:55

## 2024-10-13 RX ADMIN — SODIUM CHLORIDE 15 ML: 900 IRRIGANT IRRIGATION at 21:59

## 2024-10-13 RX ADMIN — VALACYCLOVIR HYDROCHLORIDE 500 MG: 500 TABLET, FILM COATED ORAL at 21:57

## 2024-10-13 RX ADMIN — SODIUM CHLORIDE, PRESERVATIVE FREE 10 ML: 5 INJECTION INTRAVENOUS at 21:57

## 2024-10-13 RX ADMIN — SODIUM CHLORIDE 15 ML: 900 IRRIGANT IRRIGATION at 17:17

## 2024-10-13 RX ADMIN — FUROSEMIDE 40 MG: 10 INJECTION, SOLUTION INTRAMUSCULAR; INTRAVENOUS at 06:55

## 2024-10-13 RX ADMIN — CEFEPIME 2000 MG: 2 INJECTION, POWDER, FOR SOLUTION INTRAVENOUS at 17:19

## 2024-10-13 RX ADMIN — VANCOMYCIN HYDROCHLORIDE 125 MG: 125 CAPSULE ORAL at 21:57

## 2024-10-13 RX ADMIN — LEVETIRACETAM 1500 MG: 500 TABLET, FILM COATED ORAL at 21:57

## 2024-10-13 NOTE — PLAN OF CARE
Problem: Pain  Goal: Verbalizes/displays adequate comfort level or baseline comfort level  Outcome: Progressing     Problem: Safety - Adult  Goal: Free from fall injury  Outcome: Progressing     Problem: ABCDS Injury Assessment  Goal: Absence of physical injury  Outcome: Progressing     Problem: Hematologic - Adult  Goal: Maintains hematologic stability  Outcome: Progressing

## 2024-10-14 ENCOUNTER — APPOINTMENT (OUTPATIENT)
Dept: GENERAL RADIOLOGY | Age: 44
DRG: 690 | End: 2024-10-14
Attending: INTERNAL MEDICINE
Payer: MEDICAID

## 2024-10-14 ENCOUNTER — APPOINTMENT (OUTPATIENT)
Age: 44
DRG: 690 | End: 2024-10-14
Attending: INTERNAL MEDICINE
Payer: MEDICAID

## 2024-10-14 VITALS
WEIGHT: 149 LBS | DIASTOLIC BLOOD PRESSURE: 86 MMHG | HEART RATE: 94 BPM | BODY MASS INDEX: 21.33 KG/M2 | SYSTOLIC BLOOD PRESSURE: 134 MMHG | RESPIRATION RATE: 20 BRPM | TEMPERATURE: 98.6 F | OXYGEN SATURATION: 100 % | HEIGHT: 70 IN

## 2024-10-14 LAB
ALBUMIN SERPL-MCNC: 3.3 G/DL (ref 3.4–5)
ALP SERPL-CCNC: 110 U/L (ref 40–129)
ALT SERPL-CCNC: 16 U/L (ref 10–40)
AMORPH SED URNS QL MICRO: ABNORMAL /HPF
ANION GAP SERPL CALCULATED.3IONS-SCNC: 7 MMOL/L (ref 3–16)
APTT BLD: 42.8 SEC (ref 22.1–36.4)
AST SERPL-CCNC: 6 U/L (ref 15–37)
BACTERIA URNS QL MICRO: ABNORMAL /HPF
BILIRUB DIRECT SERPL-MCNC: 0.2 MG/DL (ref 0–0.3)
BILIRUB INDIRECT SERPL-MCNC: 0.5 MG/DL (ref 0–1)
BILIRUB SERPL-MCNC: 0.7 MG/DL (ref 0–1)
BILIRUB UR QL STRIP.AUTO: NEGATIVE
BUN SERPL-MCNC: 7 MG/DL (ref 7–20)
CALCIUM SERPL-MCNC: 8.5 MG/DL (ref 8.3–10.6)
CHLORIDE SERPL-SCNC: 101 MMOL/L (ref 99–110)
CLARITY UR: CLEAR
CO2 SERPL-SCNC: 28 MMOL/L (ref 21–32)
COLOR UR: YELLOW
CREAT SERPL-MCNC: 0.6 MG/DL (ref 0.9–1.3)
DEPRECATED RDW RBC AUTO: 13.3 % (ref 12.4–15.4)
ECHO BSA: 1.83 M2
ECHO LV EDV 3D: 170 ML
ECHO LV EDV INDEX 3D: 92 ML/M2
ECHO LV EJECTION FRACTION 3D: 63 %
ECHO LV ESV 3D: 63 ML
ECHO LV ESV INDEX 3D: 34 ML/M2
ECHO LV FRACTIONAL SHORTENING: 46 % (ref 28–44)
ECHO LV INTERNAL DIMENSION DIASTOLE INDEX: 2.72 CM/M2
ECHO LV INTERNAL DIMENSION DIASTOLIC: 5 CM (ref 4.2–5.9)
ECHO LV INTERNAL DIMENSION SYSTOLIC INDEX: 1.47 CM/M2
ECHO LV INTERNAL DIMENSION SYSTOLIC: 2.7 CM
ECHO LV IVSD: 0.8 CM (ref 0.6–1)
ECHO LV MASS 2D: 182 G (ref 88–224)
ECHO LV MASS 3D INDEX: 96.7 G/M2
ECHO LV MASS 3D: 178 G
ECHO LV MASS INDEX 2D: 98.9 G/M2 (ref 49–115)
ECHO LV POSTERIOR WALL DIASTOLIC: 1.2 CM (ref 0.6–1)
ECHO LV RELATIVE WALL THICKNESS RATIO: 0.48
EPI CELLS #/AREA URNS HPF: ABNORMAL /HPF (ref 0–5)
GFR SERPLBLD CREATININE-BSD FMLA CKD-EPI: >90 ML/MIN/{1.73_M2}
GLUCOSE SERPL-MCNC: 100 MG/DL (ref 70–99)
GLUCOSE UR STRIP.AUTO-MCNC: NEGATIVE MG/DL
HCT VFR BLD AUTO: 21.3 % (ref 40.5–52.5)
HGB BLD-MCNC: 7.3 G/DL (ref 13.5–17.5)
HGB UR QL STRIP.AUTO: NEGATIVE
INR PPP: 1.15 (ref 0.85–1.15)
KETONES UR STRIP.AUTO-MCNC: NEGATIVE MG/DL
LDH SERPL L TO P-CCNC: 84 U/L (ref 100–190)
LEUKOCYTE ESTERASE UR QL STRIP.AUTO: NEGATIVE
MAGNESIUM SERPL-MCNC: 1.8 MG/DL (ref 1.8–2.4)
MCH RBC QN AUTO: 29.4 PG (ref 26–34)
MCHC RBC AUTO-ENTMCNC: 34.2 G/DL (ref 31–36)
MCV RBC AUTO: 85.9 FL (ref 80–100)
NITRITE UR QL STRIP.AUTO: NEGATIVE
PH UR STRIP.AUTO: 7 [PH] (ref 5–8)
PHOSPHATE SERPL-MCNC: 3.1 MG/DL (ref 2.5–4.9)
PLATELET # BLD AUTO: 28 K/UL (ref 135–450)
PMV BLD AUTO: 6.9 FL (ref 5–10.5)
POTASSIUM SERPL-SCNC: 3.7 MMOL/L (ref 3.5–5.1)
PROT SERPL-MCNC: 6.1 G/DL (ref 6.4–8.2)
PROT UR STRIP.AUTO-MCNC: ABNORMAL MG/DL
PROTHROMBIN TIME: 14.9 SEC (ref 11.9–14.9)
RBC # BLD AUTO: 2.48 M/UL (ref 4.2–5.9)
RBC #/AREA URNS HPF: ABNORMAL /HPF (ref 0–4)
SODIUM SERPL-SCNC: 136 MMOL/L (ref 136–145)
SP GR UR STRIP.AUTO: 1.01 (ref 1–1.03)
UA DIPSTICK W REFLEX MICRO PNL UR: YES
URATE SERPL-MCNC: 2.2 MG/DL (ref 3.5–7.2)
URN SPEC COLLECT METH UR: ABNORMAL
UROBILINOGEN UR STRIP-ACNC: 0.2 E.U./DL
WBC # BLD AUTO: 0.5 K/UL (ref 4–11)
WBC #/AREA URNS HPF: ABNORMAL /HPF (ref 0–5)

## 2024-10-14 PROCEDURE — 84100 ASSAY OF PHOSPHORUS: CPT

## 2024-10-14 PROCEDURE — 83735 ASSAY OF MAGNESIUM: CPT

## 2024-10-14 PROCEDURE — 85027 COMPLETE CBC AUTOMATED: CPT

## 2024-10-14 PROCEDURE — 2580000003 HC RX 258: Performed by: NURSE PRACTITIONER

## 2024-10-14 PROCEDURE — 93321 DOPPLER ECHO F-UP/LMTD STD: CPT | Performed by: INTERNAL MEDICINE

## 2024-10-14 PROCEDURE — 85730 THROMBOPLASTIN TIME PARTIAL: CPT

## 2024-10-14 PROCEDURE — 76376 3D RENDER W/INTRP POSTPROCES: CPT | Performed by: INTERNAL MEDICINE

## 2024-10-14 PROCEDURE — 81001 URINALYSIS AUTO W/SCOPE: CPT

## 2024-10-14 PROCEDURE — 84550 ASSAY OF BLOOD/URIC ACID: CPT

## 2024-10-14 PROCEDURE — 83615 LACTATE (LD) (LDH) ENZYME: CPT

## 2024-10-14 PROCEDURE — 80048 BASIC METABOLIC PNL TOTAL CA: CPT

## 2024-10-14 PROCEDURE — 71045 X-RAY EXAM CHEST 1 VIEW: CPT

## 2024-10-14 PROCEDURE — 85610 PROTHROMBIN TIME: CPT

## 2024-10-14 PROCEDURE — 93325 DOPPLER ECHO COLOR FLOW MAPG: CPT | Performed by: INTERNAL MEDICINE

## 2024-10-14 PROCEDURE — 6370000000 HC RX 637 (ALT 250 FOR IP): Performed by: NURSE PRACTITIONER

## 2024-10-14 PROCEDURE — 6360000002 HC RX W HCPCS: Performed by: NURSE PRACTITIONER

## 2024-10-14 PROCEDURE — 93308 TTE F-UP OR LMTD: CPT

## 2024-10-14 PROCEDURE — 80076 HEPATIC FUNCTION PANEL: CPT

## 2024-10-14 PROCEDURE — 93308 TTE F-UP OR LMTD: CPT | Performed by: INTERNAL MEDICINE

## 2024-10-14 RX ORDER — LEVOFLOXACIN 500 MG/1
500 TABLET, FILM COATED ORAL DAILY
Qty: 30 TABLET | Refills: 1 | Status: SHIPPED | OUTPATIENT
Start: 2024-10-14 | End: 2024-10-14 | Stop reason: HOSPADM

## 2024-10-14 RX ORDER — VANCOMYCIN HYDROCHLORIDE 125 MG/1
125 CAPSULE ORAL 2 TIMES DAILY
Qty: 60 CAPSULE | Refills: 1 | Status: SHIPPED | OUTPATIENT
Start: 2024-10-14 | End: 2024-12-13

## 2024-10-14 RX ADMIN — VANCOMYCIN HYDROCHLORIDE 125 MG: 125 CAPSULE ORAL at 09:13

## 2024-10-14 RX ADMIN — CEFEPIME 2000 MG: 2 INJECTION, POWDER, FOR SOLUTION INTRAVENOUS at 15:25

## 2024-10-14 RX ADMIN — CYANOCOBALAMIN TAB 1000 MCG 1000 MCG: 1000 TAB at 09:14

## 2024-10-14 RX ADMIN — CEFEPIME 2000 MG: 2 INJECTION, POWDER, FOR SOLUTION INTRAVENOUS at 09:13

## 2024-10-14 RX ADMIN — VALACYCLOVIR HYDROCHLORIDE 500 MG: 500 TABLET, FILM COATED ORAL at 09:14

## 2024-10-14 RX ADMIN — SODIUM CHLORIDE, PRESERVATIVE FREE 10 ML: 5 INJECTION INTRAVENOUS at 09:14

## 2024-10-14 RX ADMIN — LEVETIRACETAM 1500 MG: 500 TABLET, FILM COATED ORAL at 09:14

## 2024-10-14 RX ADMIN — PANTOPRAZOLE SODIUM 40 MG: 40 TABLET, DELAYED RELEASE ORAL at 06:54

## 2024-10-14 RX ADMIN — VORICONAZOLE 200 MG: 200 TABLET ORAL at 09:13

## 2024-10-14 RX ADMIN — PANTOPRAZOLE SODIUM 40 MG: 40 TABLET, DELAYED RELEASE ORAL at 15:18

## 2024-10-14 RX ADMIN — CEFEPIME 2000 MG: 2 INJECTION, POWDER, FOR SOLUTION INTRAVENOUS at 00:24

## 2024-10-14 ASSESSMENT — PAIN SCALES - GENERAL
PAINLEVEL_OUTOF10: 0

## 2024-10-14 NOTE — CARE COORDINATION
10:03am: Discussed plan of care with treatment team during rounds. Echo is pending.     Pt will discharge home with his father with no anticipated SW needs.     SW will follow    JENNIFER Ortiz   for [facility] ([facility])  [place] Mobile: [phone]

## 2024-10-14 NOTE — PROGRESS NOTES
[facility] Progress Note    10/11/2024     Dennys Peguero    MRN: [id number]    : 1980    Referring MD: Eduardo Nguyen MD  [facility] [address] [place] [address]      SUBJECTIVE:  Patient with persistent abd pain.  CT shows severe constipation.  Of note ST also shows a RLL lung nodule 16 mm in size.  Had neutropenic fever overnight.  Now on Cefepime with cult pending.    ECOG PS:  (2) Ambulatory and capable of self care, unable to carry out work activity, up and about > 50% or waking hours    KPS: 80% Normal activity with effort; some signs or symptoms of disease    Isolation: None    Medications    Scheduled Meds:   Hydrocerin   Topical BID    levETIRAcetam  1,500 mg Oral BID    pantoprazole  40 mg Oral BID AC    valACYclovir  500 mg Oral BID    vancomycin  125 mg Oral BID    cyanocobalamin  1,000 mcg Oral Daily    voriconazole  200 mg Oral 2 times per day    sodium chloride flush  5-40 mL IntraVENous 2 times per day    Saline Mouthwash  15 mL Swish & Spit 4x Daily AC & HS    cefepime  2,000 mg IntraVENous Q8H     Continuous Infusions:   sodium chloride 50 mL/hr at 10/11/24 0013    sodium chloride       PRN Meds:.ondansetron, prochlorperazine, morphine **OR** morphine, diphenhydrAMINE, sodium chloride flush, sodium chloride, potassium chloride, magnesium sulfate, Saline Mouthwash, acetaminophen    ROS:  As noted above, otherwise remainder of 10-point ROS negative    Physical Exam:     I&O:    Intake/Output Summary (Last 24 hours) at 10/11/2024 0828  Last data filed at 10/11/2024 0626  Gross per 24 hour   Intake 937 ml   Output 350 ml   Net 587 ml       Vital Signs:  /70   Pulse 89   Temp 100.1 °F (37.8 °C) (Oral)   Resp 16   Ht 1.778 m (5' 10\")   Wt 71.8 kg (158 lb 6.4 oz)   SpO2 100%   BMI 22.73 kg/m²     Weight:    Wt Readings from Last 3 Encounters:   10/11/24 71.8 kg (158 lb 6.4 oz)   10/02/24 70 kg (154 lb 6.4 oz)   24 69.4 kg (153 lb)         General: Awake, alert and 
  Comprehensive Nutrition Assessment    RECOMMENDATIONS:  PO Diet: Regular, low microbial  Nutrition Supplement: Trial Ensure Plant Based (ordered for pt last admit)  Nutrition Education: Education completed (MNT for diarrhea)     NUTRITION ASSESSMENT:   Nutritional summary & status: Assessing patient for poor appetite/intake and weight loss. Admitted with headaches, recent syncopal episode, reports of diarrhea after any PO intake. Also, continuation of cycle 6 of dacogen and venetoclax. Admitted 4 months ago in June, was malnourished at this time, was unable to meet energy/protein needs at that time and had a Corpak placed for TF. Corpak removed prior to d/c. Weight gain since admission in June. Overall, 7% weight loss in 7 months, insignificant. Per pt, his BMs are loose and occur right after every time that he eats. Eating breakfast upon RD encounter, then had to use the restroom immediately after finished eating. Limited interview as pt wincing in pain, holding his stomach. RD provided pt w/ educational materials that cover MNT for diarrhea including which foods to choose more often/foods to stay away from. Also provided Banatrol packets. RD to follow up.     Admission // PMH: Abdominal pain // Oligodendroglioma (S/P partial resection 2017, radiation 2019, and temodar 2019), rectal adenocarcinoma; AML underlying MDS    MALNUTRITION ASSESSMENT  Context of Malnutrition: Chronic Illness   Malnutrition Status: Insufficient data  Findings of the 6 clinical characteristics of malnutrition (Minimum of 2 out of 6 clinical characteristics is required to make the diagnosis of moderate or severe Protein Calorie Malnutrition based on AND/ASPEN Guidelines):  Energy Intake:  Mild decrease in energy intake (unsure exact amounts)  Weight Loss:  No significant weight loss     Body Fat Loss:  Unable to assess (NFPE deferred, pt in pain)     Muscle Mass Loss:  Unable to assess        NUTRITION DIAGNOSIS   Predicted inadequate 
Central line dressing changed using sterile technique following hospital policy. Valentina Abbasi RN, observed with procedure to ensure proper technique. Dressing changed r/t peeling.   
Mary Kate Baez RN reviewed AVS with patient. Dressing clean, dry, intact. Pt with no further questions at this time. Missing items; discussed with security. Security stated they would call patient with information. Wheelchair to main lobby.  
Neutropenic Pathway  If patient oral temp > or = to 38.0 or if axillary temp > or = to 37.4 initiate protocol per orders.  Draw 2 sets of blood cultures from different sites. If patient remains febrile, redraw PAN culture every 68-72 hours.             Temp 100.5    Site(s) / Line Type Time Cultures obtained   Date 10/10/2024  2322      Purple Lumen    Red Lumen  Purple: 10/11 @ 0003  Red: 10/11 @ 0006     This RN messaged Elham Marie to make her aware.  NP ordered:   - Full PAN  - Procal and lactic acid   - Cefepime 2 gm Q8hr for antibiotic   - PRN Tylenol 650 mg Q6hr PRN for fever.   
Patient had a new onset of R arm weakness and numbness. Patient states the feeling was re-gained after a few moments. No other neuro deficits were noted. Elham Marie was notified via perfect serve about situation. She requested that the routine CT head be modified to STAT.   
Pts CT head and CT abdomen pelvis resulted. Pt also C.diff negative. Elham Marie made aware. No new orders at this time. Plan of care on going.     
Temozolomide 150 mg/m2 (4 day cycle            due to low platelet count)       - 5/1/19-5/5/19: Cycle #2 Temozolomide 150 mg/m2      2. AML w/underlying MDS  - Induction w/ Dacogen + Venetoclax (3/12/24)     3. Rectal Adenocarcinoma  - Surgical resection  - Will need f/u with [facility]      ASSESSMENT AND PLAN:           1. 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  Peripheral blood smear 3/8/24: Unimpressive schistocytes and labs concerning for carlos negative hemolytic anemia, unlikely TTP or TMA. No need for PLEX. 20% blasts  BMBX 3/8/24: Hypercellular bone marrow (approaching 100% cellularity) showing increased blasts (13%) and trilineage myelodysplasia, consistent with clonal myeloid neoplasm (see comment). Flow cytometry showing 20% blasts.  FISH:  Deletion 5q33, trisomy 8, gain of KMT2A/11q, Deletion of 20q.    - CG: Abnormal male karyotype 47~50,XY,juliette(3)t(3;11)(p14;q23),add(5) (q13),+8,-13,add(13)(q13), juliette(15)t(13;15) (q14;q26),juliette(15;20)t(15;20) (q11.2;p13) del(20)(q11.2q13.3) ,+juliette(15;20)t(15;20) (q11.2;p13)del(20) (q11.2q13.3)x1~2,+dup(15)(q15q22), +17,dic(17;?)(p11.1;?),+21, +1~2mar, 3~46dmin[cp20  - NGS: TP53 mutation positive.  - PCRs: FLT3 ITD/TKD- Not Detected  BMBX 4/2/24: morphology pending but flow cytometry showed 7% blasts, consistent with RI.  Repeat BMBX 5/29/24: CR; 15% cellular bone marrow with predominantly new development of erythroid and megakaryocyte precursors.  Granulocytes decreased and no definitive  morphologic evidence of increased blasts.     PLAN:  Dacogen and Venetoclax (C1D1 3/13/24) target dose of 400 mg (dose adjust for drug interaction)   - Cycle 2 (re-induction) post BMBx showed hypoplastic CR and C3D1 on 6/24/24.   - Current ECOG 1, HLA typing done and pt has 4 full siblings as potential donors.  - Consider MAC based alloSCT using BuFlu/PTCy based regimen depending on post 
Colonoscopy 3/22/24: rectal lesion: Adenocarcinoma, at least adenocarcinoma in situ,  MRI pelvis showed 2.7 cm rectal mass with no involvement of anal sphincter, mesorectal fascia or any mesorectal lymph nodes.  - Pelvic MRI 3/25/24: Radiological Stage: T 1/2, N 0  MRF: Clear. Sphincter involvement: No. Suspicious extra mesorectal nodes: None. EMVI: Absent  - CT chest 3/25/24: No acute pathology. CT A/P- no mets               - CEA: 6.5  - s/p rectal mass resection 4/12/24. Path staging T1N0M0.  - Referred to Dr. Russo for rectal ca follow up post resection.--> Needs to follow-up as an outpatient.  - Would likely not offer adjuvant therapy at this stage. Follow up on Oncology recs       6. Neuro:  H/O Oligodendroglioma 2017, seizures since 2009  - Cont on Keppra 1500 mg PO BID  - s/p partial resection, radiation and Temodar  - MRI 8/28/24: Multiple intracranial, intra-axial enhancing foci have developed since the prior examination on 1/18/2024, compatible with interval development of intracranial metastatic disease           - Working up disease relapse vs. infectious process           - Care at [facility]           - Admitted 9/30/24-10/2/24 for further workup  - MRI brain (10/1/24): patten of signal abnormality that is suspicious for an amyloid angiopathy or related CNS vasculopathy. Metastatic disease is considered unlikely.   - Neurosurgery reviewed the MRI and diagnosed the patient with amyloid angiopathy. They recommend avoiding anticoagulation as it would significantly increase the risk of ICH. There is no surgical intervention warranted.   - LP (9/30/24): No malignant cells identified.   - Meningitis PCR panel (9/30/24): Negative   Headache:  - Denies n/v, visual disturbances, or AMS  - CT brain (10/4/24): No bleed.   - Asked him to reach out to  Neurology for Amyloid angiopathy with goal platelet count > 20-25k to minimize his risk of ICH. His BP is usually under well controlled. Avoid any 
colonic stool burden.  - Amylase/Lipase 10/10/24: WNL  -Start scheduled miralax   -Dulcolax BID until bowel movement     Rectal Adenocarcinoma: Stage T1N0. Mismatch repair gene expression by IHC-intact with low probability for MSI-H.   - EGD ([facility]) reportedly unremarkable  - Colonoscopy 3/22/24: rectal lesion: Adenocarcinoma, at least adenocarcinoma in situ,  MRI pelvis showed 2.7 cm rectal mass with no involvement of anal sphincter, mesorectal fascia or any mesorectal lymph nodes.  - Pelvic MRI 3/25/24: Radiological Stage: T 1/2, N 0  MRF: Clear. Sphincter involvement: No. Suspicious extra mesorectal nodes: None. EMVI: Absent  - CT chest 3/25/24: No acute pathology. CT A/P- no mets               - CEA: 6.5  - s/p rectal mass resection 4/12/24. Path staging T1N0M0.  - Referred to Dr. Russo for rectal ca follow up post resection.--> Needs to follow-up as an outpatient.  - Would likely not offer adjuvant therapy at this stage. Follow up on Oncology recs       6. Neuro:  H/O Oligodendroglioma 2017, seizures since 2009  - Cont on Keppra 1500 mg PO BID  - s/p partial resection, radiation and Temodar  - MRI 8/28/24: Multiple intracranial, intra-axial enhancing foci have developed since the prior examination on 1/18/2024, compatible with interval development of intracranial metastatic disease           - Working up disease relapse vs. infectious process           - Care at [facility]           - Admitted 9/30/24-10/2/24 for further workup  - MRI brain (10/1/24): patten of signal abnormality that is suspicious for an amyloid angiopathy or related CNS vasculopathy. Metastatic disease is considered unlikely.   - Neurosurgery reviewed the MRI and diagnosed the patient with amyloid angiopathy. They recommend avoiding anticoagulation as it would significantly increase the risk of ICH. There is no surgical intervention warranted.   - LP (9/30/24): No malignant cells identified.   - Meningitis PCR panel (9/30/24): Negative

## 2024-10-14 NOTE — DISCHARGE SUMMARY
Metastatic disease is considered unlikely.   - Neurosurgery reviewed the MRI and diagnosed the patient with amyloid angiopathy. They recommend avoiding anticoagulation as it would significantly increase the risk of ICH. There is no surgical intervention warranted.   - LP (9/30/24): No malignant cells identified.   - Meningitis PCR panel (9/30/24): Negative   Headache:  - Denies n/v, visual disturbances, or AMS  - CT brain (10/4/24): No bleed.   - Asked him to reach out to  Neurology for Amyloid angiopathy with goal platelet count > 20-25k to minimize his risk of ICH. His BP is usually under well controlled. Avoid any anticoagulation.   - CT head 10/10/24:  Stable small foci of increased density parietal and occipital lobe on the right occipital lobe on the left similar to the previous exam consistent with multiple small areas of hemorrhage.       7. Cardiac  - ECHO 3/8/24:  EF 55-60% Mild tricuspid regurgitation. Mild mitral regurgitation    8. Genetics  - Referral placed to [facility] genetics for possible germline mutation. Sister tested positive for BRCA.  - Per [facility] genetics: He has been referred to [facility] genetics as diagnosis of AML requires skin biopsy.  Will try to expedite.    9. Social:  - Father present at visit 10/4/24    Condition on discharge:  Stable   Discharge Instructions:  Follow-up at [facility] 10/16/2024 for a provider visit and labs.     The patient was advised on activity and dietary restrictions.    The patient was advised to follow up in the emergency department or contact the physician with any unresolved nausea/vomiting/diarrhea/pain or temperature greater than 100.5 F or any other unusual symptoms.       This discharge summary and plan was discussed and agreed upon with Dr. Gore.    KATHLEEN Abdi - CNP

## 2024-10-14 NOTE — CARE COORDINATION
Reviewed discharge instructions with patient.  Reviewed discharge medications including dosing, schedule, indication, and adverse reactions.  Reviewed which medications were already taken today and next dosage due for each medication.      Reviewed signs and symptoms that prompt a call to the physician and appropriate phone numbers.  Reviewed follow up appointments that have been made in [facility] and Outpatient Oncology.  Low microbial diet, activity restrictions, and increased risk of infection were reviewed.     Patient is being discharged with IV access d/t need for ongoing therapy:      Type:  PICC                         Plan:continue   Next dressing change due on: 10/20  Cap changes due on: 10/17  CVC care and maintenance was reviewed with patient. Pt verbalizes understanding of line care and maintenance.      Patient verbalized understanding of all instructions and questions were answered to his. satisfaction.  Signed discharge instructions were given to the patient and a copy placed in the paper-lite chart.  Patient discharged to home per wheelchair with family members.    Reviewed patient med organizer.  Patient reports that he set it up himself.  He did quite well I only found 1 error.  Patient will take venetoclax for 7 days.  Patient will  prescription for anti-biotic from [employer] tonight and he will take one tonight and then 2 x a day.  Patient acknowledged.  Provided patient with a [employer] GC to help with food at this time.     Mary Kate Baez

## 2024-10-14 NOTE — DISCHARGE INSTRUCTIONS
[facility] Discharge Instructions    Call for Questions/Concerns:  [phone] [facility] office  The phone number listed above is available 24 hrs/7 days per week  [facility] is open M-F 8am-4:30pm; Sat-Sun/Holidays 8am-1pm    Symptoms to Report Immediately:    Fever of 100.4 or greater  Vomiting without relief after use of anti-nausea medication  Severe abdominal cramping  Diarrhea: More than 3 loose, watery bowel movements in a 24 hour period  Unusual or excessive bleeding from your mouth, nose, rectum, bladder or vagina  Sudden onset of shortness of breath or chest pain  Signs/symptoms of infection: redness, warmth, swelling-particularly to central line site    Report to Physician's office within 24 hours:    Pain not relieved by pain medication  Change in urination-odor, cloudiness, frequency, or pain with urination  Flu-like symptoms  Skin changes-rash, hives, redness or peeling of skin    Additional Instructions:    Avoid people with colds, flu-like symptoms, or any sign of infection  Drink plenty of fluids-attempt to consume 2-3 liters ( ounces) of fluids/24 hour period  Continue low microbial diet until instructed by physician to resume normal diet  Bring all of your medications with you to your doctor's appointments  Bring your current medicine list to each hospital and office visit    You are being discharged with IV access due to need for ongoing therapy.  Below is pertinent information regarding your IV that your next provider may need to know:  Type:  PICC                         Plan:continue   Next dressing change due on: 10/20  Cap changes due on: 10/17  CVC care and maintenance was reviewed with patient and family members.  Pt verbalizes understanding of line care and maintenance.      10/14/2024 2:26 PM  Mary Kate Baez            My Discharge Checklist    Here at the [facility], we want to make sure you have the help you will need once you leave the

## 2024-10-15 LAB
BACTERIA BLD CULT ORG #2: ABNORMAL
BACTERIA BLD CULT: ABNORMAL
BACTERIA BLD CULT: ABNORMAL
ORGANISM: ABNORMAL

## 2024-10-15 NOTE — CARE COORDINATION
7:31am: Pt discharged 10/14/24 after writer left for the day. No HHC or DME orders entered in epic. Pt discharged on oral ABX per AVS. No needs identified.     Pt returned home with his father.     Sharla COLON, JENNIFER   for [facility] ([facility])  [employer]: [phone]       Hemostasis: Drysol Destruction After The Procedure: No Depth Of Biopsy: dermis Biopsy Type: H and E X Size Of Lesion In Cm: 0 Was A Bandage Applied: Yes Type Of Destruction Used: Curettage Electrodesiccation Text: The wound bed was treated with electrodesiccation after the biopsy was performed. Consent: Written consent was obtained and risks were reviewed including but not limited to scarring, infection, bleeding, scabbing, incomplete removal, nerve damage and allergy to anesthesia. Size Of Lesion In Cm: 0.3 Anesthesia Volume In Cc: 0.5 Post-Care Instructions: I reviewed with the patient in detail post-care instructions. Patient is to keep the biopsy site dry overnight, and then apply bacitracin twice daily until healed. Patient may apply hydrogen peroxide soaks to remove any crusting. Detail Level: Simple Biopsy Method: Dermablade Body Location Override (Optional - Billing Will Still Be Based On Selected Body Map Location If Applicable): right medial shoulder Dressing: bandage Electrodesiccation And Curettage Text: The wound bed was treated with electrodesiccation and curettage after the biopsy was performed. Anesthesia Type: 1% lidocaine with epinephrine Silver Nitrate Text: The wound bed was treated with silver nitrate after the biopsy was performed. Curettage Text: The wound bed was treated with curettage after the biopsy was performed. Cryotherapy Text: The wound bed was treated with cryotherapy after the biopsy was performed. Billing Type: Third-Party Bill Notification Instructions: Patient will be notified of biopsy results. However, patient instructed to call the office if not contacted within 2 weeks. Wound Care: Petrolatum

## 2024-10-18 ENCOUNTER — HOSPITAL ENCOUNTER (OUTPATIENT)
Dept: ONCOLOGY | Age: 44
Setting detail: INFUSION SERIES
Discharge: HOME | End: 2024-10-18
Payer: MEDICAID

## 2024-10-18 VITALS
SYSTOLIC BLOOD PRESSURE: 114 MMHG | TEMPERATURE: 98.3 F | RESPIRATION RATE: 18 BRPM | DIASTOLIC BLOOD PRESSURE: 71 MMHG | OXYGEN SATURATION: 100 % | HEART RATE: 79 BPM

## 2024-10-18 DIAGNOSIS — C92.00 ACUTE MYELOID LEUKEMIA NOT HAVING ACHIEVED REMISSION (HCC): Primary | ICD-10-CM

## 2024-10-18 PROCEDURE — 36592 COLLECT BLOOD FROM PICC: CPT

## 2024-10-18 PROCEDURE — 86901 BLOOD TYPING SEROLOGIC RH(D): CPT

## 2024-10-18 PROCEDURE — 86850 RBC ANTIBODY SCREEN: CPT

## 2024-10-18 PROCEDURE — P9040 RBC LEUKOREDUCED IRRADIATED: HCPCS

## 2024-10-18 PROCEDURE — 86923 COMPATIBILITY TEST ELECTRIC: CPT

## 2024-10-18 PROCEDURE — 36430 TRANSFUSION BLD/BLD COMPNT: CPT

## 2024-10-18 PROCEDURE — 86900 BLOOD TYPING SEROLOGIC ABO: CPT

## 2024-10-18 PROCEDURE — P9036 PLATELET PHERESIS IRRADIATED: HCPCS

## 2024-10-18 RX ORDER — ACETAMINOPHEN 325 MG/1
650 TABLET ORAL
OUTPATIENT
Start: 2024-10-18

## 2024-10-18 RX ORDER — SODIUM CHLORIDE 0.9 % (FLUSH) 0.9 %
5-40 SYRINGE (ML) INJECTION PRN
OUTPATIENT
Start: 2024-10-18

## 2024-10-18 RX ORDER — EPINEPHRINE 1 MG/ML
0.3 INJECTION, SOLUTION INTRAMUSCULAR; SUBCUTANEOUS PRN
Status: DISCONTINUED | OUTPATIENT
Start: 2024-10-18 | End: 2024-10-19 | Stop reason: HOSPADM

## 2024-10-18 RX ORDER — SODIUM CHLORIDE 9 MG/ML
25 INJECTION, SOLUTION INTRAVENOUS PRN
Status: DISCONTINUED | OUTPATIENT
Start: 2024-10-18 | End: 2024-10-19 | Stop reason: HOSPADM

## 2024-10-18 RX ORDER — SODIUM CHLORIDE 9 MG/ML
INJECTION, SOLUTION INTRAVENOUS CONTINUOUS
Status: DISCONTINUED | OUTPATIENT
Start: 2024-10-18 | End: 2024-10-19 | Stop reason: HOSPADM

## 2024-10-18 RX ORDER — ACETAMINOPHEN 325 MG/1
650 TABLET ORAL
Status: DISCONTINUED | OUTPATIENT
Start: 2024-10-18 | End: 2024-10-19 | Stop reason: HOSPADM

## 2024-10-18 RX ORDER — DIPHENHYDRAMINE HYDROCHLORIDE 50 MG/ML
50 INJECTION INTRAMUSCULAR; INTRAVENOUS
Status: DISCONTINUED | OUTPATIENT
Start: 2024-10-18 | End: 2024-10-19 | Stop reason: HOSPADM

## 2024-10-18 RX ORDER — EPINEPHRINE 1 MG/ML
0.3 INJECTION, SOLUTION INTRAMUSCULAR; SUBCUTANEOUS PRN
OUTPATIENT
Start: 2024-10-18

## 2024-10-18 RX ORDER — ALBUTEROL SULFATE 90 UG/1
4 INHALANT RESPIRATORY (INHALATION) PRN
Status: DISCONTINUED | OUTPATIENT
Start: 2024-10-18 | End: 2024-10-19 | Stop reason: HOSPADM

## 2024-10-18 RX ORDER — ALBUTEROL SULFATE 90 UG/1
4 INHALANT RESPIRATORY (INHALATION) PRN
OUTPATIENT
Start: 2024-10-18

## 2024-10-18 RX ORDER — SODIUM CHLORIDE 9 MG/ML
20 INJECTION, SOLUTION INTRAVENOUS CONTINUOUS
Status: DISCONTINUED | OUTPATIENT
Start: 2024-10-18 | End: 2024-10-19 | Stop reason: HOSPADM

## 2024-10-18 RX ORDER — ONDANSETRON 2 MG/ML
8 INJECTION INTRAMUSCULAR; INTRAVENOUS
OUTPATIENT
Start: 2024-10-18

## 2024-10-18 RX ORDER — SODIUM CHLORIDE 9 MG/ML
INJECTION, SOLUTION INTRAVENOUS CONTINUOUS
OUTPATIENT
Start: 2024-10-18

## 2024-10-18 RX ORDER — SODIUM CHLORIDE 9 MG/ML
25 INJECTION, SOLUTION INTRAVENOUS PRN
OUTPATIENT
Start: 2024-10-18

## 2024-10-18 RX ORDER — ONDANSETRON 2 MG/ML
8 INJECTION INTRAMUSCULAR; INTRAVENOUS
Status: DISCONTINUED | OUTPATIENT
Start: 2024-10-18 | End: 2024-10-19 | Stop reason: HOSPADM

## 2024-10-18 RX ORDER — SODIUM CHLORIDE 9 MG/ML
20 INJECTION, SOLUTION INTRAVENOUS CONTINUOUS
OUTPATIENT
Start: 2024-10-18

## 2024-10-18 RX ORDER — SODIUM CHLORIDE 0.9 % (FLUSH) 0.9 %
5-40 SYRINGE (ML) INJECTION PRN
Status: DISCONTINUED | OUTPATIENT
Start: 2024-10-18 | End: 2024-10-19 | Stop reason: HOSPADM

## 2024-10-18 RX ORDER — DIPHENHYDRAMINE HYDROCHLORIDE 50 MG/ML
50 INJECTION INTRAMUSCULAR; INTRAVENOUS
OUTPATIENT
Start: 2024-10-18

## 2024-10-18 NOTE — PROGRESS NOTES
Patient's hemoglobin this AM: 7.1  Patient's platelet count this AM: 4.0    Pt seen at [facility] today for packed red blood cell and Platelet transfusion  for above lab values per order. Informed consent verified.No Pre-medications ordered with no previous transfusion reaction history. Transfused per policy. Pt tolerated transfusion well and without incident.  Pt verbalizes understanding of discharge instructions.  Discharged to home with aunt.

## 2024-10-22 ENCOUNTER — HOSPITAL ENCOUNTER (OUTPATIENT)
Dept: ONCOLOGY | Age: 44
Setting detail: INFUSION SERIES
Discharge: HOME | End: 2024-10-22
Payer: MEDICAID

## 2024-10-22 VITALS
DIASTOLIC BLOOD PRESSURE: 79 MMHG | HEART RATE: 108 BPM | RESPIRATION RATE: 18 BRPM | TEMPERATURE: 98.8 F | OXYGEN SATURATION: 99 % | SYSTOLIC BLOOD PRESSURE: 128 MMHG

## 2024-10-22 DIAGNOSIS — C92.00 ACUTE MYELOID LEUKEMIA NOT HAVING ACHIEVED REMISSION (HCC): Primary | ICD-10-CM

## 2024-10-22 PROCEDURE — P9036 PLATELET PHERESIS IRRADIATED: HCPCS

## 2024-10-22 PROCEDURE — P9040 RBC LEUKOREDUCED IRRADIATED: HCPCS

## 2024-10-22 PROCEDURE — 36430 TRANSFUSION BLD/BLD COMPNT: CPT

## 2024-10-22 PROCEDURE — 86850 RBC ANTIBODY SCREEN: CPT

## 2024-10-22 PROCEDURE — 86901 BLOOD TYPING SEROLOGIC RH(D): CPT

## 2024-10-22 PROCEDURE — 36592 COLLECT BLOOD FROM PICC: CPT

## 2024-10-22 PROCEDURE — 86923 COMPATIBILITY TEST ELECTRIC: CPT

## 2024-10-22 PROCEDURE — 86900 BLOOD TYPING SEROLOGIC ABO: CPT

## 2024-10-22 RX ORDER — DIPHENHYDRAMINE HYDROCHLORIDE 50 MG/ML
50 INJECTION INTRAMUSCULAR; INTRAVENOUS
OUTPATIENT
Start: 2024-10-22

## 2024-10-22 RX ORDER — ALBUTEROL SULFATE 90 UG/1
4 INHALANT RESPIRATORY (INHALATION) PRN
OUTPATIENT
Start: 2024-10-22

## 2024-10-22 RX ORDER — ACETAMINOPHEN 325 MG/1
650 TABLET ORAL
OUTPATIENT
Start: 2024-10-22

## 2024-10-22 RX ORDER — SODIUM CHLORIDE 0.9 % (FLUSH) 0.9 %
5-40 SYRINGE (ML) INJECTION PRN
OUTPATIENT
Start: 2024-10-22

## 2024-10-22 RX ORDER — SODIUM CHLORIDE 9 MG/ML
INJECTION, SOLUTION INTRAVENOUS CONTINUOUS
OUTPATIENT
Start: 2024-10-22

## 2024-10-22 RX ORDER — ONDANSETRON 2 MG/ML
8 INJECTION INTRAMUSCULAR; INTRAVENOUS
OUTPATIENT
Start: 2024-10-22

## 2024-10-22 RX ORDER — EPINEPHRINE 1 MG/ML
0.3 INJECTION, SOLUTION INTRAMUSCULAR; SUBCUTANEOUS PRN
OUTPATIENT
Start: 2024-10-22

## 2024-10-22 RX ORDER — SODIUM CHLORIDE 9 MG/ML
25 INJECTION, SOLUTION INTRAVENOUS PRN
OUTPATIENT
Start: 2024-10-22

## 2024-10-22 RX ORDER — SODIUM CHLORIDE 9 MG/ML
20 INJECTION, SOLUTION INTRAVENOUS CONTINUOUS
OUTPATIENT
Start: 2024-10-22

## 2024-10-22 NOTE — PROGRESS NOTES
Patient's hemoglobin this AM: 7.2  Patient's platelet count this AM: 11  Pt seen at [facility] today for  Packed red blood cell transfusion and platelet transfusion for above lab values per order from Odette Robles NP. Blood product transfusion informed consent note current and due to be resigned by ordering physician every 90 days on 12/12/2024. No Pre-medications ordered with no previous transfusion reaction history. Transfused per policy. Pt tolerated transfusion well and without incident.  Pt verbalizes understanding of discharge instructions.  Discharged ambulatory to home.    Jessy Nolan RN

## 2024-10-25 ENCOUNTER — HOSPITAL ENCOUNTER (OUTPATIENT)
Dept: ONCOLOGY | Age: 44
Setting detail: INFUSION SERIES
Discharge: HOME | End: 2024-10-25
Payer: MEDICAID

## 2024-10-25 VITALS
SYSTOLIC BLOOD PRESSURE: 115 MMHG | HEART RATE: 84 BPM | TEMPERATURE: 99 F | RESPIRATION RATE: 16 BRPM | DIASTOLIC BLOOD PRESSURE: 73 MMHG | OXYGEN SATURATION: 100 %

## 2024-10-25 DIAGNOSIS — C92.00 ACUTE MYELOID LEUKEMIA NOT HAVING ACHIEVED REMISSION (HCC): Primary | ICD-10-CM

## 2024-10-25 PROCEDURE — 36430 TRANSFUSION BLD/BLD COMPNT: CPT

## 2024-10-25 PROCEDURE — P9036 PLATELET PHERESIS IRRADIATED: HCPCS

## 2024-10-25 RX ORDER — ALBUTEROL SULFATE 90 UG/1
4 INHALANT RESPIRATORY (INHALATION) PRN
OUTPATIENT
Start: 2024-10-25

## 2024-10-25 RX ORDER — SODIUM CHLORIDE 9 MG/ML
INJECTION, SOLUTION INTRAVENOUS CONTINUOUS
OUTPATIENT
Start: 2024-10-25

## 2024-10-25 RX ORDER — DIPHENHYDRAMINE HYDROCHLORIDE 50 MG/ML
50 INJECTION INTRAMUSCULAR; INTRAVENOUS
OUTPATIENT
Start: 2024-10-25

## 2024-10-25 RX ORDER — EPINEPHRINE 1 MG/ML
0.3 INJECTION, SOLUTION INTRAMUSCULAR; SUBCUTANEOUS PRN
OUTPATIENT
Start: 2024-10-25

## 2024-10-25 RX ORDER — ACETAMINOPHEN 325 MG/1
650 TABLET ORAL
OUTPATIENT
Start: 2024-10-25

## 2024-10-25 RX ORDER — SODIUM CHLORIDE 0.9 % (FLUSH) 0.9 %
5-40 SYRINGE (ML) INJECTION PRN
OUTPATIENT
Start: 2024-10-25

## 2024-10-25 RX ORDER — ONDANSETRON 2 MG/ML
8 INJECTION INTRAMUSCULAR; INTRAVENOUS
OUTPATIENT
Start: 2024-10-25

## 2024-10-25 RX ORDER — SODIUM CHLORIDE 9 MG/ML
20 INJECTION, SOLUTION INTRAVENOUS CONTINUOUS
OUTPATIENT
Start: 2024-10-25

## 2024-10-25 RX ORDER — SODIUM CHLORIDE 9 MG/ML
25 INJECTION, SOLUTION INTRAVENOUS PRN
OUTPATIENT
Start: 2024-10-25

## 2024-10-25 NOTE — PROGRESS NOTES
Patient's hemoglobin this AM: 7.0  Patient's platelet count this AM: 13    Pt seen at [facility] today for  Packed red blood cell transfusion and platelet transfusion for above lab values. Blood product transfusion informed consent note current and due to be resigned by ordering physician every 90 days on 12/12/2024. No Pre-medications ordered with no previous transfusion reaction history. Transfused per policy. Pt tolerated transfusion well and without incident.  Pt verbalizes understanding of discharge instructions.  Discharged ambulatory to home.    Margaret Emanuel RN

## 2024-10-28 ENCOUNTER — APPOINTMENT (OUTPATIENT)
Dept: CT IMAGING | Age: 44
DRG: 720 | End: 2024-10-28
Payer: MEDICAID

## 2024-10-28 ENCOUNTER — APPOINTMENT (OUTPATIENT)
Dept: GENERAL RADIOLOGY | Age: 44
DRG: 720 | End: 2024-10-28
Payer: MEDICAID

## 2024-10-28 ENCOUNTER — HOSPITAL ENCOUNTER (INPATIENT)
Age: 44
DRG: 720 | End: 2024-10-28
Attending: EMERGENCY MEDICINE | Admitting: INTERNAL MEDICINE
Payer: MEDICAID

## 2024-10-28 DIAGNOSIS — I95.9 HYPOTENSION, UNSPECIFIED HYPOTENSION TYPE: ICD-10-CM

## 2024-10-28 DIAGNOSIS — R65.21 SEPSIS WITH ACUTE HYPOXIC RESPIRATORY FAILURE AND SEPTIC SHOCK, DUE TO UNSPECIFIED ORGANISM (HCC): ICD-10-CM

## 2024-10-28 DIAGNOSIS — M79.89 LEFT UPPER EXTREMITY SWELLING: ICD-10-CM

## 2024-10-28 DIAGNOSIS — J96.01 SEPSIS WITH ACUTE HYPOXIC RESPIRATORY FAILURE AND SEPTIC SHOCK, DUE TO UNSPECIFIED ORGANISM (HCC): ICD-10-CM

## 2024-10-28 DIAGNOSIS — C92.02 AML (ACUTE MYELOID LEUKEMIA) IN RELAPSE (HCC): ICD-10-CM

## 2024-10-28 DIAGNOSIS — B96.1 BACTEREMIA DUE TO KLEBSIELLA PNEUMONIAE: ICD-10-CM

## 2024-10-28 DIAGNOSIS — A41.9 SEPSIS WITH ACUTE HYPOXIC RESPIRATORY FAILURE AND SEPTIC SHOCK, DUE TO UNSPECIFIED ORGANISM (HCC): ICD-10-CM

## 2024-10-28 DIAGNOSIS — J96.01 ACUTE RESPIRATORY FAILURE WITH HYPOXIA: ICD-10-CM

## 2024-10-28 DIAGNOSIS — R50.81 NEUTROPENIC FEVER (HCC): Primary | ICD-10-CM

## 2024-10-28 DIAGNOSIS — R78.81 BACTEREMIA DUE TO KLEBSIELLA PNEUMONIAE: ICD-10-CM

## 2024-10-28 DIAGNOSIS — D70.9 NEUTROPENIC FEVER (HCC): Primary | ICD-10-CM

## 2024-10-28 LAB
ABO + RH BLD: NORMAL
ALBUMIN SERPL-MCNC: 3 G/DL (ref 3.4–5)
ALBUMIN SERPL-MCNC: 3.3 G/DL (ref 3.4–5)
ALP SERPL-CCNC: 208 U/L (ref 40–129)
ALT SERPL-CCNC: 212 U/L (ref 10–40)
ANION GAP SERPL CALCULATED.3IONS-SCNC: 10 MMOL/L (ref 3–16)
ANION GAP SERPL CALCULATED.3IONS-SCNC: 9 MMOL/L (ref 3–16)
AST SERPL-CCNC: 248 U/L (ref 15–37)
BASE EXCESS BLDA CALC-SCNC: -1 MMOL/L (ref -3–3)
BILIRUB DIRECT SERPL-MCNC: 0.2 MG/DL (ref 0–0.3)
BILIRUB INDIRECT SERPL-MCNC: 0.2 MG/DL (ref 0–1)
BILIRUB SERPL-MCNC: 0.4 MG/DL (ref 0–1)
BLD GP AB SCN SERPL QL: NORMAL
BUN SERPL-MCNC: 23 MG/DL (ref 7–20)
BUN SERPL-MCNC: 29 MG/DL (ref 7–20)
CA-I BLD-SCNC: 0.93 MMOL/L (ref 1.12–1.32)
CALCIUM SERPL-MCNC: 7.4 MG/DL (ref 8.3–10.6)
CALCIUM SERPL-MCNC: 8 MG/DL (ref 8.3–10.6)
CHLORIDE SERPL-SCNC: 101 MMOL/L (ref 99–110)
CHLORIDE SERPL-SCNC: 98 MMOL/L (ref 99–110)
CO2 BLDA-SCNC: 24 MMOL/L
CO2 SERPL-SCNC: 22 MMOL/L (ref 21–32)
CO2 SERPL-SCNC: 26 MMOL/L (ref 21–32)
CREAT SERPL-MCNC: 1.4 MG/DL (ref 0.9–1.3)
CREAT SERPL-MCNC: 1.9 MG/DL (ref 0.9–1.3)
DEPRECATED RDW RBC AUTO: 12.6 % (ref 12.4–15.4)
EKG ATRIAL RATE: 114 BPM
EKG DIAGNOSIS: NORMAL
EKG P AXIS: 82 DEGREES
EKG P-R INTERVAL: 132 MS
EKG Q-T INTERVAL: 320 MS
EKG QRS DURATION: 86 MS
EKG QTC CALCULATION (BAZETT): 441 MS
EKG R AXIS: 70 DEGREES
EKG T AXIS: 84 DEGREES
EKG VENTRICULAR RATE: 114 BPM
FUNGUS BLD CULT: NORMAL
FUNGUS BLD CULT: NORMAL
FUNGUS SPEC CULT: NORMAL
FUNGUS SPEC CULT: NORMAL
GFR SERPLBLD CREATININE-BSD FMLA CKD-EPI: 44 ML/MIN/{1.73_M2}
GFR SERPLBLD CREATININE-BSD FMLA CKD-EPI: 63 ML/MIN/{1.73_M2}
GLUCOSE BLD-MCNC: 165 MG/DL (ref 70–99)
GLUCOSE BLD-MCNC: 188 MG/DL (ref 70–99)
GLUCOSE SERPL-MCNC: 159 MG/DL (ref 70–99)
GLUCOSE SERPL-MCNC: 231 MG/DL (ref 70–99)
HCO3 BLDA-SCNC: 22.9 MMOL/L (ref 21–29)
HCT VFR BLD AUTO: 19 % (ref 40.5–52.5)
HGB BLD-MCNC: 6.5 G/DL (ref 13.5–17.5)
INR PPP: 1.92 (ref 0.85–1.15)
LACTATE BLD-SCNC: 3.16 MMOL/L (ref 0.4–2)
LACTATE BLDV-SCNC: 0.8 MMOL/L (ref 0.4–1.9)
LACTATE BLDV-SCNC: 1.6 MMOL/L (ref 0.4–2)
LDH SERPL L TO P-CCNC: 304 U/L (ref 100–190)
LIPASE SERPL-CCNC: 12 U/L (ref 13–60)
LOEFFLER MB STN SPEC: NORMAL
LOEFFLER MB STN SPEC: NORMAL
MAGNESIUM SERPL-MCNC: 1.7 MG/DL (ref 1.8–2.4)
MCH RBC QN AUTO: 29.1 PG (ref 26–34)
MCHC RBC AUTO-ENTMCNC: 34.1 G/DL (ref 31–36)
MCV RBC AUTO: 85.5 FL (ref 80–100)
MICROCYTES BLD QL SMEAR: ABNORMAL
OVALOCYTES BLD QL SMEAR: ABNORMAL
PCO2 BLDA: 31.8 MM HG (ref 35–45)
PERFORMED ON: ABNORMAL
PERFORMED ON: ABNORMAL
PH BLDA: 7.46 [PH] (ref 7.35–7.45)
PHOSPHATE SERPL-MCNC: 2.1 MG/DL (ref 2.5–4.9)
PLATELET # BLD AUTO: 5 K/UL (ref 135–450)
PLATELET BLD QL SMEAR: ABNORMAL
PMV BLD AUTO: 11.3 FL (ref 5–10.5)
PO2 BLDA: 98.1 MM HG (ref 75–108)
POC SAMPLE TYPE: ABNORMAL
POTASSIUM BLD-SCNC: 4 MMOL/L (ref 3.5–5.1)
POTASSIUM SERPL-SCNC: 3.3 MMOL/L (ref 3.5–5.1)
POTASSIUM SERPL-SCNC: 3.5 MMOL/L (ref 3.5–5.1)
PROCALCITONIN SERPL IA-MCNC: 13.85 NG/ML (ref 0–0.15)
PROT SERPL-MCNC: 6.4 G/DL (ref 6.4–8.2)
PROTHROMBIN TIME: 22 SEC (ref 11.9–14.9)
RBC # BLD AUTO: 2.22 M/UL (ref 4.2–5.9)
SAO2 % BLDA: 98 % (ref 93–100)
SCHISTOCYTES BLD QL SMEAR: ABNORMAL
SODIUM BLD-SCNC: 134 MMOL/L (ref 136–145)
SODIUM SERPL-SCNC: 133 MMOL/L (ref 136–145)
SODIUM SERPL-SCNC: 133 MMOL/L (ref 136–145)
URATE SERPL-MCNC: 3.9 MG/DL (ref 3.5–7.2)
WBC # BLD AUTO: 0.2 K/UL (ref 4–11)

## 2024-10-28 PROCEDURE — 2580000003 HC RX 258: Performed by: INTERNAL MEDICINE

## 2024-10-28 PROCEDURE — 71045 X-RAY EXAM CHEST 1 VIEW: CPT

## 2024-10-28 PROCEDURE — 86900 BLOOD TYPING SEROLOGIC ABO: CPT

## 2024-10-28 PROCEDURE — 87253 VIRUS INOCULATE TISSUE ADDL: CPT

## 2024-10-28 PROCEDURE — 5A1935Z RESPIRATORY VENTILATION, LESS THAN 24 CONSECUTIVE HOURS: ICD-10-PCS | Performed by: INTERNAL MEDICINE

## 2024-10-28 PROCEDURE — 2500000003 HC RX 250 WO HCPCS

## 2024-10-28 PROCEDURE — 2000000000 HC ICU R&B

## 2024-10-28 PROCEDURE — 74018 RADEX ABDOMEN 1 VIEW: CPT

## 2024-10-28 PROCEDURE — P9036 PLATELET PHERESIS IRRADIATED: HCPCS

## 2024-10-28 PROCEDURE — 6370000000 HC RX 637 (ALT 250 FOR IP): Performed by: EMERGENCY MEDICINE

## 2024-10-28 PROCEDURE — 51702 INSERT TEMP BLADDER CATH: CPT

## 2024-10-28 PROCEDURE — 84295 ASSAY OF SERUM SODIUM: CPT

## 2024-10-28 PROCEDURE — 87205 SMEAR GRAM STAIN: CPT

## 2024-10-28 PROCEDURE — 2580000003 HC RX 258

## 2024-10-28 PROCEDURE — 87102 FUNGUS ISOLATION CULTURE: CPT

## 2024-10-28 PROCEDURE — 84145 PROCALCITONIN (PCT): CPT

## 2024-10-28 PROCEDURE — 82330 ASSAY OF CALCIUM: CPT

## 2024-10-28 PROCEDURE — 94002 VENT MGMT INPAT INIT DAY: CPT

## 2024-10-28 PROCEDURE — 95700 EEG CONT REC W/VID EEG TECH: CPT

## 2024-10-28 PROCEDURE — 31500 INSERT EMERGENCY AIRWAY: CPT | Performed by: INTERNAL MEDICINE

## 2024-10-28 PROCEDURE — 95713 VEEG 2-12 HR CONT MNTR: CPT

## 2024-10-28 PROCEDURE — 84550 ASSAY OF BLOOD/URIC ACID: CPT

## 2024-10-28 PROCEDURE — 82947 ASSAY GLUCOSE BLOOD QUANT: CPT

## 2024-10-28 PROCEDURE — 6360000002 HC RX W HCPCS

## 2024-10-28 PROCEDURE — 83615 LACTATE (LD) (LDH) ENZYME: CPT

## 2024-10-28 PROCEDURE — 2700000000 HC OXYGEN THERAPY PER DAY

## 2024-10-28 PROCEDURE — 85027 COMPLETE CBC AUTOMATED: CPT

## 2024-10-28 PROCEDURE — 83735 ASSAY OF MAGNESIUM: CPT

## 2024-10-28 PROCEDURE — 2580000003 HC RX 258: Performed by: EMERGENCY MEDICINE

## 2024-10-28 PROCEDURE — 36556 INSERT NON-TUNNEL CV CATH: CPT | Performed by: INTERNAL MEDICINE

## 2024-10-28 PROCEDURE — 71250 CT THORAX DX C-: CPT

## 2024-10-28 PROCEDURE — 83690 ASSAY OF LIPASE: CPT

## 2024-10-28 PROCEDURE — 03HY32Z INSERTION OF MONITORING DEVICE INTO UPPER ARTERY, PERCUTANEOUS APPROACH: ICD-10-PCS | Performed by: INTERNAL MEDICINE

## 2024-10-28 PROCEDURE — 70450 CT HEAD/BRAIN W/O DYE: CPT

## 2024-10-28 PROCEDURE — 86901 BLOOD TYPING SEROLOGIC RH(D): CPT

## 2024-10-28 PROCEDURE — 95720 EEG PHY/QHP EA INCR W/VEEG: CPT | Performed by: PSYCHIATRY & NEUROLOGY

## 2024-10-28 PROCEDURE — 99291 CRITICAL CARE FIRST HOUR: CPT | Performed by: INTERNAL MEDICINE

## 2024-10-28 PROCEDURE — 83605 ASSAY OF LACTIC ACID: CPT

## 2024-10-28 PROCEDURE — 87040 BLOOD CULTURE FOR BACTERIA: CPT

## 2024-10-28 PROCEDURE — 31500 INSERT EMERGENCY AIRWAY: CPT

## 2024-10-28 PROCEDURE — 94761 N-INVAS EAR/PLS OXIMETRY MLT: CPT

## 2024-10-28 PROCEDURE — 99285 EMERGENCY DEPT VISIT HI MDM: CPT

## 2024-10-28 PROCEDURE — 30233N1 TRANSFUSION OF NONAUTOLOGOUS RED BLOOD CELLS INTO PERIPHERAL VEIN, PERCUTANEOUS APPROACH: ICD-10-PCS | Performed by: INTERNAL MEDICINE

## 2024-10-28 PROCEDURE — 87252 VIRUS INOCULATION TISSUE: CPT

## 2024-10-28 PROCEDURE — 86850 RBC ANTIBODY SCREEN: CPT

## 2024-10-28 PROCEDURE — 0202U NFCT DS 22 TRGT SARS-COV-2: CPT

## 2024-10-28 PROCEDURE — 82803 BLOOD GASES ANY COMBINATION: CPT

## 2024-10-28 PROCEDURE — 36556 INSERT NON-TUNNEL CV CATH: CPT

## 2024-10-28 PROCEDURE — 0BH17EZ INSERTION OF ENDOTRACHEAL AIRWAY INTO TRACHEA, VIA NATURAL OR ARTIFICIAL OPENING: ICD-10-PCS | Performed by: INTERNAL MEDICINE

## 2024-10-28 PROCEDURE — 74177 CT ABD & PELVIS W/CONTRAST: CPT

## 2024-10-28 PROCEDURE — 96375 TX/PRO/DX INJ NEW DRUG ADDON: CPT

## 2024-10-28 PROCEDURE — 2500000003 HC RX 250 WO HCPCS: Performed by: INTERNAL MEDICINE

## 2024-10-28 PROCEDURE — 02HV33Z INSERTION OF INFUSION DEVICE INTO SUPERIOR VENA CAVA, PERCUTANEOUS APPROACH: ICD-10-PCS | Performed by: INTERNAL MEDICINE

## 2024-10-28 PROCEDURE — 87150 DNA/RNA AMPLIFIED PROBE: CPT

## 2024-10-28 PROCEDURE — 6360000004 HC RX CONTRAST MEDICATION: Performed by: EMERGENCY MEDICINE

## 2024-10-28 PROCEDURE — 93005 ELECTROCARDIOGRAM TRACING: CPT | Performed by: EMERGENCY MEDICINE

## 2024-10-28 PROCEDURE — 87103 BLOOD FUNGUS CULTURE: CPT

## 2024-10-28 PROCEDURE — 80069 RENAL FUNCTION PANEL: CPT

## 2024-10-28 PROCEDURE — 87077 CULTURE AEROBIC IDENTIFY: CPT

## 2024-10-28 PROCEDURE — 6360000002 HC RX W HCPCS: Performed by: INTERNAL MEDICINE

## 2024-10-28 PROCEDURE — 36620 INSERTION CATHETER ARTERY: CPT | Performed by: INTERNAL MEDICINE

## 2024-10-28 PROCEDURE — 84132 ASSAY OF SERUM POTASSIUM: CPT

## 2024-10-28 PROCEDURE — P9040 RBC LEUKOREDUCED IRRADIATED: HCPCS

## 2024-10-28 PROCEDURE — 6370000000 HC RX 637 (ALT 250 FOR IP)

## 2024-10-28 PROCEDURE — 96365 THER/PROPH/DIAG IV INF INIT: CPT

## 2024-10-28 PROCEDURE — 85610 PROTHROMBIN TIME: CPT

## 2024-10-28 PROCEDURE — 87186 SC STD MICRODIL/AGAR DIL: CPT

## 2024-10-28 PROCEDURE — 86923 COMPATIBILITY TEST ELECTRIC: CPT

## 2024-10-28 PROCEDURE — 80076 HEPATIC FUNCTION PANEL: CPT

## 2024-10-28 PROCEDURE — 87070 CULTURE OTHR SPECIMN AEROBIC: CPT

## 2024-10-28 PROCEDURE — 36415 COLL VENOUS BLD VENIPUNCTURE: CPT

## 2024-10-28 PROCEDURE — 6360000002 HC RX W HCPCS: Performed by: EMERGENCY MEDICINE

## 2024-10-28 PROCEDURE — 36620 INSERTION CATHETER ARTERY: CPT

## 2024-10-28 RX ORDER — 0.9 % SODIUM CHLORIDE 0.9 %
1000 INTRAVENOUS SOLUTION INTRAVENOUS ONCE
Status: COMPLETED | OUTPATIENT
Start: 2024-10-28 | End: 2024-10-28

## 2024-10-28 RX ORDER — 0.9 % SODIUM CHLORIDE 0.9 %
500 INTRAVENOUS SOLUTION INTRAVENOUS ONCE
Status: DISCONTINUED | OUTPATIENT
Start: 2024-10-28 | End: 2024-10-28

## 2024-10-28 RX ORDER — PROPOFOL 10 MG/ML
5-50 INJECTION, EMULSION INTRAVENOUS CONTINUOUS
Status: DISCONTINUED | OUTPATIENT
Start: 2024-10-28 | End: 2024-10-29

## 2024-10-28 RX ORDER — ACETAMINOPHEN 160 MG/5ML
650 LIQUID ORAL EVERY 4 HOURS PRN
Status: DISCONTINUED | OUTPATIENT
Start: 2024-10-28 | End: 2024-11-12

## 2024-10-28 RX ORDER — SODIUM CHLORIDE 0.9 % (FLUSH) 0.9 %
5-40 SYRINGE (ML) INJECTION PRN
Status: DISCONTINUED | OUTPATIENT
Start: 2024-10-28 | End: 2024-10-31

## 2024-10-28 RX ORDER — LEVETIRACETAM 100 MG/ML
500 SOLUTION ORAL 2 TIMES DAILY
Status: DISCONTINUED | OUTPATIENT
Start: 2024-10-28 | End: 2024-10-28

## 2024-10-28 RX ORDER — SODIUM CHLORIDE 0.9 % (FLUSH) 0.9 %
5-40 SYRINGE (ML) INJECTION PRN
Status: ACTIVE | OUTPATIENT
Start: 2024-10-28

## 2024-10-28 RX ORDER — SODIUM CHLORIDE 0.9 % (FLUSH) 0.9 %
5-40 SYRINGE (ML) INJECTION EVERY 12 HOURS SCHEDULED
Status: ACTIVE | OUTPATIENT
Start: 2024-10-28

## 2024-10-28 RX ORDER — METRONIDAZOLE 500 MG/100ML
500 INJECTION, SOLUTION INTRAVENOUS EVERY 8 HOURS
Status: DISCONTINUED | OUTPATIENT
Start: 2024-10-28 | End: 2024-10-28

## 2024-10-28 RX ORDER — ACETAMINOPHEN 650 MG/1
650 SUPPOSITORY RECTAL EVERY 6 HOURS PRN
Status: DISCONTINUED | OUTPATIENT
Start: 2024-10-28 | End: 2024-10-28

## 2024-10-28 RX ORDER — PROPOFOL 10 MG/ML
INJECTION, EMULSION INTRAVENOUS
Status: DISPENSED
Start: 2024-10-28 | End: 2024-10-29

## 2024-10-28 RX ORDER — POTASSIUM CHLORIDE 29.8 MG/ML
20 INJECTION INTRAVENOUS PRN
Status: DISCONTINUED | OUTPATIENT
Start: 2024-10-28 | End: 2024-11-08

## 2024-10-28 RX ORDER — SODIUM CHLORIDE 9 MG/ML
INJECTION, SOLUTION INTRAVENOUS PRN
Status: DISCONTINUED | OUTPATIENT
Start: 2024-10-28 | End: 2024-10-31

## 2024-10-28 RX ORDER — VANCOMYCIN HYDROCHLORIDE 50 MG/ML
125 KIT ORAL EVERY 12 HOURS SCHEDULED
Status: DISPENSED | OUTPATIENT
Start: 2024-10-28

## 2024-10-28 RX ORDER — VORICONAZOLE 40 MG/ML
200 POWDER, FOR SUSPENSION ORAL EVERY 12 HOURS SCHEDULED
Status: DISCONTINUED | OUTPATIENT
Start: 2024-10-28 | End: 2024-10-28

## 2024-10-28 RX ORDER — IOPAMIDOL 755 MG/ML
75 INJECTION, SOLUTION INTRAVASCULAR
Status: COMPLETED | OUTPATIENT
Start: 2024-10-28 | End: 2024-10-28

## 2024-10-28 RX ORDER — SODIUM CHLORIDE 9 MG/ML
INJECTION, SOLUTION INTRAVENOUS PRN
Status: COMPLETED | OUTPATIENT
Start: 2024-10-28 | End: 2024-10-28

## 2024-10-28 RX ORDER — HYDROCORTISONE SODIUM SUCCINATE 100 MG/2ML
100 INJECTION INTRAMUSCULAR; INTRAVENOUS ONCE
Status: COMPLETED | OUTPATIENT
Start: 2024-10-28 | End: 2024-10-28

## 2024-10-28 RX ORDER — LEVETIRACETAM 100 MG/ML
1500 SOLUTION ORAL 2 TIMES DAILY
Status: DISCONTINUED | OUTPATIENT
Start: 2024-10-28 | End: 2024-10-28

## 2024-10-28 RX ORDER — PANTOPRAZOLE SODIUM 40 MG/10ML
40 INJECTION, POWDER, LYOPHILIZED, FOR SOLUTION INTRAVENOUS 2 TIMES DAILY
Status: DISCONTINUED | OUTPATIENT
Start: 2024-10-28 | End: 2024-11-08

## 2024-10-28 RX ORDER — ETOMIDATE 2 MG/ML
20 INJECTION INTRAVENOUS ONCE
Status: COMPLETED | OUTPATIENT
Start: 2024-10-28 | End: 2024-10-28

## 2024-10-28 RX ORDER — ETOMIDATE 2 MG/ML
INJECTION INTRAVENOUS
Status: DISPENSED
Start: 2024-10-28 | End: 2024-10-29

## 2024-10-28 RX ORDER — ONDANSETRON 4 MG/1
4 TABLET, ORALLY DISINTEGRATING ORAL EVERY 30 MIN PRN
Status: DISCONTINUED | OUTPATIENT
Start: 2024-10-28 | End: 2024-11-01

## 2024-10-28 RX ORDER — SODIUM CHLORIDE 9 MG/ML
INJECTION, SOLUTION INTRAVENOUS PRN
Status: DISCONTINUED | OUTPATIENT
Start: 2024-10-28 | End: 2024-11-04

## 2024-10-28 RX ORDER — MAGNESIUM SULFATE IN WATER 40 MG/ML
4000 INJECTION, SOLUTION INTRAVENOUS PRN
Status: ACTIVE | OUTPATIENT
Start: 2024-10-28

## 2024-10-28 RX ORDER — ONDANSETRON 4 MG/1
4 TABLET, ORALLY DISINTEGRATING ORAL ONCE
Status: COMPLETED | OUTPATIENT
Start: 2024-10-28 | End: 2024-10-28

## 2024-10-28 RX ORDER — LEVETIRACETAM 500 MG/5ML
1500 INJECTION, SOLUTION, CONCENTRATE INTRAVENOUS ONCE
Status: COMPLETED | OUTPATIENT
Start: 2024-10-28 | End: 2024-10-28

## 2024-10-28 RX ORDER — ROCURONIUM BROMIDE 10 MG/ML
1 INJECTION, SOLUTION INTRAVENOUS ONCE
Status: COMPLETED | OUTPATIENT
Start: 2024-10-28 | End: 2024-10-28

## 2024-10-28 RX ORDER — ACETAMINOPHEN 325 MG/1
650 TABLET ORAL ONCE
Status: DISCONTINUED | OUTPATIENT
Start: 2024-10-28 | End: 2024-10-28

## 2024-10-28 RX ORDER — NOREPINEPHRINE BITARTRATE 0.06 MG/ML
1-100 INJECTION, SOLUTION INTRAVENOUS CONTINUOUS
Status: DISCONTINUED | OUTPATIENT
Start: 2024-10-28 | End: 2024-10-29

## 2024-10-28 RX ORDER — SODIUM CHLORIDE 9 MG/ML
INJECTION, SOLUTION INTRAVENOUS PRN
Status: ACTIVE | OUTPATIENT
Start: 2024-10-28

## 2024-10-28 RX ORDER — LORAZEPAM 2 MG/ML
INJECTION INTRAMUSCULAR
Status: COMPLETED
Start: 2024-10-28 | End: 2024-10-28

## 2024-10-28 RX ORDER — VANCOMYCIN HYDROCHLORIDE 50 MG/ML
125 KIT ORAL EVERY 6 HOURS SCHEDULED
Status: DISCONTINUED | OUTPATIENT
Start: 2024-10-28 | End: 2024-10-28

## 2024-10-28 RX ORDER — LEVETIRACETAM 500 MG/5ML
1500 INJECTION, SOLUTION, CONCENTRATE INTRAVENOUS EVERY 12 HOURS
Status: DISCONTINUED | OUTPATIENT
Start: 2024-10-28 | End: 2024-11-08

## 2024-10-28 RX ORDER — LEVETIRACETAM 100 MG/ML
1500 SOLUTION ORAL 2 TIMES DAILY
Status: CANCELLED | OUTPATIENT
Start: 2024-10-28

## 2024-10-28 RX ORDER — VANCOMYCIN 1.25 G/250ML
25 INJECTION, SOLUTION INTRAVENOUS ONCE
Status: COMPLETED | OUTPATIENT
Start: 2024-10-28 | End: 2024-10-28

## 2024-10-28 RX ORDER — NOREPINEPHRINE BITARTRATE/D5W 16MG/250ML
1-100 PLASTIC BAG, INJECTION (ML) INTRAVENOUS CONTINUOUS
Status: DISCONTINUED | OUTPATIENT
Start: 2024-10-28 | End: 2024-10-28 | Stop reason: SDUPTHER

## 2024-10-28 RX ORDER — 0.9 % SODIUM CHLORIDE 0.9 %
1000 INTRAVENOUS SOLUTION INTRAVENOUS ONCE
Status: DISCONTINUED | OUTPATIENT
Start: 2024-10-28 | End: 2024-10-28

## 2024-10-28 RX ORDER — ACETAMINOPHEN 325 MG/1
650 TABLET ORAL EVERY 6 HOURS PRN
Status: DISCONTINUED | OUTPATIENT
Start: 2024-10-28 | End: 2024-10-28

## 2024-10-28 RX ORDER — HYDROCORTISONE SODIUM SUCCINATE 100 MG/2ML
100 INJECTION INTRAMUSCULAR; INTRAVENOUS EVERY 8 HOURS
Status: DISCONTINUED | OUTPATIENT
Start: 2024-10-28 | End: 2024-10-31

## 2024-10-28 RX ORDER — ACYCLOVIR 200 MG/5ML
400 SUSPENSION ORAL 2 TIMES DAILY
Status: DISCONTINUED | OUTPATIENT
Start: 2024-10-28 | End: 2024-11-09

## 2024-10-28 RX ORDER — SODIUM CHLORIDE 9 MG/ML
500 INJECTION, SOLUTION INTRAVENOUS CONTINUOUS PRN
Status: DISCONTINUED | OUTPATIENT
Start: 2024-10-28 | End: 2024-10-31

## 2024-10-28 RX ORDER — METRONIDAZOLE 500 MG/100ML
500 INJECTION, SOLUTION INTRAVENOUS ONCE
Status: COMPLETED | OUTPATIENT
Start: 2024-10-28 | End: 2024-10-28

## 2024-10-28 RX ORDER — GUAIFENESIN 200 MG/10ML
LIQUID ORAL 2 TIMES DAILY
Status: DISPENSED | OUTPATIENT
Start: 2024-10-28

## 2024-10-28 RX ORDER — MORPHINE SULFATE 4 MG/ML
4 INJECTION INTRAVENOUS
Status: COMPLETED | OUTPATIENT
Start: 2024-10-28 | End: 2024-10-28

## 2024-10-28 RX ORDER — 0.9 % SODIUM CHLORIDE 0.9 %
30 INTRAVENOUS SOLUTION INTRAVENOUS ONCE
Status: DISCONTINUED | OUTPATIENT
Start: 2024-10-28 | End: 2024-10-28

## 2024-10-28 RX ADMIN — SODIUM CHLORIDE 1000 ML: 9 INJECTION, SOLUTION INTRAVENOUS at 10:35

## 2024-10-28 RX ADMIN — HYDROCORTISONE SODIUM SUCCINATE 100 MG: 100 INJECTION, POWDER, FOR SOLUTION INTRAMUSCULAR; INTRAVENOUS at 11:50

## 2024-10-28 RX ADMIN — NOREPINEPHRINE BITARTRATE 5 MCG/MIN: 0.06 INJECTION, SOLUTION INTRAVENOUS at 13:28

## 2024-10-28 RX ADMIN — POTASSIUM CHLORIDE 20 MEQ: 29.8 INJECTION INTRAVENOUS at 20:11

## 2024-10-28 RX ADMIN — ROCURONIUM BROMIDE 68 MG: 10 INJECTION, SOLUTION INTRAVENOUS at 14:03

## 2024-10-28 RX ADMIN — METRONIDAZOLE 500 MG: 500 INJECTION, SOLUTION INTRAVENOUS at 11:51

## 2024-10-28 RX ADMIN — LEVETIRACETAM 1500 MG: 100 INJECTION INTRAVENOUS at 21:23

## 2024-10-28 RX ADMIN — Medication 400 MG: at 21:24

## 2024-10-28 RX ADMIN — SODIUM CHLORIDE: 9 INJECTION, SOLUTION INTRAVENOUS at 11:59

## 2024-10-28 RX ADMIN — PROPOFOL 10 MCG/KG/MIN: 10 INJECTION, EMULSION INTRAVENOUS at 14:18

## 2024-10-28 RX ADMIN — MORPHINE SULFATE 4 MG: 4 INJECTION INTRAVENOUS at 10:39

## 2024-10-28 RX ADMIN — ETOMIDATE 20 MG: 2 INJECTION, SOLUTION INTRAVENOUS at 14:02

## 2024-10-28 RX ADMIN — Medication: at 21:54

## 2024-10-28 RX ADMIN — CEFEPIME 2000 MG: 2 INJECTION, POWDER, FOR SOLUTION INTRAVENOUS at 10:09

## 2024-10-28 RX ADMIN — ONDANSETRON 4 MG: 4 TABLET, ORALLY DISINTEGRATING ORAL at 10:05

## 2024-10-28 RX ADMIN — POTASSIUM CHLORIDE 20 MEQ: 29.8 INJECTION INTRAVENOUS at 21:35

## 2024-10-28 RX ADMIN — ACETAMINOPHEN 650 MG: 650 SOLUTION ORAL at 16:12

## 2024-10-28 RX ADMIN — PHENYLEPHRINE HYDROCHLORIDE 30 MCG/MIN: 50 INJECTION INTRAVENOUS at 14:59

## 2024-10-28 RX ADMIN — MEROPENEM 1000 MG: 1 INJECTION INTRAVENOUS at 22:44

## 2024-10-28 RX ADMIN — PANTOPRAZOLE SODIUM 40 MG: 40 INJECTION, POWDER, FOR SOLUTION INTRAVENOUS at 21:01

## 2024-10-28 RX ADMIN — VASOPRESSIN 0.01 UNITS/MIN: 20 INJECTION INTRAVENOUS at 20:32

## 2024-10-28 RX ADMIN — POTASSIUM CHLORIDE 20 MEQ: 29.8 INJECTION INTRAVENOUS at 18:51

## 2024-10-28 RX ADMIN — MEROPENEM 1000 MG: 1 INJECTION INTRAVENOUS at 13:56

## 2024-10-28 RX ADMIN — LORAZEPAM 2 MG: 2 INJECTION INTRAMUSCULAR; INTRAVENOUS at 13:19

## 2024-10-28 RX ADMIN — VANCOMYCIN 1750 MG: 1.25 INJECTION, SOLUTION INTRAVENOUS at 18:22

## 2024-10-28 RX ADMIN — VASOPRESSIN 0.03 UNITS/MIN: 20 INJECTION INTRAVENOUS at 13:50

## 2024-10-28 RX ADMIN — MICAFUNGIN SODIUM 50 MG: 100 INJECTION, POWDER, LYOPHILIZED, FOR SOLUTION INTRAVENOUS at 20:21

## 2024-10-28 RX ADMIN — VANCOMYCIN HYDROCHLORIDE 125 MG: 250 POWDER, FOR SOLUTION ORAL at 21:24

## 2024-10-28 RX ADMIN — HYDROCORTISONE SODIUM SUCCINATE 100 MG: 100 INJECTION, POWDER, FOR SOLUTION INTRAMUSCULAR; INTRAVENOUS at 18:02

## 2024-10-28 RX ADMIN — LEVETIRACETAM 1500 MG: 100 INJECTION INTRAVENOUS at 18:03

## 2024-10-28 RX ADMIN — IOPAMIDOL 75 ML: 755 INJECTION, SOLUTION INTRAVENOUS at 11:25

## 2024-10-28 RX ADMIN — NOREPINEPHRINE BITARTRATE 45 MCG/MIN: 0.06 INJECTION, SOLUTION INTRAVENOUS at 21:48

## 2024-10-28 RX ADMIN — SODIUM CHLORIDE 1000 ML: 0.9 INJECTION, SOLUTION INTRAVENOUS at 13:23

## 2024-10-28 ASSESSMENT — PAIN DESCRIPTION - ORIENTATION
ORIENTATION: RIGHT;LEFT

## 2024-10-28 ASSESSMENT — PULMONARY FUNCTION TESTS
PIF_VALUE: 18

## 2024-10-28 ASSESSMENT — PAIN DESCRIPTION - LOCATION
LOCATION: ABDOMEN

## 2024-10-28 ASSESSMENT — PAIN SCALES - GENERAL
PAINLEVEL_OUTOF10: 4
PAINLEVEL_OUTOF10: 8
PAINLEVEL_OUTOF10: 8
PAINLEVEL_OUTOF10: 4

## 2024-10-28 ASSESSMENT — LIFESTYLE VARIABLES
HOW MANY STANDARD DRINKS CONTAINING ALCOHOL DO YOU HAVE ON A TYPICAL DAY: PATIENT DOES NOT DRINK
HOW OFTEN DO YOU HAVE A DRINK CONTAINING ALCOHOL: NEVER

## 2024-10-28 ASSESSMENT — PAIN DESCRIPTION - DESCRIPTORS: DESCRIPTORS: DISCOMFORT

## 2024-10-28 ASSESSMENT — PAIN - FUNCTIONAL ASSESSMENT
PAIN_FUNCTIONAL_ASSESSMENT: PREVENTS OR INTERFERES SOME ACTIVE ACTIVITIES AND ADLS
PAIN_FUNCTIONAL_ASSESSMENT: 0-10

## 2024-10-28 NOTE — CONSENT
Informed Consent for Blood Component Transfusion Note    I have discussed with the patient the rationale for blood component transfusion; its benefits in treating or preventing fatigue, organ damage, or death; and its risk which includes mild transfusion reactions, rare risk of blood borne infection, or more serious but rare reactions. I have discussed the alternatives to transfusion, including the risk and consequences of not receiving transfusion. The patient had an opportunity to ask questions and had agreed to proceed with transfusion of blood components.    Electronically signed by Krystal Hendrickson PA-C on 10/28/24 at 10:58 AM EDT

## 2024-10-28 NOTE — CONSULTS
Neurology Consult Note    Patient: Dennys Peguero MRN: [id number]    YOB: 1980  Age: 43 y.o.  Sex: male   Unit: [facility] 3T [facility]  Room/Bed: 3501/3501-01 Location: [facility]    Date of Consultation: 10/28/2024  Date of Admission: 10/28/2024  9:21 AM ( LOS: 0 days )  Primary Care Physician: No primary care provider on file.   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 Became acutely unresponsive in [facility] with rightward gaze deviation, concerning for seizure. Did not improve s/p ativan dose, blood pressure started to decline, now requiring pressors. Febrile up to 104.6.   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 Area of encephalomalacia seen on imaging from prior surgery, likely area of seizure focus. Regardless of compliance with medication, his current suspected infection could certainly lead to breakthrough seizure, as well as altered mentation. Beginning to slowly wake up and move some spontaneously according to nursing staff, so seizure high on differential.    RECOMMENDATIONS:  - CT head once stabilized  - Will plan to place on cvEEG given gaze deviation  - Continue home keppra  - If gaze deviation returns/develops any abnormal movements while on cvEEG, press event button and notify Neurology  - Infection management per ICU/Hem Onc team, feel CNS infection is unlikely as he came in alert and oriented - just with fevers - though his broad spectrum ABX cover this possibility  - With platelets currently 5 and his blood pressure requiring 3 pressors, do not feel he is stable/safe for MRI or LP at this point of time  -  History:   Procedure Laterality Date    BRAIN SURGERY      Surgery in 2017 at [facility]    BRONCHOSCOPY N/A 5/22/2024    BRONCHOSCOPY ALVEOLAR LAVAGE performed by Froylan Hoang MD at [facility] ENDOSCOPY    COLONOSCOPY N/A 3/22/2024    COLONOSCOPY POLYPECTOMY HOT SNARE/COLD BIOPSY performed by Marcus Uribe MD at [facility] ENDOSCOPY    SMALL INTESTINE SURGERY N/A 4/12/2024    ROBOTIC LOW ANTERIOR RESECTION WITH COLORECTAL ANASTOMOSIS performed by Rakesh Montano MD at [facility] OR    UPPER GASTROINTESTINAL ENDOSCOPY N/A 3/6/2024    ESOPHAGOGASTRODUODENOSCOPY performed by Wade Cantrell MD at [facility] ENDOSCOPY     FAMILY HISTORY & SOCIAL HISTORY:  Family history non-contributory  History reviewed. No pertinent family history.  Social History     Tobacco Use    Smoking status: Never    Smokeless tobacco: Never   Vaping Use    Vaping status: Never Used   Substance Use Topics    Alcohol use: Not Currently    Drug use: Not Currently       Allergies & Outpatient Medications   ALLERGIES:  Allergies   Allergen Reactions    [place] Anaphylaxis     HOME MEDICATIONS:  Current Outpatient Medications   Medication Instructions    cyanocobalamin 1,000 mcg, Oral, DAILY    Hydrocerin (EUCERIN) CREA cream Topical, 2 TIMES DAILY    levETIRAcetam (KEPPRA) 1,500 mg, Oral, 2 TIMES DAILY    ondansetron (ZOFRAN-ODT) 4 mg, SubLINGual, 3 TIMES DAILY PRN    pantoprazole (PROTONIX) 40 mg, Oral, 2 TIMES DAILY BEFORE MEALS    prochlorperazine (COMPAZINE) 10 mg, Oral, EVERY 4 HOURS PRN    valACYclovir (VALTREX) 500 mg, Oral, 2 TIMES DAILY    vancomycin (VANCOCIN) 125 mg, Oral, 2 times daily    voriconazole (VFEND) 200 mg, Oral, EVERY 12 HOURS SCHEDULED       Physical Exam   PHYSICAL EXAM:  Vitals:    10/28/24 1500 10/28/24 1515 10/28/24 1530 10/28/24 1545   BP: (!) 89/39 (!) 93/44 (!) 104/50 121/71   Pulse: (!) 124 95 (!) 107 (!) 104   Resp: 17 20 20 18   Temp:    (!) 102.4 °F (39.1 °C)   TempSrc:    Core   SpO2: 100% 100% 100% 100%   Weight:

## 2024-10-28 NOTE — ED PROVIDER NOTES
THE [facility]  EMERGENCY DEPARTMENT ENCOUNTER          PHYSICIAN ASSISTANT NOTE       Date of evaluation: 10/28/2024    Chief Complaint     Nausea (Began over the weekend) and Abdominal Pain (Generalized, [facility] patient)      History of Present Illness     Dennys Peguero is a 43 y.o. male with history of AML who presents to the emergency department today for abdominal pain.  Patient reports he has had a sharp, stabbing, and constant abdominal pain for the past 2 to 3 days.  Patient reports he is not eaten anything last couple days.  He reports he has not really been drinking anything last few days.  He denies anything making his pain better or worse.  Additionally he reports being nauseous but has not had any episodes of emesis.  He reports he has not taken any of his nausea medication at home or any other medication for pain.  He reports anytime he tries to eat he has to go to the bathroom 10 minutes later.  He states that he does not have any diarrhea.  Patient denies any chest pain, shortness of breath, fever or chills, or recent medication changes.    ASSESSMENT / PLAN  (MEDICAL DECISION MAKING)     INITIAL VITALS: BP: 99/60, Temp: (!) 100.7 °F (38.2 °C), Pulse: (!) 114, Respirations: 18, SpO2: 97 %    Dennys Peguero is a 43 y.o. male senting to the emergency department for abdominal pain and generalized fatigue.      Due to concerns for sepsis patient was started on cefepime upon arrival.  Additionally, patient was started on a liter of fluid.  Appropriate cultures were ordered, and lactate obtained.     Patient became hypotensive and additional fluids were given.    CBC shows low white blood cell count which is consistent with patient's known diagnosis of AML.  Additionally CBC shows low hemoglobin hematocrit requiring blood transfusion.  Blood consent was obtained and blood transfusion was started.      Patient had elevated ALT, AST, and alk phos compared to historic labs.  A CT of abdomen pelvis was ordered  NEUROLOGY    Review of Systems     Review of Systems  Systems  reviewed and were negative other than what is stated in the HPI.  Past Medical, Surgical, Family, and Social History     He has a past medical history of Brain cancer (HCC) and Seizure (HCC).  He has a past surgical history that includes brain surgery; Upper gastrointestinal endoscopy (N/A, 3/6/2024); Colonoscopy (N/A, 3/22/2024); Small intestine surgery (N/A, 4/12/2024); and bronchoscopy (N/A, 5/22/2024).  His family history is not on file.  He reports that he has never smoked. He has never used smokeless tobacco. He reports that he does not currently use alcohol. He reports that he does not currently use drugs.    Medications     Current Discharge Medication List        CONTINUE these medications which have NOT CHANGED    Details   vancomycin (VANCOCIN) 125 MG capsule Take 1 capsule by mouth in the morning and at bedtime  Qty: 60 capsule, Refills: 1      levETIRAcetam (KEPPRA) 750 MG tablet Take 2 tablets by mouth 2 times daily  Qty: 60 tablet, Refills: 3      voriconazole (VFEND) 200 MG tablet Take 1 tablet by mouth every 12 hours  Qty: 30 tablet, Refills: 1      ondansetron (ZOFRAN-ODT) 4 MG disintegrating tablet Place 1 tablet under the tongue 3 times daily as needed for Nausea or Vomiting  Qty: 3 tablet, Refills: 0      Hydrocerin (EUCERIN) CREA cream Apply topically 2 times daily  Qty: 1 each, Refills: 0      pantoprazole (PROTONIX) 40 MG tablet Take 1 tablet by mouth 2 times daily (before meals)  Qty: 30 tablet, Refills: 3      prochlorperazine (COMPAZINE) 10 MG tablet Take 1 tablet by mouth every 4 hours as needed (Nausea / Vomiting)  Qty: 120 tablet, Refills: 3      valACYclovir (VALTREX) 500 MG tablet Take 1 tablet by mouth 2 times daily  Qty: 60 tablet, Refills: 3      vitamin B-12 1000 MCG tablet Take 1 tablet by mouth daily  Qty: 30 tablet, Refills: 3             Allergies     He is allergic to almond calin.    Physical Exam     INITIAL

## 2024-10-28 NOTE — CONSULTS
edema.   Skin:     General: Skin is warm and dry.   Neurological:      Comments: Unable to assess due to sedation.           LABS:  ABGs:    Recent Labs     10/28/24  1334   PHART 7.465*   IEQ2QDZ 31.8*   PO2ART 98.1     VBGs:  No results for input(s): \"PHVEN\", \"SIZ8GXN\", \"PO2VEN\" in the last 72 hours.  Recent Labs     10/28/24  0938   WBC 0.2*   HGB 6.5*   HCT 19.0*   PLT 5*       Recent Labs     10/28/24  0938   *   K 3.5   CL 98*   CO2 26   BUN 23*   CREATININE 1.4*   GLUCOSE 159*     Recent Labs     10/28/24  0938   *   *   BILITOT 0.4   ALKPHOS 208*     No results for input(s): \"TROPHS\" in the last 72 hours.  No results for input(s): \"PROBNP\" in the last 72 hours.  No results for input(s): \"INR\" in the last 72 hours.  No results for input(s): \"SEDRATE\" in the last 72 hours.  No results for input(s): \"CRP\" in the last 72 hours.  Recent Labs     10/28/24  1334   LACTATE 3.16*         UA/micro: No results for input(s): \"NITRITE\", \"COLORU\", \"PHUR\", \"LABCAST\", \"WBCUA\", \"RBCUA\", \"MUCUS\", \"TRICHOMONAS\", \"YEAST\", \"BACTERIA\", \"CLARITYU\", \"SPECGRAV\", \"LEUKOCYTESUR\", \"UROBILINOGEN\", \"BILIRUBINUR\", \"BLOODU\", \"GLUCOSEU\", \"AMORPHOUS\" in the last 72 hours.    Invalid input(s): \"KETONEU\"    Micro:   Results       Procedure Component Value Units Date/Time    MRSA DNA Probe, Nasal [[id number]]     Order Status: Sent Specimen: Nares     Respiratory Panel, Molecular, with COVID-19 (Restricted: peds pts or suitable admitted adults) [[id number]]     Order Status: Sent Specimen: Nasopharyngeal     Culture, Blood 2 [[id number]] Collected: 10/28/24 1001    Order Status: Sent Specimen: Blood Updated: 10/28/24 1614    Fungus culture, blood [[id number]] Collected: 10/28/24 1001    Order Status: Sent Specimen: Blood Updated: 10/28/24 1615    Culture, Viral Respiratory [[id number]] Collected: 10/28/24 1001    Order Status: Sent Specimen: Sputum Updated: 10/28/24 1023    Respiratory Culture [[id number]] Collected:  needed        Glycemic goal:  140-180 mg/dL  Infusions: Propofol, phenylephrine  Abx: Acyclovir, Vancomycin, Merrem, Micafungin  Diet:  ADULT DIET; Regular; Low Microbial  GI PPx:  Protonix 40 mg IV qd  Bowel Regimen: N/A  DVT PPx: SCDs      Code Status:  Full Code  Disposition:   Current:  ICU  At discharge:  TBD      Nathan Camarillo DO, PGY-1  Internal Medicine Resident  Contact via Perfect[employer]    Patient discussed with critical care attending Dr. Rodriguez \"Stephane\" MD Corine    PULMONARY AND CRITICAL CARE ATTENDING:  Patient was seen, examined and discussed with Dr. Camarillo PGY-1. I agree with the history of present illness, past medical/surgical histories, family history, social history, medication list and allergies as listed. The review of systems is as noted above. My physical exam confirms the findings listed above.   Chart was reviewed including Labs, CXR, CT scan, EKG, and Medical records confirm the findings noted above.   I edited the note where appropriate.    Briefly, this is a 43 y.o. male admitted to ICU with neutropenic fever.   Rapid response called for encephalopathy, acute mentation change was reported, patient with spontaneous eye opening but not responsive to interview. BP dropping started on vasopressors.     BP (!) 83/31   Pulse (!) 118   Temp (!) 104.6 °F (40.3 °C) (Axillary)   Resp (!) 31   Ht 1.778 m (5' 10\")   Wt 67.7 kg (149 lb 3.2 oz)   SpO2 93%   BMI 21.41 kg/m²     Intake/Output Summary (Last 24 hours) at 10/28/2024 1546  Last data filed at 10/28/2024 1216  Gross per 24 hour   Intake 300 ml   Output --   Net 300 ml    SpO2: 93 %  Ventilator settings  -    -    -    -    -    -    -    -    - ETCO2 (mmHg): 46 mmHg        Peripheral IV 10/28/24 Left;Proximal Forearm (Active)   Number of days: 0     ASSESSMENT:  Septic shock  RLL PNA  Pancytopenia     Electrolytes: Hypocalcemia, hyponatremia, hypochloremia, hypomagnesemia    PLAN:  Ventilator settings were reviewed, PRVC, continue lung

## 2024-10-28 NOTE — PROCEDURES
PROCEDURE NOTE  Date: 10/28/2024   Name: Dennys Peguero  YOB: 1980    CENTRAL LINE    Date/Time: 10/28/2024 4:00 PM    Performed by: Rush cA DO  Authorized by: Rush Ac DO  Consent: The procedure was performed in an emergent situation. Verbal consent not obtained.  Patient identity confirmed: arm band, provided demographic data and hospital-assigned identification number  Time out: Immediately prior to procedure a \"time out\" was called to verify the correct patient, procedure, equipment, support staff and site/side marked as required.  Indications: vascular access and central pressure monitoring    Sedation:  Patient sedated: yes  Sedatives: etomidate  Vitals: Vital signs were monitored during sedation.    Preparation: skin prepped with ChloraPrep  Skin prep agent dried: skin prep agent completely dried prior to procedure  Sterile barriers: all five maximum sterile barriers used - cap, mask, sterile gown, sterile gloves, and large sterile sheet  Hand hygiene: hand hygiene performed prior to central venous catheter insertion  Location details: right internal jugular  Patient position: flat  Catheter type: triple lumen  Catheter size: 7 Fr  Pre-procedure: landmarks identified  Ultrasound guidance: yes  Sterile ultrasound techniques: sterile gel and sterile probe covers were used  Number of attempts: 1  Successful placement: yes  Post-procedure: line sutured and dressing applied  Assessment: blood return through all ports, placement verified by x-ray, no pneumothorax on x-ray and free fluid flow  Patient tolerance: patient tolerated the procedure well with no immediate complications  Comments: EBL <5mL. Some oozing from puncture site given thrombocytopenia, direct pressure applied      Rush Ac DO  PGY-3, Internal Medicine  10/28/24  4:02 PM

## 2024-10-28 NOTE — PROGRESS NOTES
10/28/24 1417   Encounter Summary   Encounter Overview/Reason Crisis   Service Provided For Family   Support System Parent;Family members   Last Encounter  10/28/24  (dje   Code response)   Complexity of Encounter High   Begin Time 1315   End Time  1418   Total Time Calculated 63 min   Crisis   Type Code Blue   Assessment/Intervention/Outcome   Assessment Anxious;Stress overload;Tearful   Intervention Active listening;Discussed belief system/Rastafarian practices/leny;Explored/Affirmed feelings, thoughts, concerns;Facilitated/Participated in Patient/Family Care Conference;Nurtured Hope   Outcome Comfort;Deescalated;Expressed feelings, needs, and concerns;Expressed Gratitude     PT and family are [employer] Day [employer]ists and active in [facility] communities.  provided emotional support. No other needs at this time.     Spiritual Health History and Assessment/Progress Note  [facility]    (P) Crisis, (P) Code Blue,  ,      Name: Dennys Peguero MRN: [id number]    Age: 43 y.o.     Sex: male   Language: English   Spiritism: None   Neutropenic fever (HCC)     Date: 10/28/2024            Total Time Calculated: (P) 63 min              Spiritual Assessment began in [address]            Encounter Overview/Reason: (P) Crisis  Service Provided For: (P) Family    Leny, Belief, Meaning:   Patient unable to assess at this time  Family/Friends identify as spiritual, are connected with a leny tradition or spiritual practice, and have beliefs or practices that help with coping during difficult times      Importance and Influence:  Patient unable to assess at this time  Family/Friends have spiritual/personal beliefs that influence decisions regarding the patient's health    Community:  Patient is connected with a spiritual community  Family/Friends are connected with a spiritual community:    Assessment and Plan of Care:     Patient Interventions include: Other: PT unable at this time.  Family/Friends

## 2024-10-28 NOTE — ED NOTES
Pt has a Temp 103.7. Notified Dr. Park.  Pt placed on 2 L of O2 stating at 80.     Kristi Cardona, RN  10/28/24 6072

## 2024-10-28 NOTE — CARE COORDINATION
Patient father and POA requested from this writer that at this time the only people to be updated on patient status is himself and his sister Mar.  RN and Team has been notified.

## 2024-10-28 NOTE — ED NOTES
Pt is hypotensive 77/33.  Pt is A/O x 4 and responding appropriately. Dr Park made aware .       Kristi Cardona, RN  10/28/24 8237

## 2024-10-28 NOTE — SIGNIFICANT EVENT
Code blue was called around 1:15PM and ICU team arrived on the spot  It was mistakenly called a \"code blue\" as patient had a pulse and blood pressure.  RN clarified that they wanted to call \"code stroke\" as patient mentation was altered acutely.  He was recently admitted for for NEUTROPENIC FEVER.    Blood sugar: 165  BP was 163/149 initially but dropped to around MAP of 50 few mins later.  Temp 105  Patient was not talkative, unresponsive, with a right gaze preference.  2mg of ativan was given without much improvement.  Blood pressure started to come down and patient was in 70/50s. So levophed was ordered.  CT head and VBG, lactate, stat were ordered  NIHSS score was 26.  stroke team was called. They were inclining more towards sepsis as patient was febile and also neutopenic.  Due to AMS, patient was intubated and started on krystian.  ICU was consulted.

## 2024-10-28 NOTE — ED PROVIDER NOTES
ED Attending Attestation Note     Date of evaluation: 10/28/2024    This patient was seen by the advance practice provider.  I have seen and examined the patient, agree with the workup, evaluation, management and diagnosis. The care plan has been discussed.  My assessment reveals patient with fever in the setting of neutropenia.  Cultures obtained and antibiotics administered and patient will be admitted to the hospital for further management of neutropenic fever.     Joon Park MD  10/28/24 1212

## 2024-10-28 NOTE — PROCEDURES
PROCEDURE NOTE  Date: 10/28/2024   Name: Dennys Peguero  YOB: 1980    Intubation    Date/Time: 10/28/2024 3:27 PM    Performed by: Rush Ac DO  Authorized by: Michael Perez MD  Consent: The procedure was performed in an emergent situation. Verbal consent not obtained.  Patient identity confirmed: arm band, provided demographic data and hospital-assigned identification number  Time out: Immediately prior to procedure a \"time out\" was called to verify the correct patient, procedure, equipment, support staff and site/side marked as required.  Indications: hypoxemia and airway protection  Intubation method: video-assisted  Patient status: paralyzed (RSI)  Preoxygenation: nonrebreather mask and BVM  Sedatives: etomidate  Paralytic: rocuronium  Laryngoscope size: Mac 4  Tube size: 8.0 mm  Tube type: cuffed  Number of attempts: 1  Cricoid pressure: no  Cords visualized: yes  Post-procedure assessment: chest rise and ETCO2 monitor  Breath sounds: equal  Cuff inflated: yes  ETT to teeth: 23 cm  Tube secured with: ETT stark  Chest x-ray interpreted by me and radiologist.  Chest x-ray findings: endotracheal tube in appropriate position  Patient tolerance: patient tolerated the procedure well with no immediate complications      Rush Ac DO  PGY-3, Internal Medicine  10/28/24  3:52 PM

## 2024-10-28 NOTE — ED NOTES
How does patient ambulate?   []Low Fall Risk (ambulates by themselves without support)  []Stand by assist   []Contact Guard   []Front wheel walker  []Wheelchair   []Steady  []Bed bound  []History of Lower Extremity Amputation  [x]Unknown, did not assess in the emergency department   How does patient take pills?  [x]Whole with Water  []Crushed in applesauce  []Crushed in pudding  []Other  []Unknown no oral medications were given in the ED  Is patient alert?   [x]Alert  []Drowsy but responds to voice  []Doesn't respond to voice but responds to painful stimuli  []Unresponsive  Is patient oriented?   [x]To person  [x]To place  [x]To time  [x]To situation  []Confused  []Agitated  []Follows commands  If patient is disoriented or from a Skill Nursing Facility has family been notified of admission?   []Yes   [x]No  Patient belongings?   [x]Cell phone  []Wallet   [x]Dentures  [x]Clothing  Any specific patient or family belongings/needs/dynamics?   PBCS running now; Pt has temp 103.7  Miscellaneous comments/pending orders?  See orders     If there are any additional questions please reach out to the Emergency Department.           Kristi Cardona RN  10/28/24 8091

## 2024-10-28 NOTE — H&P
[facility] History and Physical       Attending Physician: Eduardo Nguyen MD    Primary Care: No primary care provider on file.       Referring MD: No referring provider defined for this encounter.    Name: Dennys Peguero :  1980  MRN:  [id number]    Admission: 10/28/2024      Date: 10/28/2024    Reason for Admission: Neutropenic Fever/Sepsis    History of Present Illness: Dennys Peguero is a 43 y.o. male admitted for new CNS lesions, concerning for possible AML involvement. He has a past medical history of Oligodendroglioma (S/P partial resection , radiation , and temodar ), rectal adenocarcinoma (s/p robotic low anterior resection with colorectal anastomosis 24) and AML with underlying MDS- Deletion 5q33, trisomy 8, gain of KMT2A/11q, Deletion of 20q. TP53 positive and cytogenetics with multiple abnormalities. He is currently on treatment with Dacogen and Venetoclax (started 3/13/24). His past medical history is also consistent with Cdiff, multifocal PNA and blastomyces antibody of 1.      He was undergoing transplant work up and had a repeat MRI of his brain 24 that showed multiple intracranial, intra-axial enhancing foci that had developed since the prior examination on 2024. This MRI was reviewed at Neuro-Oncology and the consensus was that it would be very atypical for a recurrence of oligodendroglioma. Given the progression on MRI, he was admitted to rule out CNS involvement of his AML. On admission he had a MRI of his brain, neurosurgery was consulted and he had a diagnostic lumbar puncture.      MRI from 10/1/24 shows a pattern of signal abnormality that's suspicious for an amyloid angiopathy or related CNS vasculopathy. Metastatic disease is considered unlikely.Neurosurgery reviewed the MRI and diagnosed the patient with amyloid angiopathy. They recommend avoiding anticoagulation as it would significantly increase the risk of ICH. There is no surgical intervention warranted  [place]) reportedly unremarkable  - Colonoscopy 3/22/24: rectal lesion: Adenocarcinoma, at least adenocarcinoma in situ,  MRI pelvis showed 2.7 cm rectal mass with no involvement of anal sphincter, mesorectal fascia or any mesorectal lymph nodes.  - Pelvic MRI 3/25/24: Radiological Stage: T 1/2, N 0  MRF: Clear. Sphincter involvement: No. Suspicious extra mesorectal nodes: None. EMVI: Absent  - CT chest 3/25/24: No acute pathology. CT A/P- no mets               - CEA: 6.5  - s/p rectal mass resection 4/12/24. Path staging T1N0M0.  - Referred to Dr. Russo for rectal ca follow up post resection.--> Needs to follow-up as an outpatient.  - Would likely not offer adjuvant therapy at this stage. Follow up on Oncology recs    6. Neuro:    H/O Grade 2 Oligodendroglioma 2017, seizures since 2009  - Cont on Keppra 1500 mg BID  - s/p partial resection, radiation and Temodar  - MRI 8/28/24: Multiple intracranial, intra-axial enhancing foci have developed since the prior examination on 1/18/2024, compatible with interval development of intracranial metastatic disease  - MRI brain (10/1/24): patten of signal abnormality that is suspicious for an amyloid angiopathy or related CNS vasculopathy. Metastatic disease is considered unlikely.   - Neurosurgery reviewed the MRI and diagnosed the patient with amyloid angiopathy. They recommend avoiding anticoagulation as it would significantly increase the risk of ICH. There is no surgical intervention warranted.   - LP (9/30/24): No malignant cells identified.   - Meningitis PCR panel (9/30/24): Negative   - CT brain (10/4/24): No bleed.   - Asked him to reach out to  Neurology for Amyloid angiopathy with goal platelet count > 20-25k to minimize his risk of ICH. His BP is usually under well controlled. Avoid any anticoagulation.   - CT head 10/10/24: Stable small foci of increased density parietal and occipital lobe on the right occipital lobe on the left similar to the previous exam

## 2024-10-28 NOTE — PROCEDURES
PROCEDURE NOTE  Date: 10/28/2024   Name: Dennys Peguero  YOB: 1980    Insert Arterial Line    Date/Time: 10/28/2024 4:03 PM    Performed by: Rush Ac DO  Authorized by: Rush Ac DO  Consent: The procedure was performed in an emergent situation. Verbal consent not obtained.  Time out: Immediately prior to procedure a \"time out\" was called to verify the correct patient, procedure, equipment, support staff and site/side marked as required.  Preparation: Patient was prepped and draped in the usual sterile fashion.  Indications: respiratory failure and hemodynamic monitoring  Location: left radial    Sedation:  Patient sedated: yes  Sedatives: etomidate  Vitals: Vital signs were monitored during sedation.    Pierre's test normal: yes  Needle gauge: 20  Seldinger technique: Seldinger technique used  Number of attempts: 1  Post-procedure: line sutured and dressing applied  Post-procedure CMS: unchanged  Patient tolerance: patient tolerated the procedure well with no immediate complications  Comments: EBL <5mL        Rush Ac DO  PGY-3, Internal Medicine  10/28/24  4:04 PM           Number Of Group I Special Stains Used ([id number]): None

## 2024-10-28 NOTE — CONSULTS
[facility] -  Clinical Pharmacy Note    Consult Date(s): 10/28/2024 1:10 PM  Consulting Provider(s): OMARI Esparza ([facility] NP)    Vancomycin - Management by Pharmacy    Assessment / Plan  Febrile Neutropenia - Vancomycin  Concurrent Antimicrobials: Cefepime 2 g IV q8h EI (Day #1 of 7), Metronidazole 500 mg IV q8h (Day #1 of 7)  Day of Vanc Therapy / Ordered Duration: Day #1 of 7  Current Dosing Method: Intermittent Dosing by Levels  Therapeutic Goal: Trough ~ 15 mg/L  Current Dose / Plan:   SCr significantly elevated from pt's baseline (baseline SCr ~0.6 mg/dL, SCr today of 1.4 mg/dL) -> JOSÉ MIGUEL present; Urine output documented over prior 24 hours: TBD (day 0 admit)  Patient will receive vancomycin 1750 mg IV x1 dose (~25 mg/kg) today followed by intermittent dosing of vancomycin due to current JOSÉ MIGUEL  Intermittent dosing of placeholder added to MAR at this time  Will plan to obtain random level tomorrow AM (10/29, ordered) prior to re-dosing vancomycin  MRSA nares ordered -> Follow-up result and de-escalate abx as/if able  Will continue to monitor clinical condition and make adjustments to regimen as appropriate    Thank you for consulting pharmacy,    Tasha Snyder, PharmD, [facility]  Clinical Pharmacy Specialist  Wireless: [phone]  10/28/2024 1:11 PM      Interval update:  therapy initiation     Subjective/Objective: Dennys Peguero is a 43 y.o. male with a PMHx significant for new CNS lesions, concerning for possible AML involvement, oligodendroglioma (s/p partial resection 2017, radiation 2019, and temodar 2019), rectal adenocarcinoma (s/p robotic low anterior resection with colorectal anastomosis 4/12/24) and AML with underlying MDS-Deletion 5q33, trisomy 8, gain of KMT2A/11q, Deletion of 20q. TP53 positive and cytogenetics with multiple abnormalities. He is currently on treatment with Dacogen and Venetoclax (started 3/13/24). His past medical history is also significant for C diff, malnutrition. He was admitted

## 2024-10-28 NOTE — PROGRESS NOTES
Called in by charge RN to help in moving patient from stretcher to bed. Upon initial assessment, patient noted have blood running and to be minimally responsive, hot to the touch, and eyes were deviated to the right. Vitals obtained- patient noted to be 104.6 axillary and hypotensive. CORINNE Spaulding called in and told this RN to call a code due to patient's sudden deterioration and neuro history.     Documentation as follows:    1320- Code blue called. CORINNE Spaulding, Ben Tiwari, Patrick, and Osorio to bedside    1323- 2L normal saline boluses administered    1328- Levophed started @ 5mcg. Surgery consulted for line placement    1330- Levophed increased to 10 mcg per Dr. Tiwari. BP 68/29 (42)    1332- Levophed increased to 20 mcg per Dr. Tiwari. BP 78/33 (45)    1335- Levophed increased to 25 mcg    1340- Levophed increased to 35 mcg. BP 71/32 (46)  Platelets started.    1345- Levophed increased to 45 mcg per MD. BP 91/34 (51)    1349-BP 80/33 (47). Sergio-Stick administered per Dr. Pool    1350- Vasopressin started.    1351- Levophed increased to 60 mcg per Dr. Pool     1352- Chills and rigors started. Merrem initiated.    1401- Decision made to intubate. 20 of etomidate and 68 mg of Rocuronium given per Dr. Pool for intubation    1404- Patient intubated by MD Ac    1418- Propofol gtt started at 10mcg/minute    1420- R IJ CVC placed at bedside by Dr. Ac    1425- NG inserted by this RN.    1430- R radial A-line inserted by MD Ac    This RN assumed care of patient upon upgrade to ICU status    Family updated by CORINNE Spaulding, SURINDER Hartmann, and barry RN    Head CT ordered By neurology NP for when patient's BP stabilizes               No new care gaps identified.  Powered by [employer]. Reference number: [id number].   4/08/2022 4:54:36 PM CDT

## 2024-10-29 ENCOUNTER — APPOINTMENT (OUTPATIENT)
Dept: ULTRASOUND IMAGING | Age: 44
DRG: 720 | End: 2024-10-29
Payer: MEDICAID

## 2024-10-29 ENCOUNTER — APPOINTMENT (OUTPATIENT)
Dept: GENERAL RADIOLOGY | Age: 44
DRG: 720 | End: 2024-10-29
Payer: MEDICAID

## 2024-10-29 ENCOUNTER — APPOINTMENT (OUTPATIENT)
Age: 44
DRG: 720 | End: 2024-10-29
Attending: INTERNAL MEDICINE
Payer: MEDICAID

## 2024-10-29 ENCOUNTER — APPOINTMENT (OUTPATIENT)
Dept: MRI IMAGING | Age: 44
DRG: 720 | End: 2024-10-29
Payer: MEDICAID

## 2024-10-29 PROBLEM — I95.9 HYPOTENSION: Status: ACTIVE | Noted: 2024-10-29

## 2024-10-29 PROBLEM — Z45.2 PERIPHERALLY INSERTED CENTRAL CATHETER (PICC) IN PLACE: Status: ACTIVE | Noted: 2024-10-29

## 2024-10-29 PROBLEM — G40.309 NONINTRACTABLE GENERALIZED IDIOPATHIC EPILEPSY WITHOUT STATUS EPILEPTICUS (HCC): Status: ACTIVE | Noted: 2024-10-29

## 2024-10-29 PROBLEM — J96.01 ACUTE RESPIRATORY FAILURE WITH HYPOXIA: Status: ACTIVE | Noted: 2024-10-29

## 2024-10-29 PROBLEM — G40.919 BREAKTHROUGH SEIZURE (HCC): Status: ACTIVE | Noted: 2024-10-29

## 2024-10-29 LAB
ALBUMIN SERPL-MCNC: 3 G/DL (ref 3.4–5)
ALP SERPL-CCNC: 305 U/L (ref 40–129)
ALT SERPL-CCNC: 537 U/L (ref 10–40)
AMORPH SED URNS QL MICRO: ABNORMAL /HPF
ANION GAP SERPL CALCULATED.3IONS-SCNC: 11 MMOL/L (ref 3–16)
APTT BLD: 40.1 SEC (ref 22.1–36.4)
AST SERPL-CCNC: 520 U/L (ref 15–37)
BASE EXCESS BLDA CALC-SCNC: -2.9 MMOL/L (ref -3–3)
BILIRUB DIRECT SERPL-MCNC: 2.3 MG/DL (ref 0–0.3)
BILIRUB INDIRECT SERPL-MCNC: 0.4 MG/DL (ref 0–1)
BILIRUB SERPL-MCNC: 2.7 MG/DL (ref 0–1)
BILIRUB UR QL STRIP.AUTO: ABNORMAL
BLOOD BANK DISPENSE STATUS: NORMAL
BLOOD BANK PRODUCT CODE: NORMAL
BPU ID: NORMAL
BUN SERPL-MCNC: 28 MG/DL (ref 7–20)
CALCIUM SERPL-MCNC: 7.9 MG/DL (ref 8.3–10.6)
CHLORIDE SERPL-SCNC: 105 MMOL/L (ref 99–110)
CLARITY UR: CLEAR
CO2 BLDA-SCNC: 22 MMOL/L
CO2 SERPL-SCNC: 20 MMOL/L (ref 21–32)
COARSE GRAN CASTS #/AREA URNS LPF: ABNORMAL /LPF (ref 0–2)
COHGB MFR BLDA: 0.8 % (ref 0–1.5)
COLOR UR: YELLOW
CREAT SERPL-MCNC: 1.3 MG/DL (ref 0.9–1.3)
DEPRECATED RDW RBC AUTO: 13.6 % (ref 12.4–15.4)
DESCRIPTION BLOOD BANK: NORMAL
ECHO AO ROOT DIAM: 3.2 CM
ECHO AO ROOT INDEX: 1.7 CM/M2
ECHO BSA: 1.83 M2
ECHO LA DIAMETER INDEX: 1.76 CM/M2
ECHO LA DIAMETER: 3.3 CM
ECHO LA TO AORTIC ROOT RATIO: 1.03
ECHO LV EDV A2C: 177 ML
ECHO LV EDV A4C: 108 ML
ECHO LV EDV INDEX A4C: 57 ML/M2
ECHO LV EDV NDEX A2C: 94 ML/M2
ECHO LV EJECTION FRACTION A2C: 53 %
ECHO LV EJECTION FRACTION A4C: 47 %
ECHO LV EJECTION FRACTION BIPLANE: 49 % (ref 55–100)
ECHO LV ESV A2C: 84 ML
ECHO LV ESV A4C: 57 ML
ECHO LV ESV INDEX A2C: 45 ML/M2
ECHO LV ESV INDEX A4C: 30 ML/M2
ECHO LV FRACTIONAL SHORTENING: 24 % (ref 28–44)
ECHO LV INTERNAL DIMENSION DIASTOLE INDEX: 2.87 CM/M2
ECHO LV INTERNAL DIMENSION DIASTOLIC: 5.4 CM (ref 4.2–5.9)
ECHO LV INTERNAL DIMENSION SYSTOLIC INDEX: 2.18 CM/M2
ECHO LV INTERNAL DIMENSION SYSTOLIC: 4.1 CM
ECHO LV IVSD: 1 CM (ref 0.6–1)
ECHO LV MASS 2D: 193.3 G (ref 88–224)
ECHO LV MASS INDEX 2D: 102.8 G/M2 (ref 49–115)
ECHO LV POSTERIOR WALL DIASTOLIC: 0.9 CM (ref 0.6–1)
ECHO LV RELATIVE WALL THICKNESS RATIO: 0.33
ECHO RV TAPSE: 1.8 CM (ref 1.7–?)
EPI CELLS #/AREA URNS HPF: ABNORMAL /HPF (ref 0–5)
FIBRINOGEN PPP-MCNC: 603 MG/DL (ref 227–534)
GFR SERPLBLD CREATININE-BSD FMLA CKD-EPI: 69 ML/MIN/{1.73_M2}
GLUCOSE BLD-MCNC: 105 MG/DL (ref 70–99)
GLUCOSE BLD-MCNC: 140 MG/DL (ref 70–99)
GLUCOSE BLD-MCNC: 227 MG/DL (ref 70–99)
GLUCOSE SERPL-MCNC: 292 MG/DL (ref 70–99)
GLUCOSE UR STRIP.AUTO-MCNC: NEGATIVE MG/DL
HCO3 BLDA-SCNC: 21 MMOL/L (ref 21–29)
HCT VFR BLD AUTO: 21.4 % (ref 40.5–52.5)
HGB BLD-MCNC: 7.3 G/DL (ref 13.5–17.5)
HGB BLDA-MCNC: <5 G/DL
HGB UR QL STRIP.AUTO: ABNORMAL
INR PPP: 1.79 (ref 0.85–1.15)
KETONES UR STRIP.AUTO-MCNC: NEGATIVE MG/DL
LACTATE BLDV-SCNC: 1.7 MMOL/L (ref 0.4–2)
LEUKOCYTE ESTERASE UR QL STRIP.AUTO: NEGATIVE
MAGNESIUM SERPL-MCNC: 1.9 MG/DL (ref 1.8–2.4)
MCH RBC QN AUTO: 29.1 PG (ref 26–34)
MCHC RBC AUTO-ENTMCNC: 34.2 G/DL (ref 31–36)
MCV RBC AUTO: 85 FL (ref 80–100)
METHGB MFR BLDA: 1.2 % (ref 0–1.4)
NITRITE UR QL STRIP.AUTO: NEGATIVE
PCO2 BLDA: 31.9 MMHG (ref 35–45)
PERFORMED ON: ABNORMAL
PH BLDA: 7.43 [PH] (ref 7.35–7.45)
PH UR STRIP.AUTO: 6 [PH] (ref 5–8)
PHOSPHATE SERPL-MCNC: 3.5 MG/DL (ref 2.5–4.9)
PLATELET # BLD AUTO: 10 K/UL (ref 135–450)
PMV BLD AUTO: 9.3 FL (ref 5–10.5)
PO2 BLDA: 195 MMHG (ref 75–108)
POTASSIUM SERPL-SCNC: 4.4 MMOL/L (ref 3.5–5.1)
PROT SERPL-MCNC: 6.2 G/DL (ref 6.4–8.2)
PROT UR STRIP.AUTO-MCNC: 100 MG/DL
PROTHROMBIN TIME: 20.9 SEC (ref 11.9–14.9)
RBC # BLD AUTO: 2.52 M/UL (ref 4.2–5.9)
RBC #/AREA URNS HPF: ABNORMAL /HPF (ref 0–4)
REPORT: NORMAL
REPORT: NORMAL
RESP PATH DNA+RNA PNL NPH NAA+NON-PROBE: NORMAL
SAO2 % BLDA: 100 % (ref 93–100)
SODIUM SERPL-SCNC: 136 MMOL/L (ref 136–145)
SP GR UR STRIP.AUTO: 1.02 (ref 1–1.03)
UA DIPSTICK W REFLEX MICRO PNL UR: YES
URATE SERPL-MCNC: 4.1 MG/DL (ref 3.5–7.2)
URN SPEC COLLECT METH UR: ABNORMAL
UROBILINOGEN UR STRIP-ACNC: 0.2 E.U./DL
VANCOMYCIN SERPL-MCNC: 13.9 UG/ML
WBC # BLD AUTO: 0.1 K/UL (ref 4–11)
WBC #/AREA URNS HPF: ABNORMAL /HPF (ref 0–5)

## 2024-10-29 PROCEDURE — 83605 ASSAY OF LACTIC ACID: CPT

## 2024-10-29 PROCEDURE — 93308 TTE F-UP OR LMTD: CPT | Performed by: INTERNAL MEDICINE

## 2024-10-29 PROCEDURE — 87070 CULTURE OTHR SPECIMN AEROBIC: CPT

## 2024-10-29 PROCEDURE — 99233 SBSQ HOSP IP/OBS HIGH 50: CPT | Performed by: PSYCHIATRY & NEUROLOGY

## 2024-10-29 PROCEDURE — 6370000000 HC RX 637 (ALT 250 FOR IP)

## 2024-10-29 PROCEDURE — 2500000003 HC RX 250 WO HCPCS

## 2024-10-29 PROCEDURE — 87305 ASPERGILLUS AG IA: CPT

## 2024-10-29 PROCEDURE — 80076 HEPATIC FUNCTION PANEL: CPT

## 2024-10-29 PROCEDURE — 87086 URINE CULTURE/COLONY COUNT: CPT

## 2024-10-29 PROCEDURE — 6370000000 HC RX 637 (ALT 250 FOR IP): Performed by: STUDENT IN AN ORGANIZED HEALTH CARE EDUCATION/TRAINING PROGRAM

## 2024-10-29 PROCEDURE — 83735 ASSAY OF MAGNESIUM: CPT

## 2024-10-29 PROCEDURE — 70553 MRI BRAIN STEM W/O & W/DYE: CPT

## 2024-10-29 PROCEDURE — 93325 DOPPLER ECHO COLOR FLOW MAPG: CPT | Performed by: INTERNAL MEDICINE

## 2024-10-29 PROCEDURE — 76705 ECHO EXAM OF ABDOMEN: CPT

## 2024-10-29 PROCEDURE — 93356 MYOCRD STRAIN IMG SPCKL TRCK: CPT | Performed by: INTERNAL MEDICINE

## 2024-10-29 PROCEDURE — 80202 ASSAY OF VANCOMYCIN: CPT

## 2024-10-29 PROCEDURE — 87449 NOS EACH ORGANISM AG IA: CPT

## 2024-10-29 PROCEDURE — 80048 BASIC METABOLIC PNL TOTAL CA: CPT

## 2024-10-29 PROCEDURE — 6360000002 HC RX W HCPCS: Performed by: INTERNAL MEDICINE

## 2024-10-29 PROCEDURE — 82803 BLOOD GASES ANY COMBINATION: CPT

## 2024-10-29 PROCEDURE — 2580000003 HC RX 258

## 2024-10-29 PROCEDURE — 93321 DOPPLER ECHO F-UP/LMTD STD: CPT

## 2024-10-29 PROCEDURE — 84550 ASSAY OF BLOOD/URIC ACID: CPT

## 2024-10-29 PROCEDURE — 6360000004 HC RX CONTRAST MEDICATION: Performed by: PSYCHIATRY & NEUROLOGY

## 2024-10-29 PROCEDURE — 85384 FIBRINOGEN ACTIVITY: CPT

## 2024-10-29 PROCEDURE — A4217 STERILE WATER/SALINE, 500 ML: HCPCS

## 2024-10-29 PROCEDURE — 2700000000 HC OXYGEN THERAPY PER DAY

## 2024-10-29 PROCEDURE — 94761 N-INVAS EAR/PLS OXIMETRY MLT: CPT

## 2024-10-29 PROCEDURE — 99291 CRITICAL CARE FIRST HOUR: CPT | Performed by: INTERNAL MEDICINE

## 2024-10-29 PROCEDURE — 93321 DOPPLER ECHO F-UP/LMTD STD: CPT | Performed by: INTERNAL MEDICINE

## 2024-10-29 PROCEDURE — 2580000003 HC RX 258: Performed by: INTERNAL MEDICINE

## 2024-10-29 PROCEDURE — 93308 TTE F-UP OR LMTD: CPT

## 2024-10-29 PROCEDURE — 99254 IP/OBS CNSLTJ NEW/EST MOD 60: CPT | Performed by: INTERNAL MEDICINE

## 2024-10-29 PROCEDURE — 95720 EEG PHY/QHP EA INCR W/VEEG: CPT | Performed by: PSYCHIATRY & NEUROLOGY

## 2024-10-29 PROCEDURE — A9576 INJ PROHANCE MULTIPACK: HCPCS | Performed by: PSYCHIATRY & NEUROLOGY

## 2024-10-29 PROCEDURE — 2000000000 HC ICU R&B

## 2024-10-29 PROCEDURE — 36430 TRANSFUSION BLD/BLD COMPNT: CPT

## 2024-10-29 PROCEDURE — 6360000002 HC RX W HCPCS

## 2024-10-29 PROCEDURE — 74018 RADEX ABDOMEN 1 VIEW: CPT

## 2024-10-29 PROCEDURE — 81001 URINALYSIS AUTO W/SCOPE: CPT

## 2024-10-29 PROCEDURE — 84100 ASSAY OF PHOSPHORUS: CPT

## 2024-10-29 PROCEDURE — 85610 PROTHROMBIN TIME: CPT

## 2024-10-29 PROCEDURE — 95716 VEEG EA 12-26HR CONT MNTR: CPT

## 2024-10-29 PROCEDURE — 85730 THROMBOPLASTIN TIME PARTIAL: CPT

## 2024-10-29 PROCEDURE — 85027 COMPLETE CBC AUTOMATED: CPT

## 2024-10-29 RX ORDER — POLYETHYLENE GLYCOL 3350 17 G/17G
17 POWDER, FOR SOLUTION ORAL DAILY PRN
Status: ACTIVE | OUTPATIENT
Start: 2024-10-29

## 2024-10-29 RX ORDER — INSULIN LISPRO 100 [IU]/ML
0-4 INJECTION, SOLUTION INTRAVENOUS; SUBCUTANEOUS
Status: DISCONTINUED | OUTPATIENT
Start: 2024-10-29 | End: 2024-11-11

## 2024-10-29 RX ORDER — FENTANYL CITRATE 50 UG/ML
25 INJECTION, SOLUTION INTRAMUSCULAR; INTRAVENOUS
Status: DISCONTINUED | OUTPATIENT
Start: 2024-10-29 | End: 2024-10-29

## 2024-10-29 RX ORDER — OXYCODONE HYDROCHLORIDE 5 MG/1
5 TABLET ORAL EVERY 4 HOURS PRN
Status: DISCONTINUED | OUTPATIENT
Start: 2024-10-29 | End: 2024-10-30

## 2024-10-29 RX ORDER — SODIUM CHLORIDE 9 MG/ML
INJECTION, SOLUTION INTRAVENOUS PRN
Status: DISCONTINUED | OUTPATIENT
Start: 2024-10-29 | End: 2024-10-31

## 2024-10-29 RX ADMIN — FILGRASTIM-AAFI 300 MCG: 300 INJECTION, SOLUTION SUBCUTANEOUS at 10:12

## 2024-10-29 RX ADMIN — PANTOPRAZOLE SODIUM 40 MG: 40 INJECTION, POWDER, FOR SOLUTION INTRAVENOUS at 07:44

## 2024-10-29 RX ADMIN — Medication 400 MG: at 07:44

## 2024-10-29 RX ADMIN — MICAFUNGIN SODIUM 50 MG: 100 INJECTION, POWDER, LYOPHILIZED, FOR SOLUTION INTRAVENOUS at 22:48

## 2024-10-29 RX ADMIN — MEROPENEM 1000 MG: 1 INJECTION INTRAVENOUS at 13:56

## 2024-10-29 RX ADMIN — LEVETIRACETAM 1500 MG: 100 INJECTION INTRAVENOUS at 22:11

## 2024-10-29 RX ADMIN — PROPOFOL 20 MCG/KG/MIN: 10 INJECTION, EMULSION INTRAVENOUS at 00:10

## 2024-10-29 RX ADMIN — HYDROCORTISONE SODIUM SUCCINATE 100 MG: 100 INJECTION, POWDER, FOR SOLUTION INTRAMUSCULAR; INTRAVENOUS at 10:12

## 2024-10-29 RX ADMIN — VANCOMYCIN HYDROCHLORIDE 125 MG: 250 POWDER, FOR SOLUTION ORAL at 08:24

## 2024-10-29 RX ADMIN — PANTOPRAZOLE SODIUM 40 MG: 40 INJECTION, POWDER, FOR SOLUTION INTRAVENOUS at 22:11

## 2024-10-29 RX ADMIN — LIDOCAINE HYDROCHLORIDE: 20 SOLUTION ORAL at 19:18

## 2024-10-29 RX ADMIN — VANCOMYCIN HYDROCHLORIDE 125 MG: 250 POWDER, FOR SOLUTION ORAL at 22:15

## 2024-10-29 RX ADMIN — POTASSIUM CHLORIDE 20 MEQ: 29.8 INJECTION INTRAVENOUS at 00:04

## 2024-10-29 RX ADMIN — SODIUM CHLORIDE, PRESERVATIVE FREE 10 ML: 5 INJECTION INTRAVENOUS at 07:48

## 2024-10-29 RX ADMIN — LEVETIRACETAM 1500 MG: 100 INJECTION INTRAVENOUS at 07:44

## 2024-10-29 RX ADMIN — INSULIN LISPRO 1 UNITS: 100 INJECTION, SOLUTION INTRAVENOUS; SUBCUTANEOUS at 11:22

## 2024-10-29 RX ADMIN — OXYCODONE 5 MG: 5 TABLET ORAL at 21:30

## 2024-10-29 RX ADMIN — SODIUM CHLORIDE 15 ML: 900 IRRIGANT IRRIGATION at 10:23

## 2024-10-29 RX ADMIN — HYDROCORTISONE SODIUM SUCCINATE 100 MG: 100 INJECTION, POWDER, FOR SOLUTION INTRAMUSCULAR; INTRAVENOUS at 03:52

## 2024-10-29 RX ADMIN — GADOTERIDOL 15 ML: 279.3 INJECTION, SOLUTION INTRAVENOUS at 21:00

## 2024-10-29 RX ADMIN — Medication: at 08:10

## 2024-10-29 RX ADMIN — MEROPENEM 1000 MG: 1 INJECTION INTRAVENOUS at 22:11

## 2024-10-29 RX ADMIN — Medication 400 MG: at 22:11

## 2024-10-29 RX ADMIN — NOREPINEPHRINE BITARTRATE 20 MCG/MIN: 0.06 INJECTION, SOLUTION INTRAVENOUS at 07:05

## 2024-10-29 RX ADMIN — HYDROCORTISONE SODIUM SUCCINATE 100 MG: 100 INJECTION, POWDER, FOR SOLUTION INTRAMUSCULAR; INTRAVENOUS at 18:00

## 2024-10-29 RX ADMIN — PROPOFOL 20 MCG/KG/MIN: 10 INJECTION, EMULSION INTRAVENOUS at 08:54

## 2024-10-29 RX ADMIN — SODIUM CHLORIDE, PRESERVATIVE FREE 10 ML: 5 INJECTION INTRAVENOUS at 07:44

## 2024-10-29 RX ADMIN — MEROPENEM 1000 MG: 1 INJECTION INTRAVENOUS at 05:52

## 2024-10-29 ASSESSMENT — PULMONARY FUNCTION TESTS
PIF_VALUE: 18
PIF_VALUE: 16
PIF_VALUE: 18
PIF_VALUE: 18
PIF_VALUE: 16
PIF_VALUE: 18
PIF_VALUE: 16
PIF_VALUE: 18
PIF_VALUE: 16
PIF_VALUE: 18
PIF_VALUE: 18
PIF_VALUE: 16

## 2024-10-29 NOTE — PROGRESS NOTES
ICU Progress Note    Admit Date: 10/28/2024  Diet: Diet NPO    CC: abdominal pain    Interval history:   No acute overnight events. Hemodynamically stable  Sedated on propofol. Responding to commands and able to follow intructions.  Remains intubated without significant change in vent settings RR 16  PEEP 8. Decrease in FiO2 to 40 from 60.  Improving pressor requirements. No longer on vasopressin and Neosyn. Still on Levo 20    HPI:   Mr. Dennys Peguero is a 43 y.o. male with a medical hx significant for oligodendroglioma (s/p partial resection 2017, radiation 2019, and temodar 2019), rectal adenocarcinoma (s/p robotic low anterior resection with anastomosis 4/12/24), and AML who presented  to the ED on 10/28 with abdominal pain.     Per chart review patient reported constant sharp, stabbing abdominal pain for past three days.  As a result he has not been able to eat and has only been able to have sips of water.  He denies aggravating or relieving factors and is unable to identify precipitating events.  Additionally, reports nausea without emesis.  He has not been taking anti-emetics or pain medication at home for relief.       Shortly after being admitted to [facility] code blue was mistakenly called later clarified to be code stroke as patient became acutely unresponsive.  Blood sugars 165, patient unresponsive with rightward eye gaze deviation.  Noticeable rigors concerning for possible seizure given past medical history.  Ativan 2 mg administered without improvement.  At this time blood pressures began to decline to 70's/50's requiring pressor support.  Mentation did not improve and pt remained unresponsive and was intubated for airway protection.  NIHSS 26,  stroke team notified and recommended CT Head and CTA Head once patient is stabilized.  Critical care consulted for mechanical ventilation with pressor support.      Medications:     Scheduled Meds:   insulin lispro  0-4 Units SubCUTAneous 4x Daily AC & HS

## 2024-10-29 NOTE — CONSULTS
Vancomycin has been discontinued. Pharmacy will sign off consult. If medication dosing is resumed, please re-consult pharmacy.    Juan Miguel PattersonD, [facility] 10/29/2024 9:44 AM

## 2024-10-29 NOTE — PLAN OF CARE
Problem: Safety - Medical Restraint  Goal: Remains free of injury from restraints (Restraint for Interference with Medical Device)  Description: INTERVENTIONS:  1. Determine that other, less restrictive measures have been tried or would not be effective before applying the restraint  2. Evaluate the patient's condition at the time of restraint application  3. Inform patient/family regarding the reason for restraint  4. Q2H: Monitor safety, psychosocial status, comfort, nutrition and hydration  Outcome: Progressing     Problem: Safety - Adult  Goal: Free from fall injury  Outcome: Progressing

## 2024-10-29 NOTE — PROGRESS NOTES
Department of Pharmacy    Notification received from laboratory of positive blood culture results.    Organism(s) detected: Klebsiella pneumoniae    No changes recommended at this time, current antimicrobial therapy (Meropenem) provides adequate coverage.    Fidelia Farah, PharmD  [id number] (Main Pharmacy)

## 2024-10-29 NOTE — CARE COORDINATION
Case Management Assessment  Initial Evaluation    Date/Time of Evaluation: 10/29/2024 2:48 PM  Assessment Completed by: JENNIFER Ortiz   for [facility] [place] ([facility])  [place] Mobile: [phone]    If patient is discharged prior to next notation, then this note serves as note for discharge by case management.    Patient Name: Dennys Peguero                   YOB: 1980  Diagnosis: Neutropenic fever (HCC) [D70.9, R50.81]                   Date / Time: 10/28/2024  9:21 AM    Patient Admission Status: Inpatient   Readmission Risk (Low < 19, Mod (19-27), High > 27): Readmission Risk Score: 26.9    Current PCP: No primary care provider on file.  PCP verified by CM? Yes (has been going to [facility])    Chart Reviewed: Yes      History Provided by: Unable to Assess  Patient Orientation: Other (see comment) (was intubated this AM)    Patient Cognition: Other (see comment) (intubated this AM)    Hospitalization in the last 30 days (Readmission):  Yes    If yes, Readmission Assessment in CM Navigator will be completed.    Advance Directives:      Code Status: Full Code   Patient's Primary Decision Maker is: Legal Next of Kin    Primary Decision Maker: AlenPierre Roxana Parent - [phone]    Discharge Planning:    Patient lives with: Parent Type of Home: Apartment  Primary Care Giver: Self  Patient Support Systems include: Family Members, Parent   Current Financial resources: Medicaid ([employer])  Current community resources: None  Current services prior to admission: Other (Comment) ([facility])            Current DME:              Type of Home Care services:  None    ADLS  Prior functional level: Independent in ADLs/IADLs  Current functional level: Other (see comment) (TBD)    PT AM-PAC:   /24  OT AM-PAC:   /24    Family can provide assistance at DC: Yes  Would you like Case Management to discuss the discharge plan with any other family members/significant others, and if so,

## 2024-10-29 NOTE — PROGRESS NOTES
LONG-TERM EEG-VIDEO MONITORING   CLINICAL NEUROPHYSIOLOGY LABORATORY  DEPARTMENT OF NEUROLOGY  [facility]    Patient: Dennys Peguero  Age: 43 y.o.  MRN: [id number]    Referring Physician: No ref. provider found  History: The patient is a 43 y.o. male who presented breakthrough seizure/encephalopathy. This long-term video-EEG monitoring study was performed to determine the nature of the patient's clinical events. The patient is on neuroactive medications.   Dennys Peguero   Current Facility-Administered Medications   Medication Dose Route Frequency Provider Last Rate Last Admin    ondansetron (ZOFRAN-ODT) disintegrating tablet 4 mg  4 mg Oral Q30 Min PRN Joon Park MD        sodium chloride flush 0.9 % injection 5-40 mL  5-40 mL IntraVENous 2 times per day Chelly Esparza APRN - CNP        sodium chloride flush 0.9 % injection 5-40 mL  5-40 mL IntraVENous PRN Chelly Esparza, APRN - CNP        0.9 % sodium chloride infusion   IntraVENous PRN Chelly Esparza, APRN - CNP        vancomycin (VANCOCIN) intermittent dosing (placeholder)   Other RX Placeholder Chelly Esparza, APRN - CNP        0.9 % sodium chloride infusion   IntraVENous PRN Chelly Esparza, APRN - CNP        0.9 % sodium chloride infusion   IntraVENous PRN Gabby Tiwari DO        meropenem (MERREM) 1,000 mg in sodium chloride 0.9 % 100 mL IVPB (mini-bag)  1,000 mg IntraVENous Q8H Chelly Esparza APRN - CNP 33.3 mL/hr at 10/28/24 2244 1,000 mg at 10/28/24 2244    phenylephrine (CHRIS-SYNEPHRINE) 50 mg in sodium chloride 0.9 % 250 mL infusion   mcg/min IntraVENous Continuous Cynthia Velarde MD 12 mL/hr at 10/28/24 2246 40 mcg/min at 10/28/24 2246    VASOpressin 20 Units in sodium chloride 0.9 % 100 mL infusion  0.01-0.03 Units/min IntraVENous Continuous Cynthia Velarde MD   Stopped at 10/28/24 2035    EPINEPHrine 5 mg in sodium chloride 0.9 % 250 mL infusion  1-20 mcg/min IntraVENous Continuous Chelly Esparza
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
## 2024-10-29 NOTE — PROGRESS NOTES
CONTINUOUS EEG    Name:  Dennys Peguero  Medical Record Number:  [id number]  Age: 43 y.o.   Gender: male  : 1980  Today's Date:  10/28/2024  Room:  [address] [place] [address]  Vital Signs   /71   Pulse (!) 105   Temp (!) 100.8 °F (38.2 °C) (Core)   Resp 20   Ht 1.778 m (5' 10\")   Wt 67.7 kg (149 lb 3.2 oz)   SpO2 100%   BMI 21.41 kg/m²           Continuous EEG Testing Start Time:  .      Comments: Jackie at [facility] contacted  for monitoring. Dr Talbot at [facility] contacted  for reading. Impedence on all leads are within normal limits. Today is the initial set up day for cvEEG. Patient head is NOT wrapped.Amee RIGGS  is aware that this patients cvEEG will be repositioned on 10/30 at . Amee RIGGS was notified at  by this EEG technician. Patient's head was left on blanket roll upon completion of cvEEG placement.      Plan of Care: Begin monitoring.    Electronically signed by Bertha Cannon on 10/28/2024 at 9:33 PM

## 2024-10-29 NOTE — PROGRESS NOTES
LONG-TERM EEG-VIDEO MONITORING   CLINICAL NEUROPHYSIOLOGY LABORATORY  DEPARTMENT OF NEUROLOGY  [facility]    Patient: Dennys Peguero  Age: 43 y.o.  MRN: [id number]    Referring Physician: No ref. provider found  History: The patient is a 43 y.o. male who presented breakthrough seizure/encephalopathy. This long-term video-EEG monitoring study was performed to determine the nature of the patient's clinical events. The patient is on neuroactive medications.   Dennys Peguero   Current Facility-Administered Medications   Medication Dose Route Frequency Provider Last Rate Last Admin    0.9 % sodium chloride infusion   IntraVENous PRN Eduardo Nguyen MD        insulin lispro (HUMALOG,ADMELOG) injection vial 0-4 Units  0-4 Units SubCUTAneous 4x Daily AC & HS Reta Sanchez MD   1 Units at 10/29/24 1122    polyethylene glycol (GLYCOLAX) packet 17 g  17 g Oral Daily PRN Reta Sanchez MD        fentaNYL (SUBLIMAZE) injection 25 mcg  25 mcg IntraVENous Q2H PRN Chelly Esparza APRN - CNP        ondansetron (ZOFRAN-ODT) disintegrating tablet 4 mg  4 mg Oral Q30 Min PRN Joon Park MD        sodium chloride flush 0.9 % injection 5-40 mL  5-40 mL IntraVENous 2 times per day Chelly Esparza APRN - CNP   10 mL at 10/29/24 0748    sodium chloride flush 0.9 % injection 5-40 mL  5-40 mL IntraVENous PRN Chelly Esparza APRN - CNP        0.9 % sodium chloride infusion   IntraVENous PRN Chelly Esparza APRN - CNP        0.9 % sodium chloride infusion   IntraVENous PRN Chelly Esparza APRN - CNP        0.9 % sodium chloride infusion   IntraVENous PRN Gabby Tiwari DO        meropenem (MERREM) 1,000 mg in sodium chloride 0.9 % 100 mL IVPB (mini-bag)  1,000 mg IntraVENous Q8H Chelly Esparza APRN - CNP   Stopped at 10/29/24 0734    phenylephrine (CHRIS-SYNEPHRINE) 50 mg in sodium chloride 0.9 % 250 mL infusion   mcg/min IntraVENous Continuous Cynthia Velarde MD   Stopped at 10/29/24 0504

## 2024-10-29 NOTE — CONSULTS
0.2* 0.1*   RBC 2.22* 2.52*   HGB 6.5* 7.3*   HCT 19.0* 21.4*   PLT 5* 10*   MCV 85.5 85.0   MCH 29.1 29.1   MCHC 34.1 34.2   RDW 12.6 13.6      BMP:  Recent Labs     10/28/24  0938 10/28/24  1640 10/29/24  0440   * 133* 136   K 3.5 3.3* 4.4   CL 98* 101 105   CO2 26 22 20*   BUN 23* 29* 28*   CREATININE 1.4* 1.9* 1.3   CALCIUM 8.0* 7.4* 7.9*   GLUCOSE 159* 231* 292*        Cultures:   Blood cultures x 2 10/28: Klebsiella pneumoniae  Respiratory PCR 10/28: No growth    Radiology Review:  All pertinent images / reports were reviewed as a part of this visit.     CT chest without contrast 10/28:  Small cavitary lesion with a satellite nodule in the left upper lobe. This could represent an infectious process although other etiologies not excluded.  Bibasilar airspace disease right greater than left.    CT head without contrast 10/28:  No new acute intracranial pathology    Mild sinusitis    KUB 10/28:  Nasoenteric catheter tip is in the mid stomach. Central venous catheter tip of  the chest at the cavoatrial junction. Visualized bowel gas pattern is  unremarkable.    Portable chest x-ray 10/28:  1. New consolidation in the right lung base which could represent atelectasis or  airspace disease.    CT abdomen pelvis with IV contrast 10/28:  1. Interval increase in nodular consolidation of the right lung base most  suggestive of pneumonia.    Chest x-ray 10/28:  No acute disease.    Assessment:     Patient Active Problem List   Diagnosis    GI bleed    Thrombocytopenia (HCC)    Severe malnutrition (HCC)    Rectal mass    Hematochezia    Acute myeloid leukemia not having achieved remission (HCC)    Rectal adenocarcinoma (HCC)    Neutropenic fever (HCC)    Rectal bleeding    Sepsis (HCC)    Multifocal pneumonia    Fever and neutropenia (HCC)    AML (acute myeloid leukemia) in relapse (HCC)    Acute leukemia in relapse (HCC)    Abdominal pain     Plan:   43 y.o. male with past medical history of Oligodendroglioma (S/P  monitoring for antimicrobial agent toxicity    Discussed with patient, primary team, oncology resident Mariel.   Tu Moreno MD

## 2024-10-29 NOTE — PROGRESS NOTES
Patient's lactic acid resulted at 3.16. 2L normal saline boluses ordered and administered. Q6h lactic acid ordered.

## 2024-10-29 NOTE — PROGRESS NOTES
Indwelling urinary catheter was placed, per orders, using sterile technique following hospital policy. Elina Medellin RN, observed with procedure to ensure proper technique. This catheter was placed on [facility].

## 2024-10-29 NOTE — PROGRESS NOTES
4 Eyes Skin Assessment     NAME:  Dennys Peguero  YOB: 1980  MEDICAL RECORD NUMBER:  [id number]    The patient is being assessed for  Shift Handoff    I agree that at least one RN has performed a thorough Head to Toe Skin Assessment on the patient. ALL assessment sites listed below have been assessed.      Areas assessed by both nurses:    Head, Face, Ears, Shoulders, Back, Chest, Arms, Elbows, Hands, Sacrum. Buttock, Coccyx, Ischium, Legs. Feet and Heels, and Under Medical Devices         Does the Patient have a Wound? No noted wound(s)       Aelxandre Prevention initiated by RN: Yes  Wound Care Orders initiated by RN: No    Pressure Injury (Stage 3,4, Unstageable, DTI, NWPT, and Complex wounds) if present, place Wound referral order by RN under : No    New Ostomies, if present place, Ostomy referral order under : No     Nurse 1 eSignature: Electronically signed by Aaron Drew RN on 10/29/24 at 6:53 PM EDT    **SHARE this note so that the co-signing nurse can place an eSignature**    Nurse 2 eSignature: {Esignature:[id number]}

## 2024-10-29 NOTE — PROGRESS NOTES
Stat head and chest CTs obtained. Pt tolerated well. NP Nydia aware of findings and cEEG scheduled for this evening.

## 2024-10-29 NOTE — PROGRESS NOTES
Neurology Progress Note    Patient: Dennys Peguero MRN: [id number]    YOB: 1980  Age: 43 y.o.  Sex: male   Unit: [facility] 3T BCC Room/Bed: 3501/3501-01 Location: [facility] Date: 10/29/2024  Date of Admission: 10/28/2024  9:21 AM  Admitting Physician: CLAYTON RAMOS    Primary Care Physician: No primary care provider on file.          LOS: 1 day      ASSESSMENT & RECOMMENDATIONS     Assessment  44yo man with oligodendroglioma s/p resection/chemo/xRT 2017, associated seizure disorder, and rectal adenocarcinoma admitted with apparent sepsis and subsequent breakthrough seizure yesterday in the context of septic shock  With administration of Ativan, increased dose of Keppra, and treatment of his septic shock, he has returned almost to complete neurologic baseline  cvEEG has been negative and, in all likelihood, any further seizures will be clinically apparent  Will continue EEG until tomorrow morning (> 24 hrs) and likely discontinue at that time  Given his fragile overall clinical picture, will maintain on increased dose of Keppra even though seizure may have been precipitated by acute infection  Although he had unremarkable MRI one month ago and has no focal findings on exam and he already has seizure disorder with seizure threshold likely lowered by acute infection, it would probably be of value to ensure no new lesions on brain MRI    Recommendations  Continue Keppra 1500mg q12  Continue cvEEG until tomorrow and will likely discontinue if no further seizures and tracing remains unremarkable  MRI brain w/wo contrast [ordered]  Sepsis/septic shock and acute medical issues as per Primary Team      SUBJECTIVE     Meeting patient for the first time. Initial consultation note was completed by Neurology CNP yesterday (10/28/24) evening, which I have reviewed.    44yo man with oligodendroglioma s/p resection 2017 and chemo + xRT; rectal adenocarcinoma; seizure disorder. Presented to  and to be interpreted by ER physician.Confirmed by MD, ER ([id number]),  ARTURO CORTEZ ([id number]) on 10/28/2024 10:32:25 AM   Basic Metabolic Panel    Collection Time: 10/28/24  9:38 AM   Result Value Ref Range    Sodium 133 (L) 136 - 145 mmol/L    Potassium 3.5 3.5 - 5.1 mmol/L    Chloride 98 (L) 99 - 110 mmol/L    CO2 26 21 - 32 mmol/L    Anion Gap 9 3 - 16    Glucose 159 (H) 70 - 99 mg/dL    BUN 23 (H) 7 - 20 mg/dL    Creatinine 1.4 (H) 0.9 - 1.3 mg/dL    Est, Glom Filt Rate 63 >60    Calcium 8.0 (L) 8.3 - 10.6 mg/dL   CBC auto differential    Collection Time: 10/28/24  9:38 AM   Result Value Ref Range    WBC 0.2 (LL) 4.0 - 11.0 K/uL    RBC 2.22 (L) 4.20 - 5.90 M/uL    Hemoglobin 6.5 (LL) 13.5 - 17.5 g/dL    Hematocrit 19.0 (LL) 40.5 - 52.5 %    MCV 85.5 80.0 - 100.0 fL    MCH 29.1 26.0 - 34.0 pg    MCHC 34.1 31.0 - 36.0 g/dL    RDW 12.6 12.4 - 15.4 %    Platelets 5 (LL) 135 - 450 K/uL    MPV 11.3 (H) 5.0 - 10.5 fL    PLATELET SLIDE REVIEW Decreased     Microcytes 1+ (A)     Schistocytes Occasional (A)     Ovalocytes Occasional (A)    Hepatic function panel    Collection Time: 10/28/24  9:38 AM   Result Value Ref Range    Total Protein 6.4 6.4 - 8.2 g/dL    Albumin 3.3 (L) 3.4 - 5.0 g/dL    Alkaline Phosphatase 208 (H) 40 - 129 U/L     (H) 10 - 40 U/L     (H) 15 - 37 U/L    Total Bilirubin 0.4 0.0 - 1.0 mg/dL    Bilirubin, Direct 0.2 0.0 - 0.3 mg/dL    Bilirubin, Indirect 0.2 0.0 - 1.0 mg/dL   Magnesium    Collection Time: 10/28/24  9:38 AM   Result Value Ref Range    Magnesium 1.70 (L) 1.80 - 2.40 mg/dL   Lactate dehydrogenase    Collection Time: 10/28/24  9:38 AM   Result Value Ref Range     (H) 100 - 190 U/L   Uric acid    Collection Time: 10/28/24  9:38 AM   Result Value Ref Range    Uric Acid 3.9 3.5 - 7.2 mg/dL   Culture, Blood 1    Collection Time: 10/28/24  9:38 AM    Specimen: Blood   Result Value Ref Range    Blood Culture, Routine (A)      Gram stain Aerobic bottle:  Gram negative

## 2024-10-29 NOTE — PROGRESS NOTES
Pt neuro assessment improved throughout the night, pt is able to follow commands and shake head yes or no when asked simple questions. Pt remained hemodynamically stable and a reduction in pressers were tolerated. At 0200, RN notified of positive blood cultures, Klebsiella pneumoniae. Dr. Nguyen notified, no new orders at this time. Bed in lowest position, alarm on and call light within reach. POC continues.

## 2024-10-29 NOTE — PLAN OF CARE
Problem: Safety - Medical Restraint  Goal: Remains free of injury from restraints (Restraint for Interference with Medical Device)  Description: INTERVENTIONS:  1. Determine that other, less restrictive measures have been tried or would not be effective before applying the restraint  2. Evaluate the patient's condition at the time of restraint application  3. Inform patient/family regarding the reason for restraint  4. Q2H: Monitor safety, psychosocial status, comfort, nutrition and hydration  10/29/2024 0905 by Aaron Drew RN  Flowsheets (Taken 10/29/2024 0905)  Remains free of injury from restraints (restraint for interference with medical device):   Determine that other, less restrictive measures have been tried or would not be effective before applying the restraint   Inform patient/family regarding the reason for restraint   Every 2 hours: Monitor safety, psychosocial status, comfort, nutrition and hydration   Evaluate the patient's condition at the time of restraint application     Problem: Pain  Goal: Verbalizes/displays adequate comfort level or baseline comfort level  Note: Utilizing CPOT q 4 hour.  Zero upon assessment.     Problem: Skin/Tissue Integrity - Adult  Goal: Skin integrity remains intact  Note: Turns q 2 hour with pillow support.  Frequent hygiene, frequent repositioning.  Boots to heels and mepilex to sacrum.     Problem: Infection - Adult  Goal: Absence of infection at discharge  Note: CVC site remains free of signs/symptoms of infection. No drainage, edema, erythema, pain, or warmth noted at site. Dressing changes continue per protocol and on an as needed basis - see flowsheet.     Compliant with BCC Bath Protocol:  Performed CHG bath today per BCC protocol utilizing Bed bath with CHG wipes.  CVC site cleansed with CHG wipe over dressing, skin surrounding dressing, and first 6\" of IV tubing.  Pt tolerated well.  Continued to encourage daily CHG bathing per BCC protocol.       Problem:

## 2024-10-29 NOTE — PROGRESS NOTES
0732:  Notified Dr. Velarde, Pt lactic acid within normal limits.  Asked if okay to discontinue q 6 hour lactic acid.  Okay to discontinue per Dr. Velarde.    0920:  Dr. Nguyen at bedside.  Per MD, do not obtain acetaminophen and hepatitis panels.     0941:  Notified MI Moreno regarding platelet parameter being changed to 20.  Pt Plt count this AM was 10 and he received a unit of Plt this AM.  Per jojo Spaulding to not recheck CBC at this time.      1103:  Dr. Pool notified of ABG results.  Dr. Ribera arrived to bedside to assess Pt.  Okay to extubate Pt.     1121:  RT at bedside.  Pt extubated.  Tolerated well and placed on 3L NC.     1138:  Dr. David at bedside assessing Pt.  Okay to make Pt q 4 hour neuro checks.     1458:  Notified Dr. Ribera, Pt cuff SBP in 80s and MAPs >70 while off levo.  Having difficulty with arterial line.  Attempted numerous times to correct but still getting a poor reading. Per jojo Grace to d/c NGT, remove arterial line, contact team if Pt requiring pressors.  Obtained order for clear liquid diet, Pt passed swallow eval.      1540:  Arterial line removed from left wrist, pressure held for 5 minutes.  Pt tolerated well.  Site CDI, no signs of bleeding, covered with petroleum gauze, sterile gauze and tegaderm.      1627:  PICC line removed from left upper arm.  Pressure held for 5 minutes, Pt laid flat for 15 minutes post removal.  Pt tolerated well.  Site CDI.  Tip cultured per order.      1714:  Pt complaining of intermittent ABD pain which he describes as \"gas pain.\"  Became more frequent after bowl of broth and drinking fluids.  Notified Dr. Ribera, awaiting KUB.      1636:  KUB results relayed to Dr. Ribera.  Pt denies nausea or vomiting, admits passing flatus, bowel sounds active. Also, discussed Pt's SBP in 80s, but MAPs are consistently greater than 70.  Awaiting response.    1648:  Dr. Sanchez at bedside assessing Pt.  No new orders regarding ABD pain at this

## 2024-10-29 NOTE — PROGRESS NOTES
[facility] Progress Note    10/29/2024    Dennys Peguero    :  1980    MRN:  [id number]      Interval Hx:  Overall, he is improving.  Able to appropriately follow commands.  Points toward suprapubic region when asked if he has pain.  Klebsiella pneumoniae bacteremia on day 2 of Merrem  Tmax 104.6 °F (yesterday at 1237), currently afebrile with temp 97.7 °F  Intake 5.9L, UOP 1.25L, net +4.7  Decreasing pressor requirements  Extubated  Cr & eGFR improving  Elevated LFTs likely 2/2 hypoperfusion       ECOG PS:  (2) Ambulatory and capable of self care, unable to carry out work activity, up and about > 50% or waking hours    KPS: 70% Cares for self; unable to carry on normal activity or to do active work    Isolation:  Strict contact      Medication:    Scheduled:   insulin lispro  0-4 Units SubCUTAneous 4x Daily AC & HS    sodium chloride flush  5-40 mL IntraVENous 2 times per day    meropenem  1,000 mg IntraVENous Q8H    Hydrocerin   Topical BID    acyclovir  400 mg Oral BID    pantoprazole  40 mg IntraVENous BID    sodium chloride flush  5-40 mL IntraVENous 2 times per day    Saline Mouthwash  15 mL Swish & Spit 4x Daily AC & HS    vancomycin  125 mg Oral 2 times per day    levETIRAcetam  1,500 mg IntraVENous Q12H    hydrocortisone sodium succinate PF  100 mg IntraVENous Q8H    micafungin  50 mg IntraVENous Q24H     Continuous Infusions:   sodium chloride      sodium chloride      sodium chloride      sodium chloride      sodium chloride      sodium chloride      norepinephrine-sodium chloride 6 mcg/min (10/29/24 1315)     PRN:  sodium chloride, polyethylene glycol, fentanNYL, ondansetron, sodium chloride flush, sodium chloride, sodium chloride, sodium chloride, acetaminophen, sodium chloride, sodium chloride flush, sodium chloride, potassium chloride, magnesium sulfate, Saline Mouthwash, ALTEplase (CATHFLO) 2 mg in sterile water 2 mL injection    ROS:  As noted above, otherwise remainder of  Patient: Aron Simpson  MRN: [id number] Age: 5 y.o. 9 m.o. YOB: 2010 Room/Bed: [address] [place] [address] Admit Diagnosis: Anorexia [R63.0] Bradycardia [R00.1] Principal Diagnosis: Severe protein-calorie malnutrition ([facility] [address] Goals: tolerate feeds at goal, rest, discharge planning for Monday 30 day readmission: no Influenza screening completed: VTE prophylaxis: Less than 15years old Consults needed: CM and Nutrition Community resources needed: None Specialists needed: none Equipment needed: no  
Testing due for patient today?: no 
LOS: 2 Expected length of stay:3 days Discharge plan: home with follow up Discharge appointment made: N/A at this time PCP: Thong Ingram MD 
Additional concerns/needs: none Days before discharge: one day until discharge Discharge disposition: Home Nghia Ryder RN 
01/17/21 10-point ROS negative    Physical Exam:    I&O:    Intake/Output Summary (Last 24 hours) at 10/29/2024 1336  Last data filed at 10/29/2024 1200  Gross per 24 hour   Intake 5632.48 ml   Output 1925 ml   Net 3707.48 ml       Vital Signs:  BP 97/68   Pulse (!) 107   Temp 98.1 °F (36.7 °C) (Bladder)   Resp (!) 31   Ht 1.778 m (5' 10\")   Wt 71.3 kg (157 lb 3 oz)   SpO2 100%   BMI 22.55 kg/m²     Weight:    Wt Readings from Last 3 Encounters:   10/29/24 71.3 kg (157 lb 3 oz)   10/14/24 67.6 kg (149 lb)   10/02/24 70 kg (154 lb 6.4 oz)     Gen: Chronically ill-appearing male.   HEENT: Normocephalic.  No scleral icterus or conjunctival injection.  No rhinorrhea or epistaxis.  Moist, pink oral mucous membranes.   Neck: Supple.   CVS: No apparent JVD.  Tachycardia. Regular rhythm. No murmurs, rubs, or gallops.  Radial pulses 2+.  Dorsalis pedis pulses 1+.  Capillary refill > 3 sec.   Pulm: Intubated.   Abd: Non-distended.  Hypoactive bowel sounds.  Soft.  Pain to superficial palpation of LUQ.   MSK: No BUE or BLE edema.   Skin: Cool BLE.   Neuro: Alert, awake, and following commands appropriately.   Psych: Cooperative.   Catheter: PICC L brachial w/o any surrounding erythema, warmth, or purulence (placed 8/1/24)  R IJ CVC w/o any surrounding erythema, warmth, or purulence (placed 10/28/24)   L radial arterial line w/o any surrounding erythema, warmth, or purulence (placed 10/28/24)       Data:   CBC:   Recent Labs     10/28/24  0938 10/29/24  0440   WBC 0.2* 0.1*   HGB 6.5* 7.3*   HCT 19.0* 21.4*   MCV 85.5 85.0   PLT 5* 10*     BMP/Mg:  Recent Labs     10/28/24  0938 10/28/24  1640 10/29/24  0440   * 133* 136   K 3.5 3.3* 4.4   CL 98* 101 105   CO2 26 22 20*   PHOS  --  2.1* 3.5   BUN 23* 29* 28*   CREATININE 1.4* 1.9* 1.3   MG 1.70*  --  1.90     LFTs:   Recent Labs     10/28/24  0938 10/29/24  0440   * 520*   * 537*   LIPASE 12.0*  --    BILIDIR 0.2 2.3*   BILITOT 0.4 2.7*   ALKPHOS 208* 305*

## 2024-10-29 NOTE — PROGRESS NOTES
Patient has been successfully weaned from Mechanical Ventilation.  RSBI before extubation was 31 with EtCO2 of 30 and SpO2 of 100 on 40% FiO2.  Patient extubated and placed on 3 liters/min via nasal cannula.  Post extubation SpO2 is 100% with HR  105 bpm and RR 27 breaths/min.  Patient had moderate cough that was productive of clear  and yellow sputum.  Extubation Well tolerated by patient..

## 2024-10-30 PROBLEM — G40.909 SEIZURE DISORDER (HCC): Status: ACTIVE | Noted: 2024-10-29

## 2024-10-30 LAB
ALBUMIN SERPL-MCNC: 2.7 G/DL (ref 3.4–5)
ALBUMIN SERPL-MCNC: 2.7 G/DL (ref 3.4–5)
ALBUMIN/GLOB SERPL: 1 {RATIO} (ref 1.1–2.2)
ALP SERPL-CCNC: 236 U/L (ref 40–129)
ALP SERPL-CCNC: 256 U/L (ref 40–129)
ALT SERPL-CCNC: 243 U/L (ref 10–40)
ALT SERPL-CCNC: 314 U/L (ref 10–40)
ANION GAP SERPL CALCULATED.3IONS-SCNC: 5 MMOL/L (ref 3–16)
ANION GAP SERPL CALCULATED.3IONS-SCNC: 5 MMOL/L (ref 3–16)
APTT BLD: 32.1 SEC (ref 22.1–36.4)
AST SERPL-CCNC: 38 U/L (ref 15–37)
AST SERPL-CCNC: 83 U/L (ref 15–37)
BACTERIA THROAT AEROBE CULT: NORMAL
BACTERIA UR CULT: NORMAL
BILIRUB DIRECT SERPL-MCNC: 1.1 MG/DL (ref 0–0.3)
BILIRUB INDIRECT SERPL-MCNC: 0.5 MG/DL (ref 0–1)
BILIRUB SERPL-MCNC: 1.3 MG/DL (ref 0–1)
BILIRUB SERPL-MCNC: 1.6 MG/DL (ref 0–1)
BLOOD BANK DISPENSE STATUS: NORMAL
BLOOD BANK PRODUCT CODE: NORMAL
BPU ID: NORMAL
BUN SERPL-MCNC: 25 MG/DL (ref 7–20)
BUN SERPL-MCNC: 27 MG/DL (ref 7–20)
CALCIUM SERPL-MCNC: 8.2 MG/DL (ref 8.3–10.6)
CALCIUM SERPL-MCNC: 8.3 MG/DL (ref 8.3–10.6)
CHLORIDE SERPL-SCNC: 106 MMOL/L (ref 99–110)
CHLORIDE SERPL-SCNC: 109 MMOL/L (ref 99–110)
CK SERPL-CCNC: 66 U/L (ref 39–308)
CO2 SERPL-SCNC: 25 MMOL/L (ref 21–32)
CO2 SERPL-SCNC: 26 MMOL/L (ref 21–32)
CREAT SERPL-MCNC: 0.7 MG/DL (ref 0.9–1.3)
CREAT SERPL-MCNC: 0.8 MG/DL (ref 0.9–1.3)
DEPRECATED RDW RBC AUTO: 13.3 % (ref 12.4–15.4)
DEPRECATED RDW RBC AUTO: 13.7 % (ref 12.4–15.4)
DEPRECATED RDW RBC AUTO: 13.9 % (ref 12.4–15.4)
DESCRIPTION BLOOD BANK: NORMAL
FIBRINOGEN PPP-MCNC: 613 MG/DL (ref 227–534)
FINAL REPORT: NORMAL
GALACTOMANNAN AG SERPL IA-ACNC: 0.28
GALACTOMANNAN AG SERPL QL IA: NEGATIVE
GFR SERPLBLD CREATININE-BSD FMLA CKD-EPI: >90 ML/MIN/{1.73_M2}
GFR SERPLBLD CREATININE-BSD FMLA CKD-EPI: >90 ML/MIN/{1.73_M2}
GLUCOSE BLD-MCNC: 120 MG/DL (ref 70–99)
GLUCOSE BLD-MCNC: 123 MG/DL (ref 70–99)
GLUCOSE BLD-MCNC: 140 MG/DL (ref 70–99)
GLUCOSE SERPL-MCNC: 114 MG/DL (ref 70–99)
GLUCOSE SERPL-MCNC: 129 MG/DL (ref 70–99)
HCT VFR BLD AUTO: 17.3 % (ref 40.5–52.5)
HCT VFR BLD AUTO: 18.4 % (ref 40.5–52.5)
HCT VFR BLD AUTO: 20.2 % (ref 40.5–52.5)
HGB BLD-MCNC: 6 G/DL (ref 13.5–17.5)
HGB BLD-MCNC: 6.3 G/DL (ref 13.5–17.5)
HGB BLD-MCNC: 6.8 G/DL (ref 13.5–17.5)
INR PPP: 1.76 (ref 0.85–1.15)
LACTATE BLDV-SCNC: 0.8 MMOL/L (ref 0.4–2)
LDH SERPL L TO P-CCNC: 97 U/L (ref 100–190)
MAGNESIUM SERPL-MCNC: 2.1 MG/DL (ref 1.8–2.4)
MCH RBC QN AUTO: 29 PG (ref 26–34)
MCH RBC QN AUTO: 29.2 PG (ref 26–34)
MCH RBC QN AUTO: 29.2 PG (ref 26–34)
MCHC RBC AUTO-ENTMCNC: 33.7 G/DL (ref 31–36)
MCHC RBC AUTO-ENTMCNC: 34.4 G/DL (ref 31–36)
MCHC RBC AUTO-ENTMCNC: 34.7 G/DL (ref 31–36)
MCV RBC AUTO: 84.3 FL (ref 80–100)
MCV RBC AUTO: 84.9 FL (ref 80–100)
MCV RBC AUTO: 86.2 FL (ref 80–100)
PERFORMED ON: ABNORMAL
PHOSPHATE SERPL-MCNC: 2.5 MG/DL (ref 2.5–4.9)
PLATELET # BLD AUTO: 12 K/UL (ref 135–450)
PLATELET # BLD AUTO: 18 K/UL (ref 135–450)
PLATELET # BLD AUTO: 24 K/UL (ref 135–450)
PMV BLD AUTO: 7.9 FL (ref 5–10.5)
PMV BLD AUTO: 7.9 FL (ref 5–10.5)
PMV BLD AUTO: 8.3 FL (ref 5–10.5)
POTASSIUM SERPL-SCNC: 4.5 MMOL/L (ref 3.5–5.1)
POTASSIUM SERPL-SCNC: 4.5 MMOL/L (ref 3.5–5.1)
PRELIMINARY: NORMAL
PROCALCITONIN SERPL IA-MCNC: >100 NG/ML (ref 0–0.15)
PROT SERPL-MCNC: 5.4 G/DL (ref 6.4–8.2)
PROT SERPL-MCNC: 5.6 G/DL (ref 6.4–8.2)
PROTHROMBIN TIME: 20.6 SEC (ref 11.9–14.9)
RBC # BLD AUTO: 2.06 M/UL (ref 4.2–5.9)
RBC # BLD AUTO: 2.17 M/UL (ref 4.2–5.9)
RBC # BLD AUTO: 2.35 M/UL (ref 4.2–5.9)
SODIUM SERPL-SCNC: 137 MMOL/L (ref 136–145)
SODIUM SERPL-SCNC: 139 MMOL/L (ref 136–145)
URATE SERPL-MCNC: 2.7 MG/DL (ref 3.5–7.2)
WBC # BLD AUTO: 0.1 K/UL (ref 4–11)
WBC # BLD AUTO: 0.1 K/UL (ref 4–11)
WBC # BLD AUTO: 0.2 K/UL (ref 4–11)

## 2024-10-30 PROCEDURE — 6360000002 HC RX W HCPCS

## 2024-10-30 PROCEDURE — 51702 INSERT TEMP BLADDER CATH: CPT

## 2024-10-30 PROCEDURE — 2000000000 HC ICU R&B

## 2024-10-30 PROCEDURE — 82550 ASSAY OF CK (CPK): CPT

## 2024-10-30 PROCEDURE — 95716 VEEG EA 12-26HR CONT MNTR: CPT

## 2024-10-30 PROCEDURE — 83735 ASSAY OF MAGNESIUM: CPT

## 2024-10-30 PROCEDURE — 83605 ASSAY OF LACTIC ACID: CPT

## 2024-10-30 PROCEDURE — 85027 COMPLETE CBC AUTOMATED: CPT

## 2024-10-30 PROCEDURE — 84550 ASSAY OF BLOOD/URIC ACID: CPT

## 2024-10-30 PROCEDURE — 6370000000 HC RX 637 (ALT 250 FOR IP)

## 2024-10-30 PROCEDURE — 6360000002 HC RX W HCPCS: Performed by: NURSE PRACTITIONER

## 2024-10-30 PROCEDURE — 84100 ASSAY OF PHOSPHORUS: CPT

## 2024-10-30 PROCEDURE — 80053 COMPREHEN METABOLIC PANEL: CPT

## 2024-10-30 PROCEDURE — 6370000000 HC RX 637 (ALT 250 FOR IP): Performed by: STUDENT IN AN ORGANIZED HEALTH CARE EDUCATION/TRAINING PROGRAM

## 2024-10-30 PROCEDURE — 85384 FIBRINOGEN ACTIVITY: CPT

## 2024-10-30 PROCEDURE — 85610 PROTHROMBIN TIME: CPT

## 2024-10-30 PROCEDURE — 99291 CRITICAL CARE FIRST HOUR: CPT | Performed by: INTERNAL MEDICINE

## 2024-10-30 PROCEDURE — 2580000003 HC RX 258

## 2024-10-30 PROCEDURE — 36430 TRANSFUSION BLD/BLD COMPNT: CPT

## 2024-10-30 PROCEDURE — 83615 LACTATE (LD) (LDH) ENZYME: CPT

## 2024-10-30 PROCEDURE — 87040 BLOOD CULTURE FOR BACTERIA: CPT

## 2024-10-30 PROCEDURE — 84145 PROCALCITONIN (PCT): CPT

## 2024-10-30 PROCEDURE — 94761 N-INVAS EAR/PLS OXIMETRY MLT: CPT

## 2024-10-30 PROCEDURE — 2580000003 HC RX 258: Performed by: NURSE PRACTITIONER

## 2024-10-30 PROCEDURE — 99232 SBSQ HOSP IP/OBS MODERATE 35: CPT | Performed by: NURSE PRACTITIONER

## 2024-10-30 PROCEDURE — 95720 EEG PHY/QHP EA INCR W/VEEG: CPT | Performed by: PSYCHIATRY & NEUROLOGY

## 2024-10-30 PROCEDURE — 99232 SBSQ HOSP IP/OBS MODERATE 35: CPT | Performed by: INTERNAL MEDICINE

## 2024-10-30 PROCEDURE — 85730 THROMBOPLASTIN TIME PARTIAL: CPT

## 2024-10-30 RX ORDER — 0.9 % SODIUM CHLORIDE 0.9 %
1000 INTRAVENOUS SOLUTION INTRAVENOUS ONCE
Status: COMPLETED | OUTPATIENT
Start: 2024-10-30 | End: 2024-10-30

## 2024-10-30 RX ORDER — SODIUM CHLORIDE 9 MG/ML
INJECTION, SOLUTION INTRAVENOUS PRN
Status: DISCONTINUED | OUTPATIENT
Start: 2024-10-30 | End: 2024-10-31

## 2024-10-30 RX ORDER — FENTANYL CITRATE 50 UG/ML
25 INJECTION, SOLUTION INTRAMUSCULAR; INTRAVENOUS
Status: DISCONTINUED | OUTPATIENT
Start: 2024-10-30 | End: 2024-11-04

## 2024-10-30 RX ORDER — SODIUM CHLORIDE 9 MG/ML
INJECTION, SOLUTION INTRAVENOUS CONTINUOUS
Status: DISCONTINUED | OUTPATIENT
Start: 2024-10-30 | End: 2024-11-02

## 2024-10-30 RX ADMIN — MEROPENEM 1000 MG: 1 INJECTION INTRAVENOUS at 14:20

## 2024-10-30 RX ADMIN — HYDROCORTISONE SODIUM SUCCINATE 100 MG: 100 INJECTION, POWDER, FOR SOLUTION INTRAMUSCULAR; INTRAVENOUS at 03:49

## 2024-10-30 RX ADMIN — SODIUM CHLORIDE, PRESERVATIVE FREE 10 ML: 5 INJECTION INTRAVENOUS at 08:52

## 2024-10-30 RX ADMIN — LEVETIRACETAM 1500 MG: 100 INJECTION INTRAVENOUS at 20:10

## 2024-10-30 RX ADMIN — PANTOPRAZOLE SODIUM 40 MG: 40 INJECTION, POWDER, FOR SOLUTION INTRAVENOUS at 08:51

## 2024-10-30 RX ADMIN — VANCOMYCIN HYDROCHLORIDE 125 MG: 250 POWDER, FOR SOLUTION ORAL at 10:47

## 2024-10-30 RX ADMIN — HYDROCORTISONE SODIUM SUCCINATE 100 MG: 100 INJECTION, POWDER, FOR SOLUTION INTRAMUSCULAR; INTRAVENOUS at 20:07

## 2024-10-30 RX ADMIN — SODIUM CHLORIDE, PRESERVATIVE FREE 10 ML: 5 INJECTION INTRAVENOUS at 20:15

## 2024-10-30 RX ADMIN — HYDROCORTISONE SODIUM SUCCINATE 100 MG: 100 INJECTION, POWDER, FOR SOLUTION INTRAMUSCULAR; INTRAVENOUS at 12:11

## 2024-10-30 RX ADMIN — PANTOPRAZOLE SODIUM 40 MG: 40 INJECTION, POWDER, FOR SOLUTION INTRAVENOUS at 20:10

## 2024-10-30 RX ADMIN — Medication 400 MG: at 20:28

## 2024-10-30 RX ADMIN — MEROPENEM 1000 MG: 1 INJECTION INTRAVENOUS at 06:18

## 2024-10-30 RX ADMIN — SODIUM CHLORIDE 1000 ML: 9 INJECTION, SOLUTION INTRAVENOUS at 10:16

## 2024-10-30 RX ADMIN — FENTANYL CITRATE 25 MCG: 50 INJECTION INTRAMUSCULAR; INTRAVENOUS at 14:09

## 2024-10-30 RX ADMIN — OXYCODONE 5 MG: 5 TABLET ORAL at 03:48

## 2024-10-30 RX ADMIN — VANCOMYCIN HYDROCHLORIDE 125 MG: 250 POWDER, FOR SOLUTION ORAL at 20:28

## 2024-10-30 RX ADMIN — SODIUM CHLORIDE 550 MG: 9 INJECTION INTRAMUSCULAR; INTRAVENOUS; SUBCUTANEOUS at 14:15

## 2024-10-30 RX ADMIN — FENTANYL CITRATE 25 MCG: 50 INJECTION INTRAMUSCULAR; INTRAVENOUS at 20:30

## 2024-10-30 RX ADMIN — SODIUM CHLORIDE: 9 INJECTION, SOLUTION INTRAVENOUS at 11:49

## 2024-10-30 RX ADMIN — MEROPENEM 1000 MG: 1 INJECTION INTRAVENOUS at 22:55

## 2024-10-30 RX ADMIN — LEVETIRACETAM 1500 MG: 100 INJECTION INTRAVENOUS at 08:51

## 2024-10-30 RX ADMIN — Medication 400 MG: at 08:52

## 2024-10-30 ASSESSMENT — PAIN DESCRIPTION - ORIENTATION
ORIENTATION: MID
ORIENTATION: RIGHT;LOWER
ORIENTATION: LOWER

## 2024-10-30 ASSESSMENT — PAIN DESCRIPTION - FREQUENCY
FREQUENCY: INTERMITTENT

## 2024-10-30 ASSESSMENT — PAIN DESCRIPTION - DIRECTION: RADIATING_TOWARDS: N.A

## 2024-10-30 ASSESSMENT — PAIN - FUNCTIONAL ASSESSMENT
PAIN_FUNCTIONAL_ASSESSMENT: ACTIVITIES ARE NOT PREVENTED
PAIN_FUNCTIONAL_ASSESSMENT: PREVENTS OR INTERFERES SOME ACTIVE ACTIVITIES AND ADLS
PAIN_FUNCTIONAL_ASSESSMENT: PREVENTS OR INTERFERES SOME ACTIVE ACTIVITIES AND ADLS

## 2024-10-30 ASSESSMENT — PAIN SCALES - GENERAL
PAINLEVEL_OUTOF10: 6
PAINLEVEL_OUTOF10: 3
PAINLEVEL_OUTOF10: 8
PAINLEVEL_OUTOF10: 0
PAINLEVEL_OUTOF10: 10

## 2024-10-30 ASSESSMENT — PAIN DESCRIPTION - PAIN TYPE
TYPE: ACUTE PAIN

## 2024-10-30 ASSESSMENT — PAIN DESCRIPTION - ONSET
ONSET: ON-GOING
ONSET: GRADUAL
ONSET: GRADUAL

## 2024-10-30 ASSESSMENT — PAIN DESCRIPTION - DESCRIPTORS
DESCRIPTORS: SORE;ACHING;TENDER
DESCRIPTORS: ACHING
DESCRIPTORS: CRAMPING

## 2024-10-30 ASSESSMENT — PAIN DESCRIPTION - LOCATION
LOCATION: ABDOMEN

## 2024-10-30 ASSESSMENT — PAIN SCALES - WONG BAKER: WONGBAKER_NUMERICALRESPONSE: HURTS WHOLE LOT

## 2024-10-30 NOTE — PROGRESS NOTES
Surgical red team and Dr. Zuniga notified of pt new onset bloody stool. New orders for CBC and consult GI.

## 2024-10-30 NOTE — PROGRESS NOTES
ID Follow-up NOTE    CC:   AMS  Antibiotics: Meropenem, acyclovir, p.o. vancomycin    Admit Date: 10/28/2024  Hospital Day: 3    Subjective:     Patient without any fevers overnight, temperature is now low, bear hugger in place.  Continued mid abdominal pain.  KUB yesterday showed possible ileus.  Surgery has been consulted and not planning for any further intervention at this time. Elevated procal and low BP.       Objective:     Patient Vitals for the past 8 hrs:   BP Temp Temp src Pulse Resp SpO2   10/30/24 0800 (!) 83/62 97 °F (36.1 °C) Bladder 87 13 100 %   10/30/24 0730 (!) 81/56 -- -- 85 14 99 %   10/30/24 0712 (!) 82/53 -- -- -- -- --   10/30/24 0625 (!) 80/59 (!) 96.4 °F (35.8 °C) Bladder 84 16 99 %   10/30/24 0609 (!) 82/59 (!) 96.4 °F (35.8 °C) Bladder 78 15 100 %   10/30/24 0554 (!) 81/61 (!) 96.4 °F (35.8 °C) Bladder 88 15 100 %   10/30/24 0541 (!) 82/60 (!) 96.4 °F (35.8 °C) Bladder 82 18 100 %   10/30/24 0526 (!) 88/75 (!) 96.3 °F (35.7 °C) -- 82 12 100 %   10/30/24 0430 (!) 80/61 -- -- 83 13 94 %   10/30/24 0400 (!) 85/66 (!) 96.6 °F (35.9 °C) Bladder 88 19 --   10/30/24 0330 (!) 79/63 -- -- 82 14 100 %   10/30/24 0300 (!) 84/60 -- -- 82 11 100 %   10/30/24 0230 (!) 83/68 -- -- 90 12 100 %     I/O last 3 completed shifts:  In: 3554.9 [P.O.:350; I.V.:2503; Blood:544.5; IV Piggyback:157.5]  Out: 3175 [Urine:3175]  I/O this shift:  In: 300 [Blood:300]  Out: 200 [Urine:200]    EXAM:  GENERAL: No apparent distress.  Pt is fatigued easily.   HEENT: Membranes moist, no oral lesion  NECK:  Supple, no lymphadenopathy  LUNGS: Clear b/l, no rales, no dullness  CARDIAC: RRR, no murmur appreciated  ABD:  + BS, soft, tender to palpation in the mid abdomen region.  EXT:  No rash, no edema, no lesions  NEURO: No focal neurologic findings  PSYCH: Orientation normal, drowsy.   LINES:  Peripheral iv, right internal jugular central line    Data Review:  Lab Results   Component Value Date    WBC 0.1 (LL) 10/30/2024

## 2024-10-30 NOTE — PROGRESS NOTES
ICU Progress Note    Admit Date: 10/28/2024  Day: 3  Vent Day: n/a  IV Access:Peripheral  IV Fluids:None  Vasopressors:None                Antibiotics: acyclovir, meropenem, micafungin, vancomycin  Diet: ADULT DIET; Clear Liquid    CC: abdominal pain    Interval history:   Extubated 10/29, no longer requiring pressor support and off sedation.  Hemoglobin 6.3 and platelets 12.  1 unit PRBC and 1 unit platelets given.  Per neurology, may be able to take off cveeg this am, plan for MRI w/wo when able.  TTE 10/29 EF 45-50%, mild global hypokinesis.  Blood cultures positive for klebsiella, continue abx per ID.    HPI:   Mr. Dennys Peguero is a 43 y.o. male with a medical hx significant for oligodendroglioma (s/p partial resection 2017, radiation 2019, and temodar 2019), rectal adenocarcinoma (s/p robotic low anterior resection with anastomosis 4/12/24), and AML who presented  to the ED on 10/28 with abdominal pain.     Per chart review patient reported constant sharp, stabbing abdominal pain for past three days.  As a result he has not been able to eat and has only been able to have sips of water.  He denies aggravating or relieving factors and is unable to identify precipitating events.  Additionally, reports nausea without emesis.  He has not been taking anti-emetics or pain medication at home for relief.       Shortly after being admitted to [facility] code blue was mistakenly called later clarified to be code stroke as patient became acutely unresponsive.  Blood sugars 165, patient unresponsive with rightward eye gaze deviation.  Noticeable rigors concerning for possible seizure given past medical history.  Ativan 2 mg administered without improvement.  At this time blood pressures began to decline to 70's/50's requiring pressor support.  Mentation did not improve and pt remained unresponsive and was intubated for airway protection.  NIHSS 26,  stroke team notified and recommended CT Head and CTA Head once patient is  SCDs  DISPO: ICU    Nathan Camarillo DO, PGY-1  Internal Medicine Resident  Contact via [employer]  10/30/24  7:53 AM    This patient has been staffed and discussed with Dr. Rodriguez \"Stephane\" MD Corine     PULMONARY AND CRITICAL CARE ATTENDING:  Patient was seen, examined and discussed with Dr. Sanchez  PGY-1. I agree with the history of present illness, past medical/surgical histories, family history, social history, medication list and allergies as listed. The review of systems is as noted above. My physical exam confirms the findings listed above.   Chart was reviewed including Labs, CXR, CT scan, EKG, and Medical records confirm the findings noted above. I edited the note where appropriate.    Briefly, this is a 43 y.o. male admitted to ICU with neutropenic fever.   Extensive cancer history, oligodendroglioma (s/p partial resection 2017, radiation 2019, and temodar 2019), rectal adenocarcinoma (s/p robotic low anterior resection with anastomosis 4/12/24), and AML.  Rapid response called for encephalopathy, acute mentation change was reported, patient with spontaneous eye opening but not responsive to interview. BP dropping started on vasopressors.     INTERVAL HISTORY:  Hypothermic overnight, overall improving, extubated 10/29    BP (!) 84/59   Pulse 89   Temp 97 °F (36.1 °C) (Bladder)   Resp 16   Ht 1.778 m (5' 10\")   Wt 71.3 kg (157 lb 3 oz)   SpO2 99%   BMI 22.55 kg/m²     Intake/Output Summary (Last 24 hours) at 10/30/2024 1131  Last data filed at 10/30/2024 1000  Gross per 24 hour   Intake 1253.45 ml   Output 2025 ml   Net -771.55 ml    SpO2: 99 %    - O2 Flow Rate (L/min): 1 L/min          Peripheral IV 10/28/24 Left;Proximal Forearm (Active)   Number of days: 0     ASSESSMENT:  Septic shock   Klebsiella PNA and Bacteremia    Extubated 10/29  Pancytopenia   Transaminitis  JOSÉ MIGUEL   Electrolytes: Hypocalcemia, hyponatremia, hypochloremia, hypomagnesemia    PLAN:  Provide O2 supplementation to keep SpO2 between

## 2024-10-30 NOTE — PROGRESS NOTES
Pt systolic's remained in the 80s, but MAPs stayed at 65 and above. Pt temp started to decrease and reached 96.3, warm blankets applied but had no change. ICU team reached out and a bare hugger was ordered. No other orders at this time. POC continues.

## 2024-10-30 NOTE — PROGRESS NOTES
CONTINUOUS EEG    Name:  Dennys Peguero  Medical Record Number:  [id number]  Age: 43 y.o.   Gender: male  : 1980  Today's Date:  10/29/2024  Room:  [address] [place] [address]  Vital Signs   BP (!) 88/64   Pulse (!) 103   Temp 98.1 °F (36.7 °C) (Bladder)   Resp 24   Ht 1.778 m (5' 10\")   Wt 71.3 kg (157 lb 3 oz)   SpO2 100%   BMI 22.55 kg/m²       Patient currently on continuous EEG monitoring.  All EEG leads are currently in place with no current issues.  Verified [facility] connection via team viewer.  Checked in with patient RN for current plan of care.    Comments: Pt reconnected after MRI. Tessa at [facility] contacted. Continue monitoring. All leads within 10K limit. Today is day 1 of cvEEG.    Electronically signed by Bertha Cannon on 10/29/2024 at 9:49 PM

## 2024-10-30 NOTE — PROGRESS NOTES
Neurology Progress Note    Patient: Dennys Peguero MRN: [id number]    YOB: 1980  Age: 43 y.o.  Sex: male   Unit: [facility] 3T BCC Room/Bed: 3501/3501-01 [facility] Date: 10/30/2024  Date of Admission: 10/28/2024  9:21 AM  Admitting Physician: CLAYTON RAMOS    Primary Care Physician: No primary care provider on file.          LOS: 2 days     24H events:   The interictal EEG was abnormal due to diffuse polymorphic theta slowing suggesting mild encephalopathy. Occasional left anterior temporal sharp waves conferred an increased risk for focal onset seizures. Procal > 100  MRI grossly stable. No acute new hemorrhage.   Feeling better        ASSESSMENT & RECOMMENDATIONS     Assessment  42yo man with oligodendroglioma s/p resection/chemo/xRT 2017, associated seizure disorder, and rectal adenocarcinoma admitted with apparent sepsis and subsequent breakthrough seizure 10/28 in the context of septic shock  With administration of Ativan, increased dose of Keppra, and treatment of his septic shock, he has returned almost to complete neurologic baseline  cvEEG has has shown occasional left anterior sharps over the past 24H, however, no clinically apparent seizures  Given his fragile overall clinical picture, will maintain on increased dose of Keppra even though seizure may have been precipitated by acute infection  Although he had unremarkable MRI one month ago and has no focal findings on exam and he already has seizure disorder with seizure threshold likely lowered by acute infection, it would probably be of value to ensure no new lesions on brain MRI  Brain MRI showing slightly more prominent small area of vasogenic edema associated with left occipital lobe microhemorrhage. Slightly more prominent small area of vasogenic edema associated with right parietal lobe microhemorrhage. Decrease in small foci of enhancement associated with other previously noted microhemorrhages.

## 2024-10-30 NOTE — PROGRESS NOTES
CONTINUOUS EEG    Name:  Dennys Peguero  Medical Record Number:  [id number]  Age: 43 y.o.   Gender: male  : 1980  Today's Date:  10/30/2024  Room:  [address] [place] [address]  Vital Signs   BP 95/69   Pulse 87   Temp 97.2 °F (36.2 °C)   Resp 13   Ht 1.778 m (5' 10\")   Wt 71.3 kg (157 lb 3 oz)   SpO2 100%   BMI 22.55 kg/m²           Continuous EEG Testing End Time:   173      Comments:  Per Eric Sanchez Neuro N.P. patient test is completed. [facility] contacted [phone] via team viewer. Scalp assessment performed by this EEG Matty shah       Plan of care:Leads removed.      Electronically signed by Bertha Cannon on 10/30/2024 at 6:19 PM

## 2024-10-30 NOTE — PROGRESS NOTES
Polina Christine NP and Dr. Floyd notified of pt BP unresponsive to fluid bolus. New orders for blood cx x2, BMP, lactic acid and CBC. Also made aware of pt abdominal pain rated 8 out of 10. New orders for PRN fentanyl. Dr. Floyd at bedside to assess pt.    Blood cx collected IJ at 1403. Labs collected and sent.

## 2024-10-30 NOTE — PLAN OF CARE
Problem: Pain  Goal: Verbalizes/displays adequate comfort level or baseline comfort level  Outcome: Progressing  Note: Pt c/o abdominal pain in lower abdomen. Orders given for PRN fentanyl Q2H. Pt experienced adequate pain relief from fentanyl, but pain returns after about an hour and a half.     Problem: Infection - Adult  Goal: Absence of infection at discharge  Outcome: Progressing  Note: Pt started on daptomycin and remains on merrem.     Problem: Hematologic - Adult  Goal: Maintains hematologic stability  Outcome: Progressing  Note: Patient's hemoglobin this AM:   Recent Labs     10/30/24  1410   HGB 6.3*     Patient's platelet count this AM:   Recent Labs     10/30/24  1410   PLT 18*    Thrombocytopenia Precautions in place.  Patient showing no signs or symptoms of active bleeding.  Patient transfused blood products per orders - see flowsheet.  Patient verbalizes understanding of all instructions. Will continue to assess and implement POC. Call light within reach and hourly rounding in place.

## 2024-10-30 NOTE — PROGRESS NOTES
Speech-Language Pathology  HOLD Note      SLP received orders for swallow evaluation with note wanting SLP to evaluate for clearance for diet advancement s/p extubation. Per chart review, pt is on a clear liquid diet and a consult was placed to colorectal surgery. Most recent hematology oncology note states for pt to continue with clear liquid diet with intermittent +/- NG suctioning. SLP will hold at this time as pt to be on CLD per medical team. Will follow-up as appropriate.           Kya Cao MA, CCC-SLP  [id number]  Speech-Language Pathologist  Pg. #[phone]

## 2024-10-30 NOTE — PROGRESS NOTES
LONG-TERM EEG-VIDEO MONITORING   CLINICAL NEUROPHYSIOLOGY LABORATORY  DEPARTMENT OF NEUROLOGY  [facility]    Patient: Dennys Peguero  Age: 43 y.o.  MRN: [id number]    Referring Physician: No ref. provider found  History: The patient is a 43 y.o. male who presented breakthrough seizure/encephalopathy. This long-term video-EEG monitoring study was performed to determine the nature of the patient's clinical events. The patient is on neuroactive medications.   Dennys Peguero   Current Facility-Administered Medications   Medication Dose Route Frequency Provider Last Rate Last Admin    0.9 % sodium chloride infusion   IntraVENous PRN Eduardo Nguyen MD        0.9 % sodium chloride infusion   IntraVENous PRN Eduardo Nguyen MD        0.9 % sodium chloride infusion   IntraVENous PRN Eduardo Nguyen MD        insulin lispro (HUMALOG,ADMELOG) injection vial 0-4 Units  0-4 Units SubCUTAneous 4x Daily AC & HS Reta Sanchez MD   1 Units at 10/29/24 1122    polyethylene glycol (GLYCOLAX) packet 17 g  17 g Oral Daily PRN Reta Sanchez MD        oxyCODONE (ROXICODONE) immediate release tablet 5 mg  5 mg Oral Q4H PRN Gabby Tiwari DO   5 mg at 10/30/24 0348    ondansetron (ZOFRAN-ODT) disintegrating tablet 4 mg  4 mg Oral Q30 Min PRN Joon Park MD        sodium chloride flush 0.9 % injection 5-40 mL  5-40 mL IntraVENous 2 times per day Chelly Esparza, APRN - CNP   10 mL at 10/29/24 0748    sodium chloride flush 0.9 % injection 5-40 mL  5-40 mL IntraVENous PRN Chelly Esparza, APRN - CNP        0.9 % sodium chloride infusion   IntraVENous PRN Chelly Esparza, APRN - CNP        0.9 % sodium chloride infusion   IntraVENous PRN Chelly Esparza, APRN - CNP        0.9 % sodium chloride infusion   IntraVENous PRN Gabby Tiwari DO        meropenem (MERREM) 1,000 mg in sodium chloride 0.9 % 100 mL IVPB (mini-bag)  1,000 mg IntraVENous Q8H Chelly Esparza APRN - CNP 33.3 mL/hr at 10/30/24 0618 1,000  50 mg in sodium chloride 0.9 % 100 mL IVPB  50 mg IntraVENous Q24H Eduardo Nguyen MD   Stopped at 10/30/24 0004     Technical Description: This is a 21-channel digital EEG recording with time-locked video. Electrodes were placed in accordance with the 10-20 International System of Electrode Placement. Single lead EKG monitoring was included.    Baseline EEG Recording:  A formal baseline EEG recording was not obtained.     Day 1 - 10/28/24, starting at 20:57    Interictal EEG Samples: The background activity consisted of 6-7 Hz of polymorphic theta activity of 30 to 35 µV.  There was poor anterior-posterior amplitude gradient.  Background showed state change and reactivity.  The background was continuous.  During behavioral sleep, rudimentary sleep spindles were seen over both hemispheres.  Occasional left anterior temporal sharp waves at F7 were seen. The EKG channel revealed no abnormalities.    Ictal EEG Recording / Patient Events: During this period the patient had no events or seizures.    Summary: During this day of recording no events were recorded. The interictal EEG was abnormal due to diffuse polymorphic theta slowing suggesting mild encephalopathy.  Occasional left anterior temporal sharp waves conferred an increased risk for focal onset seizures. Monitoring was continued in order to record the patient's typical events. The EKG channel revealed no abnormalities.    Day 2 - 10/29/24    Interictal EEG Samples: Interictal EEG was unchanged from yesterday.    Ictal EEG Recording / Patient Events: During this period the patient had no events or seizures.    Summary: During this day of recording no events were recorded. The interictal EEG was abnormal due to diffuse polymorphic theta slowing suggesting mild encephalopathy.  Occasional left anterior temporal sharp waves conferred an increased risk for focal onset seizures. Monitoring was continued in order to record the patient's typical events. The EKG

## 2024-10-30 NOTE — PLAN OF CARE
Mr. Peguero was seen and examined this afternoon at bedside for worsening abdominal pain.  He states it is non-radiating, progressive, 8 out of 10 in severity pain, specifically pointing to his epigastric and periumbilical regions.  He endorses flatus; denies nausea/vomiting.      On physical exam, his abdomen is non-distended, hypoactive bowel sounds, soft, and tenderness to palpation of the epigastric and periumbilical regions.  Could not appreciate any abdominal masses; no tenderness in the RUQ, RLQ, LUQ, or LLQ.    Worsening epigastric periumbilical pain  Procalcitonin is remarkably elevated >100.0 despite 2 days of Merrem.  BP is not responding to fluids as expected.  Possible cholecystitis with ultrasound 10/29/24 demonstrating uniform thickening of gallbladder wall.    Discussed broadening antibiotics with Dr. Moreno.    Start daptomycin for VRE coverage while sensitivities are pending, which are expected today  Repeat BCx x2  Start fentanyl 25 mcg q2h prn  Switch from clear liquid diet to NPO  Colorectal surgery is following with serial abdominal exams        Staffed with Dr. Eduardo Nguyen.    Discussed with RN & ICU team.    Yoselin Floyd MD, MARIE, CCRC  Internal Medicine, PGY-3

## 2024-10-30 NOTE — CONSULTS
Department of General Surgery - Adult  Surgical Service    Consult Note      Reason for Consult:  possible SBO   Requesting Physician:  Patrick     CHIEF COMPLAINT:  abdominal pain.     History Obtained From:  patient, electronic medical record    HISTORY OF PRESENT ILLNESS:                The patient is a 43 y.o. male with  a past medical history of Oligodendroglioma (S/P partial resection 2017, radiation 2019, and temodar 2019), rectal adenocarcinoma (s/p robotic low anterior resection with colorectal anastomosis 4/12/24) and AML with underlying MDS. Per chart review, He was admitted on 10/10/24 for headaches and abdominal pain.  Abdominal CT showed a large stool burden. His abdominal pain resolved after a bowel movement.     He presented to the ER on 10/28/2024 with complaints of abdominal pain since last Friday and states he hasn't had much to eat or drink since then. Denies any known sick contacts or exposures. Denies any aggravating events.  General surgery consulted for concern for possible SBO as KUB done on 10/29/2024 reports \"Mild diffuse gaseous distention of colon. There may be mild gaseous distention of small bowel. Possible ileus.\"     Upon exam, patient reports that he is not experiencing any abdominal pain and it has subsided since having a bowel movement this am. Patient reports that he is passing flatus and denies nausea or vomiting. Reports he has been tolerating his CLD without issue. Denies pain when abdomen was palpated.     Past Medical History:        Diagnosis Date    Brain cancer (HCC)     diagnosed in 2017    Seizure (HCC)      Past Surgical History:        Procedure Laterality Date    BRAIN SURGERY      Surgery in 2017 at [facility]    BRONCHOSCOPY N/A 5/22/2024    BRONCHOSCOPY ALVEOLAR LAVAGE performed by Froylan Hoang MD at [facility] ENDOSCOPY    COLONOSCOPY N/A 3/22/2024    COLONOSCOPY POLYPECTOMY HOT SNARE/COLD BIOPSY performed by Marcus Uribe MD at [facility] ENDOSCOPY    SMALL

## 2024-10-30 NOTE — CARE COORDINATION
Discussed concerns with home IV ABX if needed when discharged this AM during morning meeting with the treatment team.     SW will follow    JENNIFER Ortiz   for [facility] ([facility])  [place] Mobile: [phone]

## 2024-10-30 NOTE — PROGRESS NOTES
[facility] Progress Note    10/30/2024    Dennys Peguero    :  1980    MRN:  [id number]      Interval Hx:    Flatus +.  BM overnight.  No nausea or vomiting.  KUB yesterday evening demonstrated mild diffuse gaseous distention of colon and possible mild gaseous distention of small bowel-possible ileus.  Consult colorectal surgery as he has recent history of rectal adenocarcinoma with LAR and colonic anastomosis 2024.  Hypothermic since 4am.  Temp low of 96.3 °F today at 0526.  Colin hugger is in place.  BP 80s/50s-60s  In 953 mL, UOP 1.9L, net +3.7 L (since admit)  Renal and hepatic lab improvement  Hgb 6.0, PLT 12       ECOG PS:  (2) Ambulatory and capable of self care, unable to carry out work activity, up and about > 50% or waking hours    KPS: 70% Cares for self; unable to carry on normal activity or to do active work    Isolation:  Strict contact      Medication:    Scheduled:   insulin lispro  0-4 Units SubCUTAneous 4x Daily AC & HS    sodium chloride flush  5-40 mL IntraVENous 2 times per day    meropenem  1,000 mg IntraVENous Q8H    Hydrocerin   Topical BID    acyclovir  400 mg Oral BID    pantoprazole  40 mg IntraVENous BID    sodium chloride flush  5-40 mL IntraVENous 2 times per day    Saline Mouthwash  15 mL Swish & Spit 4x Daily AC & HS    vancomycin  125 mg Oral 2 times per day    levETIRAcetam  1,500 mg IntraVENous Q12H    hydrocortisone sodium succinate PF  100 mg IntraVENous Q8H    micafungin  50 mg IntraVENous Q24H     Continuous Infusions:   sodium chloride      sodium chloride      sodium chloride      sodium chloride      sodium chloride      sodium chloride      sodium chloride      sodium chloride       PRN:  sodium chloride, sodium chloride, sodium chloride, polyethylene glycol, oxyCODONE, ondansetron, sodium chloride flush, sodium chloride, sodium chloride, sodium chloride, acetaminophen, sodium chloride, sodium chloride flush, sodium chloride, potassium chloride,  MAC-based alloSCT using BuFlu/PTCy based regimen depending on post-surgical course for rectal adenoCa.  Resumed venetoclax 10/14-10/21/24 (did not start with D1 r/t abdominal discomfort)  Repeat BMBx tomorrow (10/30) - send Flow, FISH, CG and myeloid mutation panel by NGS       3.  HEME    Amyloid angiopathy  Per prior nsgy recs on 10/1/24, avoid anticoagulation as it would significantly increase the risk of ICH    Pancytopenia r/t AML/MDS and recent chemotherapy  Transfuse for Hgb < 7   Transfuse for PLT < 20 K (due to amyloid angiopathy causing higher risk of ICH)  PLT transfusion today (10/29)      4.  METABOLIC   JOSÉ MIGUEL likely 2/2 hypoperfusion:  continues to improve  Baseline Cr ~0.5-0.6  Cr 1.4 on admission  BMP, phos, Mg daily - replace Mg & K prn      5.  GI, NUTRITION   Rectal Adenocarcinoma   See oncology section above for details    Nutrition    Has not been eating or drinking well at home since Sat, 10/26  OG tube in place  Dietitian is following    GERD  Cont Protonix 40 mg BID     Hx of recurrent C. difficile colitis:  no diarrhea currently  see ID above  3/7/24, 5/17/24 - GI panel neg.  5/28/24 - C.difficile toxin/Ag neg.  10/10/24 - C. difficile toxin EIA neg.    Transaminitis & direct hyperbilirubinemia likely 2/2 hypoperfusion:  improving  Monitor liver function daily    Abdominal pain, possible ileus: Likely medication induced.   10/28/24 - lipase 12.0, CT abd/pelvis with IV contrast was unrevealing  Cont Roxicodone 5 mg PO q4h prn- on hold due to Ileus.  CLD, avoid opioids for 24 hrs.  Continue with medical management this time- able to pass gas and has audible bowel sounds. Will do clear liquid diet with +/- intermittent NG suction if needed.   Consult colorectal surgery for further evaluation, mary. given h/o rectal ca.      6.  NEURO   Oligodendroglioma s/p partial resection 2017  See oncology section above for details    Amyloid angiopathy  9/30/24, MRI brain w & w/o contrast - Pattern found to

## 2024-10-31 ENCOUNTER — APPOINTMENT (OUTPATIENT)
Dept: CT IMAGING | Age: 44
DRG: 720 | End: 2024-10-31
Payer: MEDICAID

## 2024-10-31 ENCOUNTER — APPOINTMENT (OUTPATIENT)
Dept: NUCLEAR MEDICINE | Age: 44
DRG: 720 | End: 2024-10-31
Payer: MEDICAID

## 2024-10-31 LAB
ALBUMIN SERPL-MCNC: 2.6 G/DL (ref 3.4–5)
ALP SERPL-CCNC: 247 U/L (ref 40–129)
ALT SERPL-CCNC: 180 U/L (ref 10–40)
ANION GAP SERPL CALCULATED.3IONS-SCNC: 4 MMOL/L (ref 3–16)
ANION GAP SERPL CALCULATED.3IONS-SCNC: 5 MMOL/L (ref 3–16)
APTT BLD: 34.3 SEC (ref 22.1–36.4)
AST SERPL-CCNC: 22 U/L (ref 15–37)
BACTERIA BLD CULT ORG #2: ABNORMAL
BACTERIA BLD CULT ORG #2: ABNORMAL
BACTERIA BLD CULT: ABNORMAL
BILIRUB DIRECT SERPL-MCNC: 0.6 MG/DL (ref 0–0.3)
BILIRUB INDIRECT SERPL-MCNC: 0.7 MG/DL (ref 0–1)
BILIRUB SERPL-MCNC: 1.3 MG/DL (ref 0–1)
BLOOD BANK DISPENSE STATUS: NORMAL
BLOOD BANK PRODUCT CODE: NORMAL
BPU ID: NORMAL
BUN SERPL-MCNC: 28 MG/DL (ref 7–20)
BUN SERPL-MCNC: 28 MG/DL (ref 7–20)
C DIFF TOX A+B STL QL IA: NORMAL
CALCIUM SERPL-MCNC: 8.3 MG/DL (ref 8.3–10.6)
CALCIUM SERPL-MCNC: 8.5 MG/DL (ref 8.3–10.6)
CHLORIDE SERPL-SCNC: 110 MMOL/L (ref 99–110)
CHLORIDE SERPL-SCNC: 114 MMOL/L (ref 99–110)
CO2 SERPL-SCNC: 25 MMOL/L (ref 21–32)
CO2 SERPL-SCNC: 25 MMOL/L (ref 21–32)
CREAT SERPL-MCNC: 0.6 MG/DL (ref 0.9–1.3)
CREAT SERPL-MCNC: <0.5 MG/DL (ref 0.9–1.3)
DEPRECATED RDW RBC AUTO: 13.7 % (ref 12.4–15.4)
DEPRECATED RDW RBC AUTO: 13.9 % (ref 12.4–15.4)
DESCRIPTION BLOOD BANK: NORMAL
FIBRINOGEN PPP-MCNC: 564 MG/DL (ref 227–534)
GFR SERPLBLD CREATININE-BSD FMLA CKD-EPI: >90 ML/MIN/{1.73_M2}
GFR SERPLBLD CREATININE-BSD FMLA CKD-EPI: >90 ML/MIN/{1.73_M2}
GGT SERPL-CCNC: 32 U/L (ref 8–61)
GLUCOSE BLD-MCNC: 119 MG/DL (ref 70–99)
GLUCOSE BLD-MCNC: 119 MG/DL (ref 70–99)
GLUCOSE BLD-MCNC: 124 MG/DL (ref 70–99)
GLUCOSE SERPL-MCNC: 115 MG/DL (ref 70–99)
GLUCOSE SERPL-MCNC: 121 MG/DL (ref 70–99)
HCT VFR BLD AUTO: 23.9 % (ref 40.5–52.5)
HCT VFR BLD AUTO: 27.1 % (ref 40.5–52.5)
HGB BLD-MCNC: 8.4 G/DL (ref 13.5–17.5)
HGB BLD-MCNC: 9.1 G/DL (ref 13.5–17.5)
INR PPP: 1.37 (ref 0.85–1.15)
MAGNESIUM SERPL-MCNC: 2.2 MG/DL (ref 1.8–2.4)
MCH RBC QN AUTO: 29.5 PG (ref 26–34)
MCH RBC QN AUTO: 30.2 PG (ref 26–34)
MCHC RBC AUTO-ENTMCNC: 33.8 G/DL (ref 31–36)
MCHC RBC AUTO-ENTMCNC: 35 G/DL (ref 31–36)
MCV RBC AUTO: 86.2 FL (ref 80–100)
MCV RBC AUTO: 87.4 FL (ref 80–100)
ORGANISM: ABNORMAL
PERFORMED ON: ABNORMAL
PHOSPHATE SERPL-MCNC: 2.5 MG/DL (ref 2.5–4.9)
PLATELET # BLD AUTO: 17 K/UL (ref 135–450)
PLATELET # BLD AUTO: 18 K/UL (ref 135–450)
PMV BLD AUTO: 8.3 FL (ref 5–10.5)
PMV BLD AUTO: 8.6 FL (ref 5–10.5)
POTASSIUM SERPL-SCNC: 4.2 MMOL/L (ref 3.5–5.1)
POTASSIUM SERPL-SCNC: 4.5 MMOL/L (ref 3.5–5.1)
PROT SERPL-MCNC: 5.3 G/DL (ref 6.4–8.2)
PROTHROMBIN TIME: 17.1 SEC (ref 11.9–14.9)
RBC # BLD AUTO: 2.77 M/UL (ref 4.2–5.9)
RBC # BLD AUTO: 3.1 M/UL (ref 4.2–5.9)
SODIUM SERPL-SCNC: 139 MMOL/L (ref 136–145)
SODIUM SERPL-SCNC: 144 MMOL/L (ref 136–145)
URATE SERPL-MCNC: 2.8 MG/DL (ref 3.5–7.2)
WBC # BLD AUTO: 0.3 K/UL (ref 4–11)
WBC # BLD AUTO: 0.3 K/UL (ref 4–11)

## 2024-10-31 PROCEDURE — 2000000000 HC ICU R&B

## 2024-10-31 PROCEDURE — 80048 BASIC METABOLIC PNL TOTAL CA: CPT

## 2024-10-31 PROCEDURE — 84100 ASSAY OF PHOSPHORUS: CPT

## 2024-10-31 PROCEDURE — 6360000002 HC RX W HCPCS: Performed by: NURSE PRACTITIONER

## 2024-10-31 PROCEDURE — 6360000004 HC RX CONTRAST MEDICATION

## 2024-10-31 PROCEDURE — 99233 SBSQ HOSP IP/OBS HIGH 50: CPT | Performed by: INTERNAL MEDICINE

## 2024-10-31 PROCEDURE — 99232 SBSQ HOSP IP/OBS MODERATE 35: CPT

## 2024-10-31 PROCEDURE — 85027 COMPLETE CBC AUTOMATED: CPT

## 2024-10-31 PROCEDURE — 2580000003 HC RX 258

## 2024-10-31 PROCEDURE — 84550 ASSAY OF BLOOD/URIC ACID: CPT

## 2024-10-31 PROCEDURE — 85384 FIBRINOGEN ACTIVITY: CPT

## 2024-10-31 PROCEDURE — 99223 1ST HOSP IP/OBS HIGH 75: CPT | Performed by: SURGERY

## 2024-10-31 PROCEDURE — 82977 ASSAY OF GGT: CPT

## 2024-10-31 PROCEDURE — 2580000003 HC RX 258: Performed by: INTERNAL MEDICINE

## 2024-10-31 PROCEDURE — 78226 HEPATOBILIARY SYSTEM IMAGING: CPT

## 2024-10-31 PROCEDURE — 6370000000 HC RX 637 (ALT 250 FOR IP)

## 2024-10-31 PROCEDURE — A9537 TC99M MEBROFENIN: HCPCS

## 2024-10-31 PROCEDURE — 2580000003 HC RX 258: Performed by: NURSE PRACTITIONER

## 2024-10-31 PROCEDURE — 83735 ASSAY OF MAGNESIUM: CPT

## 2024-10-31 PROCEDURE — 6360000002 HC RX W HCPCS

## 2024-10-31 PROCEDURE — 85730 THROMBOPLASTIN TIME PARTIAL: CPT

## 2024-10-31 PROCEDURE — 87324 CLOSTRIDIUM AG IA: CPT

## 2024-10-31 PROCEDURE — 93005 ELECTROCARDIOGRAM TRACING: CPT

## 2024-10-31 PROCEDURE — 74160 CT ABDOMEN W/CONTRAST: CPT

## 2024-10-31 PROCEDURE — 87449 NOS EACH ORGANISM AG IA: CPT

## 2024-10-31 PROCEDURE — 85610 PROTHROMBIN TIME: CPT

## 2024-10-31 PROCEDURE — 36592 COLLECT BLOOD FROM PICC: CPT

## 2024-10-31 PROCEDURE — 6360000002 HC RX W HCPCS: Performed by: INTERNAL MEDICINE

## 2024-10-31 PROCEDURE — 3430000000 HC RX DIAGNOSTIC RADIOPHARMACEUTICAL

## 2024-10-31 PROCEDURE — 99232 SBSQ HOSP IP/OBS MODERATE 35: CPT | Performed by: INTERNAL MEDICINE

## 2024-10-31 PROCEDURE — 36415 COLL VENOUS BLD VENIPUNCTURE: CPT

## 2024-10-31 PROCEDURE — 36430 TRANSFUSION BLD/BLD COMPNT: CPT

## 2024-10-31 PROCEDURE — 80076 HEPATIC FUNCTION PANEL: CPT

## 2024-10-31 RX ORDER — KIT FOR THE PREPARATION OF TECHNETIUM TC 99M MEBROFENIN 45 MG/10ML
4.7 INJECTION, POWDER, LYOPHILIZED, FOR SOLUTION INTRAVENOUS
Status: COMPLETED | OUTPATIENT
Start: 2024-10-31 | End: 2024-10-31

## 2024-10-31 RX ORDER — HYDROCORTISONE SODIUM SUCCINATE 100 MG/2ML
50 INJECTION INTRAMUSCULAR; INTRAVENOUS EVERY 8 HOURS
Status: DISCONTINUED | OUTPATIENT
Start: 2024-10-31 | End: 2024-11-02

## 2024-10-31 RX ORDER — DICYCLOMINE HCL 20 MG
20 TABLET ORAL 4 TIMES DAILY PRN
Status: DISCONTINUED | OUTPATIENT
Start: 2024-10-31 | End: 2024-11-07

## 2024-10-31 RX ORDER — IOPAMIDOL 755 MG/ML
75 INJECTION, SOLUTION INTRAVASCULAR
Status: COMPLETED | OUTPATIENT
Start: 2024-10-31 | End: 2024-10-31

## 2024-10-31 RX ADMIN — PANTOPRAZOLE SODIUM 40 MG: 40 INJECTION, POWDER, FOR SOLUTION INTRAVENOUS at 20:11

## 2024-10-31 RX ADMIN — SODIUM CHLORIDE 550 MG: 9 INJECTION INTRAMUSCULAR; INTRAVENOUS; SUBCUTANEOUS at 16:12

## 2024-10-31 RX ADMIN — MEROPENEM 1000 MG: 1 INJECTION INTRAVENOUS at 22:42

## 2024-10-31 RX ADMIN — DICYCLOMINE HYDROCHLORIDE 20 MG: 20 TABLET ORAL at 20:10

## 2024-10-31 RX ADMIN — DICYCLOMINE HYDROCHLORIDE 20 MG: 20 TABLET ORAL at 10:51

## 2024-10-31 RX ADMIN — SODIUM CHLORIDE: 9 INJECTION, SOLUTION INTRAVENOUS at 16:42

## 2024-10-31 RX ADMIN — MEBROFENIN 4.7 MILLICURIE: 45 INJECTION, POWDER, LYOPHILIZED, FOR SOLUTION INTRAVENOUS at 14:42

## 2024-10-31 RX ADMIN — Medication 400 MG: at 09:24

## 2024-10-31 RX ADMIN — MICAFUNGIN SODIUM 50 MG: 100 INJECTION, POWDER, LYOPHILIZED, FOR SOLUTION INTRAVENOUS at 00:13

## 2024-10-31 RX ADMIN — LEVETIRACETAM 1500 MG: 100 INJECTION INTRAVENOUS at 20:11

## 2024-10-31 RX ADMIN — DICYCLOMINE HYDROCHLORIDE 20 MG: 20 TABLET ORAL at 16:21

## 2024-10-31 RX ADMIN — VANCOMYCIN HYDROCHLORIDE 125 MG: 250 POWDER, FOR SOLUTION ORAL at 20:10

## 2024-10-31 RX ADMIN — SODIUM CHLORIDE, PRESERVATIVE FREE 10 ML: 5 INJECTION INTRAVENOUS at 20:07

## 2024-10-31 RX ADMIN — IOPAMIDOL 75 ML: 755 INJECTION, SOLUTION INTRAVENOUS at 14:27

## 2024-10-31 RX ADMIN — HYDROCORTISONE SODIUM SUCCINATE 100 MG: 100 INJECTION, POWDER, FOR SOLUTION INTRAMUSCULAR; INTRAVENOUS at 04:04

## 2024-10-31 RX ADMIN — HYDROCORTISONE SODIUM SUCCINATE 50 MG: 100 INJECTION, POWDER, FOR SOLUTION INTRAMUSCULAR; INTRAVENOUS at 18:51

## 2024-10-31 RX ADMIN — VANCOMYCIN HYDROCHLORIDE 125 MG: 250 POWDER, FOR SOLUTION ORAL at 09:24

## 2024-10-31 RX ADMIN — MEROPENEM 1000 MG: 1 INJECTION INTRAVENOUS at 14:36

## 2024-10-31 RX ADMIN — LEVETIRACETAM 1500 MG: 100 INJECTION INTRAVENOUS at 09:24

## 2024-10-31 RX ADMIN — HYDROCORTISONE SODIUM SUCCINATE 50 MG: 100 INJECTION, POWDER, FOR SOLUTION INTRAMUSCULAR; INTRAVENOUS at 10:51

## 2024-10-31 RX ADMIN — MEROPENEM 1000 MG: 1 INJECTION INTRAVENOUS at 06:03

## 2024-10-31 RX ADMIN — Medication 400 MG: at 20:06

## 2024-10-31 RX ADMIN — SODIUM CHLORIDE: 9 INJECTION, SOLUTION INTRAVENOUS at 04:10

## 2024-10-31 RX ADMIN — PANTOPRAZOLE SODIUM 40 MG: 40 INJECTION, POWDER, FOR SOLUTION INTRAVENOUS at 09:24

## 2024-10-31 RX ADMIN — SODIUM CHLORIDE, PRESERVATIVE FREE 10 ML: 5 INJECTION INTRAVENOUS at 09:25

## 2024-10-31 ASSESSMENT — PAIN SCALES - GENERAL
PAINLEVEL_OUTOF10: 4
PAINLEVEL_OUTOF10: 3
PAINLEVEL_OUTOF10: 7
PAINLEVEL_OUTOF10: 5
PAINLEVEL_OUTOF10: 7
PAINLEVEL_OUTOF10: 5

## 2024-10-31 ASSESSMENT — PAIN DESCRIPTION - LOCATION
LOCATION: ABDOMEN

## 2024-10-31 ASSESSMENT — PAIN DESCRIPTION - DESCRIPTORS
DESCRIPTORS: CRAMPING
DESCRIPTORS: CRAMPING
DESCRIPTORS: CRAMPING;DISCOMFORT
DESCRIPTORS: CRAMPING

## 2024-10-31 ASSESSMENT — PAIN DESCRIPTION - ORIENTATION
ORIENTATION: MID
ORIENTATION: MID
ORIENTATION: MID;LOWER
ORIENTATION: MID;LOWER
ORIENTATION: MID
ORIENTATION: MID

## 2024-10-31 ASSESSMENT — PAIN DESCRIPTION - FREQUENCY
FREQUENCY: INTERMITTENT

## 2024-10-31 ASSESSMENT — PAIN DESCRIPTION - ONSET
ONSET: ON-GOING

## 2024-10-31 ASSESSMENT — PAIN DESCRIPTION - PAIN TYPE
TYPE: ACUTE PAIN

## 2024-10-31 ASSESSMENT — PAIN - FUNCTIONAL ASSESSMENT
PAIN_FUNCTIONAL_ASSESSMENT: ACTIVITIES ARE NOT PREVENTED
PAIN_FUNCTIONAL_ASSESSMENT: PREVENTS OR INTERFERES SOME ACTIVE ACTIVITIES AND ADLS

## 2024-10-31 NOTE — PROGRESS NOTES
Colorectal Surgery   Daily Progress Note  Patient: Dennys Peguero      CC:  Sepsis     SUBJECTIVE:     Patient denies abdominal pain. Had several bowel movements yesterday, some with marjan blood.     ROS:   A 14 point review of systems was conducted, significant findings as noted above. All other systems negative.    OBJECTIVE:    Date 10/30/24 0701 - 10/31/24 0700 10/31/24 0701 - 11/01/24 0700   Shift 1747-2056 2901-4190 24 Hour Total 9067-3637 4710-3252 24 Hour Total   INTAKE   P.O. 480 60 540      I.V. 1879  1879      Blood 875 361.6 1236.6        Volume (Transfuse RBC) 300  300        Volume (Transfuse Platelets) 258  258        Volume (Transfuse RBC) 317  317        Volume (Transfuse RBC)  361.6 361.6      Shift Total 3234 421.6 3655.6      OUTPUT   Urine 8331 230 3715      Shift Total 5608 371 0762      NET 2184 -28.4 2155.6           PHYSICAL EXAM:    Vitals:    10/31/24 0525 10/31/24 0555 10/31/24 0609 10/31/24 0629   BP: 101/80 112/86  112/84   Pulse: 61 61  58   Resp: 17 16  18   Temp:   (!) 96.6 °F (35.9 °C) (!) 96.4 °F (35.8 °C)   TempSrc:    Core   SpO2: 99% 96%  98%   Weight:       Height:           General appearance: alert, no acute distress  HEENT: No scleral icterus, EOM grossly intact  Neck: trachea midline, no JVD, neck supple  Chest/Lungs: normal effort with no accessory muscle use, no signs of respiratory distress, on RA  Cardiovascular: RR  Abdomen: Soft, non-tender, non-distended, no rebound, guarding, or rigidity.  Skin: warm and dry, no rashes  Extremities: no edema, no cyanosis  Neuro: A&Ox3, no focal deficits, sensation intact    LABS:   Recent Labs     10/30/24  2035 10/31/24  0400   WBC 0.2* 0.3*   HGB 6.8* 8.4*   HCT 20.2* 23.9*   MCV 86.2 86.2   PLT 24* 17*        Recent Labs     10/30/24  0335 10/30/24  1410 10/31/24  0400    139 139   K 4.5 4.5 4.5    109 110   CO2 26 25 25   PHOS 2.5  --  2.5   BUN 25* 27* 28*   CREATININE 0.8* 0.7* 0.6*        Recent Labs

## 2024-10-31 NOTE — PROGRESS NOTES
Pt chan catheter removed per protocol. Clarified w/ Dr. Nguyen on if he wants a triple lumen IJ removed today. Per Dr. Nguyen, keep in for at least another day. Pt continues to have loose/bloody stools, team aware. C.Diff sample ordered and walked to lab.

## 2024-10-31 NOTE — PROGRESS NOTES
General  Surgery  Interval Note:    patient seen for serial abdominal exam. he is currently sitting in shower. he states that his pain worsens prior to bowel movement, however resolves after bowel movement. this pain is similar to previous crampy pain when constipated. he did have a small red mucuousy bowel moment in the shower.     on exam the patient is soft, minimally tender in epigastrum. no distension. no peritoneal signs.       Plan:    - continue serial exams   appreciate GI recs.    Jayme Todd DO  PGY1, General Surgery  10/31/24  12:22 PM  [place]  Pager: [phone]

## 2024-10-31 NOTE — PROGRESS NOTES
Comprehensive Nutrition Assessment    RECOMMENDATIONS:  PO Diet: NPO  Nutrition Supplement: NPO  Nutrition Education: Education/Counseling not appropriate     NUTRITION ASSESSMENT:   Nutritional summary & status: Assessing patient for poor appetite/intake and weight loss. Admitted with abd pain, inability to tolerate PO, n/v for 3 days prior to admit (now going on 6 days). Code stroke called 10/28, altered mentation requiring intubation for airway protection and required pressor support. Extubated 10/29. NPO/clear liquid since admit. Continues to report abd pain, BM yesterday and today, concern for GIB (hematochezia yesterday, not today). KUB 10/29 showed possible ileus. GI consulted for further workup. RD to monitor GI related findings and make recommendations PRN. Multiple attempts to speak with pt, pt either sleeping or wincing in pain upon RD encounter. Muscle/fat loss noted. Also w/ significant wt loss PTA- 20%30# in 6 months.     Admission // PMH: Neutropenic fever // Oligodendroglioma (s/p partial resection 2017, radiation 2019, and temodar 2019), rectal adenocarcinoma, AML, underlying MDS    MALNUTRITION ASSESSMENT  Context of Malnutrition: Chronic Illness   Malnutrition Status: Severe malnutrition  Findings of the 6 clinical characteristics of malnutrition (Minimum of 2 out of 6 clinical characteristics is required to make the diagnosis of moderate or severe Protein Calorie Malnutrition based on AND/ASPEN Guidelines):  Energy Intake:  Mild decrease in energy intake (6 days this admit)  Weight Loss:  Greater than 10% over 6 months (20% in 6 months)     Body Fat Loss:  Mild body fat loss Buccal region, Orbital, Triceps   Muscle Mass Loss:  Severe muscle mass loss Temples (temporalis), Clavicles (pectoralis & deltoids)      NUTRITION DIAGNOSIS   Severe malnutrition, in context of chronic illness related to inadequate protein-energy intake as evidenced by criteria as identified in malnutrition

## 2024-10-31 NOTE — PROGRESS NOTES
Pt new onset bradycardia in the 40s. Dr. Floyd notified. New orders for EKG and BMP. EKG resulted, sinus bradycardia. Dr. Floyd notified of results, no new orders.

## 2024-10-31 NOTE — CARE COORDINATION
Discussed with treatment team this AM regarding barriers with IV ABX at home. Per MD, there are oral options if needed.     SW will follow    Sharla COLON, JENNIFER   for [facility] ([facility])  [place] Mobile: [phone]

## 2024-10-31 NOTE — PROGRESS NOTES
ID Follow-up NOTE    CC:   AMS  Antibiotics: Meropenem, micafungin, daptomycin, acyclovir, p.o. vancomycin    Admit Date: 10/28/2024  Hospital Day: 4    Subjective:     Patient continues to have mid abd/epigastric pain, now with also GI bleeding, marjan blood noted in stools.  GI evaluation pending.    Objective:     Patient Vitals for the past 8 hrs:   BP Temp Temp src Pulse Resp SpO2 Weight   10/31/24 0800 111/88 96.8 °F (36 °C) Bladder 87 19 100 % --   10/31/24 0700 106/82 (!) 96.4 °F (35.8 °C) -- 57 16 98 % --   10/31/24 0629 112/84 (!) 96.4 °F (35.8 °C) CORE 58 18 98 % 72 kg (158 lb 11.7 oz)   10/31/24 0609 -- (!) 96.6 °F (35.9 °C) -- -- -- -- --   10/31/24 0555 112/86 -- -- 61 16 96 % --   10/31/24 0525 101/80 -- -- 61 17 99 % --   10/31/24 0455 103/81 -- -- 59 15 99 % --   10/31/24 0425 101/79 -- -- 57 13 98 % --   10/31/24 0355 98/79 96.8 °F (36 °C) CORE 56 22 99 % --   10/31/24 0325 99/73 -- -- 62 16 99 % --   10/31/24 0255 98/78 -- -- 69 15 95 % --     I/O last 3 completed shifts:  In: 5190.8 [P.O.:540; I.V.:2814.4; Blood:1836.4]  Out: 2400 [Urine:2400]  I/O this shift:  In: -   Out: 250 [Urine:250]    EXAM:  GENERAL: No apparent distress.  Pt is fatigued easily.   HEENT: Membranes moist, no oral lesion  NECK:  Supple, no lymphadenopathy  LUNGS: Clear b/l, no rales, no dullness  CARDIAC: RRR, no murmur appreciated  ABD:  + BS, soft, tender to palpation in the mid abdomen region.  EXT:  No rash, no edema, no lesions  NEURO: No focal neurologic findings  PSYCH: Orientation normal, drowsy.   LINES:  Peripheral iv, right internal jugular central line    Data Review:  Lab Results   Component Value Date    WBC 0.3 (LL) 10/31/2024    HGB 8.4 (L) 10/31/2024    HCT 23.9 (L) 10/31/2024    MCV 86.2 10/31/2024    PLT 17 (LL) 10/31/2024     Lab Results   Component Value Date    CREATININE 0.6 (L) 10/31/2024    BUN 28 (H) 10/31/2024     10/31/2024    K 4.5 10/31/2024     10/31/2024    CO2 25 10/31/2024     AML (acute myeloid leukemia) in relapse (HCC)    Acute leukemia in relapse (HCC)    Abdominal pain    Acute respiratory failure with hypoxia    Hypotension    Peripherally inserted central catheter (PICC) in place    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 male with past medical history of Oligodendroglioma (S/P partial resection 2017, radiation 2019, and temodar 2019), and rectal adenocarcinoma. He was diagnosed with AML with underlying MDS and rectal adenoca.       Septic shock with neutropenic fever, Klebsiella bacteremia:  -WBC currently 0.1, ANC 0.  -Patient was febrile on admission up to 104.6, now hypothermic.  -Blood cultures x 2 positive for Klebsiella pneumoniae, sensitivity pending  -Currently on meropenem, can continue this agent pending sensitivity results.  -Patient with PICC that has been present for the past 3 months and right IJ in place, when stable consider resite lines to prevent propagation of bacteremia.  -Unclear source of Klebsiella bacteremia, usually more GI/ pathogen.  Patient did present with abdominal pain however CT abdomen and pelvis did not suggest any pathology or infectious etiology here.  -KUB repeated on 10/29 suggestive of gaseous distention of the small bowel, possible ileus.  General surgery following, no planned intervention at this time.  Continue aggressive bowel regimen and serial abdominal exams.  -PICC line that has been in place for the past 3 months has been removed on 10/29, PICC tip culture pending  -Pending repeat blood cultures post removal, can consider replacing if blood cultures remain negative at 48 hours  -TTE without evidence of IE on visualized valves.  - CT chest abd pelvis 10/28 showing Interval increase in nodular consolidation of the right lung base most suggestive of pneumonia.  Pneumonia PCR panel negative  - given progressive worsening, decreased temp,

## 2024-10-31 NOTE — PROGRESS NOTES
ICU Progress Note    Admit Date: 10/28/2024  Day: 4  Vent Day: N/a  IV Access:Peripheral  IV Fluids:None  Vasopressors:None                Antibiotics: Vancomycin, micafungin, meropenem, daptomycin, acyclovir  Diet: Diet NPO    CC: abdominal pain    Interval history:   NAEON.  Hypothermic to 96.3, demarcus hugger in place.  Pro-elena >100 10/30, daptomycin added to abx regimen.  ID following.  Surgery following, no surgical intervention at this time.  Will continue with serial abdominal examinations.  Continues without pressor requirement SBP low 100's with MAPs >80.    HPI:   Mr. Dennys Peguero is a 43 y.o. male with a medical hx significant for oligodendroglioma (s/p partial resection 2017, radiation 2019, and temodar 2019), rectal adenocarcinoma (s/p robotic low anterior resection with anastomosis 4/12/24), and AML who presented  to the ED on 10/28 with abdominal pain.     Per chart review patient reported constant sharp, stabbing abdominal pain for past three days.  As a result he has not been able to eat and has only been able to have sips of water.  He denies aggravating or relieving factors and is unable to identify precipitating events.  Additionally, reports nausea without emesis.  He has not been taking anti-emetics or pain medication at home for relief.       Shortly after being admitted to [facility] code blue was mistakenly called later clarified to be code stroke as patient became acutely unresponsive.  Blood sugars 165, patient unresponsive with rightward eye gaze deviation.  Noticeable rigors concerning for possible seizure given past medical history.  Ativan 2 mg administered without improvement.  At this time blood pressures began to decline to 70's/50's requiring pressor support.  Mentation did not improve and pt remained unresponsive and was intubated for airway protection.  NIHSS 26,  stroke team notified and recommended CT Head and CTA Head once patient is stabilized.  Critical care consulted for  supplementation to keep SpO2 between 88-92% if needed.    CBC q8h, transfuse pRBC to keep Hb >7 g/dL   Transfuse PLT to keep >10K or >50K if there is bleeding evidence.   Continue broad spectrum antibiotics under ID guidance.   Aggressive electrolyte replacement  ICU Glycemic goal 140-180 mg/dL  Diet: NPO  IVF: LR  GI prophylaxis: elevate HOB 30 degrees, PPI  DVT prophylaxis: SCDs  Bowel regimen: N/A  Abx: Empiric coverage for now     Pulmonary/CC will follow only as needed. Please call us back if re-evaluation is required.       The note was completed using EMR and Dragon dictation system. Every effort was made to ensure accuracy; however, inadvertent computerized transcription errors may be present.     Michael Pretty \"Stephane\" Corine Dc MD  Pulmonary and Critical Care Medicine

## 2024-10-31 NOTE — PLAN OF CARE
Problem: Pain  Goal: Verbalizes/displays adequate comfort level or baseline comfort level  10/31/2024 1531 by Petty Escoto, RN  Outcome: Progressing  Flowsheets (Taken 10/31/2024 1531)  Verbalizes/displays adequate comfort level or baseline comfort level:   Encourage patient to monitor pain and request assistance   Consider cultural and social influences on pain and pain management   Assess pain using appropriate pain scale   Implement non-pharmacological measures as appropriate and evaluate response   Administer analgesics based on type and severity of pain and evaluate response   Notify Licensed Independent Practitioner if interventions unsuccessful or patient reports new pain  Note: Pt offered PRN fentanyl for c/o abdominal pain. Pt refused fentanyl, says he thinks it is making it worse. New orders for bentyl PRN. Pt given bentyl for abdominal pain, pt reports pain relief after bentyl administration.     Problem: Safety - Adult  Goal: Free from fall injury  10/31/2024 1531 by Petty Escoto, RN  Outcome: Progressing  Note: Pt is a High fall risk. See Jose Fall Score and ABCDS Injury Risk assessments. Explained fall risk precautions to pt and family and rationale behind their use to keep the patient safe. Pt bed is in low position, side rails up, call light and belongings are in reach. Fall wristband applied and present on pts wrist.  Bed alarm on.  Pt encouraged to call for assistance. Will continue with hourly rounds for PO intake, pain needs, toileting and repositioning as needed. Pt educated needs reinforced.       Problem: Hematologic - Adult  Goal: Maintains hematologic stability  10/31/2024 1531 by Petty Escoto, RN  Outcome: Progressing  Note: New orders for Q12h CBC

## 2024-10-31 NOTE — PROGRESS NOTES
[facility] Progress Note    10/31/2024    Dennys Peguero    :  1980    MRN:  [id number]      Interval Hx:    He reports worsening sharp epigastric/periumbilical abd pain, worsens with bowel movements.  States he is not passing flatus since yesterday.  Hypothermic 96.4 °F overnight and into this morning.  Normotensive, Cr & eGFR have returned to baseline  Hgb 6.5 -->> 4U pRBC --> 8.4  PLT 5 -->> 3U PLT --> 17 --> 1U PLT  ALT, ALP, dBili remain elevated   In 4.9L, UOP 1.6L, Wt +9 lb since admission  2 BMs noted to be bloody yesterday evening for which GI was consulted       ECOG PS:  (2) Ambulatory and capable of self care, unable to carry out work activity, up and about > 50% or waking hours    KPS: 70% Cares for self; unable to carry on normal activity or to do active work    Isolation:  Strict contact      Medication:    Scheduled:   DAPTOmycin (CUBICIN) 550 mg in sodium chloride (PF) 0.9 % 11 mL IV syringe  8 mg/kg IntraVENous Q24H    insulin lispro  0-4 Units SubCUTAneous 4x Daily AC & HS    sodium chloride flush  5-40 mL IntraVENous 2 times per day    meropenem  1,000 mg IntraVENous Q8H    Hydrocerin   Topical BID    acyclovir  400 mg Oral BID    pantoprazole  40 mg IntraVENous BID    sodium chloride flush  5-40 mL IntraVENous 2 times per day    Saline Mouthwash  15 mL Swish & Spit 4x Daily AC & HS    vancomycin  125 mg Oral 2 times per day    levETIRAcetam  1,500 mg IntraVENous Q12H    hydrocortisone sodium succinate PF  100 mg IntraVENous Q8H    micafungin  50 mg IntraVENous Q24H     Continuous Infusions:   sodium chloride      sodium chloride      sodium chloride 75 mL/hr at 10/31/24 0410    sodium chloride      sodium chloride      sodium chloride      sodium chloride      sodium chloride      sodium chloride       PRN:  sodium chloride, sodium chloride, fentanNYL, sodium chloride, polyethylene glycol, ondansetron, sodium chloride flush, sodium chloride, sodium chloride, sodium chloride,  RESOLVED 10/31  On admission, he required phenylephrine gtt, epinephrine gtt, and Levophed gtt to maintain a MAP > 65.  He was able to be weaned off of all pressors by day 2 of admission.     HFmrEF, new - likely transient d/t shock  10/29/24, TTE:  LV - EF 45-50% (decreased compared to TTE on 10/14/24); normal size & wall thickness; mild global hypokinesis.  RV - mildly dilated; normal systolic function.  MV - mild regurgitation.       8.  PULM   RLL lung nodule  10/10/24, CT:  New pulmonary nodule 16 mm RLL  Repeat CT chest 12/2024    Possible PNA:  Not requiring supplemental oxygen  Intubated for airway protection (no hypoxia) 10/28/24.  Extubated 10/29/24.  See ID for infectious workup and treatment        DVT Prophylaxis:  Platelets <50,000 cells/dL - prophylactic lovenox on hold and mechanical prophylaxis with bilateral SCDs while in bed in place.  Contraindications to pharmacologic prophylaxis:  Thrombocytopenia, amyloid angiopathy (see Neuro section above for details)  Contraindications to mechanical prophylaxis:  None    Disposition: When ANC > 1 and recovered from toxicities from chemotherapy and transplant.     The patient was seen and examined by Dr. Eduardo Nguyen.      Yoselin Floyd MD, MARIE, CCRC  Internal Medicine, PGY-3    Eduardo Nguyen MD  Hematology, Bone marrow transplant and Cellular therapy  [facility]

## 2024-10-31 NOTE — PROGRESS NOTES
1345- pt down to CT stand w/ this RN. Pt straight to nuclear medicine after CT scan.    1615- pt returned from nuclear medicine.

## 2024-10-31 NOTE — CONSULTS
depression with contact bleeding.  Biopsied  EGD 3/6/2024: The esophagus appeared normal without evidence of Matamoros's esophagus or reflux esophagitis. No varices.  2.  Normal stomach.   3.  Normal duodenum.     Past Medical History:  Past Medical History:   Diagnosis Date    Brain cancer (HCC)     diagnosed in 2017    Seizure (HCC)         Past Surgical History:  Past Surgical History:   Procedure Laterality Date    BRAIN SURGERY      Surgery in 2017 at [facility]    BRONCHOSCOPY N/A 5/22/2024    BRONCHOSCOPY ALVEOLAR LAVAGE performed by Froylan Hoang MD at [facility] ENDOSCOPY    COLONOSCOPY N/A 3/22/2024    COLONOSCOPY POLYPECTOMY HOT SNARE/COLD BIOPSY performed by Marcus Uribe MD at [facility] ENDOSCOPY    SMALL INTESTINE SURGERY N/A 4/12/2024    ROBOTIC LOW ANTERIOR RESECTION WITH COLORECTAL ANASTOMOSIS performed by Rakesh oMntano MD at [facility] OR    UPPER GASTROINTESTINAL ENDOSCOPY N/A 3/6/2024    ESOPHAGOGASTRODUODENOSCOPY performed by Wade Cantrell MD at [facility] ENDOSCOPY        Historical Medications:  Prior to Visit Medications    Medication Sig Taking? Authorizing Provider   vancomycin (VANCOCIN) 125 MG capsule Take 1 capsule by mouth in the morning and at bedtime  Shanthi Flowers N, APRN - CNP   levETIRAcetam (KEPPRA) 750 MG tablet Take 2 tablets by mouth 2 times daily  Gary Gore MD   voriconazole (VFEND) 200 MG tablet Take 1 tablet by mouth every 12 hours  Gary Gore MD   ondansetron (ZOFRAN-ODT) 4 MG disintegrating tablet Place 1 tablet under the tongue 3 times daily as needed for Nausea or Vomiting  Rakesh Montano MD   Hydrocerin (EUCERIN) CREA cream Apply topically 2 times daily  Lei Tiwari MD   pantoprazole (PROTONIX) 40 MG tablet Take 1 tablet by mouth 2 times daily (before meals)  Lei Tiwari MD   prochlorperazine (COMPAZINE) 10 MG tablet Take 1 tablet by mouth every 4 hours as needed (Nausea / Vomiting)  Lei Tiwari MD   valACYclovir (VALTREX) 500 MG tablet  study.    Electronically signed by Barry Crooks  Echo (TTE) limited (PRN contrast/bubble/strain/3D)    Left Ventricle: Mildly reduced left ventricular systolic function with   a visually estimated EF of 45 - 50%. EF by 2D Simpsons Biplane is 49%.   Left ventricle size is normal. Normal wall thickness. Mild global   hypokinesis present. Global longitudinal strain is reduced.  Compared to   prior study on 10/14/2024, LV function is now marginally reduced.    Right Ventricle: Right ventricle is mildly dilated.    Aortic Valve: Trileaflet valve.    Mitral Valve: Mild regurgitation.    Pericardium: Trivial pericardial effusion present. No indication of   cardiac tamponade.    Image quality is fair.       Labs:   Recent Labs     10/28/24  0938 10/28/24  1640 10/29/24  0400 10/29/24  0440 10/30/24  0335 10/30/24  1410 10/30/24  2035 10/31/24  0400   WBC 0.2*  --   --  0.1* 0.1* 0.1* 0.2* 0.3*   HGB 6.5*  --   --  7.3* 6.0* 6.3* 6.8* 8.4*   PLT 5*  --   --  10* 12* 18* 24* 17*   ALKPHOS 208*  --   --  305* 256* 236*  --  247*   *  --   --  537* 314* 243*  --  180*   *  --   --  520* 83* 38*  --  22   BILITOT 0.4  --   --  2.7* 1.6* 1.3*  --  1.3*   BILIDIR 0.2  --   --  2.3* 1.1*  --   --  0.6*   IBILI 0.2  --   --  0.4 0.5  --   --  0.7   BUN 23* 29*  --  28* 25* 27*  --  28*   CREATININE 1.4* 1.9*  --  1.3 0.8* 0.7*  --  0.6*   * 133*  --  136 137 139  --  139   K 3.5 3.3*  --  4.4 4.5 4.5  --  4.5   INR  --  1.92* 1.79*  --  1.76*  --   --  1.37*        Assessment:    Hospital Problems             Last Modified POA    * (Principal) Neutropenic fever (HCC) 10/28/2024 Yes    Acute respiratory failure with hypoxia 10/29/2024 Yes    Hypotension 10/29/2024 Yes    Peripherally inserted central catheter (PICC) in place 10/29/2024 Yes    Nonintractable generalized idiopathic epilepsy without status epilepticus (HCC) 10/29/2024 Yes    Seizure disorder (HCC) 10/30/2024 Yes       43-year-old male with history

## 2024-10-31 NOTE — PROGRESS NOTES
Neurology Progress Note    Patient: Dennys Peguero MRN: [id number]    YOB: 1980  Age: 43 y.o.  Sex: male   Unit: T[facility] 3T BCC Room/Bed: 3501/3501-01 Location: [facility] Date: 10/31/2024  Date of Admission: 10/28/2024  9:21 AM  Admitting Physician: CLAYTON RAMOS    Primary Care Physician: No primary care provider on file.          LOS: 3 days     24H events:   At his neurologic baseline according to bedside RN   cEEG discontinued 10/30       ASSESSMENT & RECOMMENDATIONS     Assessment  44yo man with oligodendroglioma s/p resection/chemo/xRT 2017, associated seizure disorder, and rectal adenocarcinoma admitted with apparent sepsis and subsequent breakthrough seizure 10/28 in the context of septic shock. He has returned to his baseline with administration of Ativan and Keppra.    cvEEG has shown occasional left anterior sharps over the past 24H, however, no clinically apparent seizures. Given his fragile clinical picture, Keppra was increased  Brain MRI repeated, showing slightly more prominent small area of vasogenic edema associated with left occipital lobe microhemorrhage. Slightly more prominent small area of vasogenic edema associated with right parietal lobe microhemorrhage. Decrease in small foci of enhancement associated with other previously noted microhemorrhages. Otherwise stable innumerable foci of microhemorrhages.    Recommendations  Continue Keppra 1500mg q12  MRI brain grossly stable  Sepsis/septic shock and acute medical issues as per Primary Team  Will need seizure driving instructions prior to discharge, included in discharge instructions for discharging provider. Seizure Driving Risk: Having a Seizure while driving puts you at risk for injuring yourself or others. If you drive while having uncontrolled seizures, you may be held liable for injuries to others. Do not drive until you have been seizure free for at least 3 months, state laws vary so please  flush  5-40 mL IntraVENous 2 times per day    meropenem  1,000 mg IntraVENous Q8H    Hydrocerin   Topical BID    acyclovir  400 mg Oral BID    pantoprazole  40 mg IntraVENous BID    sodium chloride flush  5-40 mL IntraVENous 2 times per day    Saline Mouthwash  15 mL Swish & Spit 4x Daily AC & HS    vancomycin  125 mg Oral 2 times per day    levETIRAcetam  1,500 mg IntraVENous Q12H    micafungin  50 mg IntraVENous Q24H          Discussed at length with patient; bedside RN, Dr. Joann Jacobson, APRN-CNP  Neurology & Neurocritical Care   Neurology Line: [phone]  [employer]: [facility] Neurology & Neuro Critical Care NPs      October 31, 2024

## 2024-10-31 NOTE — PLAN OF CARE
Problem: Pain  Goal: Verbalizes/displays adequate comfort level or baseline comfort level  Outcome: Progressing  Note: Patient with continued complaint of pain to abdomen.  Fentanyl given as requested.  Patient stated he was satisfied with pain relief.       Problem: Safety - Adult  Goal: Free from fall injury  Outcome: Progressing  Note: Orthostatic vital signs obtained at start of shift - see flowsheet for details.  Pt does not meet criteria for orthostasis.  Pt is a High fall risk. See Apple Fall Score and ABCDS Injury Risk assessments.   + Screening for Orthostasis and/or + High Fall Risk per APPLE/ABCDS: Explained fall risk precautions to pt and family and rationale behind their use to keep the patient safe. Pt bed is in low position, side rails up, call light and belongings are in reach. Fall wristband applied and present on pts wrist.  Bed alarm on.  Pt encouraged to call for assistance. Will continue with hourly rounds for PO intake, pain needs, toileting and repositioning as needed.      Problem: Hematologic - Adult  Goal: Maintains hematologic stability  Outcome: Progressing  Note: Patient's hemoglobin this AM:   Recent Labs     10/31/24  0400   HGB 8.4*     Patient's platelet count this AM:   Recent Labs     10/31/24  0400   PLT 17*    Thrombocytopenia Precautions in place.  Patient showing no signs or symptoms of active bleeding.  Patient transfused blood products per orders - see flowsheet.  Patient verbalizes understanding of all instructions. Will continue to assess and implement POC. Call light within reach and hourly rounding in place.        Problem: Skin/Tissue Integrity  Goal: Absence of new skin breakdown  Description: 1.  Monitor for areas of redness and/or skin breakdown  2.  Assess vascular access sites hourly  3.  Every 4-6 hours minimum:  Change oxygen saturation probe site  4.  Every 4-6 hours:  If on nasal continuous positive airway pressure, respiratory therapy assess nares and

## 2024-10-31 NOTE — DISCHARGE SUMMARY
[facility] Discharge Summary             Attending Physician: Eduardo Nguyen MD    Referring MD: No referring provider defined for this encounter.    Name: Dennys Peguero :  1980  MRN:  [id number]    Admission: 10/28/2024   Discharge:   ***    Date: 2024    Diagnosis on admit:  septic shock    Procedures:  central line placement, arterial line placement, PICC line removal, intubation, CT chest/abdomen/pelvis, panculture for fevers, IV antimicrobial therapy, blood product infusions, IV fluid hydration transthoracic echocardiogram      Consultations:  critical care, infectious disease, neurology, colorectal surgery, gastroenterology    Medication:      Medication List        ASK your doctor about these medications      cyanocobalamin 1000 MCG tablet  Take 1 tablet by mouth daily     Hydrocerin Crea cream  Apply topically 2 times daily     levETIRAcetam 750 MG tablet  Commonly known as: KEPPRA  Take 2 tablets by mouth 2 times daily     ondansetron 4 MG disintegrating tablet  Commonly known as: ZOFRAN-ODT  Place 1 tablet under the tongue 3 times daily as needed for Nausea or Vomiting     pantoprazole 40 MG tablet  Commonly known as: PROTONIX  Take 1 tablet by mouth 2 times daily (before meals)     prochlorperazine 10 MG tablet  Commonly known as: COMPAZINE  Take 1 tablet by mouth every 4 hours as needed (Nausea / Vomiting)     valACYclovir 500 MG tablet  Commonly known as: VALTREX  Take 1 tablet by mouth 2 times daily     vancomycin 125 MG capsule  Commonly known as: VANCOCIN  Take 1 capsule by mouth in the morning and at bedtime     voriconazole 200 MG tablet  Commonly known as: VFEND  Take 1 tablet by mouth every 12 hours  Ask about: Should I take this medication?              Reason for Admission:  Klebsiella pneumoniae bacteremia with septic shock and multisystem organ failure    Hospital Course:   Mr. Dennys Peguero is a 43-year-old  gentleman with a MHx significant for oligodendroglioma

## 2024-10-31 NOTE — PROGRESS NOTES
Speech-Language Pathology    Received new bedside swallow evaluation order, reviewed chart, spoke with RN. Pt now NPO with concern for bleed, and is pending GI consult. Will hold and follow-up at later time, as appropriate.    Barbara Marshall, SLP, [id number]  Ph. # [phone]

## 2024-11-01 PROBLEM — R00.1 BRADYCARDIA: Status: ACTIVE | Noted: 2024-11-01

## 2024-11-01 PROBLEM — R78.81 BACTEREMIA: Status: ACTIVE | Noted: 2024-11-01

## 2024-11-01 PROBLEM — B96.1 BACTEREMIA DUE TO KLEBSIELLA PNEUMONIAE: Status: ACTIVE | Noted: 2024-11-01

## 2024-11-01 LAB
(1,3)-BETA-D-GLUCAN (FUNGITELL) INTERPRETATION: POSITIVE
1,3 BETA GLUCAN SER-MCNC: 145 PG/ML
ABO + RH BLD: NORMAL
ALBUMIN SERPL-MCNC: 2.6 G/DL (ref 3.4–5)
ALP SERPL-CCNC: 209 U/L (ref 40–129)
ALT SERPL-CCNC: 109 U/L (ref 10–40)
ANION GAP SERPL CALCULATED.3IONS-SCNC: 6 MMOL/L (ref 3–16)
APTT BLD: 30 SEC (ref 22.1–36.4)
AST SERPL-CCNC: 14 U/L (ref 15–37)
BILIRUB DIRECT SERPL-MCNC: 0.4 MG/DL (ref 0–0.3)
BILIRUB INDIRECT SERPL-MCNC: 0.9 MG/DL (ref 0–1)
BILIRUB SERPL-MCNC: 1.3 MG/DL (ref 0–1)
BLD GP AB SCN SERPL QL: NORMAL
BUN SERPL-MCNC: 25 MG/DL (ref 7–20)
CALCIUM SERPL-MCNC: 8.1 MG/DL (ref 8.3–10.6)
CHLORIDE SERPL-SCNC: 115 MMOL/L (ref 99–110)
CO2 SERPL-SCNC: 26 MMOL/L (ref 21–32)
CREAT SERPL-MCNC: 0.5 MG/DL (ref 0.9–1.3)
DEPRECATED RDW RBC AUTO: 13.6 % (ref 12.4–15.4)
DEPRECATED RDW RBC AUTO: 14 % (ref 12.4–15.4)
EKG ATRIAL RATE: 48 BPM
EKG DIAGNOSIS: NORMAL
EKG P AXIS: 8 DEGREES
EKG P-R INTERVAL: 122 MS
EKG Q-T INTERVAL: 474 MS
EKG QRS DURATION: 88 MS
EKG QTC CALCULATION (BAZETT): 423 MS
EKG R AXIS: 68 DEGREES
EKG T AXIS: 47 DEGREES
EKG VENTRICULAR RATE: 48 BPM
FIBRINOGEN PPP-MCNC: 427 MG/DL (ref 227–534)
GFR SERPLBLD CREATININE-BSD FMLA CKD-EPI: >90 ML/MIN/{1.73_M2}
GLUCOSE BLD-MCNC: 112 MG/DL (ref 70–99)
GLUCOSE BLD-MCNC: 120 MG/DL (ref 70–99)
GLUCOSE BLD-MCNC: 177 MG/DL (ref 70–99)
GLUCOSE BLD-MCNC: 180 MG/DL (ref 70–99)
GLUCOSE SERPL-MCNC: 116 MG/DL (ref 70–99)
HCT VFR BLD AUTO: 25.1 % (ref 40.5–52.5)
HCT VFR BLD AUTO: 32.3 % (ref 40.5–52.5)
HGB BLD-MCNC: 10.8 G/DL (ref 13.5–17.5)
HGB BLD-MCNC: 8.5 G/DL (ref 13.5–17.5)
INR PPP: 1.35 (ref 0.85–1.15)
LDH SERPL L TO P-CCNC: 85 U/L (ref 100–190)
MAGNESIUM SERPL-MCNC: 2 MG/DL (ref 1.8–2.4)
MCH RBC QN AUTO: 29.5 PG (ref 26–34)
MCH RBC QN AUTO: 29.6 PG (ref 26–34)
MCHC RBC AUTO-ENTMCNC: 33.4 G/DL (ref 31–36)
MCHC RBC AUTO-ENTMCNC: 34 G/DL (ref 31–36)
MCV RBC AUTO: 87.3 FL (ref 80–100)
MCV RBC AUTO: 88.2 FL (ref 80–100)
PERFORMED ON: ABNORMAL
PHOSPHATE SERPL-MCNC: 2.5 MG/DL (ref 2.5–4.9)
PLATELET # BLD AUTO: 22 K/UL (ref 135–450)
PLATELET # BLD AUTO: 43 K/UL (ref 135–450)
PMV BLD AUTO: 9 FL (ref 5–10.5)
PMV BLD AUTO: 9.5 FL (ref 5–10.5)
POTASSIUM SERPL-SCNC: 4.1 MMOL/L (ref 3.5–5.1)
PROCALCITONIN SERPL IA-MCNC: 24.37 NG/ML (ref 0–0.15)
PROT SERPL-MCNC: 5.2 G/DL (ref 6.4–8.2)
PROTHROMBIN TIME: 16.8 SEC (ref 11.9–14.9)
RBC # BLD AUTO: 2.87 M/UL (ref 4.2–5.9)
RBC # BLD AUTO: 3.66 M/UL (ref 4.2–5.9)
SODIUM SERPL-SCNC: 147 MMOL/L (ref 136–145)
URATE SERPL-MCNC: 3.1 MG/DL (ref 3.5–7.2)
WBC # BLD AUTO: 0.3 K/UL (ref 4–11)
WBC # BLD AUTO: 0.3 K/UL (ref 4–11)

## 2024-11-01 PROCEDURE — 2580000003 HC RX 258

## 2024-11-01 PROCEDURE — 99232 SBSQ HOSP IP/OBS MODERATE 35: CPT | Performed by: INTERNAL MEDICINE

## 2024-11-01 PROCEDURE — 93010 ELECTROCARDIOGRAM REPORT: CPT | Performed by: INTERNAL MEDICINE

## 2024-11-01 PROCEDURE — 86901 BLOOD TYPING SEROLOGIC RH(D): CPT

## 2024-11-01 PROCEDURE — 6360000002 HC RX W HCPCS: Performed by: NURSE PRACTITIONER

## 2024-11-01 PROCEDURE — 36592 COLLECT BLOOD FROM PICC: CPT

## 2024-11-01 PROCEDURE — 86850 RBC ANTIBODY SCREEN: CPT

## 2024-11-01 PROCEDURE — 83735 ASSAY OF MAGNESIUM: CPT

## 2024-11-01 PROCEDURE — 87506 IADNA-DNA/RNA PROBE TQ 6-11: CPT

## 2024-11-01 PROCEDURE — 2580000003 HC RX 258: Performed by: INTERNAL MEDICINE

## 2024-11-01 PROCEDURE — 84100 ASSAY OF PHOSPHORUS: CPT

## 2024-11-01 PROCEDURE — 83615 LACTATE (LD) (LDH) ENZYME: CPT

## 2024-11-01 PROCEDURE — P9036 PLATELET PHERESIS IRRADIATED: HCPCS

## 2024-11-01 PROCEDURE — P9040 RBC LEUKOREDUCED IRRADIATED: HCPCS

## 2024-11-01 PROCEDURE — 6360000002 HC RX W HCPCS

## 2024-11-01 PROCEDURE — 85384 FIBRINOGEN ACTIVITY: CPT

## 2024-11-01 PROCEDURE — 99232 SBSQ HOSP IP/OBS MODERATE 35: CPT | Performed by: SURGERY

## 2024-11-01 PROCEDURE — 2060000000 HC ICU INTERMEDIATE R&B

## 2024-11-01 PROCEDURE — 92610 EVALUATE SWALLOWING FUNCTION: CPT

## 2024-11-01 PROCEDURE — 6370000000 HC RX 637 (ALT 250 FOR IP)

## 2024-11-01 PROCEDURE — 6360000002 HC RX W HCPCS: Performed by: INTERNAL MEDICINE

## 2024-11-01 PROCEDURE — 80076 HEPATIC FUNCTION PANEL: CPT

## 2024-11-01 PROCEDURE — 80048 BASIC METABOLIC PNL TOTAL CA: CPT

## 2024-11-01 PROCEDURE — 99223 1ST HOSP IP/OBS HIGH 75: CPT | Performed by: INTERNAL MEDICINE

## 2024-11-01 PROCEDURE — 85027 COMPLETE CBC AUTOMATED: CPT

## 2024-11-01 PROCEDURE — 84145 PROCALCITONIN (PCT): CPT

## 2024-11-01 PROCEDURE — 84550 ASSAY OF BLOOD/URIC ACID: CPT

## 2024-11-01 PROCEDURE — 86900 BLOOD TYPING SEROLOGIC ABO: CPT

## 2024-11-01 PROCEDURE — 86923 COMPATIBILITY TEST ELECTRIC: CPT

## 2024-11-01 PROCEDURE — 36430 TRANSFUSION BLD/BLD COMPNT: CPT

## 2024-11-01 PROCEDURE — 2580000003 HC RX 258: Performed by: NURSE PRACTITIONER

## 2024-11-01 PROCEDURE — 85610 PROTHROMBIN TIME: CPT

## 2024-11-01 PROCEDURE — 85730 THROMBOPLASTIN TIME PARTIAL: CPT

## 2024-11-01 RX ORDER — ONDANSETRON 4 MG/1
4 TABLET, ORALLY DISINTEGRATING ORAL 4 TIMES DAILY PRN
Status: ACTIVE | OUTPATIENT
Start: 2024-11-01

## 2024-11-01 RX ORDER — LOPERAMIDE HYDROCHLORIDE 2 MG/1
2 CAPSULE ORAL 4 TIMES DAILY PRN
Status: DISPENSED | OUTPATIENT
Start: 2024-11-01

## 2024-11-01 RX ORDER — PROCHLORPERAZINE EDISYLATE 5 MG/ML
5 INJECTION INTRAMUSCULAR; INTRAVENOUS EVERY 6 HOURS PRN
Status: ACTIVE | OUTPATIENT
Start: 2024-11-01

## 2024-11-01 RX ADMIN — MICAFUNGIN SODIUM 50 MG: 100 INJECTION, POWDER, LYOPHILIZED, FOR SOLUTION INTRAVENOUS at 00:00

## 2024-11-01 RX ADMIN — INSULIN LISPRO 1 UNITS: 100 INJECTION, SOLUTION INTRAVENOUS; SUBCUTANEOUS at 17:30

## 2024-11-01 RX ADMIN — MEROPENEM 1000 MG: 1 INJECTION INTRAVENOUS at 22:01

## 2024-11-01 RX ADMIN — LEVETIRACETAM 1500 MG: 100 INJECTION INTRAVENOUS at 09:04

## 2024-11-01 RX ADMIN — PANTOPRAZOLE SODIUM 40 MG: 40 INJECTION, POWDER, FOR SOLUTION INTRAVENOUS at 09:04

## 2024-11-01 RX ADMIN — Medication 400 MG: at 09:05

## 2024-11-01 RX ADMIN — Medication: at 09:10

## 2024-11-01 RX ADMIN — SODIUM CHLORIDE, PRESERVATIVE FREE 10 ML: 5 INJECTION INTRAVENOUS at 22:03

## 2024-11-01 RX ADMIN — LEVETIRACETAM 1500 MG: 100 INJECTION INTRAVENOUS at 22:02

## 2024-11-01 RX ADMIN — HYDROCORTISONE SODIUM SUCCINATE 50 MG: 100 INJECTION, POWDER, FOR SOLUTION INTRAMUSCULAR; INTRAVENOUS at 03:02

## 2024-11-01 RX ADMIN — SODIUM CHLORIDE: 9 INJECTION, SOLUTION INTRAVENOUS at 05:56

## 2024-11-01 RX ADMIN — Medication: at 22:04

## 2024-11-01 RX ADMIN — MEROPENEM 1000 MG: 1 INJECTION INTRAVENOUS at 05:55

## 2024-11-01 RX ADMIN — MEROPENEM 1000 MG: 1 INJECTION INTRAVENOUS at 14:39

## 2024-11-01 RX ADMIN — VANCOMYCIN HYDROCHLORIDE 125 MG: 250 POWDER, FOR SOLUTION ORAL at 22:02

## 2024-11-01 RX ADMIN — SODIUM CHLORIDE, PRESERVATIVE FREE 10 ML: 5 INJECTION INTRAVENOUS at 09:06

## 2024-11-01 RX ADMIN — SODIUM CHLORIDE: 9 INJECTION, SOLUTION INTRAVENOUS at 18:16

## 2024-11-01 RX ADMIN — HYDROCORTISONE SODIUM SUCCINATE 50 MG: 100 INJECTION, POWDER, FOR SOLUTION INTRAMUSCULAR; INTRAVENOUS at 11:28

## 2024-11-01 RX ADMIN — Medication 400 MG: at 22:02

## 2024-11-01 RX ADMIN — SODIUM CHLORIDE 550 MG: 9 INJECTION INTRAMUSCULAR; INTRAVENOUS; SUBCUTANEOUS at 14:38

## 2024-11-01 RX ADMIN — HYDROCORTISONE SODIUM SUCCINATE 50 MG: 100 INJECTION, POWDER, FOR SOLUTION INTRAMUSCULAR; INTRAVENOUS at 17:31

## 2024-11-01 RX ADMIN — SODIUM CHLORIDE, PRESERVATIVE FREE 10 ML: 5 INJECTION INTRAVENOUS at 22:02

## 2024-11-01 RX ADMIN — VANCOMYCIN HYDROCHLORIDE 125 MG: 250 POWDER, FOR SOLUTION ORAL at 09:09

## 2024-11-01 ASSESSMENT — PAIN SCALES - GENERAL
PAINLEVEL_OUTOF10: 0

## 2024-11-01 ASSESSMENT — PAIN SCALES - WONG BAKER: WONGBAKER_NUMERICALRESPONSE: NO HURT

## 2024-11-01 NOTE — PROGRESS NOTES
ID Follow-up NOTE    CC:   AMS  Antibiotics: Meropenem, micafungin, daptomycin, acyclovir, p.o. vancomycin    Admit Date: 10/28/2024  Hospital Day: 5    Subjective:     Patient states abd pain improved, no fevers. Denies seeing further blood in stools    Objective:     Patient Vitals for the past 8 hrs:   BP Temp Temp src Pulse Resp SpO2 Weight   11/01/24 1200 114/74 97.8 °F (36.6 °C) Oral (!) 42 10 100 % --   11/01/24 1100 116/79 -- -- (!) 44 12 -- --   11/01/24 1000 110/78 -- -- (!) 45 17 100 % --   11/01/24 0900 107/75 97.8 °F (36.6 °C) Oral 54 18 97 % 73.4 kg (161 lb 12.8 oz)   11/01/24 0800 98/74 -- -- 55 19 100 % --   11/01/24 0700 117/79 -- -- (!) 48 12 100 % --   11/01/24 0635 99/74 97.9 °F (36.6 °C) Oral 55 17 99 % --   11/01/24 0603 100/75 97.7 °F (36.5 °C) Oral (!) 49 12 98 % --   11/01/24 0600 96/73 -- -- 54 21 96 % --   11/01/24 0548 98/73 97.8 °F (36.6 °C) Oral 59 18 98 % --   11/01/24 0500 (!) 83/61 -- -- (!) 44 21 99 % --     I/O last 3 completed shifts:  In: 4805.4 [P.O.:420; I.V.:2852.4; Blood:1533]  Out: 1915 [Urine:1165; Other:550; Stool:200]  I/O this shift:  In: 220 [P.O.:220]  Out: 500 [Urine:500]    EXAM:  GENERAL: No apparent distress.  Pt is fatigued easily.   HEENT: Membranes moist, no oral lesion  NECK:  Supple, no lymphadenopathy  LUNGS: Clear b/l, no rales, no dullness  CARDIAC: RRR, no murmur appreciated  ABD:  + BS, soft, tender to palpation in the mid abdomen region.  EXT:  No rash, no edema, no lesions  NEURO: No focal neurologic findings  PSYCH: Orientation normal, drowsy.   LINES:  Peripheral iv, right internal jugular central line    Data Review:  Lab Results   Component Value Date    WBC 0.3 (LL) 11/01/2024    HGB 8.5 (L) 11/01/2024    HCT 25.1 (L) 11/01/2024    MCV 87.3 11/01/2024    PLT 22 (L) 11/01/2024     Lab Results   Component Value Date    CREATININE 0.5 (L) 11/01/2024    BUN 25 (H) 11/01/2024     (H) 11/01/2024    K 4.1 11/01/2024     (H) 11/01/2024    CO2

## 2024-11-01 NOTE — PROGRESS NOTES
Speech-Language Pathology    1st attempt 0822  Reviewed chart. Spoke with RN, who recommends hold as pt remains NPO for GI. Will follow-up as able/appropriate.    2nd attempt 1125  Notified by RN pt advanced to clear liquid diet by GI. Attempted to work with patient; spoke with him bedside and he wants to sleep right now, requested therapy return later. Will check back later today, schedule permitting.    Barbara Marshall, SLP, [id number]  Ph. # [phone]

## 2024-11-01 NOTE — PROGRESS NOTES
Physician Progress Note      PATIENT:               SAMANTHA HAMILTON  CSN #:                  [id number]  :                       1980  ADMIT DATE:       10/28/2024 9:21 AM  DISCH DATE:  RESPONDING  PROVIDER #:        Eduardo Nguyen MD          QUERY TEXT:    Dear Dr. Quintanilla,    Pt admitted with Sepsis and has Severe malnutrition documented in Dietitian   consult note dated, 10/31 and manifested by, decrease in energy intake, Weight   Loss:  Greater than 10% over 6 months, Mild body fat loss Buccal region,   Orbital, Triceps, Severe muscle mass loss Temples (temporalis), Clavicles   (pectoralis & deltoids). Please further specify type of malnutrition with   documentation in the medical record.    The medical record reflects the following:  Risk Factors: AML Oligodendroglioma, recurrent c-diff NPO  Clinical Indicators: Severe malnutrition, in context of chronic illness   related to inadequate protein-energy intake as evidenced by criteria as   identified in malnutrition assessment, Per dietitian consult note  Treatment: Dietitian, consult, nutrition supplement    ASPEN Criteria:    https://aspenjournals.onlinelibrary.narvaez.com/doi/full/10.1177/421736061773977  5    Thank you,  Odette Alvarez RN BSN  Clinical   [email or web address]  Options provided:  -- Severe Malnutrition  -- Severe Protein calorie malnutrition  -- Other - I will add my own diagnosis  -- Disagree - Not applicable / Not valid  -- Disagree - Clinically unable to determine / Unknown  -- Refer to Clinical Documentation Reviewer    PROVIDER RESPONSE TEXT:    This patient has severe protein calorie malnutrition.    Query created by: Odette Alvarez on 10/31/2024 12:33 PM      Electronically signed by:  Eduardo Nguyen MD 2024 7:46 AM

## 2024-11-01 NOTE — PROGRESS NOTES
Speech-Language Pathology    Reviewed chart. Spoke with RN, who recommends hold as pt remains NPO for GI. Will follow-up as able/appropriate.    Barbara Marshall, SLP, [id number]  Ph. # [phone]

## 2024-11-01 NOTE — PROGRESS NOTES
Speech Language Pathology  Facility/Department:[address]  Bedside Swallow Evaluation & DC                                                       Name: Dennys Peguero  : 1980  MRN: [id number]    Patient Diagnosis(es):   Patient Active Problem List    Diagnosis Date Noted    Acute respiratory failure with hypoxia 10/29/2024    Hypotension 10/29/2024    Peripherally inserted central catheter (PICC) in place 10/29/2024    Nonintractable generalized idiopathic epilepsy without status epilepticus (HCC) 10/29/2024    Seizure disorder (HCC) 10/29/2024    Abdominal pain 10/10/2024    AML (acute myeloid leukemia) in relapse (HCC) 2024    Acute leukemia in relapse (HCC) 2024    Fever and neutropenia (HCC) 2024    Multifocal pneumonia 2024    Sepsis (HCC) 05/15/2024    Rectal bleeding 2024    Neutropenic fever (HCC) 04/10/2024    Rectal adenocarcinoma (HCC) 2024    Rectal mass 2024    Hematochezia 2024    Acute myeloid leukemia not having achieved remission (HCC) 2024    Severe malnutrition (HCC) 2024    Thrombocytopenia (HCC) 2024    GI bleed 2024       Past Medical History:   Diagnosis Date    Brain cancer (HCC)     diagnosed in 2017    Seizure (HCC)      Past Surgical History:   Procedure Laterality Date    BRAIN SURGERY      Surgery in 2017 at [facility]    BRONCHOSCOPY N/A 2024    BRONCHOSCOPY ALVEOLAR LAVAGE performed by Froylan Hoang MD at [facility] ENDOSCOPY    COLONOSCOPY N/A 3/22/2024    COLONOSCOPY POLYPECTOMY HOT SNARE/COLD BIOPSY performed by Marcus Uribe MD at [facility] ENDOSCOPY    SMALL INTESTINE SURGERY N/A 2024    ROBOTIC LOW ANTERIOR RESECTION WITH COLORECTAL ANASTOMOSIS performed by Rakesh Montano MD at [facility] OR    UPPER GASTROINTESTINAL ENDOSCOPY N/A 3/6/2024    ESOPHAGOGASTRODUODENOSCOPY performed by Wade Cantrell MD at [facility] ENDOSCOPY       Reason for Referral:  Dennys Peguero  was  verbal  Dentition/Oral Mucosa: natural dentition  Vocal quality: hypophonic  Respiratory Status/oxygen requirements: 1L O2 via NC  Vision/Hearing: adequate  Patient Complaint: denies difficulty swallowing  Patient Positioning: upright in bed  PLOF: from home with family    Prior Dysphagia History:   None per chart review.  Intubated x 1 day this admission    Bedside Swallowing Evaluation Impression 11/01/2024  Swallow function appears grossly intact for PO trials assessed (limited to clear liquids: thins via straw + jello), with pt demonstrating appropriate and timely oral acceptance, prep, and clearance, with no overt s/s aspiration, no c/o globus sensation. Recommend continue current diet, defer advancement to GI/surgery.    Treatment:  Dysphagia Goals:  NA    Education  Provided education to patient re: role of SLP, purpose of visit, recommendations. Patient indicated some comprehension.    Pt goal: did not state  Pt discharge goal: did not state    Total treatment time: 20' dysphagia eval    Plan: DC from speech  Recommended diet:   Continue clear liquid diet, advance as per GI/surgery  Upright position  Encourage oral hygiene 2 x daily    Discharge Recommendation: no further speech therapy needs identified at this time  Discussed with Belgica CADENA  Needs met prior to leaving room, call light within reach    Barbara Marshall, SLP, [id number]  Pg. # [phone]

## 2024-11-01 NOTE — PLAN OF CARE
Problem: Pain  Goal: Verbalizes/displays adequate comfort level or baseline comfort level  Outcome: Progressing  Flowsheets (Taken 11/1/2024 0534)  Verbalizes/displays adequate comfort level or baseline comfort level:   Encourage patient to monitor pain and request assistance   Assess pain using appropriate pain scale   Administer analgesics based on type and severity of pain and evaluate response     Problem: Safety - Adult  Goal: Free from fall injury  Outcome: Progressing  Flowsheets (Taken 11/1/2024 0534)  Free From Fall Injury:   Instruct family/caregiver on patient safety   Based on caregiver fall risk screen, instruct family/caregiver to ask for assistance with transferring infant if caregiver noted to have fall risk factors     Problem: Skin/Tissue Integrity - Adult  Goal: Skin integrity remains intact  Outcome: Progressing  Flowsheets (Taken 11/1/2024 0534)  Skin Integrity Remains Intact:   Monitor for areas of redness and/or skin breakdown   Every 4-6 hours minimum: Change oxygen saturation probe site     Problem: Infection - Adult  Goal: Absence of infection during hospitalization  Outcome: Progressing  Flowsheets (Taken 11/1/2024 0534)  Absence of infection during hospitalization:   Assess and monitor for signs and symptoms of infection   Monitor lab/diagnostic results   Monitor all insertion sites i.e., indwelling lines, tubes and drains   Administer medications as ordered   Instruct and encourage patient and family to use good hand hygiene technique   Identify and instruct in appropriate isolation precautions for identified infection/condition     Problem: Hematologic - Adult  Goal: Maintains hematologic stability  Outcome: Progressing  Flowsheets (Taken 11/1/2024 0534)  Maintains hematologic stability:   Assess for signs and symptoms of bleeding or hemorrhage   Monitor labs for bleeding or clotting disorders   Administer blood products/factors as ordered     Problem: Nutrition Deficit:  Goal:  Optimize nutritional status  Outcome: Progressing  Flowsheets (Taken 11/1/2024 4169)  Nutrient intake appropriate for improving, restoring, or maintaining nutritional needs:   Monitor oral intake, labs, and treatment plans   Assess nutritional status and recommend course of action   Provide specific nutrition education to patient or family as appropriate

## 2024-11-01 NOTE — PROGRESS NOTES
Progress Note    Patient Dennys Peguero  MRN: [id number]  YOB: 1980 Age: 43 y.o. Sex: male  Room: [address] [place] [address]       Admitting Physician: Eduardo Nguyen MD   Date of Admission: 10/28/2024  9:21 AM   Primary Care Physician: No primary care provider on file.     Subjective:  Dennys Peguero was seen and examined. We are following for rectal bleeding.  -- no further pain today. Had liquid stool this morning that he states in brown    ROS:  Constitutional: Denies fever, no change in appetite  Respiratory: Denies cough or shortness of breath  Cardiovascular: Denies chest pain or edema    Objective:  Vital Signs:   Vitals:    11/01/24 0900   BP: 107/75   Pulse: 54   Resp: 18   Temp: 97.8 °F (36.6 °C)   SpO2: 97%         Physical Exam:  Constitutional: Alert and oriented x 4. No acute distress.   HEENT: Sclera anicteric, mucosal membranes moist  Cardiovascular: Regular rate and rhythm.  No murmurs.  Respiratory: Respirations nonlabored, no crepitus  GI: Abdomen nondistended, soft, and nontender.  Normal active bowel sounds.  No masses palpable.   Rectal: Deferred  Musculoskeletal:  No pitting edema of the lower legs.  Neurological: Gross memory appears intact.       Intake/Output:    Intake/Output Summary (Last 24 hours) at 11/1/2024 1033  Last data filed at 11/1/2024 0636  Gross per 24 hour   Intake 3090 ml   Output 1115 ml   Net 1975 ml        Current Medications:  Current Facility-Administered Medications   Medication Dose Route Frequency Provider Last Rate Last Admin    ondansetron (ZOFRAN-ODT) disintegrating tablet 4 mg  4 mg Oral 4x Daily PRN Eduardo Nguyen MD        prochlorperazine (COMPAZINE) injection 5 mg  5 mg IntraVENous Q6H PRN Eduardo Nguyen MD        loperamide (IMODIUM) capsule 2 mg  2 mg Oral 4x Daily PRN Yoselin Floyd MD        hydrocortisone sodium succinate PF (SOLU-CORTEF) injection 50 mg  50 mg IntraVENous Q8H Yoselin Floyd MD   50 mg at 11/01/24 0302    dicyclomine

## 2024-11-01 NOTE — CARE COORDINATION
Attended MD rounds. Team aware of the concern for home IV ABX and MD stated pt will discharge on oral ABX.    SW will follow    Sharla COLON, JENNIFER   for [facility] ([facility])  David Mobile: [phone]

## 2024-11-01 NOTE — PROGRESS NOTES
[facility] Progress Note    2024    Dennys Peguero    :  1980    MRN:  [id number]      Interval Hx:    Overall, he has substantially improved since admission.  At present time, he endorses nausea and diarrhea.  He states he's had resolution of his abdominal pain.  He was encouraged to request Zofran as needed.  Start clear liquid diet with oral nutritional supplementation.  Start loperamide as C. difficile toxin/Ag is negative.  Cardiology consulted for persistent sinus bradycardia and to evaluate further for the possibility of valvular vegetations in the setting of sepsis with cavitary NAN lung lesion.       ECOG PS:  (2) Ambulatory and capable of self care, unable to carry out work activity, up and about > 50% or waking hours    KPS: 70% Cares for self; unable to carry on normal activity or to do active work    Isolation:  Strict contact      Medication:    Scheduled:   hydrocortisone sodium succinate PF  50 mg IntraVENous Q8H    DAPTOmycin (CUBICIN) 550 mg in sodium chloride (PF) 0.9 % 11 mL IV syringe  8 mg/kg IntraVENous Q24H    insulin lispro  0-4 Units SubCUTAneous 4x Daily AC & HS    sodium chloride flush  5-40 mL IntraVENous 2 times per day    meropenem  1,000 mg IntraVENous Q8H    Hydrocerin   Topical BID    acyclovir  400 mg Oral BID    pantoprazole  40 mg IntraVENous BID    sodium chloride flush  5-40 mL IntraVENous 2 times per day    Saline Mouthwash  15 mL Swish & Spit 4x Daily AC & HS    vancomycin  125 mg Oral 2 times per day    levETIRAcetam  1,500 mg IntraVENous Q12H    micafungin  50 mg IntraVENous Q24H     Continuous Infusions:   sodium chloride 75 mL/hr at 24 0556    sodium chloride      sodium chloride       PRN:  dicyclomine, fentanNYL, polyethylene glycol, ondansetron, sodium chloride, sodium chloride, acetaminophen, sodium chloride flush, potassium chloride, magnesium sulfate, Saline Mouthwash, ALTEplase (CATHFLO) 2 mg in sterile water 2 mL injection    ROS:   contrast was unrevealing  10/31/24 - CT abd with IV contrast & HIDA were unrevealing  Colorectal surgery is following - serial abd exams  Cont Bentyl prn  GI consulted, recs appreciated  Cont Protonix 40 mg BID  Switch from NPO to clear liquid diet today      6.  NEURO   Oligodendroglioma s/p partial resection 2017  See oncology section above for details    Amyloid angiopathy  9/30/24, MRI brain w & w/o contrast - Pattern found to be c/w amyloid angiopathy per neurosurgery with recommendation to avoid anticoagulation as it would significantly increase the risk of ICH; no surgical intervention warranted.    10/10/24, CT head w/o contrast - Stable small foci of increased density parietal and occipital lobe on the right occipital lobe on the left similar to the previous exam c/w multiple small areas of hemorrhage.  10/29/24, MRI brain w & w/o contrast - Slightly more prominent small area of vasogenic edema a/w left occipital lobe microhemorrhage.  Slightly more prominent small area of vasogenic edema a/w right parietal lobe microhemorrhage. Decrease in small foci of enhancement a/w other previously noted microhemorrhages.    Seizures since 2009:  no seizure activity on EEG thus far this admission  Cont Keppra 1500 mg BID    Rightward gaze deviation with suspicion for seizure:  RESOLVED 10/28  10/28/24, code stroke called upon arrival to the unit from ED due to unresponsive state & R gaze deviation.  CT head w/o contrast was unrevealing.  Notably, he had not taken his Keppra since 10/26/24 raising suspicion for seizures.  Neurology consulted      7.  CVS  HFmrEF, new - likely transient d/t shock  10/29/24, TTE:  LV - EF 45-50% (decreased compared to TTE on 10/14/24); normal size & wall thickness; mild global hypokinesis.  RV - mildly dilated; normal systolic function.  MV - mild regurgitation.       8.  PULM   RLL lung nodule  10/10/24, CT:  New pulmonary nodule 16 mm RLL  Repeat CT chest 12/2024    Possible PNA:  Not

## 2024-11-01 NOTE — PLAN OF CARE
Problem: Pain  Goal: Verbalizes/displays adequate comfort level or baseline comfort level  11/1/2024 1350 by Belgica Castrejon RN  Outcome: Progressing  Flowsheets (Taken 11/1/2024 0534 by Hayley Knowles RN)  Verbalizes/displays adequate comfort level or baseline comfort level:   Encourage patient to monitor pain and request assistance   Assess pain using appropriate pain scale   Administer analgesics based on type and severity of pain and evaluate response  Note: Patient denies pain on assessment. Patient educated to make RN aware of pain for PRN pain meds.     Problem: Safety - Adult  Goal: Free from fall injury  11/1/2024 1350 by Belgica Castrejon RN  Outcome: Progressing  Flowsheets (Taken 11/1/2024 0534 by Hayley Knowles RN)  Free From Fall Injury:   Instruct family/caregiver on patient safety   Based on caregiver fall risk screen, instruct family/caregiver to ask for assistance with transferring infant if caregiver noted to have fall risk factors  Note:  Pt is a High fall risk. See Jose Fall Score and ABCDS Injury Risk assessments.  Explained fall risk precautions to pt and family and rationale behind their use to keep the patient safe. Pt bed is in low position, side rails up, call light and belongings are in reach. Fall wristband applied and present on pts wrist.  Bed alarm on.  Pt encouraged to call for assistance. Will continue with hourly rounds for PO intake, pain needs, toileting and repositioning as needed.      Problem: Skin/Tissue Integrity - Adult  Goal: Skin integrity remains intact  11/1/2024 1350 by Belgica Castrejon RN  Outcome: Progressing  Flowsheets (Taken 11/1/2024 0900)  Skin Integrity Remains Intact:   Monitor for areas of redness and/or skin breakdown   Assess vascular access sites hourly  Note: Patient with scattered bruising. Patient has cyst to R hip, no open skin.      Problem: Infection - Adult  Goal: Absence of infection at discharge  11/1/2024 1350 by Belgica Castrejon RN  Outcome:

## 2024-11-01 NOTE — PROGRESS NOTES
4 Eyes Skin Assessment     NAME:  Dennys Peguero  YOB: 1980  MEDICAL RECORD NUMBER:  [id number]    The patient is being assessed for  Shift Handoff    I agree that at least one RN has performed a thorough Head to Toe Skin Assessment on the patient. ALL assessment sites listed below have been assessed.      Areas assessed by both nurses:    Head, Face, Ears, Shoulders, Back, Chest, Arms, Elbows, Hands, Sacrum. Buttock, Coccyx, Ischium, Legs. Feet and Heels, and Under Medical Devices         Does the Patient have a Wound? Yes wound(s) were present on assessment. LDA wound assessment was Initiated and completed by RN       Alexandre Prevention initiated by RN: Yes  Wound Care Orders initiated by RN: Yes    Pressure Injury (Stage 3,4, Unstageable, DTI, NWPT, and Complex wounds) if present, place Wound referral order by RN under : No    New Ostomies, if present place, Ostomy referral order under : No     Nurse 1 eSignature: Electronically signed by Hayley Knowles RN on 11/1/24 at 7:47 AM EDT    **SHARE this note so that the co-signing nurse can place an eSignature**    Nurse 2 eSignature: Electronically signed by Belgica Castrejon RN on 11/1/24 at 8:16 AM EDT

## 2024-11-01 NOTE — PROGRESS NOTES
Colorectal Surgery   Daily Progress Note  Patient: Dennys Peguero      CC:  Sepsis     SUBJECTIVE:   NAEON. Patient resting comfortably in bed. Pain is well controlled. Passing gas and BM. No other complaints at this time.     ROS:   A 14 point review of systems was conducted, significant findings as noted above. All other systems negative.    OBJECTIVE:    PHYSICAL EXAM:    Vitals:    11/01/24 0400 11/01/24 0500 11/01/24 0548 11/01/24 0600   BP: 100/69 (!) 83/61 98/73 96/73   Pulse: (!) 44 (!) 44 59 54   Resp: 20 21 18 21   Temp: 97.6 °F (36.4 °C)  97.8 °F (36.6 °C)    TempSrc: Oral  Oral    SpO2: 100% 99% 98% 96%   Weight:       Height:           General appearance: alert, no acute distress  Chest/Lungs: normal effort with no accessory muscle use, no signs of respiratory distress, on RA  Cardiovascular: RR  Abdomen: Soft, non-tender, non-distended, no rebound, guarding, or rigidity.  Extremities: no edema, no cyanosis  Neuro: A&Ox3    LABS:   Recent Labs     10/31/24  1642 11/01/24  0318   WBC 0.3* 0.3*   HGB 9.1* 8.5*   HCT 27.1* 25.1*   MCV 87.4 87.3   PLT 18* 22*        Recent Labs     10/31/24  0400 10/31/24  1708 11/01/24  0319    144 147*   K 4.5 4.2 4.1    114* 115*   CO2 25 25 26   PHOS 2.5  --  2.5   BUN 28* 28* 25*   CREATININE 0.6* <0.5* 0.5*        Recent Labs     10/31/24  0400 11/01/24  0319   AST 22 14*   * 109*   BILIDIR 0.6* 0.4*   BILITOT 1.3* 1.3*   ALKPHOS 247* 209*        No results for input(s): \"LIPASE\", \"AMYLASE\" in the last 72 hours.       Recent Labs     10/31/24  0400 11/01/24  0319   INR 1.37* 1.35*   APTT 34.3 30.0        Recent Labs     10/30/24  1410   CKTOTAL 66         ASSESSMENT & PLAN:   43 y.o. male with  a past medical history of Oligodendroglioma (S/P partial resection 2017, radiation 2019, and temodar 2019), rectal adenocarcinoma (s/p robotic low anterior resection with colorectal anastomosis 4/12/24) and AML who general surgery was consulted for possible SBO.  Patient having active bowel movements and denies abdominal pain. Bowel movements including solid stool and marjan blood.     Patient with klebsiella bacteremia of unknown source.     Surgery is extremely high risk. Do not recommend surgical intervention at this time.   CT abd/pelvis yesterday showed no acute abdominal abnormality. Hepatobiliary scan normal as well  GI following - recommended sigmoidoscopy but given severe neutropenia and thrombocytopenia, holding off on procedure and will reevaluate today.   Continue transfusions per [facility] for anemia and thrombocytopenia   Surgery will follow     Berenice Mercado DO  PGY1, General Surgery  11/01/24  6:51 AM  [phone]     I reviewed with the resident the medical history and the resident's findings on the physical examination.  I discussed with the resident the patient's diagnosis and concur with the plan. Patient reports abdomen feeling better today. Stool now brown. No tenderness on exam.     Liu Cummings M.D.  11/1/24   11:46 AM

## 2024-11-01 NOTE — CONSULTS
Cardiology Consultation                                                                    Pt Name: Dennys Peguero  Age: 43 y.o.  Sex: male  : 1980  Location: 3501/3501-01    Referring Physician: Eduardo Nguyen MD  Primary cardiologist: None      Reason for Consult:     Reason for Consultation/Chief Complaint: Bradycardia along with potential need for DIO.    HPI:      Dennys Peguero is a 43 y.o. 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 He is currently on treatment with Dacogen and Venetoclax (started 3/13/24) for his AML. He was undergoing transplant workup for BMT and had a repeat MRI of his brain on 24 showing multiple intracranial and intra-axial foci. MRI suspicious for amyloid angiopathy vs CNS vasculopathy.     He was previously admitted with HA and abdominal pain. At the time, incidental 16 mm pulmonary nodule found in RLL on CT. He also had neutropenic fever and tx with Cefepime. Blood culture at the time grew Streptococcus Mitis. He was discharged with Augmentin for that.    Patient presented to ED on 10/28 with neutropenic fever. He had been sick at home and unable to eat, drink or take Rx properly. Patient found to be septic with Klebsiella Pneumoniae on blood culture.    During admission, he developed bradycardia. EKG was done showing sinus bradycardia. Patient BP has been running soft. CT scan of Chest and Abdomen shows stable RLL focal consolidation. We were essentially consulted because of his bradycardia and possibility for a DIO. TTE has not shown any vegetations.       Histories     Past Medical History:   has a past medical history of Brain cancer (HCC) and Seizure (HCC).    Surgical History:   has a past surgical history that includes brain surgery; Upper gastrointestinal endoscopy (N/A, 3/6/2024); Colonoscopy (N/A, 3/22/2024); Small intestine surgery (N/A, 2024); and bronchoscopy  \"LIPIDCOMM\", \"VLDCHOL\"  Recent Labs     10/30/24  0335 10/31/24  0400 11/01/24  0319   INR 1.76* 1.37* 1.35*     Recent Labs     10/30/24  1410   CKTOTAL 66     No results for input(s): \"BNP\" in the last 72 hours.  No results for input(s): \"TSH\" in the last 72 hours.  No results for input(s): \"CHOL\", \"HDL\", \"TRIG\" in the last 72 hours.    Invalid input(s): \"LDLCALC\"]    Lab Results   Component Value Date    CKTOTAL 66 10/30/2024         Assessment / Plan:     Bradycardia - Patient is in sinus becca. There is no heart block involved as expected to be seen with a vegetation. Could be 2/2 to sickness, but will order TSH.  - TSH     2.   Septic Emboli - Patient had prior hx of streptococcus mitis infection. Now has Klebsiella Pneumonia infection. Unlikely cardiac source. Does not need a DIO at this time from cardiology point of view but please consult ID.    Cardiology signing off

## 2024-11-02 LAB
ALBUMIN SERPL-MCNC: 2.7 G/DL (ref 3.4–5)
ALP SERPL-CCNC: 201 U/L (ref 40–129)
ALT SERPL-CCNC: 81 U/L (ref 10–40)
ANION GAP SERPL CALCULATED.3IONS-SCNC: 5 MMOL/L (ref 3–16)
APTT BLD: 31.4 SEC (ref 22.1–36.4)
AST SERPL-CCNC: 16 U/L (ref 15–37)
BACTERIA CATH TIP CULT: NORMAL
BILIRUB DIRECT SERPL-MCNC: 0.4 MG/DL (ref 0–0.3)
BILIRUB INDIRECT SERPL-MCNC: 0.9 MG/DL (ref 0–1)
BILIRUB SERPL-MCNC: 1.3 MG/DL (ref 0–1)
BUN SERPL-MCNC: 21 MG/DL (ref 7–20)
CALCIUM SERPL-MCNC: 8.6 MG/DL (ref 8.3–10.6)
CHLORIDE SERPL-SCNC: 111 MMOL/L (ref 99–110)
CO2 SERPL-SCNC: 26 MMOL/L (ref 21–32)
CREAT SERPL-MCNC: <0.5 MG/DL (ref 0.9–1.3)
DEPRECATED RDW RBC AUTO: 13.3 % (ref 12.4–15.4)
DEPRECATED RDW RBC AUTO: 13.8 % (ref 12.4–15.4)
FIBRINOGEN PPP-MCNC: 339 MG/DL (ref 227–534)
GFR SERPLBLD CREATININE-BSD FMLA CKD-EPI: >90 ML/MIN/{1.73_M2}
GLUCOSE BLD-MCNC: 121 MG/DL (ref 70–99)
GLUCOSE BLD-MCNC: 136 MG/DL (ref 70–99)
GLUCOSE BLD-MCNC: 187 MG/DL (ref 70–99)
GLUCOSE BLD-MCNC: 78 MG/DL (ref 70–99)
GLUCOSE SERPL-MCNC: 112 MG/DL (ref 70–99)
HCT VFR BLD AUTO: 24.1 % (ref 40.5–52.5)
HCT VFR BLD AUTO: 24.3 % (ref 40.5–52.5)
HGB BLD-MCNC: 8.1 G/DL (ref 13.5–17.5)
HGB BLD-MCNC: 8.2 G/DL (ref 13.5–17.5)
INR PPP: 1.32 (ref 0.85–1.15)
MAGNESIUM SERPL-MCNC: 1.7 MG/DL (ref 1.8–2.4)
MCH RBC QN AUTO: 29.5 PG (ref 26–34)
MCH RBC QN AUTO: 29.6 PG (ref 26–34)
MCHC RBC AUTO-ENTMCNC: 33.5 G/DL (ref 31–36)
MCHC RBC AUTO-ENTMCNC: 33.8 G/DL (ref 31–36)
MCV RBC AUTO: 87.6 FL (ref 80–100)
MCV RBC AUTO: 88.1 FL (ref 80–100)
PERFORMED ON: ABNORMAL
PERFORMED ON: NORMAL
PHOSPHATE SERPL-MCNC: 2.4 MG/DL (ref 2.5–4.9)
PLATELET # BLD AUTO: 36 K/UL (ref 135–450)
PLATELET # BLD AUTO: 39 K/UL (ref 135–450)
PMV BLD AUTO: 8.9 FL (ref 5–10.5)
PMV BLD AUTO: 9.2 FL (ref 5–10.5)
POTASSIUM SERPL-SCNC: 3.5 MMOL/L (ref 3.5–5.1)
PROT SERPL-MCNC: 5.1 G/DL (ref 6.4–8.2)
PROTHROMBIN TIME: 16.6 SEC (ref 11.9–14.9)
RBC # BLD AUTO: 2.73 M/UL (ref 4.2–5.9)
RBC # BLD AUTO: 2.78 M/UL (ref 4.2–5.9)
SODIUM SERPL-SCNC: 142 MMOL/L (ref 136–145)
URATE SERPL-MCNC: 2.3 MG/DL (ref 3.5–7.2)
WBC # BLD AUTO: 0.2 K/UL (ref 4–11)
WBC # BLD AUTO: 0.3 K/UL (ref 4–11)

## 2024-11-02 PROCEDURE — 85384 FIBRINOGEN ACTIVITY: CPT

## 2024-11-02 PROCEDURE — 6370000000 HC RX 637 (ALT 250 FOR IP)

## 2024-11-02 PROCEDURE — 6360000002 HC RX W HCPCS: Performed by: NURSE PRACTITIONER

## 2024-11-02 PROCEDURE — 36430 TRANSFUSION BLD/BLD COMPNT: CPT

## 2024-11-02 PROCEDURE — 83735 ASSAY OF MAGNESIUM: CPT

## 2024-11-02 PROCEDURE — 80076 HEPATIC FUNCTION PANEL: CPT

## 2024-11-02 PROCEDURE — 2580000003 HC RX 258: Performed by: NURSE PRACTITIONER

## 2024-11-02 PROCEDURE — 85730 THROMBOPLASTIN TIME PARTIAL: CPT

## 2024-11-02 PROCEDURE — 2580000003 HC RX 258

## 2024-11-02 PROCEDURE — 94761 N-INVAS EAR/PLS OXIMETRY MLT: CPT

## 2024-11-02 PROCEDURE — 2060000000 HC ICU INTERMEDIATE R&B

## 2024-11-02 PROCEDURE — 6360000002 HC RX W HCPCS: Performed by: INTERNAL MEDICINE

## 2024-11-02 PROCEDURE — 36592 COLLECT BLOOD FROM PICC: CPT

## 2024-11-02 PROCEDURE — 2580000003 HC RX 258: Performed by: INTERNAL MEDICINE

## 2024-11-02 PROCEDURE — 84100 ASSAY OF PHOSPHORUS: CPT

## 2024-11-02 PROCEDURE — 84550 ASSAY OF BLOOD/URIC ACID: CPT

## 2024-11-02 PROCEDURE — 80048 BASIC METABOLIC PNL TOTAL CA: CPT

## 2024-11-02 PROCEDURE — 6360000002 HC RX W HCPCS

## 2024-11-02 PROCEDURE — 85027 COMPLETE CBC AUTOMATED: CPT

## 2024-11-02 PROCEDURE — 85610 PROTHROMBIN TIME: CPT

## 2024-11-02 RX ORDER — HYDROCORTISONE SODIUM SUCCINATE 100 MG/2ML
50 INJECTION INTRAMUSCULAR; INTRAVENOUS EVERY 12 HOURS
Status: DISCONTINUED | OUTPATIENT
Start: 2024-11-02 | End: 2024-11-03

## 2024-11-02 RX ADMIN — MEROPENEM 1000 MG: 1 INJECTION INTRAVENOUS at 21:26

## 2024-11-02 RX ADMIN — SODIUM CHLORIDE 550 MG: 9 INJECTION INTRAMUSCULAR; INTRAVENOUS; SUBCUTANEOUS at 14:55

## 2024-11-02 RX ADMIN — SODIUM CHLORIDE, PRESERVATIVE FREE 10 ML: 5 INJECTION INTRAVENOUS at 09:17

## 2024-11-02 RX ADMIN — SODIUM CHLORIDE, PRESERVATIVE FREE 10 ML: 5 INJECTION INTRAVENOUS at 21:15

## 2024-11-02 RX ADMIN — Medication: at 09:16

## 2024-11-02 RX ADMIN — DICYCLOMINE HYDROCHLORIDE 20 MG: 20 TABLET ORAL at 14:55

## 2024-11-02 RX ADMIN — VANCOMYCIN HYDROCHLORIDE 125 MG: 250 POWDER, FOR SOLUTION ORAL at 09:17

## 2024-11-02 RX ADMIN — PANTOPRAZOLE SODIUM 40 MG: 40 INJECTION, POWDER, FOR SOLUTION INTRAVENOUS at 21:13

## 2024-11-02 RX ADMIN — LEVETIRACETAM 1500 MG: 100 INJECTION INTRAVENOUS at 09:16

## 2024-11-02 RX ADMIN — SODIUM CHLORIDE, PRESERVATIVE FREE 20 ML: 5 INJECTION INTRAVENOUS at 21:13

## 2024-11-02 RX ADMIN — Medication 400 MG: at 21:13

## 2024-11-02 RX ADMIN — MICAFUNGIN SODIUM 50 MG: 100 INJECTION, POWDER, LYOPHILIZED, FOR SOLUTION INTRAVENOUS at 00:28

## 2024-11-02 RX ADMIN — Medication: at 21:14

## 2024-11-02 RX ADMIN — VANCOMYCIN HYDROCHLORIDE 125 MG: 250 POWDER, FOR SOLUTION ORAL at 21:14

## 2024-11-02 RX ADMIN — LEVETIRACETAM 1500 MG: 100 INJECTION INTRAVENOUS at 21:14

## 2024-11-02 RX ADMIN — HYDROCORTISONE SODIUM SUCCINATE 50 MG: 100 INJECTION, POWDER, FOR SOLUTION INTRAMUSCULAR; INTRAVENOUS at 14:55

## 2024-11-02 RX ADMIN — LOPERAMIDE HYDROCHLORIDE 2 MG: 2 CAPSULE ORAL at 14:55

## 2024-11-02 RX ADMIN — SODIUM CHLORIDE: 9 INJECTION, SOLUTION INTRAVENOUS at 07:56

## 2024-11-02 RX ADMIN — PANTOPRAZOLE SODIUM 40 MG: 40 INJECTION, POWDER, FOR SOLUTION INTRAVENOUS at 07:58

## 2024-11-02 RX ADMIN — PANTOPRAZOLE SODIUM 40 MG: 40 INJECTION, POWDER, FOR SOLUTION INTRAVENOUS at 00:26

## 2024-11-02 RX ADMIN — HYDROCORTISONE SODIUM SUCCINATE 50 MG: 100 INJECTION, POWDER, FOR SOLUTION INTRAMUSCULAR; INTRAVENOUS at 03:10

## 2024-11-02 RX ADMIN — MEROPENEM 1000 MG: 1 INJECTION INTRAVENOUS at 05:49

## 2024-11-02 RX ADMIN — Medication 400 MG: at 09:16

## 2024-11-02 RX ADMIN — INSULIN LISPRO 1 UNITS: 100 INJECTION, SOLUTION INTRAVENOUS; SUBCUTANEOUS at 11:50

## 2024-11-02 RX ADMIN — MEROPENEM 1000 MG: 1 INJECTION INTRAVENOUS at 14:59

## 2024-11-02 RX ADMIN — MICAFUNGIN SODIUM 50 MG: 100 INJECTION, POWDER, LYOPHILIZED, FOR SOLUTION INTRAVENOUS at 22:41

## 2024-11-02 ASSESSMENT — PAIN SCALES - GENERAL
PAINLEVEL_OUTOF10: 0

## 2024-11-02 ASSESSMENT — PAIN SCALES - WONG BAKER
WONGBAKER_NUMERICALRESPONSE: NO HURT

## 2024-11-02 NOTE — PROGRESS NOTES
and radiation dose reduction techniques were employed.    COMPARISON: 10/28/2024..    FINDINGS:     Lower Lungs: Small bilateral effusions. Focal consolidation in the right lower  lung suspicious for pneumonia. Given the presence of a cavitary nodule in the  left upper lung, consideration could be given to possible septic emboli.  Extensive scattered microhemorrhages intracranially suggest possible embolic  event. Clinical correlation recommended.    Upper abdomen: Trace pericardial effusion. Early portal venous phase  enhancement. There is absence of enhancement of the hepatic veins which is  presumed to relate to the phase of contrast administration.    Retroperitoneum: Adrenal glands are unremarkable. No hydronephrosis. No  retroperitoneal lymphadenopathy identified.    Bowel and peritoneum: Nonobstructive bowel gas pattern. No small bowel or large  bowel distention. Pelvis is not excluded.    Bones: No acute osseous abnormality.  Impression: 1. Small bilateral effusions with focal consolidation in the right lower lung.  Presence of cavitary nodule identified on prior chest CT suggest possible  embolic event such as septic emboli. Clinical correlation recommended.  2. No acute abdominal abnormality identified otherwise.    Electronically signed by Yordan Hines       Labs:   Recent Labs     10/30/24  1410 10/30/24  2035 10/31/24  0400 10/31/24  1642 10/31/24  1708 11/01/24  0318 11/01/24  0319 11/01/24  1611 11/02/24  0316   WBC 0.1*   < > 0.3* 0.3*  --  0.3*  --  0.3* 0.3*   HGB 6.3*   < > 8.4* 9.1*  --  8.5*  --  10.8* 8.2*   PLT 18*   < > 17* 18*  --  22*  --  43* 36*   ALKPHOS 236*  --  247*  --   --   --  209*  --  201*   *  --  180*  --   --   --  109*  --  81*   AST 38*  --  22  --   --   --  14*  --  16   BILITOT 1.3*  --  1.3*  --   --   --  1.3*  --  1.3*   BILIDIR  --   --  0.6*  --   --   --  0.4*  --  0.4*   IBILI  --   --  0.7  --   --   --  0.9  --  0.9   BUN 27*  --  28*  --  28*  --  25*   --  21*   CREATININE 0.7*  --  0.6*  --  <0.5*  --  0.5*  --  <0.5*     --  139  --  144  --  147*  --  142   K 4.5  --  4.5  --  4.2  --  4.1  --  3.5   INR  --   --  1.37*  --   --   --  1.35*  --  1.32*    < > = values in this interval not displayed.          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 Diff in 3/2024, rectal adenocarcinoma in 3/2024 s/p rectal mass resection in 4/2024. Patient presented on 10/28 with sepsis/neutropenic fever. GI consulted for rectal bleeding.    Still neutropenic and thrombocytopenic-- will hold off on flex sig  Plan:  Continue bowel regimen  No plan for Flex sig given no further bleeding and neutropenia.  Please call with questions.      Talib Nash MD    [facility]

## 2024-11-02 NOTE — PLAN OF CARE
Problem: Pain  Goal: Verbalizes/displays adequate comfort level or baseline comfort level  11/2/2024 0718 by Brooke Baer RN  Outcome: Progressing  11/2/2024 0717 by Brooke Baer RN  Outcome: Progressing     Problem: Safety - Medical Restraint  Goal: Remains free of injury from restraints (Restraint for Interference with Medical Device)  Description: INTERVENTIONS:  1. Determine that other, less restrictive measures have been tried or would not be effective before applying the restraint  2. Evaluate the patient's condition at the time of restraint application  3. Inform patient/family regarding the reason for restraint  4. Q2H: Monitor safety, psychosocial status, comfort, nutrition and hydration  11/2/2024 0718 by Brooke Baer RN  Outcome: Progressing  11/2/2024 0717 by Brooke Baer RN  Outcome: Progressing     Problem: Safety - Adult  Goal: Free from fall injury  11/2/2024 0718 by Brooke Baer RN  Outcome: Progressing  11/2/2024 0717 by Brooke Baer RN  Outcome: Progressing     Problem: Skin/Tissue Integrity - Adult  Goal: Skin integrity remains intact  11/2/2024 0718 by Brooke Baer RN  Outcome: Progressing  11/2/2024 0717 by Brooke Baer RN  Outcome: Progressing  Goal: Incisions, wounds, or drain sites healing without S/S of infection  11/2/2024 0718 by Brooke Baer RN  Outcome: Progressing  11/2/2024 0717 by Brooke Baer RN  Outcome: Progressing  Goal: Oral mucous membranes remain intact  11/2/2024 0718 by Brooke Baer RN  Outcome: Progressing  11/2/2024 0717 by Brooke Baer RN  Outcome: Progressing     Problem: Infection - Adult  Goal: Absence of infection during hospitalization  Outcome: Progressing     Problem: Infection - Adult  Goal: Absence of infection during hospitalization  Outcome: Progressing     Problem: Hematologic - Adult  Goal: Maintains hematologic stability  Outcome: Progressing     Problem: Skin/Tissue Integrity  Goal: Absence of new skin breakdown  Description: 1.

## 2024-11-02 NOTE — PROGRESS NOTES
[facility] Progress Note    2024    Dennys Peguero    :  1980    MRN:  [id number]      Interval Hx:      The patient states that he is having mild abdominal pain.  It is similar to the pain but nowhere near as severe as it was a couple of days ago.  He is having bowel movements.  He has no nausea or vomiting.      ECOG PS:  (2) Ambulatory and capable of self care, unable to carry out work activity, up and about > 50% or waking hours    KPS: 70% Cares for self; unable to carry on normal activity or to do active work    Isolation:  Strict contact      Medication:    Scheduled:   hydrocortisone sodium succinate PF  50 mg IntraVENous Q8H    DAPTOmycin (CUBICIN) 550 mg in sodium chloride (PF) 0.9 % 11 mL IV syringe  8 mg/kg IntraVENous Q24H    insulin lispro  0-4 Units SubCUTAneous 4x Daily AC & HS    sodium chloride flush  5-40 mL IntraVENous 2 times per day    meropenem  1,000 mg IntraVENous Q8H    Hydrocerin   Topical BID    acyclovir  400 mg Oral BID    pantoprazole  40 mg IntraVENous BID    sodium chloride flush  5-40 mL IntraVENous 2 times per day    Saline Mouthwash  15 mL Swish & Spit 4x Daily AC & HS    vancomycin  125 mg Oral 2 times per day    levETIRAcetam  1,500 mg IntraVENous Q12H    micafungin  50 mg IntraVENous Q24H     Continuous Infusions:   sodium chloride 75 mL/hr at 24 1816    sodium chloride      sodium chloride       PRN:  ondansetron, prochlorperazine, loperamide, dicyclomine, fentanNYL, polyethylene glycol, sodium chloride, sodium chloride, acetaminophen, sodium chloride flush, potassium chloride, magnesium sulfate, Saline Mouthwash, ALTEplase (CATHFLO) 2 mg in sterile water 2 mL injection    ROS:  As noted above, otherwise remainder of 10-point ROS negative    Physical Exam:    I&O:    Intake/Output Summary (Last 24 hours) at 2024 0655  Last data filed at 2024 0610  Gross per 24 hour   Intake 3436.8 ml   Output 500 ml   Net 2936.8 ml       Vital Signs:   BP (!) 116/41   Pulse (!) 47   Temp 97.7 °F (36.5 °C) (Oral)   Resp 12   Ht 1.778 m (5' 10\")   Wt 73.4 kg (161 lb 12.8 oz)   SpO2 99%   BMI 23.22 kg/m²     Weight:    Wt Readings from Last 3 Encounters:   11/01/24 73.4 kg (161 lb 12.8 oz)   10/14/24 67.6 kg (149 lb)   10/02/24 70 kg (154 lb 6.4 oz)     Gen: Chronically ill-appearing male, in NAD.   HEENT: Normocephalic.  No scleral icterus or conjunctival injection.  No rhinorrhea or epistaxis.  Moist, pink oral mucous membranes.   Neck: Supple.   CVS: No apparent JVD.  RRR. No murmurs, rubs, or gallops.  Radial & dorsalis pedis pulses 2+.  Capillary refill > 3 sec.   Pulm: No perioral cyanosis.  Able to speak in full sentences w/o any frequent pauses on room air.   Abd: Non-distended.   Normal bowel sounds  Soft.  Globally tender to deep palpitation. No guarding or rebound tenderness.  No tenderness to palpation in any quadrant.   MSK: No BUE or BLE edema.   Skin: Appropriately warm and dry.   Neuro: Alert, awake, and following commands appropriately.   Psych: Cooperative.   Catheter: PICC L brachial w/o any surrounding erythema, warmth, or purulence (placed 8/1/24)  R IJ CVC w/o any surrounding erythema, warmth, or purulence (placed 10/28/24)   L radial arterial line w/o any surrounding erythema, warmth, or purulence (placed 10/28/24)       Data:   CBC:   Recent Labs     11/01/24  0318 11/01/24  1611 11/02/24  0316   WBC 0.3* 0.3* 0.3*   HGB 8.5* 10.8* 8.2*   HCT 25.1* 32.3* 24.3*   MCV 87.3 88.2 87.6   PLT 22* 43* 36*     BMP/Mg:  Recent Labs     10/31/24  0400 10/31/24  1708 11/01/24  0319 11/02/24  0316    144 147* 142   K 4.5 4.2 4.1 3.5    114* 115* 111*   CO2 25 25 26 26   PHOS 2.5  --  2.5 2.4*   BUN 28* 28* 25* 21*   CREATININE 0.6* <0.5* 0.5* <0.5*   MG 2.20  --  2.00 1.70*     LFTs:   Recent Labs     10/31/24  0400 11/01/24  0319 11/02/24  0316   AST 22 14* 16   * 109* 81*   BILIDIR 0.6* 0.4* 0.4*   BILITOT 1.3* 1.3* 1.3*

## 2024-11-03 LAB
ALBUMIN SERPL-MCNC: 2.6 G/DL (ref 3.4–5)
ALP SERPL-CCNC: 176 U/L (ref 40–129)
ALT SERPL-CCNC: 64 U/L (ref 10–40)
ANION GAP SERPL CALCULATED.3IONS-SCNC: 5 MMOL/L (ref 3–16)
APTT BLD: 32.8 SEC (ref 22.1–36.4)
AST SERPL-CCNC: 12 U/L (ref 15–37)
BACTERIA BLD CULT ORG #2: NORMAL
BACTERIA BLD CULT: NORMAL
BILIRUB DIRECT SERPL-MCNC: 0.3 MG/DL (ref 0–0.3)
BILIRUB INDIRECT SERPL-MCNC: 0.7 MG/DL (ref 0–1)
BILIRUB SERPL-MCNC: 1 MG/DL (ref 0–1)
BLOOD BANK DISPENSE STATUS: NORMAL
BLOOD BANK PRODUCT CODE: NORMAL
BPU ID: NORMAL
BUN SERPL-MCNC: 16 MG/DL (ref 7–20)
CALCIUM SERPL-MCNC: 8.5 MG/DL (ref 8.3–10.6)
CHLORIDE SERPL-SCNC: 114 MMOL/L (ref 99–110)
CO2 SERPL-SCNC: 27 MMOL/L (ref 21–32)
CREAT SERPL-MCNC: <0.5 MG/DL (ref 0.9–1.3)
DEPRECATED RDW RBC AUTO: 12.9 % (ref 12.4–15.4)
DEPRECATED RDW RBC AUTO: 13.4 % (ref 12.4–15.4)
DESCRIPTION BLOOD BANK: NORMAL
FIBRINOGEN PPP-MCNC: 309 MG/DL (ref 227–534)
GFR SERPLBLD CREATININE-BSD FMLA CKD-EPI: >90 ML/MIN/{1.73_M2}
GI PATHOGENS PNL STL NAA+PROBE: NORMAL
GLUCOSE BLD-MCNC: 102 MG/DL (ref 70–99)
GLUCOSE BLD-MCNC: 108 MG/DL (ref 70–99)
GLUCOSE BLD-MCNC: 111 MG/DL (ref 70–99)
GLUCOSE BLD-MCNC: 166 MG/DL (ref 70–99)
GLUCOSE SERPL-MCNC: 89 MG/DL (ref 70–99)
HCT VFR BLD AUTO: 21.8 % (ref 40.5–52.5)
HCT VFR BLD AUTO: 22.1 % (ref 40.5–52.5)
HGB BLD-MCNC: 7.4 G/DL (ref 13.5–17.5)
HGB BLD-MCNC: 7.6 G/DL (ref 13.5–17.5)
INR PPP: 1.28 (ref 0.85–1.15)
MAGNESIUM SERPL-MCNC: 1.6 MG/DL (ref 1.8–2.4)
MCH RBC QN AUTO: 29.7 PG (ref 26–34)
MCH RBC QN AUTO: 30 PG (ref 26–34)
MCHC RBC AUTO-ENTMCNC: 33.9 G/DL (ref 31–36)
MCHC RBC AUTO-ENTMCNC: 34.3 G/DL (ref 31–36)
MCV RBC AUTO: 87.4 FL (ref 80–100)
MCV RBC AUTO: 87.6 FL (ref 80–100)
PERFORMED ON: ABNORMAL
PHOSPHATE SERPL-MCNC: 2.8 MG/DL (ref 2.5–4.9)
PLATELET # BLD AUTO: 26 K/UL (ref 135–450)
PLATELET # BLD AUTO: 33 K/UL (ref 135–450)
PMV BLD AUTO: 8.8 FL (ref 5–10.5)
PMV BLD AUTO: 9 FL (ref 5–10.5)
POTASSIUM SERPL-SCNC: 3.3 MMOL/L (ref 3.5–5.1)
PROT SERPL-MCNC: 4.9 G/DL (ref 6.4–8.2)
PROTHROMBIN TIME: 16.1 SEC (ref 11.9–14.9)
RBC # BLD AUTO: 2.49 M/UL (ref 4.2–5.9)
RBC # BLD AUTO: 2.53 M/UL (ref 4.2–5.9)
SODIUM SERPL-SCNC: 146 MMOL/L (ref 136–145)
URATE SERPL-MCNC: 1.9 MG/DL (ref 3.5–7.2)
WBC # BLD AUTO: 0.4 K/UL (ref 4–11)
WBC # BLD AUTO: 0.4 K/UL (ref 4–11)

## 2024-11-03 PROCEDURE — 84100 ASSAY OF PHOSPHORUS: CPT

## 2024-11-03 PROCEDURE — 6370000000 HC RX 637 (ALT 250 FOR IP)

## 2024-11-03 PROCEDURE — 85730 THROMBOPLASTIN TIME PARTIAL: CPT

## 2024-11-03 PROCEDURE — 6360000002 HC RX W HCPCS: Performed by: INTERNAL MEDICINE

## 2024-11-03 PROCEDURE — 84550 ASSAY OF BLOOD/URIC ACID: CPT

## 2024-11-03 PROCEDURE — 85384 FIBRINOGEN ACTIVITY: CPT

## 2024-11-03 PROCEDURE — 83735 ASSAY OF MAGNESIUM: CPT

## 2024-11-03 PROCEDURE — 85610 PROTHROMBIN TIME: CPT

## 2024-11-03 PROCEDURE — 36430 TRANSFUSION BLD/BLD COMPNT: CPT

## 2024-11-03 PROCEDURE — 80048 BASIC METABOLIC PNL TOTAL CA: CPT

## 2024-11-03 PROCEDURE — 6360000002 HC RX W HCPCS: Performed by: NURSE PRACTITIONER

## 2024-11-03 PROCEDURE — 2580000003 HC RX 258: Performed by: INTERNAL MEDICINE

## 2024-11-03 PROCEDURE — 2580000003 HC RX 258

## 2024-11-03 PROCEDURE — 2060000000 HC ICU INTERMEDIATE R&B

## 2024-11-03 PROCEDURE — 80076 HEPATIC FUNCTION PANEL: CPT

## 2024-11-03 PROCEDURE — 6360000002 HC RX W HCPCS

## 2024-11-03 PROCEDURE — 82270 OCCULT BLOOD FECES: CPT

## 2024-11-03 PROCEDURE — 2580000003 HC RX 258: Performed by: NURSE PRACTITIONER

## 2024-11-03 PROCEDURE — 85027 COMPLETE CBC AUTOMATED: CPT

## 2024-11-03 RX ORDER — SIMETHICONE 80 MG
80 TABLET,CHEWABLE ORAL EVERY 6 HOURS PRN
Status: DISPENSED | OUTPATIENT
Start: 2024-11-03

## 2024-11-03 RX ORDER — HYDROCORTISONE SODIUM SUCCINATE 100 MG/2ML
50 INJECTION INTRAMUSCULAR; INTRAVENOUS DAILY
Status: DISCONTINUED | OUTPATIENT
Start: 2024-11-04 | End: 2024-11-04

## 2024-11-03 RX ORDER — SODIUM CHLORIDE 9 MG/ML
INJECTION, SOLUTION INTRAVENOUS PRN
Status: DISCONTINUED | OUTPATIENT
Start: 2024-11-03 | End: 2024-11-04

## 2024-11-03 RX ADMIN — MEROPENEM 1000 MG: 1 INJECTION INTRAVENOUS at 21:38

## 2024-11-03 RX ADMIN — POTASSIUM CHLORIDE 20 MEQ: 29.8 INJECTION INTRAVENOUS at 08:05

## 2024-11-03 RX ADMIN — SODIUM CHLORIDE 550 MG: 9 INJECTION INTRAMUSCULAR; INTRAVENOUS; SUBCUTANEOUS at 14:45

## 2024-11-03 RX ADMIN — MEROPENEM 1000 MG: 1 INJECTION INTRAVENOUS at 05:05

## 2024-11-03 RX ADMIN — VANCOMYCIN HYDROCHLORIDE 125 MG: 250 POWDER, FOR SOLUTION ORAL at 08:55

## 2024-11-03 RX ADMIN — Medication 400 MG: at 08:52

## 2024-11-03 RX ADMIN — Medication: at 09:10

## 2024-11-03 RX ADMIN — POTASSIUM CHLORIDE 20 MEQ: 29.8 INJECTION INTRAVENOUS at 09:05

## 2024-11-03 RX ADMIN — POTASSIUM CHLORIDE 20 MEQ: 29.8 INJECTION INTRAVENOUS at 05:03

## 2024-11-03 RX ADMIN — PANTOPRAZOLE SODIUM 40 MG: 40 INJECTION, POWDER, FOR SOLUTION INTRAVENOUS at 08:56

## 2024-11-03 RX ADMIN — VANCOMYCIN HYDROCHLORIDE 125 MG: 250 POWDER, FOR SOLUTION ORAL at 21:24

## 2024-11-03 RX ADMIN — HYDROCORTISONE SODIUM SUCCINATE 50 MG: 100 INJECTION, POWDER, FOR SOLUTION INTRAMUSCULAR; INTRAVENOUS at 03:34

## 2024-11-03 RX ADMIN — SODIUM CHLORIDE, PRESERVATIVE FREE 10 ML: 5 INJECTION INTRAVENOUS at 21:25

## 2024-11-03 RX ADMIN — LEVETIRACETAM 1500 MG: 100 INJECTION INTRAVENOUS at 21:22

## 2024-11-03 RX ADMIN — Medication 400 MG: at 21:24

## 2024-11-03 RX ADMIN — LEVETIRACETAM 1500 MG: 100 INJECTION INTRAVENOUS at 09:00

## 2024-11-03 RX ADMIN — DICYCLOMINE HYDROCHLORIDE 20 MG: 20 TABLET ORAL at 08:55

## 2024-11-03 RX ADMIN — SODIUM CHLORIDE, PRESERVATIVE FREE 10 ML: 5 INJECTION INTRAVENOUS at 09:09

## 2024-11-03 RX ADMIN — MEROPENEM 1000 MG: 1 INJECTION INTRAVENOUS at 13:44

## 2024-11-03 RX ADMIN — MICAFUNGIN SODIUM 50 MG: 100 INJECTION, POWDER, LYOPHILIZED, FOR SOLUTION INTRAVENOUS at 23:45

## 2024-11-03 RX ADMIN — Medication: at 21:39

## 2024-11-03 RX ADMIN — POTASSIUM CHLORIDE 20 MEQ: 29.8 INJECTION INTRAVENOUS at 06:10

## 2024-11-03 RX ADMIN — PANTOPRAZOLE SODIUM 40 MG: 40 INJECTION, POWDER, FOR SOLUTION INTRAVENOUS at 21:19

## 2024-11-03 RX ADMIN — SODIUM CHLORIDE, PRESERVATIVE FREE 10 ML: 5 INJECTION INTRAVENOUS at 09:10

## 2024-11-03 ASSESSMENT — PAIN DESCRIPTION - PAIN TYPE: TYPE: ACUTE PAIN

## 2024-11-03 ASSESSMENT — PAIN SCALES - GENERAL
PAINLEVEL_OUTOF10: 0
PAINLEVEL_OUTOF10: 3
PAINLEVEL_OUTOF10: 0

## 2024-11-03 ASSESSMENT — PAIN SCALES - WONG BAKER
WONGBAKER_NUMERICALRESPONSE: NO HURT
WONGBAKER_NUMERICALRESPONSE: NO HURT

## 2024-11-03 ASSESSMENT — PAIN DESCRIPTION - ONSET: ONSET: GRADUAL

## 2024-11-03 ASSESSMENT — PAIN DESCRIPTION - ORIENTATION: ORIENTATION: MID;INNER

## 2024-11-03 ASSESSMENT — PAIN DESCRIPTION - LOCATION: LOCATION: ABDOMEN

## 2024-11-03 ASSESSMENT — PAIN - FUNCTIONAL ASSESSMENT: PAIN_FUNCTIONAL_ASSESSMENT: PREVENTS OR INTERFERES SOME ACTIVE ACTIVITIES AND ADLS

## 2024-11-03 ASSESSMENT — PAIN DESCRIPTION - FREQUENCY: FREQUENCY: INTERMITTENT

## 2024-11-03 ASSESSMENT — PAIN DESCRIPTION - DESCRIPTORS: DESCRIPTORS: CRAMPING;DULL

## 2024-11-03 NOTE — PROGRESS NOTES
4 Eyes Skin Assessment     NAME:  Dennys Peguero  YOB: 1980  MEDICAL RECORD NUMBER:  [id number]    The patient is being assessed for  Shift Handoff    I agree that at least one RN has performed a thorough Head to Toe Skin Assessment on the patient. ALL assessment sites listed below have been assessed.      Areas assessed by both nurses:    Head, Face, Ears, Shoulders, Back, Chest, Arms, Elbows, Hands, Sacrum. Buttock, Coccyx, Ischium, and Legs. Feet and Heels        Does the Patient have a Wound? No noted wound(s)       Alexandre Prevention initiated by RN: Yes  Wound Care Orders initiated by RN: No    Pressure Injury (Stage 3,4, Unstageable, DTI, NWPT, and Complex wounds) if present, place Wound referral order by RN under : No    New Ostomies, if present place, Ostomy referral order under : No     Nurse 1 eSignature: Electronically signed by Andi Alston RN on 11/3/24 at 10:09 AM EST    **SHARE this note so that the co-signing nurse can place an eSignature**    Nurse 2 eSignature: Electronically signed by Ramsey Benson RN on 11/4/24 at 4:19 AM EST

## 2024-11-03 NOTE — PROGRESS NOTES
(37.1 °C) (Oral)   Resp 24   Ht 1.778 m (5' 10\")   Wt 78 kg (172 lb)   SpO2 99%   BMI 24.68 kg/m²     Weight:    Wt Readings from Last 3 Encounters:   11/03/24 78 kg (172 lb)   10/14/24 67.6 kg (149 lb)   10/02/24 70 kg (154 lb 6.4 oz)     Gen: Chronically ill-appearing male, in NAD.   HEENT: Normocephalic.  No scleral icterus or conjunctival injection.  No rhinorrhea or epistaxis.  Moist, pink oral mucous membranes.   Neck: Supple.   CVS: No apparent JVD.  RRR. No murmurs, rubs, or gallops.  Radial & dorsalis pedis pulses 2+.  Capillary refill > 3 sec.   Pulm: No perioral cyanosis.  Able to speak in full sentences w/o any frequent pauses on room air.   Abd: Non-distended.   Normal bowel sounds  Soft.  Globally tender to deep palpitation. No guarding or rebound tenderness.  No tenderness to palpation in any quadrant.   MSK: No BUE or BLE edema.   Skin: Appropriately warm and dry.   Neuro: Alert, awake, and following commands appropriately.   Psych: Cooperative.   Catheter: PICC L brachial w/o any surrounding erythema, warmth, or purulence (placed 8/1/24)  R IJ CVC w/o any surrounding erythema, warmth, or purulence (placed 10/28/24)   L radial arterial line w/o any surrounding erythema, warmth, or purulence (placed 10/28/24)       Data:   CBC:   Recent Labs     11/02/24  0316 11/02/24  1736 11/03/24  0404   WBC 0.3* 0.2* 0.4*   HGB 8.2* 8.1* 7.6*   HCT 24.3* 24.1* 22.1*   MCV 87.6 88.1 87.4   PLT 36* 39* 26*     BMP/Mg:  Recent Labs     11/01/24  0319 11/02/24  0316 11/03/24  0404   * 142 146*   K 4.1 3.5 3.3*   * 111* 114*   CO2 26 26 27   PHOS 2.5 2.4* 2.8   BUN 25* 21* 16   CREATININE 0.5* <0.5* <0.5*   MG 2.00 1.70* 1.60*     LFTs:   Recent Labs     11/01/24 0319 11/02/24 0316 11/03/24  0404   AST 14* 16 12*   * 81* 64*   BILIDIR 0.4* 0.4* 0.3   BILITOT 1.3* 1.3* 1.0   ALKPHOS 209* 201* 176*     Uric Acid:  No results for input(s): \"LABURIC\" in the last 72 hours.    Coags:   Recent Labs  for rectal adenoCa.  Resumed venetoclax 10/14-10/21/24 (did not start with D1 r/t abdominal discomfort)  Repeat BMBx when stable - send Flow, FISH, CG and myeloid mutation panel by NGS       3.  HEME    Amyloid angiopathy  Per prior nsgy recs on 10/1/24, avoid anticoagulation as it would significantly increase the risk of ICH    Pancytopenia r/t AML/MDS and recent chemotherapy  Transfuse for Hgb < 7   Transfuse for PLT < 50 K (due to amyloid angiopathy causing higher risk of ICH, PLT threshold was set to transfuse for < 20K; however, he developed melena & therefore, threshold was increased)      4.  METABOLIC   JOSÉ MIGUEL likely 2/2 hypoperfusion:  RESOLVED 10/31  Baseline Cr ~0.5-0.6  Cr 1.4 on admission  BMP, phos, Mg daily - replace Mg & K prn      5.  GI, NUTRITION   Rectal Adenocarcinoma   See oncology section above for details    Nutrition    Has not been eating or drinking well at home since Sat, 10/26  Dietitian is following    GERD  Cont Protonix 40 mg BID     Hx of recurrent C. difficile colitis:  negative on 10/31/24  Acute diarrhea:  suspect this is possibly d/t Abx; however, GI pathogen panel sent  Started 10/31/24; C. difficile toxin/Ag neg.  see ID above  Start Imodium- hold with intermittent constipation  GI pathogens panel, stool - sent, result pending    Transaminitis & direct hyperbilirubinemia likely 2/2 hypoperfusion:  improving  Monitor liver function daily    Abdominal pain, possible ileus:  RESOLVED 11/1  Acute melena:  RESOLVED 11/1  10/28/24 - lipase 12.0, CT abd/pelvis with IV contrast was unrevealing  10/31/24 - CT abd with IV contrast & HIDA were unrevealing  Colorectal surgery is following - serial abd exams  Cont Bentyl prn  GI consulted, recs appreciated  Cont Protonix 40 mg BID  General diet      6.  NEURO   Oligodendroglioma s/p partial resection 2017  See oncology section above for details    Amyloid angiopathy  9/30/24, MRI brain w & w/o contrast - Pattern found to be c/w amyloid

## 2024-11-03 NOTE — PLAN OF CARE
Problem: Pain  Goal: Verbalizes/displays adequate comfort level or baseline comfort level  Outcome: Progressing  Flowsheets (Taken 11/3/2024 1608)  Verbalizes/displays adequate comfort level or baseline comfort level:   Encourage patient to monitor pain and request assistance   Assess pain using appropriate pain scale  Note: Pt encouraged to be active participant in pain management during shift. Pt c/o abdominal cramping and alleviated with PRN bentyl.      Problem: Safety - Adult  Goal: Free from fall injury  Outcome: Progressing  Flowsheets (Taken 11/3/2024 1608)  Free From Fall Injury: Instruct family/caregiver on patient safety  Note: Pt is a high fall risk (see Jose Fall Risk assessment).  Oriented pt to room and call light. Instructed pt to call for help when needed.  Call light and personal items within reach.  Bed in lowest position, brakes on, and 2/4 side rails up.  Non-skid footwear on. Falls risk stop sign on door; hourly visual checks implemented.  Falls risk arm band on pt.  Bed alarm is on.  Pt using call light appropriately.  Will continue to monitor.       Problem: Skin/Tissue Integrity - Adult  Goal: Skin integrity remains intact  Outcome: Progressing  Flowsheets (Taken 11/3/2024 1608)  Skin Integrity Remains Intact: Monitor for areas of redness and/or skin breakdown  Note: No new incidence of skin breakdown during shift. [place] assessed. Pt able to self turn     Problem: Nutrition Deficit:  Goal: Optimize nutritional status  Outcome: Not Progressing  Flowsheets (Taken 11/3/2024 1608)  Nutrient intake appropriate for improving, restoring, or maintaining nutritional needs: Assess nutritional status and recommend course of action  Note: Pt c/o cramping and pain when eating.      Problem: Infection - Adult  Goal: Absence of infection during hospitalization  Outcome: Progressing  Flowsheets (Taken 11/3/2024 1608)  Absence of infection during hospitalization: Assess and monitor for signs and

## 2024-11-04 LAB
ALBUMIN SERPL-MCNC: 2.5 G/DL (ref 3.4–5)
ALP SERPL-CCNC: 161 U/L (ref 40–129)
ALT SERPL-CCNC: 53 U/L (ref 10–40)
AMORPH SED URNS QL MICRO: ABNORMAL /HPF
ANION GAP SERPL CALCULATED.3IONS-SCNC: 6 MMOL/L (ref 3–16)
APTT BLD: 32 SEC (ref 22.1–36.4)
AST SERPL-CCNC: 17 U/L (ref 15–37)
BILIRUB DIRECT SERPL-MCNC: 0.3 MG/DL (ref 0–0.3)
BILIRUB INDIRECT SERPL-MCNC: 0.6 MG/DL (ref 0–1)
BILIRUB SERPL-MCNC: 0.9 MG/DL (ref 0–1)
BILIRUB UR QL STRIP.AUTO: NEGATIVE
BLOOD BANK DISPENSE STATUS: NORMAL
BLOOD BANK PRODUCT CODE: NORMAL
BPU ID: NORMAL
BUN SERPL-MCNC: 14 MG/DL (ref 7–20)
CALCIUM SERPL-MCNC: 7.8 MG/DL (ref 8.3–10.6)
CHLORIDE SERPL-SCNC: 109 MMOL/L (ref 99–110)
CLARITY UR: CLEAR
CO2 SERPL-SCNC: 28 MMOL/L (ref 21–32)
COLOR UR: YELLOW
CREAT SERPL-MCNC: <0.5 MG/DL (ref 0.9–1.3)
DEPRECATED RDW RBC AUTO: 12.8 % (ref 12.4–15.4)
DEPRECATED RDW RBC AUTO: 13.5 % (ref 12.4–15.4)
DESCRIPTION BLOOD BANK: NORMAL
EPI CELLS #/AREA URNS HPF: ABNORMAL /HPF (ref 0–5)
FIBRINOGEN PPP-MCNC: 388 MG/DL (ref 227–534)
FUNGUS BLD CULT: NORMAL
FUNGUS BLD CULT: NORMAL
FUNGUS SPEC CULT: NORMAL
FUNGUS SPEC CULT: NORMAL
GFR SERPLBLD CREATININE-BSD FMLA CKD-EPI: >90 ML/MIN/{1.73_M2}
GLUCOSE BLD-MCNC: 102 MG/DL (ref 70–99)
GLUCOSE BLD-MCNC: 108 MG/DL (ref 70–99)
GLUCOSE BLD-MCNC: 77 MG/DL (ref 70–99)
GLUCOSE BLD-MCNC: 88 MG/DL (ref 70–99)
GLUCOSE SERPL-MCNC: 91 MG/DL (ref 70–99)
GLUCOSE UR STRIP.AUTO-MCNC: NEGATIVE MG/DL
HCT VFR BLD AUTO: 20.5 % (ref 40.5–52.5)
HCT VFR BLD AUTO: 27.2 % (ref 40.5–52.5)
HGB BLD-MCNC: 7 G/DL (ref 13.5–17.5)
HGB BLD-MCNC: 9.1 G/DL (ref 13.5–17.5)
HGB UR QL STRIP.AUTO: NEGATIVE
INR PPP: 1.38 (ref 0.85–1.15)
KETONES UR STRIP.AUTO-MCNC: NEGATIVE MG/DL
LDH SERPL L TO P-CCNC: 87 U/L (ref 100–190)
LEUKOCYTE ESTERASE UR QL STRIP.AUTO: NEGATIVE
LOEFFLER MB STN SPEC: NORMAL
LOEFFLER MB STN SPEC: NORMAL
MAGNESIUM SERPL-MCNC: 1.5 MG/DL (ref 1.8–2.4)
MCH RBC QN AUTO: 29.6 PG (ref 26–34)
MCH RBC QN AUTO: 30 PG (ref 26–34)
MCHC RBC AUTO-ENTMCNC: 33.4 G/DL (ref 31–36)
MCHC RBC AUTO-ENTMCNC: 33.9 G/DL (ref 31–36)
MCV RBC AUTO: 87.4 FL (ref 80–100)
MCV RBC AUTO: 90 FL (ref 80–100)
MUCOUS THREADS #/AREA URNS LPF: ABNORMAL /LPF
NITRITE UR QL STRIP.AUTO: NEGATIVE
PERFORMED ON: ABNORMAL
PERFORMED ON: ABNORMAL
PERFORMED ON: NORMAL
PERFORMED ON: NORMAL
PH UR STRIP.AUTO: 6 [PH] (ref 5–8)
PHOSPHATE SERPL-MCNC: 2.6 MG/DL (ref 2.5–4.9)
PLATELET # BLD AUTO: 41 K/UL (ref 135–450)
PLATELET # BLD AUTO: 61 K/UL (ref 135–450)
PMV BLD AUTO: 8.6 FL (ref 5–10.5)
PMV BLD AUTO: 8.9 FL (ref 5–10.5)
POTASSIUM SERPL-SCNC: 3.5 MMOL/L (ref 3.5–5.1)
PROCALCITONIN SERPL IA-MCNC: 1.93 NG/ML (ref 0–0.15)
PROT SERPL-MCNC: 4.8 G/DL (ref 6.4–8.2)
PROT UR STRIP.AUTO-MCNC: ABNORMAL MG/DL
PROTHROMBIN TIME: 17.2 SEC (ref 11.9–14.9)
RBC # BLD AUTO: 2.35 M/UL (ref 4.2–5.9)
RBC # BLD AUTO: 3.02 M/UL (ref 4.2–5.9)
RBC #/AREA URNS HPF: ABNORMAL /HPF (ref 0–4)
SODIUM SERPL-SCNC: 143 MMOL/L (ref 136–145)
SP GR UR STRIP.AUTO: >=1.03 (ref 1–1.03)
UA DIPSTICK W REFLEX MICRO PNL UR: YES
URATE SERPL-MCNC: 1.8 MG/DL (ref 3.5–7.2)
URN SPEC COLLECT METH UR: ABNORMAL
UROBILINOGEN UR STRIP-ACNC: 0.2 E.U./DL
WBC # BLD AUTO: 0.3 K/UL (ref 4–11)
WBC # BLD AUTO: 0.4 K/UL (ref 4–11)
WBC #/AREA URNS HPF: ABNORMAL /HPF (ref 0–5)

## 2024-11-04 PROCEDURE — 6370000000 HC RX 637 (ALT 250 FOR IP): Performed by: NURSE PRACTITIONER

## 2024-11-04 PROCEDURE — 83735 ASSAY OF MAGNESIUM: CPT

## 2024-11-04 PROCEDURE — 2580000003 HC RX 258

## 2024-11-04 PROCEDURE — 83615 LACTATE (LD) (LDH) ENZYME: CPT

## 2024-11-04 PROCEDURE — 80048 BASIC METABOLIC PNL TOTAL CA: CPT

## 2024-11-04 PROCEDURE — 6360000002 HC RX W HCPCS: Performed by: NURSE PRACTITIONER

## 2024-11-04 PROCEDURE — 85384 FIBRINOGEN ACTIVITY: CPT

## 2024-11-04 PROCEDURE — 6360000002 HC RX W HCPCS: Performed by: INTERNAL MEDICINE

## 2024-11-04 PROCEDURE — 99232 SBSQ HOSP IP/OBS MODERATE 35: CPT | Performed by: INTERNAL MEDICINE

## 2024-11-04 PROCEDURE — 85610 PROTHROMBIN TIME: CPT

## 2024-11-04 PROCEDURE — 87103 BLOOD FUNGUS CULTURE: CPT

## 2024-11-04 PROCEDURE — 84145 PROCALCITONIN (PCT): CPT

## 2024-11-04 PROCEDURE — 6370000000 HC RX 637 (ALT 250 FOR IP)

## 2024-11-04 PROCEDURE — 6360000002 HC RX W HCPCS: Performed by: STUDENT IN AN ORGANIZED HEALTH CARE EDUCATION/TRAINING PROGRAM

## 2024-11-04 PROCEDURE — 36592 COLLECT BLOOD FROM PICC: CPT

## 2024-11-04 PROCEDURE — 85027 COMPLETE CBC AUTOMATED: CPT

## 2024-11-04 PROCEDURE — 2580000003 HC RX 258: Performed by: NURSE PRACTITIONER

## 2024-11-04 PROCEDURE — 87040 BLOOD CULTURE FOR BACTERIA: CPT

## 2024-11-04 PROCEDURE — 6360000002 HC RX W HCPCS

## 2024-11-04 PROCEDURE — 2060000000 HC ICU INTERMEDIATE R&B

## 2024-11-04 PROCEDURE — 80076 HEPATIC FUNCTION PANEL: CPT

## 2024-11-04 PROCEDURE — 85730 THROMBOPLASTIN TIME PARTIAL: CPT

## 2024-11-04 PROCEDURE — 2580000003 HC RX 258: Performed by: INTERNAL MEDICINE

## 2024-11-04 PROCEDURE — 84550 ASSAY OF BLOOD/URIC ACID: CPT

## 2024-11-04 PROCEDURE — 84100 ASSAY OF PHOSPHORUS: CPT

## 2024-11-04 PROCEDURE — 81001 URINALYSIS AUTO W/SCOPE: CPT

## 2024-11-04 PROCEDURE — 87086 URINE CULTURE/COLONY COUNT: CPT

## 2024-11-04 PROCEDURE — 36430 TRANSFUSION BLD/BLD COMPNT: CPT

## 2024-11-04 RX ORDER — MAGNESIUM SULFATE IN WATER 40 MG/ML
4000 INJECTION, SOLUTION INTRAVENOUS ONCE
Status: COMPLETED | OUTPATIENT
Start: 2024-11-04 | End: 2024-11-04

## 2024-11-04 RX ADMIN — SODIUM CHLORIDE 15 ML: 900 IRRIGANT IRRIGATION at 08:09

## 2024-11-04 RX ADMIN — Medication: at 20:59

## 2024-11-04 RX ADMIN — VANCOMYCIN HYDROCHLORIDE 125 MG: 250 POWDER, FOR SOLUTION ORAL at 08:38

## 2024-11-04 RX ADMIN — LEVETIRACETAM 1500 MG: 100 INJECTION INTRAVENOUS at 20:46

## 2024-11-04 RX ADMIN — MICAFUNGIN SODIUM 50 MG: 100 INJECTION, POWDER, LYOPHILIZED, FOR SOLUTION INTRAVENOUS at 22:30

## 2024-11-04 RX ADMIN — SODIUM CHLORIDE, PRESERVATIVE FREE 10 ML: 5 INJECTION INTRAVENOUS at 20:48

## 2024-11-04 RX ADMIN — PANTOPRAZOLE SODIUM 40 MG: 40 INJECTION, POWDER, FOR SOLUTION INTRAVENOUS at 20:46

## 2024-11-04 RX ADMIN — PANTOPRAZOLE SODIUM 40 MG: 40 INJECTION, POWDER, FOR SOLUTION INTRAVENOUS at 08:38

## 2024-11-04 RX ADMIN — SODIUM CHLORIDE 15 ML: 900 IRRIGANT IRRIGATION at 15:55

## 2024-11-04 RX ADMIN — SODIUM CHLORIDE, PRESERVATIVE FREE 10 ML: 5 INJECTION INTRAVENOUS at 08:42

## 2024-11-04 RX ADMIN — DICYCLOMINE HYDROCHLORIDE 20 MG: 20 TABLET ORAL at 14:59

## 2024-11-04 RX ADMIN — MAGNESIUM SULFATE HEPTAHYDRATE 4000 MG: 4 INJECTION, SOLUTION INTRAVENOUS at 08:47

## 2024-11-04 RX ADMIN — SODIUM CHLORIDE 15 ML: 900 IRRIGANT IRRIGATION at 10:46

## 2024-11-04 RX ADMIN — Medication 400 MG: at 20:46

## 2024-11-04 RX ADMIN — SIMETHICONE 80 MG: 80 TABLET, CHEWABLE ORAL at 11:39

## 2024-11-04 RX ADMIN — Medication: at 09:43

## 2024-11-04 RX ADMIN — SODIUM CHLORIDE, PRESERVATIVE FREE 10 ML: 5 INJECTION INTRAVENOUS at 08:38

## 2024-11-04 RX ADMIN — VANCOMYCIN HYDROCHLORIDE 125 MG: 250 POWDER, FOR SOLUTION ORAL at 20:49

## 2024-11-04 RX ADMIN — MEROPENEM 1000 MG: 1 INJECTION INTRAVENOUS at 14:16

## 2024-11-04 RX ADMIN — LEVETIRACETAM 1500 MG: 100 INJECTION INTRAVENOUS at 08:37

## 2024-11-04 RX ADMIN — MEROPENEM 1000 MG: 1 INJECTION INTRAVENOUS at 06:30

## 2024-11-04 RX ADMIN — Medication 400 MG: at 08:37

## 2024-11-04 RX ADMIN — SODIUM CHLORIDE 550 MG: 9 INJECTION INTRAMUSCULAR; INTRAVENOUS; SUBCUTANEOUS at 14:08

## 2024-11-04 ASSESSMENT — PAIN DESCRIPTION - DESCRIPTORS
DESCRIPTORS: PRESSURE
DESCRIPTORS: PRESSURE

## 2024-11-04 ASSESSMENT — PAIN SCALES - GENERAL
PAINLEVEL_OUTOF10: 0
PAINLEVEL_OUTOF10: 3
PAINLEVEL_OUTOF10: 0
PAINLEVEL_OUTOF10: 3

## 2024-11-04 ASSESSMENT — PAIN DESCRIPTION - PAIN TYPE: TYPE: ACUTE PAIN

## 2024-11-04 ASSESSMENT — PAIN DESCRIPTION - LOCATION
LOCATION: ABDOMEN
LOCATION: ABDOMEN

## 2024-11-04 ASSESSMENT — PAIN DESCRIPTION - FREQUENCY: FREQUENCY: INTERMITTENT

## 2024-11-04 ASSESSMENT — PAIN DESCRIPTION - ORIENTATION
ORIENTATION: INNER
ORIENTATION: INNER

## 2024-11-04 ASSESSMENT — PAIN DESCRIPTION - ONSET: ONSET: ON-GOING

## 2024-11-04 ASSESSMENT — PAIN - FUNCTIONAL ASSESSMENT: PAIN_FUNCTIONAL_ASSESSMENT: PREVENTS OR INTERFERES SOME ACTIVE ACTIVITIES AND ADLS

## 2024-11-04 NOTE — PROGRESS NOTES
1314:  Triple lumen CVC removed from right neck.  Pt tolerated removal well.  Site covered with petroleum gauze, dry gauze and tegaderm.  Pressure held for 5 minutes. Pt instructed to lay flat for 30  minutes post removal. He verbalizes understanding.

## 2024-11-04 NOTE — PLAN OF CARE
Problem: Safety - Adult  Goal: Free from fall injury  11/4/2024 1020 by Aaron Drew, RN  Note: Pt is a high fall risk (see Jose Fall Risk assessment).  Oriented pt to room and call light. Instructed pt to call for help when needed.  Call light and personal items within reach.  Bed in lowest position, brakes on, and 2/4 side rails up.  Non-skid footwear on. Falls risk stop sign on door; hourly visual checks implemented.  Falls risk arm band on pt.  Bed alarm is on.  Pt using call light appropriately.  Will continue to monitor.       Problem: Hematologic - Adult  Goal: Maintains hematologic stability  11/4/2024 1020 by Aaron Drew, RN  Note: Patient's hemoglobin this AM:   Recent Labs     11/04/24 0410   HGB 7.0*     Patient's platelet count this AM:   Recent Labs     11/04/24 0410   PLT 41*    Thrombocytopenia Precautions in place.  Patient showing no signs or symptoms of active bleeding.  Patient transfused blood products per orders - see flowsheet.  Patient verbalizes understanding of all instructions. Will continue to assess and implement POC. Call light within reach and hourly rounding in place.      Problem: Skin/Tissue Integrity - Adult  Goal: Skin integrity remains intact  Note: Pt up with assistance this shift and using urinals.  Pt continent of stool and urine, turns self frequently while in bed.  ADLs and hygiene performed with assistance throughout shift. Instructed pt on importance of maintaining good hygiene and activity levels.  No signs or symptoms of new skin breakdown noted.      Problem: Infection - Adult  Goal: Absence of infection at discharge  Note: CVC site remains free of signs/symptoms of infection. No drainage, edema, erythema, pain, or warmth noted at site. Dressing changes continue per protocol and on an as needed basis - see flowsheet.     Compliant with [facility] Bath Protocol:  Performed CHG bath today per [facility] protocol utilizing Bed bath with CHG wipes.  CVC site cleansed with CHG  wipe over dressing, skin surrounding dressing, and first 6\" of IV tubing.  Pt tolerated well.  Continued to encourage daily CHG bathing per [facility] protocol.

## 2024-11-04 NOTE — PROGRESS NOTES
ID Follow-up NOTE    CC:   AMS  Antibiotics: Meropenem, micafungin, daptomycin, acyclovir, p.o. vancomycin    Admit Date: 10/28/2024  Hospital Day: 8    Subjective:     Patient had t max this morning 100.3. intermittent abd pain with dark stools.     Objective:     Patient Vitals for the past 8 hrs:   BP Temp Temp src Pulse Resp SpO2   11/04/24 0902 130/78 99.2 °F (37.3 °C) Oral 76 18 98 %   11/04/24 0848 -- -- -- 75 16 98 %   11/04/24 0832 133/79 99.2 °F (37.3 °C) Oral -- -- 99 %   11/04/24 0650 125/70 99.1 °F (37.3 °C) Oral 75 23 97 %   11/04/24 0620 122/71 99 °F (37.2 °C) Oral 80 23 95 %   11/04/24 0603 115/68 99.6 °F (37.6 °C) Oral 87 20 96 %   11/04/24 0409 114/70 100.3 °F (37.9 °C) Oral 85 23 96 %     I/O last 3 completed shifts:  In: 3432 [P.O.:300; I.V.:2120; Blood:1012]  Out: 900 [Urine:800; Stool:100]  I/O this shift:  In: 240 [P.O.:240]  Out: 150 [Urine:150]    EXAM:  GENERAL: No apparent distress.  Pt is fatigued easily.   HEENT: Membranes moist, no oral lesion  NECK:  Supple, no lymphadenopathy  LUNGS: Clear b/l, no rales, no dullness  CARDIAC: RRR, no murmur appreciated  ABD:  + BS, soft, tender to palpation in the mid abdomen region.  EXT:  No rash, no edema, no lesions  NEURO: No focal neurologic findings  PSYCH: Orientation normal, drowsy.   LINES:  Peripheral iv, right internal jugular central line    Data Review:  Lab Results   Component Value Date    WBC 0.3 (LL) 11/04/2024    HGB 7.0 (L) 11/04/2024    HCT 20.5 (LL) 11/04/2024    MCV 87.4 11/04/2024    PLT 41 (L) 11/04/2024     Lab Results   Component Value Date    CREATININE <0.5 (L) 11/04/2024    BUN 14 11/04/2024     11/04/2024    K 3.5 11/04/2024     11/04/2024    CO2 28 11/04/2024       Hepatic Function Panel:   Lab Results   Component Value Date/Time    ALKPHOS 161 11/04/2024 04:10 AM    ALT 53 11/04/2024 04:10 AM    AST 17 11/04/2024 04:10 AM    BILITOT 0.9 11/04/2024 04:10 AM    BILIDIR 0.3 11/04/2024 04:10 AM    IBILI 0.6  100.3  -Blood cultures x 2 10/28 positive for Klebsiella pneumoniae  -Currently on meropenem  -Patient presented with PICC that has been present for the past 3 months and right IJ in place  -Unclear source of Klebsiella bacteremia, usually more GI/ pathogen.  Patient did present with abdominal pain however CT abdomen and pelvis did not suggest any pathology or infectious etiology here. Repeated CT on 10/31 also neg for acute abd pathology.  -KUB repeated on 10/29 suggestive of gaseous distention of the small bowel, possible ileus.  General surgery following, no planned intervention at this time.  Continue aggressive bowel regimen and serial abdominal exams.  - PICC line that has been in place for the past 3 months has been removed on 10/29, PICC tip culture neg  - neg repeat blood cultures post removal since 10/30, can consider replacing PICC as needed if blood cultures remain negative at 48 hours.   -TTE without evidence of IE on visualized valves.  - CT chest abd pelvis 10/28 showing Interval increase in nodular consolidation of the right lung base most suggestive of pneumonia.  Pneumonia PCR panel negative  - given progressive worsening, decreased temp, worsening abd pain, lower BP and elevated procal, team added daptomycin and micafungin on 10/30.  - on 10/31 new marjan blood with stools, GI eval no plans for intervention at this time, flex sig once neutropenia improves. Continues to have dark stools.   - GI to re-eval.   - if continued improvement and no plans for procedures from GI, consider de-escalation of abx to include stopping daptomycin and de-escalation of meropenem to levaquin given the sensitivities.     AML with underlying MDS:  - [facility] following, induction with dacogen and venetoclax  -Repeat bone marrow biopsy planned 10/30     Rectal adenoca:   - s/p rectal mass resection 4/12/24. Path staging [id number]. No plans for neoadjuvant at this time.      History of recurrent C. difficile:  -On prophylactic

## 2024-11-04 NOTE — PROGRESS NOTES
[facility] Progress Note    2024    Dennys Peguero    :  1980    MRN:  [id number]      Interval Hx:  Mr. Peguero reports recurring abdominal pain occurring with/after meals with dark black stools.  Will reach out to GI for possible gastric ulcer eval using EGD.  Tmax (last 24-hr):  100.3 °F around 4:00am  Normotensive, saturating well on room air  In 1.9 L,  mL (1 unmeasured occurrence)  ANC 0.3, Hgb 7.0, PLT 41  INR 1.38       ECOG PS:  (2) Ambulatory and capable of self care, unable to carry out work activity, up and about > 50% or waking hours    KPS: 70% Cares for self; unable to carry on normal activity or to do active work    Isolation:  Strict contact      Medication:    Scheduled:   hydrocortisone sodium succinate PF  50 mg IntraVENous Daily    DAPTOmycin (CUBICIN) 550 mg in sodium chloride (PF) 0.9 % 11 mL IV syringe  8 mg/kg IntraVENous Q24H    insulin lispro  0-4 Units SubCUTAneous 4x Daily AC & HS    sodium chloride flush  5-40 mL IntraVENous 2 times per day    meropenem  1,000 mg IntraVENous Q8H    Hydrocerin   Topical BID    acyclovir  400 mg Oral BID    pantoprazole  40 mg IntraVENous BID    sodium chloride flush  5-40 mL IntraVENous 2 times per day    Saline Mouthwash  15 mL Swish & Spit 4x Daily AC & HS    vancomycin  125 mg Oral 2 times per day    levETIRAcetam  1,500 mg IntraVENous Q12H    micafungin  50 mg IntraVENous Q24H     Continuous Infusions:   sodium chloride      sodium chloride      sodium chloride       PRN:  simethicone, sodium chloride, ondansetron, prochlorperazine, [Held by provider] loperamide, dicyclomine, fentanNYL, polyethylene glycol, sodium chloride, sodium chloride, acetaminophen, sodium chloride flush, potassium chloride, magnesium sulfate, Saline Mouthwash, ALTEplase (CATHFLO) 2 mg in sterile water 2 mL injection    ROS:  As noted above, otherwise remainder of 10-point ROS negative    Physical Exam:    I&O:    Intake/Output Summary (Last 24  prophylaxis with bilateral SCDs while in bed in place.  Contraindications to pharmacologic prophylaxis:  Thrombocytopenia, amyloid angiopathy (see Neuro section above for details)  Contraindications to mechanical prophylaxis:  None    Disposition: When ANC > 1 and recovered from toxicities from chemotherapy and transplant.     The patient was seen and examined by Dr. Eduardo Nguyen.      Yoselin Floyd MD, MARIE, CCRC  Internal Medicine, PGY-3    Eduardo Nguyen MD  Hematology, Bone marrow transplant and Cellular therapy  [facility]

## 2024-11-04 NOTE — PROGRESS NOTES
0909: Once set of blood cultures obtained from red lumen on triple lumen CVC.    0921:  Blood cultures obtained peripherally from right AC.  Blood cultures obtained peripherally and one from central line per request of Dr. Nguyen.

## 2024-11-04 NOTE — PLAN OF CARE
Discussed recurrent melena & prandial epigastric pain with gastroenterology.      EGD planned for tomorrow (11/5)  NPO at midnight  Continue Protonix BID  Continue to transfuse for goal > 50K before noon tomorrow (11/5)      Yoselin Floyd MD, MARIE, CCRC  Internal Medicine, PGY-3

## 2024-11-04 NOTE — PLAN OF CARE
Problem: Pain  Goal: Verbalizes/displays adequate comfort level or baseline comfort level  Outcome: Progressing  Note: Patient stated he was not having any pain this shift because he did not eat anything.  Patient stated he only has abdominal pain when he eats.       Problem: Safety - Adult  Goal: Free from fall injury  Outcome: Progressing  Note: Orthostatic vital signs obtained at start of shift - see flowsheet for details.  Pt does not meet criteria for orthostasis.  Pt is a High fall risk. See Apple Fall Score and ABCDS Injury Risk assessments.   + Screening for Orthostasis and/or + High Fall Risk per APPLE/ABCDS: Explained fall risk precautions to pt and family and rationale behind their use to keep the patient safe. Pt bed is in low position, side rails up, call light and belongings are in reach. Fall wristband applied and present on pts wrist.  Bed alarm on.  Pt encouraged to call for assistance. Will continue with hourly rounds for PO intake, pain needs, toileting and repositioning as needed.      Problem: Hematologic - Adult  Goal: Maintains hematologic stability  Outcome: Progressing  Note: Patient's hemoglobin this AM:   Recent Labs     11/04/24  0410   HGB 7.0*     Patient's platelet count this AM:   Recent Labs     11/04/24  0410   PLT 41*    Thrombocytopenia Precautions in place.  Patient showing no signs or symptoms of active bleeding.  Patient transfused blood products per orders - see flowsheet.  Patient verbalizes understanding of all instructions. Will continue to assess and implement POC. Call light within reach and hourly rounding in place.

## 2024-11-04 NOTE — CARE COORDINATION
Attended MD rounds.     PT/OT is pending; will follow for recs.     Pt is from home with his father and no current services besides [facility].     SW will follow    Sharla COLON, JENNIFER   for [facility] ([facility])  [employer] Mobile: [phone]

## 2024-11-04 NOTE — PROGRESS NOTES
Comprehensive Nutrition Assessment    RECOMMENDATIONS:  PO Diet: Clear liquid per MD  Nutrition Supplement: Ensure Clear TID  Nutrition Education: No recommendation at this time     NUTRITION ASSESSMENT:   Nutritional summary & status: Follow up. Diet downgraded to clears today given persistent abd pain when he eats and dark stools. Previous CT negative. Bentyl ordered for cramping. Ensure Clear ordered TID to help meet energy/protein needs. Consumed >50% of 1 meal documented on previous regular diet. Bowel regimen on board to prevent constipation (glycolax PRN). GI ordered panel to assess infectious etiology at last encounter, all negative. Re-consulting GI. Repeat BM tx either today or tomorrow. No wt loss given currently +14.6 L. All appropriate nutrition interventions in place. Will monitor POC and ability to advacnce diet.     Admission // PMH: Neutropenic fever // Oligodendroglioma (s/p partial resection 2017, radiation 2019, and temodar 2019), rectal adenocarcinoma, AML, underlying MDS    MALNUTRITION ASSESSMENT  Context of Malnutrition: Chronic Illness   Malnutrition Status: Severe malnutrition  Findings of the 6 clinical characteristics of malnutrition (Minimum of 2 out of 6 clinical characteristics is required to make the diagnosis of moderate or severe Protein Calorie Malnutrition based on AND/ASPEN Guidelines):  Energy Intake:  Mild decrease in energy intake (7 days this admit)  Weight Loss:  Greater than 10% over 6 months (20% in 6 months)     Body Fat Loss:  Mild body fat loss Buccal region, Orbital, Triceps   Muscle Mass Loss:  Severe muscle mass loss Temples (temporalis), Clavicles (pectoralis & deltoids)    NUTRITION DIAGNOSIS   Inadequate oral intake related to altered GI function as evidenced by NPO or clear liquid status due to medical condition    Nutrition Monitoring and Evaluation:   Food/Nutrient Intake Outcomes:  Supplement Intake, Food and Nutrient Intake, Progression of Nutrition, Diet  Advancement/Tolerance  Physical Signs/Symptoms Outcomes:  Biochemical Data, Nutrition Focused Physical Findings, Skin, Weight, GI Status, Diarrhea, Constipation     OBJECTIVE DATA: Significant to nutrition assessment  Nutrition Related Findings: 2-3 BM/day, + 14.5, Mg 1.5  Wounds: None  Nutrition Goals: PO intake 50% or greater, by next RD assessment     CURRENT NUTRITION THERAPIES  ADULT ORAL NUTRITION SUPPLEMENT; Breakfast, Lunch, Dinner; Clear Liquid Oral Supplement  ADULT ORAL NUTRITION SUPPLEMENT; Dinner, Breakfast, Lunch; Clear Liquid Oral Supplement  ADULT DIET; Clear Liquid; Low Microbial  PO Intake: 51-75%, %   PO Supplement Intake:Unable to assess (no documentation)  Additional Sources of Calories/IVF:NA     COMPARATIVE STANDARDS  Energy (kcal):  9817-7174 (27-30 kcal/kg admit wt)     Protein (g):   (1.3-1.6 g/kg admit wt)       Fluid (ml/day):  1 ml/kcal    ANTHROPOMETRICS  Current Height: 177.8 cm (5' 10\")  Current Weight - Scale: 78 kg (172 lb)    Admission weight: 67.7 kg (149 lb 3.2 oz)    The patient will be monitored per nutrition standards of care. Consult dietitian if additional nutrition interventions are needed prior to RD reassessment.     Emma Mcgraw RD  [place]:  [phone]

## 2024-11-05 ENCOUNTER — ANESTHESIA EVENT (OUTPATIENT)
Dept: ENDOSCOPY | Age: 44
End: 2024-11-05
Payer: MEDICAID

## 2024-11-05 ENCOUNTER — ANESTHESIA (OUTPATIENT)
Dept: ENDOSCOPY | Age: 44
End: 2024-11-05
Payer: MEDICAID

## 2024-11-05 ENCOUNTER — APPOINTMENT (OUTPATIENT)
Dept: ENDOSCOPY | Age: 44
DRG: 720 | End: 2024-11-05
Attending: INTERNAL MEDICINE
Payer: MEDICAID

## 2024-11-05 LAB
ABO + RH BLD: NORMAL
ALBUMIN SERPL-MCNC: 2.4 G/DL (ref 3.4–5)
ALP SERPL-CCNC: 164 U/L (ref 40–129)
ALT SERPL-CCNC: 47 U/L (ref 10–40)
ANION GAP SERPL CALCULATED.3IONS-SCNC: 6 MMOL/L (ref 3–16)
APTT BLD: 37.9 SEC (ref 22.1–36.4)
AST SERPL-CCNC: 11 U/L (ref 15–37)
BACTERIA UR CULT: NORMAL
BILIRUB DIRECT SERPL-MCNC: 0.3 MG/DL (ref 0–0.3)
BILIRUB INDIRECT SERPL-MCNC: 0.8 MG/DL (ref 0–1)
BILIRUB SERPL-MCNC: 1.1 MG/DL (ref 0–1)
BLD GP AB SCN SERPL QL: NORMAL
BUN SERPL-MCNC: 11 MG/DL (ref 7–20)
CALCIUM SERPL-MCNC: 8.1 MG/DL (ref 8.3–10.6)
CHLORIDE SERPL-SCNC: 102 MMOL/L (ref 99–110)
CO2 SERPL-SCNC: 30 MMOL/L (ref 21–32)
CREAT SERPL-MCNC: 0.5 MG/DL (ref 0.9–1.3)
DEPRECATED RDW RBC AUTO: 13.4 % (ref 12.4–15.4)
DEPRECATED RDW RBC AUTO: 13.5 % (ref 12.4–15.4)
FIBRINOGEN PPP-MCNC: 453 MG/DL (ref 227–534)
GFR SERPLBLD CREATININE-BSD FMLA CKD-EPI: >90 ML/MIN/{1.73_M2}
GLUCOSE BLD-MCNC: 108 MG/DL (ref 70–99)
GLUCOSE BLD-MCNC: 82 MG/DL (ref 70–99)
GLUCOSE BLD-MCNC: 95 MG/DL (ref 70–99)
GLUCOSE BLD-MCNC: 99 MG/DL (ref 70–99)
GLUCOSE SERPL-MCNC: 103 MG/DL (ref 70–99)
HAV IGM SERPL QL IA: NORMAL
HBV CORE IGM SERPL QL IA: NORMAL
HBV SURFACE AG SERPL QL IA: NORMAL
HCT VFR BLD AUTO: 22.7 % (ref 40.5–52.5)
HCT VFR BLD AUTO: 23.6 % (ref 40.5–52.5)
HCV AB SERPL QL IA: NORMAL
HGB BLD-MCNC: 7.7 G/DL (ref 13.5–17.5)
HGB BLD-MCNC: 8 G/DL (ref 13.5–17.5)
INR PPP: 1.49 (ref 0.85–1.15)
MAGNESIUM SERPL-MCNC: 1.6 MG/DL (ref 1.8–2.4)
MCH RBC QN AUTO: 29.9 PG (ref 26–34)
MCH RBC QN AUTO: 30.2 PG (ref 26–34)
MCHC RBC AUTO-ENTMCNC: 33.7 G/DL (ref 31–36)
MCHC RBC AUTO-ENTMCNC: 34.2 G/DL (ref 31–36)
MCV RBC AUTO: 88.2 FL (ref 80–100)
MCV RBC AUTO: 88.6 FL (ref 80–100)
PERFORMED ON: ABNORMAL
PERFORMED ON: NORMAL
PHOSPHATE SERPL-MCNC: 2.5 MG/DL (ref 2.5–4.9)
PLATELET # BLD AUTO: 45 K/UL (ref 135–450)
PLATELET # BLD AUTO: 53 K/UL (ref 135–450)
PLATELET # BLD AUTO: 66 K/UL (ref 135–450)
PMV BLD AUTO: 8.5 FL (ref 5–10.5)
PMV BLD AUTO: 8.5 FL (ref 5–10.5)
POTASSIUM SERPL-SCNC: 3.3 MMOL/L (ref 3.5–5.1)
PROT SERPL-MCNC: 5 G/DL (ref 6.4–8.2)
PROTHROMBIN TIME: 18.1 SEC (ref 11.9–14.9)
RBC # BLD AUTO: 2.57 M/UL (ref 4.2–5.9)
RBC # BLD AUTO: 2.67 M/UL (ref 4.2–5.9)
SODIUM SERPL-SCNC: 138 MMOL/L (ref 136–145)
URATE SERPL-MCNC: 1.8 MG/DL (ref 3.5–7.2)
WBC # BLD AUTO: 0.3 K/UL (ref 4–11)
WBC # BLD AUTO: 0.3 K/UL (ref 4–11)

## 2024-11-05 PROCEDURE — 0DJ08ZZ INSPECTION OF UPPER INTESTINAL TRACT, VIA NATURAL OR ARTIFICIAL OPENING ENDOSCOPIC: ICD-10-PCS | Performed by: INTERNAL MEDICINE

## 2024-11-05 PROCEDURE — 85730 THROMBOPLASTIN TIME PARTIAL: CPT

## 2024-11-05 PROCEDURE — 97530 THERAPEUTIC ACTIVITIES: CPT

## 2024-11-05 PROCEDURE — 2580000003 HC RX 258

## 2024-11-05 PROCEDURE — 97116 GAIT TRAINING THERAPY: CPT

## 2024-11-05 PROCEDURE — 2580000003 HC RX 258: Performed by: NURSE PRACTITIONER

## 2024-11-05 PROCEDURE — 83735 ASSAY OF MAGNESIUM: CPT

## 2024-11-05 PROCEDURE — 6370000000 HC RX 637 (ALT 250 FOR IP)

## 2024-11-05 PROCEDURE — 99232 SBSQ HOSP IP/OBS MODERATE 35: CPT | Performed by: INTERNAL MEDICINE

## 2024-11-05 PROCEDURE — P9040 RBC LEUKOREDUCED IRRADIATED: HCPCS

## 2024-11-05 PROCEDURE — 2060000000 HC ICU INTERMEDIATE R&B

## 2024-11-05 PROCEDURE — 6360000002 HC RX W HCPCS

## 2024-11-05 PROCEDURE — 80076 HEPATIC FUNCTION PANEL: CPT

## 2024-11-05 PROCEDURE — 51798 US URINE CAPACITY MEASURE: CPT

## 2024-11-05 PROCEDURE — 36430 TRANSFUSION BLD/BLD COMPNT: CPT

## 2024-11-05 PROCEDURE — 7100000011 HC PHASE II RECOVERY - ADDTL 15 MIN: Performed by: INTERNAL MEDICINE

## 2024-11-05 PROCEDURE — 84550 ASSAY OF BLOOD/URIC ACID: CPT

## 2024-11-05 PROCEDURE — 85610 PROTHROMBIN TIME: CPT

## 2024-11-05 PROCEDURE — 86850 RBC ANTIBODY SCREEN: CPT

## 2024-11-05 PROCEDURE — P9036 PLATELET PHERESIS IRRADIATED: HCPCS

## 2024-11-05 PROCEDURE — 3700000000 HC ANESTHESIA ATTENDED CARE: Performed by: INTERNAL MEDICINE

## 2024-11-05 PROCEDURE — 97162 PT EVAL MOD COMPLEX 30 MIN: CPT

## 2024-11-05 PROCEDURE — 86923 COMPATIBILITY TEST ELECTRIC: CPT

## 2024-11-05 PROCEDURE — 6360000002 HC RX W HCPCS: Performed by: NURSE PRACTITIONER

## 2024-11-05 PROCEDURE — 85384 FIBRINOGEN ACTIVITY: CPT

## 2024-11-05 PROCEDURE — 85049 AUTOMATED PLATELET COUNT: CPT

## 2024-11-05 PROCEDURE — 6360000002 HC RX W HCPCS: Performed by: INTERNAL MEDICINE

## 2024-11-05 PROCEDURE — 85027 COMPLETE CBC AUTOMATED: CPT

## 2024-11-05 PROCEDURE — 80074 ACUTE HEPATITIS PANEL: CPT

## 2024-11-05 PROCEDURE — 86900 BLOOD TYPING SEROLOGIC ABO: CPT

## 2024-11-05 PROCEDURE — 2580000003 HC RX 258: Performed by: INTERNAL MEDICINE

## 2024-11-05 PROCEDURE — 3609017100 HC EGD: Performed by: INTERNAL MEDICINE

## 2024-11-05 PROCEDURE — 7100000010 HC PHASE II RECOVERY - FIRST 15 MIN: Performed by: INTERNAL MEDICINE

## 2024-11-05 PROCEDURE — 36415 COLL VENOUS BLD VENIPUNCTURE: CPT

## 2024-11-05 PROCEDURE — 86901 BLOOD TYPING SEROLOGIC RH(D): CPT

## 2024-11-05 PROCEDURE — 80069 RENAL FUNCTION PANEL: CPT

## 2024-11-05 PROCEDURE — 97535 SELF CARE MNGMENT TRAINING: CPT

## 2024-11-05 PROCEDURE — 97166 OT EVAL MOD COMPLEX 45 MIN: CPT

## 2024-11-05 RX ORDER — LIDOCAINE HYDROCHLORIDE 20 MG/ML
INJECTION, SOLUTION INTRAVENOUS
Status: DISCONTINUED | OUTPATIENT
Start: 2024-11-05 | End: 2024-11-05 | Stop reason: SDUPTHER

## 2024-11-05 RX ORDER — GLYCOPYRROLATE 0.2 MG/ML
INJECTION INTRAMUSCULAR; INTRAVENOUS
Status: DISCONTINUED | OUTPATIENT
Start: 2024-11-05 | End: 2024-11-05 | Stop reason: SDUPTHER

## 2024-11-05 RX ORDER — POTASSIUM CHLORIDE 1500 MG/1
40 TABLET, EXTENDED RELEASE ORAL ONCE
Status: COMPLETED | OUTPATIENT
Start: 2024-11-05 | End: 2024-11-05

## 2024-11-05 RX ORDER — PROPOFOL 10 MG/ML
INJECTION, EMULSION INTRAVENOUS
Status: DISCONTINUED | OUTPATIENT
Start: 2024-11-05 | End: 2024-11-05 | Stop reason: SDUPTHER

## 2024-11-05 RX ADMIN — VANCOMYCIN HYDROCHLORIDE 125 MG: 250 POWDER, FOR SOLUTION ORAL at 20:42

## 2024-11-05 RX ADMIN — ACETAMINOPHEN 650 MG: 650 SOLUTION ORAL at 21:08

## 2024-11-05 RX ADMIN — Medication 400 MG: at 08:16

## 2024-11-05 RX ADMIN — MEROPENEM 1000 MG: 1 INJECTION INTRAVENOUS at 06:58

## 2024-11-05 RX ADMIN — LEVETIRACETAM 1500 MG: 100 INJECTION INTRAVENOUS at 20:42

## 2024-11-05 RX ADMIN — POTASSIUM CHLORIDE 40 MEQ: 1500 TABLET, EXTENDED RELEASE ORAL at 09:26

## 2024-11-05 RX ADMIN — SODIUM CHLORIDE, PRESERVATIVE FREE 10 ML: 5 INJECTION INTRAVENOUS at 20:43

## 2024-11-05 RX ADMIN — PROPOFOL 100 MG: 10 INJECTION, EMULSION INTRAVENOUS at 13:28

## 2024-11-05 RX ADMIN — PROPOFOL 150 MCG/KG/MIN: 10 INJECTION, EMULSION INTRAVENOUS at 13:29

## 2024-11-05 RX ADMIN — SODIUM CHLORIDE 550 MG: 9 INJECTION INTRAMUSCULAR; INTRAVENOUS; SUBCUTANEOUS at 15:12

## 2024-11-05 RX ADMIN — MEROPENEM 1000 MG: 1 INJECTION INTRAVENOUS at 14:26

## 2024-11-05 RX ADMIN — MICAFUNGIN SODIUM 50 MG: 100 INJECTION, POWDER, LYOPHILIZED, FOR SOLUTION INTRAVENOUS at 22:38

## 2024-11-05 RX ADMIN — LEVETIRACETAM 1500 MG: 100 INJECTION INTRAVENOUS at 08:19

## 2024-11-05 RX ADMIN — GLYCOPYRROLATE 0.2 MG: 0.2 INJECTION INTRAMUSCULAR; INTRAVENOUS at 13:28

## 2024-11-05 RX ADMIN — VANCOMYCIN HYDROCHLORIDE 125 MG: 250 POWDER, FOR SOLUTION ORAL at 09:23

## 2024-11-05 RX ADMIN — MEROPENEM 1000 MG: 1 INJECTION INTRAVENOUS at 00:09

## 2024-11-05 RX ADMIN — SODIUM CHLORIDE, PRESERVATIVE FREE 10 ML: 5 INJECTION INTRAVENOUS at 08:29

## 2024-11-05 RX ADMIN — Medication 400 MG: at 20:42

## 2024-11-05 RX ADMIN — SODIUM CHLORIDE, PRESERVATIVE FREE 10 ML: 5 INJECTION INTRAVENOUS at 08:31

## 2024-11-05 RX ADMIN — LIDOCAINE HYDROCHLORIDE 100 MG: 20 INJECTION, SOLUTION INTRAVENOUS at 13:28

## 2024-11-05 RX ADMIN — DICYCLOMINE HYDROCHLORIDE 20 MG: 20 TABLET ORAL at 09:23

## 2024-11-05 RX ADMIN — PANTOPRAZOLE SODIUM 40 MG: 40 INJECTION, POWDER, FOR SOLUTION INTRAVENOUS at 08:26

## 2024-11-05 RX ADMIN — PANTOPRAZOLE SODIUM 40 MG: 40 INJECTION, POWDER, FOR SOLUTION INTRAVENOUS at 20:42

## 2024-11-05 ASSESSMENT — PAIN - FUNCTIONAL ASSESSMENT
PAIN_FUNCTIONAL_ASSESSMENT: NONE - DENIES PAIN
PAIN_FUNCTIONAL_ASSESSMENT: NONE - DENIES PAIN
PAIN_FUNCTIONAL_ASSESSMENT: 0-10

## 2024-11-05 ASSESSMENT — PAIN SCALES - GENERAL
PAINLEVEL_OUTOF10: 0
PAINLEVEL_OUTOF10: 0

## 2024-11-05 NOTE — PLAN OF CARE
Problem: Pain  Goal: Verbalizes/displays adequate comfort level or baseline comfort level  11/5/2024 1656 by Paula Bowles, RN  Outcome: Progressing  Note: Patient still experiencing pain after eating. Given bentyl for abdominal cramping once this shift. On clear liquid diet.    Problem: Safety - Adult  Goal: Free from fall injury  11/5/2024 1656 by Paula Bowles, RN  Outcome: Progressing  Note: Pt is a Med fall risk. See Apple Fall Score and ABCDS Injury Risk assessments.   + Screening for Orthostasis and/or + High Fall Risk per APPLE/ABCDS: Explained fall risk precautions to pt and family and rationale behind their use to keep the patient safe. Pt bed is in low position, side rails up, call light and belongings are in reach. Fall wristband applied and present on pts wrist.  Bed alarm on.  Pt encouraged to call for assistance. Will continue with hourly rounds for PO intake, pain needs, toileting and repositioning as needed.      Problem: Hematologic - Adult  Goal: Maintains hematologic stability  Outcome: Progressing  Note: Patient's hemoglobin this AM:   Recent Labs     11/05/24  1530   HGB 8.0*     Patient's platelet count this AM:   Recent Labs     11/05/24  1530   PLT 53*    Thrombocytopenia Precautions in place.  Patient showing no signs or symptoms of active bleeding.  Transfusion not indicated at this time.  Patient verbalizes understanding of all instructions. Will continue to assess and implement POC. Call light within reach and hourly rounding in place.      Problem: Nutrition Deficit:  Goal: Optimize nutritional status  Outcome: Not Progressing  Note: Pt drinking some fluids, but PO intake is not sufficient.

## 2024-11-05 NOTE — PROCEDURES
PROCEDURE NOTE  Date: 2024   Name: Dennys Peguero  YOB: 1980    Procedures  EGD                Endoscopy Note    Patient: Dennys Peguero  : 1980  Acct#:     Procedure: Esophagogastroduodenoscopy     Date:  2024     Surgeon:  Effie Caballero MD,     Referring Physician:  No primary care provider on file.      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 Diff in 3/2024, rectal adenocarcinoma in 3/2024 s/p rectal mass resection in 2024. Patient presented on 10/28 with sepsis/neutropenic fever.   Patient had?  Melanotic stool last night and we were consulted for consideration of EGD to rule out upper GI source of blood loss.         Anesthesia: MAC anesthesia      Consent:  The patient or their legal guardian has signed an informed consent, and is aware of the potential risks, benefits, alternatives, and potential complications of this procedure.  These include, but are not limited to hemorrhage, bleeding, post procedural pain, perforation, phlebitis, aspiration, hypotension, hypoxia, cardiovascular events such as arryhthmia, and possibly death.     Description of Procedure: The patient was then taken to the endoscopy suite, placed in the left lateral decubitus position and the above IV sedation was administrered.  The Olympus video endoscope was placed through the patient's oropharynx without difficulty to the extent of the 2nd portion of the duodenum. The patient tolerated the procedure well and was taken to the post anesthesia care unit in good condition.    Complications: None  EBL: none      Findings:  Esophagus:  Visualization of the esophagus demonstrated normal mucosa.  GE junction normal at 44 cm.     Stomach:  The scope was then advanced into the stomach.  Both forward and retroflexed views of the stomach were obtained.

## 2024-11-05 NOTE — ANESTHESIA POSTPROCEDURE EVALUATION
Department of Anesthesiology  Postprocedure Note    Patient: Dennys Peguero  MRN: [id number]  YOB: 1980  Date of evaluation: 11/5/2024    Procedure Summary       Date: 11/05/24 Room / Location: [place] [address] / [facility]    Anesthesia Start: 1323 Anesthesia Stop: 1337    Procedure: ESOPHAGOGASTRODUODENOSCOPY Diagnosis: Gastrointestinal hemorrhage, unspecified gastrointestinal hemorrhage type    Surgeons: Effie Caballero MD Responsible Provider: Liu Mercado MD    Anesthesia Type: MAC ASA Status: 3            Anesthesia Type: No value filed.    Lopez Phase I: Lopez Score: 10    Lopez Phase II: Lopez Score: 10    Anesthesia Post Evaluation    Patient location during evaluation: PACU  Patient participation: complete - patient participated  Level of consciousness: awake  Pain score: 0  Airway patency: patent  Nausea & Vomiting: no nausea  Cardiovascular status: hemodynamically stable  Respiratory status: acceptable  Hydration status: stable  Pain management: adequate    No notable events documented.

## 2024-11-05 NOTE — PROGRESS NOTES
Physical Therapy  Facility/Department: [address]  Physical Therapy Initial Assessment/Discharge    Name: Dennys Peguero  : 1980  MRN: [id number]  Date of Service: 2024    Discharge Recommendations:  Home with assist PRN   PT Equipment Recommendations  Equipment Needed: No      Patient Diagnosis(es): The primary encounter diagnosis was Neutropenic fever (HCC). Diagnoses of Acute respiratory failure with hypoxia, Sepsis with acute hypoxic respiratory failure and septic shock, due to unspecified organism (HCC), and Hypotension, unspecified hypotension type were also pertinent to this visit.  Past Medical History:  has a past medical history of Brain cancer (HCC) and Seizure (HCC).  Past Surgical History:  has a past surgical history that includes brain surgery; Upper gastrointestinal endoscopy (N/A, 3/6/2024); Colonoscopy (N/A, 3/22/2024); Small intestine surgery (N/A, 2024); and bronchoscopy (N/A, 2024).    Assessment  Assessment: pt presents from home with his father where he is typically IND with mobility without use of AD. Pt currently able to transfer and ambulate without AD, appears steady with no LOB. Pt does brace abdomen while ambulating and demo's slow jose angel 2/2 pain. Pt denies any difficulty with mobility and appears near his functional baseline, however limited by pain. Pt denies concerns for DC home, rec return home with PRN assist from family. PT will sign off, encouraged pt to continue progressive ambulation with nursing staff as tolerated.  Therapy Prognosis: Good  Decision Making: Medium Complexity  Requires PT Follow-Up: No  Activity Tolerance  Activity Tolerance: Patient tolerated evaluation without incident;Patient limited by pain    Plan  Physical Therapy Plan  General Plan: Discharge with evaluation only  Safety Devices  Type of Devices: Chair alarm in place, Left in chair, Call light within reach, Nurse notified    Restrictions  Position Activity  dad home during the day to assist PRN.  Vision/Hearing  Vision  Vision: Impaired  Vision Exceptions: Wears glasses for reading  Hearing  Hearing: Within functional limits    Cognition   Orientation  Orientation Level: Oriented X4    Objective  Temp: 99.7 °F (37.6 °C)  Pulse: 70  Heart Rate Source: Telemetry  Respirations: 24  SpO2: 96 %  O2 Device: None (Room air)  BP: 131/78  MAP (Calculated): 96  BP Location: Left upper arm  BP Method: Automatic  Patient Position: Supine        Gross Assessment  AROM: Within functional limits  Strength: Within functional limits  Coordination: Within functional limits  Tone: Normal  Sensation: Intact                Balance  Sitting: Intact  Standing: Intact  Bed mobility  Supine to Sit: Modified independent  Scooting: Modified independent  Bed Mobility Comments: HOB elevated  Transfers  Sit to Stand: Independent (to no AD, appears steady from EOB, toilet, recliner)  Stand to Sit: Independent  Ambulation  Surface: Level tile  Device: No Device  Quality of Gait: pt bracing abdomen while ambulating, slow jose angel but appears steady with no LOB  Distance: 10+10+35ft  Comments: distance limited by pain              AM-PAC - Mobility    AM-PAC Basic Mobility - Inpatient   How much help is needed turning from your back to your side while in a flat bed without using bedrails?: None  How much help is needed moving from lying on your back to sitting on the side of a flat bed without using bedrails?: None  How much help is needed moving to and from a bed to a chair?: None  How much help is needed standing up from a chair using your arms?: None  How much help is needed walking in hospital room?: None  How much help is needed climbing 3-5 steps with a railing?: None  AM-PAC Inpatient Mobility Raw Score : 24  AM-PAC Inpatient T-Scale Score : 61.14  Mobility Inpatient CMS 0-100% Score: 0  Mobility Inpatient CMS G-Code Modifier : CH       Education  Patient Education  Education Given To:  Perineural Invasion (For Histology - Be Specific If Possible): absent

## 2024-11-05 NOTE — PROGRESS NOTES
ID Follow-up NOTE    CC:   AMS  Antibiotics: Meropenem, micafungin, daptomycin, acyclovir, p.o. vancomycin    Admit Date: 10/28/2024  Hospital Day: 9    Subjective:     Patient had t max this morning 100.1. intermittent abd pain , plan for EGD today.  Possible bone marrow biopsy after that.  Sitting up in recliner next to bed    Objective:     Patient Vitals for the past 8 hrs:   BP Temp Temp src Pulse Resp SpO2 Weight   11/05/24 1110 130/85 99.4 °F (37.4 °C) Oral 61 23 97 % --   11/05/24 1100 -- -- -- 62 -- -- --   11/05/24 0950 -- -- -- -- -- -- 77.8 kg (171 lb 9.6 oz)   11/05/24 0806 131/78 99.7 °F (37.6 °C) Oral 70 24 96 % --   11/05/24 0630 129/78 100.1 °F (37.8 °C) Oral 74 22 96 % --   11/05/24 0605 114/65 99.5 °F (37.5 °C) Oral 84 25 95 % --     I/O last 3 completed shifts:  In: 2430.3 [P.O.:950; I.V.:561; Blood:919.3]  Out: 1450 [Urine:1450]  I/O this shift:  In: 380 [P.O.:60; I.V.:320]  Out: 200 [Urine:150; Stool:50]    EXAM:  GENERAL: No apparent distress.  Pt is fatigued easily.   HEENT: Membranes moist, no oral lesion  NECK:  Supple, no lymphadenopathy  LUNGS: Clear b/l, no rales, no dullness  CARDIAC: RRR, no murmur appreciated  ABD:  + BS, soft, tender to palpation in the mid abdomen region.  EXT:  No rash, no edema, no lesions  NEURO: No focal neurologic findings  PSYCH: Orientation normal, drowsy.   LINES:  Peripheral iv    Data Review:  Lab Results   Component Value Date    WBC 0.3 (LL) 11/05/2024    HGB 7.7 (L) 11/05/2024    HCT 22.7 (L) 11/05/2024    MCV 88.2 11/05/2024    PLT 66 (L) 11/05/2024     Lab Results   Component Value Date    CREATININE <0.5 (L) 11/05/2024    BUN 11 11/05/2024     11/05/2024    K 3.3 (L) 11/05/2024     11/05/2024    CO2 30 11/05/2024       Hepatic Function Panel:   Lab Results   Component Value Date/Time    ALKPHOS 164 11/05/2024 04:33 AM    ALT 47 11/05/2024 04:33 AM    AST 11 11/05/2024 04:33 AM    BILITOT 1.1 11/05/2024 04:33 AM    BILIDIR 0.3 11/05/2024

## 2024-11-05 NOTE — ANESTHESIA PRE PROCEDURE
reduced.  Left Atrium Left atrium size is normal.  Right Ventricle Right ventricle is mildly dilated. Normal systolic function.  Right Atrium Right atrium size is normal.  Aortic Valve Trileaflet valve. No regurgitation. No stenosis.  Mitral Valve Valve structure is normal. Mild regurgitation. No stenosis noted.  Tricuspid Valve Valve structure is normal. Trace regurgitation. No stenosis noted.  Pulmonic Valve The pulmonic valve visualization is suboptimal but appears to be functioning normally. Physiologically normal regurgitation. No stenosis noted.  Aorta Normal sized aortic root and ascending aorta.  IVC/Hepatic Veins IVC was not assessed.  Pericardium Trivial pericardial effusion present. No indication of cardiac tamponade.  Extracardiac No pleural effusion present.         Neuro/Psych:   (+) seizures: poorly controlled             ROS comment: S/P brain surgery with radioation GI/Hepatic/Renal: Neg GI/Hepatic/Renal ROS            Endo/Other:    (+) blood dyscrasia (Leucopenia): anemia and thrombocytopenia:., malignancy/cancer (Brain. rectal,  AML).                 Abdominal:             Vascular: negative vascular ROS.         Other Findings:         Anesthesia Plan      MAC     ASA 3       Induction: intravenous.      Anesthetic plan and risks discussed with patient.      Plan discussed with CRNA.    Attending anesthesiologist reviewed and agrees with Preprocedure content              IVETT URBANO MD   11/5/2024

## 2024-11-05 NOTE — PROGRESS NOTES
Patient denies any pain, nausea, or vomiting. Patient is resting  on left side with the HOB at 35 vitals are stable. Called and gave report to unit nurse in care of this patient. Will call for transport take patient back to the unit from [facility] via stretcher. All care needs are addressed at this time.

## 2024-11-05 NOTE — PROGRESS NOTES
GI Note:    Discussed Hematology service and noted one unit of platelets given this morning and awaiting the recheck. They are good with GI proceeding with EGD today.    Kaya Varghese PA-C

## 2024-11-05 NOTE — PLAN OF CARE
Problem: Pain  Goal: Verbalizes/displays adequate comfort level or baseline comfort level  Outcome: Progressing  Note: Patient had no complaint of pain this shift.  Patient encouraged to notify RN if changes.       Problem: Safety - Adult  Goal: Free from fall injury  Outcome: Progressing  Note: Orthostatic vital signs obtained at start of shift - see flowsheet for details.  Pt does not meet criteria for orthostasis.  Pt is a High fall risk. See Apple Fall Score and ABCDS Injury Risk assessments.   + Screening for Orthostasis and/or + High Fall Risk per APPLE/ABCDS: Explained fall risk precautions to pt and family and rationale behind their use to keep the patient safe. Pt bed is in low position, side rails up, call light and belongings are in reach. Fall wristband applied and present on pts wrist.  Bed alarm on.  Pt encouraged to call for assistance. Will continue with hourly rounds for PO intake, pain needs, toileting and repositioning as needed.      Problem: Skin/Tissue Integrity - Adult  Goal: Skin integrity remains intact  Outcome: Progressing  Note: Skin is clean dry and intact.

## 2024-11-05 NOTE — PROGRESS NOTES
disease  Acute melena:  recurrent starting 11/3  10/28/24 - lipase 12.0, CT abd/pelvis with IV contrast was unrevealing  10/31/24 - CT abd with IV contrast & HIDA were unrevealing  Colorectal surgery is following - serial abd exams  Cont Bentyl prn  GI consulted, recs appreciated  EGD planned for today (11/5)  Cont Protonix 40 mg BID      6.  NEURO   Oligodendroglioma s/p partial resection 2017  See oncology section above for details    Amyloid angiopathy  9/30/24, MRI brain w & w/o contrast - Pattern found to be c/w amyloid angiopathy per neurosurgery with recommendation to avoid anticoagulation as it would significantly increase the risk of ICH; no surgical intervention warranted.    10/10/24, CT head w/o contrast - Stable small foci of increased density parietal and occipital lobe on the right occipital lobe on the left similar to the previous exam c/w multiple small areas of hemorrhage.  10/29/24, MRI brain w & w/o contrast - Slightly more prominent small area of vasogenic edema a/w left occipital lobe microhemorrhage.  Slightly more prominent small area of vasogenic edema a/w right parietal lobe microhemorrhage. Decrease in small foci of enhancement a/w other previously noted microhemorrhages.    Seizures (dx 2009)  Cont Keppra 1500 mg BID      7.  [facility]  New-onset HFmrEF - likely transient d/t shock  10/29/24, TTE:  LV - EF 45-50% (decreased compared to TTE on 10/14/24); normal size & wall thickness; mild global hypokinesis.  RV - mildly dilated; normal systolic function.  MV - mild regurgitation.       8.  PULM   RLL lung nodule  10/10/24, CT:  New pulmonary nodule 16 mm RLL  Repeat CT chest 12/2024    NAN cavitary lung lesion, bibasilar PNA:  not requiring supplemental oxygen  Intubated for airway protection (no hypoxia) 10/28/24.  Extubated 10/29/24.  See ID section above for workup and treatment        DVT Prophylaxis:  Platelets <50,000 cells/dL - prophylactic lovenox on hold and mechanical prophylaxis with  bilateral SCDs while in bed in place.  Contraindications to pharmacologic prophylaxis:  Thrombocytopenia, amyloid angiopathy (see Neuro section above for details)  Contraindications to mechanical prophylaxis:  None    Disposition: When ANC > 1 and recovered from toxicities from chemotherapy and transplant.     The patient was seen and examined by Dr. Gore.      Yoselin Floyd MD, MARIE, CCRC  Internal Medicine, PGY-3

## 2024-11-05 NOTE — PROGRESS NOTES
Occupational Therapy  Facility/Department: [address]  Occupational Therapy Initial Assessment/Treatment    Name: Dennys Peguero  : 1980  MRN: [id number]  Date of Service: 2024    Discharge Recommendations:  Home with assist PRN  OT Equipment Recommendations  Equipment Needed: No       Patient Diagnosis(es): The primary encounter diagnosis was Neutropenic fever (HCC). Diagnoses of Acute respiratory failure with hypoxia, Sepsis with acute hypoxic respiratory failure and septic shock, due to unspecified organism (HCC), and Hypotension, unspecified hypotension type were also pertinent to this visit.  Past Medical History:  has a past medical history of Brain cancer (HCC) and Seizure (HCC).  Past Surgical History:  has a past surgical history that includes brain surgery; Upper gastrointestinal endoscopy (N/A, 3/6/2024); Colonoscopy (N/A, 3/22/2024); Small intestine surgery (N/A, 2024); and bronchoscopy (N/A, 2024).           Assessment  Performance deficits / Impairments: Decreased functional mobility ;Decreased ADL status  Assessment: Pt is a 43 y/o M who presents below near functional baseline. Pt presents from home with his father where he is typically IND with ADLs, transfers, and functional mobility without use of AD. Pt currently able to transfer and complete functional mobility without AD, appears steady with no LOB. Pt also demo ADLs this date w/o assist. Pt denies any difficulty with ADLs or mobility and appears near his functional baseline, however limited by pain. Pt denies concerns for DC home, rec return home with PRN assist from family. OT will sign off, encouraged pt to continue OOB activity with nursing during hospital stay  Prognosis: Good  Decision Making: Medium Complexity  No Skilled OT: No OT goals identified;Independent with ADL's;Independent with functional mobility;Safe to return home  REQUIRES OT FOLLOW-UP: No  Activity Tolerance  Activity Tolerance: Patient  Tolerated treatment well     Plan  Occupational Therapy Plan  Times Per Week: dc acute OT    Restrictions  Position Activity Restriction  Other position/activity restrictions: up as tolerated, If platelet count equal to or less than 10 but the patient has received a platelet transfusion, physical therapy or occupational therapy may proceed with treatment.    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 Diff in 3/2024, rectal adenocarcinoma in 3/2024 s/p rectal mass resection in 4/2024. Patient presented on 10/28 with sepsis/neutropenic fever, rectal bleeding.  Family / Caregiver Present: No  Referring Practitioner: Yoselin Floyd MD  Diagnosis: Neutropenic fever  Subjective  Subjective: Pt in bed upon arrival, pleasant and agreeable to OT eval and treat. Pt reporting 10/10 abdominal pain- RN present and providing pt with medication     Social/Functional History  Social/Functional History  Lives With: Parent (dad staying with pt for last few months)  Type of Home: Apartment  Home Layout: One level  Home Access: Elevator  Bathroom Shower/Tub: Walk-in shower  Bathroom Toilet: Standard  Bathroom Equipment: Grab bars in shower, Grab bars around toilet, Hand-held shower, Shower chair  Home Equipment: None  Has the patient had two or more falls in the past year or any fall with injury in the past year?: No  ADL Assistance: Independent  Homemaking Assistance: Independent (family usually does grocery shopping)  Ambulation Assistance: Independent  Transfer Assistance: Independent  Active : No  Patient's  Info: family drives him to appointments  Occupation: On disability  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

## 2024-11-06 ENCOUNTER — APPOINTMENT (OUTPATIENT)
Dept: CT IMAGING | Age: 44
DRG: 720 | End: 2024-11-06
Payer: MEDICAID

## 2024-11-06 ENCOUNTER — APPOINTMENT (OUTPATIENT)
Dept: GENERAL RADIOLOGY | Age: 44
DRG: 720 | End: 2024-11-06
Payer: MEDICAID

## 2024-11-06 LAB
ALBUMIN SERPL-MCNC: 2.5 G/DL (ref 3.4–5)
ALP SERPL-CCNC: 154 U/L (ref 40–129)
ALT SERPL-CCNC: 40 U/L (ref 10–40)
ANION GAP SERPL CALCULATED.3IONS-SCNC: 5 MMOL/L (ref 3–16)
APTT BLD: 41.1 SEC (ref 22.1–36.4)
AST SERPL-CCNC: 12 U/L (ref 15–37)
BACTERIA URNS QL MICRO: ABNORMAL /HPF
BILIRUB DIRECT SERPL-MCNC: 0.5 MG/DL (ref 0–0.3)
BILIRUB INDIRECT SERPL-MCNC: 0.5 MG/DL (ref 0–1)
BILIRUB SERPL-MCNC: 1 MG/DL (ref 0–1)
BILIRUB UR QL STRIP.AUTO: NEGATIVE
BUN SERPL-MCNC: 11 MG/DL (ref 7–20)
CALCIUM SERPL-MCNC: 8.1 MG/DL (ref 8.3–10.6)
CHLORIDE SERPL-SCNC: 104 MMOL/L (ref 99–110)
CK SERPL-CCNC: 8 U/L (ref 39–308)
CLARITY UR: CLEAR
CO2 SERPL-SCNC: 30 MMOL/L (ref 21–32)
COLOR UR: YELLOW
CREAT SERPL-MCNC: 0.5 MG/DL (ref 0.9–1.3)
DEPRECATED RDW RBC AUTO: 13.3 % (ref 12.4–15.4)
DEPRECATED RDW RBC AUTO: 13.3 % (ref 12.4–15.4)
EPI CELLS #/AREA URNS HPF: ABNORMAL /HPF (ref 0–5)
FIBRINOGEN PPP-MCNC: 458 MG/DL (ref 227–534)
GFR SERPLBLD CREATININE-BSD FMLA CKD-EPI: >90 ML/MIN/{1.73_M2}
GLUCOSE BLD-MCNC: 106 MG/DL (ref 70–99)
GLUCOSE BLD-MCNC: 108 MG/DL (ref 70–99)
GLUCOSE BLD-MCNC: 147 MG/DL (ref 70–99)
GLUCOSE BLD-MCNC: 176 MG/DL (ref 70–99)
GLUCOSE SERPL-MCNC: 100 MG/DL (ref 70–99)
GLUCOSE UR STRIP.AUTO-MCNC: NEGATIVE MG/DL
HCT VFR BLD AUTO: 20.8 % (ref 40.5–52.5)
HCT VFR BLD AUTO: 22 % (ref 40.5–52.5)
HGB BLD-MCNC: 7.2 G/DL (ref 13.5–17.5)
HGB BLD-MCNC: 7.5 G/DL (ref 13.5–17.5)
HGB UR QL STRIP.AUTO: NEGATIVE
INR PPP: 1.58 (ref 0.85–1.15)
KETONES UR STRIP.AUTO-MCNC: NEGATIVE MG/DL
LDH SERPL L TO P-CCNC: 84 U/L (ref 100–190)
LEUKOCYTE ESTERASE UR QL STRIP.AUTO: NEGATIVE
MAGNESIUM SERPL-MCNC: 1.49 MG/DL (ref 1.8–2.4)
MCH RBC QN AUTO: 30 PG (ref 26–34)
MCH RBC QN AUTO: 30.5 PG (ref 26–34)
MCHC RBC AUTO-ENTMCNC: 34.3 G/DL (ref 31–36)
MCHC RBC AUTO-ENTMCNC: 34.5 G/DL (ref 31–36)
MCV RBC AUTO: 87.4 FL (ref 80–100)
MCV RBC AUTO: 88.5 FL (ref 80–100)
MUCOUS THREADS #/AREA URNS LPF: ABNORMAL /LPF
NITRITE UR QL STRIP.AUTO: NEGATIVE
PERFORMED ON: ABNORMAL
PH UR STRIP.AUTO: 6.5 [PH] (ref 5–8)
PHOSPHATE SERPL-MCNC: 2.4 MG/DL (ref 2.5–4.9)
PLATELET # BLD AUTO: 38 K/UL (ref 135–450)
PLATELET # BLD AUTO: 44 K/UL (ref 135–450)
PLATELET # BLD AUTO: 51 K/UL (ref 135–450)
PMV BLD AUTO: 8.8 FL (ref 5–10.5)
PMV BLD AUTO: 9 FL (ref 5–10.5)
POTASSIUM SERPL-SCNC: 3.5 MMOL/L (ref 3.5–5.1)
PROCALCITONIN SERPL IA-MCNC: 0.79 NG/ML (ref 0–0.15)
PROT SERPL-MCNC: 4.9 G/DL (ref 6.4–8.2)
PROT UR STRIP.AUTO-MCNC: 100 MG/DL
PROTHROMBIN TIME: 19 SEC (ref 11.9–14.9)
RBC # BLD AUTO: 2.35 M/UL (ref 4.2–5.9)
RBC # BLD AUTO: 2.52 M/UL (ref 4.2–5.9)
RBC #/AREA URNS HPF: ABNORMAL /HPF (ref 0–4)
SODIUM SERPL-SCNC: 139 MMOL/L (ref 136–145)
SP GR UR STRIP.AUTO: >=1.03 (ref 1–1.03)
UA DIPSTICK W REFLEX MICRO PNL UR: YES
URATE SERPL-MCNC: 2.2 MG/DL (ref 3.5–7.2)
URN SPEC COLLECT METH UR: ABNORMAL
UROBILINOGEN UR STRIP-ACNC: 0.2 E.U./DL
WBC # BLD AUTO: 0.3 K/UL (ref 4–11)
WBC # BLD AUTO: 0.3 K/UL (ref 4–11)
WBC #/AREA URNS HPF: ABNORMAL /HPF (ref 0–5)

## 2024-11-06 PROCEDURE — 87327 CRYPTOCOCCUS NEOFORM AG IA: CPT

## 2024-11-06 PROCEDURE — 84145 PROCALCITONIN (PCT): CPT

## 2024-11-06 PROCEDURE — 84100 ASSAY OF PHOSPHORUS: CPT

## 2024-11-06 PROCEDURE — 88184 FLOWCYTOMETRY/ TC 1 MARKER: CPT

## 2024-11-06 PROCEDURE — 6370000000 HC RX 637 (ALT 250 FOR IP)

## 2024-11-06 PROCEDURE — 85610 PROTHROMBIN TIME: CPT

## 2024-11-06 PROCEDURE — 88185 FLOWCYTOMETRY/TC ADD-ON: CPT

## 2024-11-06 PROCEDURE — 99232 SBSQ HOSP IP/OBS MODERATE 35: CPT | Performed by: INTERNAL MEDICINE

## 2024-11-06 PROCEDURE — 83615 LACTATE (LD) (LDH) ENZYME: CPT

## 2024-11-06 PROCEDURE — 87040 BLOOD CULTURE FOR BACTERIA: CPT

## 2024-11-06 PROCEDURE — 87205 SMEAR GRAM STAIN: CPT

## 2024-11-06 PROCEDURE — 87103 BLOOD FUNGUS CULTURE: CPT

## 2024-11-06 PROCEDURE — 80048 BASIC METABOLIC PNL TOTAL CA: CPT

## 2024-11-06 PROCEDURE — 87385 HISTOPLASMA CAPSUL AG IA: CPT

## 2024-11-06 PROCEDURE — 2580000003 HC RX 258: Performed by: INTERNAL MEDICINE

## 2024-11-06 PROCEDURE — 6360000004 HC RX CONTRAST MEDICATION

## 2024-11-06 PROCEDURE — 85730 THROMBOPLASTIN TIME PARTIAL: CPT

## 2024-11-06 PROCEDURE — 6360000002 HC RX W HCPCS: Performed by: INTERNAL MEDICINE

## 2024-11-06 PROCEDURE — 84550 ASSAY OF BLOOD/URIC ACID: CPT

## 2024-11-06 PROCEDURE — 88341 IMHCHEM/IMCYTCHM EA ADD ANTB: CPT

## 2024-11-06 PROCEDURE — 86698 HISTOPLASMA ANTIBODY: CPT

## 2024-11-06 PROCEDURE — 88342 IMHCHEM/IMCYTCHM 1ST ANTB: CPT

## 2024-11-06 PROCEDURE — 36415 COLL VENOUS BLD VENIPUNCTURE: CPT

## 2024-11-06 PROCEDURE — 87070 CULTURE OTHR SPECIMN AEROBIC: CPT

## 2024-11-06 PROCEDURE — 6360000002 HC RX W HCPCS

## 2024-11-06 PROCEDURE — 82550 ASSAY OF CK (CPK): CPT

## 2024-11-06 PROCEDURE — 71260 CT THORAX DX C+: CPT

## 2024-11-06 PROCEDURE — 83735 ASSAY OF MAGNESIUM: CPT

## 2024-11-06 PROCEDURE — 87102 FUNGUS ISOLATION CULTURE: CPT

## 2024-11-06 PROCEDURE — 88305 TISSUE EXAM BY PATHOLOGIST: CPT

## 2024-11-06 PROCEDURE — 86612 BLASTOMYCES ANTIBODY: CPT

## 2024-11-06 PROCEDURE — 80076 HEPATIC FUNCTION PANEL: CPT

## 2024-11-06 PROCEDURE — 85027 COMPLETE CBC AUTOMATED: CPT

## 2024-11-06 PROCEDURE — 87086 URINE CULTURE/COLONY COUNT: CPT

## 2024-11-06 PROCEDURE — 88313 SPECIAL STAINS GROUP 2: CPT

## 2024-11-06 PROCEDURE — 81001 URINALYSIS AUTO W/SCOPE: CPT

## 2024-11-06 PROCEDURE — 2060000000 HC ICU INTERMEDIATE R&B

## 2024-11-06 PROCEDURE — 85384 FIBRINOGEN ACTIVITY: CPT

## 2024-11-06 PROCEDURE — 87253 VIRUS INOCULATE TISSUE ADDL: CPT

## 2024-11-06 PROCEDURE — 36430 TRANSFUSION BLD/BLD COMPNT: CPT

## 2024-11-06 PROCEDURE — 2580000003 HC RX 258

## 2024-11-06 PROCEDURE — 85049 AUTOMATED PLATELET COUNT: CPT

## 2024-11-06 PROCEDURE — 07DR3ZX EXTRACTION OF ILIAC BONE MARROW, PERCUTANEOUS APPROACH, DIAGNOSTIC: ICD-10-PCS | Performed by: INTERNAL MEDICINE

## 2024-11-06 PROCEDURE — 71045 X-RAY EXAM CHEST 1 VIEW: CPT

## 2024-11-06 PROCEDURE — 87252 VIRUS INOCULATION TISSUE: CPT

## 2024-11-06 PROCEDURE — 88311 DECALCIFY TISSUE: CPT

## 2024-11-06 RX ORDER — SODIUM CHLORIDE 9 MG/ML
INJECTION, SOLUTION INTRAVENOUS PRN
Status: DISCONTINUED | OUTPATIENT
Start: 2024-11-06 | End: 2024-11-11

## 2024-11-06 RX ORDER — IOPAMIDOL 755 MG/ML
75 INJECTION, SOLUTION INTRAVASCULAR
Status: COMPLETED | OUTPATIENT
Start: 2024-11-06 | End: 2024-11-06

## 2024-11-06 RX ADMIN — LEVETIRACETAM 1500 MG: 100 INJECTION INTRAVENOUS at 08:39

## 2024-11-06 RX ADMIN — MICAFUNGIN SODIUM 50 MG: 100 INJECTION, POWDER, LYOPHILIZED, FOR SOLUTION INTRAVENOUS at 23:49

## 2024-11-06 RX ADMIN — MEROPENEM 1000 MG: 1 INJECTION INTRAVENOUS at 14:37

## 2024-11-06 RX ADMIN — DICYCLOMINE HYDROCHLORIDE 20 MG: 20 TABLET ORAL at 07:19

## 2024-11-06 RX ADMIN — SODIUM CHLORIDE, PRESERVATIVE FREE 10 ML: 5 INJECTION INTRAVENOUS at 08:36

## 2024-11-06 RX ADMIN — Medication 400 MG: at 20:41

## 2024-11-06 RX ADMIN — MEROPENEM 1000 MG: 1 INJECTION INTRAVENOUS at 07:29

## 2024-11-06 RX ADMIN — MEROPENEM 1000 MG: 1 INJECTION INTRAVENOUS at 21:43

## 2024-11-06 RX ADMIN — Medication 400 MG: at 08:34

## 2024-11-06 RX ADMIN — IOPAMIDOL 75 ML: 755 INJECTION, SOLUTION INTRAVENOUS at 09:58

## 2024-11-06 RX ADMIN — VANCOMYCIN HYDROCHLORIDE 125 MG: 250 POWDER, FOR SOLUTION ORAL at 20:41

## 2024-11-06 RX ADMIN — VANCOMYCIN HYDROCHLORIDE 125 MG: 250 POWDER, FOR SOLUTION ORAL at 08:35

## 2024-11-06 RX ADMIN — LEVETIRACETAM 1500 MG: 100 INJECTION INTRAVENOUS at 20:41

## 2024-11-06 RX ADMIN — PANTOPRAZOLE SODIUM 40 MG: 40 INJECTION, POWDER, FOR SOLUTION INTRAVENOUS at 20:41

## 2024-11-06 RX ADMIN — MEROPENEM 1000 MG: 1 INJECTION INTRAVENOUS at 00:08

## 2024-11-06 RX ADMIN — ACETAMINOPHEN 650 MG: 650 SOLUTION ORAL at 17:50

## 2024-11-06 RX ADMIN — SODIUM CHLORIDE, PRESERVATIVE FREE 10 ML: 5 INJECTION INTRAVENOUS at 20:09

## 2024-11-06 RX ADMIN — PANTOPRAZOLE SODIUM 40 MG: 40 INJECTION, POWDER, FOR SOLUTION INTRAVENOUS at 08:29

## 2024-11-06 ASSESSMENT — PAIN DESCRIPTION - ORIENTATION: ORIENTATION: INNER

## 2024-11-06 ASSESSMENT — PAIN DESCRIPTION - DESCRIPTORS: DESCRIPTORS: CRAMPING

## 2024-11-06 ASSESSMENT — PAIN DESCRIPTION - ONSET: ONSET: ON-GOING

## 2024-11-06 ASSESSMENT — PAIN SCALES - GENERAL: PAINLEVEL_OUTOF10: 8

## 2024-11-06 ASSESSMENT — PAIN - FUNCTIONAL ASSESSMENT: PAIN_FUNCTIONAL_ASSESSMENT: PREVENTS OR INTERFERES SOME ACTIVE ACTIVITIES AND ADLS

## 2024-11-06 ASSESSMENT — PAIN DESCRIPTION - PAIN TYPE: TYPE: ACUTE PAIN

## 2024-11-06 ASSESSMENT — PAIN DESCRIPTION - LOCATION: LOCATION: ABDOMEN

## 2024-11-06 ASSESSMENT — PAIN DESCRIPTION - FREQUENCY: FREQUENCY: INTERMITTENT

## 2024-11-06 NOTE — PROGRESS NOTES
GI Note:    Attempted to see patient but is off the floor for imaging. Will attempt to see patient later.  Call with questions    Kaya Varghese PA-C

## 2024-11-06 NOTE — PROGRESS NOTES
Near end of shift, RN noticed that urine appearance could be ruth or blood-tinged. RN asked patient if he is having any pain with urination and he said \"yes.\" When asked when it started, he said \"a few days ago.\" RN messaged CORINNE Fish, who ordered a bladder scan to make sure he is not retaining. UA and urine cultures collected yesterday.

## 2024-11-06 NOTE — PROGRESS NOTES
[facility] COMPLAINT    Chief Complaint   Patient presents with    Groin Swelling       Nurses Notes reviewed and I agree except as noted in the HPI. HPI    Marylee Arista is a 60 y.o. male who presents for evaluation of swollen testicles that had been constant for the last month, rating his current pain 6/10 in severity. Patient states that he has not been evaluated for his swollen testicles prior to evaluation today. Patient denies drainage from his testicles and penis. He is also reporting shortness of breath most noted with short distances of exertion. Patient states that his shortness of breath has been persistent since the beginning of April. Patient reports a history of pneumonia that occurred a year ago, and states that his shortness of breath is similar to his past experience. Patient also notes recent weight gain, but is unsure of the exact amount. Patient denies fever and chills. He denies cough, sore throat, and chest pain. He reports a history of 2 MI, but denies a known history of CHF. Patient reports his older brother passed away from CHF. He denies having a cardiologist in the area to manage his cardiac history. Patient states that he currently resides in the same household with multiple family members. Patient has a past medical history that includes HTN, HLD, Type II DM, Sepsis, and GERD. There are no other complaints, symptoms, or concerns on initial encounter. HPI provided by the patient. REVIEW OF SYSTEMS    Review of Systems   Constitutional: Negative for appetite change, chills, diaphoresis, fatigue and fever. HENT: Negative for congestion, ear discharge, ear pain, postnasal drip, rhinorrhea, sinus pressure, sore throat, trouble swallowing and voice change. Respiratory: Positive for shortness of breath. Negative for cough, chest tightness and wheezing.     Cardiovascular: Negative ID Follow-up NOTE    CC:   AMS  Antibiotics: Meropenem, micafungin, daptomycin, acyclovir, p.o. vancomycin    Admit Date: 10/28/2024  Hospital Day: 10    Subjective:     Patient had t max yesterday of 101.3 after EGD.  No acute findings on EGD. Planning possible bone marrow bx today. Still with dark stools and intermittent mid abd pain.     Objective:     Patient Vitals for the past 8 hrs:   BP Temp Temp src Pulse Resp SpO2   11/06/24 0823 126/80 99.3 °F (37.4 °C) Oral (!) 101 25 95 %   11/06/24 0713 128/79 99.2 °F (37.3 °C) Oral (!) 103 22 96 %   11/06/24 0642 125/82 99.7 °F (37.6 °C) Oral (!) 103 22 96 %   11/06/24 0410 126/77 99.9 °F (37.7 °C) Oral 81 17 94 %     I/O last 3 completed shifts:  In: 1899.3 [P.O.:900; I.V.:741; Blood:258.3]  Out: 1900 [Urine:1750; Stool:150]  No intake/output data recorded.    EXAM:  GENERAL: No apparent distress.  Pt is fatigued easily.   HEENT: Membranes moist, no oral lesion  NECK:  Supple, no lymphadenopathy  LUNGS: Clear b/l, no rales, no dullness, on room air  CARDIAC: RRR, no murmur appreciated  ABD:  + BS, soft, tender to palpation in the mid abdomen region.  EXT:  No rash, no edema, no lesions  NEURO: No focal neurologic findings  PSYCH: Orientation normal, drowsy.   LINES:  Peripheral iv    Data Review:  Lab Results   Component Value Date    WBC 0.3 (LL) 11/06/2024    HGB 7.2 (L) 11/06/2024    HCT 20.8 (LL) 11/06/2024    MCV 88.5 11/06/2024    PLT 38 (L) 11/06/2024     Lab Results   Component Value Date    CREATININE 0.5 (L) 11/06/2024    BUN 11 11/06/2024     11/06/2024    K 3.5 11/06/2024     11/06/2024    CO2 30 11/06/2024       Hepatic Function Panel:   Lab Results   Component Value Date/Time    ALKPHOS 154 11/06/2024 04:38 AM    ALT 40 11/06/2024 04:38 AM    AST 12 11/06/2024 04:38 AM    BILITOT 1.0 11/06/2024 04:38 AM    BILIDIR 0.5 11/06/2024 04:38 AM    IBILI 0.5 11/06/2024 04:38 AM       MICRO:  Blood cultures x 2 11/4: No growth  Urine culture 11/4: No  LANCETS MISC    Apply 1 each topically 2 times daily. LANSOPRAZOLE (PREVACID) 30 MG CAPSULE    take 1 capsule by mouth once daily    LISINOPRIL (PRINIVIL;ZESTRIL) 20 MG TABLET    Take 20 mg by mouth daily    METFORMIN (GLUCOPHAGE XR) 500 MG EXTENDED RELEASE TABLET    Take 500 mg by mouth 2 times daily    METOPROLOL TARTRATE (LOPRESSOR) 25 MG TABLET    Take 25 mg by mouth daily    SERTRALINE (ZOLOFT) 25 MG TABLET    Take 25 mg by mouth daily       ALLERGIES    is allergic to aripiprazole and citalopram hydrobromide. FAMILY HISTORY    He indicated that his mother is . He indicated that his father is . He indicated that his paternal grandmother is . He indicated that his maternal uncle is . He indicated that the status of his paternal uncle is unknown.   family history includes Cancer in his maternal uncle and paternal grandmother; Diabetes in his mother and paternal grandmother; Heart Attack in his mother; Rexine Ada in his father; Parkinsonism in his paternal uncle. SOCIAL HISTORY     reports that he quit smoking about 28 years ago. He has never used smokeless tobacco. He reports current alcohol use. He reports that he does not use drugs. PHYSICAL EXAM      INITIAL VITALS: BP (!) 148/56   Pulse 73   Temp 98.5 °F (36.9 °C) (Oral)   Resp 28   Ht 5' 10\" (1.778 m)   Wt (!) 336 lb (152.4 kg)   SpO2 97%   BMI 48.21 kg/m² Estimated body mass index is 48.21 kg/m² as calculated from the following:    Height as of this encounter: 5' 10\" (1.778 m). Weight as of this encounter: 336 lb (152.4 kg). Physical Exam  Vitals signs reviewed. Exam conducted with a chaperone present. Constitutional:       Appearance: He is well-developed. HENT:      Head: Normocephalic and atraumatic. Right Ear: External ear normal.      Left Ear: External ear normal.      Nose: Nose normal.   Eyes:      General: No scleral icterus.      Conjunctiva/sclera: Conjunctivae normal.      Pupils: anasarca. DIAGNOSTIC RESULTS    EKG     None       RADIOLOGY:  I have reviewed radiologic plain film image(s). The plain films will be read or overread by the radiologist.All other non-plain film images(s) such as CT, Ultrasound and MRI have been read by the radiologist.  XR CHEST PORTABLE   Final Result      1. Bibasilar opacities which may represent infiltrate or atelectasis. 2. Cardiomegaly. **This report has been created using voice recognition software. It may contain minor errors which are inherent in voice recognition technology. **      Final report electronically signed by Dr Renetta Luis on 4/17/2020 2:48 PM          LABS:   Labs Reviewed   CBC WITH AUTO DIFFERENTIAL - Abnormal; Notable for the following components:       Result Value    RBC 4.06 (*)     Hemoglobin 6.6 (*)     Hematocrit 27.3 (*)     MCV 67.2 (*)     MCH 16.3 (*)     MCHC 24.2 (*)     RDW-CV 20.1 (*)     RDW-SD 47.5 (*)     Lymphocytes Absolute 0.8 (*)     All other components within normal limits   COMPREHENSIVE METABOLIC PANEL - Abnormal; Notable for the following components:    Glucose 197 (*)     CO2 19 (*)     ALT 6 (*)     All other components within normal limits   BRAIN NATRIURETIC PEPTIDE - Abnormal; Notable for the following components:    Pro-BNP 2784.0 (*)     All other components within normal limits   TSH WITHOUT REFLEX - Abnormal; Notable for the following components:    TSH 4.820 (*)     All other components within normal limits   GLOMERULAR FILTRATION RATE, ESTIMATED - Abnormal; Notable for the following components:    Est, Glom Filt Rate 75 (*)     All other components within normal limits   FERRITIN - Abnormal; Notable for the following components:    Ferritin 10 (*)     All other components within normal limits   IRON - Abnormal; Notable for the following components:    Iron 11 (*)     All other components within normal limits   RETICULOCYTES - Abnormal; Notable for the following components: Immature Retic Fract 35.6 (*)     Retic Hemoglobin 16.3 (*)     All other components within normal limits   TROPONIN   MAGNESIUM   ANION GAP   OSMOLALITY   BLOOD OCCULT STOOL SCREEN #1   IRON BINDING CAPACITY   VITAMIN B12 & FOLATE   SCAN OF BLOOD SMEAR   SOLUBLE TRANSFERRIN RECEPTOR   PROCALCITONIN   RETICULOCYTES   TYPE AND SCREEN   PREPARE RBC (CROSSMATCH)     All other unresulted laboratory test above are normal:    Vitals:    Vitals:    04/17/20 1353 04/17/20 1535   BP: (!) 170/61 (!) 148/56   Pulse: 103 73   Resp: (!) 35 28   Temp: 98.5 °F (36.9 °C)    TempSrc: Oral    SpO2: 95% 97%   Weight: (!) 336 lb (152.4 kg)    Height: 5' 10\" (1.778 m)        EMERGENCY DEPARTMENT COURSE:    Medications   0.9 % sodium chloride bolus (has no administration in time range)   iron sucrose (VENOFER) 200 mg in sodium chloride 0.9 % 100 mL IVPB (has no administration in time range)       The pt was seen and evaluated by me. Within the department, I observed the pt's vitalsigns to be within acceptable range. Laboratory and Radiological studies were performed, results were reviewed with the patient. . I observed the pt's condition to be hemodynamically stable during the duration of their stay. I explained my proposed course of treatment to the pt, and they were amenable to my decision. The case was referred to the hospitalist services, Dr. Jeannie Nolan graciously admitted the patient. CRITICAL CARE:   None. CONSULTS:  Dr Jeannie Nolan hospitalist services    PROCEDURES:  None. FINAL IMPRESSION       1. Anemia, unspecified type    2. Edema, unspecified type    3. Dyspnea, unspecified type          DISPOSITION/PLAN  PATIENT REFERRED TO: Patient was admitted to the hospitalist services, Dr. Timothy Whitney graciously admitted the patient.       (Please note that portions of this note were completed with a voice recognition program.  Efforts were made to edit the dictations but occasionallywords are mis-transcribed.)    Scribe:  Cam Zaman

## 2024-11-06 NOTE — PROGRESS NOTES
Patient noted with temp 101.3 orally.  Tylenol given as ordered.  Bladder scan done as needed per report by previous RN.  51 mls noted at 2100.  Polina SUN notified at MD call.  No changes at this time.

## 2024-11-06 NOTE — PROCEDURES
PROCEDURE NOTE  Date: 11/6/2024   Name: Dennys Peguero  YOB: 1980    Procedures      Procedure: Bone Marrow Biopsy and Needle Aspirate   Indication: S/p chemo, pancytopenia     Anesthesia:  Lidocaine 1% 10 mL    Patient given risks and benefits of procedure. Consent signed and time out performed.  Patient placed in the left lateral decubitus position. Area cleaned and prepped in a sterile fashion with chloraprep. Sterile drape applied. Lidocaine 1% -10ml administered to the subcutaneous tissue and periosteimum of the right iliac crest. Using sterile technique and using an aspirate needle a bone marrow aspirate was performed. A puncture was made with the provided scalpel, then using an Jamshidi needle, a biopsy was taken from the right posterior iliac crest. A sterile bandage was applied. No significant bleeding. Sample sent for histology, flow cytometry, FISH, cytogenetics and molecular studies. Patient tolerated procedure well.     Estimated Blood Loss: less than 5 mL    KATHLEEN Roldan - NP

## 2024-11-06 NOTE — PLAN OF CARE
Problem: Safety - Adult  Goal: Free from fall injury  Outcome: Progressing  Note: Orthostatic vital signs obtained at start of shift - see flowsheet for details.  Pt does not meet criteria for orthostasis.  Pt is a High fall risk. See Apple Fall Score and ABCDS Injury Risk assessments.   + Screening for Orthostasis and/or + High Fall Risk per APPLE/ABCDS: Explained fall risk precautions to pt and family and rationale behind their use to keep the patient safe. Pt bed is in low position, side rails up, call light and belongings are in reach. Fall wristband applied and present on pts wrist.  Bed alarm on.  Pt encouraged to call for assistance. Will continue with hourly rounds for PO intake, pain needs, toileting and repositioning as needed.      Problem: Hematologic - Adult  Goal: Maintains hematologic stability  Outcome: Progressing  Note: Patient's hemoglobin this AM:   Recent Labs     11/06/24  0438   HGB 7.2*     Patient's platelet count this AM:   Recent Labs     11/06/24  0438   PLT 38*    Thrombocytopenia Precautions in place.  Patient showing no signs or symptoms of active bleeding.  Patient transfused blood products per orders - see flowsheet.  Patient verbalizes understanding of all instructions. Will continue to assess and implement POC. Call light within reach and hourly rounding in place.        Problem: Pain  Goal: Verbalizes/displays adequate comfort level or baseline comfort level  Outcome: Not Progressing  Note: Patient with complaint of abdominal cramping this AM.  Dicyclomine given as ordered.

## 2024-11-06 NOTE — PROGRESS NOTES
Patient repanned per nursing communication order after patient spiked fever of 101.3. 2 sets of peripheral blood cultures taken at 1750. Throat cultures sent. Tylenol given. Awaiting urine for urine cultures. X-ray obtained-see results.

## 2024-11-06 NOTE — PLAN OF CARE
CT chest/abdomen/pelvis with IV contrast demonstrated progressive multifocal pneumonia/consolidative opacities, new nodule of the right apex, increased small bilateral pleural effusions, and fluid attenuation of the colon.  Concern for possible fungal infection.    After d/w infectious disease, will discontinue daptomycin, consult pulmonology for further evaluation & possible bronchoscopy, and send tests for endemic fungi.      Yoselin Floyd MD, MARIE, CCRC  Internal Medicine, PGY-3

## 2024-11-06 NOTE — PROGRESS NOTES
Progress Note    Patient Dennys Peguero  MRN: [id number]  YOB: 1980 Age: 44 y.o. Sex: male  Room: [address] [place] [address]       Admitting Physician: Eduardo Nguyen MD   Date of Admission: 10/28/2024  9:21 AM   Primary Care Physician: No primary care provider on file.     Subjective:  Dennys Peguero was seen and examined. We are following for anemia, abdominal pain, melena.  -- patient is reporting dark brown stools. Having about 3 episodes and is dependent on what he eats. States that food does not cause pain. He gets pain in mid abdomen after he has bowel movement.    ROS:  Constitutional: Denies fever, no change in appetite  Respiratory: Denies cough or shortness of breath  Cardiovascular: Denies chest pain or edema    Objective:  Vital Signs:   Vitals:    11/06/24 0823   BP: 126/80   Pulse: (!) 101   Resp: 25   Temp: 99.3 °F (37.4 °C)   SpO2: 95%         Physical Exam:  Constitutional: Alert and oriented x 4. No acute distress.   HEENT: Sclera anicteric, mucosal membranes moist  Cardiovascular: Regular rate and rhythm.  No murmurs.  Respiratory: Respirations nonlabored, no crepitus  GI: Abdomen nondistended, soft, and nontender.  Normal active bowel sounds.  No masses palpable.   Rectal: Deferred  Musculoskeletal:  No pitting edema of the lower legs.  Neurological: Gross memory appears intact.   Asterixis    Intake/Output:    Intake/Output Summary (Last 24 hours) at 11/6/2024 0909  Last data filed at 11/6/2024 0642  Gross per 24 hour   Intake 1081 ml   Output 1000 ml   Net 81 ml        Current Medications:  Current Facility-Administered Medications   Medication Dose Route Frequency Provider Last Rate Last Admin    simethicone (MYLICON) chewable tablet 80 mg  80 mg Oral Q6H PRN Polina Porter APRN - CNP   80 mg at 11/04/24 1139    ondansetron (ZOFRAN-ODT) disintegrating tablet 4 mg  4 mg Oral 4x Daily PRN Eduardo Nguyen MD        prochlorperazine (COMPAZINE) injection 5 mg  5 mg IntraVENous Q6H PRN  10/30/2024 Yes    Bacteremia due to Klebsiella pneumoniae 11/1/2024 Yes    Bradycardia 11/1/2024 Yes       43-year-old male with 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 Diff in 3/2024, rectal adenocarcinoma in 3/2024 s/p rectal mass resection in 4/2024. Patient presented on 10/28 with sepsis/neutropenic fever. GI consulted for rectal bleeding.   HIDA on 10/31 was normal  EGD 11/3/24: unremarkable  C. Diff negative on 11/7/24, negative GI pathogens panel on 11/1    HGB 7.2, platelets 38    Discussed with Dr. Yoselin Floyd this morning. That we have reviewed ct, no emergent plans for flex sig given no active bleeding and thrombocytopenia/neutropenia.    Plan:  Trend H/H and monitor for active bleeding  Continue PPI therapy  Consider flex sig if ongoing bleeding noted.  Consider scheduled anti diarrhea agent given negative infectious workup  Further recommendations per Dr. Caballero and see his attestation for further recommendations        Kaya Varghese PA-C    [facility]    [phone]. Also available via [employer]    Please note that some or all of this record was generated using voice recognition software. If there are any questions about the content of this document, please contact the author as some errors in translation may have occurred.

## 2024-11-06 NOTE — PROGRESS NOTES
[facility] Progress Note    2024    Dennys Peguero    :  1980    MRN:  [id number]      Interval Hx:  Mr. Peguero reports pain on urination, which apparently started a few days ago. Melanotic stools, diarrhea, and prandial/post-prandial pain continue.  Last 24 hours:  Tmax:  101.3 °F ( at ), tachycardic, tachypneic, WBC 0.3  Normotensive, saturating well on room air  Intake 1.2 L   mL   mL  Blood cx x 2 and urine cx collected 24 have NGTD  Hgb 7.2, tbilirubin 1.0, PLT 38, INR 1.58  Procalcitonin  24.37 --> 1.93 --> 0.79  EGD was unrevealing  Bone marrow bx today, if time permits.  CT scan today       ECOG PS:  (2) Ambulatory and capable of self care, unable to carry out work activity, up and about > 50% or waking hours    KPS:  70% Cares for self; unable to carry on normal activity or to do active work    Isolation:  Strict contact      Medication:    Scheduled:   DAPTOmycin (CUBICIN) 550 mg in sodium chloride (PF) 0.9 % 11 mL IV syringe  8 mg/kg IntraVENous Q24H    insulin lispro  0-4 Units SubCUTAneous 4x Daily AC & HS    sodium chloride flush  5-40 mL IntraVENous 2 times per day    meropenem  1,000 mg IntraVENous Q8H    Hydrocerin   Topical BID    acyclovir  400 mg Oral BID    pantoprazole  40 mg IntraVENous BID    sodium chloride flush  5-40 mL IntraVENous 2 times per day    Saline Mouthwash  15 mL Swish & Spit 4x Daily AC & HS    vancomycin  125 mg Oral 2 times per day    levETIRAcetam  1,500 mg IntraVENous Q12H    micafungin  50 mg IntraVENous Q24H     Continuous Infusions:   sodium chloride       PRN:  simethicone, ondansetron, prochlorperazine, [Held by provider] loperamide, dicyclomine, polyethylene glycol, sodium chloride, acetaminophen, sodium chloride flush, potassium chloride, magnesium sulfate, Saline Mouthwash, ALTEplase (CATHFLO) 2 mg in sterile water 2 mL injection    ROS:  As noted above, otherwise remainder of 10-point ROS negative    Physical

## 2024-11-07 ENCOUNTER — ANESTHESIA (OUTPATIENT)
Dept: ENDOSCOPY | Age: 44
End: 2024-11-07
Payer: MEDICAID

## 2024-11-07 ENCOUNTER — ANESTHESIA EVENT (OUTPATIENT)
Dept: ENDOSCOPY | Age: 44
End: 2024-11-07
Payer: MEDICAID

## 2024-11-07 ENCOUNTER — APPOINTMENT (OUTPATIENT)
Dept: ENDOSCOPY | Age: 44
End: 2024-11-07
Attending: INTERNAL MEDICINE
Payer: MEDICAID

## 2024-11-07 LAB
ALBUMIN SERPL-MCNC: 2.6 G/DL (ref 3.4–5)
ALP SERPL-CCNC: 161 U/L (ref 40–129)
ALT SERPL-CCNC: 37 U/L (ref 10–40)
ANION GAP SERPL CALCULATED.3IONS-SCNC: 6 MMOL/L (ref 3–16)
APTT BLD: 42.4 SEC (ref 22.1–36.4)
AST SERPL-CCNC: 11 U/L (ref 15–37)
BACTERIA UR CULT: NORMAL
BILIRUB DIRECT SERPL-MCNC: 0.4 MG/DL (ref 0–0.3)
BILIRUB INDIRECT SERPL-MCNC: 0.5 MG/DL (ref 0–1)
BILIRUB SERPL-MCNC: 0.9 MG/DL (ref 0–1)
BLOOD BANK DISPENSE STATUS: NORMAL
BLOOD BANK PRODUCT CODE: NORMAL
BPU ID: NORMAL
BUN SERPL-MCNC: 8 MG/DL (ref 7–20)
C DIFF TOX A+B STL QL IA: NORMAL
CALCIUM SERPL-MCNC: 8.1 MG/DL (ref 8.3–10.6)
CHLORIDE SERPL-SCNC: 102 MMOL/L (ref 99–110)
CO2 SERPL-SCNC: 31 MMOL/L (ref 21–32)
CREAT SERPL-MCNC: 0.6 MG/DL (ref 0.9–1.3)
DEPRECATED RDW RBC AUTO: 13.3 % (ref 12.4–15.4)
DEPRECATED RDW RBC AUTO: 13.3 % (ref 12.4–15.4)
DESCRIPTION BLOOD BANK: NORMAL
FIBRINOGEN PPP-MCNC: 472 MG/DL (ref 227–534)
GFR SERPLBLD CREATININE-BSD FMLA CKD-EPI: >90 ML/MIN/{1.73_M2}
GLUCOSE BLD-MCNC: 101 MG/DL (ref 70–99)
GLUCOSE BLD-MCNC: 106 MG/DL (ref 70–99)
GLUCOSE BLD-MCNC: 129 MG/DL (ref 70–99)
GLUCOSE BLD-MCNC: 161 MG/DL (ref 70–99)
GLUCOSE SERPL-MCNC: 114 MG/DL (ref 70–99)
HCT VFR BLD AUTO: 20 % (ref 40.5–52.5)
HCT VFR BLD AUTO: 23.3 % (ref 40.5–52.5)
HGB BLD-MCNC: 7 G/DL (ref 13.5–17.5)
HGB BLD-MCNC: 8 G/DL (ref 13.5–17.5)
INR PPP: 1.53 (ref 0.85–1.15)
MAGNESIUM SERPL-MCNC: 1.45 MG/DL (ref 1.8–2.4)
MCH RBC QN AUTO: 30.4 PG (ref 26–34)
MCH RBC QN AUTO: 30.4 PG (ref 26–34)
MCHC RBC AUTO-ENTMCNC: 34.5 G/DL (ref 31–36)
MCHC RBC AUTO-ENTMCNC: 35 G/DL (ref 31–36)
MCV RBC AUTO: 86.8 FL (ref 80–100)
MCV RBC AUTO: 87.9 FL (ref 80–100)
PERFORMED ON: ABNORMAL
PHOSPHATE SERPL-MCNC: 2.3 MG/DL (ref 2.5–4.9)
PLATELET # BLD AUTO: 41 K/UL (ref 135–450)
PLATELET # BLD AUTO: 59 K/UL (ref 135–450)
PMV BLD AUTO: 8 FL (ref 5–10.5)
PMV BLD AUTO: 8.2 FL (ref 5–10.5)
POTASSIUM SERPL-SCNC: 3.5 MMOL/L (ref 3.5–5.1)
PROT SERPL-MCNC: 5.2 G/DL (ref 6.4–8.2)
PROTHROMBIN TIME: 18.6 SEC (ref 11.9–14.9)
RBC # BLD AUTO: 2.31 M/UL (ref 4.2–5.9)
RBC # BLD AUTO: 2.65 M/UL (ref 4.2–5.9)
SODIUM SERPL-SCNC: 139 MMOL/L (ref 136–145)
URATE SERPL-MCNC: 1.8 MG/DL (ref 3.5–7.2)
WBC # BLD AUTO: 0.2 K/UL (ref 4–11)
WBC # BLD AUTO: 0.3 K/UL (ref 4–11)

## 2024-11-07 PROCEDURE — 36430 TRANSFUSION BLD/BLD COMPNT: CPT

## 2024-11-07 PROCEDURE — 87081 CULTURE SCREEN ONLY: CPT

## 2024-11-07 PROCEDURE — 87102 FUNGUS ISOLATION CULTURE: CPT

## 2024-11-07 PROCEDURE — 80048 BASIC METABOLIC PNL TOTAL CA: CPT

## 2024-11-07 PROCEDURE — 2580000003 HC RX 258: Performed by: INTERNAL MEDICINE

## 2024-11-07 PROCEDURE — 85027 COMPLETE CBC AUTOMATED: CPT

## 2024-11-07 PROCEDURE — 80076 HEPATIC FUNCTION PANEL: CPT

## 2024-11-07 PROCEDURE — 87116 MYCOBACTERIA CULTURE: CPT

## 2024-11-07 PROCEDURE — 3609010800 HC BRONCHOSCOPY ALVEOLAR LAVAGE: Performed by: INTERNAL MEDICINE

## 2024-11-07 PROCEDURE — 2060000000 HC ICU INTERMEDIATE R&B

## 2024-11-07 PROCEDURE — 31624 DX BRONCHOSCOPE/LAVAGE: CPT | Performed by: INTERNAL MEDICINE

## 2024-11-07 PROCEDURE — 85610 PROTHROMBIN TIME: CPT

## 2024-11-07 PROCEDURE — 87506 IADNA-DNA/RNA PROBE TQ 6-11: CPT

## 2024-11-07 PROCEDURE — 87205 SMEAR GRAM STAIN: CPT

## 2024-11-07 PROCEDURE — 6360000002 HC RX W HCPCS: Performed by: INTERNAL MEDICINE

## 2024-11-07 PROCEDURE — 99233 SBSQ HOSP IP/OBS HIGH 50: CPT | Performed by: INTERNAL MEDICINE

## 2024-11-07 PROCEDURE — 36415 COLL VENOUS BLD VENIPUNCTURE: CPT

## 2024-11-07 PROCEDURE — 6360000002 HC RX W HCPCS

## 2024-11-07 PROCEDURE — 87324 CLOSTRIDIUM AG IA: CPT

## 2024-11-07 PROCEDURE — 7100000001 HC PACU RECOVERY - ADDTL 15 MIN: Performed by: INTERNAL MEDICINE

## 2024-11-07 PROCEDURE — 6370000000 HC RX 637 (ALT 250 FOR IP)

## 2024-11-07 PROCEDURE — 87254 VIRUS INOCULATION SHELL VIA: CPT

## 2024-11-07 PROCEDURE — 2500000003 HC RX 250 WO HCPCS

## 2024-11-07 PROCEDURE — 3700000000 HC ANESTHESIA ATTENDED CARE: Performed by: INTERNAL MEDICINE

## 2024-11-07 PROCEDURE — 85384 FIBRINOGEN ACTIVITY: CPT

## 2024-11-07 PROCEDURE — 87206 SMEAR FLUORESCENT/ACID STAI: CPT

## 2024-11-07 PROCEDURE — 0B998ZZ DRAINAGE OF LINGULA BRONCHUS, VIA NATURAL OR ARTIFICIAL OPENING ENDOSCOPIC: ICD-10-PCS | Performed by: INTERNAL MEDICINE

## 2024-11-07 PROCEDURE — 87070 CULTURE OTHR SPECIMN AEROBIC: CPT

## 2024-11-07 PROCEDURE — 2580000003 HC RX 258

## 2024-11-07 PROCEDURE — 3700000001 HC ADD 15 MINUTES (ANESTHESIA): Performed by: INTERNAL MEDICINE

## 2024-11-07 PROCEDURE — 85730 THROMBOPLASTIN TIME PARTIAL: CPT

## 2024-11-07 PROCEDURE — 83735 ASSAY OF MAGNESIUM: CPT

## 2024-11-07 PROCEDURE — 84100 ASSAY OF PHOSPHORUS: CPT

## 2024-11-07 PROCEDURE — 99232 SBSQ HOSP IP/OBS MODERATE 35: CPT | Performed by: INTERNAL MEDICINE

## 2024-11-07 PROCEDURE — 87305 ASPERGILLUS AG IA: CPT

## 2024-11-07 PROCEDURE — 7100000000 HC PACU RECOVERY - FIRST 15 MIN: Performed by: INTERNAL MEDICINE

## 2024-11-07 PROCEDURE — 0B9D8ZX DRAINAGE OF RIGHT MIDDLE LUNG LOBE, VIA NATURAL OR ARTIFICIAL OPENING ENDOSCOPIC, DIAGNOSTIC: ICD-10-PCS | Performed by: INTERNAL MEDICINE

## 2024-11-07 PROCEDURE — 84550 ASSAY OF BLOOD/URIC ACID: CPT

## 2024-11-07 PROCEDURE — 87449 NOS EACH ORGANISM AG IA: CPT

## 2024-11-07 PROCEDURE — 87633 RESP VIRUS 12-25 TARGETS: CPT

## 2024-11-07 PROCEDURE — 87015 SPECIMEN INFECT AGNT CONCNTJ: CPT

## 2024-11-07 RX ORDER — DEXAMETHASONE SODIUM PHOSPHATE 4 MG/ML
INJECTION, SOLUTION INTRA-ARTICULAR; INTRALESIONAL; INTRAMUSCULAR; INTRAVENOUS; SOFT TISSUE
Status: DISCONTINUED | OUTPATIENT
Start: 2024-11-07 | End: 2024-11-07 | Stop reason: SDUPTHER

## 2024-11-07 RX ORDER — SUCCINYLCHOLINE CHLORIDE 20 MG/ML
INJECTION INTRAMUSCULAR; INTRAVENOUS
Status: DISCONTINUED | OUTPATIENT
Start: 2024-11-07 | End: 2024-11-07 | Stop reason: SDUPTHER

## 2024-11-07 RX ORDER — SODIUM CHLORIDE 9 MG/ML
INJECTION, SOLUTION INTRAVENOUS PRN
Status: CANCELLED | OUTPATIENT
Start: 2024-11-07

## 2024-11-07 RX ORDER — LIDOCAINE HYDROCHLORIDE 20 MG/ML
INJECTION, SOLUTION INFILTRATION; PERINEURAL
Status: DISCONTINUED | OUTPATIENT
Start: 2024-11-07 | End: 2024-11-07 | Stop reason: SDUPTHER

## 2024-11-07 RX ORDER — PROPOFOL 10 MG/ML
INJECTION, EMULSION INTRAVENOUS
Status: DISCONTINUED | OUTPATIENT
Start: 2024-11-07 | End: 2024-11-07 | Stop reason: SDUPTHER

## 2024-11-07 RX ORDER — ONDANSETRON 2 MG/ML
INJECTION INTRAMUSCULAR; INTRAVENOUS
Status: DISCONTINUED | OUTPATIENT
Start: 2024-11-07 | End: 2024-11-07 | Stop reason: SDUPTHER

## 2024-11-07 RX ORDER — SODIUM CHLORIDE 9 MG/ML
INJECTION, SOLUTION INTRAVENOUS PRN
Status: COMPLETED | OUTPATIENT
Start: 2024-11-07 | End: 2024-11-07

## 2024-11-07 RX ORDER — SODIUM CHLORIDE 0.9 % (FLUSH) 0.9 %
5-40 SYRINGE (ML) INJECTION EVERY 12 HOURS SCHEDULED
Status: CANCELLED | OUTPATIENT
Start: 2024-11-07

## 2024-11-07 RX ORDER — NALOXONE HYDROCHLORIDE 0.4 MG/ML
INJECTION, SOLUTION INTRAMUSCULAR; INTRAVENOUS; SUBCUTANEOUS PRN
Status: CANCELLED | OUTPATIENT
Start: 2024-11-07

## 2024-11-07 RX ORDER — DICYCLOMINE HCL 20 MG
20 TABLET ORAL 4 TIMES DAILY
Status: DISCONTINUED | OUTPATIENT
Start: 2024-11-07 | End: 2024-11-15

## 2024-11-07 RX ORDER — SODIUM CHLORIDE 0.9 % (FLUSH) 0.9 %
5-40 SYRINGE (ML) INJECTION PRN
Status: CANCELLED | OUTPATIENT
Start: 2024-11-07

## 2024-11-07 RX ORDER — ONDANSETRON 2 MG/ML
4 INJECTION INTRAMUSCULAR; INTRAVENOUS
Status: CANCELLED | OUTPATIENT
Start: 2024-11-07 | End: 2024-11-08

## 2024-11-07 RX ORDER — LANOLIN ALCOHOL/MO/W.PET/CERES
400 CREAM (GRAM) TOPICAL ONCE
Status: COMPLETED | OUTPATIENT
Start: 2024-11-07 | End: 2024-11-07

## 2024-11-07 RX ORDER — FENTANYL CITRATE 50 UG/ML
INJECTION, SOLUTION INTRAMUSCULAR; INTRAVENOUS
Status: DISCONTINUED | OUTPATIENT
Start: 2024-11-07 | End: 2024-11-07 | Stop reason: SDUPTHER

## 2024-11-07 RX ADMIN — DICYCLOMINE HYDROCHLORIDE 20 MG: 20 TABLET ORAL at 20:30

## 2024-11-07 RX ADMIN — MEROPENEM 1000 MG: 1 INJECTION INTRAVENOUS at 21:55

## 2024-11-07 RX ADMIN — DIBASIC SODIUM PHOSPHATE, MONOBASIC POTASSIUM PHOSPHATE AND MONOBASIC SODIUM PHOSPHATE 2 TABLET: 852; 155; 130 TABLET ORAL at 08:31

## 2024-11-07 RX ADMIN — SODIUM CHLORIDE, PRESERVATIVE FREE 10 ML: 5 INJECTION INTRAVENOUS at 09:00

## 2024-11-07 RX ADMIN — MICAFUNGIN SODIUM 50 MG: 100 INJECTION, POWDER, LYOPHILIZED, FOR SOLUTION INTRAVENOUS at 23:09

## 2024-11-07 RX ADMIN — DICYCLOMINE HYDROCHLORIDE 20 MG: 20 TABLET ORAL at 16:29

## 2024-11-07 RX ADMIN — LIDOCAINE HYDROCHLORIDE 100 MG: 20 INJECTION, SOLUTION INFILTRATION; PERINEURAL at 15:07

## 2024-11-07 RX ADMIN — MEROPENEM 1000 MG: 1 INJECTION INTRAVENOUS at 16:29

## 2024-11-07 RX ADMIN — PROPOFOL 150 MG: 10 INJECTION, EMULSION INTRAVENOUS at 15:07

## 2024-11-07 RX ADMIN — MEROPENEM 1000 MG: 1 INJECTION INTRAVENOUS at 06:41

## 2024-11-07 RX ADMIN — SODIUM CHLORIDE, PRESERVATIVE FREE 10 ML: 5 INJECTION INTRAVENOUS at 20:30

## 2024-11-07 RX ADMIN — Medication 400 MG: at 08:31

## 2024-11-07 RX ADMIN — DEXAMETHASONE SODIUM PHOSPHATE 4 MG: 4 INJECTION INTRA-ARTICULAR; INTRALESIONAL; INTRAMUSCULAR; INTRAVENOUS; SOFT TISSUE at 15:07

## 2024-11-07 RX ADMIN — VANCOMYCIN HYDROCHLORIDE 125 MG: 250 POWDER, FOR SOLUTION ORAL at 20:30

## 2024-11-07 RX ADMIN — FENTANYL CITRATE 50 MCG: 50 INJECTION, SOLUTION INTRAMUSCULAR; INTRAVENOUS at 15:25

## 2024-11-07 RX ADMIN — DICYCLOMINE HYDROCHLORIDE 20 MG: 20 TABLET ORAL at 10:51

## 2024-11-07 RX ADMIN — Medication 400 MG: at 20:30

## 2024-11-07 RX ADMIN — SUCCINYLCHOLINE CHLORIDE 160 MG: 20 INJECTION, SOLUTION INTRAMUSCULAR; INTRAVENOUS at 15:07

## 2024-11-07 RX ADMIN — LEVETIRACETAM 1500 MG: 100 INJECTION INTRAVENOUS at 20:30

## 2024-11-07 RX ADMIN — SODIUM CHLORIDE: 9 INJECTION, SOLUTION INTRAVENOUS at 15:07

## 2024-11-07 RX ADMIN — VANCOMYCIN HYDROCHLORIDE 125 MG: 250 POWDER, FOR SOLUTION ORAL at 08:31

## 2024-11-07 RX ADMIN — SODIUM CHLORIDE, PRESERVATIVE FREE 10 ML: 5 INJECTION INTRAVENOUS at 08:31

## 2024-11-07 RX ADMIN — LEVETIRACETAM 1500 MG: 100 INJECTION INTRAVENOUS at 08:30

## 2024-11-07 RX ADMIN — ONDANSETRON 4 MG: 2 INJECTION INTRAMUSCULAR; INTRAVENOUS at 15:07

## 2024-11-07 RX ADMIN — SODIUM CHLORIDE: 9 INJECTION, SOLUTION INTRAVENOUS at 14:50

## 2024-11-07 RX ADMIN — PANTOPRAZOLE SODIUM 40 MG: 40 INJECTION, POWDER, FOR SOLUTION INTRAVENOUS at 20:30

## 2024-11-07 RX ADMIN — PANTOPRAZOLE SODIUM 40 MG: 40 INJECTION, POWDER, FOR SOLUTION INTRAVENOUS at 08:31

## 2024-11-07 ASSESSMENT — PAIN SCALES - GENERAL
PAINLEVEL_OUTOF10: 0

## 2024-11-07 ASSESSMENT — PAIN - FUNCTIONAL ASSESSMENT
PAIN_FUNCTIONAL_ASSESSMENT: NONE - DENIES PAIN
PAIN_FUNCTIONAL_ASSESSMENT: NONE - DENIES PAIN

## 2024-11-07 NOTE — PROGRESS NOTES
Patient admitted to PACU # 8 from OR at 1541 post BRONCHOSCOPY ALVEOLAR LAVAGE per Michael Perez MD .  Attached to PACU monitoring system and report received from anesthesia provider.  Patient was reported to be hemodynamically stable during procedure.  Patient drowsy on admission and denied pain.

## 2024-11-07 NOTE — PROGRESS NOTES
[facility] Progress Note    2024    Dennys Peguero    :  1980    MRN:  [id number]      Interval Hx:  Mr. Peguero reports ongoing melanotic stools, diarrhea, and prandial/post-prandial pain; mucous from nares with cough.  Last 24 hours:  Tmax:  101.3 °F ( at 1700), intermittently tachypneic, WBC 0.3  Normotensive, saturating well on room air  Intake 1.2 L  UOP 1.15 L   --> 325 mL - GI pathogens panel & C. difficile toxin/Ag sent. Hold Lomotil pending results.  Hgb 7.0, PLT 59, INR 1.53  Pulmonology consult for bronchoscopy pending  CMV, Fungitel ordered         ECOG PS:  (2) Ambulatory and capable of self care, unable to carry out work activity, up and about > 50% or waking hours    KPS:  70% Cares for self; unable to carry on normal activity or to do active work    Isolation:  Strict contact      Medication:    Scheduled:   insulin lispro  0-4 Units SubCUTAneous 4x Daily AC & HS    sodium chloride flush  5-40 mL IntraVENous 2 times per day    meropenem  1,000 mg IntraVENous Q8H    Hydrocerin   Topical BID    acyclovir  400 mg Oral BID    pantoprazole  40 mg IntraVENous BID    sodium chloride flush  5-40 mL IntraVENous 2 times per day    Saline Mouthwash  15 mL Swish & Spit 4x Daily AC & HS    vancomycin  125 mg Oral 2 times per day    levETIRAcetam  1,500 mg IntraVENous Q12H    micafungin  50 mg IntraVENous Q24H     Continuous Infusions:   sodium chloride      sodium chloride       PRN:  sodium chloride, simethicone, ondansetron, prochlorperazine, [Held by provider] loperamide, dicyclomine, polyethylene glycol, sodium chloride, acetaminophen, sodium chloride flush, potassium chloride, magnesium sulfate, Saline Mouthwash, ALTEplase (CATHFLO) 2 mg in sterile water 2 mL injection      ROS:  As noted above, otherwise remainder of 10-point ROS negative    Physical Exam:    I&O:    Intake/Output Summary (Last 24 hours) at 2024 0621  Last data filed at 2024 0444  Gross per 24  CT chest/abd/pelvis w contrast - for recurrent fever of unclear source:  New nodule of R apex 7 mm.  NAN cavitary nodule ~11 mm.  New airspace consolidative opacities of NAN, RML, & LLL.  Dependent consolidation or atelectasis of RLL, unchanged.  Small B/L pleural effusions, increased in the interval.  Fluid attenuation of the colon.                10/28/24, CT chest w/o contrast:  Unable to clearly visualize the left upper lobe for comparison on prior CT chest dated 24.        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 coli bacteremia (24)    Strong family history of breast, ovarian, & lung cancer on maternal side:  Mother  d/t breast cancer- age 23 ()  Maternal cousin - breast and ovarian cancer ( )  Maternal aunt - breast and ovarian cancer ( )  Maternal grandfather - breast cancer ( late )      TREATMENT         Oligodendroglioma  L frontoparietal partial resection 17  Partial brain irradiation (total 5400 cGy) completed 19 [facility]  Temodar:  -2/12/19  3/27-3/30/19: Cycle 1 Temodar 150 mg/m2 (4 day cycle d/t thrombocytopenia)  -19: Cycle 2 Temodar 150 mg/m2      Treatment-related AML with underlying MDS  Induction - Dacogen + Venetoclax (3/12/24, dose adjust for drug interaction)  C1D1 - 24    Rectal Adenocarcinoma - stage IIA, intact MMR gene expression  LAR with colonic anastomosis 24      ASSESSMENT AND PLAN       Briefly, Mr. Peguero is a 44 y/o M who presented to the ED on 10/28/24 with a 3-day h/o malaise, decreased oral intake, including the inability to take his medication appropriately.  He was admitted to the [facility] with SEPTIC SHOCK in the setting of a neutropenic fever and quickly decompensated,

## 2024-11-07 NOTE — PLAN OF CARE
Problem: Safety - Adult  Goal: Free from fall injury  11/7/2024 0739 by Jeanette Teran, RN  Outcome: Progressing  Flowsheets (Taken 11/7/2024 0739)  Free From Fall Injury: Instruct family/caregiver on patient safety  Note:  Pt is a High fall risk. See Apple Fall Score and ABCDS Injury Risk assessments.   + Screening for Orthostasis and/or + High Fall Risk per APPLE/ABCDS: Explained fall risk precautions to pt and family and rationale behind their use to keep the patient safe. Pt bed is in low position, side rails up, call light and belongings are in reach. Fall wristband applied and present on pts wrist.  Bed alarm on.  Pt encouraged to call for assistance. Will continue with hourly rounds for PO intake, pain needs, toileting and repositioning as needed.        Problem: Pain  Goal: Verbalizes/displays adequate comfort level or baseline comfort level  11/7/2024 0739 by Jeanette Teran, RN  Outcome: Progressing  Note: No pain reported by patient this shift.

## 2024-11-07 NOTE — PLAN OF CARE
Problem: Hematologic - Adult  Goal: Maintains hematologic stability  11/6/2024 1949 by Paula Bowles, RN  Outcome: Not Progressing  Note: Patient's hemoglobin this AM:   Recent Labs     11/06/24 1748   HGB 7.5*     Patient's platelet count this AM:   Recent Labs     11/06/24 1748   PLT 44*    Thrombocytopenia Precautions in place.  Patient showing no signs or symptoms of active bleeding.  Patient transfused blood products per orders - see flowsheet.  Patient verbalizes understanding of all instructions. Will continue to assess and implement POC. Call light within reach and hourly rounding in place.      Problem: Safety - Adult  Goal: Free from fall injury  11/6/2024 1949 by Paula Bowles, RN  Outcome: Progressing  Note: Pt is a Med fall risk. See Apple Fall Score and ABCDS Injury Risk assessments.   + Screening for Orthostasis and/or + High Fall Risk per APPLE/ABCDS: Explained fall risk precautions to pt and family and rationale behind their use to keep the patient safe. Pt bed is in low position, side rails up, call light and belongings are in reach. Fall wristband applied and present on pts wrist.  Bed alarm on.  Pt encouraged to call for assistance. Will continue with hourly rounds for PO intake, pain needs, toileting and repositioning as needed.      Problem: Nutrition Deficit:  Goal: Optimize nutritional status  11/6/2024 1949 by Paula Bowles, RN  Outcome: Not Progressing  Note: Pt not tolerating much PO intake. Every time he eats or drinks, he has abdominal pain.      Problem: Pain  Goal: Verbalizes/displays adequate comfort level or baseline comfort level  11/6/2024 1949 by Paula Bowles, RN  Outcome: Progressing  Note: Patient given bentyl for pain. Pain is severe eating and calms after using bathroom.

## 2024-11-07 NOTE — PROGRESS NOTES
PACU Transfer to Floor Note    Procedure(s):  BRONCHOSCOPY ALVEOLAR LAVAGE    Current Allergies: [place] calin    Pt meets criteria as per Dilia Score and ASPAN Standards to transfer to next phase of care.     Recent Labs     11/07/24  0646 11/07/24  1048   POCGLU 106* 129*       Vitals:    11/07/24 1600   BP: (!) 144/83   Pulse: 91   Resp: 20   Temp: 97.5 °F (36.4 °C)   SpO2: 92%     Vitals within 20% of pt's admission vitals as per DILIA SCORE    SpO2: 92 %    O2 Flow Rate (L/min): 0 L/min      Intake/Output Summary (Last 24 hours) at 11/7/2024 1607  Last data filed at 11/7/2024 1536  Gross per 24 hour   Intake 2980.7 ml   Output 1602 ml   Net 1378.7 ml       Pain assessment:  none    Pain Level: 0    Patient was assessed for alterations to skin integrity. There were not alterations observed.    Is patient incontinent: no    Handoff report given at bedside.   Family updated and directed to pt room      11/7/2024 4:07 PM

## 2024-11-07 NOTE — ANESTHESIA POSTPROCEDURE EVALUATION
Department of Anesthesiology  Postprocedure Note    Patient: Dennys Peguero  MRN: [id number]  YOB: 1980  Date of evaluation: 11/7/2024    Procedure Summary       Date: 11/07/24 Room / Location: [place] [address] / [facility]    Anesthesia Start: 1446 Anesthesia Stop: 1544    Procedure: BRONCHOSCOPY ALVEOLAR LAVAGE Diagnosis:       Multifocal pneumonia      (Multifocal pneumonia [J18.9])    Surgeons: Michael Perez MD Responsible Provider: Obed Bruce MD    Anesthesia Type: general ASA Status: 3            Anesthesia Type: No value filed.    Lopez Phase I: Lopez Score: 10    Lopez Phase II: Lopez Score: 10    Anesthesia Post Evaluation    Patient location during evaluation: PACU  Patient participation: complete - patient participated  Level of consciousness: awake  Pain score: 2  Airway patency: patent  Cardiovascular status: blood pressure returned to baseline  Respiratory status: acceptable  Hydration status: euvolemic  Pain management: adequate    No notable events documented.   The patient is a 1y5m Female complaining of head trauma.

## 2024-11-07 NOTE — ANESTHESIA PRE PROCEDURE
Department of Anesthesiology  Preprocedure Note       Name:  Dennys Peguero   Age:  44 y.o.  :  1980                                          MRN:  [id number]         Date:  2024      Surgeon: Surgeon(s):  Michael Perez MD    Procedure: Procedure(s):  BRONCHOSCOPY ALVEOLAR LAVAGE    Medications prior to admission:   Prior to Admission medications    Medication Sig Start Date End Date Taking? Authorizing Provider   vancomycin (VANCOCIN) 125 MG capsule Take 1 capsule by mouth in the morning and at bedtime 10/14/24 12/13/24  Shanthi Flowers APRN - CNP   levETIRAcetam (KEPPRA) 750 MG tablet Take 2 tablets by mouth 2 times daily 10/2/24   Gary Gore MD   ondansetron (ZOFRAN-ODT) 4 MG disintegrating tablet Place 1 tablet under the tongue 3 times daily as needed for Nausea or Vomiting 24   Rakesh Montano MD   Hydrocerin (EUCERIN) CREA cream Apply topically 2 times daily 24   Lei Tiwari MD   pantoprazole (PROTONIX) 40 MG tablet Take 1 tablet by mouth 2 times daily (before meals) 24   Lei Tiwari MD   prochlorperazine (COMPAZINE) 10 MG tablet Take 1 tablet by mouth every 4 hours as needed (Nausea / Vomiting) 24   Lei Tiwari MD   valACYclovir (VALTREX) 500 MG tablet Take 1 tablet by mouth 2 times daily 24   Lei Tiwari MD   vitamin B-12 1000 MCG tablet Take 1 tablet by mouth daily 4/3/24   Lei Tiwari MD       Current medications:    Current Facility-Administered Medications   Medication Dose Route Frequency Provider Last Rate Last Admin   • 0.9 % sodium chloride infusion   IntraVENous PRN Eduardo Nguyen MD       • dicyclomine (BENTYL) tablet 20 mg  20 mg Oral 4x Daily Yoselin Floyd MD   20 mg at 24 1051   • 0.9 % sodium chloride infusion   IntraVENous PRN Eduardo Nguyen MD       • simethicone (MYLICON) chewable tablet 80 mg  80 mg Oral Q6H PRN Polina Porter APRN - CNP   80 mg at 24 1139   • ondansetron (ZOFRAN-ODT) disintegrating

## 2024-11-07 NOTE — CARE COORDINATION
Discussed plan of care with treatment team.     Pt is planning on returning home with his father when able.     SW will follow    JENNIFER Ortiz   for [facility] ([facility])  [employer] Mobile: [phone]

## 2024-11-07 NOTE — PROCEDURES
BRONCHOSCOPIC PROCEDURE NOTE:   Procedure(s) performed:  24069 - Dx bronchoscope/BAL    Preprocedure diagnosis: PNA  Post procedure diagnosis: Same    DESCRIPTION OF PROCEDURE:   - Consent:  Informed consent was obtained from the patient  after explaining the risks and benefits. A time out was taken.    - Sedation:  Bronchoscopy performed under GA, please refer to anesthesiology notes for details.     - Procedure details:  Bronchoscope was inserted into the main airway via the ETT.  Vocal cords were note assessed due to ETT presence. Lidocaine was was not used. The bronchial trees were examined to the subsegmental level.   The bronchoscope was advanced and wedged against the RML, a total of 100 ml of NS were instilled and a total of 45 ml were obtained in return.   Then the bronchoscope was advanced and wedged against the lingula, a total of 100 ml of NS were instilled and a total of 40 ml were obtained in return.    The rest of the saline in airways was sent for microbiology as wash (about 15 ml total)    - Findings:  Airway mucosa Atrophic  Endobronchial lesions: Not visualized  Lymphadenopathy: Not evaluated    - Samples:  BAL  Bronchial wash    Sent for Microbiology and Cytology    The patient tolerated the procedure well. No immediate complication    EBL: 0 ml     Michael Pretty \"Stephane\" Corine Dc MD  Pulmonary and Critical Care Medicine

## 2024-11-07 NOTE — PLAN OF CARE
Problem: Pain  Goal: Verbalizes/displays adequate comfort level or baseline comfort level  11/7/2024 1543 by Alma Rosa Emanuel RN  Outcome: Progressing  Flowsheets (Taken 11/3/2024 1608 by Andi Alston RN)  Verbalizes/displays adequate comfort level or baseline comfort level:   Encourage patient to monitor pain and request assistance   Assess pain using appropriate pain scale  Note: Patient has denied pain throughout shift up until this point.  11/7/2024 0739 by Jeanette Teran RN  Outcome: Progressing  Note: No pain reported by patient this shift.     Problem: Safety - Adult  Goal: Free from fall injury  11/7/2024 1543 by Alma Rosa Emanuel RN  Outcome: Progressing  Flowsheets (Taken 11/7/2024 0739 by Jeanette Teran RN)  Free From Fall Injury: Instruct family/caregiver on patient safety  Note: + High Fall Risk per APPLE/ABCDS: Explained fall risk precautions to patient and rationale behind their use to keep the patient safe. Patient's bed is in low position, side rails up, call light and belongings are in reach.  Bed alarm on. Encouraged patient to call for assistance as needed.  11/7/2024 0739 by Jeanette Teran RN  Outcome: Progressing  Flowsheets (Taken 11/7/2024 0739)  Free From Fall Injury: Instruct family/caregiver on patient safety  Note:  Pt is a High fall risk. See Apple Fall Score and ABCDS Injury Risk assessments.   + Screening for Orthostasis and/or + High Fall Risk per APPLE/ABCDS: Explained fall risk precautions to pt and family and rationale behind their use to keep the patient safe. Pt bed is in low position, side rails up, call light and belongings are in reach. Fall wristband applied and present on pts wrist.  Bed alarm on.  Pt encouraged to call for assistance. Will continue with hourly rounds for PO intake, pain needs, toileting and repositioning as needed.        Problem: Skin/Tissue Integrity - Adult  Goal: Skin integrity remains intact  Outcome: Progressing  Flowsheets  of physical injury  Outcome: Progressing  Flowsheets (Taken 11/1/2024 0800 by Belgica Castrejon, RN)  Absence of Physical Injury: Implement safety measures based on patient assessment     Problem: Nutrition Deficit:  Goal: Optimize nutritional status  Outcome: Progressing  Flowsheets (Taken 11/3/2024 1608 by Andi Alston, RN)  Nutrient intake appropriate for improving, restoring, or maintaining nutritional needs: Assess nutritional status and recommend course of action  Note: Patient consumed % of breakfast clear liquid ensure. Currently patient is NPO for bronch.     Problem: Coping  Goal: Pt/Family able to verbalize concerns and demonstrate effective coping strategies  Description: INTERVENTIONS:  1. Assist patient/family to identify coping skills, available support systems and cultural and spiritual values  2. Provide emotional support, including active listening and acknowledgement of concerns of patient and caregivers  3. Reduce environmental stimuli, as able  4. Instruct patient/family in relaxation techniques, as appropriate  5. Assess for spiritual pain/suffering and initiate Spiritual Care, Psychosocial Clinical Specialist consults as needed  Outcome: Progressing

## 2024-11-07 NOTE — PLAN OF CARE
Problem: Hematologic - Adult  Goal: Maintains hematologic stability  11/6/2024 1949 by Paula Bowles, RN  Outcome: Not Progressing     Problem: Skin/Tissue Integrity  Goal: Absence of new skin breakdown  Description: 1.  Monitor for areas of redness and/or skin breakdown  2.  Assess vascular access sites hourly  3.  Every 4-6 hours minimum:  Change oxygen saturation probe site  4.  Every 4-6 hours:  If on nasal continuous positive airway pressure, respiratory therapy assess nares and determine need for appliance change or resting period.  Outcome: Progressing     Problem: ABCDS Injury Assessment  Goal: Absence of physical injury  Outcome: Progressing     Problem: Nutrition Deficit:  Goal: Optimize nutritional status  Outcome: Not Progressing

## 2024-11-07 NOTE — PROGRESS NOTES
Pulmonary Medicine Follow-up Note    CC: Bilateral infiltrates    SUBJECTIVE / INTERVAL HISTORY:  States he feels ok, congested but no significant issues reported, appears weak.     ROS:  Denies headache, nausea or chest pain.    24HR INTAKE/OUTPUT:    Intake/Output Summary (Last 24 hours) at 2024 1106  Last data filed at 2024 0715  Gross per 24 hour   Intake 1186.67 ml   Output 1500 ml   Net -313.33 ml        dicyclomine  20 mg Oral 4x Daily    insulin lispro  0-4 Units SubCUTAneous 4x Daily AC & HS    sodium chloride flush  5-40 mL IntraVENous 2 times per day    meropenem  1,000 mg IntraVENous Q8H    Hydrocerin   Topical BID    acyclovir  400 mg Oral BID    pantoprazole  40 mg IntraVENous BID    sodium chloride flush  5-40 mL IntraVENous 2 times per day    Saline Mouthwash  15 mL Swish & Spit 4x Daily AC & HS    vancomycin  125 mg Oral 2 times per day    levETIRAcetam  1,500 mg IntraVENous Q12H    micafungin  50 mg IntraVENous Q24H       PHYSICAL EXAMINATION:  /79   Pulse 92   Temp 99.2 °F (37.3 °C) (Oral)   Resp 23   Ht 1.778 m (5' 10\")   Wt 77.8 kg (171 lb 9.6 oz)   SpO2 99%   BMI 24.62 kg/m²   CURRENT PULSE OXIMETRY:  SpO2: 99 %  24HR PULSE OXIMETRY RANGE:  SpO2  Av.8 %  Min: 95 %  Max: 100 %     Gen: No distress. Speaking in full sentences without accessory muscle use  HEENT: PERRL, EOMI, OP nl  Lung:  diminished bl, minimally coarse at bases.   CV: RRR without M/R/R  Abd: +BS, soft, NT/ND  Ext: No edema.    DATA  CBC:   Recent Labs     24  0438 24  1509 24  1748 24  0537   WBC 0.3*  --  0.3* 0.3*   HGB 7.2*  --  7.5* 7.0*   HCT 20.8*  --  22.0* 20.0*   MCV 88.5  --  87.4 86.8   PLT 38* 51* 44* 59*     BMP:   Recent Labs     24  0433 24  0438 24  0537    139 139   K 3.3* 3.5 3.5    104 102   CO2 30 30 31   PHOS 2.5 2.4* 2.3*   BUN 11 11 8   CREATININE 0.5* 0.5* 0.6*     No results for input(s): \"PHART\", \"[id number]\", \"PO2ART\" in the

## 2024-11-07 NOTE — PROGRESS NOTES
ID Follow-up NOTE    CC:   AMS  Antibiotics: Meropenem, micafungin, acyclovir, p.o. vancomycin    Admit Date: 10/28/2024  Hospital Day: 11    Subjective:     Patient continues with low grade temps, around  99.4. planned for BAL with bronchoscopy today. Denies any worsening cough or SOB.     Objective:     Patient Vitals for the past 8 hrs:   BP Temp Temp src Pulse Resp SpO2   11/07/24 1051 139/79 99.2 °F (37.3 °C) Oral 92 23 99 %   11/07/24 0843 136/80 99.4 °F (37.4 °C) Oral 69 27 96 %   11/07/24 0828 136/71 99.3 °F (37.4 °C) Oral 71 22 95 %   11/07/24 0816 121/67 99.7 °F (37.6 °C) Oral 80 20 95 %   11/07/24 0444 (!) 141/85 99.4 °F (37.4 °C) Oral 81 13 97 %     I/O last 3 completed shifts:  In: 1394.7 [I.V.:895; Blood:499.7]  Out: 1925 [Urine:1550; Stool:375]  I/O this shift:  In: -   Out: 250 [Urine:150; Stool:100]    EXAM:  GENERAL: No apparent distress.  Ill appearing. Pt is fatigued easily.   HEENT: Membranes moist, no oral lesion  NECK:  Supple, no lymphadenopathy  LUNGS: Clear b/l, no rales, no dullness, on room air  CARDIAC: RRR, no murmur appreciated  ABD:  + BS, soft, tender to palpation in the mid abdomen region.  EXT:  No rash, no edema, no lesions  NEURO: No focal neurologic findings  PSYCH: Orientation normal, drowsy.   LINES:  Peripheral iv    Data Review:  Lab Results   Component Value Date    WBC 0.3 (LL) 11/07/2024    HGB 7.0 (L) 11/07/2024    HCT 20.0 (LL) 11/07/2024    MCV 86.8 11/07/2024    PLT 59 (L) 11/07/2024     Lab Results   Component Value Date    CREATININE 0.6 (L) 11/07/2024    BUN 8 11/07/2024     11/07/2024    K 3.5 11/07/2024     11/07/2024    CO2 31 11/07/2024       Hepatic Function Panel:   Lab Results   Component Value Date/Time    ALKPHOS 161 11/07/2024 05:37 AM    ALT 37 11/07/2024 05:37 AM    AST 11 11/07/2024 05:37 AM    BILITOT 0.9 11/07/2024 05:37 AM    BILIDIR 0.4 11/07/2024 05:37 AM    IBILI 0.5 11/07/2024 05:37 AM       MICRO:  C dif toxin/ag 11/7:

## 2024-11-08 LAB
ALBUMIN SERPL-MCNC: 2.8 G/DL (ref 3.4–5)
ALP SERPL-CCNC: 172 U/L (ref 40–129)
ALT SERPL-CCNC: 32 U/L (ref 10–40)
ANION GAP SERPL CALCULATED.3IONS-SCNC: 6 MMOL/L (ref 3–16)
APTT BLD: 42 SEC (ref 22.1–36.4)
AST SERPL-CCNC: 8 U/L (ref 15–37)
BACTERIA BLD CULT ORG #2: NORMAL
BACTERIA BLD CULT: NORMAL
BACTERIA THROAT AEROBE CULT: NORMAL
BILIRUB DIRECT SERPL-MCNC: 0.5 MG/DL (ref 0–0.3)
BILIRUB INDIRECT SERPL-MCNC: 0.4 MG/DL (ref 0–1)
BILIRUB SERPL-MCNC: 0.9 MG/DL (ref 0–1)
BLOOD BANK DISPENSE STATUS: NORMAL
BLOOD BANK PRODUCT CODE: NORMAL
BPU ID: NORMAL
BUN SERPL-MCNC: 10 MG/DL (ref 7–20)
CALCIUM SERPL-MCNC: 8.2 MG/DL (ref 8.3–10.6)
CHLORIDE SERPL-SCNC: 99 MMOL/L (ref 99–110)
CO2 SERPL-SCNC: 30 MMOL/L (ref 21–32)
CREAT SERPL-MCNC: 0.5 MG/DL (ref 0.9–1.3)
CRYPTOC AG SER QL IA: NEGATIVE
DEPRECATED RDW RBC AUTO: 13 % (ref 12.4–15.4)
DEPRECATED RDW RBC AUTO: 13.4 % (ref 12.4–15.4)
DESCRIPTION BLOOD BANK: NORMAL
FIBRINOGEN PPP-MCNC: 430 MG/DL (ref 227–534)
FINAL REPORT: NORMAL
GFR SERPLBLD CREATININE-BSD FMLA CKD-EPI: >90 ML/MIN/{1.73_M2}
GI PATHOGENS PNL STL NAA+PROBE: NORMAL
GLUCOSE BLD-MCNC: 100 MG/DL (ref 70–99)
GLUCOSE BLD-MCNC: 115 MG/DL (ref 70–99)
GLUCOSE BLD-MCNC: 89 MG/DL (ref 70–99)
GLUCOSE BLD-MCNC: 95 MG/DL (ref 70–99)
GLUCOSE SERPL-MCNC: 159 MG/DL (ref 70–99)
HCT VFR BLD AUTO: 22.8 % (ref 40.5–52.5)
HCT VFR BLD AUTO: 24.4 % (ref 40.5–52.5)
HGB BLD-MCNC: 7.9 G/DL (ref 13.5–17.5)
HGB BLD-MCNC: 8.4 G/DL (ref 13.5–17.5)
INR PPP: 1.51 (ref 0.85–1.15)
LDH SERPL L TO P-CCNC: 83 U/L (ref 100–190)
MAGNESIUM SERPL-MCNC: 1.47 MG/DL (ref 1.8–2.4)
MCH RBC QN AUTO: 30 PG (ref 26–34)
MCH RBC QN AUTO: 30.3 PG (ref 26–34)
MCHC RBC AUTO-ENTMCNC: 34.3 G/DL (ref 31–36)
MCHC RBC AUTO-ENTMCNC: 34.5 G/DL (ref 31–36)
MCV RBC AUTO: 87.4 FL (ref 80–100)
MCV RBC AUTO: 87.8 FL (ref 80–100)
PERFORMED ON: ABNORMAL
PERFORMED ON: ABNORMAL
PERFORMED ON: NORMAL
PERFORMED ON: NORMAL
PHOSPHATE SERPL-MCNC: 2.5 MG/DL (ref 2.5–4.9)
PLATELET # BLD AUTO: 38 K/UL (ref 135–450)
PLATELET # BLD AUTO: 50 K/UL (ref 135–450)
PLATELET # BLD AUTO: 63 K/UL (ref 135–450)
PMV BLD AUTO: 8.4 FL (ref 5–10.5)
PMV BLD AUTO: 9.2 FL (ref 5–10.5)
POTASSIUM SERPL-SCNC: 3.5 MMOL/L (ref 3.5–5.1)
PRELIMINARY: NORMAL
PROT SERPL-MCNC: 5.5 G/DL (ref 6.4–8.2)
PROTHROMBIN TIME: 18.4 SEC (ref 11.9–14.9)
RBC # BLD AUTO: 2.59 M/UL (ref 4.2–5.9)
RBC # BLD AUTO: 2.79 M/UL (ref 4.2–5.9)
REPORT: NORMAL
REPORT: NORMAL
RESP PATH DNA+RNA PNL L RESP NAA+NON-PRB: NORMAL
RESP PATH DNA+RNA PNL L RESP NAA+NON-PRB: NORMAL
SODIUM SERPL-SCNC: 135 MMOL/L (ref 136–145)
URATE SERPL-MCNC: 1.8 MG/DL (ref 3.5–7.2)
WBC # BLD AUTO: 0.4 K/UL (ref 4–11)
WBC # BLD AUTO: 0.4 K/UL (ref 4–11)

## 2024-11-08 PROCEDURE — 85730 THROMBOPLASTIN TIME PARTIAL: CPT

## 2024-11-08 PROCEDURE — 85610 PROTHROMBIN TIME: CPT

## 2024-11-08 PROCEDURE — 36415 COLL VENOUS BLD VENIPUNCTURE: CPT

## 2024-11-08 PROCEDURE — 6370000000 HC RX 637 (ALT 250 FOR IP)

## 2024-11-08 PROCEDURE — 2060000000 HC ICU INTERMEDIATE R&B

## 2024-11-08 PROCEDURE — 85384 FIBRINOGEN ACTIVITY: CPT

## 2024-11-08 PROCEDURE — 84100 ASSAY OF PHOSPHORUS: CPT

## 2024-11-08 PROCEDURE — 87449 NOS EACH ORGANISM AG IA: CPT

## 2024-11-08 PROCEDURE — 83615 LACTATE (LD) (LDH) ENZYME: CPT

## 2024-11-08 PROCEDURE — 99232 SBSQ HOSP IP/OBS MODERATE 35: CPT | Performed by: INTERNAL MEDICINE

## 2024-11-08 PROCEDURE — 6360000002 HC RX W HCPCS

## 2024-11-08 PROCEDURE — 83735 ASSAY OF MAGNESIUM: CPT

## 2024-11-08 PROCEDURE — 84550 ASSAY OF BLOOD/URIC ACID: CPT

## 2024-11-08 PROCEDURE — 80048 BASIC METABOLIC PNL TOTAL CA: CPT

## 2024-11-08 PROCEDURE — 85027 COMPLETE CBC AUTOMATED: CPT

## 2024-11-08 PROCEDURE — 80076 HEPATIC FUNCTION PANEL: CPT

## 2024-11-08 PROCEDURE — 85049 AUTOMATED PLATELET COUNT: CPT

## 2024-11-08 PROCEDURE — 36430 TRANSFUSION BLD/BLD COMPNT: CPT

## 2024-11-08 PROCEDURE — 2580000003 HC RX 258

## 2024-11-08 RX ORDER — PANTOPRAZOLE SODIUM 40 MG/1
40 TABLET, DELAYED RELEASE ORAL
Status: DISPENSED | OUTPATIENT
Start: 2024-11-08

## 2024-11-08 RX ORDER — MAGNESIUM SULFATE IN WATER 40 MG/ML
2000 INJECTION, SOLUTION INTRAVENOUS ONCE
Status: COMPLETED | OUTPATIENT
Start: 2024-11-08 | End: 2024-11-08

## 2024-11-08 RX ORDER — SODIUM CHLORIDE 9 MG/ML
INJECTION, SOLUTION INTRAVENOUS PRN
Status: DISCONTINUED | OUTPATIENT
Start: 2024-11-08 | End: 2024-11-11

## 2024-11-08 RX ORDER — POTASSIUM CHLORIDE 1500 MG/1
40 TABLET, EXTENDED RELEASE ORAL PRN
Status: DISPENSED | OUTPATIENT
Start: 2024-11-08 | End: 2024-12-08

## 2024-11-08 RX ORDER — LEVETIRACETAM 500 MG/1
1500 TABLET ORAL 2 TIMES DAILY
Status: DISPENSED | OUTPATIENT
Start: 2024-11-08

## 2024-11-08 RX ORDER — POTASSIUM CHLORIDE 7.45 MG/ML
10 INJECTION INTRAVENOUS PRN
Status: ACTIVE | OUTPATIENT
Start: 2024-11-08 | End: 2024-12-08

## 2024-11-08 RX ADMIN — LEVETIRACETAM 1500 MG: 500 TABLET, FILM COATED ORAL at 21:22

## 2024-11-08 RX ADMIN — POTASSIUM BICARBONATE 40 MEQ: 782 TABLET, EFFERVESCENT ORAL at 08:11

## 2024-11-08 RX ADMIN — DICYCLOMINE HYDROCHLORIDE 20 MG: 20 TABLET ORAL at 08:11

## 2024-11-08 RX ADMIN — MAGNESIUM SULFATE HEPTAHYDRATE 2000 MG: 40 INJECTION, SOLUTION INTRAVENOUS at 08:26

## 2024-11-08 RX ADMIN — SODIUM CHLORIDE, PRESERVATIVE FREE 10 ML: 5 INJECTION INTRAVENOUS at 21:23

## 2024-11-08 RX ADMIN — Medication 400 MG: at 09:56

## 2024-11-08 RX ADMIN — MEROPENEM 1000 MG: 1 INJECTION INTRAVENOUS at 05:32

## 2024-11-08 RX ADMIN — SODIUM CHLORIDE 15 ML: 900 IRRIGANT IRRIGATION at 15:10

## 2024-11-08 RX ADMIN — PANTOPRAZOLE SODIUM 40 MG: 40 TABLET, DELAYED RELEASE ORAL at 16:15

## 2024-11-08 RX ADMIN — MEROPENEM 1000 MG: 1 INJECTION INTRAVENOUS at 21:30

## 2024-11-08 RX ADMIN — Medication 400 MG: at 21:23

## 2024-11-08 RX ADMIN — MEROPENEM 1000 MG: 1 INJECTION INTRAVENOUS at 13:40

## 2024-11-08 RX ADMIN — VANCOMYCIN HYDROCHLORIDE 125 MG: 250 POWDER, FOR SOLUTION ORAL at 08:12

## 2024-11-08 RX ADMIN — SODIUM CHLORIDE, PRESERVATIVE FREE 10 ML: 5 INJECTION INTRAVENOUS at 08:11

## 2024-11-08 RX ADMIN — VANCOMYCIN HYDROCHLORIDE 125 MG: 250 POWDER, FOR SOLUTION ORAL at 21:23

## 2024-11-08 RX ADMIN — DICYCLOMINE HYDROCHLORIDE 20 MG: 20 TABLET ORAL at 16:27

## 2024-11-08 RX ADMIN — PANTOPRAZOLE SODIUM 40 MG: 40 INJECTION, POWDER, FOR SOLUTION INTRAVENOUS at 08:13

## 2024-11-08 RX ADMIN — DICYCLOMINE HYDROCHLORIDE 20 MG: 20 TABLET ORAL at 13:38

## 2024-11-08 RX ADMIN — SODIUM CHLORIDE 15 ML: 900 IRRIGANT IRRIGATION at 09:46

## 2024-11-08 RX ADMIN — DICYCLOMINE HYDROCHLORIDE 20 MG: 20 TABLET ORAL at 21:22

## 2024-11-08 RX ADMIN — LEVETIRACETAM 1500 MG: 100 INJECTION INTRAVENOUS at 08:13

## 2024-11-08 RX ADMIN — SODIUM CHLORIDE, PRESERVATIVE FREE 10 ML: 5 INJECTION INTRAVENOUS at 08:13

## 2024-11-08 RX ADMIN — Medication: at 08:10

## 2024-11-08 ASSESSMENT — PAIN SCALES - GENERAL: PAINLEVEL_OUTOF10: 0

## 2024-11-08 NOTE — PROGRESS NOTES
interaction)  C1D1 - 4/26/24    Rectal Adenocarcinoma - stage IIA, intact MMR gene expression  LAR with colonic anastomosis 4/12/24      ASSESSMENT AND PLAN       Briefly, Mr. Peguero is a 42 y/o M who presented to the ED on 10/28/24 with a 3-day h/o malaise, decreased oral intake, including the inability to take his medication appropriately.  He was admitted to the [facility] with SEPTIC SHOCK in the setting of a neutropenic fever and quickly decompensated, necessitating intubation, placement of a central line and arterial line for multi-pressor support.    1.  SEPTIC SHOCK d/t K. pneumoniae bacteremia - possibly 2/2 GI source vs PNA vs PICC line - IMPROVING - no longer in shock; procalcitonin downtrending, BCx x 2 collected 10/30 have demonstrated no growth to date  Bibasilar PNA - need to r/o atypical/opportunistic infxn - no supplemental oxygen requirement, cough, or dyspnea on exertion  Cavitary NAN lung lesion  Possible acute cystitis - UA/micro with 3+bacteria, WBC 3-5 (however, pt is severely leukopenic), neg LE, neg nitrites  Infection Hx:  3/07/24:  C. difficile toxin B gene +, GI panel neg.  4/26/24:  C. difficile toxin B gene +, E. coli bacteremia  5/17/24:  GI panel neg.  5/28/24:  C.difficile neg.   10/11/24:  S. viridans bacteremia  10/14/24, TTE: neg. for vegetations   10/29/24, TTE: per cardiology, valves were clearly visualized and had no e/o vegetations    Recent Abx:  10/30-11/6/24:  Daptomycin    Current Workup:  10/28/24:  Blood Cx 1 & 2 -  K. pneumoniae - resistant to ampicillin  Fungal blood Cx - sent, result pending  Resp Cx & PCR molecular panel -  Neg.  Throat Cx -  No fungal elements seen, normal oral steven    Procalcitonin 13.85   CT chest -  bibasilar airspace dz (R > L), cavitary lesion with satellite nodule in NAN  10/29/24:  UA/micro -  no e/o infection, although collected after starting Merrem  PICC line tip, UrCx - NGTD  Aspergillus Ag - Neg., index 0.28  1,3-beta-D-glucan - positive,  partial resection 2017  See oncology section above for details    Amyloid angiopathy  9/30/24, MRI brain w & w/o contrast - Pattern found to be c/w amyloid angiopathy per neurosurgery with recommendation to avoid anticoagulation as it would significantly increase the risk of ICH; no surgical intervention warranted.    10/10/24, CT head w/o contrast - Stable small foci of increased density parietal and occipital lobe on the right occipital lobe on the left similar to the previous exam c/w multiple small areas of hemorrhage.  10/29/24, MRI brain w & w/o contrast - Slightly more prominent small area of vasogenic edema a/w left occipital lobe microhemorrhage.  Slightly more prominent small area of vasogenic edema a/w right parietal lobe microhemorrhage. Decrease in small foci of enhancement a/w other previously noted microhemorrhages.    Seizures (dx 2009)  Cont Keppra 1500 mg BID      7.  CVS  New-onset HFmrEF - likely transient d/t shock  10/29/24, TTE:  LV - EF 45-50% (decreased compared to TTE on 10/14/24); normal size & wall thickness; mild global hypokinesis.  RV - mildly dilated; normal systolic function.  MV - mild regurgitation.       8.  PULM   RLL lung nodule  10/10/24, CT:  New pulmonary nodule 16 mm RLL  Repeat CT chest 12/2024    NAN cavitary lung lesion, bibasilar PNA:  not requiring supplemental oxygen  Intubated for airway protection (no hypoxia) 10/28/24.  Extubated 10/29/24.  See ID section above for workup and treatment        DVT Prophylaxis:  Platelets <50,000 cells/dL - prophylactic lovenox on hold and mechanical prophylaxis with bilateral SCDs while in bed in place.  Contraindications to pharmacologic prophylaxis:  Thrombocytopenia, amyloid angiopathy (see Neuro section above for details)  Contraindications to mechanical prophylaxis:  None    Disposition: When ANC > 1 and recovered from toxicities from chemotherapy and transplant.     The patient was seen and examined by Dr. Gore.      Yoselin

## 2024-11-08 NOTE — PROGRESS NOTES
ID Follow-up NOTE    CC:   AMS  Antibiotics: Meropenem, micafungin, acyclovir, p.o. vancomycin    Admit Date: 10/28/2024  Hospital Day: 12    Subjective:     Patient has been afebrile, s/p BAL with bronchoscopy 11/7. Denies any worsening cough or SOB. Still has abd pain and diarrhea.     Objective:     Patient Vitals for the past 8 hrs:   BP Temp Temp src Pulse Resp SpO2 Weight   11/08/24 1102 133/88 98.9 °F (37.2 °C) Oral -- -- 100 % --   11/08/24 0802 122/74 98.7 °F (37.1 °C) Oral -- -- 100 % 79.5 kg (175 lb 3.2 oz)   11/08/24 0644 131/77 98.6 °F (37 °C) Oral 60 23 95 % --   11/08/24 0542 123/81 98.6 °F (37 °C) Oral 63 20 98 % --   11/08/24 0521 125/81 98.9 °F (37.2 °C) Oral 70 23 97 % --     I/O last 3 completed shifts:  In: 3894.9 [P.O.:356; I.V.:2255.6; Blood:1083.3; Other:200]  Out: 1627 [Urine:1050; Other:97; Stool:475; Blood:5]  I/O this shift:  In: 350 [P.O.:350]  Out: 0     EXAM:  GENERAL: No apparent distress.  Ill appearing. Pt is fatigued easily.   HEENT: Membranes moist, no oral lesion  NECK:  Supple, no lymphadenopathy  LUNGS: Clear b/l, no rales, no dullness, on room air  CARDIAC: RRR, no murmur appreciated  ABD:  + BS, soft, tender to palpation in the mid abdomen region.  EXT:  No rash, no edema, no lesions  NEURO: No focal neurologic findings  PSYCH: Orientation normal, drowsy.   LINES:  Peripheral iv    Data Review:  Lab Results   Component Value Date    WBC 0.4 (LL) 11/08/2024    HGB 8.4 (L) 11/08/2024    HCT 24.4 (L) 11/08/2024    MCV 87.4 11/08/2024    PLT 50 (L) 11/08/2024     Lab Results   Component Value Date    CREATININE 0.5 (L) 11/08/2024    BUN 10 11/08/2024     (L) 11/08/2024    K 3.5 11/08/2024    CL 99 11/08/2024    CO2 30 11/08/2024       Hepatic Function Panel:   Lab Results   Component Value Date/Time    ALKPHOS 172 11/08/2024 03:36 AM    ALT 32 11/08/2024 03:36 AM    AST 8 11/08/2024 03:36 AM    BILITOT 0.9 11/08/2024 03:36 AM    BILIDIR 0.5 11/08/2024 03:36 AM    IBILI 0.4  attenuation of the colon  - stopped daptomycin 11/6 as no further lines in place and no new intra abd pathology.   - given ongoing fever and new lung findings, will repeat endemic fungi workup, to include histoplasma, Blastomyces and cryptococcus antigen/antibody. These were previously negative in 5/2024.   - pulm eval and performed BAL on 11/7, follow results.     AML with underlying MDS:  - OHC following, induction with dacogen and venetoclax  -Repeat bone marrow biopsy 11/6, pending results.      Rectal adenoca:   - s/p rectal mass resection 4/12/24. Path staging T1N0M0. No plans for neoadjuvant at this time.      History of recurrent C. difficile:  -On prophylactic p.o. vancomycin 125mg twice daily  - c dif testing on this admission was negative x2       Medical Decision Making:  The following items were considered in medical decision making:  Discussion of patient care with other providers  Reviewed clinical lab tests  Reviewed radiology tests  Reviewed other diagnostic tests/interventions  Independent review of radiologic images  Microbiology cultures and other micro tests reviewed      Discussed with patient and primary team, oncology resident Mariel and Onc NP.   Tu Moreno MD

## 2024-11-08 NOTE — PLAN OF CARE
Problem: Pain  Goal: Verbalizes/displays adequate comfort level or baseline comfort level  11/8/2024 0538 by Jeanette Teran, RN  Outcome: Progressing  Note: No pain reported this shift     Problem: Safety - Adult  Goal: Free from fall injury  11/8/2024 0538 by Jeanette Teran, RN  Outcome: Progressing  Note:  Pt is a High fall risk. See Apple Fall Score and ABCDS Injury Risk assessments.   + Screening for Orthostasis and/or + High Fall Risk per APPLE/ABCDS: Explained fall risk precautions to pt and family and rationale behind their use to keep the patient safe. Pt bed is in low position, side rails up, call light and belongings are in reach. Fall wristband applied and present on pts wrist.  Bed alarm on.  Pt encouraged to call for assistance. Will continue with hourly rounds for PO intake, pain needs, toileting and repositioning as needed.

## 2024-11-08 NOTE — PROGRESS NOTES
[facility] Progress Note    2024    Dennys Peguero    :  1980    MRN:  [id number]      Interval Hx:  Mr. Peguero reports  ongoing melanotic stools, diarrhea, and prandial/post-prandial pain (improved); mucous from nares with cough.    States that his abdominal pain if anything is a bit better.  His chief complaint today is that he is having trouble sleeping.  He keeps getting awakened at night and he thinks that he needs to be left alone more often.    Patient's other concern is that there is a black mold in his apartment and he thinks this is what has been causing his fevers.  He would like a letter suggesting that his apartment be cleansed.       ECOG PS:  (2) Ambulatory and capable of self care, unable to carry out work activity, up and about > 50% or waking hours    KPS:  70% Cares for self; unable to carry on normal activity or to do active work    Isolation:  Strict contact      Medication:    Scheduled:   pantoprazole  40 mg Oral BID AC    levETIRAcetam  1,500 mg Oral BID    dicyclomine  20 mg Oral 4x Daily    insulin lispro  0-4 Units SubCUTAneous 4x Daily AC & HS    sodium chloride flush  5-40 mL IntraVENous 2 times per day    meropenem  1,000 mg IntraVENous Q8H    Hydrocerin   Topical BID    acyclovir  400 mg Oral BID    sodium chloride flush  5-40 mL IntraVENous 2 times per day    Saline Mouthwash  15 mL Swish & Spit 4x Daily AC & HS    vancomycin  125 mg Oral 2 times per day    micafungin  50 mg IntraVENous Q24H     Continuous Infusions:   sodium chloride      sodium chloride      sodium chloride       PRN:  potassium chloride **OR** potassium alternative oral replacement **OR** potassium chloride, sodium chloride, sodium chloride, simethicone, ondansetron, prochlorperazine, [Held by provider] loperamide, polyethylene glycol, sodium chloride, acetaminophen, sodium chloride flush, magnesium sulfate, Saline Mouthwash, ALTEplase (CATHFLO) 2 mg in sterile water 2 mL  prophylaxis:  Thrombocytopenia, amyloid angiopathy (see Neuro section above for details)  Contraindications to mechanical prophylaxis:  None    Disposition: TBD.     The patient was seen and examined by Dr. Waterhouse.      We will discuss with nursing trying to minimize interruptions at night.  The patient was informed that we probably could help with a letter but that this would more likely happen on Monday or Tuesday.  Patient might benefit from social service visit.    The patient continues with a low-grade temperature but his blood pressure remained stable.  He is still having some melanotic stool.  Hemoglobin is dropping but not at the same rate that it had been.  Platelets are now stable above 50 and GI could consider upper endoscopy.  The patient remains on a PPI    David M. Waterhouse, M.D., MPH  Director, Early Phase Clinical Trials  [facility] ([facility])/ [place] [address]  Office [phone]  Cell [phone]  [email or web address]    \"Participating in a clinical trial is the first step to fighting cancer; not the last.\"

## 2024-11-08 NOTE — PROGRESS NOTES
Comprehensive Nutrition Assessment    RECOMMENDATIONS:  PO Diet: Regular; low microbial  Nutrition Supplement: Ensure +HP TID  Nutrition Education: No recommendation at this time     NUTRITION ASSESSMENT:   Nutritional summary & status: Follow up. Advanced to a regular diet yesterday. Per pt, minimal intake yesterday due to cramping and postpradial diarrhea. Bentyl on board, antidiarrheals held (on vancomycin, GI workup pending). Pt w/ minimal intake due to critical illness and GI symptoms/on clear liquid diet for 11 days (+ a couple days before admit). RD encouraged adequate intake to makeup for previous days. Encouraged eating on a scedule and intake of protein at each meal. Discussed foods to choose more often w/ diarrhea. Pt w/ concerns about mold in his apt, requesting assistance finding alternate housing. RD to communicate w/ SW, CNP also aware. Unable to effectively assess for wt loss during admit given w/ wt gain due to +17  L.     Admission // PMH: Neutropenic fever // Oligodendroglioma (s/p partial resection 2017, radiation 2019, and temodar 2019), rectal adenocarcinoma, AML, underlying MDS    MALNUTRITION ASSESSMENT  Context of Malnutrition: Chronic Illness   Malnutrition Status: Severe malnutrition  Findings of the 6 clinical characteristics of malnutrition (Minimum of 2 out of 6 clinical characteristics is required to make the diagnosis of moderate or severe Protein Calorie Malnutrition based on AND/ASPEN Guidelines):  Energy Intake:  Mild decrease in energy intake (6 days this admit)  Weight Loss:  Greater than 10% over 6 months (20% in 6 months)     Body Fat Loss:  Mild body fat loss Buccal region, Orbital, Triceps   Muscle Mass Loss:  Severe muscle mass loss Temples (temporalis), Clavicles (pectoralis & deltoids)      NUTRITION DIAGNOSIS   Inadequate oral intake related to altered GI function as evidenced by intake 0-25%, intake 26-50%, other, diarrhea (previous NPO/CL)    Nutrition Monitoring and

## 2024-11-08 NOTE — PLAN OF CARE
Problem: Safety - Adult  Goal: Free from fall injury  11/8/2024 0916 by Aaron Drew RN  Note: Pt is a high fall risk (see Jose Fall Risk assessment).  Oriented pt to room and call light. Instructed pt to call for help when needed.  Call light and personal items within reach.  Bed in lowest position, brakes on, and 2/4 side rails up.  Non-skid footwear on. Falls risk stop sign on door; hourly visual checks implemented.  Falls risk arm band on pt.  Bed alarm is on.  Pt using call light appropriately.  Will continue to monitor.       Problem: Skin/Tissue Integrity - Adult  Goal: Skin integrity remains intact  Note: Pt up with assistance this shift and using urinals.  Pt continent of stool and urine, turns self frequently while in bed.  ADLs and hygiene performed with assistance throughout shift. Instructed pt on importance of maintaining good hygiene and activity levels.  No signs or symptoms of new skin breakdown noted.      Problem: Hematologic - Adult  Goal: Maintains hematologic stability  Note: Patient's hemoglobin this AM:   Recent Labs     11/08/24  0336   HGB 8.4*     Patient's platelet count this AM:   Recent Labs     11/08/24  0336   PLT 38*    Thrombocytopenia Precautions in place.  Patient showing no signs or symptoms of active bleeding.  Patient received transfusions per orders prior to this shift.  Patient verbalizes understanding of all instructions. Will continue to assess and implement POC. Call light within reach and hourly rounding in place.

## 2024-11-09 ENCOUNTER — APPOINTMENT (OUTPATIENT)
Dept: GENERAL RADIOLOGY | Age: 44
DRG: 720 | End: 2024-11-09
Payer: MEDICAID

## 2024-11-09 LAB
(1,3)-BETA-D-GLUCAN (FUNGITELL) INTERPRETATION: NEGATIVE
1,3 BETA GLUCAN SER-MCNC: 35 PG/ML
ALBUMIN SERPL-MCNC: 2.7 G/DL (ref 3.4–5)
ALP SERPL-CCNC: 146 U/L (ref 40–129)
ALT SERPL-CCNC: 23 U/L (ref 10–40)
AMORPH SED URNS QL MICRO: ABNORMAL /HPF
ANION GAP SERPL CALCULATED.3IONS-SCNC: 5 MMOL/L (ref 3–16)
APTT BLD: 39.4 SEC (ref 22.1–36.4)
ASPERGILLUS GALACTO AG: NEGATIVE
ASPERGILLUS GALACTO AG: NEGATIVE
AST SERPL-CCNC: 7 U/L (ref 15–37)
BACTERIA SPEC RESP CULT: NORMAL
BACTERIA URNS QL MICRO: ABNORMAL /HPF
BILIRUB DIRECT SERPL-MCNC: 0.4 MG/DL (ref 0–0.3)
BILIRUB INDIRECT SERPL-MCNC: 0.4 MG/DL (ref 0–1)
BILIRUB SERPL-MCNC: 0.8 MG/DL (ref 0–1)
BILIRUB UR QL STRIP.AUTO: NEGATIVE
BLOOD BANK DISPENSE STATUS: NORMAL
BLOOD BANK PRODUCT CODE: NORMAL
BPU ID: NORMAL
BUN SERPL-MCNC: 9 MG/DL (ref 7–20)
CALCIUM SERPL-MCNC: 8.2 MG/DL (ref 8.3–10.6)
CHLORIDE SERPL-SCNC: 100 MMOL/L (ref 99–110)
CLARITY UR: CLEAR
CMV DNA SERPL NAA+PROBE-ACNC: NOT DETECTED IU/ML
CMV DNA SERPL NAA+PROBE-LOG IU: NOT DETECTED LOG IU/ML
CMV DNA SERPL QL NAA+PROBE: NOT DETECTED
CO2 SERPL-SCNC: 33 MMOL/L (ref 21–32)
COLOR UR: YELLOW
CREAT SERPL-MCNC: 0.5 MG/DL (ref 0.9–1.3)
DEPRECATED RDW RBC AUTO: 12.9 % (ref 12.4–15.4)
DEPRECATED RDW RBC AUTO: 13 % (ref 12.4–15.4)
DESCRIPTION BLOOD BANK: NORMAL
EPI CELLS #/AREA URNS HPF: ABNORMAL /HPF (ref 0–5)
FIBRINOGEN PPP-MCNC: 398 MG/DL (ref 227–534)
FINAL REPORT: NORMAL
GALACTOMANNAN AG SERPL IA-ACNC: 0.15
GALACTOMANNAN AG SERPL IA-ACNC: 0.15
GFR SERPLBLD CREATININE-BSD FMLA CKD-EPI: >90 ML/MIN/{1.73_M2}
GLUCOSE BLD-MCNC: 122 MG/DL (ref 70–99)
GLUCOSE BLD-MCNC: 95 MG/DL (ref 70–99)
GLUCOSE BLD-MCNC: 96 MG/DL (ref 70–99)
GLUCOSE BLD-MCNC: 98 MG/DL (ref 70–99)
GLUCOSE SERPL-MCNC: 96 MG/DL (ref 70–99)
GLUCOSE UR STRIP.AUTO-MCNC: NEGATIVE MG/DL
GRAM STN SPEC: NORMAL
H CAPSUL AG UR IA-ACNC: NOT DETECTED NG/ML
H CAPSUL AG UR QL IA: NOT DETECTED
HCT VFR BLD AUTO: 21.1 % (ref 40.5–52.5)
HCT VFR BLD AUTO: 21.1 % (ref 40.5–52.5)
HGB BLD-MCNC: 7.2 G/DL (ref 13.5–17.5)
HGB BLD-MCNC: 7.3 G/DL (ref 13.5–17.5)
HGB UR QL STRIP.AUTO: NEGATIVE
INR PPP: 1.35 (ref 0.85–1.15)
KETONES UR STRIP.AUTO-MCNC: NEGATIVE MG/DL
LEUKOCYTE ESTERASE UR QL STRIP.AUTO: NEGATIVE
MAGNESIUM SERPL-MCNC: 1.58 MG/DL (ref 1.8–2.4)
MCH RBC QN AUTO: 30.1 PG (ref 26–34)
MCH RBC QN AUTO: 30.2 PG (ref 26–34)
MCHC RBC AUTO-ENTMCNC: 34.3 G/DL (ref 31–36)
MCHC RBC AUTO-ENTMCNC: 34.6 G/DL (ref 31–36)
MCV RBC AUTO: 87.3 FL (ref 80–100)
MCV RBC AUTO: 87.6 FL (ref 80–100)
NITRITE UR QL STRIP.AUTO: NEGATIVE
PERFORMED ON: ABNORMAL
PERFORMED ON: NORMAL
PH UR STRIP.AUTO: 7.5 [PH] (ref 5–8)
PHOSPHATE SERPL-MCNC: 2.4 MG/DL (ref 2.5–4.9)
PLATELET # BLD AUTO: 38 K/UL (ref 135–450)
PLATELET # BLD AUTO: 52 K/UL (ref 135–450)
PMV BLD AUTO: 8.8 FL (ref 5–10.5)
PMV BLD AUTO: 9 FL (ref 5–10.5)
POTASSIUM SERPL-SCNC: 3.4 MMOL/L (ref 3.5–5.1)
PRELIMINARY: NORMAL
PROCALCITONIN SERPL IA-MCNC: 0.32 NG/ML (ref 0–0.15)
PROT SERPL-MCNC: 5.3 G/DL (ref 6.4–8.2)
PROT UR STRIP.AUTO-MCNC: ABNORMAL MG/DL
PROTHROMBIN TIME: 16.8 SEC (ref 11.9–14.9)
RBC # BLD AUTO: 2.41 M/UL (ref 4.2–5.9)
RBC # BLD AUTO: 2.42 M/UL (ref 4.2–5.9)
RBC #/AREA URNS HPF: ABNORMAL /HPF (ref 0–4)
SODIUM SERPL-SCNC: 138 MMOL/L (ref 136–145)
SP GR UR STRIP.AUTO: 1.02 (ref 1–1.03)
UA DIPSTICK W REFLEX MICRO PNL UR: YES
URATE SERPL-MCNC: 1.9 MG/DL (ref 3.5–7.2)
URN SPEC COLLECT METH UR: ABNORMAL
UROBILINOGEN UR STRIP-ACNC: 0.2 E.U./DL
WBC # BLD AUTO: 0.5 K/UL (ref 4–11)
WBC # BLD AUTO: 0.5 K/UL (ref 4–11)
WBC #/AREA URNS HPF: ABNORMAL /HPF (ref 0–5)

## 2024-11-09 PROCEDURE — 2060000000 HC ICU INTERMEDIATE R&B

## 2024-11-09 PROCEDURE — 83735 ASSAY OF MAGNESIUM: CPT

## 2024-11-09 PROCEDURE — 87252 VIRUS INOCULATION TISSUE: CPT

## 2024-11-09 PROCEDURE — 6370000000 HC RX 637 (ALT 250 FOR IP)

## 2024-11-09 PROCEDURE — 87205 SMEAR GRAM STAIN: CPT

## 2024-11-09 PROCEDURE — 87070 CULTURE OTHR SPECIMN AEROBIC: CPT

## 2024-11-09 PROCEDURE — 84145 PROCALCITONIN (PCT): CPT

## 2024-11-09 PROCEDURE — 6360000002 HC RX W HCPCS: Performed by: INTERNAL MEDICINE

## 2024-11-09 PROCEDURE — 6360000002 HC RX W HCPCS

## 2024-11-09 PROCEDURE — 80076 HEPATIC FUNCTION PANEL: CPT

## 2024-11-09 PROCEDURE — 80048 BASIC METABOLIC PNL TOTAL CA: CPT

## 2024-11-09 PROCEDURE — 6370000000 HC RX 637 (ALT 250 FOR IP): Performed by: NURSE PRACTITIONER

## 2024-11-09 PROCEDURE — 87253 VIRUS INOCULATE TISSUE ADDL: CPT

## 2024-11-09 PROCEDURE — 85027 COMPLETE CBC AUTOMATED: CPT

## 2024-11-09 PROCEDURE — 85730 THROMBOPLASTIN TIME PARTIAL: CPT

## 2024-11-09 PROCEDURE — 85610 PROTHROMBIN TIME: CPT

## 2024-11-09 PROCEDURE — 36430 TRANSFUSION BLD/BLD COMPNT: CPT

## 2024-11-09 PROCEDURE — 81001 URINALYSIS AUTO W/SCOPE: CPT

## 2024-11-09 PROCEDURE — P9036 PLATELET PHERESIS IRRADIATED: HCPCS

## 2024-11-09 PROCEDURE — 2580000003 HC RX 258

## 2024-11-09 PROCEDURE — 36415 COLL VENOUS BLD VENIPUNCTURE: CPT

## 2024-11-09 PROCEDURE — 87086 URINE CULTURE/COLONY COUNT: CPT

## 2024-11-09 PROCEDURE — 71045 X-RAY EXAM CHEST 1 VIEW: CPT

## 2024-11-09 PROCEDURE — 84100 ASSAY OF PHOSPHORUS: CPT

## 2024-11-09 PROCEDURE — 87103 BLOOD FUNGUS CULTURE: CPT

## 2024-11-09 PROCEDURE — 87102 FUNGUS ISOLATION CULTURE: CPT

## 2024-11-09 PROCEDURE — 85384 FIBRINOGEN ACTIVITY: CPT

## 2024-11-09 PROCEDURE — 2580000003 HC RX 258: Performed by: INTERNAL MEDICINE

## 2024-11-09 PROCEDURE — 87040 BLOOD CULTURE FOR BACTERIA: CPT

## 2024-11-09 PROCEDURE — 84550 ASSAY OF BLOOD/URIC ACID: CPT

## 2024-11-09 RX ORDER — ACYCLOVIR 200 MG/1
400 CAPSULE ORAL 2 TIMES DAILY
Status: DISCONTINUED | OUTPATIENT
Start: 2024-11-09 | End: 2024-11-11

## 2024-11-09 RX ORDER — SODIUM CHLORIDE 9 MG/ML
INJECTION, SOLUTION INTRAVENOUS PRN
Status: DISCONTINUED | OUTPATIENT
Start: 2024-11-09 | End: 2024-11-11

## 2024-11-09 RX ADMIN — MEROPENEM 1000 MG: 1 INJECTION INTRAVENOUS at 13:51

## 2024-11-09 RX ADMIN — Medication: at 21:23

## 2024-11-09 RX ADMIN — MICAFUNGIN SODIUM 50 MG: 100 INJECTION, POWDER, LYOPHILIZED, FOR SOLUTION INTRAVENOUS at 00:34

## 2024-11-09 RX ADMIN — VANCOMYCIN HYDROCHLORIDE 125 MG: 250 POWDER, FOR SOLUTION ORAL at 21:48

## 2024-11-09 RX ADMIN — LEVETIRACETAM 1500 MG: 500 TABLET, FILM COATED ORAL at 20:00

## 2024-11-09 RX ADMIN — ACETAMINOPHEN 650 MG: 650 SOLUTION ORAL at 21:49

## 2024-11-09 RX ADMIN — DICYCLOMINE HYDROCHLORIDE 20 MG: 20 TABLET ORAL at 16:24

## 2024-11-09 RX ADMIN — POTASSIUM BICARBONATE 40 MEQ: 782 TABLET, EFFERVESCENT ORAL at 06:04

## 2024-11-09 RX ADMIN — POTASSIUM CHLORIDE 40 MEQ: 1500 TABLET, EXTENDED RELEASE ORAL at 07:51

## 2024-11-09 RX ADMIN — DICYCLOMINE HYDROCHLORIDE 20 MG: 20 TABLET ORAL at 13:07

## 2024-11-09 RX ADMIN — MEROPENEM 1000 MG: 1 INJECTION INTRAVENOUS at 06:11

## 2024-11-09 RX ADMIN — PANTOPRAZOLE SODIUM 40 MG: 40 TABLET, DELAYED RELEASE ORAL at 16:24

## 2024-11-09 RX ADMIN — DICYCLOMINE HYDROCHLORIDE 20 MG: 20 TABLET ORAL at 10:05

## 2024-11-09 RX ADMIN — SODIUM CHLORIDE, PRESERVATIVE FREE 10 ML: 5 INJECTION INTRAVENOUS at 10:05

## 2024-11-09 RX ADMIN — VANCOMYCIN HYDROCHLORIDE 125 MG: 250 POWDER, FOR SOLUTION ORAL at 10:05

## 2024-11-09 RX ADMIN — DICYCLOMINE HYDROCHLORIDE 20 MG: 20 TABLET ORAL at 20:00

## 2024-11-09 RX ADMIN — LEVETIRACETAM 1500 MG: 500 TABLET, FILM COATED ORAL at 10:05

## 2024-11-09 RX ADMIN — MEROPENEM 1000 MG: 1 INJECTION INTRAVENOUS at 21:47

## 2024-11-09 RX ADMIN — PANTOPRAZOLE SODIUM 40 MG: 40 TABLET, DELAYED RELEASE ORAL at 06:05

## 2024-11-09 RX ADMIN — SODIUM CHLORIDE, PRESERVATIVE FREE 10 ML: 5 INJECTION INTRAVENOUS at 10:10

## 2024-11-09 RX ADMIN — ACYCLOVIR 400 MG: 200 CAPSULE ORAL at 10:19

## 2024-11-09 RX ADMIN — ACETAMINOPHEN 650 MG: 650 SOLUTION ORAL at 13:07

## 2024-11-09 RX ADMIN — ACYCLOVIR 400 MG: 200 CAPSULE ORAL at 20:00

## 2024-11-09 NOTE — PROGRESS NOTES
Progress Note    Patient Dennys Peguero  MRN: [id number]  YOB: 1980 Age: 44 y.o. Sex: male  Room: [address] [place] [address]       Admitting Physician: Eduardo Nguyen MD   Date of Admission: 10/28/2024  9:21 AM   Primary Care Physician: No primary care provider on file.     Subjective:  Dennys Peguero was seen and examined. We are following for anemia, abdominal pain, melena.  -- patient is reporting dark brown stools.   He gets pain in mid abdomen after he has bowel movement.    Objective:  Vital Signs:   Vitals:    11/09/24 1000   BP: 138/83   Pulse: 93   Resp: 21   Temp: 99.9 °F (37.7 °C)   SpO2: 93%         Physical Exam:  Constitutional: Alert and oriented x 4. No acute distress.   HEENT: Sclera anicteric, mucosal membranes moist  Cardiovascular: Regular rate and rhythm.  No murmurs.  Respiratory: Respirations nonlabored, no crepitus  GI: Abdomen nondistended, soft, and nontender.  Normal active bowel sounds.  No masses palpable.   Rectal: Deferred  Musculoskeletal:  No pitting edema of the lower legs.  Neurological: Gross memory appears intact.   Asterixis    Intake/Output:    Intake/Output Summary (Last 24 hours) at 11/9/2024 1013  Last data filed at 11/9/2024 0030  Gross per 24 hour   Intake 830 ml   Output 1100 ml   Net -270 ml        Current Medications:  Current Facility-Administered Medications   Medication Dose Route Frequency Provider Last Rate Last Admin    acyclovir (ZOVIRAX) capsule 400 mg  400 mg Oral BID Shanthi Flowers APRN - CNP        potassium chloride (KLOR-CON M) extended release tablet 40 mEq  40 mEq Oral PRN Chelly Esparza APRN - CNP   40 mEq at 11/09/24 0751    Or    potassium bicarb-citric acid (EFFER-K) effervescent tablet 40 mEq  40 mEq Oral PRN Chelly Esparza APRN - CNP   40 mEq at 11/09/24 0604    Or    potassium chloride 10 mEq/100 mL IVPB (Peripheral Line)  10 mEq IntraVENous PRN Chelly Esparza APRN - CNP        pantoprazole (PROTONIX) tablet 40 mg  40 mg Oral BID AC

## 2024-11-09 NOTE — PLAN OF CARE
Problem: Pain  Goal: Verbalizes/displays adequate comfort level or baseline comfort level  Outcome: Progressing  Note: Pt without pain for majority of shift. Some abdominal pain remains after eating, before using restroom.     Problem: Safety - Adult  Goal: Free from fall injury  Outcome: Progressing  Note: Due to BLE weakness, pt is a Med fall risk. See Apple Fall Score and ABCDS Injury Risk assessments.   + Screening for Orthostasis and/or + High Fall Risk per APPLE/ABCDS: Explained fall risk precautions to pt and family and rationale behind their use to keep the patient safe. Pt bed is in low position, side rails up, call light and belongings are in reach. Fall wristband applied and present on pts wrist.  Bed alarm on.  Pt encouraged to call for assistance. Will continue with hourly rounds for PO intake, pain needs, toileting and repositioning as needed.      Problem: Gastrointestinal - Adult  Goal: Maintains or returns to baseline bowel function  Outcome: Not Progressing  Note: Pt had 2 small, mucousy, watery bowel movements this shift.

## 2024-11-10 LAB
ABO + RH BLD: NORMAL
ALBUMIN SERPL-MCNC: 2.7 G/DL (ref 3.4–5)
ALP SERPL-CCNC: 138 U/L (ref 40–129)
ALT SERPL-CCNC: 23 U/L (ref 10–40)
ANION GAP SERPL CALCULATED.3IONS-SCNC: 7 MMOL/L (ref 3–16)
APTT BLD: 39.3 SEC (ref 22.1–36.4)
ASPERGILLUS GALACTO AG: POSITIVE
AST SERPL-CCNC: 11 U/L (ref 15–37)
BACTERIA BLD CULT ORG #2: NORMAL
BACTERIA BLD CULT: NORMAL
BASOPHILS # BLD: 0 K/UL (ref 0–0.2)
BASOPHILS NFR BLD: 0 %
BILIRUB DIRECT SERPL-MCNC: 0.3 MG/DL (ref 0–0.3)
BILIRUB INDIRECT SERPL-MCNC: 0.3 MG/DL (ref 0–1)
BILIRUB SERPL-MCNC: 0.6 MG/DL (ref 0–1)
BLD GP AB SCN SERPL QL: NORMAL
BUN SERPL-MCNC: 10 MG/DL (ref 7–20)
CALCIUM SERPL-MCNC: 8.2 MG/DL (ref 8.3–10.6)
CHLORIDE SERPL-SCNC: 99 MMOL/L (ref 99–110)
CO2 SERPL-SCNC: 30 MMOL/L (ref 21–32)
CREAT SERPL-MCNC: 0.5 MG/DL (ref 0.9–1.3)
DEPRECATED RDW RBC AUTO: 12.7 % (ref 12.4–15.4)
DEPRECATED RDW RBC AUTO: 13 % (ref 12.4–15.4)
EOSINOPHIL # BLD: 0 K/UL (ref 0–0.6)
EOSINOPHIL NFR BLD: 0 %
FIBRINOGEN PPP-MCNC: 402 MG/DL (ref 227–534)
GALACTOMANNAN AG SERPL IA-ACNC: 0.52
GFR SERPLBLD CREATININE-BSD FMLA CKD-EPI: >90 ML/MIN/{1.73_M2}
GLUCOSE BLD-MCNC: 120 MG/DL (ref 70–99)
GLUCOSE BLD-MCNC: 87 MG/DL (ref 70–99)
GLUCOSE BLD-MCNC: 91 MG/DL (ref 70–99)
GLUCOSE BLD-MCNC: 92 MG/DL (ref 70–99)
GLUCOSE SERPL-MCNC: 106 MG/DL (ref 70–99)
HCT VFR BLD AUTO: 19.2 % (ref 40.5–52.5)
HCT VFR BLD AUTO: 23.6 % (ref 40.5–52.5)
HGB BLD-MCNC: 6.7 G/DL (ref 13.5–17.5)
HGB BLD-MCNC: 8.1 G/DL (ref 13.5–17.5)
INR PPP: 1.41 (ref 0.85–1.15)
LYMPHOCYTES # BLD: 0.5 K/UL (ref 1–5.1)
LYMPHOCYTES NFR BLD: 78 %
MAGNESIUM SERPL-MCNC: 1.48 MG/DL (ref 1.8–2.4)
MCH RBC QN AUTO: 29.9 PG (ref 26–34)
MCH RBC QN AUTO: 30.3 PG (ref 26–34)
MCHC RBC AUTO-ENTMCNC: 34.3 G/DL (ref 31–36)
MCHC RBC AUTO-ENTMCNC: 34.9 G/DL (ref 31–36)
MCV RBC AUTO: 86.9 FL (ref 80–100)
MCV RBC AUTO: 87 FL (ref 80–100)
MONOCYTES # BLD: 0.1 K/UL (ref 0–1.3)
MONOCYTES NFR BLD: 10 %
NEUTROPHILS # BLD: 0.1 K/UL (ref 1.7–7.7)
NEUTROPHILS NFR BLD: 12 %
PERFORMED ON: ABNORMAL
PERFORMED ON: NORMAL
PHOSPHATE SERPL-MCNC: 2.5 MG/DL (ref 2.5–4.9)
PLATELET # BLD AUTO: 46 K/UL (ref 135–450)
PLATELET # BLD AUTO: 63 K/UL (ref 135–450)
PMV BLD AUTO: 7.7 FL (ref 5–10.5)
PMV BLD AUTO: 8.3 FL (ref 5–10.5)
POTASSIUM SERPL-SCNC: 3.5 MMOL/L (ref 3.5–5.1)
PROT SERPL-MCNC: 5.5 G/DL (ref 6.4–8.2)
PROTHROMBIN TIME: 17.4 SEC (ref 11.9–14.9)
RBC # BLD AUTO: 2.21 M/UL (ref 4.2–5.9)
RBC # BLD AUTO: 2.72 M/UL (ref 4.2–5.9)
SODIUM SERPL-SCNC: 136 MMOL/L (ref 136–145)
URATE SERPL-MCNC: 1.6 MG/DL (ref 3.5–7.2)
WBC # BLD AUTO: 0.4 K/UL (ref 4–11)
WBC # BLD AUTO: 0.6 K/UL (ref 4–11)

## 2024-11-10 PROCEDURE — 85730 THROMBOPLASTIN TIME PARTIAL: CPT

## 2024-11-10 PROCEDURE — 86850 RBC ANTIBODY SCREEN: CPT

## 2024-11-10 PROCEDURE — P9040 RBC LEUKOREDUCED IRRADIATED: HCPCS

## 2024-11-10 PROCEDURE — 84100 ASSAY OF PHOSPHORUS: CPT

## 2024-11-10 PROCEDURE — 85384 FIBRINOGEN ACTIVITY: CPT

## 2024-11-10 PROCEDURE — 84550 ASSAY OF BLOOD/URIC ACID: CPT

## 2024-11-10 PROCEDURE — 86901 BLOOD TYPING SEROLOGIC RH(D): CPT

## 2024-11-10 PROCEDURE — 80048 BASIC METABOLIC PNL TOTAL CA: CPT

## 2024-11-10 PROCEDURE — 2580000003 HC RX 258: Performed by: INTERNAL MEDICINE

## 2024-11-10 PROCEDURE — 2580000003 HC RX 258

## 2024-11-10 PROCEDURE — 6370000000 HC RX 637 (ALT 250 FOR IP)

## 2024-11-10 PROCEDURE — 86900 BLOOD TYPING SEROLOGIC ABO: CPT

## 2024-11-10 PROCEDURE — P9036 PLATELET PHERESIS IRRADIATED: HCPCS

## 2024-11-10 PROCEDURE — 80076 HEPATIC FUNCTION PANEL: CPT

## 2024-11-10 PROCEDURE — 36430 TRANSFUSION BLD/BLD COMPNT: CPT

## 2024-11-10 PROCEDURE — 85027 COMPLETE CBC AUTOMATED: CPT

## 2024-11-10 PROCEDURE — 6360000002 HC RX W HCPCS: Performed by: INTERNAL MEDICINE

## 2024-11-10 PROCEDURE — 36415 COLL VENOUS BLD VENIPUNCTURE: CPT

## 2024-11-10 PROCEDURE — 6370000000 HC RX 637 (ALT 250 FOR IP): Performed by: NURSE PRACTITIONER

## 2024-11-10 PROCEDURE — 6360000002 HC RX W HCPCS

## 2024-11-10 PROCEDURE — 85610 PROTHROMBIN TIME: CPT

## 2024-11-10 PROCEDURE — 83735 ASSAY OF MAGNESIUM: CPT

## 2024-11-10 PROCEDURE — 6370000000 HC RX 637 (ALT 250 FOR IP): Performed by: INTERNAL MEDICINE

## 2024-11-10 PROCEDURE — 2060000000 HC ICU INTERMEDIATE R&B

## 2024-11-10 PROCEDURE — 86923 COMPATIBILITY TEST ELECTRIC: CPT

## 2024-11-10 PROCEDURE — 85025 COMPLETE CBC W/AUTO DIFF WBC: CPT

## 2024-11-10 RX ORDER — SODIUM CHLORIDE 9 MG/ML
INJECTION, SOLUTION INTRAVENOUS PRN
Status: DISCONTINUED | OUTPATIENT
Start: 2024-11-10 | End: 2024-11-11

## 2024-11-10 RX ORDER — AMITRIPTYLINE HYDROCHLORIDE 10 MG/1
10 TABLET ORAL NIGHTLY
Status: DISPENSED | OUTPATIENT
Start: 2024-11-10

## 2024-11-10 RX ADMIN — PANTOPRAZOLE SODIUM 40 MG: 40 TABLET, DELAYED RELEASE ORAL at 16:24

## 2024-11-10 RX ADMIN — DICYCLOMINE HYDROCHLORIDE 20 MG: 20 TABLET ORAL at 21:27

## 2024-11-10 RX ADMIN — Medication: at 09:39

## 2024-11-10 RX ADMIN — MICAFUNGIN SODIUM 50 MG: 100 INJECTION, POWDER, LYOPHILIZED, FOR SOLUTION INTRAVENOUS at 00:43

## 2024-11-10 RX ADMIN — MEROPENEM 1000 MG: 1 INJECTION INTRAVENOUS at 05:48

## 2024-11-10 RX ADMIN — MEROPENEM 1000 MG: 1 INJECTION INTRAVENOUS at 21:30

## 2024-11-10 RX ADMIN — DICYCLOMINE HYDROCHLORIDE 20 MG: 20 TABLET ORAL at 09:30

## 2024-11-10 RX ADMIN — PANTOPRAZOLE SODIUM 40 MG: 40 TABLET, DELAYED RELEASE ORAL at 05:48

## 2024-11-10 RX ADMIN — AMITRIPTYLINE HYDROCHLORIDE 10 MG: 10 TABLET, FILM COATED ORAL at 21:27

## 2024-11-10 RX ADMIN — ACYCLOVIR 400 MG: 200 CAPSULE ORAL at 21:27

## 2024-11-10 RX ADMIN — MEROPENEM 1000 MG: 1 INJECTION INTRAVENOUS at 14:02

## 2024-11-10 RX ADMIN — ACETAMINOPHEN 650 MG: 650 SOLUTION ORAL at 12:51

## 2024-11-10 RX ADMIN — LEVETIRACETAM 1500 MG: 500 TABLET, FILM COATED ORAL at 21:27

## 2024-11-10 RX ADMIN — DICYCLOMINE HYDROCHLORIDE 20 MG: 20 TABLET ORAL at 16:24

## 2024-11-10 RX ADMIN — VANCOMYCIN HYDROCHLORIDE 125 MG: 250 POWDER, FOR SOLUTION ORAL at 09:30

## 2024-11-10 RX ADMIN — ACYCLOVIR 400 MG: 200 CAPSULE ORAL at 09:30

## 2024-11-10 RX ADMIN — SIMETHICONE 80 MG: 80 TABLET, CHEWABLE ORAL at 05:07

## 2024-11-10 RX ADMIN — LEVETIRACETAM 1500 MG: 500 TABLET, FILM COATED ORAL at 09:30

## 2024-11-10 RX ADMIN — ACETAMINOPHEN 650 MG: 650 SOLUTION ORAL at 21:27

## 2024-11-10 RX ADMIN — VANCOMYCIN HYDROCHLORIDE 125 MG: 250 POWDER, FOR SOLUTION ORAL at 21:27

## 2024-11-10 RX ADMIN — Medication: at 21:31

## 2024-11-10 RX ADMIN — SODIUM CHLORIDE, PRESERVATIVE FREE 10 ML: 5 INJECTION INTRAVENOUS at 09:31

## 2024-11-10 RX ADMIN — ACETAMINOPHEN 650 MG: 650 SOLUTION ORAL at 04:35

## 2024-11-10 RX ADMIN — DICYCLOMINE HYDROCHLORIDE 20 MG: 20 TABLET ORAL at 12:51

## 2024-11-10 ASSESSMENT — PAIN DESCRIPTION - DESCRIPTORS
DESCRIPTORS: CRAMPING

## 2024-11-10 ASSESSMENT — PAIN DESCRIPTION - ORIENTATION
ORIENTATION: INNER

## 2024-11-10 ASSESSMENT — PAIN DESCRIPTION - FREQUENCY
FREQUENCY: INTERMITTENT

## 2024-11-10 ASSESSMENT — PAIN DESCRIPTION - ONSET
ONSET: ON-GOING

## 2024-11-10 ASSESSMENT — PAIN SCALES - GENERAL
PAINLEVEL_OUTOF10: 8
PAINLEVEL_OUTOF10: 8
PAINLEVEL_OUTOF10: 6

## 2024-11-10 ASSESSMENT — PAIN - FUNCTIONAL ASSESSMENT
PAIN_FUNCTIONAL_ASSESSMENT: PREVENTS OR INTERFERES SOME ACTIVE ACTIVITIES AND ADLS

## 2024-11-10 ASSESSMENT — PAIN DESCRIPTION - LOCATION
LOCATION: ABDOMEN

## 2024-11-10 ASSESSMENT — PAIN DESCRIPTION - PAIN TYPE
TYPE: ACUTE PAIN

## 2024-11-10 NOTE — PROGRESS NOTES
[facility] Progress Note    11/10/2024    Dennys Peguero    :  1980    MRN:  [id number]      Interval Hx:  Patient continues to have intermittent abdominal pain.  The pain is far worse immediately after eating and then begins to tanya.  He is not taking any narcotic analgesics.  He continues to have brown stool and no melena or bright red blood.  Seen by GI today.    As of the morning of Sun. 11/10/24:    Recurrent high-grade fever 101.5 °F starting yesterday evening.  Interestingly, Fungitell is now negative (previously positive on 10/29/24) and procalcitonin continues to decrease.  He is now on Day 14 of Merrem, which is intended to be continued through the weekend.  Initial blood cultures were positive on admission 10/28 for K. pneumoniae with repeat bacterial blood cultures 10/30, ,  negative.  He has pulmonary infiltrates concerning for atypical/opportunistic PNA on CT scan 24 for which pulmonology performed a bronchoscopy with samples sent for testing on 24.    He has melanotic diarrhea 300 mL and known h/o recurrent C. difficile for which he has been on vancomycin PO with negative toxin/antigen testing most recently on 24.  Additionally, the GI pathogens by PCR panel has been negative.        ECOG PS:  (2) Ambulatory and capable of self care, unable to carry out work activity, up and about > 50% or waking hours    KPS:  70% Cares for self; unable to carry on normal activity or to do active work    Isolation:  Strict contact      Medication:    Scheduled:   acyclovir  400 mg Oral BID    pantoprazole  40 mg Oral BID AC    levETIRAcetam  1,500 mg Oral BID    dicyclomine  20 mg Oral 4x Daily    insulin lispro  0-4 Units SubCUTAneous 4x Daily AC & HS    sodium chloride flush  5-40 mL IntraVENous 2 times per day    meropenem  1,000 mg IntraVENous Q8H    Hydrocerin   Topical BID    sodium chloride flush  5-40 mL IntraVENous 2 times per day    Saline Mouthwash  15 mL Swish  del(20)(q11.2q13.3) ,+juliette(15;20)t(15;20) (q11.2;p13)del(20) (q11.2q13.3)x1~2,+dup(15)(q15q22), +17,dic(17;?)(p11.1;?),+21, +1~2mar, 3~46dmin[cp20]  NGS: TP53 mutation positive.  PCRs: FLT3 ITD/TKD mutations not detected  BM Bx (4/2/24):  Continued presence of MDS; blast cells decreased from 13% to 5% post-treatment, fibrosis appears to have increased.  Flow cytometry - 7% blasts, c/w PA.  BM Bx (5/29/24):  CR; 15% cellular bone marrow with predominantly new development of erythroid and megakaryocyte precursors.  Granulocytes decreased and no definitive morphologic e/o increased blasts.   Cycle 2 (re-induction) post-BM Bx showed hypoplastic CR and C3D1 on 6/24/24  HLA typing done and pt has 4 full siblings as potential donors.  Consider MAC-based alloSCT using BuFlu/PTCy based regimen depending on post-surgical course for rectal adenoCa.  Resumed venetoclax 10/14-10/21/24 (did not start with C6D1 r/t abdominal discomfort)  Currently on Cycle 6 of venetoclax & Dacogen  BM Bx (11/6/24): Continued presence of clonal myeloid disorder. MDS-like picture with increased erythroid precursors, megakaryocytes appear rare, however, exceptionally small forms may not be noticed without CD61 stain which is pending.  Blast cells 6% do not appear to be increased by the morphology analysis or flow cytometry. Immunostains are pending and may be helpful.   FISH, CG, myeloid mutation panel by NGS - results pending    Plan:  Currently on Cycle 6 of venetoclax and Dacogen - treatment on hold in setting of sepsis       3.  HEME    Amyloid angiopathy  Per prior nsgy recs on 10/1/24, avoid anticoagulation as it would significantly increase risk of ICH    Pancytopenia r/t AML/MDS and recent chemotherapy  Transfuse for Hgb < 7   Transfuse for PLT < 50 K (due to amyloid angiopathy causing higher risk of ICH, PLT threshold was set to transfuse for < 20K; however, he developed melena & therefore, threshold was increased)      4.  METABOLIC - renal

## 2024-11-10 NOTE — PROGRESS NOTES
RN notified Shanthi Flowers NP, as well as GI MD Dr. Caballero that patient continues to have severe abdominal pain/cramping after eating that is only relieved by watery bowel movement. GI put in order for amitriptyline nightly. RN continuing to monitor and give scheduled Bentyl. Patient's PO intake suffering due to fear of pain and diarrhea after eating.

## 2024-11-10 NOTE — PROGRESS NOTES
Progress Note    Patient Dennys Peguero  MRN: [id number]  YOB: 1980 Age: 44 y.o. Sex: male  Room: [address] [place] [address]       Admitting Physician: Eduardo Nguyen MD   Date of Admission: 10/28/2024  9:21 AM   Primary Care Physician: No primary care provider on file.     Subjective:  Dennys Peguero was seen and examined. We are following for anemia, abdominal pain, melena.  -- patient is reporting dark brown stools.   He gets pain in mid abdomen before bowel movement.    Objective:  Vital Signs:   Vitals:    11/10/24 1035   BP:    Pulse:    Resp:    Temp: 99 °F (37.2 °C)   SpO2:          Physical Exam:  Constitutional: Alert and oriented x 4. No acute distress.   HEENT: Sclera anicteric, mucosal membranes moist  Cardiovascular: Regular rate and rhythm.  No murmurs.  Respiratory: Respirations nonlabored, no crepitus  GI: Abdomen nondistended, soft, and nontender.  Normal active bowel sounds.  No masses palpable.   Rectal: Deferred  Musculoskeletal:  No pitting edema of the lower legs.  Neurological: Gross memory appears intact.   [employer]    Intake/Output:    Intake/Output Summary (Last 24 hours) at 11/10/2024 1041  Last data filed at 11/10/2024 1037  Gross per 24 hour   Intake 1027 ml   Output 3365 ml   Net -2338 ml        Current Medications:  Current Facility-Administered Medications   Medication Dose Route Frequency Provider Last Rate Last Admin    0.9 % sodium chloride infusion   IntraVENous PRN Eduardo Nguyen MD        0.9 % sodium chloride infusion   IntraVENous PRN Eduardo Nguyen MD        acyclovir (ZOVIRAX) capsule 400 mg  400 mg Oral BID Shanthi Flowers, APRN - CNP   400 mg at 11/10/24 0930    0.9 % sodium chloride infusion   IntraVENous PRN Eduardo Nguyen MD        potassium chloride (KLOR-CON M) extended release tablet 40 mEq  40 mEq Oral PRN Chelly Esparza, APRN - CNP   40 mEq at 11/09/24 0751    Or    potassium bicarb-citric acid (EFFER-K) effervescent tablet 40 mEq  40 mEq Oral PRN  --  0.8  --  0.6   BILIDIR 0.5*  --   --  0.4*  --  0.3   IBILI 0.4  --   --  0.4  --  0.3   BUN 10  --   --  9  --  10   CREATININE 0.5*  --   --  0.5*  --  0.5*   *  --   --  138  --  136   K 3.5  --   --  3.4*  --  3.5   INR 1.51*  --   --  1.35*  --  1.41*          Assessment:  Hospital Problems             Last Modified POA    * (Principal) Neutropenic fever (HCC) 10/28/2024 Yes    Severe malnutrition (HCC) (Chronic) 10/31/2024 Yes    Acute respiratory failure with hypoxia 10/29/2024 Yes    Hypotension 10/29/2024 Yes    Peripherally inserted central catheter (PICC) in place 10/29/2024 Yes    Nonintractable generalized idiopathic epilepsy without status epilepticus (HCC) 10/29/2024 Yes    Seizure disorder (HCC) 10/30/2024 Yes    Bacteremia due to Klebsiella pneumoniae 11/1/2024 Yes    Bradycardia 11/1/2024 Yes       43-year-old male with 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 Diff in 3/2024, rectal adenocarcinoma in 3/2024 s/p rectal mass resection in 4/2024. Patient presented on 10/28 with sepsis/neutropenic fever. GI consulted for rectal bleeding.     Patient has dark brown stool.  No overt melena or rectal bleeding noted  He has abdominal pain postprandial and before bowel movement       HIDA on 10/31 was normal  EGD 11/3/24: unremarkable  C. Diff negative on 11/7/24, negative GI pathogens panel on 11/1      Plan  Continue with symptomatic and supportive care  Patient is on Protonix and Bentyl.  Added amitriptyline  No emergent plans for flex sig given no active bleeding and thrombocytopenia/neutropenia.        Rajeev Caballero MD    [facility]    [phone]. Also available via [employer]    Please note that some or all of this record was generated using voice recognition software. If there are any questions about the content of this document, please contact the author as some errors in

## 2024-11-10 NOTE — PLAN OF CARE
Problem: Pain  Goal: Verbalizes/displays adequate comfort level or baseline comfort level  Outcome: Progressing     Problem: Safety - Adult  Goal: Free from fall injury  Outcome: Progressing     Problem: Skin/Tissue Integrity - Adult  Goal: Skin integrity remains intact  Outcome: Progressing     Problem: Infection - Adult  Goal: Absence of infection at discharge  Outcome: Progressing     Problem: Gastrointestinal - Adult  Goal: Maintains or returns to baseline bowel function  Outcome: Not Progressing

## 2024-11-10 NOTE — PROGRESS NOTES
Pt temp at 1945 on 11/09 was 101.5, NP Moeharamis reached out and a repan was initiated. Blood culture labels sent to lab and were notified it was a STAT draw, lab came to draw blood cultures at 2248. No other orders at this time. POC continues

## 2024-11-10 NOTE — PLAN OF CARE
Problem: Pain  Goal: Verbalizes/displays adequate comfort level or baseline comfort level  11/10/2024 1135 by Paula Bowles, RN  Outcome: Progressing  Note: Pt reported pain this shift.  Scheduled bentyl given per MAR. Pt educated on importance of calling for pain meds when in pain. Pt verbalized understanding.      Problem: Safety - Adult  Goal: Free from fall injury  11/10/2024 1135 by Paula Bowles, RN  Outcome: Progressing  Note: Due to weakness, pt is a High fall risk. See Apple Fall Score and ABCDS Injury Risk assessments.   + Screening for Orthostasis and/or + High Fall Risk per APPLE/ABCDS: Explained fall risk precautions to pt and family and rationale behind their use to keep the patient safe. Pt bed is in low position, side rails up, call light and belongings are in reach. Fall wristband applied and present on pts wrist.  Bed alarm on.  Pt encouraged to call for assistance. Will continue with hourly rounds for PO intake, pain needs, toileting and repositioning as needed.      Problem: Hematologic - Adult  Goal: Maintains hematologic stability  Outcome: Not Progressing  Note: Patient's hemoglobin this AM:   Recent Labs     11/10/24  0545   HGB 6.7*     Patient's platelet count this AM:   Recent Labs     11/10/24  0545   PLT 46*    Thrombocytopenia Precautions in place.  Patient showing no signs or symptoms of active bleeding.  Patient transfused blood products per orders - see flowsheet.  Patient verbalizes understanding of all instructions. Will continue to assess and implement POC. Call light within reach and hourly rounding in place.      Problem: Gastrointestinal - Adult  Goal: Maintains or returns to baseline bowel function  Outcome: Not Progressing  Note: Patient still having watery stool and pain prior to diarrhea.

## 2024-11-11 ENCOUNTER — APPOINTMENT (OUTPATIENT)
Dept: VASCULAR LAB | Age: 44
DRG: 720 | End: 2024-11-11
Payer: MEDICAID

## 2024-11-11 LAB
ALBUMIN SERPL-MCNC: 2.7 G/DL (ref 3.4–5)
ALP SERPL-CCNC: 142 U/L (ref 40–129)
ALT SERPL-CCNC: 18 U/L (ref 10–40)
ANION GAP SERPL CALCULATED.3IONS-SCNC: 7 MMOL/L (ref 3–16)
APTT BLD: 45.9 SEC (ref 22.1–36.4)
AST SERPL-CCNC: 6 U/L (ref 15–37)
BACTERIA UR CULT: NORMAL
BASOPHILS # BLD: 0 K/UL (ref 0–0.2)
BASOPHILS NFR BLD: 3.7 %
BILIRUB DIRECT SERPL-MCNC: 0.3 MG/DL (ref 0–0.3)
BILIRUB INDIRECT SERPL-MCNC: 0.3 MG/DL (ref 0–1)
BILIRUB SERPL-MCNC: 0.6 MG/DL (ref 0–1)
BILIRUB UR QL STRIP.AUTO: NEGATIVE
BUN SERPL-MCNC: 10 MG/DL (ref 7–20)
CALCIUM SERPL-MCNC: 8.3 MG/DL (ref 8.3–10.6)
CHLORIDE SERPL-SCNC: 98 MMOL/L (ref 99–110)
CLARITY UR: CLEAR
CO2 SERPL-SCNC: 30 MMOL/L (ref 21–32)
COLOR UR: YELLOW
CREAT SERPL-MCNC: 0.6 MG/DL (ref 0.9–1.3)
DEPRECATED RDW RBC AUTO: 12.8 % (ref 12.4–15.4)
DEPRECATED RDW RBC AUTO: 12.9 % (ref 12.4–15.4)
ECHO BSA: 1.97 M2
EOSINOPHIL # BLD: 0 K/UL (ref 0–0.6)
EOSINOPHIL NFR BLD: 3.6 %
FIBRINOGEN PPP-MCNC: 302 MG/DL (ref 227–534)
FINAL REPORT: NORMAL
FUNGUS BLD CULT: NORMAL
FUNGUS BLD CULT: NORMAL
FUNGUS SPEC CULT: NORMAL
FUNGUS SPEC CULT: NORMAL
GFR SERPLBLD CREATININE-BSD FMLA CKD-EPI: >90 ML/MIN/{1.73_M2}
GLUCOSE BLD-MCNC: 104 MG/DL (ref 70–99)
GLUCOSE BLD-MCNC: 93 MG/DL (ref 70–99)
GLUCOSE SERPL-MCNC: 110 MG/DL (ref 70–99)
GLUCOSE UR STRIP.AUTO-MCNC: NEGATIVE MG/DL
HCT VFR BLD AUTO: 20.3 % (ref 40.5–52.5)
HCT VFR BLD AUTO: 21.2 % (ref 40.5–52.5)
HEMOCCULT STL QL: ABNORMAL
HGB BLD-MCNC: 7.1 G/DL (ref 13.5–17.5)
HGB BLD-MCNC: 7.3 G/DL (ref 13.5–17.5)
HGB UR QL STRIP.AUTO: ABNORMAL
INR PPP: 1.39 (ref 0.85–1.15)
KETONES UR STRIP.AUTO-MCNC: NEGATIVE MG/DL
LDH SERPL L TO P-CCNC: 87 U/L (ref 100–190)
LEUKOCYTE ESTERASE UR QL STRIP.AUTO: NEGATIVE
LOEFFLER MB STN SPEC: NORMAL
LOEFFLER MB STN SPEC: NORMAL
LYMPHOCYTES # BLD: 0.4 K/UL (ref 1–5.1)
LYMPHOCYTES NFR BLD: 81.5 %
MAGNESIUM SERPL-MCNC: 1.52 MG/DL (ref 1.8–2.4)
MCH RBC QN AUTO: 29.7 PG (ref 26–34)
MCH RBC QN AUTO: 30.3 PG (ref 26–34)
MCHC RBC AUTO-ENTMCNC: 34.4 G/DL (ref 31–36)
MCHC RBC AUTO-ENTMCNC: 35.1 G/DL (ref 31–36)
MCV RBC AUTO: 86.3 FL (ref 80–100)
MCV RBC AUTO: 86.5 FL (ref 80–100)
MONOCYTES # BLD: 0 K/UL (ref 0–1.3)
MONOCYTES NFR BLD: 3.3 %
NEUTROPHILS # BLD: 0 K/UL (ref 1.7–7.7)
NEUTROPHILS NFR BLD: 7.9 %
NITRITE UR QL STRIP.AUTO: NEGATIVE
PERFORMED ON: ABNORMAL
PERFORMED ON: NORMAL
PH UR STRIP.AUTO: 8 [PH] (ref 5–8)
PHOSPHATE SERPL-MCNC: 2.7 MG/DL (ref 2.5–4.9)
PLATELET # BLD AUTO: 44 K/UL (ref 135–450)
PLATELET # BLD AUTO: 51 K/UL (ref 135–450)
PMV BLD AUTO: 7.4 FL (ref 5–10.5)
PMV BLD AUTO: 8 FL (ref 5–10.5)
POTASSIUM SERPL-SCNC: 3.8 MMOL/L (ref 3.5–5.1)
PRELIMINARY: NORMAL
PROT SERPL-MCNC: 5.5 G/DL (ref 6.4–8.2)
PROT UR STRIP.AUTO-MCNC: ABNORMAL MG/DL
PROTHROMBIN TIME: 17.3 SEC (ref 11.9–14.9)
RBC # BLD AUTO: 2.36 M/UL (ref 4.2–5.9)
RBC # BLD AUTO: 2.45 M/UL (ref 4.2–5.9)
RBC #/AREA URNS HPF: NORMAL /HPF (ref 0–4)
SODIUM SERPL-SCNC: 135 MMOL/L (ref 136–145)
SP GR UR STRIP.AUTO: 1.02 (ref 1–1.03)
UA DIPSTICK W REFLEX MICRO PNL UR: YES
URATE SERPL-MCNC: 1.5 MG/DL (ref 3.5–7.2)
URN SPEC COLLECT METH UR: ABNORMAL
UROBILINOGEN UR STRIP-ACNC: 0.2 E.U./DL
WBC # BLD AUTO: 0.5 K/UL (ref 4–11)
WBC # BLD AUTO: 0.6 K/UL (ref 4–11)
WBC #/AREA URNS HPF: NORMAL /HPF (ref 0–5)

## 2024-11-11 PROCEDURE — 82270 OCCULT BLOOD FECES: CPT

## 2024-11-11 PROCEDURE — 87086 URINE CULTURE/COLONY COUNT: CPT

## 2024-11-11 PROCEDURE — 6360000002 HC RX W HCPCS

## 2024-11-11 PROCEDURE — 2580000003 HC RX 258: Performed by: INTERNAL MEDICINE

## 2024-11-11 PROCEDURE — 80048 BASIC METABOLIC PNL TOTAL CA: CPT

## 2024-11-11 PROCEDURE — 2060000000 HC ICU INTERMEDIATE R&B

## 2024-11-11 PROCEDURE — 85730 THROMBOPLASTIN TIME PARTIAL: CPT

## 2024-11-11 PROCEDURE — 36430 TRANSFUSION BLD/BLD COMPNT: CPT

## 2024-11-11 PROCEDURE — 85025 COMPLETE CBC W/AUTO DIFF WBC: CPT

## 2024-11-11 PROCEDURE — 81001 URINALYSIS AUTO W/SCOPE: CPT

## 2024-11-11 PROCEDURE — 84100 ASSAY OF PHOSPHORUS: CPT

## 2024-11-11 PROCEDURE — 6370000000 HC RX 637 (ALT 250 FOR IP): Performed by: INTERNAL MEDICINE

## 2024-11-11 PROCEDURE — 83615 LACTATE (LD) (LDH) ENZYME: CPT

## 2024-11-11 PROCEDURE — 6370000000 HC RX 637 (ALT 250 FOR IP)

## 2024-11-11 PROCEDURE — 93971 EXTREMITY STUDY: CPT

## 2024-11-11 PROCEDURE — 2580000003 HC RX 258

## 2024-11-11 PROCEDURE — 84550 ASSAY OF BLOOD/URIC ACID: CPT

## 2024-11-11 PROCEDURE — 99232 SBSQ HOSP IP/OBS MODERATE 35: CPT | Performed by: INTERNAL MEDICINE

## 2024-11-11 PROCEDURE — 85027 COMPLETE CBC AUTOMATED: CPT

## 2024-11-11 PROCEDURE — 85384 FIBRINOGEN ACTIVITY: CPT

## 2024-11-11 PROCEDURE — 93971 EXTREMITY STUDY: CPT | Performed by: SURGERY

## 2024-11-11 PROCEDURE — 6360000002 HC RX W HCPCS: Performed by: INTERNAL MEDICINE

## 2024-11-11 PROCEDURE — 83735 ASSAY OF MAGNESIUM: CPT

## 2024-11-11 PROCEDURE — 85610 PROTHROMBIN TIME: CPT

## 2024-11-11 PROCEDURE — 6370000000 HC RX 637 (ALT 250 FOR IP): Performed by: NURSE PRACTITIONER

## 2024-11-11 PROCEDURE — 36415 COLL VENOUS BLD VENIPUNCTURE: CPT

## 2024-11-11 PROCEDURE — 80076 HEPATIC FUNCTION PANEL: CPT

## 2024-11-11 RX ORDER — VALACYCLOVIR HYDROCHLORIDE 500 MG/1
500 TABLET, FILM COATED ORAL 2 TIMES DAILY
Status: DISPENSED | OUTPATIENT
Start: 2024-11-11

## 2024-11-11 RX ORDER — MAGNESIUM SULFATE IN WATER 40 MG/ML
2000 INJECTION, SOLUTION INTRAVENOUS ONCE
Status: COMPLETED | OUTPATIENT
Start: 2024-11-11 | End: 2024-11-11

## 2024-11-11 RX ORDER — SODIUM CHLORIDE 9 MG/ML
INJECTION, SOLUTION INTRAVENOUS PRN
Status: DISCONTINUED | OUTPATIENT
Start: 2024-11-11 | End: 2024-11-11

## 2024-11-11 RX ADMIN — LEVETIRACETAM 1500 MG: 500 TABLET, FILM COATED ORAL at 07:55

## 2024-11-11 RX ADMIN — SODIUM CHLORIDE, PRESERVATIVE FREE 10 ML: 5 INJECTION INTRAVENOUS at 07:58

## 2024-11-11 RX ADMIN — ACYCLOVIR 400 MG: 200 CAPSULE ORAL at 07:55

## 2024-11-11 RX ADMIN — MICAFUNGIN SODIUM 50 MG: 100 INJECTION, POWDER, LYOPHILIZED, FOR SOLUTION INTRAVENOUS at 00:33

## 2024-11-11 RX ADMIN — ACETAMINOPHEN 650 MG: 650 SOLUTION ORAL at 05:48

## 2024-11-11 RX ADMIN — SODIUM CHLORIDE 15 ML: 900 IRRIGANT IRRIGATION at 19:40

## 2024-11-11 RX ADMIN — VANCOMYCIN HYDROCHLORIDE 125 MG: 250 POWDER, FOR SOLUTION ORAL at 07:55

## 2024-11-11 RX ADMIN — SODIUM CHLORIDE, PRESERVATIVE FREE 10 ML: 5 INJECTION INTRAVENOUS at 19:41

## 2024-11-11 RX ADMIN — DICYCLOMINE HYDROCHLORIDE 20 MG: 20 TABLET ORAL at 16:13

## 2024-11-11 RX ADMIN — MEROPENEM 1000 MG: 1 INJECTION INTRAVENOUS at 14:09

## 2024-11-11 RX ADMIN — ACETAMINOPHEN 650 MG: 650 SOLUTION ORAL at 21:21

## 2024-11-11 RX ADMIN — MEROPENEM 1000 MG: 1 INJECTION INTRAVENOUS at 06:06

## 2024-11-11 RX ADMIN — VALACYCLOVIR HYDROCHLORIDE 500 MG: 500 TABLET, FILM COATED ORAL at 21:20

## 2024-11-11 RX ADMIN — DICYCLOMINE HYDROCHLORIDE 20 MG: 20 TABLET ORAL at 07:56

## 2024-11-11 RX ADMIN — MEROPENEM 1000 MG: 1 INJECTION INTRAVENOUS at 22:13

## 2024-11-11 RX ADMIN — VANCOMYCIN HYDROCHLORIDE 125 MG: 250 POWDER, FOR SOLUTION ORAL at 21:21

## 2024-11-11 RX ADMIN — PANTOPRAZOLE SODIUM 40 MG: 40 TABLET, DELAYED RELEASE ORAL at 06:09

## 2024-11-11 RX ADMIN — PANTOPRAZOLE SODIUM 40 MG: 40 TABLET, DELAYED RELEASE ORAL at 16:13

## 2024-11-11 RX ADMIN — AMITRIPTYLINE HYDROCHLORIDE 10 MG: 10 TABLET, FILM COATED ORAL at 22:11

## 2024-11-11 RX ADMIN — DICYCLOMINE HYDROCHLORIDE 20 MG: 20 TABLET ORAL at 14:05

## 2024-11-11 RX ADMIN — DICYCLOMINE HYDROCHLORIDE 20 MG: 20 TABLET ORAL at 21:20

## 2024-11-11 RX ADMIN — ACETAMINOPHEN 650 MG: 650 SOLUTION ORAL at 14:06

## 2024-11-11 RX ADMIN — LEVETIRACETAM 1500 MG: 500 TABLET, FILM COATED ORAL at 21:20

## 2024-11-11 RX ADMIN — MAGNESIUM SULFATE HEPTAHYDRATE 2000 MG: 40 INJECTION, SOLUTION INTRAVENOUS at 07:49

## 2024-11-11 ASSESSMENT — PAIN SCALES - GENERAL
PAINLEVEL_OUTOF10: 0

## 2024-11-11 NOTE — PLAN OF CARE
Problem: Safety - Adult  Goal: Free from fall injury  11/11/2024 1139 by Paula Bowles, RN  Outcome: Progressing  Note: Pt is a High fall risk. See Apple Fall Score and ABCDS Injury Risk assessments.   + Screening for Orthostasis and/or + High Fall Risk per APPLE/ABCDS: Explained fall risk precautions to pt and family and rationale behind their use to keep the patient safe. Pt bed is in low position, side rails up, call light and belongings are in reach. Fall wristband applied and present on pts wrist.  Bed alarm on.  Pt encouraged to call for assistance. Will continue with hourly rounds for PO intake, pain needs, toileting and repositioning as needed.      Problem: Respiratory - Adult  Goal: Achieves optimal ventilation and oxygenation  Outcome: Progressing  Note: Patient's oxygen saturation greater than 92% on room air.      Problem: Pain  Goal: Verbalizes/displays adequate comfort level or baseline comfort level  11/11/2024 1139 by Paula Bowles, RN  Outcome: Not Progressing  Note: Patient still having abdominal pain prior to bowel movements.      Problem: Hematologic - Adult  Goal: Maintains hematologic stability  Outcome: Not Progressing  Note: Patient's hemoglobin this AM:   Recent Labs     11/11/24  0334   HGB 7.3*     Patient's platelet count this AM:   Recent Labs     11/11/24  0334   PLT 44*    Thrombocytopenia Precautions in place.  Patient showing no signs or symptoms of active bleeding.  Patient received transfusions per orders prior to this shift.  Patient verbalizes understanding of all instructions. Will continue to assess and implement POC. Call light within reach and hourly rounding in place.      Problem: Gastrointestinal - Adult  Goal: Maintains or returns to baseline bowel function  Outcome: Not Progressing  Note: Patient having watery stools.

## 2024-11-11 NOTE — PROGRESS NOTES
RN noticed small blood clots in urine hat. RN asked pt if he was having pain during urination. He said it has been hurting his penis to urinate today. RN notified NP Chelly Esparza who put in order for urine culture and said to notify if there is an increase in pain or blood.

## 2024-11-11 NOTE — PROGRESS NOTES
ID Follow-up NOTE    CC:   AMS  Antibiotics: Meropenem, micafungin, acyclovir, p.o. vancomycin    Admit Date: 10/28/2024  Hospital Day: 15    Subjective:     Patient continues to have intermittent temps, t max 101.5. continues to have intermittent abd pain, more associated with Bms and dark stools.     Objective:     Patient Vitals for the past 8 hrs:   BP Temp Temp src Pulse Resp SpO2   11/11/24 0740 133/79 99.7 °F (37.6 °C) Oral 95 19 99 %   11/11/24 0608 (!) 147/83 100.4 °F (38 °C) Oral 83 23 94 %   11/11/24 0547 (!) 143/90 (!) 100.5 °F (38.1 °C) Oral 85 23 95 %   11/11/24 0530 132/83 99.9 °F (37.7 °C) Oral 84 26 96 %   11/11/24 0340 129/84 100.3 °F (37.9 °C) Oral 97 26 94 %     I/O last 3 completed shifts:  In: 2163.7 [P.O.:420; I.V.:587; Blood:1156.7]  Out: 5050 [Urine:4150; Stool:900]  I/O this shift:  In: 120 [P.O.:120]  Out: -     EXAM:  GENERAL: No apparent distress.  Ill appearing. Pt is fatigued easily.   HEENT: Membranes moist, no oral lesion  NECK:  Supple, no lymphadenopathy  LUNGS: Clear b/l, no rales, no dullness, on room air  CARDIAC: RRR, no murmur appreciated  ABD:  + BS, soft, tender to palpation in the mid abdomen region.  EXT:  No rash, no edema, no lesions  NEURO: No focal neurologic findings  PSYCH: Orientation normal, drowsy.   LINES:  Peripheral iv    Data Review:  Lab Results   Component Value Date    WBC 0.6 (L) 11/11/2024    HGB 7.3 (L) 11/11/2024    HCT 21.2 (L) 11/11/2024    MCV 86.5 11/11/2024    PLT 44 (L) 11/11/2024     Lab Results   Component Value Date    CREATININE 0.6 (L) 11/11/2024    BUN 10 11/11/2024     (L) 11/11/2024    K 3.8 11/11/2024    CL 98 (L) 11/11/2024    CO2 30 11/11/2024       Hepatic Function Panel:   Lab Results   Component Value Date/Time    ALKPHOS 142 11/11/2024 03:34 AM    ALT 18 11/11/2024 03:34 AM    AST 6 11/11/2024 03:34 AM    BILITOT 0.6 11/11/2024 03:34 AM    BILIDIR 0.3 11/11/2024 03:34 AM    IBILI 0.3 11/11/2024 03:34 AM       MICRO:  Blood  daptomycin and micafungin on 10/30.  - on 10/31 new marjan blood with stools, GI eval no plans for intervention at this time, flex sig once neutropenia improves. Continues to have dark stools.   - GI performed EGD on 11/5, no significant findings.   -  repeat CT chest abdomen and pelvis 11/6 showing progressive multifocal pneumonia and consolidative opacities with new nodule on the right apex.  There is nonspecific fluid attenuation of the colon  - stopped daptomycin 11/6 as no further lines in place and no new intra abd pathology.   - given ongoing fever and new lung findings, will repeat endemic fungi workup, to include histoplasma, Blastomyces and cryptococcus antigen/antibody. These were previously negative in 5/2024.   - pulm eval and performed BAL on 11/7, follow results.   - continue mid abd pain worsened around timing of Bms, with dark stools.     AML with underlying MDS:  - OHC following, induction with dacogen and venetoclax  -Repeat bone marrow biopsy 11/6,  Blast cells 6%, pending FISH, CG, myeloid mutation panel.      Rectal adenoca:   - s/p rectal mass resection 4/12/24. Path staging T1N0M0. No plans for neoadjuvant at this time.      History of recurrent C. difficile:  - On prophylactic p.o. vancomycin 125mg twice daily  - c dif testing on this admission was negative x2       Medical Decision Making:  The following items were considered in medical decision making:  Discussion of patient care with other providers  Reviewed clinical lab tests  Reviewed radiology tests  Reviewed other diagnostic tests/interventions  Independent review of radiologic images  Microbiology cultures and other micro tests reviewed      Discussed with patient and primary team, oncology resident Mariel and Onc NP.   Tu Moreno MD

## 2024-11-11 NOTE — PROGRESS NOTES
NP, Chelly Esparza notified by this RN that occult stool was positive. No new orders at this time.

## 2024-11-11 NOTE — PROGRESS NOTES
-  bibasilar airspace dz (R > L), cavitary lesion with satellite nodule in NAN  10/29/24:  UA/micro -  no e/o infection, although collected after starting Merrem  PICC line tip, UrCx - NGTD  Aspergillus Ag - Neg., index 0.28  1,3-beta-D-glucan - positive, 145  10/30/24:  Procalcitonin > 100.  Blood Cx 1 & 2 - NGTD.  10/31/24:  C. difficile toxin/Ag & stool GI pathogens both neg.  11/04/24:  Temp 100.3°F, procalcitonin 1.93, blood cx x 2 - NGTD, UrCx - NGTD  Fungal blood cx - sent, results pending  Resp cx - ORDER APPEARS TO HAVE BEEN CANCELLED  11/05/24:  Temp 101.3°F, procalcitonin 0.79  11/06/24:  Temp 101.3°F   Blood Cx x 2, UrCx - NGTD  Cryptococcus Ag, histoplasma Ag - neg.  Blastomyces Ab, histoplasma Ab - pending  CT C/A/P w contrast demonstrated increasing airspace dz  11/07/24:  Increasing stool output  C. difficile toxin/Ag, stool GI pathogens - Neg.  CMV - pending  Bronchoscopy:  atrophic airway mucosa.  Micro & cytology sent on BAL - results pending  11/09/24:  Temp 101.5°F   Blood Cx x 2, UrCx - pending      Current PPx:  Cont micafungin ppx   Restart home Valtrex ppx  D/C acyclovir suspension  Cont vancomycin 125 mg PO bid ppx (h/o recurrent C.difficile)    Current Tx:   ID is following, recs appreciated  L basilar PICC line removed (8/1-10/29/24)   CVC line removed 11/4/24  Plan for port or another PICC line  Cont Merrem (started 10/28/24) - Day 15  Pulmonology is following        2.   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  IDH1/2 mutation positive.  Insufficient tissue for ATRX studies.  8/28/24, MRI:  Multiple intracranial, intra-axial enhancing foci, new since prior exam on 1/18/24, c/w interval development of intracranial metastatic disease.  Case was discussed  following  Clear liquid diet with ONS    GERD  Cont Protonix 40 mg BID     Transaminitis likely 2/2 hypoperfusion:  stable  Monitor liver function daily    Prandial/post-prandial abdominal pain, intestinal angina, possibly chronic mesenteric ischemia:  HIDA scan unrevealing on 10/31, PUD ruled out with EGD on 11/5.  Cannot definitively r/o GI pathology  Acute melena:  no longer melanotic stools; however, positive FOBT today (11/11/24)   10/28/24 - lipase 12.0, CT abd/pelvis with IV contrast was unrevealing  10/31/24 - CT abd with IV contrast & HIDA were unrevealing  11/03/24 - reoccurred, stopped 11/9  Cont scheduled Bentyl   GI consulted, recs appreciated  GI re-consulted to request a colonoscopy  Cont amitriptyline QHS  Cont Protonix 40 mg BID      6.  NEURO   Oligodendroglioma s/p partial resection 2017  See oncology section above for details    Amyloid angiopathy  9/30/24, MRI brain w & w/o contrast - Pattern found to be c/w amyloid angiopathy per neurosurgery with recommendation to avoid anticoagulation as it would significantly increase the risk of ICH; no surgical intervention warranted.    10/10/24, CT head w/o contrast - Stable small foci of increased density parietal and occipital lobe on the right occipital lobe on the left similar to the previous exam c/w multiple small areas of hemorrhage.  10/29/24, MRI brain w & w/o contrast - Slightly more prominent small area of vasogenic edema a/w left occipital lobe microhemorrhage.  Slightly more prominent small area of vasogenic edema a/w right parietal lobe microhemorrhage. Decrease in small foci of enhancement a/w other previously noted microhemorrhages.    Seizures (dx 2009)  Cont Keppra 1500 mg BID      7.  CVS  New-onset HFmrEF - likely transient d/t shock  10/29/24, TTE:  LV - EF 45-50% (decreased compared to TTE on 10/14/24); normal size & wall thickness; mild global hypokinesis.  RV - mildly dilated; normal systolic function.  MV - mild regurgitation.

## 2024-11-11 NOTE — CARE COORDINATION
Discussed with treatment team this AM during rounds.     SW will discuss with Mary Kate CADENA tomorrow regarding plan of care.     BRENDA will follow    JENNIFER Ortiz   for [facility] ([facility])  [employer] Mobile: [phone]

## 2024-11-11 NOTE — PLAN OF CARE
Problem: Pain  Goal: Verbalizes/displays adequate comfort level or baseline comfort level  Outcome: Progressing  Verbalizes/displays adequate comfort level or baseline comfort level: Encourage patient to monitor pain and request assistance  Note: Pt had no complaints of pain overnight      Problem: Safety - Adult  Goal: Free from fall injury  Outcome: Progressing  Free From Fall Injury: Instruct family/caregiver on patient safety  Note: Orthostatic vital signs obtained at start of shift - see flowsheet for details.  Pt does not meet criteria for orthostasis.  Pt is a High fall risk. See Apple Fall Score and ABCDS Injury Risk assessments.   + Screening for Orthostasis and/or + High Fall Risk per APPLE/ABCDS: Explained fall risk precautions to pt and family and rationale behind their use to keep the patient safe. Pt bed is in low position, side rails up, call light and belongings are in reach. Fall wristband applied and present on pts wrist.  Bed alarm on.  Pt encouraged to call for assistance. Will continue with hourly rounds for PO intake, pain needs, toileting and repositioning as needed.      Problem: Skin/Tissue Integrity - Adult  Goal: Skin integrity remains intact  Outcome: Progressing  Skin Integrity Remains Intact: Monitor for areas of redness and/or skin breakdown  Note: No new skin breakdown noted during this shift     Problem: Infection - Adult  Goal: Absence of infection at discharge  Outcome: Progressing  Absence of infection at discharge: Assess and monitor for signs and symptoms of infection

## 2024-11-12 LAB
ALBUMIN SERPL-MCNC: 3.1 G/DL (ref 3.4–5)
ALP SERPL-CCNC: 149 U/L (ref 40–129)
ALT SERPL-CCNC: 17 U/L (ref 10–40)
ANION GAP SERPL CALCULATED.3IONS-SCNC: 8 MMOL/L (ref 3–16)
APTT BLD: 44.8 SEC (ref 22.1–36.4)
AST SERPL-CCNC: 8 U/L (ref 15–37)
B DERMAT AB SER-ACNC: 0.4 IV
BACTERIA THROAT AEROBE CULT: NORMAL
BACTERIA UR CULT: NORMAL
BASOPHILS # BLD: 0 K/UL (ref 0–0.2)
BASOPHILS # BLD: 0 K/UL (ref 0–0.2)
BASOPHILS NFR BLD: 0 %
BASOPHILS NFR BLD: 2.3 %
BILIRUB DIRECT SERPL-MCNC: 0.4 MG/DL (ref 0–0.3)
BILIRUB INDIRECT SERPL-MCNC: 0.4 MG/DL (ref 0–1)
BILIRUB SERPL-MCNC: 0.8 MG/DL (ref 0–1)
BLOOD BANK DISPENSE STATUS: NORMAL
BLOOD BANK PRODUCT CODE: NORMAL
BPU ID: NORMAL
BUN SERPL-MCNC: 6 MG/DL (ref 7–20)
CALCIUM SERPL-MCNC: 8.6 MG/DL (ref 8.3–10.6)
CHLORIDE SERPL-SCNC: 96 MMOL/L (ref 99–110)
CK SERPL-CCNC: 17 U/L (ref 39–308)
CO2 SERPL-SCNC: 30 MMOL/L (ref 21–32)
CREAT SERPL-MCNC: 0.6 MG/DL (ref 0.9–1.3)
DEPRECATED RDW RBC AUTO: 12.8 % (ref 12.4–15.4)
DEPRECATED RDW RBC AUTO: 13 % (ref 12.4–15.4)
DESCRIPTION BLOOD BANK: NORMAL
EOSINOPHIL # BLD: 0 K/UL (ref 0–0.6)
EOSINOPHIL # BLD: 0 K/UL (ref 0–0.6)
EOSINOPHIL NFR BLD: 0 %
EOSINOPHIL NFR BLD: 0.3 %
FIBRINOGEN PPP-MCNC: 421 MG/DL (ref 227–534)
GFR SERPLBLD CREATININE-BSD FMLA CKD-EPI: >90 ML/MIN/{1.73_M2}
GLUCOSE SERPL-MCNC: 103 MG/DL (ref 70–99)
H CAPSUL AB TITR SER ID: NOT DETECTED {TITER}
HCT VFR BLD AUTO: 21.2 % (ref 40.5–52.5)
HCT VFR BLD AUTO: 22.1 % (ref 40.5–52.5)
HGB BLD-MCNC: 7.3 G/DL (ref 13.5–17.5)
HGB BLD-MCNC: 7.6 G/DL (ref 13.5–17.5)
INR PPP: 1.41 (ref 0.85–1.15)
LYMPHOCYTES # BLD: 0.5 K/UL (ref 1–5.1)
LYMPHOCYTES # BLD: 0.7 K/UL (ref 1–5.1)
LYMPHOCYTES NFR BLD: 78 %
LYMPHOCYTES NFR BLD: 82 %
MAGNESIUM SERPL-MCNC: 1.84 MG/DL (ref 1.8–2.4)
MCH RBC QN AUTO: 29.9 PG (ref 26–34)
MCH RBC QN AUTO: 30 PG (ref 26–34)
MCHC RBC AUTO-ENTMCNC: 34.4 G/DL (ref 31–36)
MCHC RBC AUTO-ENTMCNC: 34.5 G/DL (ref 31–36)
MCV RBC AUTO: 86.8 FL (ref 80–100)
MCV RBC AUTO: 87.1 FL (ref 80–100)
MICROCYTES BLD QL SMEAR: ABNORMAL
MONOCYTES # BLD: 0 K/UL (ref 0–1.3)
MONOCYTES # BLD: 0.1 K/UL (ref 0–1.3)
MONOCYTES NFR BLD: 6.1 %
MONOCYTES NFR BLD: 8 %
NEUTROPHILS # BLD: 0 K/UL (ref 1.7–7.7)
NEUTROPHILS # BLD: 0.1 K/UL (ref 1.7–7.7)
NEUTROPHILS NFR BLD: 13.3 %
NEUTROPHILS NFR BLD: 6 %
PHOSPHATE SERPL-MCNC: 3.1 MG/DL (ref 2.5–4.9)
PLATELET # BLD AUTO: 39 K/UL (ref 135–450)
PLATELET # BLD AUTO: 49 K/UL (ref 135–450)
PLATELET BLD QL SMEAR: ABNORMAL
PMV BLD AUTO: 7 FL (ref 5–10.5)
PMV BLD AUTO: 7.3 FL (ref 5–10.5)
POTASSIUM SERPL-SCNC: 4.1 MMOL/L (ref 3.5–5.1)
PROT SERPL-MCNC: 6.3 G/DL (ref 6.4–8.2)
PROTHROMBIN TIME: 17.4 SEC (ref 11.9–14.9)
RBC # BLD AUTO: 2.44 M/UL (ref 4.2–5.9)
RBC # BLD AUTO: 2.54 M/UL (ref 4.2–5.9)
SCHISTOCYTES BLD QL SMEAR: ABNORMAL
SLIDE REVIEW: ABNORMAL
SODIUM SERPL-SCNC: 134 MMOL/L (ref 136–145)
URATE SERPL-MCNC: 1.6 MG/DL (ref 3.5–7.2)
VARIANT LYMPHS NFR BLD MANUAL: 4 % (ref 0–6)
WBC # BLD AUTO: 0.6 K/UL (ref 4–11)
WBC # BLD AUTO: 0.8 K/UL (ref 4–11)

## 2024-11-12 PROCEDURE — 99232 SBSQ HOSP IP/OBS MODERATE 35: CPT | Performed by: INTERNAL MEDICINE

## 2024-11-12 PROCEDURE — 6370000000 HC RX 637 (ALT 250 FOR IP): Performed by: INTERNAL MEDICINE

## 2024-11-12 PROCEDURE — 84550 ASSAY OF BLOOD/URIC ACID: CPT

## 2024-11-12 PROCEDURE — 36592 COLLECT BLOOD FROM PICC: CPT

## 2024-11-12 PROCEDURE — 36415 COLL VENOUS BLD VENIPUNCTURE: CPT

## 2024-11-12 PROCEDURE — 84100 ASSAY OF PHOSPHORUS: CPT

## 2024-11-12 PROCEDURE — 85730 THROMBOPLASTIN TIME PARTIAL: CPT

## 2024-11-12 PROCEDURE — 6360000002 HC RX W HCPCS: Performed by: INTERNAL MEDICINE

## 2024-11-12 PROCEDURE — 36430 TRANSFUSION BLD/BLD COMPNT: CPT

## 2024-11-12 PROCEDURE — 2580000003 HC RX 258: Performed by: INTERNAL MEDICINE

## 2024-11-12 PROCEDURE — 85610 PROTHROMBIN TIME: CPT

## 2024-11-12 PROCEDURE — 6370000000 HC RX 637 (ALT 250 FOR IP)

## 2024-11-12 PROCEDURE — 85384 FIBRINOGEN ACTIVITY: CPT

## 2024-11-12 PROCEDURE — 6360000002 HC RX W HCPCS

## 2024-11-12 PROCEDURE — 80076 HEPATIC FUNCTION PANEL: CPT

## 2024-11-12 PROCEDURE — 85025 COMPLETE CBC W/AUTO DIFF WBC: CPT

## 2024-11-12 PROCEDURE — 2580000003 HC RX 258

## 2024-11-12 PROCEDURE — 82550 ASSAY OF CK (CPK): CPT

## 2024-11-12 PROCEDURE — 80048 BASIC METABOLIC PNL TOTAL CA: CPT

## 2024-11-12 PROCEDURE — 2060000000 HC ICU INTERMEDIATE R&B

## 2024-11-12 PROCEDURE — 83735 ASSAY OF MAGNESIUM: CPT

## 2024-11-12 RX ORDER — SODIUM CHLORIDE 9 MG/ML
INJECTION, SOLUTION INTRAVENOUS PRN
Status: DISCONTINUED | OUTPATIENT
Start: 2024-11-12 | End: 2024-11-22

## 2024-11-12 RX ORDER — ACETAMINOPHEN 325 MG/1
650 TABLET ORAL EVERY 4 HOURS PRN
Status: DISPENSED | OUTPATIENT
Start: 2024-11-12

## 2024-11-12 RX ORDER — LEVOFLOXACIN 500 MG/1
500 TABLET, FILM COATED ORAL
Status: DISCONTINUED | OUTPATIENT
Start: 2024-11-12 | End: 2024-11-13

## 2024-11-12 RX ADMIN — SODIUM CHLORIDE: 9 INJECTION, SOLUTION INTRAVENOUS at 05:34

## 2024-11-12 RX ADMIN — Medication: at 08:19

## 2024-11-12 RX ADMIN — ACETAMINOPHEN 650 MG: 325 TABLET, FILM COATED ORAL at 12:19

## 2024-11-12 RX ADMIN — LEVETIRACETAM 1500 MG: 500 TABLET, FILM COATED ORAL at 20:02

## 2024-11-12 RX ADMIN — PANTOPRAZOLE SODIUM 40 MG: 40 TABLET, DELAYED RELEASE ORAL at 16:11

## 2024-11-12 RX ADMIN — DICYCLOMINE HYDROCHLORIDE 20 MG: 20 TABLET ORAL at 17:24

## 2024-11-12 RX ADMIN — VANCOMYCIN HYDROCHLORIDE 125 MG: 250 POWDER, FOR SOLUTION ORAL at 08:31

## 2024-11-12 RX ADMIN — DICYCLOMINE HYDROCHLORIDE 20 MG: 20 TABLET ORAL at 12:19

## 2024-11-12 RX ADMIN — MEROPENEM 1000 MG: 1 INJECTION INTRAVENOUS at 05:36

## 2024-11-12 RX ADMIN — SODIUM CHLORIDE, PRESERVATIVE FREE 10 ML: 5 INJECTION INTRAVENOUS at 08:15

## 2024-11-12 RX ADMIN — DICYCLOMINE HYDROCHLORIDE 20 MG: 20 TABLET ORAL at 08:12

## 2024-11-12 RX ADMIN — ACETAMINOPHEN 650 MG: 325 TABLET, FILM COATED ORAL at 20:09

## 2024-11-12 RX ADMIN — VANCOMYCIN HYDROCHLORIDE 125 MG: 250 POWDER, FOR SOLUTION ORAL at 20:03

## 2024-11-12 RX ADMIN — LEVETIRACETAM 1500 MG: 500 TABLET, FILM COATED ORAL at 08:12

## 2024-11-12 RX ADMIN — ACETAMINOPHEN 650 MG: 325 TABLET, FILM COATED ORAL at 16:12

## 2024-11-12 RX ADMIN — VALACYCLOVIR HYDROCHLORIDE 500 MG: 500 TABLET, FILM COATED ORAL at 08:12

## 2024-11-12 RX ADMIN — SODIUM CHLORIDE, PRESERVATIVE FREE 10 ML: 5 INJECTION INTRAVENOUS at 20:03

## 2024-11-12 RX ADMIN — LEVOFLOXACIN 500 MG: 500 TABLET, FILM COATED ORAL at 20:02

## 2024-11-12 RX ADMIN — AMITRIPTYLINE HYDROCHLORIDE 10 MG: 10 TABLET, FILM COATED ORAL at 20:02

## 2024-11-12 RX ADMIN — MICAFUNGIN SODIUM 50 MG: 100 INJECTION, POWDER, LYOPHILIZED, FOR SOLUTION INTRAVENOUS at 01:45

## 2024-11-12 RX ADMIN — VALACYCLOVIR HYDROCHLORIDE 500 MG: 500 TABLET, FILM COATED ORAL at 20:02

## 2024-11-12 RX ADMIN — PANTOPRAZOLE SODIUM 40 MG: 40 TABLET, DELAYED RELEASE ORAL at 07:14

## 2024-11-12 RX ADMIN — ACETAMINOPHEN 650 MG: 650 SOLUTION ORAL at 05:06

## 2024-11-12 RX ADMIN — DICYCLOMINE HYDROCHLORIDE 20 MG: 20 TABLET ORAL at 20:02

## 2024-11-12 ASSESSMENT — PAIN SCALES - GENERAL
PAINLEVEL_OUTOF10: 0

## 2024-11-12 NOTE — PLAN OF CARE
Spoke with the in-house [facility] lab & [facility].  Some results on bronchoalveolar lavage specimens collected 11/7/24 can be seen on their end, but are not transmitting through Epic into the patient's chart.  A ticket to the [employer] Help Desk has been placed.    The following verbal results were provided:  PNA panel film array (orders [id number] & [id number]) - no pathogens detected  Respiratory cultures (orders [id number], [id number], & [id number]) - no growth at 48 hours  CMV cultures (orders [id number], [id number], & [id number]) - no growth at 48 hours    The following specimens were collected and sent to [facility]:  Aspergillus galact AG by EIA-A (order [id number])  Legionella culture (order [id number])    The following specimens are noted to be in process:  Fungal cultures  AFB cultures with smear              Yoselin Floyd MD, MARIE, CCR[facility]  Internal Medicine, PGY-3

## 2024-11-12 NOTE — PROGRESS NOTES
amitriptyline  10 mg Oral Nightly    pantoprazole  40 mg Oral BID AC    levETIRAcetam  1,500 mg Oral BID    dicyclomine  20 mg Oral 4x Daily    sodium chloride flush  5-40 mL IntraVENous 2 times per day    Hydrocerin   Topical BID    sodium chloride flush  5-40 mL IntraVENous 2 times per day    Saline Mouthwash  15 mL Swish & Spit 4x Daily AC & HS    vancomycin  125 mg Oral 2 times per day    micafungin  50 mg IntraVENous Q24H     Continuous Infusions:   sodium chloride      sodium chloride      sodium chloride 5 mL/hr at 11/12/24 0534     PRN:  sodium chloride, acetaminophen, sodium chloride, potassium chloride **OR** potassium alternative oral replacement **OR** potassium chloride, simethicone, ondansetron, prochlorperazine, [Held by provider] loperamide, polyethylene glycol, sodium chloride, sodium chloride flush, magnesium sulfate, Saline Mouthwash, ALTEplase (CATHFLO) 2 mg in sterile water 2 mL injection      ROS:  As noted above, otherwise remainder of 10-point ROS negative    Physical Exam:    I&O:    Intake/Output Summary (Last 24 hours) at 11/13/2024 0859  Last data filed at 11/13/2024 0801  Gross per 24 hour   Intake 1357 ml   Output 4320 ml   Net -2963 ml       Vital Signs:  BP (!) 143/92   Pulse 89   Temp 99.9 °F (37.7 °C) (Oral)   Resp 27   Ht 1.778 m (5' 10\")   Wt 76.4 kg (168 lb 6.6 oz)   SpO2 95%   BMI 24.16 kg/m²     Weight:    Wt Readings from Last 3 Encounters:   11/12/24 76.4 kg (168 lb 6.6 oz)   10/14/24 67.6 kg (149 lb)   10/02/24 70 kg (154 lb 6.4 oz)     Gen: Chronically ill-appearing male, in NAD.   HEENT: Normocephalic.  No scleral icterus or conjunctival injection.  No rhinorrhea or epistaxis.  Moist, pink oral mucous membranes.   Neck: Supple.   CVS: No apparent JVD.  Tachycardic.  Regular rhythm. No murmurs, rubs, or gallops.  Radial & dorsalis pedis pulses 2+.  Capillary refill > 3 sec.   Pulm: No perioral cyanosis.  Able to speak in full sentences w/o any frequent pauses on room  ICH    Pancytopenia r/t AML/MDS and recent chemotherapy  Transfuse for Hgb < 7   Transfuse for PLT < 50 K (due to amyloid angiopathy causing higher risk of ICH, PLT threshold was set to transfuse for < 20K; however, he developed melena & therefore, threshold was increased)      4.  METABOLIC, RENAL - renal function is at baseline  Baseline Cr ~0.5-0.6  BMP, phos, Mg daily - replace Mg & K prn    Hematuria in setting of thrombocytopenia, prolonged PT, aPTT, & INR - RESOLVED 11/12/24 11/11/24, UA/micro:  moderate blood, 0 RBC        5.  GI, NUTRITION   Rectal Adenocarcinoma   See oncology section above for details    Nutrition    Has not been eating or drinking well at home since Sat, 10/26  Dietitian is following  Clear liquid diet with ONS    GERD  Cont Protonix 40 mg BID     Prandial/post-prandial abdominal pain, intestinal angina, possibly chronic mesenteric ischemia:  HIDA scan unrevealing on 10/31, PUD ruled out with EGD on 11/5.  Cannot definitively r/o GI pathology  Acute melena:  no longer melanotic stools; however, positive FOBT 11/11/24  10/28/24 - lipase 12.0, CT abd/pelvis with IV contrast was unrevealing  10/31/24 - CT abd with IV contrast & HIDA were unrevealing  11/03/24 - reoccurred, stopped 11/9  Cont scheduled Bentyl   GI consulted, recs appreciated  GI re-consulted to request a colonoscopy - unable to perform d/t neutropenia  Cont amitriptyline QHS  Cont Protonix 40 mg BID      6.  NEURO   Oligodendroglioma s/p partial resection 2017  See oncology section above for details    Amyloid angiopathy  9/30/24, MRI brain w & w/o contrast - Pattern found to be c/w amyloid angiopathy per neurosurgery with recommendation to avoid anticoagulation as it would significantly increase the risk of ICH; no surgical intervention warranted.    10/10/24, CT head w/o contrast - Stable small foci of increased density parietal and occipital lobe on the right occipital lobe on the left similar to the previous exam c/w

## 2024-11-12 NOTE — PLAN OF CARE
Problem: Safety - Adult  Goal: Free from fall injury  Outcome: Progressing  Flowsheets (Taken 11/12/2024 1512)  Free From Fall Injury:   Instruct family/caregiver on patient safety   Based on caregiver fall risk screen, instruct family/caregiver to ask for assistance with transferring infant if caregiver noted to have fall risk factors  Note: Orthostatic vital signs obtained at start of shift - see flowsheet for details.  Pt meets criteria for orthostasis.  Pt is a High fall risk. See Apple Fall Score and ABCDS Injury Risk assessments.   + Screening for Orthostasis and/or + High Fall Risk per APPLE/ABCDS: Explained fall risk precautions to pt and family and rationale behind their use to keep the patient safe. Pt bed is in low position, side rails up, call light and belongings are in reach. Fall wristband applied and present on pts wrist.  Bed alarm on.  Pt encouraged to call for assistance. Will continue with hourly rounds for PO intake, pain needs, toileting and repositioning as needed.        Problem: ABCDS Injury Assessment  Goal: Absence of physical injury  Outcome: Progressing  Flowsheets (Taken 11/12/2024 1512)  Absence of Physical Injury: Implement safety measures based on patient assessment  Note: Bed alarm on, bed in lowest position, non-skid socks on, beside table within reach, bed wheels locked     Problem: Musculoskeletal - Adult  Goal: Return mobility to safest level of function  Outcome: Progressing  Flowsheets (Taken 11/12/2024 1512)  Return Mobility to Safest Level of Function:   Assess patient stability and activity tolerance for standing, transferring and ambulating with or without assistive devices   Assist with transfers and ambulation using safe patient handling equipment as needed   Ensure adequate protection for wounds/incisions during mobilization  Note: Patient walking to and from bathroom as contact-guard with RN. Patient tolerating movement well during shift. Patient turning self in bed  and active with all extremities during shift. Care continues.

## 2024-11-12 NOTE — CARE COORDINATION
Discussed with Mary Kate CADENA who states she spoke with pt's father and he has spoken with the [facility] regarding the mold in pt's apartment. Per Mary Kate, pt's father will be here around 330pm to obtain a letter that is needed to be provided.     Provided pt with letter signed by Chelly SALCIDO and placed this on the couch in his room. He states that he has been getting sick since he has been in his apartment and aware his dad is working on what is needed to be done. He denied needs from  at this time.    Copy of letter placed in pt's hard chart.     JENNIFER Ortiz   for [facility] ([facility])  [employer] Mobile: [phone]

## 2024-11-12 NOTE — PLAN OF CARE
Problem: Hematologic - Adult  Goal: Maintains hematologic stability  11/12/2024 0029 by Apryl Bustamante RN  Flowsheets (Taken 11/12/2024 0029)  Maintains hematologic stability:   Assess for signs and symptoms of bleeding or hemorrhage   Administer blood products/factors as ordered  Note:   Lab Results   Component Value Date    WBC 0.5 (LL) 11/11/2024    HGB 7.1 (L) 11/11/2024    HCT 20.3 (LL) 11/11/2024    MCV 86.3 11/11/2024    PLT 51 (L) 11/11/2024      Problem: Safety - Adult  Goal: Free from fall injury  11/12/2024 0029 by Apryl Bustamante RN  Flowsheets (Taken 11/12/2024 0029)  Free From Fall Injury:   Instruct family/caregiver on patient safety   Based on caregiver fall risk screen, instruct family/caregiver to ask for assistance with transferring infant if caregiver noted to have fall risk factors  Note: Pt is a high fall risk (see Jose Fall Risk assessment).  Oriented pt to room and call light. Instructed pt to call for help when needed.  Call light and personal items within reach.  Bed in lowest position, brakes on, and 2/4 side rails up.  Non-skid footwear on. Falls risk stop sign on door; hourly visual checks implemented.  Falls risk arm band on pt.  Bed alarm is on.  Pt using call light appropriately.  Will continue to monitor.       Problem: Metabolic/Fluid and Electrolytes - Adult  Goal: Electrolytes maintained within normal limits  Flowsheets (Taken 11/12/2024 0029)  Electrolytes maintained within normal limits:   Monitor labs and assess patient for signs and symptoms of electrolyte imbalances   Monitor response to electrolyte replacements, including repeat lab results as appropriate   Administer electrolyte replacement as ordered  Note:   Lab Results   Component Value Date     (L) 11/11/2024    K 3.8 11/11/2024    CL 98 (L) 11/11/2024    CO2 30 11/11/2024      Problem: Pain  Goal: Verbalizes/displays adequate comfort level or baseline comfort level  11/11/2024 1139 by Paula Bowles,  RN  Outcome: Not Progressing     Problem: Hematologic - Adult  Goal: Maintains hematologic stability  11/12/2024 0029 by Apryl Bustamante RN  Flowsheets (Taken 11/12/2024 0029)  Maintains hematologic stability:   Assess for signs and symptoms of bleeding or hemorrhage   Administer blood products/factors as ordered  Note:   Lab Results   Component Value Date    WBC 0.5 (LL) 11/11/2024    HGB 7.1 (L) 11/11/2024    HCT 20.3 (LL) 11/11/2024    MCV 86.3 11/11/2024    PLT 51 (L) 11/11/2024 11/11/2024 1139 by Paula Bowles, RN  Outcome: Not Progressing     Problem: Gastrointestinal - Adult  Goal: Maintains or returns to baseline bowel function  11/11/2024 1139 by Paula Bowles, RN  Outcome: Not Progressing

## 2024-11-12 NOTE — PROGRESS NOTES
ID Follow-up NOTE    CC:   AMS  Antibiotics: Meropenem, micafungin, acyclovir, p.o. vancomycin    Admit Date: 10/28/2024  Hospital Day: 16    Subjective:     Patient continues to have intermittent temps, t max 101.8. continues to have intermittent abd pain, more associated with BMs and dark stools. Now also pain on urination.     Objective:     Patient Vitals for the past 8 hrs:   BP Temp Temp src Pulse Resp SpO2 Weight   11/12/24 0808 111/66 98.9 °F (37.2 °C) Oral 92 20 96 % --   11/12/24 0745 -- -- -- -- -- -- 76.4 kg (168 lb 6.6 oz)   11/12/24 0519 138/81 100.3 °F (37.9 °C) Oral 93 20 93 % --   11/12/24 0505 (!) 142/83 (!) 101.8 °F (38.8 °C) Oral -- -- -- --   11/12/24 0441 -- -- -- 92 23 -- --   11/12/24 0434 (!) 143/86 100.4 °F (38 °C) Oral -- -- 96 % --   11/12/24 0252 (!) 144/89 99.4 °F (37.4 °C) Oral -- -- -- --     I/O last 3 completed shifts:  In: 2331.3 [P.O.:720; I.V.:1235; Blood:376.3]  Out: 4900 [Urine:4700; Stool:200]  I/O this shift:  In: 10 [I.V.:10]  Out: 500 [Urine:500]    EXAM:  GENERAL: No apparent distress.  Ill appearing. Pt is fatigued easily.   HEENT: Membranes moist, no oral lesion  NECK:  Supple, no lymphadenopathy  LUNGS: Clear b/l, no rales, no dullness, on room air  CARDIAC: RRR, no murmur appreciated  ABD:  + BS, soft, tender to palpation in the mid abdomen region.  EXT:  No rash, no edema, no lesions  NEURO: No focal neurologic findings  PSYCH: Orientation normal, drowsy.   LINES:  Peripheral iv    Data Review:  Lab Results   Component Value Date    WBC 0.6 (L) 11/12/2024    HGB 7.6 (L) 11/12/2024    HCT 22.1 (L) 11/12/2024    MCV 87.1 11/12/2024    PLT 39 (L) 11/12/2024     Lab Results   Component Value Date    CREATININE 0.6 (L) 11/12/2024    BUN 6 (L) 11/12/2024     (L) 11/12/2024    K 4.1 11/12/2024    CL 96 (L) 11/12/2024    CO2 30 11/12/2024       Hepatic Function Panel:   Lab Results   Component Value Date/Time    ALKPHOS 149 11/12/2024 03:43 AM    ALT 17 11/12/2024 03:43

## 2024-11-12 NOTE — PROGRESS NOTES
[facility] Progress Note    2024    Denyns Peguero    :  1980    MRN:  [id number]      Interval Hx:      Mr. Peguero's epigastric persists with pain immediately before & during bowel movements, which he describes as similar to prior intestinal ischemic event, causing him fear of eating. He continues to have watery brown stools. Fecal occult blood test was found to be positive.  GI unable to perform a colonoscopy d/t neutropenia.      Now he has pain on urination.  UA/micro demonstrated moderate blood w/o any e/o RBCs.    Stop Merrem, start Levaquin PPx  Over last 24 hrs:  Tmax 101.9 °F (, 1406)  Intake 1.4 L  UOP 3.0 L    --> 600  --> 150 mL  Hgb 6.7 --> 1U pRBC --> 8.1 --> 7.3  --> 7.6  PLT 46 --> 1U PLT --> 63 --> 44 --> 1U PLT  --> 51  --> 39  --> 1U PLT      ECOG PS:  (2) Ambulatory and capable of self care, unable to carry out work activity, up and about > 50% or waking hours    KPS:  70% Cares for self; unable to carry on normal activity or to do active work    Isolation:  Strict contact      Medication:    Scheduled:   valACYclovir  500 mg Oral BID    amitriptyline  10 mg Oral Nightly    pantoprazole  40 mg Oral BID AC    levETIRAcetam  1,500 mg Oral BID    dicyclomine  20 mg Oral 4x Daily    sodium chloride flush  5-40 mL IntraVENous 2 times per day    meropenem  1,000 mg IntraVENous Q8H    Hydrocerin   Topical BID    sodium chloride flush  5-40 mL IntraVENous 2 times per day    Saline Mouthwash  15 mL Swish & Spit 4x Daily AC & HS    vancomycin  125 mg Oral 2 times per day    micafungin  50 mg IntraVENous Q24H     Continuous Infusions:   sodium chloride      sodium chloride 5 mL/hr at 24 0534     PRN:  sodium chloride, potassium chloride **OR** potassium alternative oral replacement **OR** potassium chloride, simethicone, ondansetron, prochlorperazine, [Held by provider] loperamide, polyethylene glycol, sodium chloride, acetaminophen, sodium chloride flush,

## 2024-11-12 NOTE — PROGRESS NOTES
Comprehensive Nutrition Assessment    RECOMMENDATIONS:  PO Diet: Regular; low microbial  Nutrition Supplement: Ensure +HP TID  Nutrition Education: No recommendation at this time     NUTRITION ASSESSMENT:   Nutritional summary & status: Follow up. PO intake improving despite postprandial abdominal pain and subsequent diarrhea. Thought to have mesenteric ischemia. Fecal occutl test positive. GI states they are unable to scope due to neutropenia. GI added amitriptyline to calm bowels. RD to recommend nutrition therapy for mesenteric ischemia (low fat, low fiber), which are also diet recommendations for diarrhea. Pt expressed understanding. Supplements ordered TID, pt consuming BID.     Admission // PMH: Neutropenic fever // Oligodendroglioma (s/p partial resection 2017, radiation 2019, and temodar 2019), rectal adenocarcinoma, AML, underlying MDS    MALNUTRITION ASSESSMENT  Context of Malnutrition: Chronic Illness   Malnutrition Status: Severe malnutrition  Findings of the 6 clinical characteristics of malnutrition (Minimum of 2 out of 6 clinical characteristics is required to make the diagnosis of moderate or severe Protein Calorie Malnutrition based on AND/ASPEN Guidelines):  Energy Intake:  Mild decrease in energy intake (previous 6 days this admit)  Weight Loss:  Greater than 10% over 6 months (20% in 6 months)     Body Fat Loss:  Mild body fat loss Buccal region, Orbital, Triceps   Muscle Mass Loss:  Severe muscle mass loss Temples (temporalis), Clavicles (pectoralis & deltoids)      NUTRITION DIAGNOSIS   Inadequate oral intake related to altered GI function as evidenced by diarrhea, intake 0-25%    Nutrition Monitoring and Evaluation:   Food/Nutrient Intake Outcomes:  Progression of Nutrition, Supplement Intake, Food and Nutrient Intake  Physical Signs/Symptoms Outcomes:  Nutrition Focused Physical Findings, Biochemical Data, Skin, Weight, Diarrhea     OBJECTIVE DATA: Significant to nutrition

## 2024-11-13 ENCOUNTER — APPOINTMENT (OUTPATIENT)
Dept: CT IMAGING | Age: 44
DRG: 720 | End: 2024-11-13
Payer: MEDICAID

## 2024-11-13 LAB
ALBUMIN SERPL-MCNC: 3 G/DL (ref 3.4–5)
ALP SERPL-CCNC: 161 U/L (ref 40–129)
ALT SERPL-CCNC: 19 U/L (ref 10–40)
ANION GAP SERPL CALCULATED.3IONS-SCNC: 5 MMOL/L (ref 3–16)
APTT BLD: 37.2 SEC (ref 22.1–36.4)
AST SERPL-CCNC: 11 U/L (ref 15–37)
BILIRUB DIRECT SERPL-MCNC: 0.4 MG/DL (ref 0–0.3)
BILIRUB INDIRECT SERPL-MCNC: 0.4 MG/DL (ref 0–1)
BILIRUB SERPL-MCNC: 0.8 MG/DL (ref 0–1)
BILIRUB UR QL STRIP.AUTO: NEGATIVE
BLOOD BANK DISPENSE STATUS: NORMAL
BLOOD BANK DISPENSE STATUS: NORMAL
BLOOD BANK PRODUCT CODE: NORMAL
BLOOD BANK PRODUCT CODE: NORMAL
BPU ID: NORMAL
BPU ID: NORMAL
BUN SERPL-MCNC: 7 MG/DL (ref 7–20)
CALCIUM SERPL-MCNC: 8.5 MG/DL (ref 8.3–10.6)
CHLORIDE SERPL-SCNC: 96 MMOL/L (ref 99–110)
CLARITY UR: CLEAR
CO2 SERPL-SCNC: 30 MMOL/L (ref 21–32)
COLOR UR: YELLOW
CREAT SERPL-MCNC: 0.6 MG/DL (ref 0.9–1.3)
DEPRECATED RDW RBC AUTO: 12.8 % (ref 12.4–15.4)
DEPRECATED RDW RBC AUTO: 12.9 % (ref 12.4–15.4)
DESCRIPTION BLOOD BANK: NORMAL
DESCRIPTION BLOOD BANK: NORMAL
EPI CELLS #/AREA URNS HPF: NORMAL /HPF (ref 0–5)
FERRITIN SERPL IA-MCNC: 4659 NG/ML (ref 30–400)
FIBRINOGEN PPP-MCNC: 399 MG/DL (ref 227–534)
FIBRINOGEN PPP-MCNC: 438 MG/DL (ref 227–534)
FINAL REPORT: NORMAL
GFR SERPLBLD CREATININE-BSD FMLA CKD-EPI: >90 ML/MIN/{1.73_M2}
GLUCOSE SERPL-MCNC: 99 MG/DL (ref 70–99)
GLUCOSE UR STRIP.AUTO-MCNC: NEGATIVE MG/DL
HCT VFR BLD AUTO: 18.8 % (ref 40.5–52.5)
HCT VFR BLD AUTO: 23.2 % (ref 40.5–52.5)
HGB BLD-MCNC: 6.5 G/DL (ref 13.5–17.5)
HGB BLD-MCNC: 7.9 G/DL (ref 13.5–17.5)
HGB UR QL STRIP.AUTO: NEGATIVE
INR PPP: 1.43 (ref 0.85–1.15)
KETONES UR STRIP.AUTO-MCNC: NEGATIVE MG/DL
LDH SERPL L TO P-CCNC: 101 U/L (ref 100–190)
LEUKOCYTE ESTERASE UR QL STRIP.AUTO: NEGATIVE
Lab: NORMAL
MAGNESIUM SERPL-MCNC: 1.69 MG/DL (ref 1.8–2.4)
MCH RBC QN AUTO: 30.1 PG (ref 26–34)
MCH RBC QN AUTO: 30.1 PG (ref 26–34)
MCHC RBC AUTO-ENTMCNC: 33.8 G/DL (ref 31–36)
MCHC RBC AUTO-ENTMCNC: 34.7 G/DL (ref 31–36)
MCV RBC AUTO: 86.8 FL (ref 80–100)
MCV RBC AUTO: 88.9 FL (ref 80–100)
NITRITE UR QL STRIP.AUTO: NEGATIVE
PH UR STRIP.AUTO: 8 [PH] (ref 5–8)
PHOSPHATE SERPL-MCNC: 3.1 MG/DL (ref 2.5–4.9)
PLATELET # BLD AUTO: 55 K/UL (ref 135–450)
PLATELET # BLD AUTO: 66 K/UL (ref 135–450)
PMV BLD AUTO: 7.3 FL (ref 5–10.5)
PMV BLD AUTO: 8 FL (ref 5–10.5)
POTASSIUM SERPL-SCNC: 4.2 MMOL/L (ref 3.5–5.1)
PRELIMINARY: NORMAL
PROCALCITONIN SERPL IA-MCNC: 0.57 NG/ML (ref 0–0.15)
PROT SERPL-MCNC: 6.3 G/DL (ref 6.4–8.2)
PROT UR STRIP.AUTO-MCNC: ABNORMAL MG/DL
PROTHROMBIN TIME: 17.6 SEC (ref 11.9–14.9)
RBC # BLD AUTO: 2.17 M/UL (ref 4.2–5.9)
RBC # BLD AUTO: 2.61 M/UL (ref 4.2–5.9)
RBC #/AREA URNS HPF: NORMAL /HPF (ref 0–4)
REPORT: NORMAL
REPORT: NORMAL
RESP PATH DNA+RNA PNL NPH NAA+NON-PROBE: NORMAL
SODIUM SERPL-SCNC: 131 MMOL/L (ref 136–145)
SP GR UR STRIP.AUTO: 1.02 (ref 1–1.03)
THIS TEST SENT TO: NORMAL
TRIGL SERPL-MCNC: 90 MG/DL (ref 0–150)
UA DIPSTICK W REFLEX MICRO PNL UR: YES
URATE SERPL-MCNC: 1.7 MG/DL (ref 3.5–7.2)
URN SPEC COLLECT METH UR: ABNORMAL
UROBILINOGEN UR STRIP-ACNC: 0.2 E.U./DL
WBC # BLD AUTO: 0.5 K/UL (ref 4–11)
WBC # BLD AUTO: 0.5 K/UL (ref 4–11)
WBC #/AREA URNS HPF: NORMAL /HPF (ref 0–5)

## 2024-11-13 PROCEDURE — 6370000000 HC RX 637 (ALT 250 FOR IP)

## 2024-11-13 PROCEDURE — 71260 CT THORAX DX C+: CPT

## 2024-11-13 PROCEDURE — 84100 ASSAY OF PHOSPHORUS: CPT

## 2024-11-13 PROCEDURE — 99221 1ST HOSP IP/OBS SF/LOW 40: CPT | Performed by: SURGERY

## 2024-11-13 PROCEDURE — 87086 URINE CULTURE/COLONY COUNT: CPT

## 2024-11-13 PROCEDURE — 87305 ASPERGILLUS AG IA: CPT

## 2024-11-13 PROCEDURE — 80076 HEPATIC FUNCTION PANEL: CPT

## 2024-11-13 PROCEDURE — 87799 DETECT AGENT NOS DNA QUANT: CPT

## 2024-11-13 PROCEDURE — 6370000000 HC RX 637 (ALT 250 FOR IP): Performed by: INTERNAL MEDICINE

## 2024-11-13 PROCEDURE — 36415 COLL VENOUS BLD VENIPUNCTURE: CPT

## 2024-11-13 PROCEDURE — 87533 HHV-6 DNA QUANT: CPT

## 2024-11-13 PROCEDURE — 2580000003 HC RX 258: Performed by: INTERNAL MEDICINE

## 2024-11-13 PROCEDURE — 85730 THROMBOPLASTIN TIME PARTIAL: CPT

## 2024-11-13 PROCEDURE — 36430 TRANSFUSION BLD/BLD COMPNT: CPT

## 2024-11-13 PROCEDURE — 80048 BASIC METABOLIC PNL TOTAL CA: CPT

## 2024-11-13 PROCEDURE — 6360000002 HC RX W HCPCS: Performed by: NURSE PRACTITIONER

## 2024-11-13 PROCEDURE — 84478 ASSAY OF TRIGLYCERIDES: CPT

## 2024-11-13 PROCEDURE — 6360000004 HC RX CONTRAST MEDICATION

## 2024-11-13 PROCEDURE — 81001 URINALYSIS AUTO W/SCOPE: CPT

## 2024-11-13 PROCEDURE — 87253 VIRUS INOCULATE TISSUE ADDL: CPT

## 2024-11-13 PROCEDURE — 99232 SBSQ HOSP IP/OBS MODERATE 35: CPT | Performed by: INTERNAL MEDICINE

## 2024-11-13 PROCEDURE — 87070 CULTURE OTHR SPECIMN AEROBIC: CPT

## 2024-11-13 PROCEDURE — 87798 DETECT AGENT NOS DNA AMP: CPT

## 2024-11-13 PROCEDURE — 87040 BLOOD CULTURE FOR BACTERIA: CPT

## 2024-11-13 PROCEDURE — 87641 MR-STAPH DNA AMP PROBE: CPT

## 2024-11-13 PROCEDURE — 87252 VIRUS INOCULATION TISSUE: CPT

## 2024-11-13 PROCEDURE — 82728 ASSAY OF FERRITIN: CPT

## 2024-11-13 PROCEDURE — 2580000003 HC RX 258: Performed by: NURSE PRACTITIONER

## 2024-11-13 PROCEDURE — 85384 FIBRINOGEN ACTIVITY: CPT

## 2024-11-13 PROCEDURE — 6360000002 HC RX W HCPCS: Performed by: INTERNAL MEDICINE

## 2024-11-13 PROCEDURE — 83735 ASSAY OF MAGNESIUM: CPT

## 2024-11-13 PROCEDURE — 2580000003 HC RX 258

## 2024-11-13 PROCEDURE — 87103 BLOOD FUNGUS CULTURE: CPT

## 2024-11-13 PROCEDURE — 87205 SMEAR GRAM STAIN: CPT

## 2024-11-13 PROCEDURE — 6370000000 HC RX 637 (ALT 250 FOR IP): Performed by: NURSE PRACTITIONER

## 2024-11-13 PROCEDURE — 87102 FUNGUS ISOLATION CULTURE: CPT

## 2024-11-13 PROCEDURE — 85027 COMPLETE CBC AUTOMATED: CPT

## 2024-11-13 PROCEDURE — 82784 ASSAY IGA/IGD/IGG/IGM EACH: CPT

## 2024-11-13 PROCEDURE — 2060000000 HC ICU INTERMEDIATE R&B

## 2024-11-13 PROCEDURE — 83615 LACTATE (LD) (LDH) ENZYME: CPT

## 2024-11-13 PROCEDURE — 84145 PROCALCITONIN (PCT): CPT

## 2024-11-13 PROCEDURE — 85610 PROTHROMBIN TIME: CPT

## 2024-11-13 PROCEDURE — 84550 ASSAY OF BLOOD/URIC ACID: CPT

## 2024-11-13 PROCEDURE — 0202U NFCT DS 22 TRGT SARS-COV-2: CPT

## 2024-11-13 RX ORDER — VANCOMYCIN 1.5 G/300ML
1500 INJECTION, SOLUTION INTRAVENOUS EVERY 12 HOURS
Status: DISCONTINUED | OUTPATIENT
Start: 2024-11-13 | End: 2024-11-15

## 2024-11-13 RX ORDER — AZITHROMYCIN 250 MG/1
250 TABLET, FILM COATED ORAL DAILY
Status: DISCONTINUED | OUTPATIENT
Start: 2024-11-14 | End: 2024-11-13

## 2024-11-13 RX ORDER — AZITHROMYCIN 250 MG/1
500 TABLET, FILM COATED ORAL DAILY
Status: COMPLETED | OUTPATIENT
Start: 2024-11-13 | End: 2024-11-13

## 2024-11-13 RX ORDER — VANCOMYCIN 1.25 G/250ML
1750 INJECTION, SOLUTION INTRAVENOUS ONCE
Status: COMPLETED | OUTPATIENT
Start: 2024-11-13 | End: 2024-11-13

## 2024-11-13 RX ORDER — IOPAMIDOL 755 MG/ML
75 INJECTION, SOLUTION INTRAVASCULAR
Status: COMPLETED | OUTPATIENT
Start: 2024-11-13 | End: 2024-11-13

## 2024-11-13 RX ORDER — LANOLIN ALCOHOL/MO/W.PET/CERES
400 CREAM (GRAM) TOPICAL 2 TIMES DAILY
Status: COMPLETED | OUTPATIENT
Start: 2024-11-13 | End: 2024-11-13

## 2024-11-13 RX ADMIN — DICYCLOMINE HYDROCHLORIDE 20 MG: 20 TABLET ORAL at 16:22

## 2024-11-13 RX ADMIN — SODIUM CHLORIDE, PRESERVATIVE FREE 10 ML: 5 INJECTION INTRAVENOUS at 08:20

## 2024-11-13 RX ADMIN — IOPAMIDOL 75 ML: 755 INJECTION, SOLUTION INTRAVENOUS at 09:48

## 2024-11-13 RX ADMIN — VANCOMYCIN 1500 MG: 1.5 INJECTION, SOLUTION INTRAVENOUS at 21:35

## 2024-11-13 RX ADMIN — VALACYCLOVIR HYDROCHLORIDE 500 MG: 500 TABLET, FILM COATED ORAL at 20:50

## 2024-11-13 RX ADMIN — PANTOPRAZOLE SODIUM 40 MG: 40 TABLET, DELAYED RELEASE ORAL at 16:22

## 2024-11-13 RX ADMIN — SODIUM CHLORIDE 15 ML: 900 IRRIGANT IRRIGATION at 20:36

## 2024-11-13 RX ADMIN — ACETAMINOPHEN 650 MG: 325 TABLET, FILM COATED ORAL at 20:53

## 2024-11-13 RX ADMIN — VANCOMYCIN 1750 MG: 1.25 INJECTION, SOLUTION INTRAVENOUS at 10:37

## 2024-11-13 RX ADMIN — PANTOPRAZOLE SODIUM 40 MG: 40 TABLET, DELAYED RELEASE ORAL at 06:30

## 2024-11-13 RX ADMIN — ACETAMINOPHEN 650 MG: 325 TABLET, FILM COATED ORAL at 09:13

## 2024-11-13 RX ADMIN — VORICONAZOLE 450 MG: 200 TABLET ORAL at 20:50

## 2024-11-13 RX ADMIN — MICAFUNGIN SODIUM 50 MG: 100 INJECTION, POWDER, LYOPHILIZED, FOR SOLUTION INTRAVENOUS at 01:00

## 2024-11-13 RX ADMIN — ACETAMINOPHEN 650 MG: 325 TABLET, FILM COATED ORAL at 01:46

## 2024-11-13 RX ADMIN — PIPERACILLIN AND TAZOBACTAM 4500 MG: 4; .5 INJECTION, POWDER, LYOPHILIZED, FOR SOLUTION INTRAVENOUS at 09:21

## 2024-11-13 RX ADMIN — SODIUM CHLORIDE, PRESERVATIVE FREE 10 ML: 5 INJECTION INTRAVENOUS at 20:36

## 2024-11-13 RX ADMIN — Medication 400 MG: at 21:00

## 2024-11-13 RX ADMIN — AZITHROMYCIN DIHYDRATE 500 MG: 250 TABLET, FILM COATED ORAL at 09:13

## 2024-11-13 RX ADMIN — SODIUM CHLORIDE: 9 INJECTION, SOLUTION INTRAVENOUS at 09:21

## 2024-11-13 RX ADMIN — Medication 400 MG: at 08:13

## 2024-11-13 RX ADMIN — DICYCLOMINE HYDROCHLORIDE 20 MG: 20 TABLET ORAL at 12:29

## 2024-11-13 RX ADMIN — PIPERACILLIN AND TAZOBACTAM 4500 MG: 4; .5 INJECTION, POWDER, LYOPHILIZED, FOR SOLUTION INTRAVENOUS at 16:02

## 2024-11-13 RX ADMIN — DICYCLOMINE HYDROCHLORIDE 20 MG: 20 TABLET ORAL at 08:13

## 2024-11-13 RX ADMIN — LEVETIRACETAM 1500 MG: 500 TABLET, FILM COATED ORAL at 08:13

## 2024-11-13 RX ADMIN — DICYCLOMINE HYDROCHLORIDE 20 MG: 20 TABLET ORAL at 20:51

## 2024-11-13 RX ADMIN — SODIUM CHLORIDE, PRESERVATIVE FREE 10 ML: 5 INJECTION INTRAVENOUS at 08:14

## 2024-11-13 RX ADMIN — LEVETIRACETAM 1500 MG: 500 TABLET, FILM COATED ORAL at 20:50

## 2024-11-13 RX ADMIN — ACETAMINOPHEN 650 MG: 325 TABLET, FILM COATED ORAL at 16:22

## 2024-11-13 RX ADMIN — VALACYCLOVIR HYDROCHLORIDE 500 MG: 500 TABLET, FILM COATED ORAL at 08:13

## 2024-11-13 RX ADMIN — AMITRIPTYLINE HYDROCHLORIDE 10 MG: 10 TABLET, FILM COATED ORAL at 20:51

## 2024-11-13 RX ADMIN — VANCOMYCIN HYDROCHLORIDE 125 MG: 250 POWDER, FOR SOLUTION ORAL at 08:14

## 2024-11-13 RX ADMIN — VANCOMYCIN HYDROCHLORIDE 125 MG: 250 POWDER, FOR SOLUTION ORAL at 20:55

## 2024-11-13 RX ADMIN — Medication: at 08:21

## 2024-11-13 RX ADMIN — SODIUM CHLORIDE, PRESERVATIVE FREE 10 ML: 5 INJECTION INTRAVENOUS at 20:35

## 2024-11-13 RX ADMIN — PIPERACILLIN AND TAZOBACTAM 4500 MG: 4; .5 INJECTION, POWDER, LYOPHILIZED, FOR SOLUTION INTRAVENOUS at 23:21

## 2024-11-13 ASSESSMENT — PAIN SCALES - GENERAL
PAINLEVEL_OUTOF10: 0

## 2024-11-13 NOTE — PROGRESS NOTES
Colorectal Surgery   Daily Progress Note  Patient: Dennys Peguero      CC: Possible SBO (resolved). Re-engaged for enlarging hernia, concern for incarceration    INTERVAL Hx:   Dennys Peguero is a 43 y.o. male with a Hx of Oligodendroglioma (S/P partial resection 2017, radiation 2019, and temodar 2019), rectal adenocarcinoma (s/p robotic low anterior resection with colorectal anastomosis 4/12/24) and AML who is admitted to [facility] with septic shock in the setting of neutropenic fever. Surgery was initially consulted for possible SBO that has resolved after return of bowel function. Surgery has been re-engaged due to concerns of possible incarceration of enlarging hernia.     At time of bedside exam, patient resting comfortably in bed. He is in no acute distress. He states that this \"bulge\" on his right hip has been present since 2016. Denies of any associated pain or concerns to his right hip. Patient states that he believes he had work-up done regarding this \"bulge\" but does not remember the results. Otherwise, patient has no obstructive symptoms. He is tolerating a diet with no nausea or vomiting. States that he is passing flatus and having BM.     OBJECTIVE:    PHYSICAL EXAM:    Vitals:    11/13/24 0801 11/13/24 0859 11/13/24 1001 11/13/24 1002   BP: (!) 143/92      Pulse: 89      Resp: 27      Temp: 99.9 °F (37.7 °C) (!) 101.5 °F (38.6 °C) (!) 102.8 °F (39.3 °C)    TempSrc: Oral Oral Oral    SpO2: 95%      Weight:    73.8 kg (162 lb 12.8 oz)   Height:           General appearance: alert, no acute distress  Neck: trachea midline, neck supple  Chest/Lungs: Normal effort with no accessory muscle use on RA  Cardiovascular: RRR  Abdomen: Soft, non-tender, non-distended, no rebound, guarding, or rigidity. No palpable inguinal hernia noted  Skin: Moderate sized, soft, mobile mass noted medial to the right hip. No overlying erythema, swelling, or infection noted.   Extremities: no edema, no cyanosis  Neuro: A&Ox3, no focal

## 2024-11-13 NOTE — PROGRESS NOTES
04:31 AM    BILIDIR 0.4 11/13/2024 04:31 AM    IBILI 0.4 11/13/2024 04:31 AM       MICRO:  Urine culture 11/11: no growth   Blood cultures x 2 11/9: Negative  Urine culture 11/9: No growth  Stool culture 11/7: Negative  BAL cx 11/7: pending, PCR negative  C dif toxin/ag 11/7: negative  Blood cultures x 2 11/4: No growth  Urine culture 11/4: No growth  Stool C. difficile toxin/antigen 10/31: Negative  Blood cultures x 2 10/30: No growth   Blood cultures x 2 10/28: Klebsiella pneumoniae  Klebsiella pneumoniae (2)    Antibiotic Interpretation Microscan  Method Status    ampicillin Resistant   BACTERIAL SUSCEPTIBILITY PANEL BY VERONICA     ampicillin-sulbactam Sensitive <=2 mcg/mL BACTERIAL SUSCEPTIBILITY PANEL BY VERONICA     ceFAZolin Sensitive <=4 mcg/mL BACTERIAL SUSCEPTIBILITY PANEL BY VERONICA     cefepime Sensitive <=0.12 mcg/mL BACTERIAL SUSCEPTIBILITY PANEL BY VERONICA     cefTRIAXone Sensitive <=0.25 mcg/mL BACTERIAL SUSCEPTIBILITY PANEL BY VERONICA     ciprofloxacin Sensitive <=0.25 mcg/mL BACTERIAL SUSCEPTIBILITY PANEL BY VERONICA     ertapenem Sensitive <=0.12 mcg/mL BACTERIAL SUSCEPTIBILITY PANEL BY VERONICA     gentamicin Sensitive <=1 mcg/mL BACTERIAL SUSCEPTIBILITY PANEL BY VERONICA     levofloxacin Sensitive <=0.12 mcg/mL BACTERIAL SUSCEPTIBILITY PANEL BY VERONICA     piperacillin-tazobactam Sensitive <=4 mcg/mL BACTERIAL SUSCEPTIBILITY PANEL BY VERONICA     trimethoprim-sulfamethoxazole Sensitive <=20 mcg/mL BACTERIAL SUSCEPTIBILITY PANEL BY VERONICA         PICC cath tip culture 10/29: No growth to date  Respiratory PCR 10/28: No growth    IMAGING:I have independently reviewed the images and reports.  CT chest abd pelvis 11/13:  pending    Vascular duplex right arm 11/11:    Chronic phlebitic process involving the right basilic vein.    No evidence of acute deep or superficial venous thrombosis involving the right upper extremity.    Portable chest x-ray 11/9:  1. Stable pleural effusions and bilateral airspace disease    CT chest abdomen and pelvis  Blast cells 6%, pending FISH, CG, myeloid mutation panel.      Rectal adenoca:   - s/p rectal mass resection 4/12/24. Path staging T1N0M0. No plans for neoadjuvant at this time.      History of recurrent C. difficile:  - On prophylactic p.o. vancomycin 125mg twice daily  - c dif testing on this admission was negative x2       Medical Decision Making:  The following items were considered in medical decision making:  Discussion of patient care with other providers  Reviewed clinical lab tests  Reviewed radiology tests  Reviewed other diagnostic tests/interventions  Independent review of radiologic images  Microbiology cultures and other micro tests reviewed      Discussed with patient and primary team, oncology resident Mariel and Onc NP.   Tu Moreno MD

## 2024-11-13 NOTE — PLAN OF CARE
Problem: Pain  Goal: Verbalizes/displays adequate comfort level or baseline comfort level  Outcome: Progressing  Flowsheets (Taken 11/13/2024 0649)  Verbalizes/displays adequate comfort level or baseline comfort level:   Encourage patient to monitor pain and request assistance   Administer analgesics based on type and severity of pain and evaluate response   Consider cultural and social influences on pain and pain management   Assess pain using appropriate pain scale   Implement non-pharmacological measures as appropriate and evaluate response   Notify Licensed Independent Practitioner if interventions unsuccessful or patient reports new pain     Problem: Safety - Adult  Goal: Free from fall injury  Outcome: Progressing  Flowsheets (Taken 11/13/2024 0649)  Free From Fall Injury: Instruct family/caregiver on patient safety     Problem: Skin/Tissue Integrity - Adult  Goal: Skin integrity remains intact  Outcome: Progressing  Flowsheets (Taken 11/13/2024 0649)  Skin Integrity Remains Intact:   Monitor for areas of redness and/or skin breakdown   Assess vascular access sites hourly     Problem: Skin/Tissue Integrity - Adult  Goal: Oral mucous membranes remain intact  Outcome: Progressing  Flowsheets (Taken 11/13/2024 0649)  Oral Mucous Membranes Remain Intact:   Assess oral mucosa and hygiene practices   Implement preventative oral hygiene regimen   Implement oral medicated treatments as ordered     Problem: Infection - Adult  Goal: Absence of infection at discharge  Outcome: Progressing  Flowsheets (Taken 11/13/2024 0649)  Absence of infection at discharge:   Assess and monitor for signs and symptoms of infection   Monitor lab/diagnostic results   Monitor all insertion sites i.e., indwelling lines, tubes and drains   Administer medications as ordered   Instruct and encourage patient and family to use good hand hygiene technique   Identify and instruct in appropriate isolation precautions for identified  without assistive devices   Instruct patient/family in ordered activity level     Problem: Gastrointestinal - Adult  Goal: Maintains or returns to baseline bowel function  Outcome: Progressing  Flowsheets (Taken 11/13/2024 0649)  Maintains or returns to baseline bowel function:   Assess bowel function   Encourage oral fluids to ensure adequate hydration   Administer ordered medications as needed   Encourage mobilization and activity     Problem: Cardiovascular - Adult  Goal: Absence of cardiac dysrhythmias or at baseline  Outcome: Progressing  Flowsheets (Taken 11/13/2024 0649)  Absence of cardiac dysrhythmias or at baseline:   Monitor cardiac rate and rhythm   Administer antiarrhythmia medication and electrolyte replacement as ordered   Assess for signs of decreased cardiac output     Problem: Neurosensory - Adult  Goal: Achieves maximal functionality and self care  Outcome: Progressing  Flowsheets (Taken 11/13/2024 0649)  Achieves maximal functionality and self care:   Monitor swallowing and airway patency with patient fatigue and changes in neurological status   Encourage and assist patient to increase activity and self care with guidance from physical therapy/occupational therapy   Encourage visually impaired, hearing impaired and aphasic patients to use assistive/communication devices     Problem: Genitourinary - Adult  Goal: Absence of urinary retention  Outcome: Progressing  Flowsheets (Taken 11/13/2024 0649)  Absence of urinary retention:   Assess patient’s ability to void and empty bladder   Monitor intake/output and perform bladder scan as needed

## 2024-11-13 NOTE — PLAN OF CARE
Problem: Safety - Adult  Goal: Free from fall injury  11/13/2024 1526 by Josy Martinez, RN  Outcome: Progressing  Flowsheets (Taken 11/13/2024 1526)  Free From Fall Injury:   Instruct family/caregiver on patient safety   Based on caregiver fall risk screen, instruct family/caregiver to ask for assistance with transferring infant if caregiver noted to have fall risk factors  Note: Orthostatic vital signs obtained at start of shift - see flowsheet for details.  Pt meets criteria for orthostasis.  Pt is a High fall risk. See Apple Fall Score and ABCDS Injury Risk assessments.   + Screening for Orthostasis and/or + High Fall Risk per APPLE/ABCDS: Explained fall risk precautions to pt and family and rationale behind their use to keep the patient safe. Pt bed is in low position, side rails up, call light and belongings are in reach. Fall wristband applied and present on pts wrist.  Bed alarm on.  Pt encouraged to call for assistance. Will continue with hourly rounds for PO intake, pain needs, toileting and repositioning as needed.      Problem: ABCDS Injury Assessment  Goal: Absence of physical injury  11/13/2024 1526 by Josy Martinez, RN  Outcome: Progressing  Flowsheets (Taken 11/13/2024 1526)  Absence of Physical Injury: Implement safety measures based on patient assessment  Note: Bed in lowest position, call light within reach, bed wheels locked, bedside table within reach     Problem: Metabolic/Fluid and Electrolytes - Adult  Goal: Electrolytes maintained within normal limits  11/13/2024 1526 by Josy Martinez, RN  Outcome: Progressing  Flowsheets (Taken 11/13/2024 1526)  Electrolytes maintained within normal limits:   Monitor labs and assess patient for signs and symptoms of electrolyte imbalances   Monitor response to electrolyte replacements, including repeat lab results as appropriate   Instruct patient on fluid and nutrition restrictions as appropriate   Administer electrolyte replacement as  ordered  Note: Patient labs being monitored throughout shift. Magnesium replacement given this AM for low lab level. 1 unit of PRBC also given this AM. Qshift CBC orders in place. Care continues.

## 2024-11-13 NOTE — PROGRESS NOTES
[facility] Progress Note    2024    Dennys Peguero    :  1980    MRN:  [id number]      Interval Hx:      Mr. Peguero reports improved productive cough of green sputum.  Mr. Peguero's epigastric/periumbilical pain persists with pain immediately before & during bowel movements, which he describes as similar to prior intestinal ischemic event, causing him fear of eating. He continues to have watery brown stools.     CT scan performed yesterday () demonstrated findings most c/w multiple hepatic abscesses, which were not present on imaging 24.  Zosyn & Vfend were initiated.  Case was d/w IR who will be performing a liver aspirate to be sent for further infectious workup.     Over last 24 hrs:  Tmax 103 °F (, 0330), tachycardic  Intake 1.488 L  UOP 4 L    --> 150 mL  --> 3 unmeasured --> 5 unmeasured  7.6 --> 6.5 --> 1U pRBC  39  --> 1U PLT --> 66 --> 42       ECOG PS:  (2) Ambulatory and capable of self care, unable to carry out work activity, up and about > 50% or waking hours    KPS:  70% Cares for self; unable to carry on normal activity or to do active work    Isolation:  Strict contact      Medication:    Scheduled:   piperacillin-tazobactam  4,500 mg IntraVENous Q8H    vancomycin  1,500 mg IntraVENous Q12H    voriconazole  450 mg Oral 2 times per day    Followed by    voriconazole  300 mg Oral 2 times per day    valACYclovir  500 mg Oral BID    amitriptyline  10 mg Oral Nightly    pantoprazole  40 mg Oral BID AC    levETIRAcetam  1,500 mg Oral BID    dicyclomine  20 mg Oral 4x Daily    sodium chloride flush  5-40 mL IntraVENous 2 times per day    Hydrocerin   Topical BID    sodium chloride flush  5-40 mL IntraVENous 2 times per day    Saline Mouthwash  15 mL Swish & Spit 4x Daily AC & HS    vancomycin  125 mg Oral 2 times per day     Continuous Infusions:   sodium chloride      sodium chloride      sodium chloride      sodium chloride 5 mL/hr at 24 0921

## 2024-11-13 NOTE — PROGRESS NOTES
Met with Mr. Peguero at bedside to discuss the CT scan results and anti-microbial medication changes.  He verbalized understanding and is in agreement to proceed with the liver biopsy tomorrow (11/14/24).      Yoselin Floyd MD, MARIE, CCRC  Internal Medicine, PGY-3

## 2024-11-13 NOTE — CONSULTS
[facility] -  Clinical Pharmacy Note    Vancomycin - Management by Pharmacy    Consult Date(s): 11/13  Consulting Provider(s): Polina Porter (APRN)    Assessment / Plan  Febrile neutropenia/possible PNA - Vancomycin  Concurrent Antimicrobials: Pip-tazo (started 11/13)  Prophylaxis: Micafungin, valacyclovir, oral vancomycin  Day of Vanc Therapy / Ordered Duration: 1 / TBD  Current Dosing Method: Bayesian-Guided AUC Dosing  Therapeutic Goal: -600 mg/L*hr  Current Dose / Plan:   Team starting vancomycin for persistent fevers despite 2 week course of meropenem, 1-week course of daptomycin  Patient has possible worsening pneumonia on chest xr-ray  Scr stable at patient's baseline (0.6 today)  Will give 1750 mg x 1 dose now, followed by 1500 mg q12h  Estimated AUC = 485 mg/L*hr, trough - 11.5 mg/L  Will check level in ~48 hours or sooner if clinically indicated.  Will continue to monitor clinical condition and make adjustments to regimen as appropriate.    Thank you for consulting pharmacy,    Saida Earl, PharmD  [facility] Clinical Pharmacist  Phone: [phone]        Interval update:  therapy initiation for persistent fevers despite completing 2 week course of meropenem. MRSA nares sent and team plans to d/c vancomycin if negative.    Subjective/Objective:   Dennys Peguero is a 44 y.o. male with a PMHx significant for oligodendrioglioma (/sp resection, radiation, temodar), rectal adenocarcinoma (s/p resection), AML with deletion MDS (currently being treated with Dacogen/Venetoclax), recurrent c diff infections, multifocal pneumonia who is admitted initially with sepsis s/t klebsiella bacteremia.  Now starting vancomycin for persistent fevers of unknown origin.     Pharmacy is consulted to dose vancomycin.    Ht Readings from Last 1 Encounters:   11/07/24 1.778 m (5' 10\")     Wt Readings from Last 1 Encounters:   11/13/24 73.8 kg (162 lb 12.8 oz)       Vancomycin Level(s) / Doses:    Date Time Dose Type of Level /

## 2024-11-14 ENCOUNTER — APPOINTMENT (OUTPATIENT)
Dept: CT IMAGING | Age: 44
DRG: 720 | End: 2024-11-14
Payer: MEDICAID

## 2024-11-14 PROBLEM — D17.1 LIPOMA OF ABDOMINAL WALL: Status: ACTIVE | Noted: 2024-11-14

## 2024-11-14 LAB
ABO + RH BLD: NORMAL
ALBUMIN SERPL-MCNC: 3 G/DL (ref 3.4–5)
ALP SERPL-CCNC: 177 U/L (ref 40–129)
ALT SERPL-CCNC: 21 U/L (ref 10–40)
ANION GAP SERPL CALCULATED.3IONS-SCNC: 9 MMOL/L (ref 3–16)
AST SERPL-CCNC: 10 U/L (ref 15–37)
BACTERIA BLD CULT ORG #2: NORMAL
BACTERIA BLD CULT: NORMAL
BACTERIA UR CULT: NORMAL
BILIRUB DIRECT SERPL-MCNC: 0.5 MG/DL (ref 0–0.3)
BILIRUB INDIRECT SERPL-MCNC: 0.4 MG/DL (ref 0–1)
BILIRUB SERPL-MCNC: 0.9 MG/DL (ref 0–1)
BLD GP AB SCN SERPL QL: NORMAL
BUN SERPL-MCNC: 9 MG/DL (ref 7–20)
CALCIUM SERPL-MCNC: 8.4 MG/DL (ref 8.3–10.6)
CHLORIDE SERPL-SCNC: 98 MMOL/L (ref 99–110)
CO2 SERPL-SCNC: 26 MMOL/L (ref 21–32)
CREAT SERPL-MCNC: 0.7 MG/DL (ref 0.9–1.3)
DEPRECATED RDW RBC AUTO: 13.1 % (ref 12.4–15.4)
GALACTOMANNAN AG SERPL IA-ACNC: 0.04
GALACTOMANNAN AG SERPL QL IA: NEGATIVE
GFR SERPLBLD CREATININE-BSD FMLA CKD-EPI: >90 ML/MIN/{1.73_M2}
GLUCOSE SERPL-MCNC: 99 MG/DL (ref 70–99)
HCT VFR BLD AUTO: 19.8 % (ref 40.5–52.5)
HGB BLD-MCNC: 6.9 G/DL (ref 13.5–17.5)
IGG SERPL-MCNC: 1737 MG/DL (ref 700–1600)
LOEFFLER MB STN SPEC: NORMAL
LOEFFLER MB STN SPEC: NORMAL
MCH RBC QN AUTO: 30.3 PG (ref 26–34)
MCHC RBC AUTO-ENTMCNC: 34.8 G/DL (ref 31–36)
MCV RBC AUTO: 87 FL (ref 80–100)
MRSA DNA SPEC QL NAA+PROBE: NORMAL
PLATELET # BLD AUTO: 42 K/UL (ref 135–450)
PMV BLD AUTO: 7 FL (ref 5–10.5)
POTASSIUM SERPL-SCNC: 4.1 MMOL/L (ref 3.5–5.1)
PROT SERPL-MCNC: 6.3 G/DL (ref 6.4–8.2)
RBC # BLD AUTO: 2.28 M/UL (ref 4.2–5.9)
SODIUM SERPL-SCNC: 133 MMOL/L (ref 136–145)
WBC # BLD AUTO: 0.5 K/UL (ref 4–11)

## 2024-11-14 PROCEDURE — 6370000000 HC RX 637 (ALT 250 FOR IP): Performed by: INTERNAL MEDICINE

## 2024-11-14 PROCEDURE — 36415 COLL VENOUS BLD VENIPUNCTURE: CPT

## 2024-11-14 PROCEDURE — 87205 SMEAR GRAM STAIN: CPT

## 2024-11-14 PROCEDURE — 6360000002 HC RX W HCPCS: Performed by: RADIOLOGY

## 2024-11-14 PROCEDURE — 86901 BLOOD TYPING SEROLOGIC RH(D): CPT

## 2024-11-14 PROCEDURE — 85027 COMPLETE CBC AUTOMATED: CPT

## 2024-11-14 PROCEDURE — 99231 SBSQ HOSP IP/OBS SF/LOW 25: CPT | Performed by: SURGERY

## 2024-11-14 PROCEDURE — 86900 BLOOD TYPING SEROLOGIC ABO: CPT

## 2024-11-14 PROCEDURE — 86850 RBC ANTIBODY SCREEN: CPT

## 2024-11-14 PROCEDURE — 2580000003 HC RX 258

## 2024-11-14 PROCEDURE — P9036 PLATELET PHERESIS IRRADIATED: HCPCS

## 2024-11-14 PROCEDURE — 77012 CT SCAN FOR NEEDLE BIOPSY: CPT

## 2024-11-14 PROCEDURE — 6370000000 HC RX 637 (ALT 250 FOR IP): Performed by: NURSE PRACTITIONER

## 2024-11-14 PROCEDURE — P9040 RBC LEUKOREDUCED IRRADIATED: HCPCS

## 2024-11-14 PROCEDURE — 36430 TRANSFUSION BLD/BLD COMPNT: CPT

## 2024-11-14 PROCEDURE — 36592 COLLECT BLOOD FROM PICC: CPT

## 2024-11-14 PROCEDURE — 6360000002 HC RX W HCPCS: Performed by: NURSE PRACTITIONER

## 2024-11-14 PROCEDURE — 80076 HEPATIC FUNCTION PANEL: CPT

## 2024-11-14 PROCEDURE — 99232 SBSQ HOSP IP/OBS MODERATE 35: CPT | Performed by: INTERNAL MEDICINE

## 2024-11-14 PROCEDURE — 2500000003 HC RX 250 WO HCPCS: Performed by: RADIOLOGY

## 2024-11-14 PROCEDURE — 6370000000 HC RX 637 (ALT 250 FOR IP)

## 2024-11-14 PROCEDURE — 0F923ZX DRAINAGE OF LEFT LOBE LIVER, PERCUTANEOUS APPROACH, DIAGNOSTIC: ICD-10-PCS | Performed by: RADIOLOGY

## 2024-11-14 PROCEDURE — 2580000003 HC RX 258: Performed by: NURSE PRACTITIONER

## 2024-11-14 PROCEDURE — 76937 US GUIDE VASCULAR ACCESS: CPT

## 2024-11-14 PROCEDURE — 80048 BASIC METABOLIC PNL TOTAL CA: CPT

## 2024-11-14 PROCEDURE — 86923 COMPATIBILITY TEST ELECTRIC: CPT

## 2024-11-14 PROCEDURE — 87075 CULTR BACTERIA EXCEPT BLOOD: CPT

## 2024-11-14 PROCEDURE — 85025 COMPLETE CBC W/AUTO DIFF WBC: CPT

## 2024-11-14 PROCEDURE — 87070 CULTURE OTHR SPECIMN AEROBIC: CPT

## 2024-11-14 PROCEDURE — 2060000000 HC ICU INTERMEDIATE R&B

## 2024-11-14 PROCEDURE — 87102 FUNGUS ISOLATION CULTURE: CPT

## 2024-11-14 RX ORDER — MIDAZOLAM HYDROCHLORIDE 5 MG/ML
INJECTION, SOLUTION INTRAMUSCULAR; INTRAVENOUS PRN
Status: COMPLETED | OUTPATIENT
Start: 2024-11-14 | End: 2024-11-14

## 2024-11-14 RX ORDER — FENTANYL CITRATE 50 UG/ML
INJECTION, SOLUTION INTRAMUSCULAR; INTRAVENOUS PRN
Status: COMPLETED | OUTPATIENT
Start: 2024-11-14 | End: 2024-11-14

## 2024-11-14 RX ORDER — SODIUM CHLORIDE 9 MG/ML
INJECTION, SOLUTION INTRAVENOUS PRN
Status: DISCONTINUED | OUTPATIENT
Start: 2024-11-14 | End: 2024-11-22

## 2024-11-14 RX ORDER — LIDOCAINE HYDROCHLORIDE 10 MG/ML
INJECTION, SOLUTION EPIDURAL; INFILTRATION; INTRACAUDAL; PERINEURAL PRN
Status: COMPLETED | OUTPATIENT
Start: 2024-11-14 | End: 2024-11-14

## 2024-11-14 RX ADMIN — PANTOPRAZOLE SODIUM 40 MG: 40 TABLET, DELAYED RELEASE ORAL at 15:48

## 2024-11-14 RX ADMIN — Medication: at 21:59

## 2024-11-14 RX ADMIN — ACETAMINOPHEN 650 MG: 325 TABLET, FILM COATED ORAL at 03:39

## 2024-11-14 RX ADMIN — SODIUM CHLORIDE 15 ML: 900 IRRIGANT IRRIGATION at 15:50

## 2024-11-14 RX ADMIN — VORICONAZOLE 450 MG: 200 TABLET ORAL at 08:53

## 2024-11-14 RX ADMIN — VANCOMYCIN 1500 MG: 1.5 INJECTION, SOLUTION INTRAVENOUS at 10:39

## 2024-11-14 RX ADMIN — SODIUM CHLORIDE, PRESERVATIVE FREE 10 ML: 5 INJECTION INTRAVENOUS at 21:55

## 2024-11-14 RX ADMIN — VANCOMYCIN 1500 MG: 1.5 INJECTION, SOLUTION INTRAVENOUS at 21:57

## 2024-11-14 RX ADMIN — LIDOCAINE HYDROCHLORIDE 10 ML: 10 INJECTION, SOLUTION EPIDURAL; INFILTRATION; INTRACAUDAL; PERINEURAL at 11:34

## 2024-11-14 RX ADMIN — DICYCLOMINE HYDROCHLORIDE 20 MG: 20 TABLET ORAL at 12:32

## 2024-11-14 RX ADMIN — FENTANYL CITRATE 25 MCG: 50 INJECTION, SOLUTION INTRAMUSCULAR; INTRAVENOUS at 11:47

## 2024-11-14 RX ADMIN — VANCOMYCIN HYDROCHLORIDE 125 MG: 250 POWDER, FOR SOLUTION ORAL at 09:08

## 2024-11-14 RX ADMIN — SODIUM CHLORIDE, PRESERVATIVE FREE 10 ML: 5 INJECTION INTRAVENOUS at 22:00

## 2024-11-14 RX ADMIN — DICYCLOMINE HYDROCHLORIDE 20 MG: 20 TABLET ORAL at 21:52

## 2024-11-14 RX ADMIN — PIPERACILLIN AND TAZOBACTAM 4500 MG: 4; .5 INJECTION, POWDER, LYOPHILIZED, FOR SOLUTION INTRAVENOUS at 23:45

## 2024-11-14 RX ADMIN — VALACYCLOVIR HYDROCHLORIDE 500 MG: 500 TABLET, FILM COATED ORAL at 08:53

## 2024-11-14 RX ADMIN — LEVETIRACETAM 1500 MG: 500 TABLET, FILM COATED ORAL at 08:53

## 2024-11-14 RX ADMIN — SODIUM CHLORIDE 15 ML: 900 IRRIGANT IRRIGATION at 12:32

## 2024-11-14 RX ADMIN — ACETAMINOPHEN 650 MG: 325 TABLET, FILM COATED ORAL at 13:10

## 2024-11-14 RX ADMIN — DICYCLOMINE HYDROCHLORIDE 20 MG: 20 TABLET ORAL at 15:48

## 2024-11-14 RX ADMIN — DICYCLOMINE HYDROCHLORIDE 20 MG: 20 TABLET ORAL at 08:54

## 2024-11-14 RX ADMIN — ACETAMINOPHEN 650 MG: 325 TABLET, FILM COATED ORAL at 22:07

## 2024-11-14 RX ADMIN — SODIUM CHLORIDE, PRESERVATIVE FREE 10 ML: 5 INJECTION INTRAVENOUS at 08:54

## 2024-11-14 RX ADMIN — VORICONAZOLE 300 MG: 200 TABLET ORAL at 21:52

## 2024-11-14 RX ADMIN — PIPERACILLIN AND TAZOBACTAM 4500 MG: 4; .5 INJECTION, POWDER, LYOPHILIZED, FOR SOLUTION INTRAVENOUS at 15:47

## 2024-11-14 RX ADMIN — PIPERACILLIN AND TAZOBACTAM 4500 MG: 4; .5 INJECTION, POWDER, LYOPHILIZED, FOR SOLUTION INTRAVENOUS at 06:33

## 2024-11-14 RX ADMIN — VALACYCLOVIR HYDROCHLORIDE 500 MG: 500 TABLET, FILM COATED ORAL at 21:52

## 2024-11-14 RX ADMIN — VANCOMYCIN HYDROCHLORIDE 125 MG: 250 POWDER, FOR SOLUTION ORAL at 21:53

## 2024-11-14 RX ADMIN — LEVETIRACETAM 1500 MG: 500 TABLET, FILM COATED ORAL at 21:52

## 2024-11-14 RX ADMIN — AMITRIPTYLINE HYDROCHLORIDE 10 MG: 10 TABLET, FILM COATED ORAL at 21:58

## 2024-11-14 RX ADMIN — MIDAZOLAM HYDROCHLORIDE 1 MG: 5 INJECTION, SOLUTION INTRAMUSCULAR; INTRAVENOUS at 11:46

## 2024-11-14 ASSESSMENT — PAIN SCALES - GENERAL
PAINLEVEL_OUTOF10: 0

## 2024-11-14 ASSESSMENT — PAIN - FUNCTIONAL ASSESSMENT: PAIN_FUNCTIONAL_ASSESSMENT: NONE - DENIES PAIN

## 2024-11-14 NOTE — CARE COORDINATION
Attended rounds. Discussed with treatment team concern for ability to do IV ABX at home if this would be needed.     SW will continue to follow.    Sharla COLON, GENIA-S   for [facility] [place] ([facility])  [place] [employer]: [phone]

## 2024-11-14 NOTE — PROGRESS NOTES
Colorectal Surgery   Daily Progress Note  Patient: Dennys Peguero      CC: Possible SBO (resolved). Re-engaged for enlarging hernia, concern for incarceration    INTERVAL Hx:   No changes to soft tissue mass. Denies issues with bowel function or n/v.     OBJECTIVE:    PHYSICAL EXAM:    Vitals:    11/13/24 1608 11/13/24 2044 11/13/24 2317 11/14/24 0330   BP: (!) 139/92 130/83 131/83 139/80   Pulse: (!) 109  97 (!) 109   Resp: 26 22 28   Temp: (!) 102.9 °F (39.4 °C) (!) 101.2 °F (38.4 °C) 99.8 °F (37.7 °C) (!) 103 °F (39.4 °C)   TempSrc: Oral Oral Oral Oral   SpO2: 94% 95% 97% 94%   Weight:       Height:           General appearance: alert, no acute distress  Neck: trachea midline, neck supple  Chest/Lungs: Normal effort with no accessory muscle use on RA  Cardiovascular: RRR  Abdomen: Soft, non-tender, non-distended, no rebound, guarding, or rigidity. No palpable inguinal hernia noted  Skin: Moderate sized, soft, mobile mass noted medial to the right hip. No overlying erythema, swelling, or infection noted.   Extremities: no edema, no cyanosis  Neuro: A&Ox3, no focal deficits      ASSESSMENT & PLAN:   43 y.o. male with  a past medical history of oligodendroglioma (S/P partial resection 2017, radiation 2019, and temodar 2019), rectal adenocarcinoma (s/p robotic low anterior resection with colorectal anastomosis 4/12/24) and AML who general surgery was consulted for possible SBO that has resolved after having bowel movements. Surgery has been re-engaged due to concerns of incarceration of enlarging hernia.     - Clinical exam more consistent with lipoma.   - Per chart review, MRI and US of RLE in 2018 revealed an encapsulated fat-containing mass at the right superior acetabulum, consistent with lipoma with no definitive features suggesting low-grade liposarcoma   - CT abd/pelvis ordered today by primary team due to continued neutropenic fever reveals lipoma to right hip, stable since CT scan in 2016. No inguinal hernia

## 2024-11-14 NOTE — PRE SEDATION
Patient:  Dennys Peguero   :   1980      Relevant clinical history, particularly as it involves the pending procedure, was reviewed and discussed.    The procedure including risks, benefits, and alternatives was discussed at length with the patient (or designated family member) and all questions were answered.  Informed consent to proceed with the procedure was given.    Vital signs were monitored and documented by the Radiology nurse.    Targeted physical examination  Heart : regular rate and rhythm  Lungs : clear, breathing easily  Condition : stable    [facility] nurses notes reviewed and agreed.    Past Medical History:        Diagnosis Date    Brain cancer (HCC)     diagnosed in 2017    Seizure (HCC)        Past Surgical History:           Procedure Laterality Date    BRAIN SURGERY      Surgery in 2017 at [facility]    BRONCHOSCOPY N/A 2024    BRONCHOSCOPY ALVEOLAR LAVAGE performed by Froylan Hoang MD at [facility] ENDOSCOPY    BRONCHOSCOPY N/A 2024    BRONCHOSCOPY ALVEOLAR LAVAGE performed by Michael Perez MD at [facility] ENDOSCOPY    COLONOSCOPY N/A 3/22/2024    COLONOSCOPY POLYPECTOMY HOT SNARE/COLD BIOPSY performed by Marcus Uribe MD at [facility] ENDOSCOPY    SMALL INTESTINE SURGERY N/A 2024    ROBOTIC LOW ANTERIOR RESECTION WITH COLORECTAL ANASTOMOSIS performed by Rakesh Montano MD at [facility] OR    UPPER GASTROINTESTINAL ENDOSCOPY N/A 3/6/2024    ESOPHAGOGASTRODUODENOSCOPY performed by Wade Cantrell MD at [facility] ENDOSCOPY    UPPER GASTROINTESTINAL ENDOSCOPY N/A 2024    ESOPHAGOGASTRODUODENOSCOPY performed by Effie Caballero MD at [facility] ENDOSCOPY       Allergies:  [place] calin    Medications:   Home Meds  No current facility-administered medications on file prior to encounter.     Current Outpatient Medications on File Prior to Encounter   Medication Sig Dispense Refill    vancomycin (VANCOCIN) 125 MG capsule Take 1 capsule by mouth in the morning and at bedtime 60  infusion, PRN  Hydrocerin (EUCERIN) cream CREA, BID  sodium chloride flush 0.9 % injection 5-40 mL, 2 times per day  sodium chloride flush 0.9 % injection 5-40 mL, PRN  magnesium sulfate 4000 mg in 100 mL IVPB premix, PRN  Saline Mouthwash 15 mL, 4x Daily AC & HS  Saline Mouthwash 15 mL, Q4H PRN  ALTEplase (CATHFLO) 2 mg in sterile water 2 mL injection, PRN  vancomycin (FIRVANQ) 50 MG/ML oral solution 125 mg, 2 times per day          ASA 3 - Patient with moderate systemic disease with functional limitations    II (soft palate, uvula, fauces visible)    Activity:  2 - Able to move 4 extremities voluntarily on command  Respiration:  2 - Able to breathe deeply and cough freely  Circulation:  2 - BP+/- 20mmHg of normal  Consciousness:  2 - Fully awake  Oxygen Saturation (color):  2 - Able to maintain oxygen saturation >92% on room air    Sedation : Moderate sedation planned    HPI / Treatment plan : CT guided aspiration, liver abscess       Electronically signed by KATHLEEN Herman - CNP on 11/14/24 at 11:05 AM EST

## 2024-11-14 NOTE — PLAN OF CARE
Problem: Safety - Adult  Goal: Free from fall injury  11/13/2024 2348 by Apryl Bustamante RN  Flowsheets (Taken 11/13/2024 2348)  Free From Fall Injury:   Instruct family/caregiver on patient safety   Based on caregiver fall risk screen, instruct family/caregiver to ask for assistance with transferring infant if caregiver noted to have fall risk factors  Note: Pt is a high fall risk (see Jose Fall Risk assessment).  Oriented pt to room and call light. Instructed pt to call for help when needed.  Call light and personal items within reach.  Bed in lowest position, brakes on, and 2/4 side rails up.  Non-skid footwear on. Falls risk stop sign on door; hourly visual checks implemented.  Falls risk arm band on pt.  Bed alarm is on.  Pt using call light appropriately.  Will continue to monitor.       Problem: Metabolic/Fluid and Electrolytes - Adult  Goal: Electrolytes maintained within normal limits  11/13/2024 2348 by Apryl Bustamante RN  Flowsheets (Taken 11/13/2024 2348)  Electrolytes maintained within normal limits:   Monitor labs and assess patient for signs and symptoms of electrolyte imbalances   Monitor response to electrolyte replacements, including repeat lab results as appropriate   Instruct patient on fluid and nutrition restrictions as appropriate  Note:   Lab Results   Component Value Date     (L) 11/13/2024    K 4.2 11/13/2024    CL 96 (L) 11/13/2024    CO2 30 11/13/2024      Problem: Hematologic - Adult  Goal: Maintains hematologic stability  Flowsheets (Taken 11/13/2024 2348)  Maintains hematologic stability:   Assess for signs and symptoms of bleeding or hemorrhage   Administer blood products/factors as ordered  Note:   Lab Results   Component Value Date    WBC 0.5 (LL) 11/13/2024    HGB 7.9 (L) 11/13/2024    HCT 23.2 (L) 11/13/2024    MCV 88.9 11/13/2024    PLT 55 (L) 11/13/2024

## 2024-11-14 NOTE — PROGRESS NOTES
Physical Therapy/Occupational Therapy  Hold    PT/OT consulted for re-evaluation this date, pt with low Hg this date. Per RN, pt will be receiving platelets, will hold therapy and follow up later in the day.    1100: Follow up for therapy evaluations. Pt currently off the floor for liver biopsy.    Abimbola Landon PT, DPT, NCS, CSRS   Ceci Parikh, OT [id number]

## 2024-11-14 NOTE — PROGRESS NOTES
IMAGING SERVICES NURSING PROGRESS NOTE    Procedure:  Liver bx  November 14, 2024  Dennys Peguero      Allergies:    Allergies   Allergen Reactions    Redford Alee Anaphylaxis       Vitals:    11/14/24 1131   BP: 131/71   Pulse: (!) 105   Resp: 18   Temp:    SpO2: 99%       Recent lab work reviewed with MD: yes   Procedure explained to patient by MD: yes   Informed consent obtained:yes  Family with patient: Inpatient     Mental Status:  Normal  Readiness to learn:  Yes  Barriers to learning: No    Pain Assessment Pre-Procedure:  Pain Present:  no  Pain Score:  0  Pain Quality/Description:  N/A    Time out Procedure Verification with:  [x] RN  [x] Physician  [x] Patient  [x] Other: CT Technologist  Procedure site marked, if applicable:  Yes    Note: Patient arrived A & O x 4, denies pain, breathing easily on room air, Spoke to Dr. Je Hsieh prior to procedure.  Procedural sedation:  Fentanyl:  25 mcg  Versed:    1 mg  Post Procedureal Note:  Patient tolerated procedure well.  Breathing easily on room air.  Report given to SURINDER HOWARD.  Patient transported in stable conditon to room 3524.    Pain Assessment Post-Procedure:  Pain Present:  no  Pain Score:  0  Pain Quality/Description:  N/A    Plan of Care Goals:  Safety measures met:  Yes  Patient understands explanation of procedure:  Yes    Time in:  1131  Time out:  1206    Sindy Mcgowan RN.  R.N. 11/14/2024

## 2024-11-14 NOTE — PROGRESS NOTES
Vascular Access Team called to assist with PIV.  See Flowsheet for USG PIV information, below is ultrasound evaluation of vein:

## 2024-11-14 NOTE — PROGRESS NOTES
ID Follow-up NOTE    CC:   AMS  Antibiotics: Pip-tazo, voriconazole, acyclovir, p.o. vancomycin    Admit Date: 10/28/2024  Hospital Day: 18    Subjective:     Patient continues to have ongoing fevers, as high as 103. Planned IR guided liver bx today. Ongoing abd pain     Objective:     Patient Vitals for the past 8 hrs:   BP Temp Temp src Pulse Resp SpO2   11/14/24 0850 133/85 98.7 °F (37.1 °C) Oral 87 17 100 %   11/14/24 0330 139/80 (!) 103 °F (39.4 °C) Oral (!) 109 28 94 %     I/O last 3 completed shifts:  In: 2845.5 [P.O.:240; I.V.:919; Blood:1686.5]  Out: 6720 [Urine:6620; Blood:100]  I/O this shift:  In: -   Out: 600 [Urine:600]    EXAM:  GENERAL: No apparent distress.  Ill appearing. Pt is fatigued easily.   HEENT: Membranes moist, no oral lesion, on room air.   NECK:  Supple, no lymphadenopathy  LUNGS: Clear b/l, no rales, no dullness, on room air  CARDIAC: RRR, no murmur appreciated  ABD:  + BS, soft, tender to palpation in the mid abdomen region.  EXT:  No rash, no edema, no lesions  NEURO: No focal neurologic findings  PSYCH: Orientation normal, drowsy.   LINES:  Peripheral iv    Data Review:  Lab Results   Component Value Date    WBC 0.5 (LL) 11/14/2024    HGB 6.9 (LL) 11/14/2024    HCT 19.8 (LL) 11/14/2024    MCV 87.0 11/14/2024    PLT 42 (L) 11/14/2024     Lab Results   Component Value Date    CREATININE 0.7 (L) 11/14/2024    BUN 9 11/14/2024     (L) 11/14/2024    K 4.1 11/14/2024    CL 98 (L) 11/14/2024    CO2 26 11/14/2024       Hepatic Function Panel:   Lab Results   Component Value Date/Time    ALKPHOS 177 11/14/2024 04:17 AM    ALT 21 11/14/2024 04:17 AM    AST 10 11/14/2024 04:17 AM    BILITOT 0.9 11/14/2024 04:17 AM    BILIDIR 0.5 11/14/2024 04:17 AM    IBILI 0.4 11/14/2024 04:17 AM       MICRO:  MRSA nares 1113: pending   Blood cx x 2 11/13: neg to date  Urine culture 11/11: no growth   Blood cultures x 2 11/9: Negative  Urine culture 11/9: No growth  Stool culture 11/7: Negative  BAL cx  removed on 10/29, PICC tip culture neg  - neg repeat blood cultures post removal since 10/30, can consider replacing PICC as needed if blood cultures remain negative  -TTE without evidence of IE on visualized valves.  - CT chest abd pelvis 10/28 showing Interval increase in nodular consolidation of the right lung base most suggestive of pneumonia.  Pneumonia PCR panel negative  - given progressive worsening, decreased temp, worsening abd pain, lower BP and elevated procal, team added daptomycin and micafungin on 10/30.  - on 10/31 new marjan blood with stools, GI eval no plans for intervention at this time, flex sig once neutropenia improves. Continues to have dark stools.   - GI performed EGD on 11/5, no significant findings.   -  repeat CT chest abdomen and pelvis 11/6 showing progressive multifocal pneumonia and consolidative opacities with new nodule on the right apex.  There is nonspecific fluid attenuation of the colon  - stopped daptomycin 11/6 as no further lines in place and no new intra abd pathology.   - given ongoing fever and new lung findings, will repeat endemic fungi workup, to include histoplasma, Blastomyces and cryptococcus antigen/antibody. These were previously negative in 5/2024.   - pulm eval and performed BAL on 11/7, pneumonia panel from washings all neg. Cultures pending but no growth to date.  - continue mid abd pain worsened around timing of Bms, with dark stools.  - transitioned to po levaquin for ppx on 11/12. Resumed on pip-tazo due to ongoing fevers.  - I believe that risks of use of antifungal like amphotericin B without identified fungal infection. Would suspect serology and/or BAL positivity if fungal etiology. Empiric coverage of micafungin is sufficient; no clear fungal infection identified. Changed antifungal from micafungin to voriconazole on 11/13  - with pt's prolonged and severe neutropenia, suspect likely  of ongoing fever. So far, extensive workup has not yielded any

## 2024-11-15 ENCOUNTER — APPOINTMENT (OUTPATIENT)
Dept: VASCULAR LAB | Age: 44
DRG: 720 | End: 2024-11-15
Payer: MEDICAID

## 2024-11-15 PROBLEM — M79.89 LEFT UPPER EXTREMITY SWELLING: Status: ACTIVE | Noted: 2024-11-15

## 2024-11-15 LAB
ALBUMIN SERPL-MCNC: 3.4 G/DL (ref 3.4–5)
ALP SERPL-CCNC: 237 U/L (ref 40–129)
ALT SERPL-CCNC: 27 U/L (ref 10–40)
ANION GAP SERPL CALCULATED.3IONS-SCNC: 13 MMOL/L (ref 3–16)
ANISOCYTOSIS BLD QL SMEAR: ABNORMAL
AST SERPL-CCNC: 17 U/L (ref 15–37)
BACTERIA THROAT AEROBE CULT: NORMAL
BACTERIA URNS QL MICRO: ABNORMAL /HPF
BASOPHILS # BLD: 0 K/UL (ref 0–0.2)
BASOPHILS NFR BLD: 0 %
BILIRUB DIRECT SERPL-MCNC: 0.6 MG/DL (ref 0–0.3)
BILIRUB INDIRECT SERPL-MCNC: 0.5 MG/DL (ref 0–1)
BILIRUB SERPL-MCNC: 1.1 MG/DL (ref 0–1)
BILIRUB UR QL STRIP.AUTO: NEGATIVE
BLASTS NFR BLD MANUAL: 1 %
BLASTS NFR BLD MANUAL: 2 %
BUN SERPL-MCNC: 9 MG/DL (ref 7–20)
CALCIUM SERPL-MCNC: 9 MG/DL (ref 8.3–10.6)
CHLORIDE SERPL-SCNC: 98 MMOL/L (ref 99–110)
CLARITY UR: CLEAR
CO2 SERPL-SCNC: 25 MMOL/L (ref 21–32)
COARSE GRAN CASTS #/AREA URNS LPF: ABNORMAL /LPF (ref 0–2)
COLOR UR: YELLOW
CREAT SERPL-MCNC: 0.7 MG/DL (ref 0.9–1.3)
DEPRECATED RDW RBC AUTO: 13 % (ref 12.4–15.4)
DEPRECATED RDW RBC AUTO: 13 % (ref 12.4–15.4)
DEPRECATED RDW RBC AUTO: 13.2 % (ref 12.4–15.4)
EBV DNA SERPL NAA+PROBE-ACNC: NOT DETECTED IU/ML
EBV DNA SERPL NAA+PROBE-LOG#: NOT DETECTED LOG IU/ML
EBV DNA SPEC QL NAA+PROBE: NOT DETECTED
ECHO BSA: 1.97 M2
EOSINOPHIL # BLD: 0 K/UL (ref 0–0.6)
EOSINOPHIL NFR BLD: 0 %
EPI CELLS #/AREA URNS HPF: ABNORMAL /HPF (ref 0–5)
FIBRINOGEN PPP-MCNC: 449 MG/DL (ref 227–534)
FINAL REPORT: NORMAL
GFR SERPLBLD CREATININE-BSD FMLA CKD-EPI: >90 ML/MIN/{1.73_M2}
GLUCOSE SERPL-MCNC: 87 MG/DL (ref 70–99)
GLUCOSE UR STRIP.AUTO-MCNC: NEGATIVE MG/DL
HCT VFR BLD AUTO: 22.9 % (ref 40.5–52.5)
HCT VFR BLD AUTO: 23.5 % (ref 40.5–52.5)
HCT VFR BLD AUTO: 23.5 % (ref 40.5–52.5)
HGB BLD-MCNC: 7.9 G/DL (ref 13.5–17.5)
HGB BLD-MCNC: 7.9 G/DL (ref 13.5–17.5)
HGB BLD-MCNC: 8 G/DL (ref 13.5–17.5)
HGB UR QL STRIP.AUTO: NEGATIVE
KETONES UR STRIP.AUTO-MCNC: 15 MG/DL
LDH SERPL L TO P-CCNC: 134 U/L (ref 100–190)
LEUKOCYTE ESTERASE UR QL STRIP.AUTO: NEGATIVE
LYMPHOCYTES # BLD: 0.5 K/UL (ref 1–5.1)
LYMPHOCYTES # BLD: 0.6 K/UL (ref 1–5.1)
LYMPHOCYTES # BLD: 0.6 K/UL (ref 1–5.1)
LYMPHOCYTES NFR BLD: 62 %
LYMPHOCYTES NFR BLD: 72 %
LYMPHOCYTES NFR BLD: 86 %
MAGNESIUM SERPL-MCNC: 1.79 MG/DL (ref 1.8–2.4)
MCH RBC QN AUTO: 30.6 PG (ref 26–34)
MCHC RBC AUTO-ENTMCNC: 33.7 G/DL (ref 31–36)
MCHC RBC AUTO-ENTMCNC: 34.1 G/DL (ref 31–36)
MCHC RBC AUTO-ENTMCNC: 34.4 G/DL (ref 31–36)
MCV RBC AUTO: 89 FL (ref 80–100)
MCV RBC AUTO: 89.6 FL (ref 80–100)
MCV RBC AUTO: 90.7 FL (ref 80–100)
MONOCYTES # BLD: 0 K/UL (ref 0–1.3)
MONOCYTES # BLD: 0.1 K/UL (ref 0–1.3)
MONOCYTES # BLD: 0.1 K/UL (ref 0–1.3)
MONOCYTES NFR BLD: 1 %
MONOCYTES NFR BLD: 8 %
MONOCYTES NFR BLD: 8 %
MUCOUS THREADS #/AREA URNS LPF: ABNORMAL /LPF
NEUTROPHILS # BLD: 0.1 K/UL (ref 1.7–7.7)
NEUTROPHILS # BLD: 0.1 K/UL (ref 1.7–7.7)
NEUTROPHILS # BLD: 0.3 K/UL (ref 1.7–7.7)
NEUTROPHILS NFR BLD: 13 %
NEUTROPHILS NFR BLD: 18 %
NEUTROPHILS NFR BLD: 28 %
NITRITE UR QL STRIP.AUTO: NEGATIVE
PATH INTERP BLD-IMP: NO
PATH INTERP BLD-IMP: YES
PH UR STRIP.AUTO: 6.5 [PH] (ref 5–8)
PHOSPHATE SERPL-MCNC: 3.4 MG/DL (ref 2.5–4.9)
PLATELET # BLD AUTO: 48 K/UL (ref 135–450)
PLATELET # BLD AUTO: 65 K/UL (ref 135–450)
PLATELET # BLD AUTO: 81 K/UL (ref 135–450)
PLATELET BLD QL SMEAR: ABNORMAL
PMV BLD AUTO: 7.1 FL (ref 5–10.5)
PMV BLD AUTO: 7.6 FL (ref 5–10.5)
PMV BLD AUTO: 7.6 FL (ref 5–10.5)
POTASSIUM SERPL-SCNC: 4 MMOL/L (ref 3.5–5.1)
PRELIMINARY: NORMAL
PROT SERPL-MCNC: 7.4 G/DL (ref 6.4–8.2)
PROT UR STRIP.AUTO-MCNC: 30 MG/DL
RBC # BLD AUTO: 2.57 M/UL (ref 4.2–5.9)
RBC # BLD AUTO: 2.59 M/UL (ref 4.2–5.9)
RBC # BLD AUTO: 2.62 M/UL (ref 4.2–5.9)
RBC #/AREA URNS HPF: ABNORMAL /HPF (ref 0–4)
RBC MORPH BLD: NORMAL
RBC MORPH BLD: NORMAL
SLIDE REVIEW: ABNORMAL
SLIDE REVIEW: ABNORMAL
SODIUM SERPL-SCNC: 136 MMOL/L (ref 136–145)
SP GR UR STRIP.AUTO: 1.02 (ref 1–1.03)
UA DIPSTICK W REFLEX MICRO PNL UR: YES
URATE SERPL-MCNC: 1.7 MG/DL (ref 3.5–7.2)
URN SPEC COLLECT METH UR: ABNORMAL
UROBILINOGEN UR STRIP-ACNC: 0.2 E.U./DL
VANCOMYCIN SERPL-MCNC: 19.4 UG/ML
VARIANT LYMPHS NFR BLD MANUAL: 1 % (ref 0–6)
WBC # BLD AUTO: 0.7 K/UL (ref 4–11)
WBC # BLD AUTO: 0.7 K/UL (ref 4–11)
WBC # BLD AUTO: 0.9 K/UL (ref 4–11)
WBC #/AREA URNS HPF: ABNORMAL /HPF (ref 0–5)

## 2024-11-15 PROCEDURE — 6360000002 HC RX W HCPCS: Performed by: NURSE PRACTITIONER

## 2024-11-15 PROCEDURE — 36430 TRANSFUSION BLD/BLD COMPNT: CPT

## 2024-11-15 PROCEDURE — 2580000003 HC RX 258

## 2024-11-15 PROCEDURE — 87102 FUNGUS ISOLATION CULTURE: CPT

## 2024-11-15 PROCEDURE — 97162 PT EVAL MOD COMPLEX 30 MIN: CPT

## 2024-11-15 PROCEDURE — 80076 HEPATIC FUNCTION PANEL: CPT

## 2024-11-15 PROCEDURE — 97168 OT RE-EVAL EST PLAN CARE: CPT

## 2024-11-15 PROCEDURE — 87103 BLOOD FUNGUS CULTURE: CPT

## 2024-11-15 PROCEDURE — 87205 SMEAR GRAM STAIN: CPT

## 2024-11-15 PROCEDURE — 87040 BLOOD CULTURE FOR BACTERIA: CPT

## 2024-11-15 PROCEDURE — 87252 VIRUS INOCULATION TISSUE: CPT

## 2024-11-15 PROCEDURE — 6370000000 HC RX 637 (ALT 250 FOR IP): Performed by: INTERNAL MEDICINE

## 2024-11-15 PROCEDURE — 87070 CULTURE OTHR SPECIMN AEROBIC: CPT

## 2024-11-15 PROCEDURE — 36415 COLL VENOUS BLD VENIPUNCTURE: CPT

## 2024-11-15 PROCEDURE — 97116 GAIT TRAINING THERAPY: CPT

## 2024-11-15 PROCEDURE — 6370000000 HC RX 637 (ALT 250 FOR IP): Performed by: NURSE PRACTITIONER

## 2024-11-15 PROCEDURE — 93971 EXTREMITY STUDY: CPT | Performed by: SURGERY

## 2024-11-15 PROCEDURE — 83615 LACTATE (LD) (LDH) ENZYME: CPT

## 2024-11-15 PROCEDURE — 99233 SBSQ HOSP IP/OBS HIGH 50: CPT | Performed by: INTERNAL MEDICINE

## 2024-11-15 PROCEDURE — 81001 URINALYSIS AUTO W/SCOPE: CPT

## 2024-11-15 PROCEDURE — 97535 SELF CARE MNGMENT TRAINING: CPT

## 2024-11-15 PROCEDURE — 85025 COMPLETE CBC W/AUTO DIFF WBC: CPT

## 2024-11-15 PROCEDURE — 87086 URINE CULTURE/COLONY COUNT: CPT

## 2024-11-15 PROCEDURE — 84550 ASSAY OF BLOOD/URIC ACID: CPT

## 2024-11-15 PROCEDURE — 93971 EXTREMITY STUDY: CPT

## 2024-11-15 PROCEDURE — 6370000000 HC RX 637 (ALT 250 FOR IP)

## 2024-11-15 PROCEDURE — 80202 ASSAY OF VANCOMYCIN: CPT

## 2024-11-15 PROCEDURE — 2060000000 HC ICU INTERMEDIATE R&B

## 2024-11-15 PROCEDURE — 80048 BASIC METABOLIC PNL TOTAL CA: CPT

## 2024-11-15 PROCEDURE — 85384 FIBRINOGEN ACTIVITY: CPT

## 2024-11-15 PROCEDURE — 2580000003 HC RX 258: Performed by: NURSE PRACTITIONER

## 2024-11-15 PROCEDURE — 87253 VIRUS INOCULATE TISSUE ADDL: CPT

## 2024-11-15 PROCEDURE — 83735 ASSAY OF MAGNESIUM: CPT

## 2024-11-15 PROCEDURE — 6360000002 HC RX W HCPCS: Performed by: STUDENT IN AN ORGANIZED HEALTH CARE EDUCATION/TRAINING PROGRAM

## 2024-11-15 PROCEDURE — 84100 ASSAY OF PHOSPHORUS: CPT

## 2024-11-15 RX ORDER — DICYCLOMINE HCL 20 MG
20 TABLET ORAL 4 TIMES DAILY PRN
Status: ACTIVE | OUTPATIENT
Start: 2024-11-15

## 2024-11-15 RX ORDER — OXYCODONE HYDROCHLORIDE 5 MG/1
5 TABLET ORAL ONCE
Status: COMPLETED | OUTPATIENT
Start: 2024-11-15 | End: 2024-11-15

## 2024-11-15 RX ORDER — LANOLIN ALCOHOL/MO/W.PET/CERES
400 CREAM (GRAM) TOPICAL 2 TIMES DAILY
Status: COMPLETED | OUTPATIENT
Start: 2024-11-15 | End: 2024-11-15

## 2024-11-15 RX ORDER — VANCOMYCIN HCL IN 5 % DEXTROSE 1.25 G/25
1250 PLASTIC BAG, INJECTION (ML) INTRAVENOUS EVERY 12 HOURS
Status: DISCONTINUED | OUTPATIENT
Start: 2024-11-15 | End: 2024-11-19

## 2024-11-15 RX ADMIN — SODIUM CHLORIDE, PRESERVATIVE FREE 10 ML: 5 INJECTION INTRAVENOUS at 08:24

## 2024-11-15 RX ADMIN — ACETAMINOPHEN 650 MG: 325 TABLET, FILM COATED ORAL at 08:23

## 2024-11-15 RX ADMIN — VANCOMYCIN HYDROCHLORIDE 1250 MG: 1.25 INJECTION, SOLUTION INTRAVITREAL at 23:04

## 2024-11-15 RX ADMIN — Medication 400 MG: at 08:23

## 2024-11-15 RX ADMIN — VANCOMYCIN HYDROCHLORIDE 125 MG: 250 POWDER, FOR SOLUTION ORAL at 08:23

## 2024-11-15 RX ADMIN — LEVETIRACETAM 1500 MG: 500 TABLET, FILM COATED ORAL at 08:23

## 2024-11-15 RX ADMIN — LEVETIRACETAM 1500 MG: 500 TABLET, FILM COATED ORAL at 20:20

## 2024-11-15 RX ADMIN — VANCOMYCIN HYDROCHLORIDE 125 MG: 250 POWDER, FOR SOLUTION ORAL at 20:24

## 2024-11-15 RX ADMIN — VORICONAZOLE 300 MG: 200 TABLET ORAL at 20:20

## 2024-11-15 RX ADMIN — OXYCODONE 5 MG: 5 TABLET ORAL at 23:07

## 2024-11-15 RX ADMIN — PIPERACILLIN AND TAZOBACTAM 4500 MG: 4; .5 INJECTION, POWDER, LYOPHILIZED, FOR SOLUTION INTRAVENOUS at 15:08

## 2024-11-15 RX ADMIN — AMITRIPTYLINE HYDROCHLORIDE 10 MG: 10 TABLET, FILM COATED ORAL at 20:20

## 2024-11-15 RX ADMIN — VALACYCLOVIR HYDROCHLORIDE 500 MG: 500 TABLET, FILM COATED ORAL at 20:20

## 2024-11-15 RX ADMIN — Medication 400 MG: at 20:20

## 2024-11-15 RX ADMIN — VALACYCLOVIR HYDROCHLORIDE 500 MG: 500 TABLET, FILM COATED ORAL at 08:23

## 2024-11-15 RX ADMIN — Medication: at 20:25

## 2024-11-15 RX ADMIN — ACETAMINOPHEN 650 MG: 325 TABLET, FILM COATED ORAL at 16:03

## 2024-11-15 RX ADMIN — VORICONAZOLE 300 MG: 200 TABLET ORAL at 08:23

## 2024-11-15 RX ADMIN — Medication: at 08:25

## 2024-11-15 RX ADMIN — VANCOMYCIN 1500 MG: 1.5 INJECTION, SOLUTION INTRAVENOUS at 11:23

## 2024-11-15 RX ADMIN — PIPERACILLIN AND TAZOBACTAM 4500 MG: 4; .5 INJECTION, POWDER, LYOPHILIZED, FOR SOLUTION INTRAVENOUS at 06:52

## 2024-11-15 RX ADMIN — PANTOPRAZOLE SODIUM 40 MG: 40 TABLET, DELAYED RELEASE ORAL at 06:52

## 2024-11-15 RX ADMIN — DICYCLOMINE HYDROCHLORIDE 20 MG: 20 TABLET ORAL at 08:23

## 2024-11-15 RX ADMIN — PANTOPRAZOLE SODIUM 40 MG: 40 TABLET, DELAYED RELEASE ORAL at 16:04

## 2024-11-15 RX ADMIN — ACETAMINOPHEN 650 MG: 325 TABLET, FILM COATED ORAL at 23:15

## 2024-11-15 ASSESSMENT — PAIN SCALES - GENERAL
PAINLEVEL_OUTOF10: 5
PAINLEVEL_OUTOF10: 0
PAINLEVEL_OUTOF10: 0

## 2024-11-15 ASSESSMENT — PAIN DESCRIPTION - ORIENTATION: ORIENTATION: RIGHT;LEFT;INNER

## 2024-11-15 ASSESSMENT — PAIN DESCRIPTION - ONSET: ONSET: GRADUAL

## 2024-11-15 ASSESSMENT — PAIN DESCRIPTION - DESCRIPTORS: DESCRIPTORS: ACHING

## 2024-11-15 ASSESSMENT — PAIN DESCRIPTION - LOCATION: LOCATION: HEAD

## 2024-11-15 ASSESSMENT — PAIN DESCRIPTION - PAIN TYPE: TYPE: ACUTE PAIN

## 2024-11-15 ASSESSMENT — PAIN - FUNCTIONAL ASSESSMENT: PAIN_FUNCTIONAL_ASSESSMENT: ACTIVITIES ARE NOT PREVENTED

## 2024-11-15 ASSESSMENT — PAIN DESCRIPTION - FREQUENCY: FREQUENCY: INTERMITTENT

## 2024-11-15 NOTE — PROGRESS NOTES
Occupational Therapy  Facility/Department: [address] CANCER [place]  Occupational Therapy Re-Assessment    Name: Dennys Peguero  : 1980  MRN: [id number]  Date of Service: 11/15/2024    Discharge Recommendations:  24 hour supervision or assist (initially)  OT Equipment Recommendations  Equipment Needed: Yes  Other: shower chair       Patient Diagnosis(es): The primary encounter diagnosis was Neutropenic fever (HCC). Diagnoses of Acute respiratory failure with hypoxia, Sepsis with acute hypoxic respiratory failure and septic shock, due to unspecified organism (HCC), Hypotension, unspecified hypotension type, AML (acute myeloid leukemia) in relapse (HCC), and Left upper extremity swelling were also pertinent to this visit.  Past Medical History:  has a past medical history of Brain cancer (HCC) and Seizure (HCC).  Past Surgical History:  has a past surgical history that includes brain surgery; Upper gastrointestinal endoscopy (N/A, 3/6/2024); Colonoscopy (N/A, 3/22/2024); Small intestine surgery (N/A, 2024); bronchoscopy (N/A, 2024); Upper gastrointestinal endoscopy (N/A, 2024); bronchoscopy (N/A, 2024); and CT ASP ABS HEMATOMA BULLA CYST (2024).    Treatment Diagnosis: impaired ADLs and mobility      Assessment  Performance deficits / Impairments: Decreased functional mobility ;Decreased ADL status;Decreased endurance  Assessment: Pt admitted with neutropenic fever, evaluated/discharged by OT on 24 secondary to being indep with ADLs and mobility.  Currently, he appears more weak, with decreased activity tolerance, requiring SBA/CGA with ADLs and mobility.  Recommend discharge to home with initial 24 hr A. Pt could benefit from shower chair at home.  Treatment Diagnosis: impaired ADLs and mobility  Prognosis: Good  Decision Making: Medium Complexity  REQUIRES OT FOLLOW-UP: Yes  Activity Tolerance  Activity Tolerance: Patient limited by fatigue     Plan  Occupational Therapy  and taking off regular lower body clothing?: A Little  How much help is needed for bathing (which includes washing, rinsing, drying)?: A Little  How much help is needed for toileting (which includes using toilet, bedpan, or urinal)?: A Little  How much help is needed for putting on and taking off regular upper body clothing?: None  How much help is needed for taking care of personal grooming?: A Little  How much help for eating meals?: None  AM-[facility] Inpatient Daily Activity Raw Score: 20  AM-PAC Inpatient ADL T-Scale Score : 42.03  ADL Inpatient CMS 0-100% Score: 38.32  ADL Inpatient CMS G-Code Modifier : CJ    Tinneti Score       Goals  Short Term Goals  Time Frame for Short Term Goals: discharge  Short Term Goal 1: pt to transfer to commode with S/I  Short Term Goal 2: pt to manage toileting with S/I  Short Term Goal 3: pt to retrieve clothes and dress lower body with S/I  Short Term Goal 4: pt to tolerate 10-12 reps B UE AROM exerc to increase function  Short Term Goal 5: pt to do functional tasks in room with S/I  Patient Goals   Patient goals : not stated      Therapy Time   Individual Concurrent Group Co-treatment   Time In 0945         Time Out 1013         Minutes 28          Timed Code Treatment Minutes:   13    Total Treatment Minutes:   28       Renate Butterfield OTR/L 2351

## 2024-11-15 NOTE — PROGRESS NOTES
[facility] -  Clinical Pharmacy Note    Vancomycin - Management by Pharmacy    Consult Date(s): 11/13  Consulting Provider(s): Polina Porter (APRN)    Assessment / Plan  Febrile neutropenia/possible PNA - Vancomycin  Concurrent Antimicrobials: Pip-tazo (started 11/13) and treatment dosing Vfend  Prophylaxis: valacyclovir, oral vancomycin  Day of Vanc Therapy / Ordered Duration: 3 / TBD  Current Dosing Method: Bayesian-Guided AUC Dosing  Therapeutic Goal: -600 mg/L*hr  Current Dose / Plan:   Team started vancomycin 11/13 for persistent fevers despite 2 week course of meropenem, 1-week course of daptomycin  Patient has possible worsening pneumonia on chest xr-ray  Scr stable at 0.7 today  Received 1750 mg x 1 dose on 11/13, followed by 1500 mg q12h  Calculated AUCss = 618 mg/L*hr, trough - 14.7 mg/L  (Random Vancomycin level obtained today 11/15=  19.4 mg/L - 8 hrs after last dose)  Will change Vancomycin 1250 mg IV q12h for estimated AUCss 517 mg/L*hr, trough 12.1 mg/L and obtain level when clinically appropriate  Will continue to monitor clinical condition and make adjustments to regimen as appropriate.    Thank you for consulting pharmacy,    Nicci Mcintosh, PharmD  [facility] Clinical Pharmacist  Phone: [phone]        Interval update:  continues with persistent fevers despite completing 2 week course of meropenem. MRSA nares negative but per team continue with Vancomycin until receive culture results from liver biopsy done yesterday 11/14    Subjective/Objective:   Dennys Peguero is a 44 y.o. male with a PMHx significant for oligodendrioglioma (/sp resection, radiation, temodar), rectal adenocarcinoma (s/p resection), AML with deletion MDS (currently being treated with Dacogen/Venetoclax), recurrent c diff infections, multifocal pneumonia who is admitted initially with sepsis s/t klebsiella bacteremia.  Now starting vancomycin for persistent fevers of unknown origin.     Pharmacy is consulted to dose  vancomycin.    Ht Readings from Last 1 Encounters:   11/07/24 1.778 m (5' 10\")     Wt Readings from Last 1 Encounters:   11/15/24 69.8 kg (153 lb 14.1 oz)       Vancomycin Level(s) / Doses:    Date Time Dose Type of Level / Level Interpretation   11/13 1037 1750 mg IV x1     11/13 2135 1500 mg IV q12h     11/15 0548 1500 mg IV q12h Random= 19.4 8 hours after last dose  AUCss= = 618 mg/L*hr, trough - 14.7 mg/L  Decrease dose 1250 mg IV q12h   11/15  1250 mg IV q12h     Note: Serum levels collected for AUC-based dosing may be high if collected in close proximity to the dose administered. This is not necessarily indicative of toxicity.    Cultures & Sensitivities:    Date Site Micro Susceptibility / Result   11/13 MRSA nares  negative   11/7 BAL-galactomannan    positive           Recent Labs     11/13/24  0431 11/13/24  1707 11/14/24  0417 11/14/24  2232 11/15/24  0548   CREATININE 0.6*  --  0.7*  --  0.7*   BUN 7  --  9  --  9   WBC 0.5*   < > 0.5* 0.7* 0.7*    < > = values in this interval not displayed.       Estimated Creatinine Clearance: 132 mL/min (A) (based on SCr of 0.7 mg/dL (L)).    Additional Lab Values / Findings of Note:    Recent Labs     11/13/24  0937   PROCAL 0.57*

## 2024-11-15 NOTE — PROGRESS NOTES
tests/interventions  Independent review of radiologic images  Microbiology cultures and other micro tests reviewed      Discussed with patient and primary team, oncology resident Mariel and Onc NP.     Tu Moreno MD

## 2024-11-15 NOTE — PROGRESS NOTES
Patient platelet count is 48, per parameters nurse to administer platelets for count less than 50. Per NP, platelet transfusion on hold at this time r/t neutropenic fever. Patient has no s/s of active bleed noted. Will recheck temperature and give platelets when safe to do so. Patient is aware.

## 2024-11-15 NOTE — PLAN OF CARE
Problem: Infection - Adult  Goal: Absence of infection during hospitalization  Outcome: Not Progressing  Pt continues to fever on and off, tylenol x2 this shift    Problem: Nutrition Deficit:  Goal: Optimize nutritional status  Outcome: Not Progressing  Pt did not eat this shift. Pt did have adequate fluid intake.     Problem: Pain  Goal: Verbalizes/displays adequate comfort level or baseline comfort level  Outcome: Progressing  No complaints of pain this shift.     Problem: Safety - Adult  Goal: Free from fall injury  Outcome: Progressing  Pt up with assist x1, call light within reach, bed alarm in use     Problem: Skin/Tissue Integrity - Adult  Goal: Skin integrity remains intact  Outcome: Progressing  No new skin breakdown this shift.     Problem: Hematologic - Adult  Goal: Maintains hematologic stability  Outcome: Progressing  Patient's hemoglobin this AM:   Recent Labs     11/15/24  1533   HGB 7.9*     Patient's platelet count this AM:   Recent Labs     11/15/24  1533   PLT 65*    Thrombocytopenia Precautions in place.  Patient showing no signs or symptoms of active bleeding.  Transfusion not indicated at this time.  Patient verbalizes understanding of all instructions. Will continue to assess and implement POC. Call light within reach and hourly rounding in place.      Problem: Respiratory - Adult  Goal: Achieves optimal ventilation and oxygenation  Outcome: Progressing  Pt remains on room air this shift.     Problem: Metabolic/Fluid and Electrolytes - Adult  Goal: Electrolytes maintained within normal limits  Outcome: Progressing  Magnesium replacement ordered this shift.     Problem: Musculoskeletal - Adult  Goal: Return mobility to safest level of function  Outcome: Progressing  Pt up with assist x1, call light within reach, bed alarm in use     Problem: Gastrointestinal - Adult  Goal: Maintains or returns to baseline bowel function  Outcome: Progressing  Pt had one soft bowel movement this shift.

## 2024-11-15 NOTE — PROGRESS NOTES
[facility] Progress Note    11/15/2024    Dennys Peguero    :  1980    MRN:  [id number]      Interval Hx:      Mr. Peguero reports improved productive cough of green sputum.  Mr. Peguero's epigastric/periumbilical pain persists with pain immediately before & during bowel movements, which he describes as similar to prior intestinal ischemic event, causing him fear of eating. He continues to have watery brown stools.     Liver aspirate sent yesterday () for further infectious workup.     BAL positive for aspergillus galacto Ag by EIA (collected , resulted 11/15)  Over last 24 hrs:  Tmax 102.7 °F (, 2200)  Intake 812 mL  UOP 1.6 L  25 mL (2 unmeasured)  ANC 0.1  7.6 --> 6.5 --> 1U pRBC --> 8.0  66 --> 42 --> 1U PLT --> 81  Total bilirubin 1.1, direct bilirubin is increasing 0.6      ECOG PS:  (2) Ambulatory and capable of self care, unable to carry out work activity, up and about > 50% or waking hours    KPS:  70% Cares for self; unable to carry on normal activity or to do active work    Isolation:  Strict contact      Medication:    Scheduled:   magnesium oxide  400 mg Oral BID    piperacillin-tazobactam  4,500 mg IntraVENous Q8H    vancomycin  1,500 mg IntraVENous Q12H    voriconazole  300 mg Oral 2 times per day    valACYclovir  500 mg Oral BID    amitriptyline  10 mg Oral Nightly    pantoprazole  40 mg Oral BID AC    levETIRAcetam  1,500 mg Oral BID    dicyclomine  20 mg Oral 4x Daily    sodium chloride flush  5-40 mL IntraVENous 2 times per day    Hydrocerin   Topical BID    sodium chloride flush  5-40 mL IntraVENous 2 times per day    Saline Mouthwash  15 mL Swish & Spit 4x Daily AC & HS    vancomycin  125 mg Oral 2 times per day     Continuous Infusions:   sodium chloride      sodium chloride      sodium chloride      sodium chloride      sodium chloride 5 mL/hr at 24 0921     PRN:  sodium chloride, sodium chloride, sodium chloride, acetaminophen, sodium chloride, potassium  apartment  Depressed mood  Psychology consulted, recs appreciated          DVT Prophylaxis:  Platelets <50,000 cells/dL - prophylactic lovenox on hold and mechanical prophylaxis with bilateral SCDs while in bed in place.  Contraindications to pharmacologic prophylaxis:  Thrombocytopenia, amyloid angiopathy (see Neuro section above for details)  Contraindications to mechanical prophylaxis:  None    Disposition: Pending resolution of fevers and clinical improvement.    The patient was seen and examined by Dr. Gabby Tiwari.    Yoselin Floyd MD, MARIE, CCR[facility]  Internal Medicine, PGY-3

## 2024-11-15 NOTE — PROGRESS NOTES
Physical Therapy  Facility/Department: [address]  Physical Therapy Re-Evaluation/Treatment    Name: Dennys Peguero  : 1980  MRN: [id number]  Date of Service: 11/15/2024    Discharge Recommendations:  Home with Home health PT   PT Equipment Recommendations  Equipment Needed: No      Patient Diagnosis(es): The primary encounter diagnosis was Neutropenic fever (HCC). 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  Past Medical History:  has a past medical history of Brain cancer (HCC) and Seizure (HCC).  Past Surgical History:  has a past surgical history that includes brain surgery; Upper gastrointestinal endoscopy (N/A, 3/6/2024); Colonoscopy (N/A, 3/22/2024); Small intestine surgery (N/A, 2024); bronchoscopy (N/A, 2024); Upper gastrointestinal endoscopy (N/A, 2024); bronchoscopy (N/A, 2024); and CT ASP ABS HEMATOMA BULLA CYST (2024).    Assessment  Assessment: pt seen for re-eval this date with concerns of decreased mobility from last week. Pt presents from home with his father where he is typically IND with mobility without use of AD. Pt currently able to transfer and ambulate without AD with CGA - SBA. Mobility does appear effortful with minor unsteadiness noted, slow jose angel with ambulation. Pt slightly below baseline, will follow during admission. Encouraged ambulation with RN staff, up to chair daily.  Treatment Diagnosis: gait difficulty  Therapy Prognosis: Good  Decision Making: Medium Complexity  Requires PT Follow-Up: Yes  Activity Tolerance  Activity Tolerance: Patient tolerated evaluation without incident;Patient limited by fatigue    Plan  Physical Therapy Plan  General Plan:  (2-5)  Current Treatment Recommendations:  Strengthening, Balance training, Functional mobility training, Transfer training, Gait training, Stair training, Patient/Caregiver education & training, Therapeutic activities, Endurance training, Safety education & training  Safety Devices  Type of Devices: Chair alarm in place, Left in chair, Call light within reach, Nurse notified, Gait belt    Restrictions  Position Activity Restriction  Other position/activity restrictions: up as tolerated, If platelet count equal to or less than 10 but the patient has received a platelet transfusion, physical therapy or occupational therapy may proceed with treatment., up as tolerated, contact prec     Subjective  General  Chart Reviewed: Yes  Patient assessed for rehabilitation services?: Yes  Additional Pertinent Hx: 43-year-old male with 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 Diff in 3/2024, rectal adenocarcinoma in 3/2024 s/p rectal mass resection in 4/2024. Patient presented on 10/28 with sepsis/neutropenic fever, rectal bleeding.  Referring Practitioner: Polina Porter APRN - CNP  Referral Date : 11/13/24  Diagnosis: neutropenic fever  Follows Commands: Within Functional Limits  General Comment  Comments: pt cleared to see for therapy by RN.  Subjective  Subjective: pt semisupine in bed on entry, no c/o pain, agreeable to therapy. Appears with flat affect, soft spoken.         Social/Functional History  Social/Functional History  Lives With: Parent (dad staying with pt for last few months)  Type of Home: Apartment  Home Layout: One level  Home Access: Elevator  Bathroom Shower/Tub: Walk-in shower  Bathroom Toilet: Standard  Bathroom Equipment: Grab bars in shower, Grab bars around toilet, Hand-held shower, Shower chair  Home Equipment: None  Has the patient had two or more falls in the past year or any fall with injury in the past year?: No  ADL Assistance:  2.04

## 2024-11-16 LAB
ALBUMIN SERPL-MCNC: 3.1 G/DL (ref 3.4–5)
ALP SERPL-CCNC: 238 U/L (ref 40–129)
ALT SERPL-CCNC: 25 U/L (ref 10–40)
ANION GAP SERPL CALCULATED.3IONS-SCNC: 8 MMOL/L (ref 3–16)
AST SERPL-CCNC: 17 U/L (ref 15–37)
BACTERIA UR CULT: NORMAL
BASOPHILS # BLD: 0 K/UL (ref 0–0.2)
BASOPHILS # BLD: 0 K/UL (ref 0–0.2)
BASOPHILS NFR BLD: 0 %
BASOPHILS NFR BLD: 4 %
BILIRUB DIRECT SERPL-MCNC: 0.5 MG/DL (ref 0–0.3)
BILIRUB INDIRECT SERPL-MCNC: 0.3 MG/DL (ref 0–1)
BILIRUB SERPL-MCNC: 0.8 MG/DL (ref 0–1)
BUN SERPL-MCNC: 8 MG/DL (ref 7–20)
CALCIUM SERPL-MCNC: 8.3 MG/DL (ref 8.3–10.6)
CHLORIDE SERPL-SCNC: 98 MMOL/L (ref 99–110)
CO2 SERPL-SCNC: 26 MMOL/L (ref 21–32)
CREAT SERPL-MCNC: 0.7 MG/DL (ref 0.9–1.3)
DEPRECATED RDW RBC AUTO: 12.8 % (ref 12.4–15.4)
DEPRECATED RDW RBC AUTO: 13 % (ref 12.4–15.4)
EOSINOPHIL # BLD: 0 K/UL (ref 0–0.6)
EOSINOPHIL # BLD: 0 K/UL (ref 0–0.6)
EOSINOPHIL NFR BLD: 0 %
EOSINOPHIL NFR BLD: 0 %
FINAL REPORT: NORMAL
GFR SERPLBLD CREATININE-BSD FMLA CKD-EPI: >90 ML/MIN/{1.73_M2}
GLUCOSE SERPL-MCNC: 107 MG/DL (ref 70–99)
HCT VFR BLD AUTO: 20.9 % (ref 40.5–52.5)
HCT VFR BLD AUTO: 21.8 % (ref 40.5–52.5)
HGB BLD-MCNC: 7.2 G/DL (ref 13.5–17.5)
HGB BLD-MCNC: 7.4 G/DL (ref 13.5–17.5)
HHV6 DNA # SPEC NAA+PROBE: <1000 CPY/ML
HHV6 DNA SPEC NAA+PROBE-LOG#: <3 LOG
HHV6 DNA SPEC NAA+PROBE: NORMAL
HHV6 IGG+IGM SER-IMP: NOT DETECTED
HYPOCHROMIA BLD QL SMEAR: ABNORMAL
LOEFFLER MB STN SPEC: NORMAL
LYMPHOCYTES # BLD: 0.4 K/UL (ref 1–5.1)
LYMPHOCYTES # BLD: 0.5 K/UL (ref 1–5.1)
LYMPHOCYTES NFR BLD: 56 %
LYMPHOCYTES NFR BLD: 58 %
MCH RBC QN AUTO: 30.3 PG (ref 26–34)
MCH RBC QN AUTO: 30.5 PG (ref 26–34)
MCHC RBC AUTO-ENTMCNC: 34 G/DL (ref 31–36)
MCHC RBC AUTO-ENTMCNC: 34.3 G/DL (ref 31–36)
MCV RBC AUTO: 88.8 FL (ref 80–100)
MCV RBC AUTO: 89.1 FL (ref 80–100)
MONOCYTES # BLD: 0.1 K/UL (ref 0–1.3)
MONOCYTES # BLD: 0.1 K/UL (ref 0–1.3)
MONOCYTES NFR BLD: 8 %
MONOCYTES NFR BLD: 8 %
MYELOCYTES NFR BLD MANUAL: 4 %
NEUTROPHILS # BLD: 0.3 K/UL (ref 1.7–7.7)
NEUTROPHILS # BLD: 0.3 K/UL (ref 1.7–7.7)
NEUTROPHILS NFR BLD: 28 %
NEUTROPHILS NFR BLD: 34 %
NRBC BLD-RTO: 2 /100 WBC
PATH INTERP BLD-IMP: NO
PLATELET # BLD AUTO: 41 K/UL (ref 135–450)
PLATELET # BLD AUTO: 52 K/UL (ref 135–450)
PLATELET BLD QL SMEAR: ABNORMAL
PMV BLD AUTO: 8.1 FL (ref 5–10.5)
PMV BLD AUTO: 8.1 FL (ref 5–10.5)
POTASSIUM SERPL-SCNC: 3.5 MMOL/L (ref 3.5–5.1)
PRELIMINARY: NORMAL
PROT SERPL-MCNC: 6.5 G/DL (ref 6.4–8.2)
RBC # BLD AUTO: 2.35 M/UL (ref 4.2–5.9)
RBC # BLD AUTO: 2.45 M/UL (ref 4.2–5.9)
RBC MORPH BLD: NORMAL
SLIDE REVIEW: ABNORMAL
SODIUM SERPL-SCNC: 132 MMOL/L (ref 136–145)
SPECIMEN SOURCE: NORMAL
WBC # BLD AUTO: 0.8 K/UL (ref 4–11)
WBC # BLD AUTO: 0.9 K/UL (ref 4–11)

## 2024-11-16 PROCEDURE — 85025 COMPLETE CBC W/AUTO DIFF WBC: CPT

## 2024-11-16 PROCEDURE — 6370000000 HC RX 637 (ALT 250 FOR IP)

## 2024-11-16 PROCEDURE — 6370000000 HC RX 637 (ALT 250 FOR IP): Performed by: NURSE PRACTITIONER

## 2024-11-16 PROCEDURE — 2580000003 HC RX 258

## 2024-11-16 PROCEDURE — 6370000000 HC RX 637 (ALT 250 FOR IP): Performed by: INTERNAL MEDICINE

## 2024-11-16 PROCEDURE — 80048 BASIC METABOLIC PNL TOTAL CA: CPT

## 2024-11-16 PROCEDURE — 2580000003 HC RX 258: Performed by: NURSE PRACTITIONER

## 2024-11-16 PROCEDURE — 6360000002 HC RX W HCPCS: Performed by: NURSE PRACTITIONER

## 2024-11-16 PROCEDURE — 2060000000 HC ICU INTERMEDIATE R&B

## 2024-11-16 PROCEDURE — 36415 COLL VENOUS BLD VENIPUNCTURE: CPT

## 2024-11-16 PROCEDURE — 6360000002 HC RX W HCPCS: Performed by: STUDENT IN AN ORGANIZED HEALTH CARE EDUCATION/TRAINING PROGRAM

## 2024-11-16 PROCEDURE — 36430 TRANSFUSION BLD/BLD COMPNT: CPT

## 2024-11-16 PROCEDURE — 80076 HEPATIC FUNCTION PANEL: CPT

## 2024-11-16 RX ORDER — SODIUM CHLORIDE 9 MG/ML
INJECTION, SOLUTION INTRAVENOUS PRN
Status: DISCONTINUED | OUTPATIENT
Start: 2024-11-16 | End: 2024-11-22

## 2024-11-16 RX ADMIN — AMITRIPTYLINE HYDROCHLORIDE 10 MG: 10 TABLET, FILM COATED ORAL at 21:28

## 2024-11-16 RX ADMIN — VANCOMYCIN HYDROCHLORIDE 125 MG: 250 POWDER, FOR SOLUTION ORAL at 08:43

## 2024-11-16 RX ADMIN — PIPERACILLIN AND TAZOBACTAM 4500 MG: 4; .5 INJECTION, POWDER, LYOPHILIZED, FOR SOLUTION INTRAVENOUS at 00:48

## 2024-11-16 RX ADMIN — VALACYCLOVIR HYDROCHLORIDE 500 MG: 500 TABLET, FILM COATED ORAL at 21:27

## 2024-11-16 RX ADMIN — VANCOMYCIN HYDROCHLORIDE 125 MG: 250 POWDER, FOR SOLUTION ORAL at 21:28

## 2024-11-16 RX ADMIN — ACETAMINOPHEN 650 MG: 325 TABLET, FILM COATED ORAL at 15:07

## 2024-11-16 RX ADMIN — Medication: at 08:45

## 2024-11-16 RX ADMIN — ACETAMINOPHEN 650 MG: 325 TABLET, FILM COATED ORAL at 08:43

## 2024-11-16 RX ADMIN — POTASSIUM CHLORIDE 40 MEQ: 1500 TABLET, EXTENDED RELEASE ORAL at 17:08

## 2024-11-16 RX ADMIN — PIPERACILLIN AND TAZOBACTAM 4500 MG: 4; .5 INJECTION, POWDER, LYOPHILIZED, FOR SOLUTION INTRAVENOUS at 08:38

## 2024-11-16 RX ADMIN — VORICONAZOLE 300 MG: 200 TABLET ORAL at 21:27

## 2024-11-16 RX ADMIN — ACETAMINOPHEN 650 MG: 325 TABLET, FILM COATED ORAL at 19:32

## 2024-11-16 RX ADMIN — PANTOPRAZOLE SODIUM 40 MG: 40 TABLET, DELAYED RELEASE ORAL at 15:07

## 2024-11-16 RX ADMIN — LEVETIRACETAM 1500 MG: 500 TABLET, FILM COATED ORAL at 21:27

## 2024-11-16 RX ADMIN — LEVETIRACETAM 1500 MG: 500 TABLET, FILM COATED ORAL at 08:39

## 2024-11-16 RX ADMIN — PANTOPRAZOLE SODIUM 40 MG: 40 TABLET, DELAYED RELEASE ORAL at 06:25

## 2024-11-16 RX ADMIN — PIPERACILLIN AND TAZOBACTAM 4500 MG: 4; .5 INJECTION, POWDER, LYOPHILIZED, FOR SOLUTION INTRAVENOUS at 17:00

## 2024-11-16 RX ADMIN — VANCOMYCIN HYDROCHLORIDE 1250 MG: 1.25 INJECTION, SOLUTION INTRAVITREAL at 12:45

## 2024-11-16 RX ADMIN — VANCOMYCIN HYDROCHLORIDE 1250 MG: 1.25 INJECTION, SOLUTION INTRAVITREAL at 23:52

## 2024-11-16 RX ADMIN — Medication: at 22:04

## 2024-11-16 RX ADMIN — VALACYCLOVIR HYDROCHLORIDE 500 MG: 500 TABLET, FILM COATED ORAL at 08:39

## 2024-11-16 RX ADMIN — SODIUM CHLORIDE, PRESERVATIVE FREE 10 ML: 5 INJECTION INTRAVENOUS at 22:04

## 2024-11-16 RX ADMIN — VORICONAZOLE 300 MG: 200 TABLET ORAL at 08:39

## 2024-11-16 ASSESSMENT — PAIN SCALES - GENERAL
PAINLEVEL_OUTOF10: 0

## 2024-11-16 NOTE — PROGRESS NOTES
[facility] -  Clinical Pharmacy Note    Vancomycin - Management by Pharmacy    Consult Date(s): 11/13  Consulting Provider(s): Polina Porter (APRN)    Assessment / Plan  Febrile neutropenia/possible PNA - Vancomycin  Concurrent Antimicrobials: Pip-tazo (started 11/13) and treatment dosing Vfend  Prophylaxis: valacyclovir, oral vancomycin  Day of Vanc Therapy / Ordered Duration: 4 / TBD  Current Dosing Method: Bayesian-Guided AUC Dosing  Therapeutic Goal: -600 mg/L*hr  Current Dose / Plan:   Team started vancomycin 11/13 for persistent fevers despite 2 week course of meropenem, 1-week course of daptomycin  Scr remains stable - 0.7 again today   Vancomycin dose adjusted to 1250 mg IV q12h yesterday  Estimated AUCss 517 mg/L*hr, trough of 12.1 mg/L  No repeat levels have been ordered at this time   Will continue to monitor clinical condition and make adjustments to regimen as appropriate.    Thank you for consulting pharmacy,    Varsha Elder, PharmD, BCPS  Clinical Pharmacist  [id number]          Interval update:  Patient continues to have fevers, Tmax yesterday 102.4. Cultures sent yesterday, so far blood cultures NGTD. Per ID, consider stopping Vanco once cultures from liver abscess aspirate is available.     Subjective/Objective:   Dennys Peguero is a 44 y.o. male with a PMHx significant for oligodendrioglioma (/sp resection, radiation, temodar), rectal adenocarcinoma (s/p resection), AML with deletion MDS (currently being treated with Dacogen/Venetoclax), recurrent c diff infections, multifocal pneumonia who is admitted initially with sepsis s/t klebsiella bacteremia.  Now starting vancomycin for persistent fevers of unknown origin.     Pharmacy is consulted to dose vancomycin.    Ht Readings from Last 1 Encounters:   11/07/24 1.778 m (5' 10\")     Wt Readings from Last 1 Encounters:   11/15/24 69.8 kg (153 lb 14.1 oz)       Vancomycin Level(s) / Doses:    Date Time Dose Type of Level / Level Interpretation

## 2024-11-16 NOTE — PLAN OF CARE
medications as ordered   Instruct and encourage patient and family to use good hand hygiene technique     Problem: Infection - Adult  Goal: Absence of infection during hospitalization  11/16/2024 0358 by Cameron Carter RN  Outcome: Progressing  Flowsheets (Taken 11/16/2024 0358)  Absence of infection during hospitalization:   Assess and monitor for signs and symptoms of infection   Monitor lab/diagnostic results   Monitor all insertion sites i.e., indwelling lines, tubes and drains   Administer medications as ordered   Instruct and encourage patient and family to use good hand hygiene technique     Problem: Infection - Adult  Goal: Absence of fever/infection during anticipated neutropenic period  11/16/2024 0358 by Cameron Carter RN  Outcome: Progressing  Flowsheets (Taken 11/16/2024 0358)  Absence of fever/infection during anticipated neutropenic period:   Monitor white blood cell count   Administer growth factors as ordered   Implement neutropenic guidelines     Problem: Hematologic - Adult  Goal: Maintains hematologic stability  11/16/2024 0358 by Cameron Carter RN  Outcome: Progressing  Flowsheets (Taken 11/16/2024 0358)  Maintains hematologic stability:   Assess for signs and symptoms of bleeding or hemorrhage   Administer blood products/factors as ordered   Monitor labs for bleeding or clotting disorders     Problem: ABCDS Injury Assessment  Goal: Absence of physical injury  11/16/2024 0358 by Cameron Carter RN  Outcome: Progressing  Flowsheets (Taken 11/13/2024 1526 by Josy Martinze RN)  Absence of Physical Injury: Implement safety measures based on patient assessment     Problem: Nutrition Deficit:  Goal: Optimize nutritional status  11/16/2024 0358 by Cameron Carter RN  Outcome: Progressing  Flowsheets (Taken 11/16/2024 0358)  Nutrient intake appropriate for improving, restoring, or maintaining nutritional needs:   Assess nutritional status and recommend course of action   Recommend appropriate diets, oral  RN  Outcome: Not Progressing     Problem: Nutrition Deficit:  Goal: Optimize nutritional status  11/16/2024 0358 by Cameron Carter RN  Outcome: Progressing  Flowsheets (Taken 11/16/2024 0358)  Nutrient intake appropriate for improving, restoring, or maintaining nutritional needs:   Assess nutritional status and recommend course of action   Recommend appropriate diets, oral nutritional supplements, and vitamin/mineral supplements   Monitor oral intake, labs, and treatment plans   Provide specific nutrition education to patient or family as appropriate  11/15/2024 1631 by Jessy Morgan, RN  Outcome: Not Progressing

## 2024-11-16 NOTE — PROGRESS NOTES
[facility] Progress Note    2024    Dennys Peguero    :  1980    MRN:  [id number]      Interval Hx:      The patient is relatively desponded.  He does not want to talk much.  He does not offer many complaints.  The only issue of any significance to him is an increase in his right groin mass.  Previously, this was felt to be a lipoma.  He states he had 1 taken off his buttocks years ago.    Liver aspirate sent yesterday () for further infectious workup.     BAL positive for aspergillus galacto Ag by EIA (collected , resulted 11/15)  Over last 24 hrs:  Tmax 102.7 °F (, 2200)  Intake 812 mL  UOP 1.6 L  25 mL (2 unmeasured)  ANC 0.1  7.6 --> 6.5 --> 1U pRBC --> 8.0  66 --> 42 --> 1U PLT --> 81  Total bilirubin 1.1, direct bilirubin is increasing 0.6      ECOG PS:  (2) Ambulatory and capable of self care, unable to carry out work activity, up and about > 50% or waking hours    KPS:  70% Cares for self; unable to carry on normal activity or to do active work    Isolation:  Strict contact      Medication:    Scheduled:   vancomycin HCl in Dextrose  1,250 mg IntraVENous Q12H    piperacillin-tazobactam  4,500 mg IntraVENous Q8H    voriconazole  300 mg Oral 2 times per day    valACYclovir  500 mg Oral BID    amitriptyline  10 mg Oral Nightly    pantoprazole  40 mg Oral BID AC    levETIRAcetam  1,500 mg Oral BID    sodium chloride flush  5-40 mL IntraVENous 2 times per day    Hydrocerin   Topical BID    sodium chloride flush  5-40 mL IntraVENous 2 times per day    Saline Mouthwash  15 mL Swish & Spit 4x Daily AC & HS    vancomycin  125 mg Oral 2 times per day     Continuous Infusions:   sodium chloride      sodium chloride      sodium chloride      sodium chloride      sodium chloride 5 mL/hr at 24 0921     PRN:  dicyclomine, sodium chloride, sodium chloride, sodium chloride, acetaminophen, sodium chloride, potassium chloride **OR** potassium alternative oral replacement **OR**  to be c/w amyloid angiopathy per neurosurgery with recommendation to avoid anticoagulation as it would significantly increase the risk of ICH; no surgical intervention warranted.    10/10/24, CT head w/o contrast - Stable small foci of increased density parietal and occipital lobe on the right occipital lobe on the left similar to the previous exam c/w multiple small areas of hemorrhage.  10/29/24, MRI brain w & w/o contrast - Slightly more prominent small area of vasogenic edema a/w left occipital lobe microhemorrhage.  Slightly more prominent small area of vasogenic edema a/w right parietal lobe microhemorrhage. Decrease in small foci of enhancement a/w other previously noted microhemorrhages.    Seizures (dx 2009)  Cont Keppra 1500 mg BID      7.  CVS  New-onset HFmrEF - likely transient d/t shock  10/29/24, TTE:  LV - EF 45-50% (decreased compared to TTE on 10/14/24); normal size & wall thickness; mild global hypokinesis.  RV - mildly dilated; normal systolic function.  MV - mild regurgitation.       8.  PULM   RLL lung nodule  10/10/24, CT:  New pulmonary nodule 16 mm RLL  Repeat CT chest 12/2024    NAN cavitary lung lesion -  stable on CT 11/13/24 compared to 11/6/24   Bibasilar PNA -  improving per CT 11/13/24  Intubated for airway protection (no hypoxia) 10/28/24.  Extubated 10/29/24.  See ID section above for workup and treatment      9.  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  Left upper extremity swelling, non-tender  Doppler U/S - No evidence of deep venous thrombosis in the left upper extremity       10.  PSYCHOSOCIAL  11/12/24 - Letter provided to pt's father for [employer] regarding mold w/in the patient's apartment  Depressed mood  Psychology

## 2024-11-17 LAB
ALBUMIN SERPL-MCNC: 3.1 G/DL (ref 3.4–5)
ALP SERPL-CCNC: 218 U/L (ref 40–129)
ALT SERPL-CCNC: 15 U/L (ref 10–40)
ANION GAP SERPL CALCULATED.3IONS-SCNC: 10 MMOL/L (ref 3–16)
AST SERPL-CCNC: 9 U/L (ref 15–37)
BACTERIA BLD CULT ORG #2: NORMAL
BACTERIA BLD CULT: NORMAL
BACTERIA THROAT AEROBE CULT: NORMAL
BASOPHILS # BLD: 0 K/UL (ref 0–0.2)
BASOPHILS # BLD: 0 K/UL (ref 0–0.2)
BASOPHILS NFR BLD: 0 %
BASOPHILS NFR BLD: 0 %
BILIRUB DIRECT SERPL-MCNC: 0.4 MG/DL (ref 0–0.3)
BILIRUB INDIRECT SERPL-MCNC: 0.4 MG/DL (ref 0–1)
BILIRUB SERPL-MCNC: 0.8 MG/DL (ref 0–1)
BLASTS NFR BLD MANUAL: 1 %
BLOOD BANK DISPENSE STATUS: NORMAL
BLOOD BANK DISPENSE STATUS: NORMAL
BLOOD BANK PRODUCT CODE: NORMAL
BLOOD BANK PRODUCT CODE: NORMAL
BPU ID: NORMAL
BPU ID: NORMAL
BUN SERPL-MCNC: 6 MG/DL (ref 7–20)
CALCIUM SERPL-MCNC: 8.5 MG/DL (ref 8.3–10.6)
CHLORIDE SERPL-SCNC: 98 MMOL/L (ref 99–110)
CO2 SERPL-SCNC: 25 MMOL/L (ref 21–32)
CREAT SERPL-MCNC: 0.6 MG/DL (ref 0.9–1.3)
DEPRECATED RDW RBC AUTO: 13.1 % (ref 12.4–15.4)
DEPRECATED RDW RBC AUTO: 13.2 % (ref 12.4–15.4)
DESCRIPTION BLOOD BANK: NORMAL
DESCRIPTION BLOOD BANK: NORMAL
EOSINOPHIL # BLD: 0 K/UL (ref 0–0.6)
EOSINOPHIL # BLD: 0 K/UL (ref 0–0.6)
EOSINOPHIL NFR BLD: 0 %
EOSINOPHIL NFR BLD: 0.4 %
FINAL REPORT: NORMAL
GFR SERPLBLD CREATININE-BSD FMLA CKD-EPI: >90 ML/MIN/{1.73_M2}
GLUCOSE SERPL-MCNC: 110 MG/DL (ref 70–99)
HCT VFR BLD AUTO: 20.3 % (ref 40.5–52.5)
HCT VFR BLD AUTO: 23.9 % (ref 40.5–52.5)
HGB BLD-MCNC: 7 G/DL (ref 13.5–17.5)
HGB BLD-MCNC: 8.3 G/DL (ref 13.5–17.5)
LYMPHOCYTES # BLD: 0.4 K/UL (ref 1–5.1)
LYMPHOCYTES # BLD: 0.5 K/UL (ref 1–5.1)
LYMPHOCYTES NFR BLD: 41 %
LYMPHOCYTES NFR BLD: 46.4 %
MCH RBC QN AUTO: 30.5 PG (ref 26–34)
MCH RBC QN AUTO: 30.7 PG (ref 26–34)
MCHC RBC AUTO-ENTMCNC: 34.7 G/DL (ref 31–36)
MCHC RBC AUTO-ENTMCNC: 34.7 G/DL (ref 31–36)
MCV RBC AUTO: 88.1 FL (ref 80–100)
MCV RBC AUTO: 88.7 FL (ref 80–100)
MONOCYTES # BLD: 0 K/UL (ref 0–1.3)
MONOCYTES # BLD: 0.1 K/UL (ref 0–1.3)
MONOCYTES NFR BLD: 4 %
MONOCYTES NFR BLD: 7.2 %
NEUTROPHILS # BLD: 0.4 K/UL (ref 1.7–7.7)
NEUTROPHILS # BLD: 0.6 K/UL (ref 1.7–7.7)
NEUTROPHILS NFR BLD: 46 %
NEUTROPHILS NFR BLD: 49 %
NEUTS BAND NFR BLD MANUAL: 1 % (ref 0–7)
PATH INTERP BLD-IMP: NO
PLATELET # BLD AUTO: 52 K/UL (ref 135–450)
PLATELET # BLD AUTO: 59 K/UL (ref 135–450)
PLATELET BLD QL SMEAR: ABNORMAL
PMV BLD AUTO: 7.5 FL (ref 5–10.5)
PMV BLD AUTO: 7.9 FL (ref 5–10.5)
POTASSIUM SERPL-SCNC: 3.9 MMOL/L (ref 3.5–5.1)
PRELIMINARY: NORMAL
PROT SERPL-MCNC: 6.5 G/DL (ref 6.4–8.2)
RBC # BLD AUTO: 2.29 M/UL (ref 4.2–5.9)
RBC # BLD AUTO: 2.71 M/UL (ref 4.2–5.9)
RBC MORPH BLD: NORMAL
SLIDE REVIEW: ABNORMAL
SODIUM SERPL-SCNC: 133 MMOL/L (ref 136–145)
VARIANT LYMPHS NFR BLD MANUAL: 4 % (ref 0–6)
WBC # BLD AUTO: 0.9 K/UL (ref 4–11)
WBC # BLD AUTO: 1.1 K/UL (ref 4–11)

## 2024-11-17 PROCEDURE — 6370000000 HC RX 637 (ALT 250 FOR IP)

## 2024-11-17 PROCEDURE — 80076 HEPATIC FUNCTION PANEL: CPT

## 2024-11-17 PROCEDURE — 2580000003 HC RX 258: Performed by: NURSE PRACTITIONER

## 2024-11-17 PROCEDURE — 6360000002 HC RX W HCPCS: Performed by: STUDENT IN AN ORGANIZED HEALTH CARE EDUCATION/TRAINING PROGRAM

## 2024-11-17 PROCEDURE — 80048 BASIC METABOLIC PNL TOTAL CA: CPT

## 2024-11-17 PROCEDURE — 6370000000 HC RX 637 (ALT 250 FOR IP): Performed by: INTERNAL MEDICINE

## 2024-11-17 PROCEDURE — 2580000003 HC RX 258

## 2024-11-17 PROCEDURE — 2060000000 HC ICU INTERMEDIATE R&B

## 2024-11-17 PROCEDURE — 36430 TRANSFUSION BLD/BLD COMPNT: CPT

## 2024-11-17 PROCEDURE — 85025 COMPLETE CBC W/AUTO DIFF WBC: CPT

## 2024-11-17 PROCEDURE — 36415 COLL VENOUS BLD VENIPUNCTURE: CPT

## 2024-11-17 PROCEDURE — 6370000000 HC RX 637 (ALT 250 FOR IP): Performed by: NURSE PRACTITIONER

## 2024-11-17 PROCEDURE — 6360000002 HC RX W HCPCS: Performed by: NURSE PRACTITIONER

## 2024-11-17 RX ADMIN — SODIUM CHLORIDE, PRESERVATIVE FREE 10 ML: 5 INJECTION INTRAVENOUS at 08:37

## 2024-11-17 RX ADMIN — AMITRIPTYLINE HYDROCHLORIDE 10 MG: 10 TABLET, FILM COATED ORAL at 20:46

## 2024-11-17 RX ADMIN — PANTOPRAZOLE SODIUM 40 MG: 40 TABLET, DELAYED RELEASE ORAL at 15:32

## 2024-11-17 RX ADMIN — PANTOPRAZOLE SODIUM 40 MG: 40 TABLET, DELAYED RELEASE ORAL at 06:17

## 2024-11-17 RX ADMIN — LEVETIRACETAM 1500 MG: 500 TABLET, FILM COATED ORAL at 08:36

## 2024-11-17 RX ADMIN — VALACYCLOVIR HYDROCHLORIDE 500 MG: 500 TABLET, FILM COATED ORAL at 08:37

## 2024-11-17 RX ADMIN — PIPERACILLIN AND TAZOBACTAM 4500 MG: 4; .5 INJECTION, POWDER, LYOPHILIZED, FOR SOLUTION INTRAVENOUS at 17:16

## 2024-11-17 RX ADMIN — Medication: at 08:37

## 2024-11-17 RX ADMIN — VORICONAZOLE 300 MG: 200 TABLET ORAL at 08:36

## 2024-11-17 RX ADMIN — PIPERACILLIN AND TAZOBACTAM 4500 MG: 4; .5 INJECTION, POWDER, LYOPHILIZED, FOR SOLUTION INTRAVENOUS at 09:05

## 2024-11-17 RX ADMIN — VANCOMYCIN HYDROCHLORIDE 125 MG: 250 POWDER, FOR SOLUTION ORAL at 08:37

## 2024-11-17 RX ADMIN — Medication: at 20:42

## 2024-11-17 RX ADMIN — VORICONAZOLE 300 MG: 200 TABLET ORAL at 20:36

## 2024-11-17 RX ADMIN — VALACYCLOVIR HYDROCHLORIDE 500 MG: 500 TABLET, FILM COATED ORAL at 20:37

## 2024-11-17 RX ADMIN — PIPERACILLIN AND TAZOBACTAM 4500 MG: 4; .5 INJECTION, POWDER, LYOPHILIZED, FOR SOLUTION INTRAVENOUS at 01:27

## 2024-11-17 RX ADMIN — ACETAMINOPHEN 650 MG: 325 TABLET, FILM COATED ORAL at 04:15

## 2024-11-17 RX ADMIN — SODIUM CHLORIDE 15 ML: 900 IRRIGANT IRRIGATION at 20:37

## 2024-11-17 RX ADMIN — SODIUM CHLORIDE, PRESERVATIVE FREE 10 ML: 5 INJECTION INTRAVENOUS at 20:37

## 2024-11-17 RX ADMIN — VANCOMYCIN HYDROCHLORIDE 1250 MG: 1.25 INJECTION, SOLUTION INTRAVITREAL at 23:47

## 2024-11-17 RX ADMIN — ACETAMINOPHEN 650 MG: 325 TABLET, FILM COATED ORAL at 15:31

## 2024-11-17 RX ADMIN — LEVETIRACETAM 1500 MG: 500 TABLET, FILM COATED ORAL at 20:37

## 2024-11-17 RX ADMIN — VANCOMYCIN HYDROCHLORIDE 1250 MG: 1.25 INJECTION, SOLUTION INTRAVITREAL at 13:11

## 2024-11-17 RX ADMIN — SODIUM CHLORIDE, PRESERVATIVE FREE 10 ML: 5 INJECTION INTRAVENOUS at 20:39

## 2024-11-17 RX ADMIN — VANCOMYCIN HYDROCHLORIDE 125 MG: 250 POWDER, FOR SOLUTION ORAL at 20:45

## 2024-11-17 ASSESSMENT — PAIN SCALES - GENERAL: PAINLEVEL_OUTOF10: 0

## 2024-11-17 NOTE — PLAN OF CARE
Problem: Safety - Adult  Goal: Free from fall injury  Recent Flowsheet Documentation  Taken 11/17/2024 0743 by Ruby Peralta, RN  Free From Fall Injury: Instruct family/caregiver on patient safety  11/17/2024 0424 by Cameron Carter RN  Outcome: Progressing  Flowsheets (Taken 11/17/2024 0424)  Free From Fall Injury: Instruct family/caregiver on patient safety     Problem: Hematologic - Adult  Goal: Maintains hematologic stability  11/17/2024 1629 by Ruby Peralta, RN  Outcome: Progressing  11/17/2024 0424 by Cameron aCrter RN  Outcome: Progressing  Flowsheets (Taken 11/17/2024 0424)  Maintains hematologic stability:   Assess for signs and symptoms of bleeding or hemorrhage   Administer blood products/factors as ordered   Monitor labs for bleeding or clotting disorders

## 2024-11-17 NOTE — PLAN OF CARE
Problem: Pain  Goal: Verbalizes/displays adequate comfort level or baseline comfort level  11/17/2024 0424 by Cameron Carter RN  Outcome: Progressing  Flowsheets (Taken 11/17/2024 0424)  Verbalizes/displays adequate comfort level or baseline comfort level:   Administer analgesics based on type and severity of pain and evaluate response   Consider cultural and social influences on pain and pain management   Encourage patient to monitor pain and request assistance   Assess pain using appropriate pain scale   Implement non-pharmacological measures as appropriate and evaluate response   Notify Licensed Independent Practitioner if interventions unsuccessful or patient reports new pain     Problem: Safety - Adult  Goal: Free from fall injury  Outcome: Progressing  Flowsheets (Taken 11/17/2024 0424)  Free From Fall Injury: Instruct family/caregiver on patient safety     Problem: Skin/Tissue Integrity - Adult  Goal: Skin integrity remains intact  Outcome: Progressing  Flowsheets (Taken 11/17/2024 0424)  Skin Integrity Remains Intact:   Monitor for areas of redness and/or skin breakdown   Assess vascular access sites hourly     Problem: Skin/Tissue Integrity - Adult  Goal: Oral mucous membranes remain intact  Outcome: Progressing  Flowsheets (Taken 11/17/2024 0424)  Oral Mucous Membranes Remain Intact:   Assess oral mucosa and hygiene practices   Implement preventative oral hygiene regimen   Implement oral medicated treatments as ordered     Problem: Infection - Adult  Goal: Absence of infection at discharge  Outcome: Progressing  Flowsheets (Taken 11/17/2024 0424)  Absence of infection at discharge:   Assess and monitor for signs and symptoms of infection   Monitor lab/diagnostic results   Monitor all insertion sites i.e., indwelling lines, tubes and drains   Administer medications as ordered   Instruct and encourage patient and family to use good hand hygiene technique   Identify and instruct in appropriate isolation  Gastrointestinal - Adult  Goal: Maintains or returns to baseline bowel function  Outcome: Progressing  Flowsheets (Taken 11/17/2024 0424)  Maintains or returns to baseline bowel function:   Assess bowel function   Administer IV fluids as ordered to ensure adequate hydration   Encourage mobilization and activity   Encourage oral fluids to ensure adequate hydration   Administer ordered medications as needed     Problem: Cardiovascular - Adult  Goal: Absence of cardiac dysrhythmias or at baseline  Outcome: Progressing  Flowsheets (Taken 11/17/2024 0424)  Absence of cardiac dysrhythmias or at baseline:   Monitor cardiac rate and rhythm   Assess for signs of decreased cardiac output   Administer antiarrhythmia medication and electrolyte replacement as ordered     Problem: Genitourinary - Adult  Goal: Absence of urinary retention  Outcome: Progressing  Flowsheets (Taken 11/17/2024 0424)  Absence of urinary retention:   Assess patient’s ability to void and empty bladder   Monitor intake/output and perform bladder scan as needed

## 2024-11-17 NOTE — PROGRESS NOTES
sodium chloride flush, magnesium sulfate, Saline Mouthwash, ALTEplase (CATHFLO) 2 mg in sterile water 2 mL injection      ROS:  As noted above, otherwise remainder of 10-point ROS negative    Physical Exam:    I&O:    Intake/Output Summary (Last 24 hours) at 11/17/2024 0716  Last data filed at 11/17/2024 0711  Gross per 24 hour   Intake 1897.67 ml   Output 1950 ml   Net -52.33 ml       Vital Signs:  /81   Pulse 100   Temp 99.7 °F (37.6 °C) (Oral)   Resp 19   Ht 1.778 m (5' 10\")   Wt 70 kg (154 lb 5.2 oz)   SpO2 94%   BMI 22.14 kg/m²     Weight:    Wt Readings from Last 3 Encounters:   11/16/24 70 kg (154 lb 5.2 oz)   10/14/24 67.6 kg (149 lb)   10/02/24 70 kg (154 lb 6.4 oz)       Gen: Chronically ill-appearing male, in NAD.   HEENT: Normocephalic.  No scleral icterus or conjunctival injection.  No rhinorrhea or epistaxis.  Moist, pink oral mucous membranes.   Neck: Supple.   CVS: No apparent JVD.  RRR. No murmurs, rubs, or gallops.  Radial & dorsalis pedis pulses 2+.  Capillary refill > 3 sec.   Pulm: No perioral cyanosis.  Able to speak in full sentences w/o any frequent pauses on room air.   Abd: Non-distended.   Normoactive bowel sounds.  Soft.  No guarding or rebound tenderness, or tenderness to palpation in any quadrant.  Large right non-painful lipoma overlying right hip/anterior thigh.   MSK: R hand swelling (improving).  New Left arm swelling, non-tender.  B/L lower extremity ankle edema.   Skin: Appropriately warm and dry.  Well-healed bone marrow bx site w/o any erythema, warmth, or purulence.   Neuro: Alert, awake, and following commands appropriately.   Psych: Cooperative.   Catheter: N/A       Data:   CBC:   Recent Labs     11/16/24  0348 11/16/24  1543 11/17/24  0430   WBC 0.8* 0.9* 0.9*   HGB 7.2* 7.4* 7.0*   HCT 20.9* 21.8* 20.3*   MCV 88.8 89.1 88.7   PLT 52* 41* 59*     BMP/Mg:  Recent Labs     11/15/24  0548 11/16/24  0348 11/17/24  0430    132* 133*   K 4.0 3.5 3.9   CL 98*  Merrem  10/30-11/6/24:  Daptomycin  11/13/24:  Azithromycin (received 1 dose for empiric treatment of atypical PNA; however, CT scans revealed improvement of airspace disease & findings suggestive of hepatic abscesses)    Workup this Admission:  10/28/24:  Blood Cx 1 & 2 -  K. pneumoniae - resistant to ampicillin  Fungal blood Cx - no growth after 1 week of intubation  Resp Cx & PCR molecular panel, throat Cx -  Neg.  Procalcitonin 13.85   CT chest -  bibasilar airspace dz (R > L), cavitary lesion with satellite nodule in NAN  10/29/24:  UA/micro -  no e/o infection, although collected after starting Merrem  PICC line tip, UrCx - NGTD  Aspergillus Ag - Neg., index 0.28  1,3-beta-D-glucan - positive, 145  10/30/24:  Procalcitonin > 100.  Blood Cx 1 & 2 - NGTD.  10/31/24:  C. difficile toxin/Ag & stool GI pathogens both neg.  11/04/24:  Temp 100.3°F,   Procalcitonin 1.93, blood cx x 2 - NGTD, UrCx - NGTD  Fungal blood cx - sent, results pending  11/05/24:  Temp 101.3°F, procalcitonin 0.79  11/06/24:  Temp 101.3°F   Blood Cx x 2, UrCx - NGTD  Cryptococcus Ag, histoplasma Ag/Ab, blastomyces Ab - neg.  CT C/A/P w contrast demonstrated increasing airspace dz  11/07/24:  Increasing stool output  C. difficile toxin/Ag, stool GI pathogens, CMV by NAAT - neg.  Bronchoscopy:  atrophic airway mucosa.  Micro & cytology sent on BAL - results pending (see noted dated 11/12/24)  Aspergillus glacto Ag positive, index 0.52 (resulted 11/15/24)  11/08/24:  1,3-beta-D-glucan - negative  11/09/24:  Temp 101.5°F  Blood Cx x 2, UrCx - NGTD  11/13/24:  Temp 102.9°F   Ferritin 4659.  Triglycerides 90.  Resp panel, MRSA nares -  neg.  BCx x 2 -  NGTD  EBV, HHV6 - negative; adeno, parvo - pending  CT C/A/P w contrast - improvement in airspace dz; multiple hepatic lesions suggestive of hepatic abscesses    Component  Ref Range & Units 11/13/24 0937 11/9/24 2248 11/6/24 0438 11/4/24 0927 11/1/24 1611 10/30/24 0905 10/28/24 0938

## 2024-11-17 NOTE — PROGRESS NOTES
[facility] -  Clinical Pharmacy Note    Vancomycin - Management by Pharmacy    Consult Date(s): 11/13  Consulting Provider(s): Polina Porter (APRN)    Assessment / Plan  Febrile neutropenia/possible PNA - Vancomycin  Concurrent Antimicrobials: Pip-tazo (started 11/13) and treatment dosing Vfend  Prophylaxis: valacyclovir, oral vancomycin  Day of Vanc Therapy / Ordered Duration: 5 / TBD  Current Dosing Method: Bayesian-Guided AUC Dosing  Therapeutic Goal: -600 mg/L*hr  Current Dose / Plan:   Team started vancomycin 11/13 for persistent fevers despite 2 week course of meropenem, 1-week course of daptomycin  Scr remains stable, Scr 0.6 today  Patient continues on vancomycin 1250 mg IV q12h   Estimated AUCss 496 mg/L*hr, trough of 11.2 mg/L  No repeat levels have been ordered at this time   Will continue to monitor clinical condition and make adjustments to regimen as appropriate.    Thank you for consulting pharmacy,    Varsha Elder, PharmD, BCPS  Clinical Pharmacist  [id number]          Interval update:  Patient continues to have fevers, Tmax yesterday 103. Cultures sent yesterday, so far blood cultures NGTD. Per ID, consider stopping Zosyn/Vanco once cultures from liver abscess aspirate is available - remain pending at this time.    Subjective/Objective:   Dennys Peguero is a 44 y.o. male with a PMHx significant for oligodendrioglioma (/sp resection, radiation, temodar), rectal adenocarcinoma (s/p resection), AML with deletion MDS (currently being treated with Dacogen/Venetoclax), recurrent c diff infections, multifocal pneumonia who is admitted initially with sepsis s/t klebsiella bacteremia.  Now starting vancomycin for persistent fevers of unknown origin.     Pharmacy is consulted to dose vancomycin.    Ht Readings from Last 1 Encounters:   11/07/24 1.778 m (5' 10\")     Wt Readings from Last 1 Encounters:   11/17/24 71 kg (156 lb 8.4 oz)       Vancomycin Level(s) / Doses:    Date Time Dose Type of Level /

## 2024-11-18 ENCOUNTER — APPOINTMENT (OUTPATIENT)
Dept: GENERAL RADIOLOGY | Age: 44
DRG: 720 | End: 2024-11-18
Payer: MEDICAID

## 2024-11-18 LAB
ALBUMIN SERPL-MCNC: 3.1 G/DL (ref 3.4–5)
ALP SERPL-CCNC: 265 U/L (ref 40–129)
ALT SERPL-CCNC: 17 U/L (ref 10–40)
ANION GAP SERPL CALCULATED.3IONS-SCNC: 10 MMOL/L (ref 3–16)
APTT BLD: 43.2 SEC (ref 22.1–36.4)
AST SERPL-CCNC: 14 U/L (ref 15–37)
BASOPHILS # BLD: 0 K/UL (ref 0–0.2)
BASOPHILS # BLD: 0 K/UL (ref 0–0.2)
BASOPHILS NFR BLD: 0 %
BASOPHILS NFR BLD: 0 %
BILIRUB DIRECT SERPL-MCNC: 0.4 MG/DL (ref 0–0.3)
BILIRUB INDIRECT SERPL-MCNC: 0.3 MG/DL (ref 0–1)
BILIRUB SERPL-MCNC: 0.7 MG/DL (ref 0–1)
BLOOD BANK DISPENSE STATUS: NORMAL
BLOOD BANK PRODUCT CODE: NORMAL
BPU ID: NORMAL
BUN SERPL-MCNC: 6 MG/DL (ref 7–20)
CALCIUM SERPL-MCNC: 8.5 MG/DL (ref 8.3–10.6)
CHLORIDE SERPL-SCNC: 98 MMOL/L (ref 99–110)
CO2 SERPL-SCNC: 26 MMOL/L (ref 21–32)
CREAT SERPL-MCNC: 0.7 MG/DL (ref 0.9–1.3)
DEPRECATED RDW RBC AUTO: 13.1 % (ref 12.4–15.4)
DEPRECATED RDW RBC AUTO: 13.2 % (ref 12.4–15.4)
DESCRIPTION BLOOD BANK: NORMAL
EOSINOPHIL # BLD: 0 K/UL (ref 0–0.6)
EOSINOPHIL # BLD: 0 K/UL (ref 0–0.6)
EOSINOPHIL NFR BLD: 0 %
EOSINOPHIL NFR BLD: 0 %
FIBRINOGEN PPP-MCNC: 377 MG/DL (ref 227–534)
FUNGUS BLD CULT: NORMAL
FUNGUS SPEC CULT: NORMAL
GFR SERPLBLD CREATININE-BSD FMLA CKD-EPI: >90 ML/MIN/{1.73_M2}
GLUCOSE SERPL-MCNC: 112 MG/DL (ref 70–99)
HCT VFR BLD AUTO: 24.6 % (ref 40.5–52.5)
HCT VFR BLD AUTO: 25.5 % (ref 40.5–52.5)
HGB BLD-MCNC: 8.6 G/DL (ref 13.5–17.5)
HGB BLD-MCNC: 8.6 G/DL (ref 13.5–17.5)
INR PPP: 1.57 (ref 0.85–1.15)
LDH SERPL L TO P-CCNC: 138 U/L (ref 100–190)
LOEFFLER MB STN SPEC: NORMAL
LYMPHOCYTES # BLD: 0.7 K/UL (ref 1–5.1)
LYMPHOCYTES # BLD: 0.7 K/UL (ref 1–5.1)
LYMPHOCYTES NFR BLD: 42 %
LYMPHOCYTES NFR BLD: 47 %
MAGNESIUM SERPL-MCNC: 1.57 MG/DL (ref 1.8–2.4)
MCH RBC QN AUTO: 30.4 PG (ref 26–34)
MCH RBC QN AUTO: 30.7 PG (ref 26–34)
MCHC RBC AUTO-ENTMCNC: 33.9 G/DL (ref 31–36)
MCHC RBC AUTO-ENTMCNC: 34.8 G/DL (ref 31–36)
MCV RBC AUTO: 88.2 FL (ref 80–100)
MCV RBC AUTO: 89.5 FL (ref 80–100)
MONOCYTES # BLD: 0.1 K/UL (ref 0–1.3)
MONOCYTES # BLD: 0.1 K/UL (ref 0–1.3)
MONOCYTES NFR BLD: 9 %
MONOCYTES NFR BLD: 9 %
NEUTROPHILS # BLD: 0.6 K/UL (ref 1.7–7.7)
NEUTROPHILS # BLD: 0.7 K/UL (ref 1.7–7.7)
NEUTROPHILS NFR BLD: 44 %
NEUTROPHILS NFR BLD: 44 %
PHOSPHATE SERPL-MCNC: 2.5 MG/DL (ref 2.5–4.9)
PLATELET # BLD AUTO: 46 K/UL (ref 135–450)
PLATELET # BLD AUTO: 60 K/UL (ref 135–450)
PLATELET BLD QL SMEAR: ABNORMAL
PMV BLD AUTO: 8 FL (ref 5–10.5)
PMV BLD AUTO: 8.4 FL (ref 5–10.5)
POTASSIUM SERPL-SCNC: 3.7 MMOL/L (ref 3.5–5.1)
PROT SERPL-MCNC: 6.7 G/DL (ref 6.4–8.2)
PROTHROMBIN TIME: 18.9 SEC (ref 11.9–14.9)
RBC # BLD AUTO: 2.78 M/UL (ref 4.2–5.9)
RBC # BLD AUTO: 2.85 M/UL (ref 4.2–5.9)
RBC MORPH BLD: NORMAL
SLIDE REVIEW: ABNORMAL
SODIUM SERPL-SCNC: 134 MMOL/L (ref 136–145)
URATE SERPL-MCNC: 1.1 MG/DL (ref 3.5–7.2)
VARIANT LYMPHS NFR BLD MANUAL: 5 % (ref 0–6)
WBC # BLD AUTO: 1.4 K/UL (ref 4–11)
WBC # BLD AUTO: 1.5 K/UL (ref 4–11)

## 2024-11-18 PROCEDURE — 84100 ASSAY OF PHOSPHORUS: CPT

## 2024-11-18 PROCEDURE — 87252 VIRUS INOCULATION TISSUE: CPT

## 2024-11-18 PROCEDURE — 85384 FIBRINOGEN ACTIVITY: CPT

## 2024-11-18 PROCEDURE — 6370000000 HC RX 637 (ALT 250 FOR IP)

## 2024-11-18 PROCEDURE — 99233 SBSQ HOSP IP/OBS HIGH 50: CPT | Performed by: INTERNAL MEDICINE

## 2024-11-18 PROCEDURE — 71045 X-RAY EXAM CHEST 1 VIEW: CPT

## 2024-11-18 PROCEDURE — 87205 SMEAR GRAM STAIN: CPT

## 2024-11-18 PROCEDURE — 87070 CULTURE OTHR SPECIMN AEROBIC: CPT

## 2024-11-18 PROCEDURE — 85025 COMPLETE CBC W/AUTO DIFF WBC: CPT

## 2024-11-18 PROCEDURE — 87102 FUNGUS ISOLATION CULTURE: CPT

## 2024-11-18 PROCEDURE — 6360000002 HC RX W HCPCS: Performed by: STUDENT IN AN ORGANIZED HEALTH CARE EDUCATION/TRAINING PROGRAM

## 2024-11-18 PROCEDURE — 2580000003 HC RX 258: Performed by: NURSE PRACTITIONER

## 2024-11-18 PROCEDURE — 97116 GAIT TRAINING THERAPY: CPT

## 2024-11-18 PROCEDURE — 84550 ASSAY OF BLOOD/URIC ACID: CPT

## 2024-11-18 PROCEDURE — 6370000000 HC RX 637 (ALT 250 FOR IP): Performed by: INTERNAL MEDICINE

## 2024-11-18 PROCEDURE — 6370000000 HC RX 637 (ALT 250 FOR IP): Performed by: NURSE PRACTITIONER

## 2024-11-18 PROCEDURE — 2580000003 HC RX 258

## 2024-11-18 PROCEDURE — 36415 COLL VENOUS BLD VENIPUNCTURE: CPT

## 2024-11-18 PROCEDURE — 36430 TRANSFUSION BLD/BLD COMPNT: CPT

## 2024-11-18 PROCEDURE — 83735 ASSAY OF MAGNESIUM: CPT

## 2024-11-18 PROCEDURE — 87103 BLOOD FUNGUS CULTURE: CPT

## 2024-11-18 PROCEDURE — 80076 HEPATIC FUNCTION PANEL: CPT

## 2024-11-18 PROCEDURE — P9036 PLATELET PHERESIS IRRADIATED: HCPCS

## 2024-11-18 PROCEDURE — 83615 LACTATE (LD) (LDH) ENZYME: CPT

## 2024-11-18 PROCEDURE — 2060000000 HC ICU INTERMEDIATE R&B

## 2024-11-18 PROCEDURE — 85610 PROTHROMBIN TIME: CPT

## 2024-11-18 PROCEDURE — 87253 VIRUS INOCULATE TISSUE ADDL: CPT

## 2024-11-18 PROCEDURE — 6360000002 HC RX W HCPCS: Performed by: NURSE PRACTITIONER

## 2024-11-18 PROCEDURE — 85730 THROMBOPLASTIN TIME PARTIAL: CPT

## 2024-11-18 PROCEDURE — 80048 BASIC METABOLIC PNL TOTAL CA: CPT

## 2024-11-18 PROCEDURE — 97530 THERAPEUTIC ACTIVITIES: CPT

## 2024-11-18 PROCEDURE — 87040 BLOOD CULTURE FOR BACTERIA: CPT

## 2024-11-18 RX ORDER — CYCLOBENZAPRINE HCL 10 MG
10 TABLET ORAL 3 TIMES DAILY PRN
Status: DISPENSED | OUTPATIENT
Start: 2024-11-18

## 2024-11-18 RX ADMIN — VANCOMYCIN HYDROCHLORIDE 125 MG: 250 POWDER, FOR SOLUTION ORAL at 21:46

## 2024-11-18 RX ADMIN — PIPERACILLIN AND TAZOBACTAM 4500 MG: 4; .5 INJECTION, POWDER, LYOPHILIZED, FOR SOLUTION INTRAVENOUS at 01:28

## 2024-11-18 RX ADMIN — PIPERACILLIN AND TAZOBACTAM 4500 MG: 4; .5 INJECTION, POWDER, LYOPHILIZED, FOR SOLUTION INTRAVENOUS at 11:21

## 2024-11-18 RX ADMIN — Medication: at 21:50

## 2024-11-18 RX ADMIN — VALACYCLOVIR HYDROCHLORIDE 500 MG: 500 TABLET, FILM COATED ORAL at 09:42

## 2024-11-18 RX ADMIN — SODIUM CHLORIDE, PRESERVATIVE FREE 10 ML: 5 INJECTION INTRAVENOUS at 21:49

## 2024-11-18 RX ADMIN — Medication: at 09:42

## 2024-11-18 RX ADMIN — LEVETIRACETAM 1500 MG: 500 TABLET, FILM COATED ORAL at 21:46

## 2024-11-18 RX ADMIN — PIPERACILLIN AND TAZOBACTAM 4500 MG: 4; .5 INJECTION, POWDER, LYOPHILIZED, FOR SOLUTION INTRAVENOUS at 19:59

## 2024-11-18 RX ADMIN — PANTOPRAZOLE SODIUM 40 MG: 40 TABLET, DELAYED RELEASE ORAL at 17:22

## 2024-11-18 RX ADMIN — CYCLOBENZAPRINE 10 MG: 10 TABLET, FILM COATED ORAL at 05:33

## 2024-11-18 RX ADMIN — PANTOPRAZOLE SODIUM 40 MG: 40 TABLET, DELAYED RELEASE ORAL at 05:05

## 2024-11-18 RX ADMIN — ACETAMINOPHEN 650 MG: 325 TABLET, FILM COATED ORAL at 21:45

## 2024-11-18 RX ADMIN — VANCOMYCIN HYDROCHLORIDE 125 MG: 250 POWDER, FOR SOLUTION ORAL at 09:42

## 2024-11-18 RX ADMIN — SODIUM CHLORIDE, PRESERVATIVE FREE 10 ML: 5 INJECTION INTRAVENOUS at 09:43

## 2024-11-18 RX ADMIN — VORICONAZOLE 300 MG: 200 TABLET ORAL at 21:46

## 2024-11-18 RX ADMIN — LEVETIRACETAM 1500 MG: 500 TABLET, FILM COATED ORAL at 09:41

## 2024-11-18 RX ADMIN — SODIUM CHLORIDE 15 ML: 900 IRRIGANT IRRIGATION at 05:34

## 2024-11-18 RX ADMIN — CYCLOBENZAPRINE 10 MG: 10 TABLET, FILM COATED ORAL at 22:09

## 2024-11-18 RX ADMIN — VANCOMYCIN HYDROCHLORIDE 1250 MG: 1.25 INJECTION, SOLUTION INTRAVITREAL at 13:30

## 2024-11-18 RX ADMIN — SODIUM CHLORIDE 15 ML: 900 IRRIGANT IRRIGATION at 21:49

## 2024-11-18 RX ADMIN — VORICONAZOLE 300 MG: 200 TABLET ORAL at 09:41

## 2024-11-18 RX ADMIN — VALACYCLOVIR HYDROCHLORIDE 500 MG: 500 TABLET, FILM COATED ORAL at 21:46

## 2024-11-18 RX ADMIN — ACETAMINOPHEN 650 MG: 325 TABLET, FILM COATED ORAL at 15:12

## 2024-11-18 RX ADMIN — AMITRIPTYLINE HYDROCHLORIDE 10 MG: 10 TABLET, FILM COATED ORAL at 21:45

## 2024-11-18 ASSESSMENT — PAIN SCALES - GENERAL: PAINLEVEL_OUTOF10: 10

## 2024-11-18 ASSESSMENT — PAIN SCALES - WONG BAKER: WONGBAKER_NUMERICALRESPONSE: NO HURT

## 2024-11-18 ASSESSMENT — PAIN DESCRIPTION - LOCATION: LOCATION: BACK;NECK

## 2024-11-18 ASSESSMENT — PAIN - FUNCTIONAL ASSESSMENT: PAIN_FUNCTIONAL_ASSESSMENT: PREVENTS OR INTERFERES SOME ACTIVE ACTIVITIES AND ADLS

## 2024-11-18 ASSESSMENT — PAIN DESCRIPTION - ONSET: ONSET: PROGRESSIVE

## 2024-11-18 ASSESSMENT — PAIN DESCRIPTION - FREQUENCY: FREQUENCY: INTERMITTENT

## 2024-11-18 ASSESSMENT — PAIN DESCRIPTION - DESCRIPTORS: DESCRIPTORS: ACHING;SORE

## 2024-11-18 ASSESSMENT — PAIN DESCRIPTION - PAIN TYPE: TYPE: ACUTE PAIN

## 2024-11-18 ASSESSMENT — PAIN DESCRIPTION - ORIENTATION: ORIENTATION: POSTERIOR

## 2024-11-18 NOTE — CARE COORDINATION
Dicussed with Mary Kate CADENA this AM who states pt's father obtained the letter needed for the apartment.     Attempted meeting with pt and he was being seen by another provider.     SW will attempt again when able to meet with pt.     JENNIFER Ortiz   for [facility] [place] ([facility])  [employer]: [phone]    Addendum at 2:15pm: Spoke with pt briefly at bedside. He appeared to be drowsy and was falling back to sleep during the conversation. RN notified and will check on pt.

## 2024-11-18 NOTE — PROGRESS NOTES
line removed    Recent Abx:  10/28-11/12/24:  Merrem  10/30-11/6/24:  Daptomycin  11/13/24:  Azithromycin (received 1 dose for empiric treatment of atypical PNA; however, CT scans revealed improvement of airspace disease & findings suggestive of hepatic abscesses)    Workup this Admission:  10/28/24:  Blood Cx 1 & 2 -  K. pneumoniae - resistant to ampicillin  Fungal blood Cx - no growth after 1 week of intubation  Resp Cx & PCR molecular panel, throat Cx -  Neg.  Procalcitonin 13.85   CT chest -  bibasilar airspace dz (R > L), cavitary lesion with satellite nodule in NAN  10/29/24:  UA/micro -  no e/o infection, although collected after starting Merrem  PICC line tip, UrCx - NGTD  Aspergillus Ag - Neg., index 0.28  1,3-beta-D-glucan - positive, 145  10/30/24:  Procalcitonin > 100.  Blood Cx 1 & 2 - NGTD.  10/31/24:  C. difficile toxin/Ag & stool GI pathogens both neg.  11/04/24:  Temp 100.3°F,   Procalcitonin 1.93, blood cx x 2 - NGTD, UrCx - NGTD  Fungal blood cx - sent, results pending  11/05/24:  Temp 101.3°F, procalcitonin 0.79  11/06/24:  Temp 101.3°F   Blood Cx x 2, UrCx - NGTD  Cryptococcus Ag, histoplasma Ag/Ab, blastomyces Ab - neg.  CT C/A/P w contrast demonstrated increasing airspace dz  11/07/24:  Increasing stool output  C. difficile toxin/Ag, stool GI pathogens, CMV by NAAT - neg.  Bronchoscopy:  atrophic airway mucosa.  Micro & cytology sent on BAL - results pending (see noted dated 11/12/24)  Aspergillus glacto Ag positive, index 0.52 (resulted 11/15/24)  11/08/24:  1,3-beta-D-glucan - negative  11/09/24:  Temp 101.5°F  Blood Cx x 2, UrCx - NGTD  11/13/24:  Temp 102.9°F   Ferritin 4659.  Triglycerides 90.  Resp panel, MRSA nares -  neg.  BCx x 2 -  NGTD  EBV, HHV6 - negative; adeno, parvo - pending  CT C/A/P w contrast - improvement in airspace dz; multiple hepatic lesions suggestive of hepatic abscesses    Component  Ref Range & Units 11/13/24 0937 11/9/24 2248 11/6/24 0438 11/4/24 0927 11/1/24 1611  not appear to be increased by the morphology analysis or flow cytometry. Immunostains are pending and may be helpful.   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  HEME    Amyloid angiopathy  Per prior nsgy recs on 10/1/24, avoid anticoagulation as it would significantly increase risk of ICH    Pancytopenia r/t AML/MDS and recent chemotherapy  transfuse pRBC    Transfuse for Hgb < 7   Transfuse for PLT < 50 K (due to amyloid angiopathy causing higher risk of ICH, PLT threshold was set to transfuse for < 20K; however, he developed melena & therefore, threshold was increased)      4.  METABOLIC, RENAL - renal function is at baseline  Baseline Cr ~0.5-0.6  BMP, phos, Mg daily - replace Mg & K prn      5.  GI, NUTRITION   Rectal Adenocarcinoma   See oncology section above for details    Nutrition    Dietitian is following  Low-microbial diet    GERD  Cont Protonix 40 mg BID     Prandial/post-prandial abdominal pain, intestinal angina, possibly chronic mesenteric ischemia:  HIDA scan unrevealing on 10/31, PUD ruled out with EGD on 11/5.  Cannot definitively r/o GI pathology  Acute melena:  no longer melanotic stools; however, positive FOBT 11/11/24  10/28/24 - lipase 12.0, CT abd/pelvis with IV contrast was unrevealing  10/31/24 - CT abd with IV contrast & HIDA were unrevealing  11/03/24 - reoccurred, stopped 11/9  Cont scheduled Bentyl   GI consulted, recs appreciated  GI re-consulted to request a colonoscopy 11/12/24 - unable to perform d/t neutropenia  Cont amitriptyline QHS  Cont Protonix 40 mg BID      6.  NEURO   Oligodendroglioma s/p partial resection 2017  See oncology section above for details    Amyloid angiopathy  9/30/24, MRI brain w & w/o contrast - Pattern found to be c/w amyloid angiopathy per neurosurgery with recommendation to

## 2024-11-18 NOTE — CARE COORDINATION
Spoke with patient father today to get an update on patient apartment. Patient and family report mold issues at his residence.  They are working with [facility] to get this issue resolved either by clean up and repairing the problem or moving patient into a new apartment.  Father states they are slow to respond.  This writer will review with ALEXIS

## 2024-11-18 NOTE — PLAN OF CARE
Problem: Pain  Goal: Verbalizes/displays adequate comfort level or baseline comfort level  11/18/2024 0624 by Ivette Bernard RN  Outcome: Progressing  Flowsheets (Taken 11/18/2024 0624)  Verbalizes/displays adequate comfort level or baseline comfort level:   Encourage patient to monitor pain and request assistance   Assess pain using appropriate pain scale   Administer analgesics based on type and severity of pain and evaluate response   Implement non-pharmacological measures as appropriate and evaluate response   Consider cultural and social influences on pain and pain management   Notify Licensed Independent Practitioner if interventions unsuccessful or patient reports new pain     Problem: Safety - Adult  Goal: Free from fall injury  Outcome: Progressing  Flowsheets (Taken 11/18/2024 0624)  Free From Fall Injury:   Instruct family/caregiver on patient safety   Based on caregiver fall risk screen, instruct family/caregiver to ask for assistance with transferring infant if caregiver noted to have fall risk factors     Problem: Skin/Tissue Integrity - Adult  Goal: Skin integrity remains intact  Outcome: Progressing  Flowsheets (Taken 11/18/2024 0624)  Skin Integrity Remains Intact: Monitor for areas of redness and/or skin breakdown

## 2024-11-18 NOTE — PROGRESS NOTES
Physical Therapy  Facility/Department: [address]  Physical Therapy Treatment    Name: eDnnys Peguero  : 1980  MRN: [id number]  Date of Service: 2024    Discharge Recommendations:  Home with Home health PT, 24 hour supervision or assist   PT Equipment Recommendations  Equipment Needed: No    HOME HEALTH CARE: LEVEL 3 SAFETY     - Initial home health evaluation to occur within 24-48 hours, in patient home   - Therapy evaluations in home within 24-48 hours of discharge; including DME and home safety   - Frontload therapy 5 days, then 3x a week   - Therapy to evaluate if patient has Home Health Aide needs for personal care   -  evaluation within 24-48 hours, includes evaluation of resources and insurance to determine AL, [place], LTC, and Medicaid options       Patient Diagnosis(es): The primary encounter diagnosis was Neutropenic fever (HCC). Diagnoses of Acute respiratory failure with hypoxia, Sepsis with acute hypoxic respiratory failure and septic shock, due to unspecified organism (HCC), Hypotension, unspecified hypotension type, AML (acute myeloid leukemia) in relapse (HCC), and Left upper extremity swelling were also pertinent to this visit.  Past Medical History:  has a past medical history of Brain cancer (HCC) and Seizure (HCC).  Past Surgical History:  has a past surgical history that includes brain surgery; Upper gastrointestinal endoscopy (N/A, 3/6/2024); Colonoscopy (N/A, 3/22/2024); Small intestine surgery (N/A, 2024); bronchoscopy (N/A, 2024); Upper gastrointestinal endoscopy (N/A, 2024); bronchoscopy (N/A, 2024); and CT ASP ABS HEMATOMA BULLA CYST (2024).    Assessment  Body Structures, Functions, Activity Limitations Requiring Skilled Therapeutic Intervention: Decreased functional mobility ;Decreased ADL status;Decreased strength;Decreased endurance;Decreased balance  Assessment: Patient demonstrates impaired functional mobility, requiring  goals ongoing 11/18/24  Short Term Goal 1: pt will perf supine<>sit IND  Short Term Goal 2: pt will perf STS to LRAD IND  Short Term Goal 3: pt will amb 150ft with LRAD IND  Patient Goals   Patient Goals : \"be able to go home\"       Education         Therapy Time   Individual Concurrent Group Co-treatment   Time In 1124         Time Out 1147         Minutes 23                 Timed Code Treatment Minutes: 23 minutes    Total Treatment Minutes: 23 Minutes    If patient discharges prior to next treatment, this note will serve as discharge summary.    Susan Herman PT, DPT #[id number]

## 2024-11-18 NOTE — PROGRESS NOTES
Neutropenic Pathway  If patient oral temp > or = to 38.0 or if axillary temp > or = to 37.4 initiate protocol per orders.  Draw 2 sets of blood cultures from different sites. If patient remains febrile, redraw PAN culture every 68-72 hours.             Temp 100.6    Site(s) / Line Type Time Cultures obtained   Date 11/18/2024  1425    Peripheral x2     1500

## 2024-11-19 ENCOUNTER — APPOINTMENT (OUTPATIENT)
Dept: CT IMAGING | Age: 44
DRG: 720 | End: 2024-11-19
Payer: MEDICAID

## 2024-11-19 LAB
ACID FAST STN SPEC QL: NORMAL
ALBUMIN SERPL-MCNC: 3.2 G/DL (ref 3.4–5)
ALP SERPL-CCNC: 266 U/L (ref 40–129)
ALT SERPL-CCNC: 14 U/L (ref 10–40)
ANION GAP SERPL CALCULATED.3IONS-SCNC: 10 MMOL/L (ref 3–16)
AST SERPL-CCNC: 11 U/L (ref 15–37)
BACTERIA BLD CULT ORG #2: NORMAL
BACTERIA BLD CULT: NORMAL
BACTERIA UR CULT: NORMAL
BACTERIA URNS QL MICRO: ABNORMAL /HPF
BASOPHILS # BLD: 0 K/UL (ref 0–0.2)
BASOPHILS # BLD: 0 K/UL (ref 0–0.2)
BASOPHILS NFR BLD: 0 %
BASOPHILS NFR BLD: 0 %
BILIRUB DIRECT SERPL-MCNC: 0.4 MG/DL (ref 0–0.3)
BILIRUB INDIRECT SERPL-MCNC: 0.3 MG/DL (ref 0–1)
BILIRUB SERPL-MCNC: 0.7 MG/DL (ref 0–1)
BILIRUB UR QL STRIP.AUTO: NEGATIVE
BLOOD BANK DISPENSE STATUS: NORMAL
BLOOD BANK DISPENSE STATUS: NORMAL
BLOOD BANK PRODUCT CODE: NORMAL
BLOOD BANK PRODUCT CODE: NORMAL
BPU ID: NORMAL
BPU ID: NORMAL
BUN SERPL-MCNC: 7 MG/DL (ref 7–20)
CALCIUM SERPL-MCNC: 8.7 MG/DL (ref 8.3–10.6)
CHLORIDE SERPL-SCNC: 99 MMOL/L (ref 99–110)
CLARITY UR: CLEAR
CO2 SERPL-SCNC: 27 MMOL/L (ref 21–32)
COLOR UR: YELLOW
CREAT SERPL-MCNC: 0.7 MG/DL (ref 0.9–1.3)
DEPRECATED RDW RBC AUTO: 13 % (ref 12.4–15.4)
DEPRECATED RDW RBC AUTO: 13.2 % (ref 12.4–15.4)
DESCRIPTION BLOOD BANK: NORMAL
DESCRIPTION BLOOD BANK: NORMAL
EOSINOPHIL # BLD: 0 K/UL (ref 0–0.6)
EOSINOPHIL # BLD: 0 K/UL (ref 0–0.6)
EOSINOPHIL NFR BLD: 0 %
EOSINOPHIL NFR BLD: 0 %
GFR SERPLBLD CREATININE-BSD FMLA CKD-EPI: >90 ML/MIN/{1.73_M2}
GLUCOSE SERPL-MCNC: 116 MG/DL (ref 70–99)
GLUCOSE UR STRIP.AUTO-MCNC: NEGATIVE MG/DL
HCT VFR BLD AUTO: 23.9 % (ref 40.5–52.5)
HCT VFR BLD AUTO: 24.6 % (ref 40.5–52.5)
HGB BLD-MCNC: 8.2 G/DL (ref 13.5–17.5)
HGB BLD-MCNC: 8.2 G/DL (ref 13.5–17.5)
HGB UR QL STRIP.AUTO: NEGATIVE
KETONES UR STRIP.AUTO-MCNC: NEGATIVE MG/DL
LEUKOCYTE ESTERASE UR QL STRIP.AUTO: NEGATIVE
LOEFFLER MB STN SPEC: NORMAL
LYMPHOCYTES # BLD: 0.6 K/UL (ref 1–5.1)
LYMPHOCYTES # BLD: 0.8 K/UL (ref 1–5.1)
LYMPHOCYTES NFR BLD: 32 %
LYMPHOCYTES NFR BLD: 52 %
MCH RBC QN AUTO: 29.9 PG (ref 26–34)
MCH RBC QN AUTO: 30.4 PG (ref 26–34)
MCHC RBC AUTO-ENTMCNC: 33.3 G/DL (ref 31–36)
MCHC RBC AUTO-ENTMCNC: 34.3 G/DL (ref 31–36)
MCV RBC AUTO: 88.6 FL (ref 80–100)
MCV RBC AUTO: 89.6 FL (ref 80–100)
MISCELLANEOUS LAB TEST RESULT: NORMAL
MONOCYTES # BLD: 0 K/UL (ref 0–1.3)
MONOCYTES # BLD: 0.1 K/UL (ref 0–1.3)
MONOCYTES NFR BLD: 0 %
MONOCYTES NFR BLD: 7 %
MUCOUS THREADS #/AREA URNS LPF: ABNORMAL /LPF
MYCOBACTERIUM SPEC CULT: NORMAL
NEUTROPHILS # BLD: 0.8 K/UL (ref 1.7–7.7)
NEUTROPHILS # BLD: 1.2 K/UL (ref 1.7–7.7)
NEUTROPHILS NFR BLD: 48 %
NEUTROPHILS NFR BLD: 61 %
NITRITE UR QL STRIP.AUTO: NEGATIVE
PH UR STRIP.AUTO: 7.5 [PH] (ref 5–8)
PLATELET # BLD AUTO: 48 K/UL (ref 135–450)
PLATELET # BLD AUTO: 50 K/UL (ref 135–450)
PLATELET BLD QL SMEAR: ABNORMAL
PMV BLD AUTO: 8.3 FL (ref 5–10.5)
PMV BLD AUTO: 8.3 FL (ref 5–10.5)
POTASSIUM SERPL-SCNC: 4 MMOL/L (ref 3.5–5.1)
PROT SERPL-MCNC: 6.8 G/DL (ref 6.4–8.2)
PROT UR STRIP.AUTO-MCNC: 30 MG/DL
RBC # BLD AUTO: 2.7 M/UL (ref 4.2–5.9)
RBC # BLD AUTO: 2.75 M/UL (ref 4.2–5.9)
RBC #/AREA URNS HPF: ABNORMAL /HPF (ref 0–4)
RBC MORPH BLD: NORMAL
RBC MORPH BLD: NORMAL
SLIDE REVIEW: ABNORMAL
SODIUM SERPL-SCNC: 136 MMOL/L (ref 136–145)
SP GR UR STRIP.AUTO: 1.02 (ref 1–1.03)
UA DIPSTICK W REFLEX MICRO PNL UR: YES
URN SPEC COLLECT METH UR: ABNORMAL
UROBILINOGEN UR STRIP-ACNC: 0.2 E.U./DL
WBC # BLD AUTO: 1.6 K/UL (ref 4–11)
WBC # BLD AUTO: 1.9 K/UL (ref 4–11)
WBC #/AREA URNS HPF: ABNORMAL /HPF (ref 0–5)

## 2024-11-19 PROCEDURE — P9036 PLATELET PHERESIS IRRADIATED: HCPCS

## 2024-11-19 PROCEDURE — 36415 COLL VENOUS BLD VENIPUNCTURE: CPT

## 2024-11-19 PROCEDURE — 87086 URINE CULTURE/COLONY COUNT: CPT

## 2024-11-19 PROCEDURE — 6370000000 HC RX 637 (ALT 250 FOR IP)

## 2024-11-19 PROCEDURE — 6370000000 HC RX 637 (ALT 250 FOR IP): Performed by: INTERNAL MEDICINE

## 2024-11-19 PROCEDURE — 6360000002 HC RX W HCPCS: Performed by: STUDENT IN AN ORGANIZED HEALTH CARE EDUCATION/TRAINING PROGRAM

## 2024-11-19 PROCEDURE — 2580000003 HC RX 258

## 2024-11-19 PROCEDURE — 87205 SMEAR GRAM STAIN: CPT

## 2024-11-19 PROCEDURE — 97535 SELF CARE MNGMENT TRAINING: CPT

## 2024-11-19 PROCEDURE — 2580000003 HC RX 258: Performed by: NURSE PRACTITIONER

## 2024-11-19 PROCEDURE — 6360000002 HC RX W HCPCS: Performed by: NURSE PRACTITIONER

## 2024-11-19 PROCEDURE — 85025 COMPLETE CBC W/AUTO DIFF WBC: CPT

## 2024-11-19 PROCEDURE — 2580000003 HC RX 258: Performed by: STUDENT IN AN ORGANIZED HEALTH CARE EDUCATION/TRAINING PROGRAM

## 2024-11-19 PROCEDURE — 6370000000 HC RX 637 (ALT 250 FOR IP): Performed by: NURSE PRACTITIONER

## 2024-11-19 PROCEDURE — 36430 TRANSFUSION BLD/BLD COMPNT: CPT

## 2024-11-19 PROCEDURE — 99233 SBSQ HOSP IP/OBS HIGH 50: CPT | Performed by: INTERNAL MEDICINE

## 2024-11-19 PROCEDURE — 2060000000 HC ICU INTERMEDIATE R&B

## 2024-11-19 PROCEDURE — 81001 URINALYSIS AUTO W/SCOPE: CPT

## 2024-11-19 PROCEDURE — 97530 THERAPEUTIC ACTIVITIES: CPT

## 2024-11-19 PROCEDURE — 80076 HEPATIC FUNCTION PANEL: CPT

## 2024-11-19 PROCEDURE — 70450 CT HEAD/BRAIN W/O DYE: CPT

## 2024-11-19 PROCEDURE — 87102 FUNGUS ISOLATION CULTURE: CPT

## 2024-11-19 PROCEDURE — 80048 BASIC METABOLIC PNL TOTAL CA: CPT

## 2024-11-19 RX ORDER — 0.9 % SODIUM CHLORIDE 0.9 %
500 INTRAVENOUS SOLUTION INTRAVENOUS ONCE
Status: COMPLETED | OUTPATIENT
Start: 2024-11-19 | End: 2024-11-19

## 2024-11-19 RX ORDER — TRAMADOL HYDROCHLORIDE 50 MG/1
100 TABLET ORAL EVERY 6 HOURS PRN
Status: DISPENSED | OUTPATIENT
Start: 2024-11-19

## 2024-11-19 RX ADMIN — SODIUM CHLORIDE 500 ML: 9 INJECTION, SOLUTION INTRAVENOUS at 11:16

## 2024-11-19 RX ADMIN — VORICONAZOLE 300 MG: 200 TABLET ORAL at 09:28

## 2024-11-19 RX ADMIN — PANTOPRAZOLE SODIUM 40 MG: 40 TABLET, DELAYED RELEASE ORAL at 15:56

## 2024-11-19 RX ADMIN — ACETAMINOPHEN 650 MG: 325 TABLET, FILM COATED ORAL at 04:12

## 2024-11-19 RX ADMIN — VANCOMYCIN HYDROCHLORIDE 1250 MG: 1.25 INJECTION, SOLUTION INTRAVITREAL at 00:50

## 2024-11-19 RX ADMIN — ACETAMINOPHEN 650 MG: 325 TABLET, FILM COATED ORAL at 10:47

## 2024-11-19 RX ADMIN — SODIUM CHLORIDE, PRESERVATIVE FREE 10 ML: 5 INJECTION INTRAVENOUS at 09:31

## 2024-11-19 RX ADMIN — AMITRIPTYLINE HYDROCHLORIDE 10 MG: 10 TABLET, FILM COATED ORAL at 20:41

## 2024-11-19 RX ADMIN — VANCOMYCIN HYDROCHLORIDE 125 MG: 250 POWDER, FOR SOLUTION ORAL at 20:43

## 2024-11-19 RX ADMIN — SODIUM CHLORIDE 15 ML: 900 IRRIGANT IRRIGATION at 20:49

## 2024-11-19 RX ADMIN — ACETAMINOPHEN 650 MG: 325 TABLET, FILM COATED ORAL at 17:56

## 2024-11-19 RX ADMIN — Medication: at 20:48

## 2024-11-19 RX ADMIN — ACETAMINOPHEN 650 MG: 325 TABLET, FILM COATED ORAL at 01:00

## 2024-11-19 RX ADMIN — VANCOMYCIN HYDROCHLORIDE 125 MG: 250 POWDER, FOR SOLUTION ORAL at 09:30

## 2024-11-19 RX ADMIN — VALACYCLOVIR HYDROCHLORIDE 500 MG: 500 TABLET, FILM COATED ORAL at 09:29

## 2024-11-19 RX ADMIN — PANTOPRAZOLE SODIUM 40 MG: 40 TABLET, DELAYED RELEASE ORAL at 09:29

## 2024-11-19 RX ADMIN — VORICONAZOLE 300 MG: 200 TABLET ORAL at 20:38

## 2024-11-19 RX ADMIN — PIPERACILLIN AND TAZOBACTAM 4500 MG: 4; .5 INJECTION, POWDER, LYOPHILIZED, FOR SOLUTION INTRAVENOUS at 23:49

## 2024-11-19 RX ADMIN — PIPERACILLIN AND TAZOBACTAM 4500 MG: 4; .5 INJECTION, POWDER, LYOPHILIZED, FOR SOLUTION INTRAVENOUS at 04:03

## 2024-11-19 RX ADMIN — TRAMADOL HYDROCHLORIDE 100 MG: 50 TABLET, COATED ORAL at 20:41

## 2024-11-19 RX ADMIN — SODIUM CHLORIDE, PRESERVATIVE FREE 10 ML: 5 INJECTION INTRAVENOUS at 20:38

## 2024-11-19 RX ADMIN — LEVETIRACETAM 1500 MG: 500 TABLET, FILM COATED ORAL at 09:30

## 2024-11-19 RX ADMIN — VALACYCLOVIR HYDROCHLORIDE 500 MG: 500 TABLET, FILM COATED ORAL at 20:41

## 2024-11-19 RX ADMIN — LEVETIRACETAM 1500 MG: 500 TABLET, FILM COATED ORAL at 20:38

## 2024-11-19 ASSESSMENT — PAIN DESCRIPTION - FREQUENCY
FREQUENCY: INTERMITTENT

## 2024-11-19 ASSESSMENT — PAIN SCALES - GENERAL
PAINLEVEL_OUTOF10: 8
PAINLEVEL_OUTOF10: 3
PAINLEVEL_OUTOF10: 8
PAINLEVEL_OUTOF10: 5

## 2024-11-19 ASSESSMENT — PAIN DESCRIPTION - ONSET
ONSET: PROGRESSIVE
ONSET: PROGRESSIVE
ONSET: GRADUAL

## 2024-11-19 ASSESSMENT — PAIN DESCRIPTION - ORIENTATION
ORIENTATION: POSTERIOR;LOWER
ORIENTATION: OTHER (COMMENT)
ORIENTATION: POSTERIOR

## 2024-11-19 ASSESSMENT — PAIN - FUNCTIONAL ASSESSMENT
PAIN_FUNCTIONAL_ASSESSMENT: ACTIVITIES ARE NOT PREVENTED
PAIN_FUNCTIONAL_ASSESSMENT: ACTIVITIES ARE NOT PREVENTED
PAIN_FUNCTIONAL_ASSESSMENT: PREVENTS OR INTERFERES SOME ACTIVE ACTIVITIES AND ADLS

## 2024-11-19 ASSESSMENT — PAIN DESCRIPTION - DESCRIPTORS
DESCRIPTORS: ACHING;DULL
DESCRIPTORS: ACHING
DESCRIPTORS: ACHING

## 2024-11-19 ASSESSMENT — PAIN DESCRIPTION - PAIN TYPE
TYPE: ACUTE PAIN

## 2024-11-19 ASSESSMENT — PAIN DESCRIPTION - LOCATION
LOCATION: HEAD
LOCATION: HEAD;NECK

## 2024-11-19 ASSESSMENT — PAIN SCALES - WONG BAKER
WONGBAKER_NUMERICALRESPONSE: NO HURT

## 2024-11-19 NOTE — CONSULTS
[facility]  Palliative Medicine Consultation Note      Date Of Admission:10/28/2024  Date of consult: 11/19/24  Seen by PC in the past:  No    Recommendations:        Discussed case with Sharla GUTIERREZ, Mary Kate CADENA, and Diana WANG. Introduced palliative care to pt's father Pierre on the phone. Will be available for the family meeting. Briefly introduced myself to the pt. He was very tired and didn't talk to me much.    1. Goals of Care/Advanced Care planning/Code status: Full Code, did not discuss today.  2. Pain: pt denied pain at the time of my visit, but has c/o abdominal pain and headaches today. Has flexeril available prn, received 2 doses yesterday, none today.  3. Anorexia: pt has not been eating much. He ate some of his  from lunch but said it had no taste. He has lost about 15 lbs in the past 5 months, current BMI is 21.  4. Disposition: TBD    Reason for Consult:         [x]  Goals of Care  []  Code Status Discussion/Advanced Care Planning   []  Psychosocial/Family Support  []  Symptom Management  []  Other (Specify)    Requesting Physician: Dr. Nguyen    CHIEF COMPLAINT:  Neutropenic fever    History Obtained From:  electronic medical record    History of Present Illness:         Dennys Pegureo is a 44 y.o. male with PMH of Oligodendroglioma (S/P partial resection 2017, radiation 2019, and temodar 2019), rectal adenocarcinoma (s/p robotic low anterior resection with colorectal anastomosis 4/12/24) and AML with underlying MDS who presented with neutropenic fever. Family reported he had been unable to eat, drink, or take his medications at home. Upon arrival he was tachycardic and was admitted with concern for sepsis secondary to GI source vs pneumonia vs infected PICC line. He was intubated briefly 10/28-10/29. Neurology was consulted for possible breakthrough seizure, from which he did return to his previous baseline. Brain MRI was obtained showing mainly stable findings.

## 2024-11-19 NOTE — PROGRESS NOTES
ID Follow-up NOTE    CC:   AMS  Antibiotics: Pip-tazo, voriconazole, acyclovir, p.o. vancomycin, iv vanco    Admit Date: 10/28/2024  Hospital Day: 23    Subjective:     Patient spiked temp yesterday afternoon up to 103. Now low grade. Continued to be fatigued, improved abd pain, mild cough.       Objective:     Patient Vitals for the past 8 hrs:   BP Temp Temp src Pulse Resp SpO2 Weight   11/19/24 1116 (!) 137/95 (!) 100.6 °F (38.1 °C) Oral 99 24 96 % --   11/19/24 0758 (!) 140/92 99.4 °F (37.4 °C) Oral 98 19 95 % 68.5 kg (151 lb)   11/19/24 0647 (!) 141/85 100.1 °F (37.8 °C) Oral 97 26 97 % --   11/19/24 0627 138/89 100.4 °F (38 °C) Oral 97 28 97 % --     I/O last 3 completed shifts:  In: 1880 [P.O.:595; I.V.:1285]  Out: 625 [Urine:625]  I/O this shift:  In: 807.5 [P.O.:480; Blood:327.5]  Out: 475 [Urine:450; Stool:25]    EXAM:  GENERAL: No apparent distress.  Ill appearing. Pt is fatigued easily.   HEENT: Membranes moist, no oral lesion, on room air.   NECK:  Supple, no lymphadenopathy  LUNGS: Clear b/l, no rales, no dullness, on room air  CARDIAC: RRR, no murmur appreciated  ABD:  + BS, soft, tender to palpation in the mid abdomen region.  EXT:  No rash, bilateral LE edema, right hand edema, left arm edema.   NEURO: No focal neurologic findings  PSYCH: Orientation normal, drowsy.   LINES:  Peripheral iv    Data Review:  Lab Results   Component Value Date    WBC 1.6 (L) 11/19/2024    HGB 8.2 (L) 11/19/2024    HCT 23.9 (L) 11/19/2024    MCV 88.6 11/19/2024    PLT 48 (L) 11/19/2024     Lab Results   Component Value Date    CREATININE 0.7 (L) 11/19/2024    BUN 7 11/19/2024     11/19/2024    K 4.0 11/19/2024    CL 99 11/19/2024    CO2 27 11/19/2024       Hepatic Function Panel:   Lab Results   Component Value Date/Time    ALKPHOS 266 11/19/2024 04:12 AM    ALT 14 11/19/2024 04:12 AM    AST 11 11/19/2024 04:12 AM    BILITOT 0.7 11/19/2024 04:12 AM    BILIDIR 0.4 11/19/2024 04:12 AM    IBILI 0.3 11/19/2024 04:12  11/15:  No evidence of deep venous thrombosis in the left upper extremity.  The cephalic and basilic veins are thick walled without evidence of superficial thrombophlebitis.    CT chest abd pelvis 11/13:  CHEST:  1. Improvement in patchy airspace opacities in the bilateral upper lobes.  2. Bilateral pleural effusions, trace on the right and small on the left. There  has been interval improvement in the right pleural effusion. There is adjacent  dependent parenchymal consolidation.     ABDOMEN/PELVIS:  1. Interval development of multiple new hypodense lesions in comparison to  recent abdominal CT imaging. Given rapid interval development, this is  suspicious for multiple small hepatic abscesses.    Vascular duplex right arm 11/11:    Chronic phlebitic process involving the right basilic vein.    No evidence of acute deep or superficial venous thrombosis involving the right upper extremity.    Portable chest x-ray 11/9:  1. Stable pleural effusions and bilateral airspace disease    CT chest abdomen and pelvis 11/6:  CHEST:  1. Progressive multifocal pneumonia/consolidative opacities with new nodule of  the right apex.  2. Small bilateral pleural effusions, increased in the interval    ABDOMEN/PELVIS:  1. Nonspecific fluid attenuation of the colon. Otherwise, no acute abnormality  of the abdomen or pelvis.    Hepatobiliary nuclear medicine scan 10/31:  Normal hepatobiliary scan    CT abdomen with contrast 10/31:  1. Small bilateral effusions with focal consolidation in the right lower lung.  Presence of cavitary nodule identified on prior chest CT suggest possible  embolic event such as septic emboli. Clinical correlation recommended.  2. No acute abdominal abnormality identified otherwise.       MRI brain with and without contrast 10/29:  1.  Slightly more prominent small area of vasogenic edema associated with left occipital lobe microhemorrhage. Slightly more prominent small area of vasogenic edema associated with

## 2024-11-19 NOTE — PROGRESS NOTES
[facility] - Clinical Pharmacy Note    Pharmacy was consulted to dose Vancomycin.    Vancomycin therapy has been discontinued. Pharmacy will sign off at this time. Please consider consulting pharmacy again if Vancomycin is re-started.    Thank you for allowing us to participate in the care of this patient.    Nicci Mcintosh,PharmD  [facility] Clinical Pharmacist  [phone]

## 2024-11-19 NOTE — PLAN OF CARE
Problem: Safety - Adult  Goal: Free from fall injury  Outcome: Progressing  Note: Pt is a High fall risk. See Apple Fall Score and ABCDS Injury Risk assessments.   + Screening for Orthostasis and/or + High Fall Risk per APPLE/ABCDS: Explained fall risk precautions to pt and family and rationale behind their use to keep the patient safe. Pt bed is in low position, side rails up, call light and belongings are in reach. Fall wristband applied and present on pts wrist.  Bed alarm on.  Pt encouraged to call for assistance. Will continue with hourly rounds for PO intake, pain needs, toileting and repositioning as needed.      Problem: Pain  Goal: Verbalizes/displays adequate comfort level or baseline comfort level  Outcome: Not Progressing  Note: Patient experiencing intermittent headaches today when moving. Tylenol given. Pain not resolved. NP Diana notified. CT ordered.      Problem: Hematologic - Adult  Goal: Maintains hematologic stability  Outcome: Not Progressing  Note: Patient's hemoglobin this AM:   Recent Labs     11/19/24  0412   HGB 8.2*     Patient's platelet count this AM:   Recent Labs     11/19/24  0412   PLT 48*    Thrombocytopenia Precautions in place.  Patient showing no signs or symptoms of active bleeding.  Patient transfused blood products per orders - see flowsheet.  Patient verbalizes understanding of all instructions. Will continue to assess and implement POC. Call light within reach and hourly rounding in place.      Problem: Gastrointestinal - Adult  Goal: Maintains or returns to baseline bowel function  Outcome: Not Progressing  Note: Patient still experiencing pain before bowel movements which are small and watery.

## 2024-11-19 NOTE — PROGRESS NOTES
Patient reported dizziness upon standing/moving as well as headache when moving. He reported pain in the back of his head near the base of his skull/neck. RN assessed orthostatic vitals which were negative. RN notified NP, Diana Pettit. NP ordered head CT scan. See results. Dr. Nguyen notified by RN that patient still experiencing pain. He ordered a one time dose of tylenol and Q6 100mg Tramadol PRN if tylenol doesn't help.

## 2024-11-19 NOTE — CARE COORDINATION
Discussed with treatment team and Dr. Tiwari requested a family meeting with Dr. Nguyen. Spoke with Dr. Nguyen who states family meeting on Friday 11/22 at 3:30pm. Updated treatment team and they consulted Palliative Care. If Palliative Care determines family is able to complete the family meeting sooner than Friday, then MD will be updated.     Also discussed with Mary Kate CADENA who states will call pt's father to discuss resources such as  to assist with the apartment and mold as he left her a message.    JENNIFER Ortiz   for [facility] ([facility])  [employer] Mobile: [phone]

## 2024-11-19 NOTE — PROGRESS NOTES
Comprehensive Nutrition Assessment    RECOMMENDATIONS:  PO Diet: Regular; low microbial  Nutrition Supplement: Continue Ensure TID  Initiate kcal count  Nutrition Education: No recommendation at this time     NUTRITION ASSESSMENT:   Nutritional summary & status: Follow up. Patient only able to tolerate fruit, not a significant source of calories. Ensure +HP ordered TID, pt consuming 1-2 per day (350-700 kcal). Pt of tall stature and has increased needs related to catabolic disease and extensive inpatient stay. Significant 10#/6.6% weight loss since admit.Pt w/ lethargy/fatigue, mild abd pain. Acute weight loss is concerning on top of severe malnutrition present on admission. Plan for family meeting on Friday to discuss GOC. RD has offered strategies to increase intake, suggested foods that may be better tolerated. Pt desponded. RD hung kcal count to assess severeity of inadequate oral intake. Will continue to follow and monitor goals of care.     Admission // PMH: Neutropenic fever // Oligodendroglioma (s/p partial resection 2017, radiation 2019, and temodar 2019), rectal adenocarcinoma, AML, underlying MDS    MALNUTRITION ASSESSMENT  Context of Malnutrition: Chronic Illness   Malnutrition Status: Severe malnutrition  Findings of the 6 clinical characteristics of malnutrition (Minimum of 2 out of 6 clinical characteristics is required to make the diagnosis of moderate or severe Protein Calorie Malnutrition based on AND/ASPEN Guidelines):  Energy Intake:  Mild decrease in energy intake (6 days this admit)  Weight Loss:  Greater than 10% over 6 months (20% in 6 months)     Body Fat Loss:  Mild body fat loss Buccal region, Orbital, Triceps   Muscle Mass Loss:  Severe muscle mass loss Temples (temporalis), Clavicles (pectoralis & deltoids)      NUTRITION DIAGNOSIS   Inadequate oral intake related to decreased appetite, pain as evidenced by intake 0-25%, intake 26-50%    Nutrition Monitoring and Evaluation:

## 2024-11-19 NOTE — PROGRESS NOTES
lesions suggestive of hepatic abscesses  11/18/24: New fever, T-max 103  - Pan Cx 11/18/24: Pending     Component  Ref Range & Units 11/13/24 0937 11/9/24 2248 11/6/24 0438 11/4/24 0927 11/1/24 1611 10/30/24 0905 10/28/24 0938   Procalcitonin  0.00 - 0.15 ng/mL 0.57 0.32 0.79 1.93 24.37 >100.00 13.85     Current PPx:   Cont Valtrex ppx  Cont vancomycin 125 mg PO bid ppx (h/o recurrent C.difficile)    Current Plan:   ID is following, recs appreciated  Pulmonology is following, recs appreciated  Plan for port or another PICC line  Cont Zosyn (started 11/13/24) - Day 7 pending hepatic aspirate result  Cont Vfend (started 11/13/24) - Day 7  Cont vancomycin IV (started 11/13/24) - Day 7 pending hepatic aspirate result  Hepatic lesion aspirate sent infection testing 11/14/24 - results pending *Spoke w/ lab 11/19/24, Anaerobic & Aerobic, fungal, and body fluid Cx all In Process at [facility], will be posted soon*      2.   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  IDH1/2 mutation positive.  Insufficient tissue for ATRX studies.  8/28/24, MRI:  Multiple intracranial, intra-axial enhancing foci, new since prior exam on 1/18/24, c/w interval development of intracranial metastatic disease.  Case was discussed at [facility] Neuro-Oncology tumor conference and deemed very atypical for a recurrence of oligodendroglioma and possibly represented intracranial disease related to AML.  9/30/24, LP:  No malignant cells identified  Med Onc:  Dr. Lucien Montiel ([facility])  Neurologist:  Dr. Andi Swenson III, Dr. Jojo Tate ([facility])    Stage IIA Rectal Adenocarcinoma (path T3N0M0) - under surveillance   Presented with rectal bleeding.  3/22/24:  Rectal lesion bx --> adenocarcinoma.  Intact  abdominal discomfort)  Currently on Cycle 6 of venetoclax & Dacogen  BM Bx (11/6/24): Continued presence of clonal myeloid disorder. MDS-like picture with increased erythroid precursors, megakaryocytes appear rare, however, exceptionally small forms may not be noticed without CD61 stain which is pending.  Blast cells 6% do not appear to be increased by the morphology analysis or flow cytometry. Immunostains are pending and may be helpful.   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  HEME    Amyloid angiopathy  Per prior nsgy recs on 10/1/24, avoid anticoagulation as it would significantly increase risk of ICH    Pancytopenia r/t AML/MDS and recent chemotherapy  transfuse pRBC    Transfuse for Hgb < 7   Transfuse for PLT < 50 K (due to amyloid angiopathy causing higher risk of ICH, PLT threshold was set to transfuse for < 20K; however, he developed melena & therefore, threshold was increased)      4.  METABOLIC, RENAL - renal function is at baseline  Baseline Cr ~0.5-0.6  BMP, phos, Mg daily - replace Mg & K prn      5.  GI, NUTRITION   Rectal Adenocarcinoma   See oncology section above for details    Nutrition    Dietitian is following  Low-microbial diet    GERD  Cont Protonix 40 mg BID     Prandial/post-prandial abdominal pain, intestinal angina, possibly chronic mesenteric ischemia:  HIDA scan unrevealing on 10/31, PUD ruled out with EGD on 11/5.  Cannot definitively r/o GI pathology  Acute melena:  no longer melanotic stools; however, positive FOBT 11/11/24  10/28/24 - lipase 12.0, CT abd/pelvis with IV contrast was unrevealing  10/31/24 - CT abd with IV contrast & HIDA were unrevealing  11/03/24 - reoccurred, stopped 11/9  Cont scheduled Bentyl   GI consulted, recs appreciated  GI re-consulted to request a colonoscopy 11/12/24 -

## 2024-11-19 NOTE — PROGRESS NOTES
Occupational Therapy  Facility/Department: [address] CANC[facility] [place]  Occupational Therapy Treatment     Name: Dennys Peguero  : 1980  MRN: [id number]  Date of Service: 2024    Discharge Recommendations:  24 hour supervision or assist, Home with Home health OT  OT Equipment Recommendations  Other: shower chair       Patient Diagnosis(es): The primary encounter diagnosis was Neutropenic fever (HCC). Diagnoses of Acute respiratory failure with hypoxia, Sepsis with acute hypoxic respiratory failure and septic shock, due to unspecified organism (HCC), Hypotension, unspecified hypotension type, AML (acute myeloid leukemia) in relapse (HCC), and Left upper extremity swelling were also pertinent to this visit.  Past Medical History:  has a past medical history of Brain cancer (HCC) and Seizure (HCC).  Past Surgical History:  has a past surgical history that includes brain surgery; Upper gastrointestinal endoscopy (N/A, 3/6/2024); Colonoscopy (N/A, 3/22/2024); Small intestine surgery (N/A, 2024); bronchoscopy (N/A, 2024); Upper gastrointestinal endoscopy (N/A, 2024); bronchoscopy (N/A, 2024); and CT ASP ABS HEMATOMA BULLA CYST (2024).    Treatment Diagnosis: impaired ADLs and mobility      Assessment  Performance deficits / Impairments: Decreased functional mobility ;Decreased ADL status;Decreased endurance  Assessment: Pt was limited by dizziness today though BP was WFL. Pt completed functional mobility w/ CGA and pt required CGA for toileting and safe functional transfers today. Pt more unsteady as a result of dizziness. Pt is functioning below his baseline and continues to benefit from OT. Cont OT per POC  Treatment Diagnosis: impaired ADLs and mobility  REQUIRES OT FOLLOW-UP: Yes  Activity Tolerance  Activity Tolerance: Patient limited by fatigue  Activity Tolerance Comments: Pt limited by dizziness. BP was 126/85 seated and 119/84 in stance. pt reported dizziness got a little    ADL  Grooming: Contact guard assistance  Grooming Skilled Clinical Factors: CGA in stance at sink to wash hands  Toileting: Contact guard assistance  Toileting Skilled Clinical Factors: Pt was continent of bowel and bladder but required ++ time to void. Pt required CGA due to report of being dizzy and noted mild unsteadiness.  Functional Mobility: Contact guard assistance  Functional Mobility Skilled Clinical Factors: Pt completed functional mobility to/from bathroom w/o AD w/ CGA. Pt more unsteady today due to c/o dizziness.        Bed mobility  Supine to Sit: Stand by assistance (HOB flat)  Transfers  Sit to stand: Contact guard assistance (from EOB)  Stand to sit: Contact guard assistance     Cognition  Overall Cognitive Status: [facility]  Cognition Comment: soft spoken, flat affect  Orientation  Overall Orientation Status: Within Functional Limits                  Education Given To: Patient  Education Provided: Role of Therapy;Plan of Care;ADL Adaptive Strategies  Education Provided Comments: activity promotion  Education Method: Demonstration;Verbal  Barriers to Learning: None  Education Outcome: Verbalized understanding;Continued education needed                     G-Code     OutComes Score                                                  AM-PAC - ADL  AM-PAC Daily Activity - Inpatient   How much help is needed for putting on and taking off regular lower body clothing?: A Little  How much help is needed for bathing (which includes washing, rinsing, drying)?: A Little  How much help is needed for toileting (which includes using toilet, bedpan, or urinal)?: A Little  How much help is needed for putting on and taking off regular upper body clothing?: A Little  How much help is needed for taking care of personal grooming?: A Little  How much help for eating meals?: None  AM-[facility] Inpatient Daily Activity Raw Score: 19  AM-PAC Inpatient ADL T-Scale Score : 40.22  ADL Inpatient CMS 0-100% Score: 42.8  ADL Inpatient CMS

## 2024-11-20 ENCOUNTER — APPOINTMENT (OUTPATIENT)
Dept: GENERAL RADIOLOGY | Age: 44
DRG: 720 | End: 2024-11-20
Payer: MEDICAID

## 2024-11-20 LAB
ALBUMIN SERPL-MCNC: 3.3 G/DL (ref 3.4–5)
ALP SERPL-CCNC: 233 U/L (ref 40–129)
ALT SERPL-CCNC: 10 U/L (ref 10–40)
ANION GAP SERPL CALCULATED.3IONS-SCNC: 10 MMOL/L (ref 3–16)
APPEARANCE CSF: CLEAR
AST SERPL-CCNC: 7 U/L (ref 15–37)
BASOPHILS # BLD: 0 K/UL (ref 0–0.2)
BASOPHILS # BLD: 0 K/UL (ref 0–0.2)
BASOPHILS NFR BLD: 0 %
BASOPHILS NFR BLD: 0 %
BILIRUB DIRECT SERPL-MCNC: 0.4 MG/DL (ref 0–0.3)
BILIRUB INDIRECT SERPL-MCNC: 0.2 MG/DL (ref 0–1)
BILIRUB SERPL-MCNC: 0.6 MG/DL (ref 0–1)
BUN SERPL-MCNC: 6 MG/DL (ref 7–20)
CALCIUM SERPL-MCNC: 8.6 MG/DL (ref 8.3–10.6)
CHLORIDE SERPL-SCNC: 99 MMOL/L (ref 99–110)
CLOT EVALUATION CSF: ABNORMAL
CO2 SERPL-SCNC: 27 MMOL/L (ref 21–32)
COLOR CSF: COLORLESS
CREAT SERPL-MCNC: 0.6 MG/DL (ref 0.9–1.3)
DEPRECATED RDW RBC AUTO: 13 % (ref 12.4–15.4)
DEPRECATED RDW RBC AUTO: 13.2 % (ref 12.4–15.4)
EOSINOPHIL # BLD: 0 K/UL (ref 0–0.6)
EOSINOPHIL # BLD: 0 K/UL (ref 0–0.6)
EOSINOPHIL NFR BLD: 0 %
EOSINOPHIL NFR BLD: 0 %
FIBRINOGEN PPP-MCNC: 351 MG/DL (ref 227–534)
GFR SERPLBLD CREATININE-BSD FMLA CKD-EPI: >90 ML/MIN/{1.73_M2}
GLUCOSE CSF-MCNC: 51 MG/DL (ref 40–80)
GLUCOSE SERPL-MCNC: 105 MG/DL (ref 70–99)
HCT VFR BLD AUTO: 22.2 % (ref 40.5–52.5)
HCT VFR BLD AUTO: 25.7 % (ref 40.5–52.5)
HGB BLD-MCNC: 7.7 G/DL (ref 13.5–17.5)
HGB BLD-MCNC: 8.7 G/DL (ref 13.5–17.5)
INR PPP: 1.46 (ref 0.85–1.15)
LDH SERPL L TO P-CCNC: 149 U/L (ref 100–190)
LOEFFLER MB STN SPEC: NORMAL
LYMPHOCYTES # BLD: 0.4 K/UL (ref 1–5.1)
LYMPHOCYTES # BLD: 1 K/UL (ref 1–5.1)
LYMPHOCYTES NFR BLD: 22 %
LYMPHOCYTES NFR BLD: 33 %
MAGNESIUM SERPL-MCNC: 1.58 MG/DL (ref 1.8–2.4)
MANUAL DIF COMMENT CSF-IMP: ABNORMAL
MCH RBC QN AUTO: 30.3 PG (ref 26–34)
MCH RBC QN AUTO: 30.6 PG (ref 26–34)
MCHC RBC AUTO-ENTMCNC: 33.7 G/DL (ref 31–36)
MCHC RBC AUTO-ENTMCNC: 34.5 G/DL (ref 31–36)
MCV RBC AUTO: 88.9 FL (ref 80–100)
MCV RBC AUTO: 89.9 FL (ref 80–100)
MENING+ENC PNL CSF NAA+NON-PROBE: NORMAL
MISCELLANEOUS LAB TEST ORDER: NORMAL
MONOCYTES # BLD: 0.2 K/UL (ref 0–1.3)
MONOCYTES # BLD: 0.4 K/UL (ref 0–1.3)
MONOCYTES NFR BLD: 13 %
MONOCYTES NFR BLD: 8 %
NEUTROPHILS # BLD: 1.4 K/UL (ref 1.7–7.7)
NEUTROPHILS # BLD: 1.5 K/UL (ref 1.7–7.7)
NEUTROPHILS NFR BLD: 47 %
NEUTROPHILS NFR BLD: 69 %
NEUTS BAND NFR BLD MANUAL: 1 % (ref 0–7)
NEUTS BAND NFR BLD MANUAL: 5 % (ref 0–7)
NUC CELL # FLD MANUAL: 0 /CUMM (ref 0–5)
PHOSPHATE SERPL-MCNC: 2.8 MG/DL (ref 2.5–4.9)
PLATELET # BLD AUTO: 57 K/UL (ref 135–450)
PLATELET # BLD AUTO: 70 K/UL (ref 135–450)
PLATELET BLD QL SMEAR: ABNORMAL
PLATELET BLD QL SMEAR: ABNORMAL
PMV BLD AUTO: 8 FL (ref 5–10.5)
PMV BLD AUTO: 8.1 FL (ref 5–10.5)
POTASSIUM SERPL-SCNC: 3.2 MMOL/L (ref 3.5–5.1)
PROT CSF-MCNC: 28 MG/DL (ref 15–45)
PROT SERPL-MCNC: 6.6 G/DL (ref 6.4–8.2)
PROTHROMBIN TIME: 17.9 SEC (ref 11.9–14.9)
RBC # BLD AUTO: 2.5 M/UL (ref 4.2–5.9)
RBC # BLD AUTO: 2.86 M/UL (ref 4.2–5.9)
RBC # FLD MANUAL: 6 /CUMM
RBC MORPH BLD: NORMAL
RBC MORPH BLD: NORMAL
REPORT: NORMAL
SLIDE REVIEW: ABNORMAL
SLIDE REVIEW: ABNORMAL
SODIUM SERPL-SCNC: 136 MMOL/L (ref 136–145)
TUBE # CSF: ABNORMAL
URATE SERPL-MCNC: 1.1 MG/DL (ref 3.5–7.2)
VARIANT LYMPHS NFR BLD MANUAL: 2 % (ref 0–6)
WBC # BLD AUTO: 2 K/UL (ref 4–11)
WBC # BLD AUTO: 2.9 K/UL (ref 4–11)

## 2024-11-20 PROCEDURE — 6370000000 HC RX 637 (ALT 250 FOR IP)

## 2024-11-20 PROCEDURE — 6370000000 HC RX 637 (ALT 250 FOR IP): Performed by: INTERNAL MEDICINE

## 2024-11-20 PROCEDURE — 2700000000 HC OXYGEN THERAPY PER DAY

## 2024-11-20 PROCEDURE — 85610 PROTHROMBIN TIME: CPT

## 2024-11-20 PROCEDURE — 83735 ASSAY OF MAGNESIUM: CPT

## 2024-11-20 PROCEDURE — 87070 CULTURE OTHR SPECIMN AEROBIC: CPT

## 2024-11-20 PROCEDURE — 6370000000 HC RX 637 (ALT 250 FOR IP): Performed by: NURSE PRACTITIONER

## 2024-11-20 PROCEDURE — 2580000003 HC RX 258

## 2024-11-20 PROCEDURE — 87483 CNS DNA AMP PROBE TYPE 12-25: CPT

## 2024-11-20 PROCEDURE — 84550 ASSAY OF BLOOD/URIC ACID: CPT

## 2024-11-20 PROCEDURE — 84100 ASSAY OF PHOSPHORUS: CPT

## 2024-11-20 PROCEDURE — 87205 SMEAR GRAM STAIN: CPT

## 2024-11-20 PROCEDURE — 2500000003 HC RX 250 WO HCPCS: Performed by: INTERNAL MEDICINE

## 2024-11-20 PROCEDURE — 83615 LACTATE (LD) (LDH) ENZYME: CPT

## 2024-11-20 PROCEDURE — 36415 COLL VENOUS BLD VENIPUNCTURE: CPT

## 2024-11-20 PROCEDURE — 85025 COMPLETE CBC W/AUTO DIFF WBC: CPT

## 2024-11-20 PROCEDURE — 89050 BODY FLUID CELL COUNT: CPT

## 2024-11-20 PROCEDURE — 99232 SBSQ HOSP IP/OBS MODERATE 35: CPT | Performed by: INTERNAL MEDICINE

## 2024-11-20 PROCEDURE — 80048 BASIC METABOLIC PNL TOTAL CA: CPT

## 2024-11-20 PROCEDURE — 6360000002 HC RX W HCPCS: Performed by: NURSE PRACTITIONER

## 2024-11-20 PROCEDURE — 009U3ZX DRAINAGE OF SPINAL CANAL, PERCUTANEOUS APPROACH, DIAGNOSTIC: ICD-10-PCS | Performed by: RADIOLOGY

## 2024-11-20 PROCEDURE — 84157 ASSAY OF PROTEIN OTHER: CPT

## 2024-11-20 PROCEDURE — 2060000000 HC ICU INTERMEDIATE R&B

## 2024-11-20 PROCEDURE — 88112 CYTOPATH CELL ENHANCE TECH: CPT

## 2024-11-20 PROCEDURE — 87899 AGENT NOS ASSAY W/OPTIC: CPT

## 2024-11-20 PROCEDURE — 82945 GLUCOSE OTHER FLUID: CPT

## 2024-11-20 PROCEDURE — 80076 HEPATIC FUNCTION PANEL: CPT

## 2024-11-20 PROCEDURE — 2580000003 HC RX 258: Performed by: NURSE PRACTITIONER

## 2024-11-20 PROCEDURE — 2709999900 FL LUMBAR PUNCTURE DIAG

## 2024-11-20 PROCEDURE — 94761 N-INVAS EAR/PLS OXIMETRY MLT: CPT

## 2024-11-20 PROCEDURE — 85384 FIBRINOGEN ACTIVITY: CPT

## 2024-11-20 RX ORDER — LIDOCAINE HYDROCHLORIDE 10 MG/ML
5 INJECTION, SOLUTION EPIDURAL; INFILTRATION; INTRACAUDAL; PERINEURAL ONCE
Status: COMPLETED | OUTPATIENT
Start: 2024-11-20 | End: 2024-11-20

## 2024-11-20 RX ORDER — VANCOMYCIN 1.25 G/250ML
1750 INJECTION, SOLUTION INTRAVENOUS ONCE
Status: COMPLETED | OUTPATIENT
Start: 2024-11-20 | End: 2024-11-20

## 2024-11-20 RX ADMIN — SODIUM CHLORIDE, PRESERVATIVE FREE 10 ML: 5 INJECTION INTRAVENOUS at 08:22

## 2024-11-20 RX ADMIN — LIDOCAINE HYDROCHLORIDE 5 ML: 10 INJECTION, SOLUTION EPIDURAL; INFILTRATION; INTRACAUDAL; PERINEURAL at 14:26

## 2024-11-20 RX ADMIN — Medication: at 08:25

## 2024-11-20 RX ADMIN — SODIUM CHLORIDE 15 ML: 900 IRRIGANT IRRIGATION at 20:12

## 2024-11-20 RX ADMIN — LEVETIRACETAM 1500 MG: 500 TABLET, FILM COATED ORAL at 08:15

## 2024-11-20 RX ADMIN — SODIUM CHLORIDE, PRESERVATIVE FREE 10 ML: 5 INJECTION INTRAVENOUS at 08:24

## 2024-11-20 RX ADMIN — VORICONAZOLE 300 MG: 200 TABLET ORAL at 20:12

## 2024-11-20 RX ADMIN — ACETAMINOPHEN 650 MG: 325 TABLET, FILM COATED ORAL at 20:12

## 2024-11-20 RX ADMIN — PANTOPRAZOLE SODIUM 40 MG: 40 TABLET, DELAYED RELEASE ORAL at 06:38

## 2024-11-20 RX ADMIN — VALACYCLOVIR HYDROCHLORIDE 500 MG: 500 TABLET, FILM COATED ORAL at 20:12

## 2024-11-20 RX ADMIN — SODIUM CHLORIDE, PRESERVATIVE FREE 10 ML: 5 INJECTION INTRAVENOUS at 20:17

## 2024-11-20 RX ADMIN — VANCOMYCIN 1750 MG: 1.25 INJECTION, SOLUTION INTRAVENOUS at 15:16

## 2024-11-20 RX ADMIN — VORICONAZOLE 300 MG: 200 TABLET ORAL at 08:16

## 2024-11-20 RX ADMIN — Medication: at 20:13

## 2024-11-20 RX ADMIN — LEVETIRACETAM 1500 MG: 500 TABLET, FILM COATED ORAL at 20:12

## 2024-11-20 RX ADMIN — ACETAMINOPHEN 650 MG: 325 TABLET, FILM COATED ORAL at 00:42

## 2024-11-20 RX ADMIN — VALACYCLOVIR HYDROCHLORIDE 500 MG: 500 TABLET, FILM COATED ORAL at 08:16

## 2024-11-20 RX ADMIN — PANTOPRAZOLE SODIUM 40 MG: 40 TABLET, DELAYED RELEASE ORAL at 15:23

## 2024-11-20 RX ADMIN — TRAMADOL HYDROCHLORIDE 100 MG: 50 TABLET, COATED ORAL at 08:33

## 2024-11-20 RX ADMIN — ACETAMINOPHEN 650 MG: 325 TABLET, FILM COATED ORAL at 09:52

## 2024-11-20 RX ADMIN — VANCOMYCIN HYDROCHLORIDE 125 MG: 250 POWDER, FOR SOLUTION ORAL at 21:33

## 2024-11-20 RX ADMIN — MEROPENEM 2000 MG: 2 INJECTION, POWDER, FOR SOLUTION INTRAVENOUS at 20:19

## 2024-11-20 RX ADMIN — AMITRIPTYLINE HYDROCHLORIDE 10 MG: 10 TABLET, FILM COATED ORAL at 20:12

## 2024-11-20 RX ADMIN — MEROPENEM 2000 MG: 2 INJECTION, POWDER, FOR SOLUTION INTRAVENOUS at 12:09

## 2024-11-20 RX ADMIN — SODIUM CHLORIDE, PRESERVATIVE FREE 10 ML: 5 INJECTION INTRAVENOUS at 20:12

## 2024-11-20 RX ADMIN — PIPERACILLIN AND TAZOBACTAM 4500 MG: 4; .5 INJECTION, POWDER, LYOPHILIZED, FOR SOLUTION INTRAVENOUS at 07:58

## 2024-11-20 RX ADMIN — POTASSIUM CHLORIDE 40 MEQ: 1500 TABLET, EXTENDED RELEASE ORAL at 06:38

## 2024-11-20 RX ADMIN — VANCOMYCIN HYDROCHLORIDE 125 MG: 250 POWDER, FOR SOLUTION ORAL at 08:15

## 2024-11-20 ASSESSMENT — PAIN DESCRIPTION - PAIN TYPE
TYPE: ACUTE PAIN

## 2024-11-20 ASSESSMENT — PAIN DESCRIPTION - ONSET
ONSET: PROGRESSIVE

## 2024-11-20 ASSESSMENT — PAIN DESCRIPTION - ORIENTATION
ORIENTATION: POSTERIOR

## 2024-11-20 ASSESSMENT — PAIN DESCRIPTION - FREQUENCY
FREQUENCY: INTERMITTENT

## 2024-11-20 ASSESSMENT — PAIN - FUNCTIONAL ASSESSMENT
PAIN_FUNCTIONAL_ASSESSMENT: PREVENTS OR INTERFERES SOME ACTIVE ACTIVITIES AND ADLS

## 2024-11-20 ASSESSMENT — PAIN DESCRIPTION - LOCATION
LOCATION: HEAD;NECK

## 2024-11-20 ASSESSMENT — PAIN SCALES - GENERAL
PAINLEVEL_OUTOF10: 8

## 2024-11-20 ASSESSMENT — PAIN DESCRIPTION - DESCRIPTORS
DESCRIPTORS: PRESSURE;ACHING

## 2024-11-20 NOTE — CONSULTS
[facility] -  Clinical Pharmacy Note    Vancomycin - Management by Pharmacy    Consult Date(s): 11/20  Consulting Provider(s): Polina Porter (APRN)    Assessment / Plan  Febrile neutropenia/possible PNA - Vancomycin  Concurrent Antimicrobials: Meropenem (started 11/20) and treatment dosing Vfend (started 11/13)  Prophylaxis: valacyclovir, oral vancomycin  Day of Vanc Therapy / Ordered Duration: 1 / TBD  Current Dosing Method: Bayesian-Guided AUC Dosing  Therapeutic Goal: -600 mg/L*hr  Current Dose / Plan:   Vancomycin resumed today due to concern for meningitis, as patient has persistent fevers with no clear source  Scr stable at 0.6 today  Vancomycin 1250 mg q12h was just discontinued yesterday, with estimated  mg/L*hr with this regimen.  Will give vancomycin 1750 mg x 1, followed by vancomycin 1250 mg q12h  Estimated AUCss = 511 mg/L*hr, estimated trough = 11.9 mg/L  First dose to be given after LP performed  Will continue to monitor clinical condition and make adjustments to regimen as appropriate.    Thank you for consulting pharmacy,    Saida Earl, PharmD  [facility] Clinical Pharmacist  Phone: [phone]      Interval update:  Continues with persistent fevers (Tmax 103F). Team now concerned for meningitis due to neck stiffness and headaches, LP pending today. Plan to continue vancomycin until cultures from CSF result.    Subjective/Objective:   Dennys Peguero is a 44 y.o. male with a PMHx significant for oligodendrioglioma (/sp resection, radiation, temodar), rectal adenocarcinoma (s/p resection), AML with deletion MDS (currently being treated with Dacogen/Venetoclax), recurrent c diff infections, multifocal pneumonia who is admitted initially with sepsis s/t klebsiella bacteremia.  Now restarting vancomycin due to concern for meningitis.     Pharmacy is consulted to dose vancomycin.    Ht Readings from Last 1 Encounters:   11/07/24 1.778 m (5' 10\")     Wt Readings from Last 1 Encounters:   11/20/24

## 2024-11-20 NOTE — CARE COORDINATION
Discussed plan of care with treatment team and outpatient provider during morning meeting. Per Dr. Nguyen, plan to keep the family meeting for Friday at 3:30pm.     RN updated. Called and spoke with pt's father Pierre. He was updated on the family meeting for Friday at 330pm and said this will work for both him/Sima (pt's aunt). He states that there are no updates on housing as they are calling him Thursday. He states he will update Mary Kate on outcome.     SW will follow    JENNIFER Ortiz   for [facility] ([facility])  [employer]: [phone]    Addendum at 11:00am: Discussed plan of care with treatment team during rounds. Discussed barrier if needs IV ABX at home and current recommendations from OT.

## 2024-11-20 NOTE — PROGRESS NOTES
Physical Therapy/Occupational Therapy  Attempt Note/No Treatment    Pt politely declined PT/OT at this time d/t fatigue. \"I haven't gotten any rest.\" \"I'll do it tomorrow.\" Will follow up per plan [place] [phone]  Morenita Mendoza, OTD, OTR/L [phone]

## 2024-11-20 NOTE — PROGRESS NOTES
with right parietal lobe microhemorrhage.  2.  Decrease in small foci of enhancement associated with other previously noted microhemorrhages.  3.  Otherwise stable innumerable foci of microhemorrhages.  4.  Differential diagnosis includes vasculopathy whether inflammatory or infectious, amyloid angiopathy and less typical for hemorrhagic metastases.    KUB 10/29:  Mild diffuse gaseous distention of colon. There may be mild gaseous distention of small bowel. Possible ileus. Continue follow-up.    Ultrasound gallbladder 10/29:  Diffuse wall thickening of the gallbladder without stone, pericholecystic fluid,  or positive sonographic Pastrana sign. This finding is nonspecific but could  represent underlying hepatitis or other inflammatory conditions. If there is  remaining clinical concern for acute cholecystitis then consider evaluation with  nuclear medicine HIDA study.    CT chest without contrast 10/28:  Small cavitary lesion with a satellite nodule in the left upper lobe. This could represent an infectious process although other etiologies not excluded.  Bibasilar airspace disease right greater than left.     CT head without contrast 10/28:  No new acute intracranial pathology    Mild sinusitis     KUB 10/28:  Nasoenteric catheter tip is in the mid stomach. Central venous catheter tip of  the chest at the cavoatrial junction. Visualized bowel gas pattern is  unremarkable.     Portable chest x-ray 10/28:  1. New consolidation in the right lung base which could represent atelectasis or  airspace disease.     CT abdomen pelvis with IV contrast 10/28:  1. Interval increase in nodular consolidation of the right lung base most  suggestive of pneumonia.     Chest x-ray 10/28:  No acute disease.    Scheduled Meds:   piperacillin-tazobactam  4,500 mg IntraVENous Q8H    voriconazole  300 mg Oral 2 times per day    valACYclovir  500 mg Oral BID    amitriptyline  10 mg Oral Nightly    pantoprazole  40 mg Oral BID AC     underlying MDS and rectal adenoca.       Septic shock with neutropenic fever, Klebsiella bacteremia:  -WBC currently 2, ANC 1.4  -Patient was febrile on admission up to 104.6. still intermittent temps.   -Blood cultures x 2 10/28 positive for Klebsiella pneumoniae  -Patient presented with PICC that has been present for the past 3 months   -Unclear source of Klebsiella bacteremia, usually more GI/ pathogen.  Patient did present with abdominal pain however CT abdomen and pelvis did not suggest any pathology or infectious etiology here. Repeated CT on 10/31 also neg for acute abd pathology.  -KUB repeated on 10/29 suggestive of gaseous distention of the small bowel, possible ileus.  General surgery following, no planned intervention at this time.    - PICC line that has been in place for the past 3 months has been removed on 10/29, PICC tip culture neg  - neg repeat blood cultures post removal since 10/30, can consider replacing PICC as needed if blood cultures remain negative  -TTE without evidence of IE on visualized valves.  - CT chest abd pelvis 10/28 showing Interval increase in nodular consolidation of the right lung base most suggestive of pneumonia.  Pneumonia PCR panel negative  - given progressive worsening, decreased temp, worsening abd pain, lower BP and elevated procal, team added daptomycin and micafungin on 10/30.  - on 10/31 new marjan blood with stools, GI eval no plans for intervention at this time, flex sig once neutropenia improves. Continues to have dark stools.   - GI performed EGD on 11/5, no significant findings.   -  repeat CT chest abdomen and pelvis 11/6 showing progressive multifocal pneumonia and consolidative opacities with new nodule on the right apex.  There is nonspecific fluid attenuation of the colon  - stopped daptomycin 11/6 as no further lines in place and no new intra abd pathology.   - given ongoing fever and new lung findings, will repeat endemic fungi workup, to include

## 2024-11-20 NOTE — PROGRESS NOTES
At 1200, patient's oxygen saturation was 87 on room air. Patient repositioned and put on 1 L NC. Oxygen saturation increased. CORINNE Porter notified.

## 2024-11-20 NOTE — PROGRESS NOTES
Received report from day shift RN that lab had not come to draw 1600 labs, and day shift RN called lab at 1800 to see progress on labs. This RN called lab at 2004 about pt not having labs drawn yet. Labs were drawn at 2115. Care continues.

## 2024-11-20 NOTE — PROGRESS NOTES
At 0833 patient had oral temp of 103 F. Patient also tachycardic and tachypneic at time. RN notified NP Polina and gave patient tylenol. Patient's temp decreased slowly to low-grade fever, see flowsheets.

## 2024-11-20 NOTE — PROGRESS NOTES
NUTRITION NOTE: Calorie Count      Diet Orders / Intake / Nutrition Support  Current diet/supplement order: ADULT DIET; Regular; Low Microbial  ADULT ORAL NUTRITION SUPPLEMENT; Breakfast, Lunch, Dinner; Standard High Calorie/High Protein Oral Supplement       COMPARATIVE STANDARDS  Energy (kcal):  1336-3246 (27-30 kcal/kg admit wt); Weight Used for Energy Requirements:  Admission     Protein (g):   (1.3-1.6 g/kg admit wt); Weight Used for Protein Requirements:  Admission        Fluid (ml/day):  1 ml/kcal; Method Used for Fluid Requirements:  1 ml/kcal      Date Consumed PO Intake Kcal %   Kcal met PO Intake grams protein %  Protein met   Comments   11/19 240 kcal 12% ~10 g 10% 2 bites chicken tenders, Ensure compact   11/20 340 kcal 15% 18 g 18% 80% chicken caesar with ranch   11/21 510 kcal 26% 28 g 28% Breakfast- 100% white toast, 2 oz scrambled eggs  Dinner- Ensure +HP                    **Results will be posted as available.     Emma Mcgraw RD  [place]:  [phone]  Office:  [phone]

## 2024-11-20 NOTE — PLAN OF CARE
Problem: Safety - Adult  Goal: Free from fall injury  Outcome: Progressing  Note: Pt is a High fall risk due to weakness/dizziness. See Apple Fall Score and ABCDS Injury Risk assessments.   + Screening for Orthostasis and/or + High Fall Risk per APPLE/ABCDS: Explained fall risk precautions to pt and family and rationale behind their use to keep the patient safe. Pt bed is in low position, side rails up, call light and belongings are in reach. Fall wristband applied and present on pts wrist.  Bed alarm on.  Pt encouraged to call for assistance. Will continue with hourly rounds for PO intake, pain needs, toileting and repositioning as needed.     Problem: Pain  Goal: Verbalizes/displays adequate comfort level or baseline comfort level  Outcome: Not Progressing  Note: Patient reporting 8/10 head/neck pain with movement. Tramadol given by RN with little relief. LP ordered by Dr. Tiwari.      Problem: Hematologic - Adult  Goal: Maintains hematologic stability  Outcome: Not Progressing  Note:Patient's hemoglobin this AM:   Recent Labs     11/20/24  0423   HGB 7.7*     Patient's platelet count this AM:   Recent Labs     11/20/24  0423   PLT 70*    Thrombocytopenia Precautions in place.  Patient showing no signs or symptoms of active bleeding.  Transfusion not indicated at this time.  Patient verbalizes understanding of all instructions. Will continue to assess and implement POC. Call light within reach and hourly rounding in place.      Problem: Gastrointestinal - Adult  Goal: Maintains or returns to baseline bowel function  Outcome: Not Progressing  Note: Patient having 2-3 very small liquidy bowel movements per day. Prior to BM, patient has abdominal pain.

## 2024-11-20 NOTE — PROGRESS NOTES
[facility] Progress Note    2024    Dennys Peguero    :  1980    MRN:  [id number]      Interval Hx:  he is having headaches and neck stiffness , LP pending today.     Liver aspirate sent  () for further infectious workup. Unclear if cultures were sent   *Spoke w/ lab 24, Anaerobic & Aerobic, fungal, and body fluid Cx all In Process at [facility], will be posted soon*      BAL positive for aspergillus galacto Ag by EIA (collected , resulted 11/15)      ECOG PS:  (2) Ambulatory and capable of self care, unable to carry out work activity, up and about > 50% or waking hours    KPS:  60% Requires occasional assistance, but is able to care for most of his personal needs    Isolation:  Strict contact      Medication:    Scheduled:   piperacillin-tazobactam  4,500 mg IntraVENous Q8H    voriconazole  300 mg Oral 2 times per day    valACYclovir  500 mg Oral BID    amitriptyline  10 mg Oral Nightly    pantoprazole  40 mg Oral BID AC    levETIRAcetam  1,500 mg Oral BID    sodium chloride flush  5-40 mL IntraVENous 2 times per day    Hydrocerin   Topical BID    sodium chloride flush  5-40 mL IntraVENous 2 times per day    Saline Mouthwash  15 mL Swish & Spit 4x Daily AC & HS    vancomycin  125 mg Oral 2 times per day     Continuous Infusions:   sodium chloride      sodium chloride      sodium chloride      sodium chloride      sodium chloride      sodium chloride 5 mL/hr at 24 0921     PRN:  traMADol, cyclobenzaprine, sodium chloride, dicyclomine, sodium chloride, sodium chloride, sodium chloride, acetaminophen, sodium chloride, potassium chloride **OR** potassium alternative oral replacement **OR** potassium chloride, simethicone, ondansetron, prochlorperazine, [Held by provider] loperamide, polyethylene glycol, sodium chloride, sodium chloride flush, magnesium sulfate, Saline Mouthwash, ALTEplase (CATHFLO) 2 mg in sterile water 2 mL injection      ROS:  As noted above, otherwise  45-50% (decreased compared to TTE on 10/14/24); normal size & wall thickness; mild global hypokinesis.  RV - mildly dilated; normal systolic function.  MV - mild regurgitation.       8.  PULM   RLL lung nodule  10/10/24, CT:  New pulmonary nodule 16 mm RLL  Repeat CT chest 12/2024    NAN cavitary lung lesion -  stable on CT 11/13/24 compared to 11/6/24   Bibasilar PNA -  improving per CT 11/13/24  Intubated for airway protection (no hypoxia) 10/28/24.  Extubated 10/29/24.  See ID section above for workup and treatment      9.  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  Left upper extremity swelling, non-tender  Doppler U/S - No evidence of deep venous thrombosis in the left upper extremity       10.  PSYCHOSOCIAL  11/12/24 - Letter provided to pt's father for [employer] regarding mold w/in the patient's apartment  Depressed mood  Psychology consulted, recs appreciated    11.  Right lower quadrant mass:  -Reviewed the CT scan with the patient and this is just a lipoma  -I explained what a lipoma is to the patient.  It is growing and I suspect at some point he is going to asked to have it removed.    12. Visual changes:  -Patient states these were several days ago and have resolved  -He has had a previous MRI and I am not going to repeat this.  - CT head 11/19/24:  No acute changes        DVT Prophylaxis:  Platelets <50,000 cells/dL - prophylactic lovenox on hold and mechanical prophylaxis with bilateral SCDs while in bed in place.  Contraindications to pharmacologic prophylaxis:  Thrombocytopenia, amyloid angiopathy (see Neuro section above for details)  Contraindications to mechanical prophylaxis:  None    Disposition: Pending resolution of fevers and clinical

## 2024-11-21 ENCOUNTER — APPOINTMENT (OUTPATIENT)
Dept: CT IMAGING | Age: 44
DRG: 720 | End: 2024-11-21
Payer: MEDICAID

## 2024-11-21 LAB
ALBUMIN SERPL-MCNC: 3.1 G/DL (ref 3.4–5)
ALP SERPL-CCNC: 240 U/L (ref 40–129)
ALT SERPL-CCNC: 8 U/L (ref 10–40)
ANION GAP SERPL CALCULATED.3IONS-SCNC: 13 MMOL/L (ref 3–16)
APTT BLD: 50.9 SEC (ref 22.1–36.4)
AST SERPL-CCNC: 7 U/L (ref 15–37)
BACTERIA CSF CULT: NORMAL
BACTERIA THROAT AEROBE CULT: NORMAL
BASOPHILS # BLD: 0 K/UL (ref 0–0.2)
BASOPHILS NFR BLD: 0 %
BILIRUB DIRECT SERPL-MCNC: 0.4 MG/DL (ref 0–0.3)
BILIRUB INDIRECT SERPL-MCNC: 0.2 MG/DL (ref 0–1)
BILIRUB SERPL-MCNC: 0.6 MG/DL (ref 0–1)
BLOOD BANK DISPENSE STATUS: NORMAL
BLOOD BANK PRODUCT CODE: NORMAL
BPU ID: NORMAL
BUN SERPL-MCNC: 8 MG/DL (ref 7–20)
CALCIUM SERPL-MCNC: 8.8 MG/DL (ref 8.3–10.6)
CHLORIDE SERPL-SCNC: 98 MMOL/L (ref 99–110)
CO2 SERPL-SCNC: 25 MMOL/L (ref 21–32)
CREAT SERPL-MCNC: 0.6 MG/DL (ref 0.9–1.3)
CRYPTOC AG CSF QL: NEGATIVE
DEPRECATED RDW RBC AUTO: 13.1 % (ref 12.4–15.4)
DESCRIPTION BLOOD BANK: NORMAL
EOSINOPHIL # BLD: 0 K/UL (ref 0–0.6)
EOSINOPHIL NFR BLD: 0 %
FINAL REPORT: NORMAL
FUNGUS BLD CULT: ABNORMAL
GFR SERPLBLD CREATININE-BSD FMLA CKD-EPI: >90 ML/MIN/{1.73_M2}
GLUCOSE SERPL-MCNC: 78 MG/DL (ref 70–99)
GRAM STN SPEC: NORMAL
HCT VFR BLD AUTO: 22.9 % (ref 40.5–52.5)
HGB BLD-MCNC: 7.8 G/DL (ref 13.5–17.5)
INR PPP: 1.53 (ref 0.85–1.15)
LYMPHOCYTES # BLD: 1 K/UL (ref 1–5.1)
LYMPHOCYTES NFR BLD: 37 %
MCH RBC QN AUTO: 30.6 PG (ref 26–34)
MCHC RBC AUTO-ENTMCNC: 34 G/DL (ref 31–36)
MCV RBC AUTO: 90.1 FL (ref 80–100)
MONOCYTES # BLD: 0.5 K/UL (ref 0–1.3)
MONOCYTES NFR BLD: 19 %
MYELOCYTES NFR BLD MANUAL: 3 %
NEUTROPHILS # BLD: 1.1 K/UL (ref 1.7–7.7)
NEUTROPHILS NFR BLD: 41 %
ORGANISM: ABNORMAL
ORGANISM: ABNORMAL
PATH INTERP BLD-IMP: NO
PLATELET # BLD AUTO: 45 K/UL (ref 135–450)
PLATELET BLD QL SMEAR: ABNORMAL
PMV BLD AUTO: 8.6 FL (ref 5–10.5)
POTASSIUM SERPL-SCNC: 3.9 MMOL/L (ref 3.5–5.1)
PRELIMINARY: NORMAL
PROT SERPL-MCNC: 6.6 G/DL (ref 6.4–8.2)
PROTHROMBIN TIME: 18.5 SEC (ref 11.9–14.9)
RBC # BLD AUTO: 2.54 M/UL (ref 4.2–5.9)
SLIDE REVIEW: ABNORMAL
SODIUM SERPL-SCNC: 136 MMOL/L (ref 136–145)
WBC # BLD AUTO: 2.6 K/UL (ref 4–11)

## 2024-11-21 PROCEDURE — 2060000000 HC ICU INTERMEDIATE R&B

## 2024-11-21 PROCEDURE — 85025 COMPLETE CBC W/AUTO DIFF WBC: CPT

## 2024-11-21 PROCEDURE — 6360000004 HC RX CONTRAST MEDICATION

## 2024-11-21 PROCEDURE — 6370000000 HC RX 637 (ALT 250 FOR IP)

## 2024-11-21 PROCEDURE — 36430 TRANSFUSION BLD/BLD COMPNT: CPT

## 2024-11-21 PROCEDURE — 2580000003 HC RX 258: Performed by: NURSE PRACTITIONER

## 2024-11-21 PROCEDURE — 6370000000 HC RX 637 (ALT 250 FOR IP): Performed by: INTERNAL MEDICINE

## 2024-11-21 PROCEDURE — 2580000003 HC RX 258

## 2024-11-21 PROCEDURE — 80076 HEPATIC FUNCTION PANEL: CPT

## 2024-11-21 PROCEDURE — 97530 THERAPEUTIC ACTIVITIES: CPT

## 2024-11-21 PROCEDURE — 36415 COLL VENOUS BLD VENIPUNCTURE: CPT

## 2024-11-21 PROCEDURE — 85730 THROMBOPLASTIN TIME PARTIAL: CPT

## 2024-11-21 PROCEDURE — 71260 CT THORAX DX C+: CPT

## 2024-11-21 PROCEDURE — 6370000000 HC RX 637 (ALT 250 FOR IP): Performed by: NURSE PRACTITIONER

## 2024-11-21 PROCEDURE — 99232 SBSQ HOSP IP/OBS MODERATE 35: CPT | Performed by: INTERNAL MEDICINE

## 2024-11-21 PROCEDURE — 6360000002 HC RX W HCPCS: Performed by: NURSE PRACTITIONER

## 2024-11-21 PROCEDURE — P9036 PLATELET PHERESIS IRRADIATED: HCPCS

## 2024-11-21 PROCEDURE — 80048 BASIC METABOLIC PNL TOTAL CA: CPT

## 2024-11-21 PROCEDURE — 97535 SELF CARE MNGMENT TRAINING: CPT

## 2024-11-21 PROCEDURE — 87040 BLOOD CULTURE FOR BACTERIA: CPT

## 2024-11-21 PROCEDURE — 87103 BLOOD FUNGUS CULTURE: CPT

## 2024-11-21 PROCEDURE — 85610 PROTHROMBIN TIME: CPT

## 2024-11-21 RX ORDER — IOPAMIDOL 755 MG/ML
75 INJECTION, SOLUTION INTRAVASCULAR
Status: COMPLETED | OUTPATIENT
Start: 2024-11-21 | End: 2024-11-21

## 2024-11-21 RX ADMIN — PANTOPRAZOLE SODIUM 40 MG: 40 TABLET, DELAYED RELEASE ORAL at 06:56

## 2024-11-21 RX ADMIN — MEROPENEM 2000 MG: 2 INJECTION, POWDER, FOR SOLUTION INTRAVENOUS at 13:12

## 2024-11-21 RX ADMIN — ACETAMINOPHEN 650 MG: 325 TABLET, FILM COATED ORAL at 20:23

## 2024-11-21 RX ADMIN — AMITRIPTYLINE HYDROCHLORIDE 10 MG: 10 TABLET, FILM COATED ORAL at 20:25

## 2024-11-21 RX ADMIN — VORICONAZOLE 300 MG: 200 TABLET ORAL at 20:24

## 2024-11-21 RX ADMIN — LEVETIRACETAM 1500 MG: 500 TABLET, FILM COATED ORAL at 09:11

## 2024-11-21 RX ADMIN — MEROPENEM 2000 MG: 2 INJECTION, POWDER, FOR SOLUTION INTRAVENOUS at 04:52

## 2024-11-21 RX ADMIN — VANCOMYCIN HYDROCHLORIDE 125 MG: 250 POWDER, FOR SOLUTION ORAL at 09:13

## 2024-11-21 RX ADMIN — PANTOPRAZOLE SODIUM 40 MG: 40 TABLET, DELAYED RELEASE ORAL at 16:26

## 2024-11-21 RX ADMIN — VANCOMYCIN HYDROCHLORIDE 125 MG: 250 POWDER, FOR SOLUTION ORAL at 20:24

## 2024-11-21 RX ADMIN — VANCOMYCIN HYDROCHLORIDE 1250 MG: 1.25 INJECTION, POWDER, LYOPHILIZED, FOR SOLUTION INTRAVENOUS at 04:54

## 2024-11-21 RX ADMIN — VALACYCLOVIR HYDROCHLORIDE 500 MG: 500 TABLET, FILM COATED ORAL at 09:11

## 2024-11-21 RX ADMIN — MEROPENEM 2000 MG: 2 INJECTION, POWDER, FOR SOLUTION INTRAVENOUS at 20:36

## 2024-11-21 RX ADMIN — LEVETIRACETAM 1500 MG: 500 TABLET, FILM COATED ORAL at 20:29

## 2024-11-21 RX ADMIN — SODIUM CHLORIDE 15 ML: 900 IRRIGANT IRRIGATION at 06:59

## 2024-11-21 RX ADMIN — ACETAMINOPHEN 650 MG: 325 TABLET, FILM COATED ORAL at 08:19

## 2024-11-21 RX ADMIN — SODIUM CHLORIDE, PRESERVATIVE FREE 10 ML: 5 INJECTION INTRAVENOUS at 09:14

## 2024-11-21 RX ADMIN — IOPAMIDOL 75 ML: 755 INJECTION, SOLUTION INTRAVENOUS at 14:27

## 2024-11-21 RX ADMIN — VALACYCLOVIR HYDROCHLORIDE 500 MG: 500 TABLET, FILM COATED ORAL at 20:24

## 2024-11-21 RX ADMIN — VORICONAZOLE 300 MG: 200 TABLET ORAL at 09:11

## 2024-11-21 RX ADMIN — POTASSIUM BICARBONATE 40 MEQ: 782 TABLET, EFFERVESCENT ORAL at 06:56

## 2024-11-21 NOTE — PROGRESS NOTES
Comprehensive Nutrition Assessment    RECOMMENDATIONS:  PO Diet: Regular; low microbial  Nutrition Supplement: Continue Ensure +HP TID  Nutrition Education: No recommendation at this time     NUTRITION ASSESSMENT:   Nutritional summary & status: Follow up. Pt lethargic, desponded. Headaches and neck stiffness, LP negative for menigitis. Febrile, 103 F this AM. Family meeting Friday to discuss GOC. PO intake intake minimal. See kcal count below. Will wait for GOC discussion to make recommendations PRN. Concerning weight loss, significant 10#/6.6% weight loss since admit (standing scale wts). As previously mentioned, severely malnourished on admit.  All appropriate nutrition interventions in place for now.     Admission // PMH: Neutropenic fever // Oligodendroglioma (s/p partial resection 2017, radiation 2019, and temodar 2019), rectal adenocarcinoma, AML, underlying MDS    Date Consumed PO Intake Kcal %   Kcal met PO Intake grams protein %  Protein met    Comments   11/19 240 kcal 12% ~10 g 10% 2 bites chicken tenders, Ensure compact    11/20  340 kcal !15% 18 g  ~18%   80% chicken caesar with ranch                                             **Results will be posted as available.     MALNUTRITION ASSESSMENT  Context of Malnutrition: Chronic Illness   Malnutrition Status: Severe malnutrition  Findings of the 6 clinical characteristics of malnutrition (Minimum of 2 out of 6 clinical characteristics is required to make the diagnosis of moderate or severe Protein Calorie Malnutrition based on AND/ASPEN Guidelines):  Energy Intake:  Mild decrease in energy intake (6 days this admit)  Weight Loss:  Greater than 10% over 6 months (20% in 6 months)     Body Fat Loss:  Mild body fat loss Buccal region, Orbital, Triceps   Muscle Mass Loss:  Severe muscle mass loss Temples (temporalis), Clavicles (pectoralis & deltoids)      NUTRITION DIAGNOSIS   Inadequate oral intake related to decreased appetite, pain as evidenced by

## 2024-11-21 NOTE — PLAN OF CARE
Problem: Pain  Goal: Verbalizes/displays adequate comfort level or baseline comfort level  11/21/2024 1653 by Jennifer Mooney, RN  Outcome: Progressing  Flowsheets (Taken 11/21/2024 1653)  Verbalizes/displays adequate comfort level or baseline comfort level:   Encourage patient to monitor pain and request assistance   Assess pain using appropriate pain scale   Administer analgesics based on type and severity of pain and evaluate response   Implement non-pharmacological measures as appropriate and evaluate response   Consider cultural and social influences on pain and pain management   Notify Licensed Independent Practitioner if interventions unsuccessful or patient reports new pain  Note: Patient not complaining of pain this shift     Problem: Safety - Adult  Goal: Free from fall injury  11/21/2024 1653 by Jennifer Mooney, RN  Outcome: Progressing  Flowsheets (Taken 11/21/2024 1653)  Free From Fall Injury: Instruct family/caregiver on patient safety  Note: Patient in bed with bed alarm on, instructed to call for assistance, verbalizes understanding. Fall precautions in place.       Problem: Infection - Adult  Goal: Absence of infection at discharge  Outcome: Progressing  Flowsheets   Absence of infection at discharge: Assess and monitor for signs and symptoms of infection

## 2024-11-21 NOTE — PROGRESS NOTES
This RN asked the PT three separate times, if RN could bathe PT, change PT grown,pants and sheets. PT stated to this RN that \" I can still get myself dressed\". RN asked the PT if it was ok that to help?. PT stated no, I will do it. RN educated the pt and informed the pt when he is ready to turn the call light on so this RN can be in the room just incase he needs assistance. PT stated ok. RN asked pt again at 0450 to allow staff to help him. PT stated I can do it his self when I am ready. RN once again told the PT to please use your call light when you are ready just so assistance can be provided when needed. Charge RN notified of attempts to clean the pt and changing the sheets and clothes.

## 2024-11-21 NOTE — PLAN OF CARE
Problem: Pain  Goal: Verbalizes/displays adequate comfort level or baseline comfort level  Outcome: Progressing  Flowsheets (Taken 11/21/2024 0617)  Verbalizes/displays adequate comfort level or baseline comfort level:   Encourage patient to monitor pain and request assistance   Assess pain using appropriate pain scale   Administer analgesics based on type and severity of pain and evaluate response   Implement non-pharmacological measures as appropriate and evaluate response   Consider cultural and social influences on pain and pain management   Notify Licensed Independent Practitioner if interventions unsuccessful or patient reports new pain     Problem: Safety - Adult  Goal: Free from fall injury  Outcome: Progressing  Flowsheets (Taken 11/21/2024 0617)  Free From Fall Injury:   Instruct family/caregiver on patient safety   Based on caregiver fall risk screen, instruct family/caregiver to ask for assistance with transferring infant if caregiver noted to have fall risk factors     Problem: Hematologic - Adult  Goal: Maintains hematologic stability  Outcome: Progressing  Flowsheets (Taken 11/21/2024 0617)  Maintains hematologic stability:   Assess for signs and symptoms of bleeding or hemorrhage   Monitor labs for bleeding or clotting disorders   Administer blood products/factors as ordered

## 2024-11-21 NOTE — PROGRESS NOTES
[facility] Progress Note    2024    Dennys Peguero    :  1980    MRN:  [id number]      Interval Hx:    - he is having headaches and neck stiffness , LP 24, meningitis panel and crypto Ag negative,   - BAL positive for aspergillus galacto Ag by EIA (collected , resulted 11/15)  - Liver aspirate sent () for further infectious workup: all cultures NGTD  - Case discussed at path meeting 24  - Family meeting planned fo 24 to discuss supportive/palliative care.       ECOG PS:  (2) Ambulatory and capable of self care, unable to carry out work activity, up and about > 50% or waking hours    KPS:  60% Requires occasional assistance, but is able to care for most of his personal needs    Isolation:  Strict contact      Medication:    Scheduled:   meropenem  2,000 mg IntraVENous Q8H    vancomycin  1,250 mg IntraVENous Q12H    voriconazole  300 mg Oral 2 times per day    valACYclovir  500 mg Oral BID    amitriptyline  10 mg Oral Nightly    pantoprazole  40 mg Oral BID AC    levETIRAcetam  1,500 mg Oral BID    sodium chloride flush  5-40 mL IntraVENous 2 times per day    Hydrocerin   Topical BID    sodium chloride flush  5-40 mL IntraVENous 2 times per day    Saline Mouthwash  15 mL Swish & Spit 4x Daily AC & HS    vancomycin  125 mg Oral 2 times per day     Continuous Infusions:   sodium chloride      sodium chloride      sodium chloride      sodium chloride      sodium chloride      sodium chloride 5 mL/hr at 24 0921     PRN:  traMADol, cyclobenzaprine, sodium chloride, dicyclomine, sodium chloride, sodium chloride, sodium chloride, acetaminophen, sodium chloride, potassium chloride **OR** potassium alternative oral replacement **OR** potassium chloride, simethicone, ondansetron, prochlorperazine, [Held by provider] loperamide, polyethylene glycol, sodium chloride, sodium chloride flush, magnesium sulfate, Saline Mouthwash, ALTEplase (CATHFLO) 2 mg in sterile water 2  possibly chronic mesenteric ischemia:  HIDA scan unrevealing on 10/31, PUD ruled out with EGD on 11/5.  Cannot definitively r/o GI pathology  Acute melena:  no longer melanotic stools; however, positive FOBT 11/11/24  10/28/24 - lipase 12.0, CT abd/pelvis with IV contrast was unrevealing  10/31/24 - CT abd with IV contrast & HIDA were unrevealing  11/03/24 - reoccurred, stopped 11/9  Cont scheduled Bentyl   GI consulted, recs appreciated  GI re-consulted to request a colonoscopy 11/12/24 - unable to perform d/t neutropenia  Cont amitriptyline QHS  Cont Protonix 40 mg BID      6.  NEURO   Oligodendroglioma s/p partial resection 2017  See oncology section above for details    Amyloid angiopathy  9/30/24, MRI brain w & w/o contrast - Pattern found to be c/w amyloid angiopathy per neurosurgery with recommendation to avoid anticoagulation as it would significantly increase the risk of ICH; no surgical intervention warranted.    10/10/24, CT head w/o contrast - Stable small foci of increased density parietal and occipital lobe on the right occipital lobe on the left similar to the previous exam c/w multiple small areas of hemorrhage.  10/29/24, MRI brain w & w/o contrast - Slightly more prominent small area of vasogenic edema a/w left occipital lobe microhemorrhage.  Slightly more prominent small area of vasogenic edema a/w right parietal lobe microhemorrhage. Decrease in small foci of enhancement a/w other previously noted microhemorrhages.    Seizures (dx 2009)  Cont Keppra 1500 mg BID      7.  CVS  New-onset HFmrEF - likely transient d/t shock  10/29/24, TTE:  LV - EF 45-50% (decreased compared to TTE on 10/14/24); normal size & wall thickness; mild global hypokinesis.  RV - mildly dilated; normal systolic function.  MV - mild regurgitation.       8.  PULM   RLL lung nodule  10/10/24, CT:  New pulmonary nodule 16 mm RLL  Repeat CT chest 12/2024    NAN cavitary lung lesion -  stable on CT 11/13/24 compared to 11/6/24

## 2024-11-21 NOTE — CONSULTS
Vancomycin has been discontinued. Pharmacy will sign off consult. If medication dosing is resumed, please re-consult pharmacy.    Please call with any questions.  Juan Miguel LaceyD, BCPS  Main pharmacy: [id number]  11/21/2024 9:43 AM

## 2024-11-21 NOTE — PROGRESS NOTES
with left occipital lobe microhemorrhage. Slightly more prominent small area of vasogenic edema associated with right parietal lobe microhemorrhage.  2.  Decrease in small foci of enhancement associated with other previously noted microhemorrhages.  3.  Otherwise stable innumerable foci of microhemorrhages.  4.  Differential diagnosis includes vasculopathy whether inflammatory or infectious, amyloid angiopathy and less typical for hemorrhagic metastases.    KUB 10/29:  Mild diffuse gaseous distention of colon. There may be mild gaseous distention of small bowel. Possible ileus. Continue follow-up.    Ultrasound gallbladder 10/29:  Diffuse wall thickening of the gallbladder without stone, pericholecystic fluid,  or positive sonographic Pastrana sign. This finding is nonspecific but could  represent underlying hepatitis or other inflammatory conditions. If there is  remaining clinical concern for acute cholecystitis then consider evaluation with  nuclear medicine HIDA study.    CT chest without contrast 10/28:  Small cavitary lesion with a satellite nodule in the left upper lobe. This could represent an infectious process although other etiologies not excluded.  Bibasilar airspace disease right greater than left.     CT head without contrast 10/28:  No new acute intracranial pathology    Mild sinusitis     KUB 10/28:  Nasoenteric catheter tip is in the mid stomach. Central venous catheter tip of  the chest at the cavoatrial junction. Visualized bowel gas pattern is  unremarkable.     Portable chest x-ray 10/28:  1. New consolidation in the right lung base which could represent atelectasis or  airspace disease.     CT abdomen pelvis with IV contrast 10/28:  1. Interval increase in nodular consolidation of the right lung base most  suggestive of pneumonia.     Chest x-ray 10/28:  No acute disease.    Scheduled Meds:   meropenem  2,000 mg IntraVENous Q8H    voriconazole  300 mg Oral 2 times per day    valACYclovir  500 mg  ongoing fever and new lung findings, neg repeated endemic fungi workup, to include histoplasma, Blastomyces and cryptococcus antigen/antibody. These were also previously negative in 5/2024.   - pulm eval and performed BAL on 11/7, pneumonia panel from washings all neg. Cultures no growth   - continue mid abd pain worsened around timing of Bms, with dark stools.  - transitioned to po levaquin for ppx on 11/12. Resumed on pip-tazo 11/13 due to ongoing fevers. And now back on meropenem 11/20  - Changed antifungal from micafungin to voriconazole on 11/13. BAL wash aspergillus galactomannan 0.52, other two from RML and lingula was 0.15 from same date.   - with pt's prolonged and severe neutropenia, suspect likely  of ongoing fever. So far, extensive workup has not yielded any positive cultures or etiology of infectious source. With increasing time spent in hospital nosocomial infection risks also elevated.    - His MRSA nares has returned negative, less likely to see MRSA nares in liver abscesses, especially as liver aspirate neg with growth.  Stopped vanco 11/19.  - CT chest abd and pelvis repeated on 11/13 showing new multiple hypodense liver lesions concerning for possible small liver abscess  - s/p IR guided bx 11/14, no path sent, Cx pending include AFB, fungal and routine aerobic/anaerobic all neg to date. Called micro 11/19 and Bacterial cx have finalized as negative from this. (not showing up in chart but confirmed with micro)  - some neck stiffness and headache 11/20, LP performed with negative CSF meningitis panel and neg cx to date.   - planned family meeting tomorrow with palliative team     AML with underlying MDS:  - OHC following, induction with dacogen and venetoclax  - Repeat bone marrow biopsy 11/6,  Blast cells 6%, TP53 mutation detected.      Rectal adenoca:   - s/p rectal mass resection 4/12/24. Path staging T1N0M0. No plans for neoadjuvant at this time.      History of recurrent C.

## 2024-11-21 NOTE — PROGRESS NOTES
Occupational Therapy  Facility/Department: [address] [facility] [place]  Occupational Therapy Treatment  Name: Dennys Peguero  : 1980  MRN: [id number]  Date of Service: 2024    Discharge Recommendations:  24 hour supervision or assist, Home with Home health OT  OT Equipment Recommendations  Equipment Needed: Yes  Other: shower chair    Treatment Diagnosis: impaired ADLs and mobility    Assessment  Performance deficits / Impairments: Decreased functional mobility ;Decreased ADL status;Decreased endurance  Assessment: Pt completed shower and dressing this date with increased time and VC for modifcations for safety and energy conservation. Pt is limited by gross weakness and deconditioning. Pt CGA for mobility and CGA-SBA ADLs. Continue OT tx per POC  Treatment Diagnosis: impaired ADLs and mobility  Prognosis: Good  REQUIRES OT FOLLOW-UP: Yes  Activity Tolerance  Activity Tolerance: Patient limited by fatigue;Patient Tolerated treatment well     Plan  Occupational Therapy Plan  Times Per Week: 2-5  Current Treatment Recommendations: Balance training, Functional mobility training, Endurance training, Safety education & training, Self-Care / ADL    Restrictions  Restrictions/Precautions  Restrictions/Precautions: Fall Risk  Position Activity Restriction  Other position/activity restrictions: up as tolerated, If platelet count equal to or less than 10 but the patient has received a platelet transfusion, physical therapy or occupational therapy may proceed with treatment; contact precautions    Subjective  General  Chart Reviewed: Yes  Patient assessed for rehabilitation services?: Yes  Additional Pertinent Hx: 43-year-old male with history of left frontoparietal oligodendroglioma s/p resection and brain radiation in , 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 Diff in 3/2024, rectal adenocarcinoma in 3/2024 s/p rectal mass resection in 2024. Patient presented on 10/28 with sepsis/neutropenic  do functional tasks in room with S/I- ongoing  Patient Goals   Patient goals : not stated      Therapy Time   Individual Concurrent Group Co-treatment   Time In 1500         Time Out 1608         Minutes 68         Timed Code Treatment Minutes: 68 Minutes   Total Treatment Time: 68 minutes    Ceci Parikh, OT

## 2024-11-21 NOTE — CARE COORDINATION
Attended MD rounds and discussed plan of care with treatment team.     Family meeting is scheduled for Friday 11/22/24 at 3:30pm and Mary Kate RN will reach out to SW/YULIET covering for writer if needs assistance with referrals being placed.     SW will continue to follow.     Sharla COLON, JENNIFER   for [facility] ([facility])  [employer]: [phone]

## 2024-11-22 ENCOUNTER — APPOINTMENT (OUTPATIENT)
Age: 44
DRG: 720 | End: 2024-11-22
Payer: MEDICAID

## 2024-11-22 LAB
ALBUMIN SERPL-MCNC: 3.1 G/DL (ref 3.4–5)
ALP SERPL-CCNC: 240 U/L (ref 40–129)
ALT SERPL-CCNC: 7 U/L (ref 10–40)
ANION GAP SERPL CALCULATED.3IONS-SCNC: 11 MMOL/L (ref 3–16)
AST SERPL-CCNC: 10 U/L (ref 15–37)
BACTERIA BLD CULT ORG #2: NORMAL
BACTERIA BLD CULT ORG #2: NORMAL
BACTERIA BLD CULT: NORMAL
BACTERIA BLD CULT: NORMAL
BACTERIA URNS QL MICRO: ABNORMAL /HPF
BASOPHILS # BLD: 0 K/UL (ref 0–0.2)
BASOPHILS NFR BLD: 0 %
BILIRUB DIRECT SERPL-MCNC: 0.3 MG/DL (ref 0–0.3)
BILIRUB INDIRECT SERPL-MCNC: 0.3 MG/DL (ref 0–1)
BILIRUB SERPL-MCNC: 0.6 MG/DL (ref 0–1)
BILIRUB UR QL STRIP.AUTO: NEGATIVE
BUN SERPL-MCNC: 8 MG/DL (ref 7–20)
CALCIUM SERPL-MCNC: 8.9 MG/DL (ref 8.3–10.6)
CHLORIDE SERPL-SCNC: 100 MMOL/L (ref 99–110)
CLARITY UR: CLEAR
CO2 SERPL-SCNC: 26 MMOL/L (ref 21–32)
COLOR UR: YELLOW
CREAT SERPL-MCNC: 0.6 MG/DL (ref 0.9–1.3)
DEPRECATED RDW RBC AUTO: 13 % (ref 12.4–15.4)
ECHO BSA: 1.82 M2
ECHO LV EDV 3D: 179 ML
ECHO LV EDV INDEX 3D: 97 ML/M2
ECHO LV EF PHYSICIAN: 50 %
ECHO LV ESV 3D: 112 ML
ECHO LV ESV INDEX 3D: 61 ML/M2
ECHO LV MASS 3D INDEX: 109.2 G/M2
ECHO LV MASS 3D: 201 G
EOSINOPHIL # BLD: 0 K/UL (ref 0–0.6)
EOSINOPHIL NFR BLD: 0 %
EPI CELLS #/AREA URNS HPF: ABNORMAL /HPF (ref 0–5)
FIBRINOGEN PPP-MCNC: 358 MG/DL (ref 227–534)
GFR SERPLBLD CREATININE-BSD FMLA CKD-EPI: >90 ML/MIN/{1.73_M2}
GLUCOSE SERPL-MCNC: 115 MG/DL (ref 70–99)
GLUCOSE UR STRIP.AUTO-MCNC: NEGATIVE MG/DL
HCT VFR BLD AUTO: 24.7 % (ref 40.5–52.5)
HGB BLD-MCNC: 8.3 G/DL (ref 13.5–17.5)
HGB UR QL STRIP.AUTO: NEGATIVE
KETONES UR STRIP.AUTO-MCNC: 15 MG/DL
LDH SERPL L TO P-CCNC: 142 U/L (ref 100–190)
LEUKOCYTE ESTERASE UR QL STRIP.AUTO: NEGATIVE
LYMPHOCYTES # BLD: 1.1 K/UL (ref 1–5.1)
LYMPHOCYTES NFR BLD: 33 %
MAGNESIUM SERPL-MCNC: 1.64 MG/DL (ref 1.8–2.4)
MCH RBC QN AUTO: 30.3 PG (ref 26–34)
MCHC RBC AUTO-ENTMCNC: 33.6 G/DL (ref 31–36)
MCV RBC AUTO: 90.2 FL (ref 80–100)
MONOCYTES # BLD: 0.7 K/UL (ref 0–1.3)
MONOCYTES NFR BLD: 22 %
NEUTROPHILS # BLD: 1.4 K/UL (ref 1.7–7.7)
NEUTROPHILS NFR BLD: 45 %
NITRITE UR QL STRIP.AUTO: NEGATIVE
PATH INTERP BLD-IMP: NO
PH UR STRIP.AUTO: 7 [PH] (ref 5–8)
PHOSPHATE SERPL-MCNC: 2.3 MG/DL (ref 2.5–4.9)
PLATELET # BLD AUTO: 63 K/UL (ref 135–450)
PLATELET BLD QL SMEAR: ABNORMAL
PMV BLD AUTO: 9.1 FL (ref 5–10.5)
POTASSIUM SERPL-SCNC: 3.7 MMOL/L (ref 3.5–5.1)
PROT SERPL-MCNC: 6.6 G/DL (ref 6.4–8.2)
PROT UR STRIP.AUTO-MCNC: 100 MG/DL
RBC # BLD AUTO: 2.74 M/UL (ref 4.2–5.9)
RBC #/AREA URNS HPF: ABNORMAL /HPF (ref 0–4)
RBC MORPH BLD: NORMAL
SLIDE REVIEW: ABNORMAL
SODIUM SERPL-SCNC: 137 MMOL/L (ref 136–145)
SP GR UR STRIP.AUTO: 1.02 (ref 1–1.03)
UA DIPSTICK W REFLEX MICRO PNL UR: YES
URATE SERPL-MCNC: 2.4 MG/DL (ref 3.5–7.2)
URN SPEC COLLECT METH UR: ABNORMAL
UROBILINOGEN UR STRIP-ACNC: 0.2 E.U./DL
WBC # BLD AUTO: 3.2 K/UL (ref 4–11)
WBC #/AREA URNS HPF: ABNORMAL /HPF (ref 0–5)

## 2024-11-22 PROCEDURE — 2580000003 HC RX 258: Performed by: NURSE PRACTITIONER

## 2024-11-22 PROCEDURE — 85025 COMPLETE CBC W/AUTO DIFF WBC: CPT

## 2024-11-22 PROCEDURE — 6370000000 HC RX 637 (ALT 250 FOR IP): Performed by: INTERNAL MEDICINE

## 2024-11-22 PROCEDURE — 80076 HEPATIC FUNCTION PANEL: CPT

## 2024-11-22 PROCEDURE — 81001 URINALYSIS AUTO W/SCOPE: CPT

## 2024-11-22 PROCEDURE — 80048 BASIC METABOLIC PNL TOTAL CA: CPT

## 2024-11-22 PROCEDURE — 93325 DOPPLER ECHO COLOR FLOW MAPG: CPT | Performed by: INTERNAL MEDICINE

## 2024-11-22 PROCEDURE — 84550 ASSAY OF BLOOD/URIC ACID: CPT

## 2024-11-22 PROCEDURE — 6370000000 HC RX 637 (ALT 250 FOR IP)

## 2024-11-22 PROCEDURE — 6360000002 HC RX W HCPCS: Performed by: NURSE PRACTITIONER

## 2024-11-22 PROCEDURE — 36415 COLL VENOUS BLD VENIPUNCTURE: CPT

## 2024-11-22 PROCEDURE — 97116 GAIT TRAINING THERAPY: CPT

## 2024-11-22 PROCEDURE — 84100 ASSAY OF PHOSPHORUS: CPT

## 2024-11-22 PROCEDURE — 87086 URINE CULTURE/COLONY COUNT: CPT

## 2024-11-22 PROCEDURE — 93308 TTE F-UP OR LMTD: CPT

## 2024-11-22 PROCEDURE — 85384 FIBRINOGEN ACTIVITY: CPT

## 2024-11-22 PROCEDURE — 6360000002 HC RX W HCPCS

## 2024-11-22 PROCEDURE — 97530 THERAPEUTIC ACTIVITIES: CPT

## 2024-11-22 PROCEDURE — 2580000003 HC RX 258

## 2024-11-22 PROCEDURE — 6370000000 HC RX 637 (ALT 250 FOR IP): Performed by: NURSE PRACTITIONER

## 2024-11-22 PROCEDURE — 93308 TTE F-UP OR LMTD: CPT | Performed by: INTERNAL MEDICINE

## 2024-11-22 PROCEDURE — 2060000000 HC ICU INTERMEDIATE R&B

## 2024-11-22 PROCEDURE — 99497 ADVNCD CARE PLAN 30 MIN: CPT | Performed by: NURSE PRACTITIONER

## 2024-11-22 PROCEDURE — 83615 LACTATE (LD) (LDH) ENZYME: CPT

## 2024-11-22 PROCEDURE — 83735 ASSAY OF MAGNESIUM: CPT

## 2024-11-22 PROCEDURE — 99232 SBSQ HOSP IP/OBS MODERATE 35: CPT | Performed by: INTERNAL MEDICINE

## 2024-11-22 RX ADMIN — SODIUM CHLORIDE, PRESERVATIVE FREE 10 ML: 5 INJECTION INTRAVENOUS at 08:32

## 2024-11-22 RX ADMIN — PANTOPRAZOLE SODIUM 40 MG: 40 TABLET, DELAYED RELEASE ORAL at 16:33

## 2024-11-22 RX ADMIN — SODIUM CHLORIDE, PRESERVATIVE FREE 10 ML: 5 INJECTION INTRAVENOUS at 20:09

## 2024-11-22 RX ADMIN — ACETAMINOPHEN 650 MG: 325 TABLET, FILM COATED ORAL at 16:32

## 2024-11-22 RX ADMIN — DEXTROSE MONOHYDRATE 350 MG: 50 INJECTION, SOLUTION INTRAVENOUS at 12:21

## 2024-11-22 RX ADMIN — ACETAMINOPHEN 650 MG: 325 TABLET, FILM COATED ORAL at 08:39

## 2024-11-22 RX ADMIN — MEROPENEM 1000 MG: 1 INJECTION INTRAVENOUS at 12:16

## 2024-11-22 RX ADMIN — AMITRIPTYLINE HYDROCHLORIDE 10 MG: 10 TABLET, FILM COATED ORAL at 20:06

## 2024-11-22 RX ADMIN — PANTOPRAZOLE SODIUM 40 MG: 40 TABLET, DELAYED RELEASE ORAL at 06:45

## 2024-11-22 RX ADMIN — SODIUM CHLORIDE 15 ML: 900 IRRIGANT IRRIGATION at 20:10

## 2024-11-22 RX ADMIN — TRAMADOL HYDROCHLORIDE 100 MG: 50 TABLET, COATED ORAL at 23:28

## 2024-11-22 RX ADMIN — VORICONAZOLE 300 MG: 200 TABLET ORAL at 08:31

## 2024-11-22 RX ADMIN — Medication: at 20:09

## 2024-11-22 RX ADMIN — VALACYCLOVIR HYDROCHLORIDE 500 MG: 500 TABLET, FILM COATED ORAL at 20:07

## 2024-11-22 RX ADMIN — LEVETIRACETAM 1500 MG: 500 TABLET, FILM COATED ORAL at 20:07

## 2024-11-22 RX ADMIN — VANCOMYCIN HYDROCHLORIDE 125 MG: 250 POWDER, FOR SOLUTION ORAL at 09:10

## 2024-11-22 RX ADMIN — LEVETIRACETAM 1500 MG: 500 TABLET, FILM COATED ORAL at 08:31

## 2024-11-22 RX ADMIN — MEROPENEM 2000 MG: 2 INJECTION, POWDER, FOR SOLUTION INTRAVENOUS at 04:04

## 2024-11-22 RX ADMIN — MEROPENEM 1000 MG: 1 INJECTION INTRAVENOUS at 19:58

## 2024-11-22 RX ADMIN — VANCOMYCIN HYDROCHLORIDE 125 MG: 250 POWDER, FOR SOLUTION ORAL at 20:07

## 2024-11-22 RX ADMIN — VALACYCLOVIR HYDROCHLORIDE 500 MG: 500 TABLET, FILM COATED ORAL at 08:31

## 2024-11-22 ASSESSMENT — PAIN - FUNCTIONAL ASSESSMENT: PAIN_FUNCTIONAL_ASSESSMENT: ACTIVITIES ARE NOT PREVENTED

## 2024-11-22 ASSESSMENT — PAIN DESCRIPTION - LOCATION: LOCATION: BACK

## 2024-11-22 ASSESSMENT — PAIN DESCRIPTION - ONSET: ONSET: AWAKENED FROM SLEEP

## 2024-11-22 ASSESSMENT — PAIN DESCRIPTION - ORIENTATION: ORIENTATION: LOWER

## 2024-11-22 ASSESSMENT — PAIN DESCRIPTION - PAIN TYPE: TYPE: ACUTE PAIN

## 2024-11-22 ASSESSMENT — PAIN DESCRIPTION - DESCRIPTORS: DESCRIPTORS: ACHING;DISCOMFORT

## 2024-11-22 ASSESSMENT — PAIN SCALES - GENERAL: PAINLEVEL_OUTOF10: 10

## 2024-11-22 ASSESSMENT — PAIN DESCRIPTION - FREQUENCY: FREQUENCY: INTERMITTENT

## 2024-11-22 NOTE — ACP (ADVANCE CARE PLANNING)
Advance Care Planning      Palliative Medicine Provider (MD/NP)  Advance Care Planning (ACP) Conversation      Date of Conversation: 11/22/24  The patient and/or authorized decision maker consented to a voluntary Advance Care Planning conversation.   Individuals present for the conversation:   Patient, father, stepmother, cousin, aunt, Chelly Dunn    Legal Healthcare Agent(s):    Primary Decision Maker: Pierre Lowry - [facility] - [phone]    ACP documents available in EMR prior to discussion:  -None    Primary Palliative Diagnosis(es):  AML    Conversation Summary:  Discussed code status during family meeting. The pt and family were in agreement with DNR/DNI. They want to continue the current aggressive management in hopes of prolonging his life.    Resuscitation Status:    Code Status: Limited    Outcomes / Completed Documentation:  An explanation of advance directives and their importance was provided and the following forms completed:    -No new documents completed.    If new document completed, original was provided to patient and/or family member.    Copy was placed for scanning into the [facility] EMR.      I spent 30 minutes providing separately identifiable ACP services with the patient and/or surrogate decision maker in a voluntary, in-person conversation discussing the patient's wishes and goals as detailed in the above note.       KATHLEEN Price - CNP

## 2024-11-22 NOTE — PROGRESS NOTES
Palliative Care Chart Review  and Check in Note:     NAME:  Dennys Peguero  Admit Date: 10/28/2024  Hospital Day:  Hospital Day: 26   Current Code status: Limited    Palliative care is continuing to following Mr. Peguero for symptom management,  and goals of care discussion as needed. Patient's chart reviewed today 11/22/24.      Attended the family meeting today at pt's bedside with Dr. Nguyen, pt's father, pt's stepmother, pt's aunt, pt's cousin, Chelly Esparza APRN, and this APRN. Dr. Nguyen explained that the pt's leukemia will likely take his life in the coming weeks/months and briefly mentioned hospice. For now, the pt/family want to continue the current aggressive management. Discussed code status at length and the pt and family were in agreement with DNR/DNI.    The following are the currently established goals/code status, and Symptom management.     Goals of care: Pt/family want to continue aggressive management for now.    Code status: changed to DNR/DNI (Limited- no to all interventions) per pt/family wishes    Discharge plan: d/c home when medically ready    Maru Bryant NP  Palliative Care  [phone]

## 2024-11-22 NOTE — PLAN OF CARE
Problem: Pain  Goal: Verbalizes/displays adequate comfort level or baseline comfort level  Outcome: Progressing  Flowsheets (Taken 11/21/2024 1653)  Verbalizes/displays adequate comfort level or baseline comfort level:   Encourage patient to monitor pain and request assistance   Assess pain using appropriate pain scale   Administer analgesics based on type and severity of pain and evaluate response   Implement non-pharmacological measures as appropriate and evaluate response   Consider cultural and social influences on pain and pain management   Notify Licensed Independent Practitioner if interventions unsuccessful or patient reports new pain  Note: Patient not complaining of pain this shift     Problem: Safety - Adult  Goal: Free from fall injury  Outcome: Progressing  Flowsheets (Taken 11/21/2024 1653)  Free From Fall Injury: Instruct family/caregiver on patient safety  Note: Patient in bed with bed alarm on, instructed to call for assistance, verbalizes understanding. Fall precautions in place.       Problem: Skin/Tissue Integrity - Adult  Goal: Skin integrity remains intact  Outcome: Progressing  Flowsheets (Taken 11/18/2024 0624 by Ivette Bernard RN)  Skin Integrity Remains Intact: Monitor for areas of redness and/or skin breakdown

## 2024-11-22 NOTE — PLAN OF CARE
Problem: Safety - Adult  Goal: Free from fall injury  11/21/2024 2356 by Clau Le, RN  Outcome: Progressing     Problem: ABCDS Injury Assessment  Goal: Absence of physical injury  Outcome: Progressing     Problem: Respiratory - Adult  Goal: Achieves optimal ventilation and oxygenation  Outcome: Progressing     Problem: Gastrointestinal - Adult  Goal: Maintains or returns to baseline bowel function  Outcome: Progressing

## 2024-11-22 NOTE — PROGRESS NOTES
[facility] Progress Note    2024    Dennys Peguero    :  1980    MRN:  [id number]      Interval Hx:    - Seen and examined this morning. Sleepy this morning, but currently denying headaches or other symptoms.  - Family meeting planned for today to discuss goals of care  - Vitally, he has been febrile to 102.7 yesterday, tachycardic to 120s intermittently.     - CT CAP performed yesterday, with interval increase in size of multiple small hepatic abscesses, with additional ill-defined lesion in the lateral segment of the left  hepatic lobe which was also present back in May 2024 and has increased in size. Superimposed infection in this area is not excluded. Interval development of multiple small splenic abscesses.    ECOG PS:  (2) Ambulatory and capable of self care, unable to carry out work activity, up and about > 50% or waking hours    KPS:  60% Requires occasional assistance, but is able to care for most of his personal needs    Isolation:  Strict contact      Medication:    Scheduled:   amphotericin  350 mg IntraVENous Q24H    meropenem  1,000 mg IntraVENous Q8H    valACYclovir  500 mg Oral BID    amitriptyline  10 mg Oral Nightly    pantoprazole  40 mg Oral BID AC    levETIRAcetam  1,500 mg Oral BID    sodium chloride flush  5-40 mL IntraVENous 2 times per day    Hydrocerin   Topical BID    sodium chloride flush  5-40 mL IntraVENous 2 times per day    Saline Mouthwash  15 mL Swish & Spit 4x Daily AC & HS    vancomycin  125 mg Oral 2 times per day     Continuous Infusions:   sodium chloride 5 mL/hr at 24 0921     PRN:  sulfur hexafluoride microspheres, traMADol, cyclobenzaprine, dicyclomine, acetaminophen, potassium chloride **OR** potassium alternative oral replacement **OR** potassium chloride, simethicone, ondansetron, prochlorperazine, [Held by provider] loperamide, polyethylene glycol, sodium chloride, sodium chloride flush, magnesium sulfate, Saline Mouthwash, ALTEplase  area of vasogenic edema a/w left occipital lobe microhemorrhage.  Slightly more prominent small area of vasogenic edema a/w right parietal lobe microhemorrhage. Decrease in small foci of enhancement a/w other previously noted microhemorrhages.    Seizures (dx 2009)  Cont Keppra 1500 mg BID      7.  CVS  New-onset HFmrEF - likely transient d/t shock  10/29/24, TTE:  LV - EF 45-50% (decreased compared to TTE on 10/14/24); normal size & wall thickness; mild global hypokinesis.  RV - mildly dilated; normal systolic function.  MV - mild regurgitation.       8.  PULM   RLL lung nodule  10/10/24, CT:  New pulmonary nodule 16 mm RLL  Repeat CT chest 12/2024    NAN cavitary lung lesion -  stable on CT 11/13/24 compared to 11/6/24   Bibasilar PNA -  improving per CT 11/13/24  Intubated for airway protection (no hypoxia) 10/28/24.  Extubated 10/29/24.  See ID section above for workup and treatment      9.  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  Left upper extremity swelling, non-tender  Doppler U/S - No evidence of deep venous thrombosis in the left upper extremity       10.  PSYCHOSOCIAL  11/12/24 - Letter provided to pt's father for [employer] regarding mold w/in the patient's apartment  Depressed mood  Psychology consulted, recs appreciated    11.  Right lower quadrant mass:  -Reviewed the CT scan with the patient and this is just a lipoma  -I explained what a lipoma is to the patient.  It is growing and I suspect at some point he is going to asked to have it removed.    12. Visual changes:  -Patient states these were several days ago and have resolved  -He has had a previous MRI and I am not going to repeat this.  - CT head 11/19/24:  No acute changes      DVT

## 2024-11-22 NOTE — PROGRESS NOTES
ID Follow-up NOTE    CC:   AMS  Antibiotics: Meropenem, voriconazole, valacyclovir, p.o. vancomycin    Admit Date: 10/28/2024  Hospital Day: 26    Subjective:     Patient spiked temp today up to 101.4. Continued to be fatigued, continues to have mid abd pain, mild cough. Remains on room air, family meeting planned for later today.      Objective:     Patient Vitals for the past 8 hrs:   BP Temp Temp src Pulse Resp SpO2 Height Weight   11/22/24 1239 112/82 -- -- -- -- -- 1.778 m (5' 10\") 67.1 kg (148 lb)   11/22/24 1223 112/82 98.8 °F (37.1 °C) Oral (!) 105 22 95 % -- --   11/22/24 0827 (!) 132/92 (!) 101.4 °F (38.6 °C) Oral (!) 109 24 100 % -- --   11/22/24 0805 -- -- -- -- -- -- -- 67.5 kg (148 lb 13 oz)     I/O last 3 completed shifts:  In: 950 [P.O.:950]  Out: 1600 [Urine:1500; Stool:100]  No intake/output data recorded.    EXAM:  GENERAL: No apparent distress.  Ill appearing. Pt is fatigued easily.   HEENT: Membranes moist, no oral lesion, on room air.   NECK:  Supple, no lymphadenopathy  LUNGS: Clear b/l, no rales, no dullness, on room air  CARDIAC: RRR, no murmur appreciated  ABD:  + BS, soft, tender to palpation in the mid abdomen region.  EXT:  No rash, bilateral LE edema, right hand edema, left arm edema.   NEURO: No focal neurologic findings  PSYCH: Orientation normal, drowsy.   LINES:  Peripheral iv    Data Review:  Lab Results   Component Value Date    WBC 3.2 (L) 11/22/2024    HGB 8.3 (L) 11/22/2024    HCT 24.7 (L) 11/22/2024    MCV 90.2 11/22/2024    PLT 63 (L) 11/22/2024     Lab Results   Component Value Date    CREATININE 0.6 (L) 11/22/2024    BUN 8 11/22/2024     11/22/2024    K 3.7 11/22/2024     11/22/2024    CO2 26 11/22/2024       Hepatic Function Panel:   Lab Results   Component Value Date/Time    ALKPHOS 240 11/22/2024 02:58 AM    ALT 7 11/22/2024 02:58 AM    AST 10 11/22/2024 02:58 AM    BILITOT 0.6 11/22/2024 02:58 AM    BILIDIR 0.3 11/22/2024 02:58 AM    IBILI 0.3 11/22/2024  decreased temp, worsening abd pain, lower BP and elevated procal, team added daptomycin and micafungin on 10/30.  - on 10/31 new marjan blood with stools, GI eval no plans for intervention at this time, flex sig once neutropenia improves. Continues to have dark stools.   - GI performed EGD on 11/5, no significant findings.   -  repeat CT chest abdomen and pelvis 11/6 showing progressive multifocal pneumonia and consolidative opacities with new nodule on the right apex.  There is nonspecific fluid attenuation of the colon  - stopped daptomycin 11/6 as no further lines in place and no new intra abd pathology.   - given ongoing fever and new lung findings, neg repeated endemic fungi workup, to include histoplasma, Blastomyces and cryptococcus antigen/antibody. These were also previously negative in 5/2024.   - pulm eval and performed BAL on 11/7, pneumonia panel from washings all neg. Cultures no growth   - continue mid abd pain worsened around timing of Bms, with dark stools.  - transitioned to po levaquin for ppx on 11/12. Resumed on pip-tazo 11/13 due to ongoing fevers. And now back on meropenem 11/20  - Changed antifungal from micafungin to voriconazole on 11/13. BAL wash aspergillus galactomannan 0.52, other two from RML and lingula was 0.15 from same date.   - with pt's prolonged and severe neutropenia, suspect likely  of ongoing fever. So far, extensive workup has not yielded any positive cultures or etiology of infectious source. With increasing time spent in hospital nosocomial infection risks also elevated.    - His MRSA nares has returned negative, less likely to see MRSA nares in liver abscesses, especially as liver aspirate neg with growth.  Stopped vanco 11/19.  - CT chest abd and pelvis repeated on 11/13 showing new multiple hypodense liver lesions concerning for possible small liver abscess  - s/p IR guided bx 11/14, no path sent, Cx pending include AFB, fungal and routine aerobic/anaerobic all neg

## 2024-11-22 NOTE — PROGRESS NOTES
Physical Therapy  Facility/Department: [address]  Physical Therapy Daily Treatment     Name: Dennys Peguero  : 1980  MRN: [id number]  Date of Service: 2024    Discharge Recommendations:  Home with Home health PT, 24 hour supervision or assist   PT Equipment Recommendations  Equipment Needed: No      Patient Diagnosis(es): The primary encounter diagnosis was Neutropenic fever (HCC). 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  Past Medical History:  has a past medical history of Brain cancer (HCC) and Seizure (HCC).  Past Surgical History:  has a past surgical history that includes brain surgery; Upper gastrointestinal endoscopy (N/A, 3/6/2024); Colonoscopy (N/A, 3/22/2024); Small intestine surgery (N/A, 2024); bronchoscopy (N/A, 2024); Upper gastrointestinal endoscopy (N/A, 2024); bronchoscopy (N/A, 2024); and CT ASP ABS HEMATOMA BULLA CYST (2024).    Assessment  Body Structures, Functions, Activity Limitations Requiring Skilled Therapeutic Intervention: Decreased functional mobility ;Decreased ADL status;Decreased strength;Decreased endurance;Decreased balance  Assessment: Patient demonstrates impaired functional mobility, requiring CGA to SBA without an assistive device for gait and transfers.  Patient lives at home, father currently staying with patient, typically (I) without an assistive device for mobility.  Patient demonstrates effortful mobility, limited by low functional endurance and deconditioning, more unsteady with turns and fatigue during gait.  Patient will continue to benefit from additional skilled PT intervention to facilitate safe mobility and

## 2024-11-23 LAB
ALBUMIN SERPL-MCNC: 3.1 G/DL (ref 3.4–5)
ALP SERPL-CCNC: 233 U/L (ref 40–129)
ALT SERPL-CCNC: 11 U/L (ref 10–40)
ANION GAP SERPL CALCULATED.3IONS-SCNC: 9 MMOL/L (ref 3–16)
AST SERPL-CCNC: 13 U/L (ref 15–37)
BACTERIA UR CULT: NORMAL
BASOPHILS # BLD: 0 K/UL (ref 0–0.2)
BASOPHILS NFR BLD: 0 %
BILIRUB DIRECT SERPL-MCNC: 0.3 MG/DL (ref 0–0.3)
BILIRUB INDIRECT SERPL-MCNC: 0.2 MG/DL (ref 0–1)
BILIRUB SERPL-MCNC: 0.5 MG/DL (ref 0–1)
BUN SERPL-MCNC: 9 MG/DL (ref 7–20)
CALCIUM SERPL-MCNC: 8.7 MG/DL (ref 8.3–10.6)
CHLORIDE SERPL-SCNC: 98 MMOL/L (ref 99–110)
CO2 SERPL-SCNC: 29 MMOL/L (ref 21–32)
CREAT SERPL-MCNC: 0.6 MG/DL (ref 0.9–1.3)
DEPRECATED RDW RBC AUTO: 13.2 % (ref 12.4–15.4)
EOSINOPHIL # BLD: 0 K/UL (ref 0–0.6)
EOSINOPHIL NFR BLD: 0 %
GFR SERPLBLD CREATININE-BSD FMLA CKD-EPI: >90 ML/MIN/{1.73_M2}
GLUCOSE SERPL-MCNC: 114 MG/DL (ref 70–99)
HCT VFR BLD AUTO: 23.4 % (ref 40.5–52.5)
HGB BLD-MCNC: 7.9 G/DL (ref 13.5–17.5)
LYMPHOCYTES # BLD: 1 K/UL (ref 1–5.1)
LYMPHOCYTES NFR BLD: 26 %
MCH RBC QN AUTO: 30 PG (ref 26–34)
MCHC RBC AUTO-ENTMCNC: 33.9 G/DL (ref 31–36)
MCV RBC AUTO: 88.5 FL (ref 80–100)
MONOCYTES # BLD: 0.6 K/UL (ref 0–1.3)
MONOCYTES NFR BLD: 16 %
NEUTROPHILS # BLD: 2.2 K/UL (ref 1.7–7.7)
NEUTROPHILS NFR BLD: 58 %
PATH INTERP BLD-IMP: NO
PLATELET # BLD AUTO: 52 K/UL (ref 135–450)
PLATELET BLD QL SMEAR: ABNORMAL
PMV BLD AUTO: 8.9 FL (ref 5–10.5)
POTASSIUM SERPL-SCNC: 3.4 MMOL/L (ref 3.5–5.1)
PROT SERPL-MCNC: 6.4 G/DL (ref 6.4–8.2)
RBC # BLD AUTO: 2.65 M/UL (ref 4.2–5.9)
RBC MORPH BLD: NORMAL
SLIDE REVIEW: ABNORMAL
SODIUM SERPL-SCNC: 136 MMOL/L (ref 136–145)
WBC # BLD AUTO: 3.8 K/UL (ref 4–11)

## 2024-11-23 PROCEDURE — 2060000000 HC ICU INTERMEDIATE R&B

## 2024-11-23 PROCEDURE — 6370000000 HC RX 637 (ALT 250 FOR IP): Performed by: INTERNAL MEDICINE

## 2024-11-23 PROCEDURE — 87305 ASPERGILLUS AG IA: CPT

## 2024-11-23 PROCEDURE — 80048 BASIC METABOLIC PNL TOTAL CA: CPT

## 2024-11-23 PROCEDURE — 80076 HEPATIC FUNCTION PANEL: CPT

## 2024-11-23 PROCEDURE — 6370000000 HC RX 637 (ALT 250 FOR IP)

## 2024-11-23 PROCEDURE — 2580000003 HC RX 258

## 2024-11-23 PROCEDURE — 85025 COMPLETE CBC W/AUTO DIFF WBC: CPT

## 2024-11-23 PROCEDURE — 6360000002 HC RX W HCPCS

## 2024-11-23 PROCEDURE — 36415 COLL VENOUS BLD VENIPUNCTURE: CPT

## 2024-11-23 RX ADMIN — ACETAMINOPHEN 650 MG: 325 TABLET, FILM COATED ORAL at 09:04

## 2024-11-23 RX ADMIN — DEXTROSE MONOHYDRATE 350 MG: 50 INJECTION, SOLUTION INTRAVENOUS at 11:49

## 2024-11-23 RX ADMIN — LEVETIRACETAM 1500 MG: 500 TABLET, FILM COATED ORAL at 09:04

## 2024-11-23 RX ADMIN — VANCOMYCIN HYDROCHLORIDE 125 MG: 250 POWDER, FOR SOLUTION ORAL at 09:04

## 2024-11-23 RX ADMIN — MEROPENEM 1000 MG: 1 INJECTION INTRAVENOUS at 11:48

## 2024-11-23 RX ADMIN — MEROPENEM 1000 MG: 1 INJECTION INTRAVENOUS at 04:08

## 2024-11-23 RX ADMIN — LEVETIRACETAM 1500 MG: 500 TABLET, FILM COATED ORAL at 21:19

## 2024-11-23 RX ADMIN — PANTOPRAZOLE SODIUM 40 MG: 40 TABLET, DELAYED RELEASE ORAL at 15:19

## 2024-11-23 RX ADMIN — SODIUM CHLORIDE, PRESERVATIVE FREE 10 ML: 5 INJECTION INTRAVENOUS at 21:22

## 2024-11-23 RX ADMIN — VALACYCLOVIR HYDROCHLORIDE 500 MG: 500 TABLET, FILM COATED ORAL at 09:04

## 2024-11-23 RX ADMIN — ACETAMINOPHEN 650 MG: 325 TABLET, FILM COATED ORAL at 15:19

## 2024-11-23 RX ADMIN — SODIUM CHLORIDE, PRESERVATIVE FREE 10 ML: 5 INJECTION INTRAVENOUS at 09:06

## 2024-11-23 RX ADMIN — TRAMADOL HYDROCHLORIDE 100 MG: 50 TABLET, COATED ORAL at 09:29

## 2024-11-23 RX ADMIN — POTASSIUM CHLORIDE 40 MEQ: 1500 TABLET, EXTENDED RELEASE ORAL at 06:34

## 2024-11-23 RX ADMIN — VANCOMYCIN HYDROCHLORIDE 125 MG: 250 POWDER, FOR SOLUTION ORAL at 21:20

## 2024-11-23 RX ADMIN — ACETAMINOPHEN 650 MG: 325 TABLET, FILM COATED ORAL at 21:21

## 2024-11-23 RX ADMIN — AMITRIPTYLINE HYDROCHLORIDE 10 MG: 10 TABLET, FILM COATED ORAL at 21:20

## 2024-11-23 RX ADMIN — SODIUM CHLORIDE: 9 INJECTION, SOLUTION INTRAVENOUS at 04:07

## 2024-11-23 RX ADMIN — MEROPENEM 1000 MG: 1 INJECTION INTRAVENOUS at 20:59

## 2024-11-23 RX ADMIN — VALACYCLOVIR HYDROCHLORIDE 500 MG: 500 TABLET, FILM COATED ORAL at 21:19

## 2024-11-23 RX ADMIN — PANTOPRAZOLE SODIUM 40 MG: 40 TABLET, DELAYED RELEASE ORAL at 06:34

## 2024-11-23 ASSESSMENT — PAIN SCALES - GENERAL
PAINLEVEL_OUTOF10: 6
PAINLEVEL_OUTOF10: 3

## 2024-11-23 ASSESSMENT — PAIN DESCRIPTION - ONSET: ONSET: GRADUAL

## 2024-11-23 ASSESSMENT — PAIN - FUNCTIONAL ASSESSMENT: PAIN_FUNCTIONAL_ASSESSMENT: ACTIVITIES ARE NOT PREVENTED

## 2024-11-23 ASSESSMENT — PAIN DESCRIPTION - DESCRIPTORS: DESCRIPTORS: ACHING;DISCOMFORT

## 2024-11-23 ASSESSMENT — PAIN DESCRIPTION - ORIENTATION: ORIENTATION: MID;LOWER

## 2024-11-23 ASSESSMENT — PAIN DESCRIPTION - PAIN TYPE: TYPE: ACUTE PAIN

## 2024-11-23 ASSESSMENT — PAIN DESCRIPTION - FREQUENCY: FREQUENCY: INTERMITTENT

## 2024-11-23 ASSESSMENT — PAIN DESCRIPTION - LOCATION: LOCATION: BACK

## 2024-11-23 NOTE — PLAN OF CARE
Problem: Infection - Adult  Goal: Absence of infection during hospitalization  Outcome: Not Progressing  Pt continues to have ongoing fevers this shift. Tylenol x2    Problem: Pain  Goal: Verbalizes/displays adequate comfort level or baseline comfort level  11/23/2024 1649 by Jessy Morgan, RN  Outcome: Progressing  Pt had one complaint of pain this shift, tramadol x1     Problem: Safety - Adult  Goal: Free from fall injury  11/23/2024 1649 by Jessy Morgan, RN  Outcome: Progressing  Pt up with assist x1 w/ gait belt and walker, call light within reach, bed alarm in use     Problem: Skin/Tissue Integrity - Adult  Goal: Skin integrity remains intact  11/23/2024 1649 by Jessy Morgan, RN  Outcome: Progressing  No new skin issues.     Problem: Hematologic - Adult  Goal: Maintains hematologic stability  Outcome: Progressing  Patient's hemoglobin this AM:   Recent Labs     11/23/24  0325   HGB 7.9*     Patient's platelet count this AM:   Recent Labs     11/23/24  0325   PLT 52*    Thrombocytopenia Precautions in place.  Patient showing no signs or symptoms of active bleeding.  Transfusion not indicated at this time.  Patient verbalizes understanding of all instructions. Will continue to assess and implement POC. Call light within reach and hourly rounding in place.      Problem: Respiratory - Adult  Goal: Achieves optimal ventilation and oxygenation  Outcome: Progressing  Pt remains on room air this shift.     Problem: Metabolic/Fluid and Electrolytes - Adult  Goal: Electrolytes maintained within normal limits  Outcome: Progressing  No replacements needed this shift.     Problem: Musculoskeletal - Adult  Goal: Return mobility to safest level of function  Outcome: Progressing  Pt up with assist x1 w/ gait belt and walker, call light within reach, bed alarm in use     Problem: Gastrointestinal - Adult  Goal: Maintains or returns to baseline bowel function  Outcome: Progressing  Pt has not had a bowel movement this  shift.       Problem: Cardiovascular - Adult  Goal: Absence of cardiac dysrhythmias or at baseline  Outcome: Progressing  Pt remains on tele, normal sinus tach     Problem: Genitourinary - Adult  Goal: Absence of urinary retention  Outcome: Progressing  No signs of urinary retention at this time     Problem: ABCDS Injury Assessment  Goal: Absence of physical injury  Outcome: Progressing  Pt slept most of this shift. Pt appears to have more strength this afternoon than this morning. Pt states it is easier to move this afternoon.     Problem: Nutrition Deficit:  Goal: Optimize nutritional status  Outcome: Progressing  Pt able to finish 100% of his breakfast and lunch this shift.

## 2024-11-23 NOTE — PROGRESS NOTES
enhancing foci, new since prior exam on 1/18/24, c/w interval development of intracranial metastatic disease.  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  9/30/24, LP:  No malignant cells identified  Med Onc:  Dr. Lucien Montiel ([facility])  Neurologist:  Dr. Andi Swenson III, Dr. Jojo Tate ([facility])    Stage IIA Rectal Adenocarcinoma (path T3N0M0) - under surveillance   Presented with rectal bleeding.  3/22/24:  Rectal lesion bx --> adenocarcinoma.  Intact MMR gene expression by IHC (specimen SJS-) - low probability for MSI-H.   3/25/24:  CT chest -  no acute pathology.  Pelvic MRI radiological stage T1/2 N1, MRF clear, no sphincter involvement, no suspicious extra mesorectal nodes, and EMVI absent.  CEA: 6.5.  LAR with primary anastomosis (4/12/24):  Invasive moderately differentiated.    pT3 (tumor invades through muscularis propria into pericolonic adipose), N0, M0  Would likely not offer adjuvant therapy at this stage    Tx-related AML, underlying MDS (trilineage dysplasia)  - ELN adverse risk- with complex karyotype & TP53 mutation  PBS (3/8/24):  20% blasts  BM Bx (3/8/24): Hypercellular bone marrow (approaching 100% cellularity) showing increased blasts (13%) and trilineage myelodysplasia, c/w clonal myeloid neoplasm.   Flow cytometry:  20% blasts.  FISH:  del(5q33), +8, gain of KMT2A/11q, del(20q)    CG: Abnormal male karyotype 47~50,XY, juliette(3)t(3;11)(p14;q23),add(5) (q13), +8, -13,add(13)(q13), juliette(15)t(13;15) (q14;q26),juliette(15;20)t(15;20) (q11.2;p13) del(20)(q11.2q13.3) ,+juliette(15;20)t(15;20) (q11.2;p13)del(20) (q11.2q13.3)x1~2,+dup(15)(q15q22), +17,dic(17;?)(p11.1;?),+21, +1~2mar, 3~46dmin[cp20]  NGS: TP53 mutation detected  PCRs: FLT3 ITD/TKD mutations not detected  BM Bx (4/2/24):  Continued presence of MDS; blast cells decreased from 13%  improvement.    The patient was seen and examined by Dr. Tiwari

## 2024-11-23 NOTE — PLAN OF CARE
Problem: Pain  Goal: Verbalizes/displays adequate comfort level or baseline comfort level  Outcome: Progressing     Problem: Safety - Adult  Goal: Free from fall injury  Outcome: Progressing  Free From Fall Injury: Instruct family/caregiver on patient safety  Note: Patient in bed with bed alarm on, instructed to call for assistance, verbalizes understanding. Fall precautions in place.       Problem: Skin/Tissue Integrity - Adult  Goal: Skin integrity remains intact  Outcome: Progressing

## 2024-11-24 ENCOUNTER — APPOINTMENT (OUTPATIENT)
Dept: GENERAL RADIOLOGY | Age: 44
DRG: 720 | End: 2024-11-24
Payer: MEDICAID

## 2024-11-24 VITALS
BODY MASS INDEX: 21.64 KG/M2 | HEIGHT: 70 IN | DIASTOLIC BLOOD PRESSURE: 82 MMHG | OXYGEN SATURATION: 94 % | RESPIRATION RATE: 19 BRPM | WEIGHT: 151.2 LBS | HEART RATE: 107 BPM | SYSTOLIC BLOOD PRESSURE: 136 MMHG | TEMPERATURE: 99 F

## 2024-11-24 LAB
ALBUMIN SERPL-MCNC: 3.1 G/DL (ref 3.4–5)
ALP SERPL-CCNC: 219 U/L (ref 40–129)
ALT SERPL-CCNC: 11 U/L (ref 10–40)
ANION GAP SERPL CALCULATED.3IONS-SCNC: 11 MMOL/L (ref 3–16)
AST SERPL-CCNC: 11 U/L (ref 15–37)
BASOPHILS # BLD: 0 K/UL (ref 0–0.2)
BASOPHILS NFR BLD: 0 %
BILIRUB DIRECT SERPL-MCNC: 0.3 MG/DL (ref 0–0.3)
BILIRUB INDIRECT SERPL-MCNC: 0.1 MG/DL (ref 0–1)
BILIRUB SERPL-MCNC: 0.4 MG/DL (ref 0–1)
BLOOD BANK DISPENSE STATUS: NORMAL
BLOOD BANK PRODUCT CODE: NORMAL
BPU ID: NORMAL
BUN SERPL-MCNC: 10 MG/DL (ref 7–20)
CALCIUM SERPL-MCNC: 8.7 MG/DL (ref 8.3–10.6)
CHLORIDE SERPL-SCNC: 99 MMOL/L (ref 99–110)
CO2 SERPL-SCNC: 27 MMOL/L (ref 21–32)
CREAT SERPL-MCNC: 0.6 MG/DL (ref 0.9–1.3)
CRP SERPL-MCNC: 218 MG/L (ref 0–5.1)
DEPRECATED RDW RBC AUTO: 13.4 % (ref 12.4–15.4)
DESCRIPTION BLOOD BANK: NORMAL
EOSINOPHIL # BLD: 0 K/UL (ref 0–0.6)
EOSINOPHIL NFR BLD: 0 %
ERYTHROCYTE [SEDIMENTATION RATE] IN BLOOD BY WESTERGREN METHOD: 41 MM/HR (ref 0–15)
GFR SERPLBLD CREATININE-BSD FMLA CKD-EPI: >90 ML/MIN/{1.73_M2}
GLUCOSE SERPL-MCNC: 111 MG/DL (ref 70–99)
HCT VFR BLD AUTO: 23.5 % (ref 40.5–52.5)
HGB BLD-MCNC: 7.9 G/DL (ref 13.5–17.5)
LYMPHOCYTES # BLD: 1 K/UL (ref 1–5.1)
LYMPHOCYTES NFR BLD: 22 %
MCH RBC QN AUTO: 30.1 PG (ref 26–34)
MCHC RBC AUTO-ENTMCNC: 33.5 G/DL (ref 31–36)
MCV RBC AUTO: 90 FL (ref 80–100)
MONOCYTES # BLD: 0.7 K/UL (ref 0–1.3)
MONOCYTES NFR BLD: 15 %
NEUTROPHILS # BLD: 2.8 K/UL (ref 1.7–7.7)
NEUTROPHILS NFR BLD: 58 %
NEUTS BAND NFR BLD MANUAL: 5 % (ref 0–7)
NRBC BLD-RTO: 2 /100 WBC
PLATELET # BLD AUTO: 31 K/UL (ref 135–450)
PMV BLD AUTO: 8.7 FL (ref 5–10.5)
POTASSIUM SERPL-SCNC: 3.6 MMOL/L (ref 3.5–5.1)
PROT SERPL-MCNC: 6.7 G/DL (ref 6.4–8.2)
RBC # BLD AUTO: 2.61 M/UL (ref 4.2–5.9)
SODIUM SERPL-SCNC: 137 MMOL/L (ref 136–145)
URN SPEC COLLECT METH UR: NORMAL
WBC # BLD AUTO: 4.5 K/UL (ref 4–11)

## 2024-11-24 PROCEDURE — 6370000000 HC RX 637 (ALT 250 FOR IP)

## 2024-11-24 PROCEDURE — 36415 COLL VENOUS BLD VENIPUNCTURE: CPT

## 2024-11-24 PROCEDURE — P9036 PLATELET PHERESIS IRRADIATED: HCPCS

## 2024-11-24 PROCEDURE — 36430 TRANSFUSION BLD/BLD COMPNT: CPT

## 2024-11-24 PROCEDURE — 80076 HEPATIC FUNCTION PANEL: CPT

## 2024-11-24 PROCEDURE — 71045 X-RAY EXAM CHEST 1 VIEW: CPT

## 2024-11-24 PROCEDURE — 2580000003 HC RX 258

## 2024-11-24 PROCEDURE — 86140 C-REACTIVE PROTEIN: CPT

## 2024-11-24 PROCEDURE — 87070 CULTURE OTHR SPECIMN AEROBIC: CPT

## 2024-11-24 PROCEDURE — 87102 FUNGUS ISOLATION CULTURE: CPT

## 2024-11-24 PROCEDURE — 85025 COMPLETE CBC W/AUTO DIFF WBC: CPT

## 2024-11-24 PROCEDURE — 6370000000 HC RX 637 (ALT 250 FOR IP): Performed by: INTERNAL MEDICINE

## 2024-11-24 PROCEDURE — 87040 BLOOD CULTURE FOR BACTERIA: CPT

## 2024-11-24 PROCEDURE — 81003 URINALYSIS AUTO W/O SCOPE: CPT

## 2024-11-24 PROCEDURE — 87086 URINE CULTURE/COLONY COUNT: CPT

## 2024-11-24 PROCEDURE — 87205 SMEAR GRAM STAIN: CPT

## 2024-11-24 PROCEDURE — 2580000003 HC RX 258: Performed by: STUDENT IN AN ORGANIZED HEALTH CARE EDUCATION/TRAINING PROGRAM

## 2024-11-24 PROCEDURE — 80048 BASIC METABOLIC PNL TOTAL CA: CPT

## 2024-11-24 PROCEDURE — 2060000000 HC ICU INTERMEDIATE R&B

## 2024-11-24 PROCEDURE — 6360000002 HC RX W HCPCS

## 2024-11-24 PROCEDURE — 85652 RBC SED RATE AUTOMATED: CPT

## 2024-11-24 PROCEDURE — 87103 BLOOD FUNGUS CULTURE: CPT

## 2024-11-24 PROCEDURE — 87252 VIRUS INOCULATION TISSUE: CPT

## 2024-11-24 PROCEDURE — 87253 VIRUS INOCULATE TISSUE ADDL: CPT

## 2024-11-24 RX ORDER — 0.9 % SODIUM CHLORIDE 0.9 %
1000 INTRAVENOUS SOLUTION INTRAVENOUS ONCE
Status: COMPLETED | OUTPATIENT
Start: 2024-11-24 | End: 2024-11-24

## 2024-11-24 RX ORDER — 0.9 % SODIUM CHLORIDE 0.9 %
500 INTRAVENOUS SOLUTION INTRAVENOUS ONCE
Status: DISCONTINUED | OUTPATIENT
Start: 2024-11-24 | End: 2024-11-24

## 2024-11-24 RX ADMIN — LEVETIRACETAM 1500 MG: 500 TABLET, FILM COATED ORAL at 21:59

## 2024-11-24 RX ADMIN — VALACYCLOVIR HYDROCHLORIDE 500 MG: 500 TABLET, FILM COATED ORAL at 09:18

## 2024-11-24 RX ADMIN — SODIUM CHLORIDE, PRESERVATIVE FREE 10 ML: 5 INJECTION INTRAVENOUS at 09:21

## 2024-11-24 RX ADMIN — Medication: at 09:22

## 2024-11-24 RX ADMIN — SODIUM CHLORIDE 1000 ML: 0.9 INJECTION, SOLUTION INTRAVENOUS at 17:10

## 2024-11-24 RX ADMIN — VALACYCLOVIR HYDROCHLORIDE 500 MG: 500 TABLET, FILM COATED ORAL at 21:59

## 2024-11-24 RX ADMIN — MEROPENEM 1000 MG: 1 INJECTION INTRAVENOUS at 11:30

## 2024-11-24 RX ADMIN — LEVETIRACETAM 1500 MG: 500 TABLET, FILM COATED ORAL at 09:18

## 2024-11-24 RX ADMIN — SODIUM CHLORIDE, PRESERVATIVE FREE 10 ML: 5 INJECTION INTRAVENOUS at 22:00

## 2024-11-24 RX ADMIN — ACETAMINOPHEN 650 MG: 325 TABLET, FILM COATED ORAL at 04:34

## 2024-11-24 RX ADMIN — MEROPENEM 1000 MG: 1 INJECTION INTRAVENOUS at 21:01

## 2024-11-24 RX ADMIN — DEXTROSE MONOHYDRATE 350 MG: 50 INJECTION, SOLUTION INTRAVENOUS at 11:31

## 2024-11-24 RX ADMIN — VANCOMYCIN HYDROCHLORIDE 125 MG: 250 POWDER, FOR SOLUTION ORAL at 09:18

## 2024-11-24 RX ADMIN — PANTOPRAZOLE SODIUM 40 MG: 40 TABLET, DELAYED RELEASE ORAL at 06:31

## 2024-11-24 RX ADMIN — MEROPENEM 1000 MG: 1 INJECTION INTRAVENOUS at 04:31

## 2024-11-24 RX ADMIN — AMITRIPTYLINE HYDROCHLORIDE 10 MG: 10 TABLET, FILM COATED ORAL at 21:59

## 2024-11-24 RX ADMIN — VANCOMYCIN HYDROCHLORIDE 125 MG: 250 POWDER, FOR SOLUTION ORAL at 21:59

## 2024-11-24 RX ADMIN — PANTOPRAZOLE SODIUM 40 MG: 40 TABLET, DELAYED RELEASE ORAL at 16:02

## 2024-11-24 RX ADMIN — ACETAMINOPHEN 650 MG: 325 TABLET, FILM COATED ORAL at 16:24

## 2024-11-24 ASSESSMENT — PAIN SCALES - WONG BAKER
WONGBAKER_NUMERICALRESPONSE: NO HURT
WONGBAKER_NUMERICALRESPONSE: NO HURT

## 2024-11-24 ASSESSMENT — PAIN SCALES - GENERAL
PAINLEVEL_OUTOF10: 0
PAINLEVEL_OUTOF10: 0

## 2024-11-24 NOTE — PROGRESS NOTES
Thrombocytopenia, amyloid angiopathy (see Neuro section above for details)  Contraindications to mechanical prophylaxis:  None    Disposition: Pending resolution of fevers and clinical improvement.    The patient was seen and examined by Dr. Tiwari

## 2024-11-24 NOTE — PLAN OF CARE
Problem: Skin/Tissue Integrity - Adult  Goal: Skin integrity remains intact  11/24/2024 0615 by Isabella Turner, RN  Outcome: Progressing     Problem: Safety - Adult  Goal: Free from fall injury  11/24/2024 0615 by Isabella Turner RN  Outcome: Progressing     Problem: Infection - Adult  Goal: Absence of infection during hospitalization  11/24/2024 0615 by Isabella Turner RN  Outcome: Progressing  11/23/2024 1649 by Jessy Morgan RN  Outcome: Not Progressing  Goal: Absence of fever/infection during anticipated neutropenic period  11/24/2024 0615 by Isabella Turner RN  Outcome: Progressing  11/23/2024 1649 by Jessy Morgan RN  Outcome: Not Progressing

## 2024-11-24 NOTE — PLAN OF CARE
Problem: Pain  Goal: Verbalizes/displays adequate comfort level or baseline comfort level  11/24/2024 1848 by Phuong Contreras RN  Outcome: Progressing  11/24/2024 0615 by Isabella Turner, RN  Outcome: Progressing     Problem: Safety - Adult  Goal: Free from fall injury  11/24/2024 1848 by Phuong Contreras RN  Outcome: Progressing  Flowsheets (Taken 11/24/2024 0920)  Free From Fall Injury: Instruct family/caregiver on patient safety  11/24/2024 0615 by Isabella Turner, RN  Outcome: Progressing     Problem: Skin/Tissue Integrity - Adult  Goal: Skin integrity remains intact  11/24/2024 1848 by Phuong Contreras RN  Outcome: Progressing  Flowsheets (Taken 11/24/2024 0920)  Skin Integrity Remains Intact: Monitor for areas of redness and/or skin breakdown  11/24/2024 0615 by Isabella Turner, RN  Outcome: Progressing     Problem: Infection - Adult  Goal: Absence of infection at discharge  11/24/2024 1848 by Phuong Contreras RN  Outcome: Progressing  Flowsheets (Taken 11/24/2024 0920)  Absence of infection at discharge: Assess and monitor for signs and symptoms of infection  11/24/2024 0615 by Isabella Turner RN  Outcome: Progressing

## 2024-11-25 LAB
ABO + RH BLD: NORMAL
ALBUMIN SERPL-MCNC: 3.1 G/DL (ref 3.4–5)
ALP SERPL-CCNC: 193 U/L (ref 40–129)
ALT SERPL-CCNC: 9 U/L (ref 10–40)
ANION GAP SERPL CALCULATED.3IONS-SCNC: 10 MMOL/L (ref 3–16)
ANION GAP SERPL CALCULATED.3IONS-SCNC: 11 MMOL/L (ref 3–16)
APTT BLD: 35 SEC (ref 22.1–36.4)
AST SERPL-CCNC: 9 U/L (ref 15–37)
BACTERIA BLD CULT ORG #2: NORMAL
BACTERIA BLD CULT: NORMAL
BASOPHILS # BLD: 0 K/UL (ref 0–0.2)
BASOPHILS NFR BLD: 0 %
BILIRUB DIRECT SERPL-MCNC: 0.3 MG/DL (ref 0–0.3)
BILIRUB INDIRECT SERPL-MCNC: 0.2 MG/DL (ref 0–1)
BILIRUB SERPL-MCNC: 0.5 MG/DL (ref 0–1)
BILIRUB UR QL STRIP.AUTO: NEGATIVE
BLD GP AB SCN SERPL QL: NORMAL
BLOOD BANK DISPENSE STATUS: NORMAL
BLOOD BANK DISPENSE STATUS: NORMAL
BLOOD BANK PRODUCT CODE: NORMAL
BLOOD BANK PRODUCT CODE: NORMAL
BPU ID: NORMAL
BPU ID: NORMAL
BUN SERPL-MCNC: 10 MG/DL (ref 7–20)
BUN SERPL-MCNC: 9 MG/DL (ref 7–20)
CALCIUM SERPL-MCNC: 8.6 MG/DL (ref 8.3–10.6)
CALCIUM SERPL-MCNC: 8.8 MG/DL (ref 8.3–10.6)
CHLORIDE SERPL-SCNC: 100 MMOL/L (ref 99–110)
CHLORIDE SERPL-SCNC: 101 MMOL/L (ref 99–110)
CLARITY UR: CLEAR
CO2 SERPL-SCNC: 27 MMOL/L (ref 21–32)
CO2 SERPL-SCNC: 28 MMOL/L (ref 21–32)
COLOR UR: YELLOW
CREAT SERPL-MCNC: 0.5 MG/DL (ref 0.9–1.3)
CREAT SERPL-MCNC: 0.5 MG/DL (ref 0.9–1.3)
DEPRECATED RDW RBC AUTO: 12.9 % (ref 12.4–15.4)
DESCRIPTION BLOOD BANK: NORMAL
DESCRIPTION BLOOD BANK: NORMAL
EOSINOPHIL # BLD: 0 K/UL (ref 0–0.6)
EOSINOPHIL NFR BLD: 0 %
FIBRINOGEN PPP-MCNC: 305 MG/DL (ref 227–534)
FUNGUS BLD CULT: NORMAL
FUNGUS SPEC CULT: NORMAL
GALACTOMANNAN AG SERPL IA-ACNC: 0.03
GALACTOMANNAN AG SERPL QL IA: NEGATIVE
GFR SERPLBLD CREATININE-BSD FMLA CKD-EPI: >90 ML/MIN/{1.73_M2}
GFR SERPLBLD CREATININE-BSD FMLA CKD-EPI: >90 ML/MIN/{1.73_M2}
GLUCOSE SERPL-MCNC: 121 MG/DL (ref 70–99)
GLUCOSE SERPL-MCNC: 97 MG/DL (ref 70–99)
GLUCOSE UR STRIP.AUTO-MCNC: NEGATIVE MG/DL
HCT VFR BLD AUTO: 20.9 % (ref 40.5–52.5)
HGB BLD-MCNC: 7.2 G/DL (ref 13.5–17.5)
HGB UR QL STRIP.AUTO: NEGATIVE
INR PPP: 1.35 (ref 0.85–1.15)
KETONES UR STRIP.AUTO-MCNC: NEGATIVE MG/DL
LDH SERPL L TO P-CCNC: 171 U/L (ref 100–190)
LEUKOCYTE ESTERASE UR QL STRIP.AUTO: NEGATIVE
LOEFFLER MB STN SPEC: NORMAL
LYMPHOCYTES # BLD: 1 K/UL (ref 1–5.1)
LYMPHOCYTES NFR BLD: 19.6 %
MAGNESIUM SERPL-MCNC: 1.67 MG/DL (ref 1.8–2.4)
MCH RBC QN AUTO: 30.6 PG (ref 26–34)
MCHC RBC AUTO-ENTMCNC: 34.4 G/DL (ref 31–36)
MCV RBC AUTO: 89 FL (ref 80–100)
MONOCYTES # BLD: 3.6 K/UL (ref 0–1.3)
MONOCYTES NFR BLD: 68.8 %
NEUTROPHILS # BLD: 0.6 K/UL (ref 1.7–7.7)
NEUTROPHILS NFR BLD: 11.6 %
NITRITE UR QL STRIP.AUTO: NEGATIVE
PH UR STRIP.AUTO: 7.5 [PH] (ref 5–8)
PHOSPHATE SERPL-MCNC: 2.8 MG/DL (ref 2.5–4.9)
PLATELET # BLD AUTO: 34 K/UL (ref 135–450)
PMV BLD AUTO: 7.9 FL (ref 5–10.5)
POTASSIUM SERPL-SCNC: 2.7 MMOL/L (ref 3.5–5.1)
POTASSIUM SERPL-SCNC: 3.3 MMOL/L (ref 3.5–5.1)
PROT SERPL-MCNC: 6.6 G/DL (ref 6.4–8.2)
PROT UR STRIP.AUTO-MCNC: ABNORMAL MG/DL
PROTHROMBIN TIME: 16.8 SEC (ref 11.9–14.9)
RBC # BLD AUTO: 2.35 M/UL (ref 4.2–5.9)
RBC #/AREA URNS HPF: NORMAL /HPF (ref 0–4)
SODIUM SERPL-SCNC: 138 MMOL/L (ref 136–145)
SODIUM SERPL-SCNC: 139 MMOL/L (ref 136–145)
SP GR UR STRIP.AUTO: 1.01 (ref 1–1.03)
UA DIPSTICK W REFLEX MICRO PNL UR: YES
URATE SERPL-MCNC: 3.1 MG/DL (ref 3.5–7.2)
URN SPEC COLLECT METH UR: ABNORMAL
UROBILINOGEN UR STRIP-ACNC: 0.2 E.U./DL
WBC # BLD AUTO: 5.2 K/UL (ref 4–11)
WBC #/AREA URNS HPF: NORMAL /HPF (ref 0–5)

## 2024-11-25 PROCEDURE — 6370000000 HC RX 637 (ALT 250 FOR IP)

## 2024-11-25 PROCEDURE — 85384 FIBRINOGEN ACTIVITY: CPT

## 2024-11-25 PROCEDURE — 83735 ASSAY OF MAGNESIUM: CPT

## 2024-11-25 PROCEDURE — 6370000000 HC RX 637 (ALT 250 FOR IP): Performed by: INTERNAL MEDICINE

## 2024-11-25 PROCEDURE — 88184 FLOWCYTOMETRY/ TC 1 MARKER: CPT

## 2024-11-25 PROCEDURE — 80076 HEPATIC FUNCTION PANEL: CPT

## 2024-11-25 PROCEDURE — P9040 RBC LEUKOREDUCED IRRADIATED: HCPCS

## 2024-11-25 PROCEDURE — 86850 RBC ANTIBODY SCREEN: CPT

## 2024-11-25 PROCEDURE — 84550 ASSAY OF BLOOD/URIC ACID: CPT

## 2024-11-25 PROCEDURE — 85610 PROTHROMBIN TIME: CPT

## 2024-11-25 PROCEDURE — 88185 FLOWCYTOMETRY/TC ADD-ON: CPT

## 2024-11-25 PROCEDURE — 6360000002 HC RX W HCPCS

## 2024-11-25 PROCEDURE — 85730 THROMBOPLASTIN TIME PARTIAL: CPT

## 2024-11-25 PROCEDURE — 36430 TRANSFUSION BLD/BLD COMPNT: CPT

## 2024-11-25 PROCEDURE — 83615 LACTATE (LD) (LDH) ENZYME: CPT

## 2024-11-25 PROCEDURE — 2060000000 HC ICU INTERMEDIATE R&B

## 2024-11-25 PROCEDURE — 99232 SBSQ HOSP IP/OBS MODERATE 35: CPT | Performed by: INTERNAL MEDICINE

## 2024-11-25 PROCEDURE — 36415 COLL VENOUS BLD VENIPUNCTURE: CPT

## 2024-11-25 PROCEDURE — P9036 PLATELET PHERESIS IRRADIATED: HCPCS

## 2024-11-25 PROCEDURE — 86923 COMPATIBILITY TEST ELECTRIC: CPT

## 2024-11-25 PROCEDURE — 85025 COMPLETE CBC W/AUTO DIFF WBC: CPT

## 2024-11-25 PROCEDURE — 84100 ASSAY OF PHOSPHORUS: CPT

## 2024-11-25 PROCEDURE — 86900 BLOOD TYPING SEROLOGIC ABO: CPT

## 2024-11-25 PROCEDURE — 80048 BASIC METABOLIC PNL TOTAL CA: CPT

## 2024-11-25 PROCEDURE — 86901 BLOOD TYPING SEROLOGIC RH(D): CPT

## 2024-11-25 PROCEDURE — 6360000002 HC RX W HCPCS: Performed by: INTERNAL MEDICINE

## 2024-11-25 PROCEDURE — 2580000003 HC RX 258

## 2024-11-25 RX ORDER — HYDROCORTISONE SODIUM SUCCINATE 100 MG/2ML
50 INJECTION INTRAMUSCULAR; INTRAVENOUS ONCE
Status: COMPLETED | OUTPATIENT
Start: 2024-11-25 | End: 2024-11-25

## 2024-11-25 RX ORDER — ACETAMINOPHEN 325 MG/1
650 TABLET ORAL ONCE
Status: COMPLETED | OUTPATIENT
Start: 2024-11-25 | End: 2024-11-25

## 2024-11-25 RX ADMIN — HYDROCORTISONE SODIUM SUCCINATE 50 MG: 100 INJECTION, POWDER, FOR SOLUTION INTRAMUSCULAR; INTRAVENOUS at 18:18

## 2024-11-25 RX ADMIN — AMITRIPTYLINE HYDROCHLORIDE 10 MG: 10 TABLET, FILM COATED ORAL at 22:02

## 2024-11-25 RX ADMIN — MEROPENEM 1000 MG: 1 INJECTION INTRAVENOUS at 04:51

## 2024-11-25 RX ADMIN — VALACYCLOVIR HYDROCHLORIDE 500 MG: 500 TABLET, FILM COATED ORAL at 21:03

## 2024-11-25 RX ADMIN — ACETAMINOPHEN 650 MG: 325 TABLET, FILM COATED ORAL at 21:11

## 2024-11-25 RX ADMIN — LEVETIRACETAM 1500 MG: 500 TABLET, FILM COATED ORAL at 08:45

## 2024-11-25 RX ADMIN — POTASSIUM BICARBONATE 20 MEQ: 782 TABLET, EFFERVESCENT ORAL at 15:19

## 2024-11-25 RX ADMIN — POTASSIUM BICARBONATE 40 MEQ: 782 TABLET, EFFERVESCENT ORAL at 10:17

## 2024-11-25 RX ADMIN — LEVETIRACETAM 1500 MG: 500 TABLET, FILM COATED ORAL at 21:03

## 2024-11-25 RX ADMIN — VANCOMYCIN HYDROCHLORIDE 125 MG: 250 POWDER, FOR SOLUTION ORAL at 08:56

## 2024-11-25 RX ADMIN — POTASSIUM CHLORIDE 10 MEQ: 10 INJECTION, SOLUTION INTRAVENOUS at 08:52

## 2024-11-25 RX ADMIN — MEROPENEM 1000 MG: 1 INJECTION INTRAVENOUS at 13:04

## 2024-11-25 RX ADMIN — AMPHOTERICIN B 350 MG: 50 INJECTABLE, LIPOSOMAL INTRAVENOUS at 10:50

## 2024-11-25 RX ADMIN — POTASSIUM BICARBONATE 40 MEQ: 782 TABLET, EFFERVESCENT ORAL at 21:03

## 2024-11-25 RX ADMIN — POTASSIUM CHLORIDE 10 MEQ: 10 INJECTION, SOLUTION INTRAVENOUS at 07:42

## 2024-11-25 RX ADMIN — MEROPENEM 1000 MG: 1 INJECTION INTRAVENOUS at 21:00

## 2024-11-25 RX ADMIN — SODIUM CHLORIDE, PRESERVATIVE FREE 10 ML: 5 INJECTION INTRAVENOUS at 21:03

## 2024-11-25 RX ADMIN — PANTOPRAZOLE SODIUM 40 MG: 40 TABLET, DELAYED RELEASE ORAL at 08:45

## 2024-11-25 RX ADMIN — VALACYCLOVIR HYDROCHLORIDE 500 MG: 500 TABLET, FILM COATED ORAL at 08:44

## 2024-11-25 RX ADMIN — ACETAMINOPHEN 650 MG: 325 TABLET, FILM COATED ORAL at 12:58

## 2024-11-25 RX ADMIN — VANCOMYCIN HYDROCHLORIDE 125 MG: 250 POWDER, FOR SOLUTION ORAL at 22:02

## 2024-11-25 RX ADMIN — ACETAMINOPHEN 650 MG: 325 TABLET, FILM COATED ORAL at 18:17

## 2024-11-25 ASSESSMENT — PAIN DESCRIPTION - FREQUENCY: FREQUENCY: INTERMITTENT

## 2024-11-25 ASSESSMENT — PAIN - FUNCTIONAL ASSESSMENT: PAIN_FUNCTIONAL_ASSESSMENT: ACTIVITIES ARE NOT PREVENTED

## 2024-11-25 ASSESSMENT — PAIN SCALES - GENERAL
PAINLEVEL_OUTOF10: 0
PAINLEVEL_OUTOF10: 0
PAINLEVEL_OUTOF10: 5

## 2024-11-25 ASSESSMENT — PAIN DESCRIPTION - ORIENTATION: ORIENTATION: LOWER

## 2024-11-25 ASSESSMENT — PAIN DESCRIPTION - ONSET: ONSET: SUDDEN

## 2024-11-25 ASSESSMENT — PAIN DESCRIPTION - PAIN TYPE: TYPE: ACUTE PAIN

## 2024-11-25 ASSESSMENT — PAIN DESCRIPTION - DESCRIPTORS: DESCRIPTORS: ACHING

## 2024-11-25 ASSESSMENT — PAIN SCALES - WONG BAKER: WONGBAKER_NUMERICALRESPONSE: NO HURT

## 2024-11-25 ASSESSMENT — PAIN DESCRIPTION - LOCATION: LOCATION: BACK

## 2024-11-25 NOTE — CONSULTS
Behavioral Health Intervention Note    Met with patient briefly after attending clinical rounds and discussion with provider. Before meeting with patient, provider inquired about patient's desire to return home for the holidays with family. The patient expressed he would like to go home. Stayed with patient after provider left to introduce services and to help process recent news of prognosis, per treatment team's recommendation. Patient appeared flat and stoic upon interaction demonstrated by minimal responses and eye contact. He reported he did not want to discuss recent prognosis and stated there is nothing to process as \"it is what it is.\" Patient will discharge home in the next few days with family.     Fidelia Keller M.A.   Psychology Trainee     Prior to discussion the writer informed the patient that they are under the supervision of clinical psychologist Dr. Tammi Lind. Patient expressed understanding and was agreeable to meet.

## 2024-11-25 NOTE — PROGRESS NOTES
ID Follow-up NOTE    CC:   AMS  Antibiotics: Meropenem, Ambisome, valacyclovir, p.o. vancomycin    Admit Date: 10/28/2024  Hospital Day: 29    Subjective:     Patient continues to have fever spikes, up to 101.5 yesterday. Denies any SOB or cough. Poor appetite and some low back pain.       Objective:     Patient Vitals for the past 8 hrs:   BP Temp Temp src Pulse Resp SpO2   11/25/24 0840 131/89 99.9 °F (37.7 °C) Oral 96 20 96 %   11/25/24 0655 129/81 98.6 °F (37 °C) Temporal (!) 102 23 99 %   11/25/24 0558 120/84 98.2 °F (36.8 °C) Temporal 100 21 97 %   11/25/24 0539 129/85 98.4 °F (36.9 °C) Temporal (!) 103 23 97 %   11/25/24 0433 122/84 98.5 °F (36.9 °C) Oral (!) 101 19 96 %     I/O last 3 completed shifts:  In: 4437.9 [P.O.:1155; I.V.:2269; Blood:1013.9]  Out: 2925 [Urine:2925]  I/O this shift:  In: 340 [P.O.:340]  Out: 500 [Urine:500]    EXAM:  GENERAL: No apparent distress.  Ill appearing. Pt is fatigued easily.   HEENT: Membranes moist, no oral lesion, on room air.   NECK:  Supple, no lymphadenopathy  LUNGS: Clear b/l, no rales, no dullness, on room air  CARDIAC: RRR, no murmur appreciated  ABD:  + BS, soft, tender to palpation in the mid abdomen region.  EXT:  No rash, bilateral LE edema, right hand edema, left arm edema.   NEURO: No focal neurologic findings  PSYCH: Orientation normal, drowsy.   LINES:  Peripheral iv    Data Review:  Lab Results   Component Value Date    WBC 5.2 11/25/2024    HGB 7.2 (L) 11/25/2024    HCT 20.9 (LL) 11/25/2024    MCV 89.0 11/25/2024    PLT 34 (L) 11/25/2024     Lab Results   Component Value Date    CREATININE 0.5 (L) 11/25/2024    BUN 10 11/25/2024     11/25/2024    K 2.7 (LL) 11/25/2024     11/25/2024    CO2 27 11/25/2024       Hepatic Function Panel:   Lab Results   Component Value Date/Time    ALKPHOS 193 11/25/2024 03:48 AM    ALT 9 11/25/2024 03:48 AM    AST 9 11/25/2024 03:48 AM    BILITOT 0.5 11/25/2024 03:48 AM    BILIDIR 0.3 11/25/2024 03:48 AM    IBILI

## 2024-11-25 NOTE — CARE COORDINATION
Attended MD rounds at bedside. Dr. Tiwari discussed goals of care with pt and he will discuss with his family.     SW will follow    JENNIFER Ortiz   for [facility] ([facility])  [employer] Mobile: [phone]    Addendum at 1:55pm: Discussed with Mary Kate CADENA who spoke with pt and family. Per Mary Kate, pt's aunt Geneva said he could come to her house and have support from family. Mary Kate states pt/family wants a referral to [facility] for information.     Referral called to Jennifer at [facility] and will follow up with writer once meeting is arranged.     Addendum at 2:10pm: Pt, pt's father Pierre and niece updated on the above with pt permission. Pierre states he will answer his phone when [place] calls    Addendum at 2:35pm: Received call form Jennifer at [employer] stating the meeting is today between 330-400pm. Jennifer requested information be faxed to her at [phone] which writer did.

## 2024-11-25 NOTE — PROGRESS NOTES
Dr. Nguyen notified of pt fever during platelet transfusion. Per Dr. Nguyen, OK to continue to transfuse bag of platelets and give pt tylenol and solu-cortef for fever.

## 2024-11-25 NOTE — PROGRESS NOTES
Physical Therapy/Occupational Therapy  Attempt  Chart review completed.  Attempted to see pt at 928 on 11/25/24.  Pt found supine in bed upon arrival.  Pt not opening eyes, with head tilted down throughout entire attempt.  Pt politely declining session at this time, stating that he has already been up in the chair, and is reporting that his arm is burning.  RN notified.  PT/OT educating pt on importance of OOB mobility, however pt continues to politely decline.  RN notified.    Dorita Beatty, PT  Morenita Mendoza, OTD, OTR/L [id number]

## 2024-11-25 NOTE — CARE COORDINATION
Provider met with patient this morning to review the option of patient returning home at this time with support.  Patient acknowledged that he would like to be at home with family for the holidays. Provider requested that patient speak with his family about this.    This writer met with patient, father and cousin.  Pt expressed to them he would like to go home. Reviewed with family the support that he will need and recommended that they meet with hospice to review how they can assist the family. Family open to meeting with  [facility] they have been consulted.     This writer will follow up with family after they meet with [facility].

## 2024-11-25 NOTE — PROGRESS NOTES
Comprehensive Nutrition Assessment    RECOMMENDATIONS:  PO Diet: Regular; low microbial  Nutrition Supplement: Continue Ensure +HP TID  Nutrition Education: No recommendation at this time     NUTRITION ASSESSMENT:   Nutritional summary & status: Follow up. PO intake has been inadequate for the majority of admit. Pt -10# since admit (28 days). TF was once indicated, but waited for family meeting to determine appropriateness of further nutrition intervention. Able to tolerate 2 meals this past Saturday, 1 meal yesterday. Family meeting yesterday to discuss goals of care. Code status changed to DNR/DNI. Goals of care discussion ongoing, pt to discuss with family. Treating infection, ctx on hold despite concern for disease progression. All appropriate nutrition interventions in place. Pt can tolerate high protein nutritional supplement, consuming this intermittently. RD to follow.     Admission // PMH: Neutropenic fever // Oligodendroglioma (s/p partial resection 2017, radiation 2019, and temodar 2019), rectal adenocarcinoma, AML, underlying MDS    MALNUTRITION ASSESSMENT  Context of Malnutrition: Chronic Illness   Malnutrition Status: Severe malnutrition  Findings of the 6 clinical characteristics of malnutrition (Minimum of 2 out of 6 clinical characteristics is required to make the diagnosis of moderate or severe Protein Calorie Malnutrition based on AND/ASPEN Guidelines):  Energy Intake:  Mild decrease in energy intake (6 days this admit)  Weight Loss:  Greater than 10% over 6 months (20% in 6 months)     Body Fat Loss:  Mild body fat loss Buccal region, Orbital, Triceps   Muscle Mass Loss:  Severe muscle mass loss Temples (temporalis), Clavicles (pectoralis & deltoids)    NUTRITION DIAGNOSIS   Inadequate oral intake related to decreased appetite, pain as evidenced by intake 0-25%, intake 26-50%    Nutrition Monitoring and Evaluation:   Food/Nutrient Intake Outcomes:  Other (goals of care)  Physical Signs/Symptoms

## 2024-11-25 NOTE — PROGRESS NOTES
Dr. Nguyen and hospice nurse at bedside. Pt plans to go home w/ hospice tomorrow. Per Dr. Nguyen, order one unit of blood and platelets to be transfused prior to discharge home.

## 2024-11-25 NOTE — PROGRESS NOTES
[facility] Progress Note    2024    Dennys Peguero    :  1980    MRN:  [id number]      Interval Hx:    Mr. Peguero reports lower back pain when shifting around in bed; no pain on ambulation or at rest.  Denies cough, diarrhea.  Discussed goals of care.  He will speak with his family.  Tmax 101.5 °F (, 1610)      ECOG PS:  (2) Ambulatory and capable of self care, unable to carry out work activity, up and about > 50% or waking hours    KPS:  60% Requires occasional assistance, but is able to care for most of his personal needs    ROS:  As noted above, otherwise remainder of 10-point ROS negative      Isolation:  Strict contact        Medication:    Scheduled:   amphotericin  350 mg IntraVENous Q24H    meropenem  1,000 mg IntraVENous Q8H    valACYclovir  500 mg Oral BID    amitriptyline  10 mg Oral Nightly    pantoprazole  40 mg Oral BID AC    levETIRAcetam  1,500 mg Oral BID    sodium chloride flush  5-40 mL IntraVENous 2 times per day    Hydrocerin   Topical BID    sodium chloride flush  5-40 mL IntraVENous 2 times per day    Saline Mouthwash  15 mL Swish & Spit 4x Daily AC & HS    vancomycin  125 mg Oral 2 times per day     Continuous Infusions:   sodium chloride 5 mL/hr at 24 0407     PRN:  sulfur hexafluoride microspheres, traMADol, cyclobenzaprine, dicyclomine, acetaminophen, potassium chloride **OR** potassium alternative oral replacement **OR** potassium chloride, simethicone, ondansetron, prochlorperazine, [Held by provider] loperamide, polyethylene glycol, sodium chloride, sodium chloride flush, magnesium sulfate, Saline Mouthwash, ALTEplase (CATHFLO) 2 mg in sterile water 2 mL injection      Physical Exam:    I&O:    Intake/Output Summary (Last 24 hours) at 2024 1007  Last data filed at 2024 0644  Gross per 24 hour   Intake 3408.9 ml   Output 1425 ml   Net 1983.9 ml       Vital Signs:  /89   Pulse 96   Temp 99.9 °F (37.7 °C) (Oral)   Resp 20   Ht

## 2024-11-25 NOTE — PROGRESS NOTES
Neutropenic Pathway  If patient oral temp > or = to 38.0 or if axillary temp > or = to 37.4 initiate protocol per orders.  Draw 2 sets of blood cultures from different sites. If patient remains febrile, redraw PAN culture every 68-72 hours.             Temp 101.5    Site(s) / Line Type Time Cultures obtained   Date 11/24/2024  1610    Peripheral draws x2     1730 (both cultures obtained by lab draw)         MD GERALD Tiwari notified. New order placed for 1L bolus NS.

## 2024-11-26 VITALS
BODY MASS INDEX: 21.64 KG/M2 | WEIGHT: 151.2 LBS | HEART RATE: 97 BPM | OXYGEN SATURATION: 94 % | RESPIRATION RATE: 25 BRPM | DIASTOLIC BLOOD PRESSURE: 82 MMHG | TEMPERATURE: 102 F | SYSTOLIC BLOOD PRESSURE: 133 MMHG | HEIGHT: 70 IN

## 2024-11-26 LAB
ACID FAST STN SPEC QL: NORMAL
ALBUMIN SERPL-MCNC: 3.1 G/DL (ref 3.4–5)
ALP SERPL-CCNC: 195 U/L (ref 40–129)
ALT SERPL-CCNC: 11 U/L (ref 10–40)
ANION GAP SERPL CALCULATED.3IONS-SCNC: 10 MMOL/L (ref 3–16)
AST SERPL-CCNC: 11 U/L (ref 15–37)
BACTERIA THROAT AEROBE CULT: NORMAL
BACTERIA UR CULT: NORMAL
BASOPHILS # BLD: 0 K/UL (ref 0–0.2)
BASOPHILS NFR BLD: 0 %
BILIRUB DIRECT SERPL-MCNC: 0.3 MG/DL (ref 0–0.3)
BILIRUB INDIRECT SERPL-MCNC: 0.2 MG/DL (ref 0–1)
BILIRUB SERPL-MCNC: 0.5 MG/DL (ref 0–1)
BUN SERPL-MCNC: 11 MG/DL (ref 7–20)
CALCIUM SERPL-MCNC: 9.2 MG/DL (ref 8.3–10.6)
CHLORIDE SERPL-SCNC: 99 MMOL/L (ref 99–110)
CO2 SERPL-SCNC: 29 MMOL/L (ref 21–32)
CREAT SERPL-MCNC: 0.5 MG/DL (ref 0.9–1.3)
DEPRECATED RDW RBC AUTO: 13 % (ref 12.4–15.4)
EOSINOPHIL # BLD: 0 K/UL (ref 0–0.6)
EOSINOPHIL NFR BLD: 0 %
FUNGUS BLD CULT: ABNORMAL
GFR SERPLBLD CREATININE-BSD FMLA CKD-EPI: >90 ML/MIN/{1.73_M2}
GLUCOSE SERPL-MCNC: 133 MG/DL (ref 70–99)
HCT VFR BLD AUTO: 22 % (ref 40.5–52.5)
HGB BLD-MCNC: 7.5 G/DL (ref 13.5–17.5)
LOEFFLER MB STN SPEC: NORMAL
LOEFFLER MB STN SPEC: NORMAL
LYMPHOCYTES # BLD: 3.1 K/UL (ref 1–5.1)
LYMPHOCYTES NFR BLD: 58 %
MCH RBC QN AUTO: 30.2 PG (ref 26–34)
MCHC RBC AUTO-ENTMCNC: 34.3 G/DL (ref 31–36)
MCV RBC AUTO: 88.1 FL (ref 80–100)
METAMYELOCYTES NFR BLD MANUAL: 3 %
MONOCYTES # BLD: 0.7 K/UL (ref 0–1.3)
MONOCYTES NFR BLD: 14 %
MYCOBACTERIUM SPEC CULT: NORMAL
NEUTROPHILS # BLD: 1.5 K/UL (ref 1.7–7.7)
NEUTROPHILS NFR BLD: 25 %
ORGANISM: ABNORMAL
ORGANISM: ABNORMAL
PATH INTERP BLD-IMP: NO
PLATELET # BLD AUTO: 67 K/UL (ref 135–450)
PLATELET BLD QL SMEAR: ABNORMAL
PMV BLD AUTO: 8.9 FL (ref 5–10.5)
POTASSIUM SERPL-SCNC: 3.6 MMOL/L (ref 3.5–5.1)
PROT SERPL-MCNC: 6.5 G/DL (ref 6.4–8.2)
RBC # BLD AUTO: 2.49 M/UL (ref 4.2–5.9)
RBC MORPH BLD: NORMAL
SLIDE REVIEW: ABNORMAL
SODIUM SERPL-SCNC: 138 MMOL/L (ref 136–145)
WBC # BLD AUTO: 5.3 K/UL (ref 4–11)

## 2024-11-26 PROCEDURE — 6370000000 HC RX 637 (ALT 250 FOR IP): Performed by: INTERNAL MEDICINE

## 2024-11-26 PROCEDURE — 6360000002 HC RX W HCPCS

## 2024-11-26 PROCEDURE — 85025 COMPLETE CBC W/AUTO DIFF WBC: CPT

## 2024-11-26 PROCEDURE — 80076 HEPATIC FUNCTION PANEL: CPT

## 2024-11-26 PROCEDURE — 36415 COLL VENOUS BLD VENIPUNCTURE: CPT

## 2024-11-26 PROCEDURE — 2580000003 HC RX 258

## 2024-11-26 PROCEDURE — 36430 TRANSFUSION BLD/BLD COMPNT: CPT

## 2024-11-26 PROCEDURE — 80048 BASIC METABOLIC PNL TOTAL CA: CPT

## 2024-11-26 PROCEDURE — 6370000000 HC RX 637 (ALT 250 FOR IP)

## 2024-11-26 RX ORDER — LEVOFLOXACIN 500 MG/1
500 TABLET, FILM COATED ORAL NIGHTLY
Qty: 30 TABLET | Refills: 1 | Status: SHIPPED | OUTPATIENT
Start: 2024-11-26 | End: 2025-01-25

## 2024-11-26 RX ORDER — DICYCLOMINE HCL 20 MG
20 TABLET ORAL 4 TIMES DAILY PRN
Qty: 120 TABLET | Refills: 3 | Status: SHIPPED | OUTPATIENT
Start: 2024-11-26

## 2024-11-26 RX ADMIN — AMPHOTERICIN B 350 MG: 50 INJECTABLE, LIPOSOMAL INTRAVENOUS at 10:48

## 2024-11-26 RX ADMIN — PANTOPRAZOLE SODIUM 40 MG: 40 TABLET, DELAYED RELEASE ORAL at 16:06

## 2024-11-26 RX ADMIN — MEROPENEM 1000 MG: 1 INJECTION INTRAVENOUS at 06:24

## 2024-11-26 RX ADMIN — SODIUM CHLORIDE, PRESERVATIVE FREE 10 ML: 5 INJECTION INTRAVENOUS at 08:29

## 2024-11-26 RX ADMIN — VALACYCLOVIR HYDROCHLORIDE 500 MG: 500 TABLET, FILM COATED ORAL at 08:26

## 2024-11-26 RX ADMIN — LEVETIRACETAM 1500 MG: 500 TABLET, FILM COATED ORAL at 08:26

## 2024-11-26 RX ADMIN — ACETAMINOPHEN 650 MG: 325 TABLET, FILM COATED ORAL at 16:21

## 2024-11-26 RX ADMIN — SODIUM CHLORIDE 15 ML: 900 IRRIGANT IRRIGATION at 10:48

## 2024-11-26 RX ADMIN — SODIUM CHLORIDE 15 ML: 900 IRRIGANT IRRIGATION at 06:25

## 2024-11-26 RX ADMIN — VANCOMYCIN HYDROCHLORIDE 125 MG: 250 POWDER, FOR SOLUTION ORAL at 09:30

## 2024-11-26 RX ADMIN — MEROPENEM 1000 MG: 1 INJECTION INTRAVENOUS at 12:57

## 2024-11-26 RX ADMIN — PANTOPRAZOLE SODIUM 40 MG: 40 TABLET, DELAYED RELEASE ORAL at 06:24

## 2024-11-26 RX ADMIN — Medication: at 09:56

## 2024-11-26 NOTE — CARE COORDINATION
Returned call to  Alen regarding patient discharge. Patient belongings gathered in preparation for transport at or about 6:00 PM. Notified  Alen that one medication Cresemba will need to be picked up tomorrow in outpatient pharmacy.  He acknowledged that someone will come and pick it  up tomorrow.

## 2024-11-26 NOTE — PROGRESS NOTES
(11/22/24)  Hepatic lesion aspirate sent infection testing 11/14/24 - NGTD      3.  HEME    Amyloid angiopathy  Per prior nsgy recs on 10/1/24, avoid anticoagulation as it would significantly increase risk of ICH    Pancytopenia r/t AML/MDS and recent chemotherapy   Transfuse for Hgb < 7   Transfuse for PLT < 50 K (due to amyloid angiopathy causing higher risk of ICH, PLT threshold was set to transfuse for < 20K; however, he developed melena & therefore, threshold was increased)      4.  METABOLIC, RENAL - renal function is at baseline  Baseline Cr ~0.5-0.6  BMP, phos, Mg daily   Replace Mg & K prn      5.  GI, NUTRITION   Rectal Adenocarcinoma   See oncology section above for details    Nutrition    Dietitian is following  Low-microbial diet    GERD  Cont Protonix 40 mg BID     Prandial/post-prandial abdominal pain, intestinal angina, possibly chronic mesenteric ischemia:  HIDA scan unrevealing on 10/31, PUD ruled out with EGD on 11/5.  Cannot definitively r/o GI pathology  Acute melena:  no longer melanotic stools; however, positive FOBT 11/11/24  10/28/24 - lipase 12.0, CT abd/pelvis with IV contrast was unrevealing  10/31/24 - CT abd with IV contrast & HIDA were unrevealing  11/03/24 - reoccurred, stopped 11/9  Cont scheduled Bentyl   GI consulted, recs appreciated  GI re-consulted to request a colonoscopy 11/12/24 - unable to perform d/t neutropenia  Cont amitriptyline QHS  Cont Protonix 40 mg BID    6.  NEURO   Oligodendroglioma s/p partial resection 2017  See oncology section above for details    Amyloid angiopathy  9/30/24, MRI brain w & w/o contrast - Pattern found to be c/w amyloid angiopathy per neurosurgery with recommendation to avoid anticoagulation as it would significantly increase the risk of ICH; no surgical intervention warranted.    10/10/24, CT head w/o contrast - Stable small foci of increased density parietal and occipital lobe on the right occipital lobe on the left similar to the previous

## 2024-11-26 NOTE — CARE COORDINATION
Spoke with Dina López RN who states she spoke with pt/family at bedside and hospice consents have been signed. She said pt/family is okay with the 3 approved blood/PLT transfusions. Maribel states that pt will discharge to [facility] (aunts) via [facility] at 6pm. The DME (hospital bed, bedside table, walker, wheelchair, shower chair and bedside table) is being delivered at 5pm. SURINDER Fox will be at [facility] at 6:30pm today.     Chelly SALCIDO updated and signed the DNR. Team aware of discharge home with Dina. Dr. Nguyen updated on the 3 approvals for blood/PLT transfusions and no issues noted.             Case Management Assessment            Discharge Note                    Date / Time of Note: 11/26/2024 1:15 PM                  Discharge Note Completed by: JENNIFER Ortiz   for [facility] ([facility])  [employer] Mobile: [phone]    Patient Name: Dennys Peguero   YOB: 1980  Diagnosis: Neutropenic fever (HCC) [D70.9, R50.81]   Date / Time: 10/28/2024  9:21 AM    Current PCP: No primary care provider on file.  Clinic patient: No    Hospitalization in the last 30 days: Yes  Readmission Assessment  Number of Days since last admission?: 8-30 days  Previous Disposition: Home with Family  Who is being Interviewed: Unable to Complete (medical record; pt intubated earlier)  What was the patient's/caregiver's perception as to why they think they needed to return back to the hospital?: Other (Comment) (fever from home and sick)  Did you visit your Primary Care Physician after you left the hospital, before you returned this time?: No  Why weren't you able to visit your PCP?: Did not have an appointment  Did you see a specialist, such as Cardiac, Pulmonary, Orthopedic Physician, etc. after you left the hospital?: Yes  Who advised the patient to return to the hospital?: Caregiver  Does the patient report anything that got in the way of taking their

## 2024-11-26 NOTE — PROGRESS NOTES
Occupational Therapy/Physical Therapy  Attempt    Attempted to see pt this AM for therapy. Pt in bed sleeping upon arrival with family members present. Pt's family requesting to hold therapy at this time so patient can rest. Will re-attempt later this date as appropriate      Morenita Mendoza, HERMELINDA, OTR/L [id number]  Abimbola Landon PT, DPT, NCS, CSRS

## 2024-11-26 NOTE — PLAN OF CARE
Problem: Pain  Goal: Verbalizes/displays adequate comfort level or baseline comfort level  Outcome: Progressing  Verbalizes/displays adequate comfort level or baseline comfort level:   Encourage patient to monitor pain and request assistance   Administer analgesics based on type and severity of pain and evaluate response   Consider cultural and social influences on pain and pain management   Assess pain using appropriate pain scale   Implement non-pharmacological measures as appropriate and evaluate response   Notify Licensed Independent Practitioner if interventions unsuccessful or patient reports new pain  Note: No pain noted during this shift.     Problem: Safety - Adult  Goal: Free from fall injury  Outcome: Progressing  Free From Fall Injury:   Instruct family/caregiver on patient safety   Based on caregiver fall risk screen, instruct family/caregiver to ask for assistance with transferring infant if caregiver noted to have fall risk factors    Problem: Skin/Tissue Integrity - Adult  Goal: Skin integrity remains intact  Outcome: Progressing  Skin Integrity Remains Intact:   Monitor for areas of redness and/or skin breakdown   Assess vascular access sites hourly   Every 4-6 hours minimum: Change oxygen saturation probe site   Every 4-6 hours: If on nasal continuous positive airway pressure, respiratory therapy assesses nares and determine need for appliance change or resting period  Note: No new skin breakdown noted during this shift.       Problem: Infection - Adult  Goal: Absence of infection at discharge  Outcome: Progressing  Absence of infection at discharge:   Assess and monitor for signs and symptoms of infection   Monitor all insertion sites i.e., indwelling lines, tubes and drains   [place] appropriate cooling/warming therapies per order   Instruct and encourage patient and family to use good hand hygiene technique   Monitor lab/diagnostic results   Monitor endotracheal (as able) and nasal secretions

## 2024-11-26 NOTE — CARE COORDINATION
Addendum at 12:46pm: Discussed with Dina López RN who is on unit to meet with pt/family. Maribel states they director has only approved 3 blood transfusions/PLT's and will discuss with pt/family, then MD    Addendum at 11:16am: Jessy with [facility] left a message stating Darshana will be here at noon to bedside to meet with pt/family.     Pierre, pt's father updated via phone call who confirmed they will still be here for the meeting    10:21am: Discussed with Mary Kate CADENA and Chelly SALCIDO who was made aware [facility] met with pt/family yesterday at 3:30-4:00pm but would follow up with [facility].      Called and spoke with Jessy at [facility] who states pt wanted to think about hospice yesterday and they plan on following up with pt/family today. Jessy states she will call writer once meeting time arranged.     Sharla COLON, GENIA-S   for [facility] ([facility])  [employer] Mobile: [phone]

## 2024-11-26 NOTE — PROGRESS NOTES
Patient discharged to home via [facility] transportation. Patient discharged with all belongings. PIVS removed. AVS reviewed with patient and all questions and concerns answered.

## 2024-11-27 LAB
BACTERIA CSF CULT: NORMAL
FINAL REPORT: NORMAL
GRAM STN SPEC: NORMAL
PRELIMINARY: NORMAL

## 2024-11-28 LAB
BACTERIA BLD CULT ORG #2: NORMAL
BACTERIA BLD CULT: NORMAL

## 2024-11-29 LAB
BLOOD BANK DISPENSE STATUS: NORMAL
BLOOD BANK DISPENSE STATUS: NORMAL
BLOOD BANK PRODUCT CODE: NORMAL
BLOOD BANK PRODUCT CODE: NORMAL
BPU ID: NORMAL
BPU ID: NORMAL
DESCRIPTION BLOOD BANK: NORMAL
DESCRIPTION BLOOD BANK: NORMAL

## 2024-12-02 LAB
FUNGUS BLD CULT: NORMAL
FUNGUS SPEC CULT: NORMAL
LOEFFLER MB STN SPEC: NORMAL

## 2024-12-03 LAB
ACID FAST STN SPEC QL: NORMAL
MYCOBACTERIUM SPEC CULT: NORMAL

## 2024-12-04 ENCOUNTER — HOSPITAL ENCOUNTER (OUTPATIENT)
Dept: ONCOLOGY | Age: 44
Setting detail: INFUSION SERIES
Discharge: HOME | End: 2024-12-04
Payer: MEDICAID

## 2024-12-04 VITALS
SYSTOLIC BLOOD PRESSURE: 135 MMHG | OXYGEN SATURATION: 100 % | RESPIRATION RATE: 18 BRPM | DIASTOLIC BLOOD PRESSURE: 80 MMHG | HEART RATE: 118 BPM | TEMPERATURE: 98.3 F

## 2024-12-04 DIAGNOSIS — C92.00 ACUTE MYELOID LEUKEMIA NOT HAVING ACHIEVED REMISSION (HCC): Primary | ICD-10-CM

## 2024-12-04 LAB
ABO + RH BLD: NORMAL
BLD GP AB SCN SERPL QL: NORMAL
BLOOD BANK DISPENSE STATUS: NORMAL
BLOOD BANK DISPENSE STATUS: NORMAL
BLOOD BANK PRODUCT CODE: NORMAL
BLOOD BANK PRODUCT CODE: NORMAL
BPU ID: NORMAL
BPU ID: NORMAL
DESCRIPTION BLOOD BANK: NORMAL
DESCRIPTION BLOOD BANK: NORMAL
FUNGUS BLD CULT: ABNORMAL
ORGANISM: ABNORMAL
ORGANISM: ABNORMAL

## 2024-12-04 PROCEDURE — P9040 RBC LEUKOREDUCED IRRADIATED: HCPCS

## 2024-12-04 PROCEDURE — 86900 BLOOD TYPING SEROLOGIC ABO: CPT

## 2024-12-04 PROCEDURE — 86901 BLOOD TYPING SEROLOGIC RH(D): CPT

## 2024-12-04 PROCEDURE — 86850 RBC ANTIBODY SCREEN: CPT

## 2024-12-04 PROCEDURE — 86923 COMPATIBILITY TEST ELECTRIC: CPT

## 2024-12-04 PROCEDURE — 76937 US GUIDE VASCULAR ACCESS: CPT

## 2024-12-04 PROCEDURE — P9036 PLATELET PHERESIS IRRADIATED: HCPCS

## 2024-12-04 PROCEDURE — 36430 TRANSFUSION BLD/BLD COMPNT: CPT

## 2024-12-04 RX ORDER — ACETAMINOPHEN 325 MG/1
650 TABLET ORAL
OUTPATIENT
Start: 2024-12-04

## 2024-12-04 RX ORDER — ONDANSETRON 2 MG/ML
8 INJECTION INTRAMUSCULAR; INTRAVENOUS
OUTPATIENT
Start: 2024-12-04

## 2024-12-04 RX ORDER — HYDROCORTISONE SODIUM SUCCINATE 100 MG/2ML
100 INJECTION INTRAMUSCULAR; INTRAVENOUS
OUTPATIENT
Start: 2024-12-04

## 2024-12-04 RX ORDER — SODIUM CHLORIDE 9 MG/ML
INJECTION, SOLUTION INTRAVENOUS CONTINUOUS
OUTPATIENT
Start: 2024-12-04

## 2024-12-04 RX ORDER — DIPHENHYDRAMINE HYDROCHLORIDE 50 MG/ML
50 INJECTION INTRAMUSCULAR; INTRAVENOUS
OUTPATIENT
Start: 2024-12-04

## 2024-12-04 RX ORDER — SODIUM CHLORIDE 0.9 % (FLUSH) 0.9 %
5-40 SYRINGE (ML) INJECTION PRN
OUTPATIENT
Start: 2024-12-04

## 2024-12-04 RX ORDER — SODIUM CHLORIDE 9 MG/ML
20 INJECTION, SOLUTION INTRAVENOUS CONTINUOUS
OUTPATIENT
Start: 2024-12-04

## 2024-12-04 RX ORDER — EPINEPHRINE 1 MG/ML
0.3 INJECTION, SOLUTION INTRAMUSCULAR; SUBCUTANEOUS PRN
OUTPATIENT
Start: 2024-12-04

## 2024-12-04 RX ORDER — ALBUTEROL SULFATE 90 UG/1
4 INHALANT RESPIRATORY (INHALATION) PRN
OUTPATIENT
Start: 2024-12-04

## 2024-12-04 RX ORDER — SODIUM CHLORIDE 9 MG/ML
25 INJECTION, SOLUTION INTRAVENOUS PRN
OUTPATIENT
Start: 2024-12-04

## 2024-12-04 NOTE — PROGRESS NOTES
Patient's hemoglobin this AM: 7.6  Patient's platelet count this AM: 18.0      Pt seen at [facility] today for  Packed red blood cell transfusion  and Platelet Transfusion for above lab values per order. Informed consent verified. No Pre-medications ordered with no previous transfusion reaction history. Transfused per policy. Pt tolerated transfusion well and without incident.  Pt verbalizes understanding of discharge instructions.  Discharged to home with family members.

## 2024-12-09 LAB
FUNGUS BLD CULT: NORMAL
FUNGUS SPEC CULT: NORMAL
LOEFFLER MB STN SPEC: NORMAL

## 2024-12-10 LAB
ACID FAST STN SPEC QL: NORMAL
MYCOBACTERIUM SPEC CULT: NORMAL

## 2024-12-11 ENCOUNTER — HOSPITAL ENCOUNTER (OUTPATIENT)
Dept: ONCOLOGY | Age: 44
Setting detail: INFUSION SERIES
Discharge: HOME | End: 2024-12-11
Payer: MEDICAID

## 2024-12-11 VITALS
HEART RATE: 98 BPM | DIASTOLIC BLOOD PRESSURE: 73 MMHG | SYSTOLIC BLOOD PRESSURE: 115 MMHG | RESPIRATION RATE: 16 BRPM | OXYGEN SATURATION: 98 % | TEMPERATURE: 99.9 F

## 2024-12-11 DIAGNOSIS — C92.00 ACUTE MYELOID LEUKEMIA NOT HAVING ACHIEVED REMISSION (HCC): Primary | ICD-10-CM

## 2024-12-11 PROCEDURE — 86900 BLOOD TYPING SEROLOGIC ABO: CPT

## 2024-12-11 PROCEDURE — 36430 TRANSFUSION BLD/BLD COMPNT: CPT

## 2024-12-11 PROCEDURE — P9040 RBC LEUKOREDUCED IRRADIATED: HCPCS

## 2024-12-11 PROCEDURE — 86901 BLOOD TYPING SEROLOGIC RH(D): CPT

## 2024-12-11 PROCEDURE — 86923 COMPATIBILITY TEST ELECTRIC: CPT

## 2024-12-11 PROCEDURE — 86850 RBC ANTIBODY SCREEN: CPT

## 2024-12-11 RX ORDER — ACETAMINOPHEN 325 MG/1
650 TABLET ORAL
OUTPATIENT
Start: 2024-12-11

## 2024-12-11 RX ORDER — HYDROCORTISONE SODIUM SUCCINATE 100 MG/2ML
100 INJECTION INTRAMUSCULAR; INTRAVENOUS
OUTPATIENT
Start: 2024-12-11

## 2024-12-11 RX ORDER — ALBUTEROL SULFATE 90 UG/1
4 INHALANT RESPIRATORY (INHALATION) PRN
OUTPATIENT
Start: 2024-12-11

## 2024-12-11 RX ORDER — DIPHENHYDRAMINE HYDROCHLORIDE 50 MG/ML
50 INJECTION INTRAMUSCULAR; INTRAVENOUS
OUTPATIENT
Start: 2024-12-11

## 2024-12-11 RX ORDER — EPINEPHRINE 1 MG/ML
0.3 INJECTION, SOLUTION INTRAMUSCULAR; SUBCUTANEOUS PRN
OUTPATIENT
Start: 2024-12-11

## 2024-12-11 RX ORDER — SODIUM CHLORIDE 9 MG/ML
25 INJECTION, SOLUTION INTRAVENOUS PRN
OUTPATIENT
Start: 2024-12-11

## 2024-12-11 RX ORDER — SODIUM CHLORIDE 9 MG/ML
INJECTION, SOLUTION INTRAVENOUS CONTINUOUS
OUTPATIENT
Start: 2024-12-11

## 2024-12-11 RX ORDER — ONDANSETRON 2 MG/ML
8 INJECTION INTRAMUSCULAR; INTRAVENOUS
OUTPATIENT
Start: 2024-12-11

## 2024-12-11 RX ORDER — SODIUM CHLORIDE 0.9 % (FLUSH) 0.9 %
5-40 SYRINGE (ML) INJECTION PRN
OUTPATIENT
Start: 2024-12-11

## 2024-12-11 RX ORDER — SODIUM CHLORIDE 9 MG/ML
20 INJECTION, SOLUTION INTRAVENOUS CONTINUOUS
OUTPATIENT
Start: 2024-12-11

## 2024-12-11 NOTE — PROGRESS NOTES
Patient's hemoglobin this AM: 6.9  Patient's platelet count this AM: 39  Pt seen at [facility] today for  Packed red blood cell transfusion  for above lab values per order. Blood product transfusion informed consent note current and due to be resigned by ordering physician every 90 days on 3/5/2025. No Pre-medications ordered with no previous transfusion reaction history. Transfused per policy. Pt tolerated transfusion well and without incident.  Pt verbalizes understanding of discharge instructions.  Discharged via W/C to home with family.    Jessy Nolan RN

## 2024-12-12 ENCOUNTER — HOSPITAL ENCOUNTER (OUTPATIENT)
Age: 44
Setting detail: SPECIMEN
Discharge: HOME | End: 2024-12-12
Payer: MEDICAID

## 2024-12-12 PROCEDURE — 38204 BL DONOR SEARCH MANAGEMENT: CPT | Performed by: INTERNAL MEDICINE

## 2024-12-16 LAB
FUNGUS BLD CULT: NORMAL
FUNGUS SPEC CULT: NORMAL
LOEFFLER MB STN SPEC: NORMAL

## 2024-12-17 LAB
ACID FAST STN SPEC QL: NORMAL
MYCOBACTERIUM SPEC CULT: NORMAL

## 2024-12-18 ENCOUNTER — HOSPITAL ENCOUNTER (OUTPATIENT)
Dept: ONCOLOGY | Age: 44
Setting detail: INFUSION SERIES
Discharge: HOME | End: 2024-12-18
Payer: MEDICAID

## 2024-12-18 VITALS
DIASTOLIC BLOOD PRESSURE: 81 MMHG | RESPIRATION RATE: 16 BRPM | OXYGEN SATURATION: 100 % | TEMPERATURE: 98.7 F | SYSTOLIC BLOOD PRESSURE: 128 MMHG | HEART RATE: 81 BPM

## 2024-12-18 DIAGNOSIS — C92.00 ACUTE MYELOID LEUKEMIA NOT HAVING ACHIEVED REMISSION (HCC): Primary | ICD-10-CM

## 2024-12-18 PROCEDURE — 86900 BLOOD TYPING SEROLOGIC ABO: CPT

## 2024-12-18 PROCEDURE — P9040 RBC LEUKOREDUCED IRRADIATED: HCPCS

## 2024-12-18 PROCEDURE — 86901 BLOOD TYPING SEROLOGIC RH(D): CPT

## 2024-12-18 PROCEDURE — 36430 TRANSFUSION BLD/BLD COMPNT: CPT

## 2024-12-18 PROCEDURE — 86850 RBC ANTIBODY SCREEN: CPT

## 2024-12-18 PROCEDURE — 86923 COMPATIBILITY TEST ELECTRIC: CPT

## 2024-12-18 RX ORDER — ACETAMINOPHEN 325 MG/1
650 TABLET ORAL
OUTPATIENT
Start: 2024-12-18

## 2024-12-18 RX ORDER — ALBUTEROL SULFATE 90 UG/1
4 INHALANT RESPIRATORY (INHALATION) PRN
OUTPATIENT
Start: 2024-12-18

## 2024-12-18 RX ORDER — ONDANSETRON 2 MG/ML
8 INJECTION INTRAMUSCULAR; INTRAVENOUS
OUTPATIENT
Start: 2024-12-18

## 2024-12-18 RX ORDER — DIPHENHYDRAMINE HYDROCHLORIDE 50 MG/ML
50 INJECTION INTRAMUSCULAR; INTRAVENOUS
OUTPATIENT
Start: 2024-12-18

## 2024-12-18 RX ORDER — SODIUM CHLORIDE 9 MG/ML
INJECTION, SOLUTION INTRAVENOUS CONTINUOUS
OUTPATIENT
Start: 2024-12-18

## 2024-12-18 RX ORDER — SODIUM CHLORIDE 9 MG/ML
25 INJECTION, SOLUTION INTRAVENOUS PRN
OUTPATIENT
Start: 2024-12-18

## 2024-12-18 RX ORDER — SODIUM CHLORIDE 9 MG/ML
20 INJECTION, SOLUTION INTRAVENOUS CONTINUOUS
OUTPATIENT
Start: 2024-12-18

## 2024-12-18 RX ORDER — HYDROCORTISONE SODIUM SUCCINATE 100 MG/2ML
100 INJECTION INTRAMUSCULAR; INTRAVENOUS
OUTPATIENT
Start: 2024-12-18

## 2024-12-18 RX ORDER — SODIUM CHLORIDE 0.9 % (FLUSH) 0.9 %
5-40 SYRINGE (ML) INJECTION PRN
OUTPATIENT
Start: 2024-12-18

## 2024-12-18 RX ORDER — EPINEPHRINE 1 MG/ML
0.3 INJECTION, SOLUTION INTRAMUSCULAR; SUBCUTANEOUS PRN
OUTPATIENT
Start: 2024-12-18

## 2024-12-18 NOTE — PROGRESS NOTES
Patient's hemoglobin this AM: 6.9  Patient's platelet count this AM: 36.0    Pt seen at [facility] today for  Packed red blood cell transfusion  for above lab values per order. Informed consent verified. No Pre-medications ordered with no previous transfusion reaction history. Transfused per policy. Pt tolerated transfusion well and without incident.  Pt verbalizes understanding of discharge instructions.  Discharged to home with family members.

## 2024-12-23 LAB
FUNGUS BLD CULT: NORMAL
FUNGUS SPEC CULT: NORMAL
LOEFFLER MB STN SPEC: NORMAL

## 2024-12-24 LAB
ACID FAST STN SPEC QL: NORMAL
MYCOBACTERIUM SPEC CULT: NORMAL

## 2024-12-27 ENCOUNTER — HOSPITAL ENCOUNTER (OUTPATIENT)
Dept: ONCOLOGY | Age: 44
Setting detail: INFUSION SERIES
Discharge: HOME | End: 2024-12-27
Payer: MEDICAID

## 2024-12-27 VITALS
RESPIRATION RATE: 18 BRPM | TEMPERATURE: 98.5 F | DIASTOLIC BLOOD PRESSURE: 81 MMHG | HEART RATE: 96 BPM | OXYGEN SATURATION: 99 % | SYSTOLIC BLOOD PRESSURE: 126 MMHG

## 2024-12-27 DIAGNOSIS — C92.00 ACUTE MYELOID LEUKEMIA NOT HAVING ACHIEVED REMISSION (HCC): Primary | ICD-10-CM

## 2024-12-27 PROCEDURE — 86923 COMPATIBILITY TEST ELECTRIC: CPT

## 2024-12-27 PROCEDURE — P9040 RBC LEUKOREDUCED IRRADIATED: HCPCS

## 2024-12-27 PROCEDURE — 86900 BLOOD TYPING SEROLOGIC ABO: CPT

## 2024-12-27 PROCEDURE — 86850 RBC ANTIBODY SCREEN: CPT

## 2024-12-27 PROCEDURE — 36430 TRANSFUSION BLD/BLD COMPNT: CPT

## 2024-12-27 PROCEDURE — 86901 BLOOD TYPING SEROLOGIC RH(D): CPT

## 2024-12-27 RX ORDER — DIPHENHYDRAMINE HYDROCHLORIDE 50 MG/ML
50 INJECTION INTRAMUSCULAR; INTRAVENOUS
OUTPATIENT
Start: 2024-12-27

## 2024-12-27 RX ORDER — EPINEPHRINE 1 MG/ML
0.3 INJECTION, SOLUTION INTRAMUSCULAR; SUBCUTANEOUS PRN
OUTPATIENT
Start: 2024-12-27

## 2024-12-27 RX ORDER — HYDROCORTISONE SODIUM SUCCINATE 100 MG/2ML
100 INJECTION INTRAMUSCULAR; INTRAVENOUS
OUTPATIENT
Start: 2024-12-27

## 2024-12-27 RX ORDER — SODIUM CHLORIDE 9 MG/ML
INJECTION, SOLUTION INTRAVENOUS CONTINUOUS
OUTPATIENT
Start: 2024-12-27

## 2024-12-27 RX ORDER — SODIUM CHLORIDE 9 MG/ML
25 INJECTION, SOLUTION INTRAVENOUS PRN
OUTPATIENT
Start: 2024-12-27

## 2024-12-27 RX ORDER — ONDANSETRON 2 MG/ML
8 INJECTION INTRAMUSCULAR; INTRAVENOUS
OUTPATIENT
Start: 2024-12-27

## 2024-12-27 RX ORDER — ACETAMINOPHEN 325 MG/1
650 TABLET ORAL
OUTPATIENT
Start: 2024-12-27

## 2024-12-27 RX ORDER — ALBUTEROL SULFATE 90 UG/1
4 INHALANT RESPIRATORY (INHALATION) PRN
OUTPATIENT
Start: 2024-12-27

## 2024-12-27 RX ORDER — SODIUM CHLORIDE 0.9 % (FLUSH) 0.9 %
5-40 SYRINGE (ML) INJECTION PRN
OUTPATIENT
Start: 2024-12-27

## 2024-12-27 RX ORDER — SODIUM CHLORIDE 9 MG/ML
20 INJECTION, SOLUTION INTRAVENOUS CONTINUOUS
OUTPATIENT
Start: 2024-12-27

## 2024-12-27 NOTE — PROGRESS NOTES
Patient's hemoglobin this AM: 7.5  Patient's platelet count this AM: 31    Pt seen at [facility] today for  Packed red blood cell transfusion  for above lab values per order. Informed consent verified. No Pre-medications ordered with no previous transfusion reaction history. Transfused per policy. Pt tolerated transfusion well and without incident.  Pt verbalizes understanding of discharge instructions.  Discharged to home with family members.

## 2025-01-17 ENCOUNTER — HOSPITAL ENCOUNTER (OUTPATIENT)
Dept: ONCOLOGY | Age: 45
Setting detail: INFUSION SERIES
Discharge: HOME | End: 2025-01-17
Payer: MEDICAID

## 2025-01-17 VITALS
SYSTOLIC BLOOD PRESSURE: 114 MMHG | HEART RATE: 88 BPM | RESPIRATION RATE: 16 BRPM | OXYGEN SATURATION: 100 % | DIASTOLIC BLOOD PRESSURE: 74 MMHG | TEMPERATURE: 98.7 F

## 2025-01-17 DIAGNOSIS — C92.00 ACUTE MYELOID LEUKEMIA NOT HAVING ACHIEVED REMISSION (HCC): Primary | ICD-10-CM

## 2025-01-17 PROCEDURE — 86901 BLOOD TYPING SEROLOGIC RH(D): CPT

## 2025-01-17 PROCEDURE — 86850 RBC ANTIBODY SCREEN: CPT

## 2025-01-17 PROCEDURE — P9040 RBC LEUKOREDUCED IRRADIATED: HCPCS

## 2025-01-17 PROCEDURE — 36430 TRANSFUSION BLD/BLD COMPNT: CPT

## 2025-01-17 PROCEDURE — 86900 BLOOD TYPING SEROLOGIC ABO: CPT

## 2025-01-17 PROCEDURE — 86923 COMPATIBILITY TEST ELECTRIC: CPT

## 2025-01-17 RX ORDER — ONDANSETRON 2 MG/ML
8 INJECTION INTRAMUSCULAR; INTRAVENOUS
OUTPATIENT
Start: 2025-01-17

## 2025-01-17 RX ORDER — EPINEPHRINE 1 MG/ML
0.3 INJECTION, SOLUTION INTRAMUSCULAR; SUBCUTANEOUS PRN
OUTPATIENT
Start: 2025-01-17

## 2025-01-17 RX ORDER — ACETAMINOPHEN 325 MG/1
650 TABLET ORAL
OUTPATIENT
Start: 2025-01-17

## 2025-01-17 RX ORDER — SODIUM CHLORIDE 9 MG/ML
INJECTION, SOLUTION INTRAVENOUS CONTINUOUS
OUTPATIENT
Start: 2025-01-17

## 2025-01-17 RX ORDER — DIPHENHYDRAMINE HYDROCHLORIDE 50 MG/ML
50 INJECTION INTRAMUSCULAR; INTRAVENOUS
OUTPATIENT
Start: 2025-01-17

## 2025-01-17 RX ORDER — HYDROCORTISONE SODIUM SUCCINATE 100 MG/2ML
100 INJECTION INTRAMUSCULAR; INTRAVENOUS
OUTPATIENT
Start: 2025-01-17

## 2025-01-17 RX ORDER — ALBUTEROL SULFATE 90 UG/1
4 INHALANT RESPIRATORY (INHALATION) PRN
OUTPATIENT
Start: 2025-01-17

## 2025-01-17 RX ORDER — SODIUM CHLORIDE 0.9 % (FLUSH) 0.9 %
5-40 SYRINGE (ML) INJECTION PRN
OUTPATIENT
Start: 2025-01-17

## 2025-01-17 RX ORDER — SODIUM CHLORIDE 9 MG/ML
25 INJECTION, SOLUTION INTRAVENOUS PRN
OUTPATIENT
Start: 2025-01-17

## 2025-01-17 RX ORDER — SODIUM CHLORIDE 9 MG/ML
20 INJECTION, SOLUTION INTRAVENOUS CONTINUOUS
OUTPATIENT
Start: 2025-01-17

## (undated) DEVICE — SUTURE MONOCRYL + SZ 4-0 L27IN ABSRB UD L19MM PS-2 3/8 CIR MCP426H

## (undated) DEVICE — RELOAD STPL 40MM H1.5-3.5MM WIRE 0.2MM REG TISS BLU CRV

## (undated) DEVICE — SEAL

## (undated) DEVICE — SEALER/DIVIDER LAP SHFT L44CM JAW APER 11.4MM 315DEG ROT

## (undated) DEVICE — FORCEPS BX L240CM WRK CHN 2.8MM STD CAP W/ NDL MIC MESH

## (undated) DEVICE — STERILE PVP: Brand: MEDLINE INDUSTRIES, INC.

## (undated) DEVICE — TIP-UP FENESTRATED GRASPER: Brand: ENDOWRIST

## (undated) DEVICE — Z DISCONTINUED USE 2749457 TUBING SAMP AD W12.5XH8.4IN D9.1IN NSL ORAL SMRT CAPNOLINE

## (undated) DEVICE — SOLUTION BOWL: Brand: KENDALL

## (undated) DEVICE — BLADE,CARBON-STEEL,10,STRL,DISPOSABLE,TB: Brand: MEDLINE

## (undated) DEVICE — LIQUIBAND RAPID ADHESIVE 36/CS 0.8ML: Brand: MEDLINE

## (undated) DEVICE — SOLUTION ANTIFOG VIS SYS CLEARIFY LAPSCP

## (undated) DEVICE — CATHETER DRNGE 34FR 2 EYE PROPORTIONATE HD DISP FOR

## (undated) DEVICE — TROCAR: Brand: KII FIOS FIRST ENTRY

## (undated) DEVICE — STAPLER INT DIA29MM CLS STPL H1.5-2.2MM OPN LEG L5.2MM 26

## (undated) DEVICE — LAPAROSCOPIC ACCESS SYSTEM: Brand: ALEXIS LAPAROSCOPIC SYSTEM

## (undated) DEVICE — STAPLER 60: Brand: SUREFORM

## (undated) DEVICE — LEGGINGS, PAIR, 31X48, STERILE: Brand: MEDLINE

## (undated) DEVICE — STAPLER 60 RELOAD BLUE: Brand: SUREFORM

## (undated) DEVICE — ARM DRAPE

## (undated) DEVICE — SYRINGE MED 10ML SLIP TIP BLNT FILL AND LUERLOCK DISP

## (undated) DEVICE — ROBOTIC: Brand: MEDLINE INDUSTRIES, INC.

## (undated) DEVICE — SPONGE,LAP,18"X18",DLX,XR,ST,5/PK,40/PK: Brand: MEDLINE

## (undated) DEVICE — SUTURE VICRYL + SZ 0 L27IN ABSRB UD CT-1 L36MM 1/2 CIR TAPR VCP260H

## (undated) DEVICE — TIP COVER ACCESSORY

## (undated) DEVICE — 1LYRTR 16FR10ML100%SIL UMS SNP: Brand: MEDLINE INDUSTRIES, INC.

## (undated) DEVICE — SUTURE VICRYL + SZ 3-0 L18IN ABSRB UD SH 1/2 CIR TAPERCUT NDL VCP864D

## (undated) DEVICE — PUMP SUC IRR TBNG L10FT W/ HNDPC ASSEMB STRYKEFLOW 2

## (undated) DEVICE — SNARE ENDOSCP L240CM DIA2.4MM LOOP W20MM RND INSUL STIFF

## (undated) DEVICE — VESSEL SEALER EXTEND: Brand: ENDOWRIST

## (undated) DEVICE — SUTURE VICRYL SZ 0 L27IN ABSRB UD L36MM CT-1 1/2 CIR J260H

## (undated) DEVICE — ERBE NESSY®PLATE 170 SPLIT; 168CM²; CABLE 3M: Brand: ERBE

## (undated) DEVICE — DRAPE,UNDERBUTTOCKS,PCH,STERILE: Brand: MEDLINE

## (undated) DEVICE — 40583 XL ADVANCED TRENDELENBURG POSITIONING KIT: Brand: 40583 XL ADVANCED TRENDELENBURG POSITIONING KIT

## (undated) DEVICE — SUTURE PDS II SZ 0 L27IN ABSRB VLT L36MM CT-1 1/2 CIR Z340H

## (undated) DEVICE — SOLUTION INJ LR VISIV 1000ML BG

## (undated) DEVICE — PROTECTOR EYE AD FOAM RIG IGUARD

## (undated) DEVICE — REDUCER: Brand: ENDOWRIST

## (undated) DEVICE — APPLICATOR MEDICATED 26 CC SOLUTION HI LT ORNG CHLORAPREP

## (undated) DEVICE — STAPLER EXT 65MM S STL AUTO DISP PURSTRING

## (undated) DEVICE — SYRINGE CATH TIP 50ML

## (undated) DEVICE — ENDOSCOPY KIT: Brand: MEDLINE INDUSTRIES, INC.

## (undated) DEVICE — SYSTEM SMK EVAC LAP TBNG FILTER HSNG BENT STYL PNK SEE CLR

## (undated) DEVICE — SOLUTION IV 1000ML 0.9% SOD CHL PH 5 INJ USP VIAFLX PLAS

## (undated) DEVICE — TOWEL,STOP FLAG GOLD N-W: Brand: MEDLINE

## (undated) DEVICE — PREMIUM WET SKIN PREP TRAY: Brand: MEDLINE INDUSTRIES, INC.

## (undated) DEVICE — 40586 ADVANCED TRENDELENBURG POSITIONING KIT: Brand: 40586 ADVANCED TRENDELENBURG POSITIONING KIT

## (undated) DEVICE — 3M™ IOBAN™ 2 ANTIMICROBIAL INCISE DRAPE 6648EZ: Brand: IOBAN™ 2

## (undated) DEVICE — GLOVE SURG SZ 7 CRM LTX FREE POLYISOPRENE POLYMER BEAD ANTI

## (undated) DEVICE — NEEDLE,22GX1.5",REG,BEVEL: Brand: MEDLINE

## (undated) DEVICE — BITE BLOCK ENDOSCP AD 60 FR W/ ADJ STRP PLAS GRN BLOX

## (undated) DEVICE — Device

## (undated) DEVICE — BLADELESS OBTURATOR: Brand: WECK VISTA